# Patient Record
Sex: FEMALE | Race: WHITE | NOT HISPANIC OR LATINO | Employment: OTHER | URBAN - METROPOLITAN AREA
[De-identification: names, ages, dates, MRNs, and addresses within clinical notes are randomized per-mention and may not be internally consistent; named-entity substitution may affect disease eponyms.]

---

## 2017-01-16 DIAGNOSIS — G89.29 OTHER CHRONIC PAIN: ICD-10-CM

## 2017-01-16 DIAGNOSIS — G89.4 CHRONIC PAIN SYNDROME: ICD-10-CM

## 2017-01-16 DIAGNOSIS — S76.311S HAMSTRING MUSCLE STRAIN, RIGHT, SEQUELA: Primary | ICD-10-CM

## 2017-01-16 NOTE — TELEPHONE ENCOUNTER
Controlled Substance Refill Request for Tramadol 50mg  Problem List Complete:  No     PROVIDER TO CONSIDER COMPLETION OF PROBLEM LIST AND OVERVIEW/CONTROLLED SUBSTANCE AGREEMENT    Last Written Prescription Date:  12/15/2016  Last Fill Quantity: 75,12/15/2016   # refills: 0    Last Office Visit with [facility] [place] care provider: 12/15/2016-Dr Oconnor    Future Office visit:     Controlled substance agreement on file: Yes:  Date 1/7/2015.     Processing:  Fax Rx to [facility] pharmacy   checked in past 6 months?  Yes 9/17/2016

## 2017-01-17 RX ORDER — TRAMADOL HYDROCHLORIDE 50 MG/1
50 TABLET ORAL EVERY 6 HOURS PRN
Qty: 75 TABLET | Refills: 0 | Status: SHIPPED | OUTPATIENT
Start: 2017-01-17 | End: 2017-03-14

## 2017-01-17 RX ORDER — HYDROCODONE BITARTRATE AND ACETAMINOPHEN 10; 325 MG/1; MG/1
TABLET ORAL
Qty: 20 TABLET | Refills: 0 | Status: SHIPPED | OUTPATIENT
Start: 2017-01-17 | End: 2017-03-16

## 2017-01-17 NOTE — TELEPHONE ENCOUNTER
Controlled Substance Refill Request for Norco Oral TAblet 10-325mg  Problem List Complete:  No     PROVIDER TO CONSIDER COMPLETION OF PROBLEM LIST AND OVERVIEW/CONTROLLED SUBSTANCE AGREEMENT    Last Written Prescription Date:  12/15/2016  Last Fill Quantity: 20,12/15/2016   # refills: 0    Last Office Visit with [facility] [place] care provider: 12/15/2016-Dr Oconnor    Future Office visit:     Controlled substance agreement on file: Yes:  Date 1/7/2015.     Processing:  Fax Rx to [facility] pharmacy   checked in past 6 months?  Yes 9/7/2016

## 2017-03-14 DIAGNOSIS — S76.311S HAMSTRING MUSCLE STRAIN, RIGHT, SEQUELA: ICD-10-CM

## 2017-03-14 DIAGNOSIS — G89.4 CHRONIC PAIN SYNDROME: ICD-10-CM

## 2017-03-15 DIAGNOSIS — G89.29 OTHER CHRONIC PAIN: ICD-10-CM

## 2017-03-15 NOTE — TELEPHONE ENCOUNTER
Controlled Substance Refill Request for traMADol (ULTRAM) 50 MG tablet  Problem List Complete:  No     PROVIDER TO CONSIDER COMPLETION OF PROBLEM LIST AND OVERVIEW/CONTROLLED SUBSTANCE AGREEMENT    Last Written Prescription Date:  1/17/17  Last Fill Quantity: 75,   # refills: 0    Last Office Visit with [facility] [place]: 12/15/2016 With Dr. Oconnor.      Future Office visit:     Controlled substance agreement on file: Yes:  Date 1/7/15.     Processing:  Fax Rx to [facility] pharmacy   checked in past 6 months?  No, route to RN  9/17/16    ROLAND Seymour

## 2017-03-16 RX ORDER — CYCLOBENZAPRINE HCL 10 MG
TABLET ORAL
Qty: 30 TABLET | Refills: 1 | Status: SHIPPED | OUTPATIENT
Start: 2017-03-16 | End: 2017-07-17

## 2017-03-16 RX ORDER — TRAMADOL HYDROCHLORIDE 50 MG/1
TABLET ORAL
Qty: 75 TABLET | Refills: 0 | Status: SHIPPED | OUTPATIENT
Start: 2017-03-16 | End: 2017-04-17

## 2017-03-16 RX ORDER — HYDROCODONE BITARTRATE AND ACETAMINOPHEN 10; 325 MG/1; MG/1
TABLET ORAL
Qty: 20 TABLET | Refills: 0 | Status: SHIPPED | OUTPATIENT
Start: 2017-03-16 | End: 2017-04-17

## 2017-03-16 NOTE — TELEPHONE ENCOUNTER
Pending Prescriptions:                       Disp   Refills    cyclobenzaprine (FLEXERIL) 10 MG tablet [*30 tab*1            Sig: TAKE 1 TABLET (10 MG) BY MOUTH NIGHTLY AS NEEDED           FOR MUSCLE SPASMS    HYDROcodone-acetaminophen (NORCO)  *20 tab*0            Sig: One tab by mouth twice daily as needed LAST           REFILL NEEDS VISIT    Controlled Substance Refill Request for Hydrocodone  Problem List Complete:  Yes  Patient is followed by GABINO JONES for ongoing prescription of pain medication. All refills should be approved by this provider, or covering partner.     Medication(s):   Tramadol, hydrocodone.   Maximum quantity per month:75/20 Clinic visit frequency required: Q 4 months      Controlled substance agreement:      Encounter-Level CSA - 1/7/15:                   Controlled Substance Agreement - Scan on 1/8/2015 9:46 AM :  Clinics Controlled Medication Agreement 1-7-15 (below)          Pain Clinic evaluation in the past: No     DIRE Total Score(s):  No flowsheet data found.     Last [facility] website verification: 9/7/16     checked in past 6 months?  Yes 09/07/2016     Please call patient on her cell phone when Rx's are ready to be picked up.  Orquidea Herzog

## 2017-03-16 NOTE — TELEPHONE ENCOUNTER
[place] reviewed:  not reviewed/not due - last done on 9/7/16    [place]  [email or web address]

## 2017-03-16 NOTE — TELEPHONE ENCOUNTER
[place] reviewed:  not reviewed/not due - last done on 9/7/16    [place] profile:  [email or web address]

## 2017-04-17 DIAGNOSIS — S76.311S HAMSTRING MUSCLE STRAIN, RIGHT, SEQUELA: ICD-10-CM

## 2017-04-17 DIAGNOSIS — G89.29 OTHER CHRONIC PAIN: ICD-10-CM

## 2017-04-17 DIAGNOSIS — G89.4 CHRONIC PAIN SYNDROME: ICD-10-CM

## 2017-04-17 RX ORDER — HYDROCODONE BITARTRATE AND ACETAMINOPHEN 10; 325 MG/1; MG/1
TABLET ORAL
Qty: 20 TABLET | Refills: 0 | Status: SHIPPED | OUTPATIENT
Start: 2017-04-17 | End: 2017-06-06

## 2017-04-17 RX ORDER — TRAMADOL HYDROCHLORIDE 50 MG/1
50 TABLET ORAL EVERY 6 HOURS PRN
Qty: 75 TABLET | Refills: 0 | Status: SHIPPED | OUTPATIENT
Start: 2017-04-17 | End: 2017-05-09

## 2017-04-17 NOTE — TELEPHONE ENCOUNTER
Controlled Substance Refill Request for Norco  Mg & Tramadol 50 Mg  Problem List Complete:  Yes   checked in past 6 months?  Yes 12/15/2016     Patient would like to  the RX for Norco  Mg & she would like the RX for Tramadol 50 Mg to be faxed to [facility] pharmacy in [place].  Please call patient when she can come over and  the paper prescription.  Patient can be reached at [phone].    Paola Manzo/

## 2017-05-09 ENCOUNTER — TELEPHONE (OUTPATIENT)
Dept: FAMILY MEDICINE | Facility: CLINIC | Age: 62
End: 2017-05-09

## 2017-05-09 DIAGNOSIS — G89.4 CHRONIC PAIN SYNDROME: ICD-10-CM

## 2017-05-09 DIAGNOSIS — S76.311S HAMSTRING MUSCLE STRAIN, RIGHT, SEQUELA: ICD-10-CM

## 2017-05-09 NOTE — TELEPHONE ENCOUNTER
5/9/2017    Call Regarding Preventive Health Screening Mammogram    Attempt 1    Message on voicemail     Comments:         Outreach   PAULY

## 2017-05-09 NOTE — TELEPHONE ENCOUNTER
Controlled Substance Refill Request for traMADol (ULTRAM) 50 MG tablet  Problem List Complete:  Yes    Overview Addendum 9/7/2016 11:11 AM by Gayle Broderick RN      Patient is followed by GABINO JONES for ongoing prescription of pain medication. All refills should be approved by this provider, or covering partner.     Medication(s):   Tramadol, hydrocodone.   Maximum quantity per month:75/20 Clinic visit frequency required: Q 4 months      Controlled substance agreement:      Encounter-Level CSA - 1/7/15:                   Controlled Substance Agreement - Scan on 1/8/2015 9:46 AM :  Clinics Controlled Medication Agreement 1-7-15 (below)          Pain Clinic evaluation in the past: No     DIRE Total Score(s):  No flowsheet data found.         Last Written Prescription Date:  4/17/17  Last Fill Quantity: 75,   # refills: 0    Last Office Visit with [facility] primary care provider: 12/15/2016      Clinic visit frequency required: Q 4 months     Future Office visit:     Controlled substance agreement on file: Yes:  Date 1/7/15.     Processing:  Fax Rx to [facility] pharmacy   checked in past 6 months?  Yes 12/15/16      ROLAND Seymour  May 9, 2017  4:58 PM

## 2017-05-12 RX ORDER — TRAMADOL HYDROCHLORIDE 50 MG/1
TABLET ORAL
Qty: 50 TABLET | Refills: 0 | Status: SHIPPED | OUTPATIENT
Start: 2017-05-12 | End: 2017-06-02

## 2017-05-12 NOTE — TELEPHONE ENCOUNTER
Prescription faxed to [facility], 05/12/17    Teagan Llamas/KATRINA

## 2017-06-02 DIAGNOSIS — S76.311S HAMSTRING MUSCLE STRAIN, RIGHT, SEQUELA: ICD-10-CM

## 2017-06-02 DIAGNOSIS — G89.4 CHRONIC PAIN SYNDROME: ICD-10-CM

## 2017-06-05 RX ORDER — TRAMADOL HYDROCHLORIDE 50 MG/1
TABLET ORAL
Qty: 50 TABLET | Refills: 0 | Status: SHIPPED | OUTPATIENT
Start: 2017-06-05 | End: 2017-06-27

## 2017-06-05 NOTE — TELEPHONE ENCOUNTER
Pending Prescriptions:                       Disp   Refills    traMADol (ULTRAM) 50 MG tablet [Pharmacy *50 tab*0            Sig: TAKE 1 TABLET BY MOUTH EVERY 6 HOURS AS NEEDED           FOR MODERATE PAIN        TRAMADOL       Last Written Prescription Date:  5/12/2017  Last Fill Quantity: 50,   # refills: 0  Last Office Visit with [facility] or [facility] prescribing provider: 12/15/2016Anastasia        Future Office visit:       Routing refill request to provider for review/approval because:  Drug not on the [facility], [facility] or [facility] refill protocol or controlled substance

## 2017-06-05 NOTE — TELEPHONE ENCOUNTER
Routing refill request to provider for review/approval because:  Drug not on the [facility] refill protocol   : 6.5.17, monthly fills    Lyndsey Amador RN, BS  Clinical Nurse Triage.

## 2017-06-06 DIAGNOSIS — G89.29 OTHER CHRONIC PAIN: ICD-10-CM

## 2017-06-06 NOTE — TELEPHONE ENCOUNTER
Controlled Substance Refill Request for Norco  Problem List Complete:  No     PROVIDER TO CONSIDER COMPLETION OF PROBLEM LIST AND OVERVIEW/CONTROLLED SUBSTANCE AGREEMENT    Last Written Prescription Date:  04/17/2017  Last Fill Quantity: 20,   # refills: 0    Last Office Visit with [facility] primary care provider: 12/15/2016-Dr Oconnor    Future Office visit:     Controlled substance agreement on file: Yes:  Date 1/7/2015.     Processing:  Fax Rx to [facility] pharmacy   checked in past 6 months?  No, route to RN

## 2017-06-12 NOTE — TELEPHONE ENCOUNTER
Not [facility] med.  Sent to provider.  Elisabeth Freeman RN      Last [facility] website verification:  6.5.17

## 2017-06-13 RX ORDER — HYDROCODONE BITARTRATE AND ACETAMINOPHEN 10; 325 MG/1; MG/1
TABLET ORAL
Qty: 20 TABLET | Refills: 0 | Status: SHIPPED | OUTPATIENT
Start: 2017-06-13 | End: 2017-07-17

## 2017-06-15 ENCOUNTER — TELEPHONE (OUTPATIENT)
Dept: FAMILY MEDICINE | Facility: CLINIC | Age: 62
End: 2017-06-15

## 2017-06-15 NOTE — TELEPHONE ENCOUNTER
Panel Management Review      Patient has the following on her problem list: None      Composite cancer screening  Chart review shows that this patient is due/due soon for the following Pap Smear and Mammogram  Summary:    Patient is due/failing the following:   MAMMOGRAM and PAP    Action needed:   Patient needs office visit for Pap, Mammo.    Type of outreach:    Phone, spoke to patient.  she going set something in the future     Questions for provider review:    None                                                                                                                                    Liudmila Jerry      Chart routed to none .

## 2017-06-17 ENCOUNTER — HEALTH MAINTENANCE LETTER (OUTPATIENT)
Age: 62
End: 2017-06-17

## 2017-06-26 DIAGNOSIS — S76.311S HAMSTRING MUSCLE STRAIN, RIGHT, SEQUELA: ICD-10-CM

## 2017-06-26 DIAGNOSIS — G89.4 CHRONIC PAIN SYNDROME: ICD-10-CM

## 2017-06-26 RX ORDER — TRAMADOL HYDROCHLORIDE 50 MG/1
TABLET ORAL
Qty: 50 TABLET | Refills: 0 | Status: CANCELLED | OUTPATIENT
Start: 2017-06-26

## 2017-06-26 NOTE — TELEPHONE ENCOUNTER
Controlled Substance Refill Request for traMADol (ULTRAM) 50 MG tablet  Sig: TAKE 1 TABLET BY MOUTH EVERY 6 HOURS AS NEEDED FOR MODERATE PAIN    Problem List Complete:  Yes  Overview Addendum 6/5/2017  1:35 PM by Lyndsey Amador RN      Patient is followed by GABINO JONES for ongoing prescription of pain medication.  All refills should be approved by this provider, or covering partner.     Medication(s):   Tramadol, hydrocodone.   Maximum quantity per month:75/20 Clinic visit frequency required: Q 4 months      Controlled substance agreement:      Encounter-Level CSA - 1/7/15:                   Controlled Substance Agreement - Scan on 1/8/2015  9:46 AM : [facility] Controlled Medication Agreement 1-7-15 (below)          Pain Clinic evaluation in the past: No     DIRE Total Score(s):  No flowsheet data found.     Last [facility] website verification:  6.5.17       Last Written Prescription Date:  6/5/17  Last Fill Quantity: 50,   # refills: 0    Last Office Visit with [facility] primary care provider: 12/15/2016      Clinic visit frequency required: Q 4 months     Future Office visit:     Controlled substance agreement on file: Yes:  Date 1/7/15.     Processing:  Fax Rx to [facility] pharmacy   checked in past 6 months?  Yes 6/5/17      ROLADN Seymour  June 26, 2017  3:47 PM

## 2017-06-27 ENCOUNTER — TELEPHONE (OUTPATIENT)
Dept: FAMILY MEDICINE | Facility: CLINIC | Age: 62
End: 2017-06-27

## 2017-06-27 DIAGNOSIS — S76.311S HAMSTRING MUSCLE STRAIN, RIGHT, SEQUELA: ICD-10-CM

## 2017-06-27 DIAGNOSIS — G89.4 CHRONIC PAIN SYNDROME: ICD-10-CM

## 2017-06-27 RX ORDER — TRAMADOL HYDROCHLORIDE 50 MG/1
TABLET ORAL
Qty: 50 TABLET | Refills: 0 | Status: SHIPPED | OUTPATIENT
Start: 2017-06-27 | End: 2017-07-17

## 2017-06-27 NOTE — TELEPHONE ENCOUNTER
Patient wondering if medication for Tramadol has been faxed to [facility] pharmacy. Was filled yesterday or put in, has Dr. Oconnor looked at this?

## 2017-06-27 NOTE — LETTER
[facility]  [address] [place], [address]  [phone]        June 27, 2017    Emeli Granados                                                                                                                                                       [address] [facility] [address]            Dear Emeli,    I did refill your medication but will need to see you in the next month to re evaluate your Pain Management Plan..          Lars Oconnor MD

## 2017-06-27 NOTE — TELEPHONE ENCOUNTER
Routing refill request to provider for review/approval because:  Drug not on the [facility] refill protocol    done 6.5.17    Lyndsey Amador RN, BS  Clinical Nurse Triage.

## 2017-07-17 ENCOUNTER — OFFICE VISIT (OUTPATIENT)
Dept: FAMILY MEDICINE | Facility: CLINIC | Age: 62
End: 2017-07-17
Payer: COMMERCIAL

## 2017-07-17 ENCOUNTER — RADIANT APPOINTMENT (OUTPATIENT)
Dept: GENERAL RADIOLOGY | Facility: CLINIC | Age: 62
End: 2017-07-17
Attending: FAMILY MEDICINE
Payer: COMMERCIAL

## 2017-07-17 VITALS
HEIGHT: 59 IN | BODY MASS INDEX: 26.59 KG/M2 | WEIGHT: 131.9 LBS | OXYGEN SATURATION: 97 % | DIASTOLIC BLOOD PRESSURE: 73 MMHG | RESPIRATION RATE: 16 BRPM | HEART RATE: 72 BPM | SYSTOLIC BLOOD PRESSURE: 135 MMHG | TEMPERATURE: 98.3 F

## 2017-07-17 DIAGNOSIS — G89.29 OTHER CHRONIC PAIN: ICD-10-CM

## 2017-07-17 DIAGNOSIS — M25.511 ACUTE PAIN OF RIGHT SHOULDER: Primary | ICD-10-CM

## 2017-07-17 DIAGNOSIS — G47.9 SLEEP DISORDER: ICD-10-CM

## 2017-07-17 PROBLEM — F10.21 ALCOHOL DEPENDENCE IN REMISSION (H): Status: ACTIVE | Noted: 2017-07-17

## 2017-07-17 PROCEDURE — 99214 OFFICE O/P EST MOD 30 MIN: CPT | Performed by: FAMILY MEDICINE

## 2017-07-17 PROCEDURE — 73030 X-RAY EXAM OF SHOULDER: CPT | Mod: RT

## 2017-07-17 RX ORDER — NAPROXEN 500 MG/1
500 TABLET ORAL 2 TIMES DAILY PRN
Qty: 90 TABLET | Refills: 1 | Status: SHIPPED | OUTPATIENT
Start: 2017-07-17 | End: 2017-12-04

## 2017-07-17 RX ORDER — ZOLPIDEM TARTRATE 5 MG/1
5 TABLET ORAL
Qty: 30 TABLET | Refills: 3 | Status: SHIPPED | OUTPATIENT
Start: 2017-07-17 | End: 2018-04-21

## 2017-07-17 RX ORDER — HYDROCODONE BITARTRATE AND ACETAMINOPHEN 10; 325 MG/1; MG/1
TABLET ORAL
Qty: 20 TABLET | Refills: 0 | Status: SHIPPED | OUTPATIENT
Start: 2017-07-17 | End: 2017-09-05

## 2017-07-17 RX ORDER — TRAMADOL HYDROCHLORIDE 50 MG/1
TABLET ORAL
Qty: 60 TABLET | Refills: 0 | Status: SHIPPED | OUTPATIENT
Start: 2017-07-17 | End: 2017-08-09

## 2017-07-17 RX ORDER — CYCLOBENZAPRINE HCL 10 MG
TABLET ORAL
Qty: 30 TABLET | Refills: 1 | Status: SHIPPED | OUTPATIENT
Start: 2017-07-17 | End: 2017-12-04

## 2017-07-17 ASSESSMENT — ANXIETY QUESTIONNAIRES
6. BECOMING EASILY ANNOYED OR IRRITABLE: NOT AT ALL
1. FEELING NERVOUS, ANXIOUS, OR ON EDGE: NOT AT ALL
3. WORRYING TOO MUCH ABOUT DIFFERENT THINGS: NOT AT ALL
GAD7 TOTAL SCORE: 0
5. BEING SO RESTLESS THAT IT IS HARD TO SIT STILL: NOT AT ALL
7. FEELING AFRAID AS IF SOMETHING AWFUL MIGHT HAPPEN: NOT AT ALL
2. NOT BEING ABLE TO STOP OR CONTROL WORRYING: NOT AT ALL
IF YOU CHECKED OFF ANY PROBLEMS ON THIS QUESTIONNAIRE, HOW DIFFICULT HAVE THESE PROBLEMS MADE IT FOR YOU TO DO YOUR WORK, TAKE CARE OF THINGS AT HOME, OR GET ALONG WITH OTHER PEOPLE: NOT DIFFICULT AT ALL

## 2017-07-17 ASSESSMENT — PATIENT HEALTH QUESTIONNAIRE - PHQ9: 5. POOR APPETITE OR OVEREATING: NOT AT ALL

## 2017-07-17 NOTE — MR AVS SNAPSHOT
"              After Visit Summary   7/17/2017    Emeli Granados    MRN: [id number]           Patient Information     Date Of Birth          1955        Visit Information        Provider Department      7/17/2017 10:00 AM Lars Oconnor MD [facility]        Today's Diagnoses     Acute pain of right shoulder    -  1    Hamstring muscle strain, right, sequela        Chronic pain syndrome        Other chronic pain          Care Instructions    lamisil cream twice daily     Soak in hot tub or pool or mix water with bleach          Follow-ups after your visit        Who to contact     If you have questions or need follow up information about today's clinic visit or your schedule please contact [facility] directly at [phone].  Normal or non-critical lab and imaging results will be communicated to you by MyChart, letter or phone within 4 business days after the clinic has received the results. If you do not hear from us within 7 days, please contact the clinic through MyChart or phone. If you have a critical or abnormal lab result, we will notify you by phone as soon as possible.  Submit refill requests through [facility] or call your pharmacy and they will forward the refill request to us. Please allow 3 business days for your refill to be completed.          Additional Information About Your Visit        MyChart Information     [facility] lets you send messages to your doctor, view your test results, renew your prescriptions, schedule appointments and more. To sign up, go to [email or web address] . Click on \"Log in\" on the left side of the screen, which will take you to the Welcome page. Then click on \"Sign up Now\" on the right side of the page.     You will be asked to enter the access code listed below, as well as some personal information. Please follow the directions to create your username and password.     Your access code is: [email or web address]  Expires: 10/15/2017 " "11:12 AM     Your access code will  in 90 days. If you need help or a new code, please call your [facility] or [phone].        Care EveryWhere ID     This is your Care EveryWhere ID. This could be used by other organizations to access your [facility] medical records  [id number]        Your Vitals Were     Pulse Temperature Respirations Height Pulse Oximetry BMI (Body Mass Index)    72 98.3  F (36.8  C) (Oral) 16 4' 11\" (1.499 m) 97% 26.64 kg/m2       Blood Pressure from Last 3 Encounters:   17 135/73   12/15/16 110/70   16 112/74    Weight from Last 3 Encounters:   17 131 lb 14.4 oz (59.8 kg)   12/15/16 129 lb (58.5 kg)   16 131 lb (59.4 kg)              We Performed the Following     DEPRESSION ACTION PLAN (DAP)     XR Shoulder Right G/E 3 Views          Today's Medication Changes          These changes are accurate as of: 17 11:12 AM.  If you have any questions, ask your nurse or doctor.               These medicines have changed or have updated prescriptions.        Dose/Directions    HYDROcodone-acetaminophen  MG per tablet   Commonly known as:  NORCO   This may have changed:  additional instructions   Used for:  Other chronic pain   Changed by:  Lars Oconnor MD        One tab by mouth twice daily as needed   Quantity:  20 tablet   Refills:  0            Where to get your medicines      Some of these will need a paper prescription and others can be bought over the counter.  Ask your nurse if you have questions.     Bring a paper prescription for each of these medications     HYDROcodone-acetaminophen  MG per tablet    traMADol 50 MG tablet                Primary Care Provider Office Phone # Fax #    Lars Oconnor [phone]       [address] [address]        Equal Access to Services     SWETA ZHENG AH: Natalio leoo Soomaali, waaxda luqadaha, qaybta kaalmada " jes westtamara arenasaan ah. So [facility] [phone].    ATENCIÓN: Si deon huitron, tiene a murphy disposición servicios gratuitos de asistencia lingüística. Judy al [phone].    We comply with applicable federal civil rights laws and [place] laws. We do not discriminate on the basis of race, color, national origin, age, disability sex, sexual orientation or gender identity.            Thank you!     Thank you for choosing [facility]  for your care. Our goal is always to provide you with excellent care. Hearing back from our patients is one way we can continue to improve our services. Please take a few minutes to complete the written survey that you may receive in the mail after your visit with us. Thank you!             Your Updated Medication List - Protect others around you: Learn how to safely use, store and throw away your medicines at www.disposemymeds.org.          This list is accurate as of: 7/17/17 11:12 AM.  Always use your most recent med list.                   Brand Name Dispense Instructions for use Diagnosis    * albuterol (2.5 MG/3ML) 0.083% neb solution     30 vial    Take 1 vial (2.5 mg) by nebulization every 6 hours as needed for shortness of breath / dyspnea or wheezing    Acute recurrent maxillary sinusitis, Acute bronchitis, unspecified organism       * albuterol 108 (90 BASE) MCG/ACT Inhaler    PROAIR HFA/PROVENTIL HFA/VENTOLIN HFA    1 Inhaler    Inhale 2 puffs into the lungs every 6 hours as needed    COPD exacerbation (H)       ascorbic acid 1000 MG Tabs    vitamin C     Take 1,000 mg by mouth daily        cholecalciferol 1000 UNIT tablet    vitamin D     Take 1,000 Units by mouth daily        cyclobenzaprine 10 MG tablet    FLEXERIL    30 tablet    TAKE 1 TABLET (10 MG) BY MOUTH NIGHTLY AS NEEDED FOR MUSCLE SPASMS    Other chronic pain       fexofenadine 180 MG tablet    ALLEGRA    30 tablet    Take 1 tablet (180 mg) by mouth daily    Hives        HYDROcodone-acetaminophen  MG per tablet    NORCO    20 tablet    One tab by mouth twice daily as needed    Other chronic pain       * ipratropium - albuterol 0.5 mg/2.5 mg/3 mL 0.5-2.5 (3) MG/3ML neb solution    DUONEB    30 vial    Take 1 vial (3 mLs) by nebulization every 4 hours as needed for shortness of breath / dyspnea or wheezing    Obstructive chronic bronchitis with exacerbation (H)       * ipratropium - albuterol 0.5 mg/2.5 mg/3 mL 0.5-2.5 (3) MG/3ML neb solution    DUONEB    30 vial    Take 1 vial (3 mLs) by nebulization every 6 hours as needed for shortness of breath / dyspnea or wheezing    COPD exacerbation (H)       lidocaine (viscous) 2 % solution    XYLOCAINE      Obstructive chronic bronchitis with exacerbation (H)       metFORMIN 500 MG 24 hr tablet    GLUCOPHAGE-XR    30 tablet    Take 1 tablet (500 mg) by mouth daily (with dinner)    Abnormal laboratory test       MULTIVITAMIN PO      Take 1 tablet by mouth daily Per pt, uses ones without Iron        naproxen 500 MG tablet    NAPROSYN    90 tablet    Take 1 tablet (500 mg) by mouth 2 times daily as needed for moderate pain    Hamstring muscle strain, right, sequela, Other chronic pain       * nicotine 14 MG/24HR 24 hr patch    NICODERM CQ    30 patch    Place 1 patch onto the skin every 24 hours        * nicotine 7 MG/24HR 24 hr patch    NICODERM CQ    30 patch    Place 1 patch onto the skin every 24 hours        omeprazole 20 MG CR capsule    priLOSEC    60 capsule    Take 2 capsules (40 mg) by mouth daily    Oropharyngeal dysphagia       order for DME     1 Device    Equipment being ordered: Nebulizer    COPD exacerbation (H)       traMADol 50 MG tablet    ULTRAM    60 tablet    TAKE 1 TABLET BY MOUTH EVERY 6 HOURS AS NEEDED FOR MODERATE PAIN    Hamstring muscle strain, right, sequela, Chronic pain syndrome       vitamin E 400 UNITS Tabs      Take 400 Units by mouth daily        zolpidem 5 MG tablet    AMBIEN    30 tablet    Take 1  tablet (5 mg) by mouth nightly as needed for sleep    Sleep disorder       * Notice:  This list has 6 medication(s) that are the same as other medications prescribed for you. Read the directions carefully, and ask your doctor or other care provider to review them with you.

## 2017-07-17 NOTE — NURSING NOTE
"Chief Complaint   Patient presents with     Musculoskeletal Problem     right shoulder and both feet        Initial /73 (BP Location: Right arm, Patient Position: Chair, Cuff Size: Adult Regular)  Pulse 72  Temp 98.3  F (36.8  C) (Oral)  Resp 16  Ht 4' 11\" (1.499 m)  Wt 131 lb 14.4 oz (59.8 kg)  SpO2 97%  BMI 26.64 kg/m2 Estimated body mass index is 26.64 kg/(m^2) as calculated from the following:    Height as of this encounter: 4' 11\" (1.499 m).    Weight as of this encounter: 131 lb 14.4 oz (59.8 kg).  Medication Reconciliation: complete   "

## 2017-07-17 NOTE — PROGRESS NOTES
SUBJECTIVE:                                                    Emeli Granados is a 62 year old female who presents to clinic today for the following health issues:      Concern - shoulder pain   Overdid it moving her dad to snf  Onset: 4 months     Description:   Hurts with movement and when lay on it right shoulder     Intensity: moderate, severe    Progression of Symptoms:  worsening    Accompanying Signs & Symptoms:  Hurts when movement     Previous history of similar problem:   None     Precipitating factors:   Worsened by: with movement     Alleviating factors:  Improved by:     Therapies Tried and outcome:     Past Medical History:   Diagnosis Date     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 uterus and left the ovaries     Other and unspecified alcohol dependence, unspecified drinking behavior     sober since 1999     Polycythemia 8/8/2012     Smoker      Unspecified disorder of esophagus     stricture - has had it dilated     Unspecified hemorrhoids without mention of complication        Past Surgical History:   Procedure Laterality Date     C NONSPECIFIC PROCEDURE  1988    hysterectomy-uterine & cervical ca - tubes and ovaries are still in     C NONSPECIFIC PROCEDURE      ganglion cyst removal     C NONSPECIFIC PROCEDURE      right thumb surgery     C NONSPECIFIC PROCEDURE  2003    dilatation of the esophagus once due to dysphagia and stricture     HC COLONOSCOPY THRU STOMA, DIAGNOSTIC  2004, 2012    repeat in 2022     HC HEMORRHOIDECTOMY W BANDING/LIGATION      Hemorrhoidectomy     HC REMOVAL OF TONSILS,<10 Y/O      Tonsils <11y.o.     HYSTERECTOMY, PAP NO LONGER INDICATED       LAPAROSCOPIC SALPINGO-OOPHORECTOMY Bilateral 10/23/2015    Procedure: LAPAROSCOPIC SALPINGO-OOPHORECTOMY;   Surgeon: Johnathan Steve MD;  Location: [facility] OR       Family History   Problem Relation Age of Onset     Hypertension Mother      CEREBROVASCULAR DISEASE Mother      TIA's     Depression Mother      Cancer - colorectal Maternal Grandmother      dx at 65     Lipids Father      C.A.D. Father      angioplasty at 65     Musculoskeletal Disorder Father      Alcohol/Drug Daughter      in remission     Breast Cancer No family hx of        Social History   Substance Use Topics     Smoking status: Current Every Day Smoker     Packs/day: 0.50     Years: 30.00     Types: Cigarettes     Start date: 10/13/1967     Smokeless tobacco: Never Used     Alcohol use No      Comment: sober since 9/11/99     Inventory of mental status shows issues with past alcoholism  Discussed regarding anxiety, sleep, anhedonia, irritability, energy,  perspective, self image, affect, current stressors, holistic parameters, work situation,  physical and social mitigating factors.    Past history has included years of sobriety no new episode and  episodes and recent stress from mom and dad in snf.    This is the dominant arm      Painful arc syndrome is present, xray shows calcific bursitis     No associated neck pain. Moderate trapezius discomfort is noted with  shoulder shrug and axillary nerve is intact. Full neck ROM    Xrays show potential calcification    no associated epicondylitis at the elbow    No apprehension or rotational instability.    Specific rotator cuff defect or tenderness is not  palpable.        No problems with vision, hearing, thyroid, chest pain, dyspnea,rash,  stomach upset, polyuria, polydipsia, dysuria,muscle aches, numbness,  cough, tics or balance issues. Memory is reliable, employment is regular .    HARVINDER,thyroid normal, lungs clear, s1s2,soft abdoman, symmetrical dtrs,  no abdominal mass,good peripheral pulses, vs stable.  Discussed ssri vs dop/ser grouping vs wellbutrin in depression mgmt  Discussed analgesics as a  bridge to definitive treatment     (M25.511) Acute pain of right shoulder  (primary encounter diagnosis)  Comment: bursitis, suggest steroid injection  Plan: XR Shoulder Right G/E 3 Views            (S76.311S) Hamstring muscle strain, right, sequela  Comment:   Plan: traMADol (ULTRAM) 50 MG tablet, naproxen         (NAPROSYN) 500 MG tablet        Good recovery at this point     (G89.4) Chronic pain syndrome  Comment: neck and back   Plan: traMADol (ULTRAM) 50 MG tablet            (G89.29) Other chronic pain  Comment: completed CSA  Plan: HYDROcodone-acetaminophen (NORCO)  MG per        tablet, cyclobenzaprine (FLEXERIL) 10 MG         tablet, naproxen (NAPROSYN) 500 MG tablet        Infrequent: nsaid is primary    (G47.9) Sleep disorder  Comment: Plan: zolpidem (AMBIEN) 5 MG tablet  .

## 2017-07-17 NOTE — LETTER
[facility]  [address] [place], [address]  [phone]      July 18, 2017    Emeli Granados  [address]  [facility] [address]          Dear Emeli,    The results of your recent xray look normal.   Enclosed is a copy of the results.  It was a pleasure to see you at your last appointment.    If you have any questions or concerns, please call myself or my nurse at [phone].          Sincerely,    Lars Oconnor MD    Results for orders placed or performed in visit on 07/17/17   XR Shoulder Right G/E 3 Views    Narrative    XR SHOULDER RT G/E 3 VW  7/17/2017 10:37 AM    HISTORY:  Pain in right shoulder    COMPARISON:  None.      Impression    IMPRESSION:  Negative.     BRENDA LAMAS MD     MF

## 2017-07-17 NOTE — LETTER
[facility]    07/17/17    Patient: Emeli Granados  YOB: 1955  Medical Record Number: [id number]                                                                  Controlled Substance Agreement  I understand that my care provider has prescribed controlled substances (narcotics, tranquilizers, and/or stimulants) to help manage my condition(s).  I am taking this medicine to help me function or work.  I know that this is strong medicine.  It could have serious side effects and even cause a dependency on the drug.  If I stop these medicines suddenly, I could have severe withdrawal symptoms.    The risks, benefits, and side effects of these medication(s) were explained to me.  I agree that:  1. I will take part in other treatments as advised by my provider.  This may be psychiatry or counseling, physical therapy, behavioral therapy, group treatment, or a referral to a pain clinic.  I will reduce or stop my medicine when my provider tells me to do so.   2. I will take my medicines as prescribed.  I will not change the dose or schedule unless my provider tells me to.  There will be no refills if I  run out early.   I may be contacted at any time without warning and asked to complete a drug test or pill count.   3. I will keep all my appointments at the clinic.  If I miss appointments or fail to follow instructions, my provider may stop my medicine.  4. I will not ask other providers to prescribe controlled substances. And I will not accept controlled substances from other people. If I need another prescribed controlled substance for a new reason, I will notify my provider within one business day.  5. If I enroll in the [place] Medical Marijuana program, I will tell my provider.  I will also sign an agreement to share my medical records with my provider.  6. I will use one pharmacy to fill all of my controlled substance prescriptions.  If my prescription is mailed to my pharmacy, it may  take 5 to 7 days for my medicine to be ready.  7. I understand that my provider, clinic care team, and pharmacy can track controlled substance prescriptions from other providers through a central database (prescription monitoring program).  8. I will bring in my list of medications (or my medicine bottles) each time I come to the clinic.  REV- 04/2016                                                                                                                                            Page 1 of 2      [place]    07/17/17    Patient: Emeli Granados  YOB: 1955  Medical Record Number: [id number]    9. Refills of controlled substances will be made only during office hours.  It is up to me to make sure that I do not run out of my medicines on weekends or holidays.    10. I am responsible for my prescriptions.  If the medicine is lost or stolen, it will not be replaced.   I also agree not to share these medicines with anyone.  11. I agree to not use ANY illegal or recreational drugs.  This includes marijuana, cocaine, bath salts or other drugs.  I agree not to use alcohol unless my provider says I may.  I agree to give urine samples whenever asked.  If I fail to give a urine sample, the provider may stop my medicine.     12. I will tell my nurse or provider right away if I become pregnant or have a new medical problem treated outside of [facility].  13. I understand that this medicine can affect my thinking and judgment.  It may be unsafe for me to drive, use machinery and do dangerous tasks.  I will not do any of these things until I know how the medicine affects me.  If my dose changes, I will wait to see how it affects me.  I will contact my provider if I have concerns about medicine side effects.  I understand that if I do not follow any of the conditions above, my prescriptions or treatment may be stopped.    I agree that my provider, clinic care team, and pharmacy may  work with any city, state or federal law enforcement agency that investigates the misuse, sale, or other diversion of my controlled medicine. I will allow my provider to discuss my care with or share a copy of this agreement with any other treating provider, pharmacy or emergency room where I receive care.  I agree to give up (waive) any right of privacy or confidentiality with respect to these authorizations.   I have read this agreement and have asked questions about anything I did not understand.   ___________________________________    ___________________________  Patient Signature                                                           Date and Time  ___________________________________     ____________________________  Witness                                                                            Date and Time  ___________________________________  Lars Oconnor MD  REV-  04/2016                                                                                                                                                                 Page 2 of 2

## 2017-07-18 ASSESSMENT — PATIENT HEALTH QUESTIONNAIRE - PHQ9: SUM OF ALL RESPONSES TO PHQ QUESTIONS 1-9: 0

## 2017-07-18 ASSESSMENT — ANXIETY QUESTIONNAIRES: GAD7 TOTAL SCORE: 0

## 2017-08-09 DIAGNOSIS — G89.29 OTHER CHRONIC PAIN: ICD-10-CM

## 2017-08-09 RX ORDER — TRAMADOL HYDROCHLORIDE 50 MG/1
TABLET ORAL
Qty: 60 TABLET | Refills: 0 | Status: SHIPPED | OUTPATIENT
Start: 2017-08-09 | End: 2017-09-05

## 2017-08-09 NOTE — TELEPHONE ENCOUNTER
traMADol (ULTRAM) 50 MG tablet    Last Written Prescription Date: 07/17/17  Last Fill Quantity: 60,  # refills: 0   Last Office Visit with [facility], [facility] or [facility] prescribing provider: 07/17/17

## 2017-09-05 DIAGNOSIS — G89.29 OTHER CHRONIC PAIN: ICD-10-CM

## 2017-09-05 RX ORDER — HYDROCODONE BITARTRATE AND ACETAMINOPHEN 10; 325 MG/1; MG/1
TABLET ORAL
Qty: 20 TABLET | Refills: 0 | Status: SHIPPED | OUTPATIENT
Start: 2017-09-05 | End: 2017-10-03

## 2017-09-05 RX ORDER — TRAMADOL HYDROCHLORIDE 50 MG/1
TABLET ORAL
Qty: 60 TABLET | Refills: 0 | Status: SHIPPED | OUTPATIENT
Start: 2017-09-05 | End: 2017-10-03

## 2017-09-05 NOTE — TELEPHONE ENCOUNTER
HYDROcodone-acetaminophen (NORCO)  MG per tablet    Last Written Prescription Date: 07/17/17  Last Fill Quantity: 20,  # refills: 0   Last Office Visit with [facility], [facility] or [facility] prescribing provider: 07/17/17

## 2017-09-05 NOTE — TELEPHONE ENCOUNTER
Adding second script:    traMADol (ULTRAM) 50 MG tablet    Last Written Prescription Date: 08/09/17  Last Fill Quantity: 60,  # refills: 0   Last Office Visit with [facility], [facility] or [facility] prescribing provider: 07/17/17

## 2017-09-15 ENCOUNTER — TELEPHONE (OUTPATIENT)
Dept: FAMILY MEDICINE | Facility: CLINIC | Age: 62
End: 2017-09-15

## 2017-09-15 DIAGNOSIS — Z87.891 PERSONAL HISTORY OF TOBACCO USE, PRESENTING HAZARDS TO HEALTH: Primary | ICD-10-CM

## 2017-09-15 NOTE — TELEPHONE ENCOUNTER
Panel Management Review      Patient has the following on her problem list: None      Composite cancer screening  Chart review shows that this patient is due/due soon for the following Pap Smear and Mammogram  Summary:    Patient is due/failing the following:   MAMMOGRAM and PAP    Action needed:   Patient needs office visit for Pap and Mammo.    Type of outreach:    Phone, spoke to patient.  decline both Mammo and Pap    Questions for provider review:    None                                                                                                                                    Liudmila Jerry      Chart routed to none .

## 2017-09-15 NOTE — TELEPHONE ENCOUNTER
nicotine (NICODERM CQ) 14 MG/24HR 24 hr patch    Last Written Prescription Date: 04/08/2016  Last Fill Quantity: 30 patches, # refills: 0  Last Office Visit with [facility], [facility] or [facility] prescribing provider: 07/17/2017-Dr Oconnor        BP Readings from Last 3 Encounters:   07/17/17 135/73   12/15/16 110/70   07/12/16 112/74

## 2017-09-19 RX ORDER — NICOTINE 21 MG/24HR
PATCH, TRANSDERMAL 24 HOURS TRANSDERMAL
Qty: 28 PATCH | Refills: 3 | Status: SHIPPED | OUTPATIENT
Start: 2017-09-19 | End: 2019-02-25

## 2017-09-19 NOTE — TELEPHONE ENCOUNTER
Routing refill request to provider to review approval because:  Drug not active on patient's medication list    ? Pt resuming  Gayle Broderick RN, BSN  Message handled by Nurse Triage.

## 2017-10-03 DIAGNOSIS — G89.29 OTHER CHRONIC PAIN: ICD-10-CM

## 2017-10-03 RX ORDER — TRAMADOL HYDROCHLORIDE 50 MG/1
TABLET ORAL
Qty: 60 TABLET | Refills: 0 | Status: SHIPPED | OUTPATIENT
Start: 2017-10-03 | End: 2017-10-30

## 2017-10-03 RX ORDER — HYDROCODONE BITARTRATE AND ACETAMINOPHEN 10; 325 MG/1; MG/1
TABLET ORAL
Qty: 20 TABLET | Refills: 0 | Status: SHIPPED | OUTPATIENT
Start: 2017-10-03 | End: 2017-10-30

## 2017-10-03 NOTE — TELEPHONE ENCOUNTER
refill of Norco and Tramadol fax tramadol to [facility] will pick norco up at  please call when ready  Antonella Melgar/

## 2017-10-30 ENCOUNTER — OFFICE VISIT (OUTPATIENT)
Dept: FAMILY MEDICINE | Facility: CLINIC | Age: 62
End: 2017-10-30
Payer: COMMERCIAL

## 2017-10-30 ENCOUNTER — RADIANT APPOINTMENT (OUTPATIENT)
Dept: GENERAL RADIOLOGY | Facility: CLINIC | Age: 62
End: 2017-10-30
Attending: FAMILY MEDICINE
Payer: COMMERCIAL

## 2017-10-30 VITALS
TEMPERATURE: 97.7 F | OXYGEN SATURATION: 98 % | RESPIRATION RATE: 14 BRPM | SYSTOLIC BLOOD PRESSURE: 119 MMHG | HEART RATE: 99 BPM | DIASTOLIC BLOOD PRESSURE: 67 MMHG | BODY MASS INDEX: 26.66 KG/M2 | WEIGHT: 132 LBS

## 2017-10-30 DIAGNOSIS — G89.29 OTHER CHRONIC PAIN: ICD-10-CM

## 2017-10-30 DIAGNOSIS — M54.50 ACUTE LOW BACK PAIN WITHOUT SCIATICA, UNSPECIFIED BACK PAIN LATERALITY: ICD-10-CM

## 2017-10-30 DIAGNOSIS — Z12.31 ENCOUNTER FOR SCREENING MAMMOGRAM FOR BREAST CANCER: Primary | ICD-10-CM

## 2017-10-30 PROCEDURE — 72100 X-RAY EXAM L-S SPINE 2/3 VWS: CPT

## 2017-10-30 PROCEDURE — 99214 OFFICE O/P EST MOD 30 MIN: CPT | Performed by: FAMILY MEDICINE

## 2017-10-30 RX ORDER — HYDROCODONE BITARTRATE AND ACETAMINOPHEN 10; 325 MG/1; MG/1
TABLET ORAL
Qty: 20 TABLET | Refills: 0 | Status: SHIPPED | OUTPATIENT
Start: 2017-11-02 | End: 2017-12-04

## 2017-10-30 RX ORDER — TRAMADOL HYDROCHLORIDE 50 MG/1
TABLET ORAL
Qty: 60 TABLET | Refills: 0 | Status: SHIPPED | OUTPATIENT
Start: 2017-11-02 | End: 2017-12-04

## 2017-10-30 RX ORDER — CYCLOBENZAPRINE HCL 10 MG
5-10 TABLET ORAL 3 TIMES DAILY PRN
Qty: 30 TABLET | Refills: 1 | Status: SHIPPED | OUTPATIENT
Start: 2017-10-30 | End: 2018-01-26

## 2017-10-30 NOTE — PATIENT INSTRUCTIONS
You may schedule your mammogram at [facility] by calling [phone] and asking to schedule your mammogram or you may schedule with [facility] in [place] by calling [phone].

## 2017-10-30 NOTE — NURSING NOTE
"Chief Complaint   Patient presents with     Back Pain     X 4 weeks, starts in Right buttocks and radiates to lower back, also having some bilateral calf pain, lateral aspect.         Initial /67 (BP Location: Right arm, Patient Position: Chair, Cuff Size: Adult Regular)  Pulse 99  Temp 97.7  F (36.5  C) (Oral)  Resp 14  Wt 132 lb (59.9 kg)  SpO2 98%  BMI 26.66 kg/m2 Estimated body mass index is 26.66 kg/(m^2) as calculated from the following:    Height as of 7/17/17: 4' 11\" (1.499 m).    Weight as of this encounter: 132 lb (59.9 kg).  Medication Reconciliation: complete   Mani Maxwell MA      "

## 2017-10-30 NOTE — PROGRESS NOTES
SUBJECTIVE:   Emeli Granados is a 62 year old female who presents to clinic today for the following health issues:    Patient presents with low back and groin pain for about 3 weeks.   Was raking and pulled something in right groin/crotch area and it shoots up and into low back.   She tried muscles relaxer and tramadol and Vicodin and they did not help much.         Back Pain       Duration: X 3 weeks        Specific cause: raking her son's yard on a hill, moving large salad bowls at work     Description:   Location of pain: gluteus right  Character of pain: Jolting, constant pain   Pain radiation:to lower back   New numbness or weakness in legs, not attributed to pain:  no     Intensity: Currently 6/10     History:   Pain interferes with job: YES, the last 4 days, on her feet a lot, working at [employer]   History of back problems: previous degenerative joint disease of the lumbar spine  Any previous MRI or X-rays: Yes--at "[facility]  Date 1-2 years ago  Sees a specialist for back pain:  Dr. Oli Verma-surgeon, orthopedic MD-"[employer]"  Therapies tried without relief: acetaminophen (Tylenol), cold, heat, muscle relaxants, NSAIDs and stretch    Alleviating factors:   Improved by: none      Precipitating factors:  Worsened by: walking, standing, bending, lifting    Functional and Psychosocial Screen (Chris STarT Back):      Not performed today          Accompanying Signs & Symptoms:  Risk of Fracture:  None  Risk of Cauda Equina:  None  Risk of Infection:  None  Risk of Cancer:  None  Risk of Ankylosing Spondylitis:  Onset at age <35, male, AND morning back stiffness. no         Past Medical History:   Diagnosis Date     Acute hepatitis C without mention of hepatic coma(070.51) 1996    Dr. 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     Diverticula of colon 2014     Malignant neoplasm of endocervix (H) 1988    took  uterus and left the ovaries     Other and unspecified alcohol dependence, unspecified drinking behavior     sober since 1999     Polycythemia 8/8/2012     Smoker      Unspecified disorder of esophagus     stricture - has had it dilated     Unspecified hemorrhoids without mention of complication        Past Surgical History:   Procedure Laterality Date     C NONSPECIFIC PROCEDURE  1988    hysterectomy-uterine & cervical ca - tubes and ovaries are still in     C NONSPECIFIC PROCEDURE      ganglion cyst removal     C NONSPECIFIC PROCEDURE      right thumb surgery     C NONSPECIFIC PROCEDURE  2003    dilatation of the esophagus once due to dysphagia and stricture     HC COLONOSCOPY THRU STOMA, DIAGNOSTIC  2004, 2012    repeat in 2022     HC HEMORRHOIDECTOMY W BANDING/LIGATION      Hemorrhoidectomy     HC REMOVAL OF TONSILS,<12 Y/O      Tonsils <11y.o.     HYSTERECTOMY, PAP NO LONGER INDICATED       LAPAROSCOPIC SALPINGO-OOPHORECTOMY Bilateral 10/23/2015    Procedure: LAPAROSCOPIC SALPINGO-OOPHORECTOMY;  Surgeon: Johnathan Steve MD;  Location:  OR       MEDICATIONS:  Current Outpatient Prescriptions   Medication     HYDROcodone-acetaminophen (NORCO)  MG per tablet     traMADol (ULTRAM) 50 MG tablet     nicotine (NICODERM CQ) 14 MG/24HR 24 hr patch     cyclobenzaprine (FLEXERIL) 10 MG tablet     naproxen (NAPROSYN) 500 MG tablet     zolpidem (AMBIEN) 5 MG tablet     albuterol (PROAIR HFA/PROVENTIL HFA/VENTOLIN HFA) 108 (90 BASE) MCG/ACT Inhaler     order for DME     ipratropium - albuterol 0.5 mg/2.5 mg/3 mL (DUONEB) 0.5-2.5 (3) MG/3ML neb solution     lidocaine (XYLOCAINE) 2 % solution (viscous)     metFORMIN (GLUCOPHAGE-XR) 500 MG 24 hr tablet     omeprazole (PRILOSEC) 20 MG capsule     nicotine (NICODERM CQ) 7 MG/24HR patch 2h hr     fexofenadine (ALLEGRA) 180 MG tablet     ipratropium - albuterol 0.5 mg/2.5 mg/3 mL (DUONEB) 0.5-2.5 (3) MG/3ML nebulization     albuterol (2.5 MG/3ML) 0.083% nebulizer solution      cholecalciferol (VITAMIN D3) 1000 UNIT tablet     ascorbic acid (VITAMIN C) 1000 MG TABS     vitamin E 400 UNITS TABS     Multiple Vitamins-Minerals (MULTIVITAMIN OR)     No current facility-administered medications for this visit.        SOCIAL HISTORY:  Social History   Substance Use Topics     Smoking status: Current Every Day Smoker     Packs/day: 0.50     Years: 30.00     Types: Cigarettes     Start date: 10/13/1967     Smokeless tobacco: Never Used      Comment: has patches, trying to quit 10/30/2017     Alcohol use No      Comment: sober since 9/11/99       Family History   Problem Relation Age of Onset     Hypertension Mother      CEREBROVASCULAR DISEASE Mother      TIA's     Depression Mother      Cancer - colorectal Maternal Grandmother      dx at 65     Lipids Father      C.A.D. Father      angioplasty at 65     Musculoskeletal Disorder Father      Alcohol/Drug Daughter      in remission     Breast Cancer No family hx of        Objective:  Blood pressure 119/67, pulse 99, temperature 97.7  F (36.5  C), temperature source Oral, resp. rate 14, weight 132 lb (59.9 kg), SpO2 98 %, not currently breastfeeding.  Chest: Clear to auscultation bilaterally.  No wheezes, rales or retractions.  CV: Regular rate and rhythm without murmurs, rubs or gallops.  Back:   NEG straight leg raise - pain tenderness in inner right groin to touch and low right gluteal muscle    Xray: some DJD but no disc space issues    Assessment:  1. Muscles strain - could still be disc issue but less likely  2. Chronic pain  3. HCM = needs mammogram and due for pap    Plan:  1. Mammogram ordered  2. Flexeril   3. Refills per epicare  4. BENITO  5. Recheck in one month  6. If not better will consider MRI

## 2017-10-30 NOTE — MR AVS SNAPSHOT
After Visit Summary   10/30/2017    Emeli Granados    MRN: [id number]           Patient Information     Date Of Birth          1955        Visit Information        Provider Department      10/30/2017 3:45 PM Emma Garcia MD [facility]        Today's Diagnoses     Encounter for screening mammogram for breast cancer    -  1    Acute low back pain without sciatica, unspecified back pain laterality        Other chronic pain          Care Instructions    You may schedule your mammogram at [facility] by calling [phone] and asking to schedule your mammogram or you may schedule with [facility] in [place] by calling [phone].            Follow-ups after your visit        Additional Services     [facility] PT, HAND, AND CHIROPRACTIC REFERRAL       **This order will print in the [facility] Scheduling Office**    Physical Therapy, Hand Therapy and Chiropractic Care are available through:    *[facility]  *[facility]  *[facility]    Call one number to schedule at any of the above locations: [phone].    Your provider has referred you to: [facility] - PT and/or Chiropractic Care determined by clinical presentation at [facility] or [facility] Initial Visit    Indication/Reason for Referral: Low Back Pain  Onset of Illness: 3 weeks ago  Therapy Orders: Up to 12 visits - eval and treat   Special Programs: None  Special Request: None    Chris Perez      Additional Comments for the Therapist or Chiropractor: per therapist    Please be aware that coverage of these services is subject to the terms and limitations of your health insurance plan.  Call member services at your health plan with any benefit or coverage questions.      Please bring the following to your appointment:    *Your personal calendar for scheduling future appointments  *Comfortable clothing                  Future tests that  "were ordered for you today     Open Future Orders        Priority Expected Expires Ordered    *MA Screening Digital Bilateral Routine  10/30/2018 10/30/2017            Who to contact     If you have questions or need follow up information about today's clinic visit or your schedule please contact [facility] directly at [phone].  Normal or non-critical lab and imaging results will be communicated to you by MyChart, letter or phone within 4 business days after the clinic has received the results. If you do not hear from us within 7 days, please contact the clinic through [employer] or phone. If you have a critical or abnormal lab result, we will notify you by phone as soon as possible.  Submit refill requests through [employer] or call your pharmacy and they will forward the refill request to us. Please allow 3 business days for your refill to be completed.          Additional Information About Your Visit        [email or web address] Information     [employer] lets you send messages to your doctor, view your test results, renew your prescriptions, schedule appointments and more. To sign up, go to [email or web address] . Click on \"Log in\" on the left side of the screen, which will take you to the Welcome page. Then click on \"Sign up Now\" on the right side of the page.     You will be asked to enter the access code listed below, as well as some personal information. Please follow the directions to create your username and password.     Your access code is: [email or web address]  Expires: 2018  4:48 PM     Your access code will  in 90 days. If you need help or a new code, please call your [facility] or [phone].        Care EveryWhere ID     This is your Care EveryWhere ID. This could be used by other organizations to access your [place] medical records  [id number]        Your Vitals Were     Pulse Temperature Respirations Pulse Oximetry BMI (Body Mass Index)       99 97.7  F (36.5  C) (Oral) 14 98% " 26.66 kg/m2        Blood Pressure from Last 3 Encounters:   10/30/17 119/67   07/17/17 135/73   12/15/16 110/70    Weight from Last 3 Encounters:   10/30/17 132 lb (59.9 kg)   07/17/17 131 lb 14.4 oz (59.8 kg)   12/15/16 129 lb (58.5 kg)              We Performed the Following     [facility] PT, HAND, AND CHIROPRACTIC REFERRAL          Today's Medication Changes          These changes are accurate as of: 10/30/17  4:48 PM.  If you have any questions, ask your nurse or doctor.               These medicines have changed or have updated prescriptions.        Dose/Directions    * cyclobenzaprine 10 MG tablet   Commonly known as:  FLEXERIL   This may have changed:  Another medication with the same name was added. Make sure you understand how and when to take each.   Used for:  Other chronic pain   Changed by:  Lars Oconnor MD        TAKE 1 TABLET (10 MG) BY MOUTH NIGHTLY AS NEEDED FOR MUSCLE SPASMS   Quantity:  30 tablet   Refills:  1       * cyclobenzaprine 10 MG tablet   Commonly known as:  FLEXERIL   This may have changed:  You were already taking a medication with the same name, and this prescription was added. Make sure you understand how and when to take each.   Used for:  Acute low back pain without sciatica, unspecified back pain laterality   Changed by:  Emma Garcia MD        Dose:  5-10 mg   Take 0.5-1 tablets (5-10 mg) by mouth 3 times daily as needed for muscle spasms   Quantity:  30 tablet   Refills:  1       * Notice:  This list has 2 medication(s) that are the same as other medications prescribed for you. Read the directions carefully, and ask your doctor or other care provider to review them with you.         Where to get your medicines      These medications were sent to [id number] - [place], [place] - [address]  [address] [facility] [address]    Hours:  9Am-9Pm (M-F) 9Am-6Pm (S&S) Phone:  [phone]     cyclobenzaprine 10 MG tablet         Some of these will need a paper  prescription and others can be bought over the counter.  Ask your nurse if you have questions.     Bring a paper prescription for each of these medications     HYDROcodone-acetaminophen  MG per tablet    traMADol 50 MG tablet                Primary Care Provider Office Phone # Fax #    Lars Oconnor [phone] [address]  [facility] [address]        Equal Access to Services     [facility] NORM : Hadii aad ku hadasho Soomaali, waaxda luqadaha, qaybta kaalmada adeegyada, waxay idiin hayaan adeeg khmayish la'aan . So [facility] [phone].    ATENCIÓN: Si habla español, tiene a murphy disposición servicios gratuitos de asistencia lingüística. Llame al [phone].    We comply with applicable federal civil rights laws and [place] laws. We do not discriminate on the basis of race, color, national origin, age, disability, sex, sexual orientation, or gender identity.            Thank you!     Thank you for choosing [facility]  for your care. Our goal is always to provide you with excellent care. Hearing back from our patients is one way we can continue to improve our services. Please take a few minutes to complete the written survey that you may receive in the mail after your visit with us. Thank you!             Your Updated Medication List - Protect others around you: Learn how to safely use, store and throw away your medicines at www.disposemymeds.org.          This list is accurate as of: 10/30/17  4:48 PM.  Always use your most recent med list.                   Brand Name Dispense Instructions for use Diagnosis    * albuterol (2.5 MG/3ML) 0.083% neb solution     30 vial    Take 1 vial (2.5 mg) by nebulization every 6 hours as needed for shortness of breath / dyspnea or wheezing    Acute recurrent maxillary sinusitis, Acute bronchitis, unspecified organism       * albuterol 108 (90 BASE) MCG/ACT Inhaler    PROAIR HFA/PROVENTIL HFA/VENTOLIN HFA    1 Inhaler     Inhale 2 puffs into the lungs every 6 hours as needed    COPD exacerbation (H)       ascorbic acid 1000 MG Tabs    vitamin C     Take 1,000 mg by mouth daily        cholecalciferol 1000 UNIT tablet    vitamin D3     Take 1,000 Units by mouth daily        * cyclobenzaprine 10 MG tablet    FLEXERIL    30 tablet    TAKE 1 TABLET (10 MG) BY MOUTH NIGHTLY AS NEEDED FOR MUSCLE SPASMS    Other chronic pain       * cyclobenzaprine 10 MG tablet    FLEXERIL    30 tablet    Take 0.5-1 tablets (5-10 mg) by mouth 3 times daily as needed for muscle spasms    Acute low back pain without sciatica, unspecified back pain laterality       fexofenadine 180 MG tablet    ALLEGRA    30 tablet    Take 1 tablet (180 mg) by mouth daily    Hives       HYDROcodone-acetaminophen  MG per tablet   Start taking on:  11/2/2017    NORCO    20 tablet    One tab by mouth twice daily as needed    Other chronic pain       * ipratropium - albuterol 0.5 mg/2.5 mg/3 mL 0.5-2.5 (3) MG/3ML neb solution    DUONEB    30 vial    Take 1 vial (3 mLs) by nebulization every 4 hours as needed for shortness of breath / dyspnea or wheezing    Obstructive chronic bronchitis with exacerbation (H)       * ipratropium - albuterol 0.5 mg/2.5 mg/3 mL 0.5-2.5 (3) MG/3ML neb solution    DUONEB    30 vial    Take 1 vial (3 mLs) by nebulization every 6 hours as needed for shortness of breath / dyspnea or wheezing    COPD exacerbation (H)       lidocaine (viscous) 2 % solution    XYLOCAINE      Obstructive chronic bronchitis with exacerbation (H)       metFORMIN 500 MG 24 hr tablet    GLUCOPHAGE-XR    30 tablet    Take 1 tablet (500 mg) by mouth daily (with dinner)    Abnormal laboratory test       MULTIVITAMIN PO      Take 1 tablet by mouth daily Per pt, uses ones without Iron        naproxen 500 MG tablet    NAPROSYN    90 tablet    Take 1 tablet (500 mg) by mouth 2 times daily as needed for moderate pain    Other chronic pain       * nicotine 7 MG/24HR 24 hr patch     NICODERM CQ    30 patch    Place 1 patch onto the skin every 24 hours        * nicotine 14 MG/24HR 24 hr patch    NICODERM CQ    28 patch    PLACE 1 PATCH ONTO THE SKIN EVERY 24 HOURS    Personal history of tobacco use, presenting hazards to health       omeprazole 20 MG CR capsule    priLOSEC    60 capsule    Take 2 capsules (40 mg) by mouth daily    Oropharyngeal dysphagia       order for DME     1 Device    Equipment being ordered: Nebulizer    COPD exacerbation (H)       traMADol 50 MG tablet   Start taking on:  11/2/2017    ULTRAM    60 tablet    TAKE 1 TABLET BY MOUTH EVERY 6 HOURS AS NEEDED FOR MODERATE PAIN    Other chronic pain       vitamin E 400 UNITS Tabs      Take 400 Units by mouth daily        zolpidem 5 MG tablet    AMBIEN    30 tablet    Take 1 tablet (5 mg) by mouth nightly as needed for sleep    Sleep disorder       * Notice:  This list has 8 medication(s) that are the same as other medications prescribed for you. Read the directions carefully, and ask your doctor or other care provider to review them with you.

## 2017-11-06 ENCOUNTER — THERAPY VISIT (OUTPATIENT)
Dept: PHYSICAL THERAPY | Facility: CLINIC | Age: 62
End: 2017-11-06
Payer: COMMERCIAL

## 2017-11-06 ENCOUNTER — OFFICE VISIT (OUTPATIENT)
Dept: FAMILY MEDICINE | Facility: CLINIC | Age: 62
End: 2017-11-06
Payer: COMMERCIAL

## 2017-11-06 VITALS
TEMPERATURE: 98.1 F | HEIGHT: 59 IN | WEIGHT: 135 LBS | RESPIRATION RATE: 12 BRPM | OXYGEN SATURATION: 97 % | HEART RATE: 72 BPM | DIASTOLIC BLOOD PRESSURE: 45 MMHG | SYSTOLIC BLOOD PRESSURE: 98 MMHG | BODY MASS INDEX: 27.21 KG/M2

## 2017-11-06 DIAGNOSIS — Z23 NEED FOR PROPHYLACTIC VACCINATION AND INOCULATION AGAINST INFLUENZA: ICD-10-CM

## 2017-11-06 DIAGNOSIS — M54.50 LUMBAGO: Primary | ICD-10-CM

## 2017-11-06 DIAGNOSIS — J01.01 ACUTE RECURRENT MAXILLARY SINUSITIS: ICD-10-CM

## 2017-11-06 DIAGNOSIS — J20.9 ACUTE BRONCHITIS, UNSPECIFIED ORGANISM: ICD-10-CM

## 2017-11-06 DIAGNOSIS — J44.1 OBSTRUCTIVE CHRONIC BRONCHITIS WITH EXACERBATION (H): Primary | ICD-10-CM

## 2017-11-06 PROCEDURE — 90686 IIV4 VACC NO PRSV 0.5 ML IM: CPT | Performed by: FAMILY MEDICINE

## 2017-11-06 PROCEDURE — 97161 PT EVAL LOW COMPLEX 20 MIN: CPT | Mod: GP | Performed by: PHYSICAL THERAPIST

## 2017-11-06 PROCEDURE — 97110 THERAPEUTIC EXERCISES: CPT | Mod: GP | Performed by: PHYSICAL THERAPIST

## 2017-11-06 PROCEDURE — 90471 IMMUNIZATION ADMIN: CPT | Performed by: FAMILY MEDICINE

## 2017-11-06 PROCEDURE — 99214 OFFICE O/P EST MOD 30 MIN: CPT | Mod: 25 | Performed by: FAMILY MEDICINE

## 2017-11-06 NOTE — PROGRESS NOTES

## 2017-11-06 NOTE — NURSING NOTE
"Chief Complaint   Patient presents with     URI       Initial BP 98/45 (BP Location: Left arm, Patient Position: Chair, Cuff Size: Adult Regular)  Pulse 72  Temp 98.1  F (36.7  C) (Oral)  Resp 12  Ht 4' 11\" (1.499 m)  Wt 135 lb (61.2 kg)  SpO2 97%  BMI 27.27 kg/m2 Estimated body mass index is 27.27 kg/(m^2) as calculated from the following:    Height as of this encounter: 4' 11\" (1.499 m).    Weight as of this encounter: 135 lb (61.2 kg).  Medication Reconciliation: complete   Jono Kimball CMA       "

## 2017-11-06 NOTE — PROGRESS NOTES
SUBJECTIVE:   Emeli Granados is a 62 year old female who presents to clinic today for the following health issues:      RESPIRATORY SYMPTOMS      Duration: three days    Description  nasal congestion, rhinorrhea, fatigue/malaise and chest congestion    Severity: severe    Accompanying signs and symptoms: None    History (predisposing factors):  Bronchitis     Precipitating or alleviating factors: sick grandchildren    Therapies tried and outcome:  Mucinex      Past Medical History:   Diagnosis Date     Acute hepatitis C without mention of hepatic coma(070.51) 1996    Dr. 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     Diverticula of colon 2014     Malignant neoplasm of endocervix (H) 1988    took uterus and left the ovaries     Other and unspecified alcohol dependence, unspecified drinking behavior     sober since 1999     Polycythemia 8/8/2012     Smoker      Unspecified disorder of esophagus     stricture - has had it dilated     Unspecified hemorrhoids without mention of complication        Past Surgical History:   Procedure Laterality Date     C NONSPECIFIC PROCEDURE  1988    hysterectomy-uterine & cervical ca - tubes and ovaries are still in     C NONSPECIFIC PROCEDURE      ganglion cyst removal     C NONSPECIFIC PROCEDURE      right thumb surgery     C NONSPECIFIC PROCEDURE  2003    dilatation of the esophagus once due to dysphagia and stricture     HC COLONOSCOPY THRU STOMA, DIAGNOSTIC  2004, 2012    repeat in 2022     HC HEMORRHOIDECTOMY W BANDING/LIGATION      Hemorrhoidectomy     HC REMOVAL OF TONSILS,<10 Y/O      Tonsils <11y.o.     HYSTERECTOMY, PAP NO LONGER INDICATED       LAPAROSCOPIC SALPINGO-OOPHORECTOMY Bilateral 10/23/2015    Procedure: LAPAROSCOPIC SALPINGO-OOPHORECTOMY;  Surgeon: Johnathan Steve MD;  Location: [facility] OR       Family History   Problem Relation Age of Onset     Hypertension Mother       CEREBROVASCULAR DISEASE Mother      TIA's     Depression Mother      Cancer - colorectal Maternal Grandmother      dx at 65     Lipids Father      C.A.D. Father      angioplasty at 65     Musculoskeletal Disorder Father      Alcohol/Drug Daughter      in remission     Breast Cancer No family hx of        Social History   Substance Use Topics     Smoking status: Current Every Day Smoker     Packs/day: 0.50     Years: 30.00     Types: Cigarettes     Start date: 10/13/1967     Smokeless tobacco: Never Used      Comment: has patches, trying to quit 10/30/2017     Alcohol use No      Comment: sober since 9/11/99     Patient complains of congestion with sore throat, nasal congestion and sinus pain. Some mucopurulant discharge but no upper dental pain and no sustained fever. Duration less than one week, .  Moderately productive with wheeze and congestion. Left maxillary sinus history of airborn allergy or asthma.   No chest pain, diaphoresis, or sob.  Moderately productive cough.  TMs dull not*red, sinus tenderness left maxillary   lungs clear, s1s2,soft abd,no distress, cyanosis or stridor.  A:URI with copd and exacerbation  P:fluids, antipyretics,rest and  as directed.  Recheck PRN worsening or non-improvement over 5 days.  Discussed signs of systemic or constutional illness.  (J44.1) Obstructive chronic bronchitis with exacerbation (H)  (primary encounter diagnosis)  Comment:   Plan: COPD ACTION PLAN, amoxicillin-clavulanate         (AUGMENTIN) 875-125 MG per tablet        Take with food

## 2017-11-06 NOTE — MR AVS SNAPSHOT
"              After Visit Summary   11/6/2017    Emeli Granados    MRN: [id number]           Patient Information     Date Of Birth          1955        Visit Information        Provider Department      11/6/2017 10:50 AM Lars Solorio, PT [facility] Physical Therapy        Today's Diagnoses     Lumbago    -  1       Follow-ups after your visit        Your next 10 appointments already scheduled     Nov 10, 2017 11:00 AM CST   [facility] Spine with Andrew Gaona PT   [facility] ([facility])    [address]  [facility] [id number] [phone]              Who to contact     If you have questions or need follow up information about today's clinic visit or your schedule please contact [facility] PHYSICAL THERAPY directly at [phone].  Normal or non-critical lab and imaging results will be communicated to you by [employer], letter or phone within 4 business days after the clinic has received the results. If you do not hear from us within 7 days, please contact the clinic through [employer] or phone. If you have a critical or abnormal lab result, we will notify you by phone as soon as possible.  Submit refill requests through [employer] or call your pharmacy and they will forward the refill request to us. Please allow 3 business days for your refill to be completed.          Additional Information About Your Visit        MyChart Information     [employer] lets you send messages to your doctor, view your test results, renew your prescriptions, schedule appointments and more. To sign up, go to [email or web address] . Click on \"Log in\" on the left side of the screen, which will take you to the Welcome page. Then click on \"Sign up Now\" on the right side of the page.     You will be asked to enter the access code listed below, as well as some personal information. Please follow " the directions to create your username and password.     Your access code is: [email or web address]  Expires: 2018  3:48 PM     Your access code will  in 90 days. If you need help or a new code, please call your [employer] clinic or [phone].        Care EveryWhere ID     This is your Care EveryWhere ID. This could be used by other organizations to access your [employer] medical records  [id number]         Blood Pressure from Last 3 Encounters:   17 98/45   10/30/17 119/67   17 135/73    Weight from Last 3 Encounters:   17 61.2 kg (135 lb)   10/30/17 59.9 kg (132 lb)   17 59.8 kg (131 lb 14.4 oz)              We Performed the Following     HC PT EVAL, LOW COMPLEXITY     [facility] INITIAL EVAL REPORT     THERAPEUTIC EXERCISES          Today's Medication Changes          These changes are accurate as of: 17  3:04 PM.  If you have any questions, ask your nurse or doctor.               Start taking these medicines.        Dose/Directions    amoxicillin-clavulanate 875-125 MG per tablet   Commonly known as:  AUGMENTIN   Used for:  Obstructive chronic bronchitis with exacerbation (H)   Started by:  Lars Oconnor MD        Dose:  1 tablet   Take 1 tablet by mouth 2 times daily   Quantity:  20 tablet   Refills:  0            Where to get your medicines      These medications were sent to [id number] - [place], [place] - [address]  [address] [facility] [address]    Hours:  9Am-9Pm (M-F) 9Am-6Pm (S&S) Phone:  [phone]     amoxicillin-clavulanate 875-125 MG per tablet                Primary Care Provider Office Phone # Fax #    Lars Oconnor [phone] [address] [facility] [address]        Equal Access to Services     MISHA ZHENG : Natalio ivy Sotremaine, waotilioda luqadaha, qaybta kaalmajes daigle. [facility] [phone].    ATENCIÓN: Si tori lindsey a murphy disposición  servicios gratuitos de asistencia lingüística. Judy abarca [phone].    We comply with applicable federal civil rights laws and [place] laws. We do not discriminate on the basis of race, color, national origin, age, disability, sex, sexual orientation, or gender identity.            Thank you!     Thank you for choosing [place] [facility] PHYSICAL [facility]  for your care. Our goal is always to provide you with excellent care. Hearing back from our patients is one way we can continue to improve our services. Please take a few minutes to complete the written survey that you may receive in the mail after your visit with us. Thank you!             Your Updated Medication List - Protect others around you: Learn how to safely use, store and throw away your medicines at www.disposemymeds.org.          This list is accurate as of: 11/6/17  3:04 PM.  Always use your most recent med list.                   Brand Name Dispense Instructions for use Diagnosis    * albuterol (2.5 MG/3ML) 0.083% neb solution     30 vial    Take 1 vial (2.5 mg) by nebulization every 6 hours as needed for shortness of breath / dyspnea or wheezing    Acute recurrent maxillary sinusitis, Acute bronchitis, unspecified organism       * albuterol 108 (90 BASE) MCG/ACT Inhaler    PROAIR HFA/PROVENTIL HFA/VENTOLIN HFA    1 Inhaler    Inhale 2 puffs into the lungs every 6 hours as needed    COPD exacerbation (H)       amoxicillin-clavulanate 875-125 MG per tablet    AUGMENTIN    20 tablet    Take 1 tablet by mouth 2 times daily    Obstructive chronic bronchitis with exacerbation (H)       ascorbic acid 1000 MG Tabs    vitamin C     Take 1,000 mg by mouth daily        cholecalciferol 1000 UNIT tablet    vitamin D3     Take 1,000 Units by mouth daily        * cyclobenzaprine 10 MG tablet    FLEXERIL    30 tablet    TAKE 1 TABLET (10 MG) BY MOUTH NIGHTLY AS NEEDED FOR MUSCLE SPASMS    Other chronic pain       * cyclobenzaprine 10  MG tablet    FLEXERIL    30 tablet    Take 0.5-1 tablets (5-10 mg) by mouth 3 times daily as needed for muscle spasms    Acute low back pain without sciatica, unspecified back pain laterality       fexofenadine 180 MG tablet    ALLEGRA    30 tablet    Take 1 tablet (180 mg) by mouth daily    Hives       HYDROcodone-acetaminophen  MG per tablet    NORCO    20 tablet    One tab by mouth twice daily as needed    Other chronic pain       * ipratropium - albuterol 0.5 mg/2.5 mg/3 mL 0.5-2.5 (3) MG/3ML neb solution    DUONEB    30 vial    Take 1 vial (3 mLs) by nebulization every 4 hours as needed for shortness of breath / dyspnea or wheezing    Obstructive chronic bronchitis with exacerbation (H)       * ipratropium - albuterol 0.5 mg/2.5 mg/3 mL 0.5-2.5 (3) MG/3ML neb solution    DUONEB    30 vial    Take 1 vial (3 mLs) by nebulization every 6 hours as needed for shortness of breath / dyspnea or wheezing    COPD exacerbation (H)       lidocaine (viscous) 2 % solution    XYLOCAINE      Obstructive chronic bronchitis with exacerbation (H)       metFORMIN 500 MG 24 hr tablet    GLUCOPHAGE-XR    30 tablet    Take 1 tablet (500 mg) by mouth daily (with dinner)    Abnormal laboratory test       MULTIVITAMIN PO      Take 1 tablet by mouth daily Per pt, uses ones without Iron        naproxen 500 MG tablet    NAPROSYN    90 tablet    Take 1 tablet (500 mg) by mouth 2 times daily as needed for moderate pain    Other chronic pain       * nicotine 7 MG/24HR 24 hr patch    NICODERM CQ    30 patch    Place 1 patch onto the skin every 24 hours        * nicotine 14 MG/24HR 24 hr patch    NICODERM CQ    28 patch    PLACE 1 PATCH ONTO THE SKIN EVERY 24 HOURS    Personal history of tobacco use, presenting hazards to health       omeprazole 20 MG CR capsule    priLOSEC    60 capsule    Take 2 capsules (40 mg) by mouth daily    Oropharyngeal dysphagia       order for DME     1 Device    Equipment being ordered: Nebulizer    COPD  exacerbation (H)       traMADol 50 MG tablet    ULTRAM    60 tablet    TAKE 1 TABLET BY MOUTH EVERY 6 HOURS AS NEEDED FOR MODERATE PAIN    Other chronic pain       vitamin E 400 UNITS Tabs      Take 400 Units by mouth daily        zolpidem 5 MG tablet    AMBIEN    30 tablet    Take 1 tablet (5 mg) by mouth nightly as needed for sleep    Sleep disorder       * Notice:  This list has 8 medication(s) that are the same as other medications prescribed for you. Read the directions carefully, and ask your doctor or other care provider to review them with you.

## 2017-11-06 NOTE — PROGRESS NOTES
Subjective:    Patient is a 62 year old female presenting with rehab back hpi. The history is provided by the patient. No  was used.   Emeli Granados is a 62 year old female with a lumbar condition.  Condition occurred with:  Degenerative joint disease.  Condition occurred: for unknown reasons.  This is a chronic condition  Pt reports having bilateral LBP with R hip and thigh radiculopathy that has been present for one month.  She attributes her sx's to raking and performing yard work 4 weeks ago.  MD appt on 10/30/17..    Patient reports pain:  Lumbar spine right and central lumbar spine.  Radiates to:  Gluteals right and thigh right.   and is constant and reported as 8/10.  Associated symptoms:  Loss of motion/stiffness. Pain is worse during the day.  Symptoms are exacerbated by sitting, walking and standing and relieved by rest.  Since onset symptoms are unchanged.  Special tests:  X-ray.      General health as reported by patient is good.  Pertinent medical history includes:  Cancer and smoking.  Medical allergies: no.  Other surgeries include:  Cancer surgery (1999).  Current medications:  Muscle relaxants.  Current occupation is None  .        Barriers include:  None as reported by the patient.    Red flags:  None as reported by the patient.                        Objective:    Standing Alignment:        Lumbar:  Lordosis incr                Flexibility/Screens:   Positive screens:  LumbarNegative screens: Hip                    Lumbar/SI Evaluation  ROM:    AROM Lumbar:   Flexion:          75% with ERP  Ext:                    75% with ERP   Side Bend:        Left:  100%    Right:  100%  Rotation:           Left:     Right:   Side Glide:        Left:     Right:                   Neural Tension/Mobility:  Lumbar:  Normal        Lumbar Palpation:  normal                                                         General     ROS    Assessment/Plan:      Patient is a 62 year old female with  lumbar complaints.    Patient has the following significant findings with corresponding treatment plan.                Diagnosis 1:  LBP  Pain -  self management, education, directional preference exercise and home program  Decreased ROM/flexibility - manual therapy and therapeutic exercise  Decreased strength - therapeutic exercise and therapeutic activities  Impaired muscle performance - neuro re-education  Decreased function - therapeutic activities    Therapy Evaluation Codes:   1) History comprised of:   Personal factors that impact the plan of care:      None.    Comorbidity factors that impact the plan of care are:      Smoking.     Medications impacting care: Muscle relaxant.  2) Examination of Body Systems comprised of:   Body structures and functions that impact the plan of care:      Lumbar spine.   Activity limitations that impact the plan of care are:      Bending, Lifting, Sitting, Standing and Walking.  3) Clinical presentation characteristics are:   Stable/Uncomplicated.  4) Decision-Making    Low complexity using standardized patient assessment instrument and/or measureable assessment of functional outcome.  Cumulative Therapy Evaluation is: Low complexity.    Previous and current functional limitations:  (See Goal Flow Sheet for this information)    Short term and Long term goals: (See Goal Flow Sheet for this information)     Communication ability:  Patient appears to be able to clearly communicate and understand verbal and written communication and follow directions correctly.  Treatment Explanation - The following has been discussed with the patient:   RX ordered/plan of care  Anticipated outcomes  Possible risks and side effects  This patient would benefit from PT intervention to resume normal activities.   Rehab potential is good.    Frequency:  2 X week, once daily  Duration:  for 5 weeks  Discharge Plan:  Achieve all LTG.  Independent in home treatment program.  Reach maximal therapeutic  benefit.    Please refer to the daily flowsheet for treatment today, total treatment time and time spent performing 1:1 timed codes.

## 2017-11-06 NOTE — MR AVS SNAPSHOT
"              After Visit Summary   11/6/2017    Emeli Granados    MRN: [id number]           Patient Information     Date Of Birth          1955        Visit Information        Provider Department      11/6/2017 2:30 PM Lars Oconnor MD [facility]        Today's Diagnoses     Obstructive chronic bronchitis with exacerbation (H)    -  1       Follow-ups after your visit        Your next 10 appointments already scheduled     Nov 06, 2017  2:30 PM CST   Team Short with Lars Oconnor MD   [facility] ([facility])    [address] [place] [id number] [phone]            Nov 10, 2017 11:00 AM CST   BENITO Spine with Andrew Gaona PT   [facility] ([address] [address] [phone]              Who to contact     If you have questions or need follow up information about today's clinic visit or your schedule please contact [facility] directly at [phone].  Normal or non-critical lab and imaging results will be communicated to you by incredibluehart, letter or phone within 4 business days after the clinic has received the results. If you do not hear from us within 7 days, please contact the clinic through incredibluehart or phone. If you have a critical or abnormal lab result, we will notify you by phone as soon as possible.  Submit refill requests through Sun Number or call your pharmacy and they will forward the refill request to us. Please allow 3 business days for your refill to be completed.          Additional Information About Your Visit        MyChart Information     Sun Number lets you send messages to your doctor, view your test results, renew your prescriptions, schedule appointments and more. To sign up, go to [email or web address] . Click on \"Log in\" on the left side of the screen, which will take " "you to the Welcome page. Then click on \"Sign up Now\" on the right side of the page.     You will be asked to enter the access code listed below, as well as some personal information. Please follow the directions to create your username and password.     Your access code is: [email or web address]  Expires: 2018  3:48 PM     Your access code will  in 90 days. If you need help or a new code, please call your [place] clinic or [phone].        Care EveryWhere ID     This is your Care EveryWhere ID. This could be used by other organizations to access your [place] medical records  [id number]        Your Vitals Were     Pulse Temperature Respirations Height Pulse Oximetry BMI (Body Mass Index)    72 98.1  F (36.7  C) (Oral) 12 4' 11\" (1.499 m) 97% 27.27 kg/m2       Blood Pressure from Last 3 Encounters:   17 98/45   10/30/17 119/67   17 135/73    Weight from Last 3 Encounters:   17 135 lb (61.2 kg)   10/30/17 132 lb (59.9 kg)   17 131 lb 14.4 oz (59.8 kg)              We Performed the Following     COPD ACTION PLAN          Today's Medication Changes          These changes are accurate as of: 17 12:03 PM.  If you have any questions, ask your nurse or doctor.               Start taking these medicines.        Dose/Directions    amoxicillin-clavulanate 875-125 MG per tablet   Commonly known as:  AUGMENTIN   Used for:  Obstructive chronic bronchitis with exacerbation (H)   Started by:  Lars Oconnor MD        Dose:  1 tablet   Take 1 tablet by mouth 2 times daily   Quantity:  20 tablet   Refills:  0            Where to get your medicines      These medications were sent to [id number] - [place], [place] - [address]  [address] [facility] [address]    Hours:  9Am-9Pm (M-F) 9Am-6Pm (S&S) Phone:  [phone]     amoxicillin-clavulanate 875-125 MG per tablet                Primary Care Provider Office Phone # Fax #    Lars Oconnor [phone] " [phone]       [address] [address] [facility] [address]        Equal Access to Services     ARACELIMISHA NORM : Hadii aad ku hadpradeeprafael Soilaali, waotilioda luqadaha, qaybta kaortizda brett, waxgoyo rosein hayaamaegan sevillatamara tuttle ludivina alvarez. So [facility] [phone].    ATENCIÓN: Si habla español, tiene a murphy disposición servicios gratuitos de asistencia lingüística. Llame al [phone].    We comply with applicable federal civil rights laws and [place] laws. We do not discriminate on the basis of race, color, national origin, age, disability, sex, sexual orientation, or gender identity.            Thank you!     Thank you for choosing [facility]  for your care. Our goal is always to provide you with excellent care. Hearing back from our patients is one way we can continue to improve our services. Please take a few minutes to complete the written survey that you may receive in the mail after your visit with us. Thank you!             Your Updated Medication List - Protect others around you: Learn how to safely use, store and throw away your medicines at www.disposemymeds.org.          This list is accurate as of: 11/6/17 12:03 PM.  Always use your most recent med list.                   Brand Name Dispense Instructions for use Diagnosis    * albuterol (2.5 MG/3ML) 0.083% neb solution     30 vial    Take 1 vial (2.5 mg) by nebulization every 6 hours as needed for shortness of breath / dyspnea or wheezing    Acute recurrent maxillary sinusitis, Acute bronchitis, unspecified organism       * albuterol 108 (90 BASE) MCG/ACT Inhaler    PROAIR HFA/PROVENTIL HFA/VENTOLIN HFA    1 Inhaler    Inhale 2 puffs into the lungs every 6 hours as needed    COPD exacerbation (H)       amoxicillin-clavulanate 875-125 MG per tablet    AUGMENTIN    20 tablet    Take 1 tablet by mouth 2 times daily    Obstructive chronic bronchitis with exacerbation (H)       ascorbic acid 1000 MG Tabs    vitamin C     Take 1,000 mg by mouth daily         cholecalciferol 1000 UNIT tablet    vitamin D3     Take 1,000 Units by mouth daily        * cyclobenzaprine 10 MG tablet    FLEXERIL    30 tablet    TAKE 1 TABLET (10 MG) BY MOUTH NIGHTLY AS NEEDED FOR MUSCLE SPASMS    Other chronic pain       * cyclobenzaprine 10 MG tablet    FLEXERIL    30 tablet    Take 0.5-1 tablets (5-10 mg) by mouth 3 times daily as needed for muscle spasms    Acute low back pain without sciatica, unspecified back pain laterality       fexofenadine 180 MG tablet    ALLEGRA    30 tablet    Take 1 tablet (180 mg) by mouth daily    Hives       HYDROcodone-acetaminophen  MG per tablet    NORCO    20 tablet    One tab by mouth twice daily as needed    Other chronic pain       * ipratropium - albuterol 0.5 mg/2.5 mg/3 mL 0.5-2.5 (3) MG/3ML neb solution    DUONEB    30 vial    Take 1 vial (3 mLs) by nebulization every 4 hours as needed for shortness of breath / dyspnea or wheezing    Obstructive chronic bronchitis with exacerbation (H)       * ipratropium - albuterol 0.5 mg/2.5 mg/3 mL 0.5-2.5 (3) MG/3ML neb solution    DUONEB    30 vial    Take 1 vial (3 mLs) by nebulization every 6 hours as needed for shortness of breath / dyspnea or wheezing    COPD exacerbation (H)       lidocaine (viscous) 2 % solution    XYLOCAINE      Obstructive chronic bronchitis with exacerbation (H)       metFORMIN 500 MG 24 hr tablet    GLUCOPHAGE-XR    30 tablet    Take 1 tablet (500 mg) by mouth daily (with dinner)    Abnormal laboratory test       MULTIVITAMIN PO      Take 1 tablet by mouth daily Per pt, uses ones without Iron        naproxen 500 MG tablet    NAPROSYN    90 tablet    Take 1 tablet (500 mg) by mouth 2 times daily as needed for moderate pain    Other chronic pain       * nicotine 7 MG/24HR 24 hr patch    NICODERM CQ    30 patch    Place 1 patch onto the skin every 24 hours        * nicotine 14 MG/24HR 24 hr patch    NICODERM CQ    28 patch    PLACE 1 PATCH ONTO THE SKIN EVERY 24 HOURS     Personal history of tobacco use, presenting hazards to health       omeprazole 20 MG CR capsule    priLOSEC    60 capsule    Take 2 capsules (40 mg) by mouth daily    Oropharyngeal dysphagia       order for DME     1 Device    Equipment being ordered: Nebulizer    COPD exacerbation (H)       traMADol 50 MG tablet    ULTRAM    60 tablet    TAKE 1 TABLET BY MOUTH EVERY 6 HOURS AS NEEDED FOR MODERATE PAIN    Other chronic pain       vitamin E 400 UNITS Tabs      Take 400 Units by mouth daily        zolpidem 5 MG tablet    AMBIEN    30 tablet    Take 1 tablet (5 mg) by mouth nightly as needed for sleep    Sleep disorder       * Notice:  This list has 8 medication(s) that are the same as other medications prescribed for you. Read the directions carefully, and ask your doctor or other care provider to review them with you.

## 2017-11-06 NOTE — LETTER
My COPD Action Plan     Name: Emeli Granados    YOB: 1955   Date: 11/6/2017    My doctor: Lars Oconnor MD   My clinic: [address] [address] [phone] Quit smoking   Duo neb  Albuterol neb  FEV-1 (no units)   Date Value   02/09/2015 49     FEV1/FVC (no units)   Date Value   02/09/2015 80      My COPD Severity: Mild = FeV1 > 80%      Use of Oxygen: Oxygen Not Prescribed      Make sure you've had your pneumonia   vaccines.          GREEN ZONE       Doing well today      Usual level of activity and exercise    Usual amount of cough and mucus    No shortness of breath    Usual level of health (thinking clearly, sleeping well, feel like eating) Actions:      Take daily medicines    Use oxygen as prescribed    Follow regular exercise and diet plan    Avoid cigarette smoke and other irritants that harm the lungs           YELLOW ZONE          Having a bad day or flare up      Short of breath more than usual    A lot more sputum (mucus) than usual    Sputum looks yellow, green, tan, brown or bloody    More coughing or wheezing    Fever or chills    Less energy; trouble completing activities    Trouble thinking or focusing    Using quick relief inhaler or nebulizer more often    Poor sleep; symptoms wake me up    Do not feel like eating Actions:      Get plenty of rest    Take daily medicines    Use quick relief inhaler every 4 hours    If you use oxygen, call you doctor to see if you should adjust your oxygen    Do breathing exercises or other things to help you relax    Let a loved one, friend or neighbor know you are feeling worse    Call your care team if you have 2 or more symptoms.  Start taking steroids or antibiotics if directed by your care team           RED ZONE       Need medical care now      Severe shortness of breath (feel you can't breathe)    Fever, chills    Not enough breath to do any activity    Trouble  coughing up mucus, walking or talking    Blood in mucus    Frequent coughing   Rescue medicines are not working    Not able to sleep because of breathing    Feel confused or drowsy    Chest pain    Actions:      Call your health care team.  If you cannot reach your care team, call 911 or go to the emergency room.        Electronically signed by: Lars Oconnor, November 6, 2017  Annual Reminders:  Meet with Care Team, Flu Shot every Fall  Pharmacy: [id number] [address]

## 2017-11-09 RX ORDER — ALBUTEROL SULFATE 0.83 MG/ML
SOLUTION RESPIRATORY (INHALATION)
Qty: 75 ML | Refills: 0 | Status: SHIPPED | OUTPATIENT
Start: 2017-11-09 | End: 2019-11-01

## 2017-11-09 NOTE — TELEPHONE ENCOUNTER
Prescription approved per [facility] Refill Protocol.  Used for COPD  Damaso Colorado RN, BSN

## 2017-11-10 ENCOUNTER — THERAPY VISIT (OUTPATIENT)
Dept: PHYSICAL THERAPY | Facility: CLINIC | Age: 62
End: 2017-11-10
Payer: COMMERCIAL

## 2017-11-10 DIAGNOSIS — G89.29 CHRONIC RIGHT-SIDED LOW BACK PAIN WITH RIGHT-SIDED SCIATICA: ICD-10-CM

## 2017-11-10 DIAGNOSIS — M54.41 CHRONIC RIGHT-SIDED LOW BACK PAIN WITH RIGHT-SIDED SCIATICA: ICD-10-CM

## 2017-11-10 PROCEDURE — 97110 THERAPEUTIC EXERCISES: CPT | Mod: GP | Performed by: PHYSICAL THERAPIST

## 2017-11-10 NOTE — MR AVS SNAPSHOT
"              After Visit Summary   11/10/2017    Emeli Granaods    MRN: [id number]           Patient Information     Date Of Birth          1955        Visit Information        Provider Department      11/10/2017 11:00 AM Lars Solorio PT [facility]        Today's Diagnoses     Chronic right-sided low back pain with right-sided sciatica           Follow-ups after your visit        Who to contact     If you have questions or need follow up information about today's clinic visit or your schedule please contact [facility] directly at [phone].  Normal or non-critical lab and imaging results will be communicated to you by NetSparkhart, letter or phone within 4 business days after the clinic has received the results. If you do not hear from us within 7 days, please contact the clinic through NetSparkhart or phone. If you have a critical or abnormal lab result, we will notify you by phone as soon as possible.  Submit refill requests through Yotta280 or call your pharmacy and they will forward the refill request to us. Please allow 3 business days for your refill to be completed.          Additional Information About Your Visit        MyChart Information     [email or web address] lets you send messages to your doctor, view your test results, renew your prescriptions, schedule appointments and more. To sign up, go to [email or web address] . Click on \"Log in\" on the left side of the screen, which will take you to the Welcome page. Then click on \"Sign up Now\" on the right side of the page.     You will be asked to enter the access code listed below, as well as some personal information. Please follow the directions to create your username and password.     Your access code is: [email or web address]  Expires: 2018  3:48 PM     Your access code will  in 90 days. If you need help or a new code, please call your [place] clinic or " [phone].        Care EveryWhere ID     This is your Care EveryWhere ID. This could be used by other organizations to access your [place] medical records  [id number]         Blood Pressure from Last 3 Encounters:   11/06/17 98/45   10/30/17 119/67   07/17/17 135/73    Weight from Last 3 Encounters:   11/06/17 61.2 kg (135 lb)   10/30/17 59.9 kg (132 lb)   07/17/17 59.8 kg (131 lb 14.4 oz)              We Performed the Following     THERAPEUTIC EXERCISES        Primary Care Provider Office Phone # Fax #    Lars Oconnor [phone] [address] [facility] [address]        Equal Access to Services     SWETA ZHENG : Hadii aad ku hadasho Soomaali, waaxda luqadaha, qaybta kaalmada adeegyada, jes florence . So [facility] [phone].    ATENCIÓN: Si habla español, tiene a murphy disposición servicios gratuitos de asistencia lingüística. Ll[facility] [phone].    We comply with applicable federal civil rights laws and [place] laws. We do not discriminate on the basis of race, color, national origin, age, disability, sex, sexual orientation, or gender identity.            Thank you!     Thank you for [facility]  for your care. Our goal is always to provide you with excellent care. Hearing back from our patients is one way we can continue to improve our services. Please take a few minutes to complete the written survey that you may receive in the mail after your visit with us. Thank you!             Your Updated Medication List - Protect others around you: Learn how to safely use, store and throw away your medicines at www.disposemymeds.org.          This list is accurate as of: 11/10/17 12:27 PM.  Always use your most recent med list.                   Brand Name Dispense Instructions for use Diagnosis    * albuterol 108 (90 BASE) MCG/ACT Inhaler    PROAIR HFA/PROVENTIL HFA/VENTOLIN HFA    1  Inhaler    Inhale 2 puffs into the lungs every 6 hours as needed    COPD exacerbation (H)       * albuterol (2.5 MG/3ML) 0.083% neb solution     75 mL    INHALE 3 MILLILITERS (2.5 MG) BY NEBULIZATION ROUTE EVERY 6 HOURS AS NEEDED FOR SHORTNESS OF BREATH/DYSPNEA OR WHEEZING    Acute recurrent maxillary sinusitis, Acute bronchitis, unspecified organism       amoxicillin-clavulanate 875-125 MG per tablet    AUGMENTIN    20 tablet    Take 1 tablet by mouth 2 times daily    Obstructive chronic bronchitis with exacerbation (H)       ascorbic acid 1000 MG Tabs    vitamin C     Take 1,000 mg by mouth daily        cholecalciferol 1000 UNIT tablet    vitamin D3     Take 1,000 Units by mouth daily        * cyclobenzaprine 10 MG tablet    FLEXERIL    30 tablet    TAKE 1 TABLET (10 MG) BY MOUTH NIGHTLY AS NEEDED FOR MUSCLE SPASMS    Other chronic pain       * cyclobenzaprine 10 MG tablet    FLEXERIL    30 tablet    Take 0.5-1 tablets (5-10 mg) by mouth 3 times daily as needed for muscle spasms    Acute low back pain without sciatica, unspecified back pain laterality       fexofenadine 180 MG tablet    ALLEGRA    30 tablet    Take 1 tablet (180 mg) by mouth daily    Hives       HYDROcodone-acetaminophen  MG per tablet    NORCO    20 tablet    One tab by mouth twice daily as needed    Other chronic pain       * ipratropium - albuterol 0.5 mg/2.5 mg/3 mL 0.5-2.5 (3) MG/3ML neb solution    DUONEB    30 vial    Take 1 vial (3 mLs) by nebulization every 4 hours as needed for shortness of breath / dyspnea or wheezing    Obstructive chronic bronchitis with exacerbation (H)       * ipratropium - albuterol 0.5 mg/2.5 mg/3 mL 0.5-2.5 (3) MG/3ML neb solution    DUONEB    30 vial    Take 1 vial (3 mLs) by nebulization every 6 hours as needed for shortness of breath / dyspnea or wheezing    COPD exacerbation (H)       lidocaine (viscous) 2 % solution    XYLOCAINE      Obstructive chronic bronchitis with exacerbation (H)       metFORMIN  500 MG 24 hr tablet    GLUCOPHAGE-XR    30 tablet    Take 1 tablet (500 mg) by mouth daily (with dinner)    Abnormal laboratory test       MULTIVITAMIN PO      Take 1 tablet by mouth daily Per pt, uses ones without Iron        naproxen 500 MG tablet    NAPROSYN    90 tablet    Take 1 tablet (500 mg) by mouth 2 times daily as needed for moderate pain    Other chronic pain       * nicotine 7 MG/24HR 24 hr patch    NICODERM CQ    30 patch    Place 1 patch onto the skin every 24 hours        * nicotine 14 MG/24HR 24 hr patch    NICODERM CQ    28 patch    PLACE 1 PATCH ONTO THE SKIN EVERY 24 HOURS    Personal history of tobacco use, presenting hazards to health       omeprazole 20 MG CR capsule    priLOSEC    60 capsule    Take 2 capsules (40 mg) by mouth daily    Oropharyngeal dysphagia       order for DME     1 Device    Equipment being ordered: Nebulizer    COPD exacerbation (H)       traMADol 50 MG tablet    ULTRAM    60 tablet    TAKE 1 TABLET BY MOUTH EVERY 6 HOURS AS NEEDED FOR MODERATE PAIN    Other chronic pain       vitamin E 400 UNITS Tabs      Take 400 Units by mouth daily        zolpidem 5 MG tablet    AMBIEN    30 tablet    Take 1 tablet (5 mg) by mouth nightly as needed for sleep    Sleep disorder       * Notice:  This list has 8 medication(s) that are the same as other medications prescribed for you. Read the directions carefully, and ask your doctor or other care provider to review them with you.

## 2017-12-04 DIAGNOSIS — G89.29 OTHER CHRONIC PAIN: ICD-10-CM

## 2017-12-04 RX ORDER — HYDROCODONE BITARTRATE AND ACETAMINOPHEN 10; 325 MG/1; MG/1
TABLET ORAL
Qty: 20 TABLET | Refills: 0 | Status: SHIPPED | OUTPATIENT
Start: 2017-12-04 | End: 2017-12-27

## 2017-12-04 RX ORDER — TRAMADOL HYDROCHLORIDE 50 MG/1
TABLET ORAL
Qty: 60 TABLET | Refills: 0 | Status: SHIPPED | OUTPATIENT
Start: 2017-12-04 | End: 2017-12-27

## 2017-12-04 RX ORDER — CYCLOBENZAPRINE HCL 10 MG
TABLET ORAL
Qty: 30 TABLET | Refills: 1 | Status: SHIPPED | OUTPATIENT
Start: 2017-12-04 | End: 2018-01-26

## 2017-12-04 RX ORDER — NAPROXEN 500 MG/1
500 TABLET ORAL 2 TIMES DAILY PRN
Qty: 90 TABLET | Refills: 1 | Status: SHIPPED | OUTPATIENT
Start: 2017-12-04 | End: 2019-02-25

## 2017-12-04 NOTE — TELEPHONE ENCOUNTER
Controlled Substance Refill Request for Norco, Tramadol, Flexeril, Naproxen  Problem List Complete:  Yes     checked in past 6 months?  Yes 11/06/2017     Disorder of bone and cartilage   CARDIOVASCULAR SCREENING; LDL GOAL LESS THAN 130   Acute hepatitis C virus infection   Obstructive chronic bronchitis with exacerbation (H)   Osteopenia   Obesity   Back pain   Chronic pain   Alcohol dependence in remission (H)   Lumbago     Patient states she will  all 4 Rx's at the [facility].  Patient states she requested this refill on Friday 12/01/2017. Patient is hoping to get a rush put on these refills.  Please call patient at [phone] when available for  at the  at the [facility].    Paola Manzo/

## 2017-12-05 NOTE — TELEPHONE ENCOUNTER
How Severe Is Your Rash?: mild Picked up at  today. ID verified, picked up by Anastasia.   Is This A New Presentation, Or A Follow-Up?: Rash Additional History: Patient states he uses Fluocinonide 0.05% gel once or twice a week as directed by his primary care physician. Pain 0/10.

## 2017-12-12 PROBLEM — M54.50 LUMBAGO: Status: RESOLVED | Noted: 2017-11-06 | Resolved: 2017-12-12

## 2017-12-12 NOTE — PROGRESS NOTES
Discharge Note    Progress reporting period is from initial eval to Nov 10, 2017.     Emeli failed to return for next follow up visit and current status is unknown.  Please see information below for last relevant information on current status.  Patient seen for Rxs Used: 2 visits.  SUBJECTIVE  Subjective changes noted by patient:     .  Current pain level is  .     Previous pain level was  Initial Pain level: 8/10.   Changes in function:  Yes (See Goal flowsheet attached for changes in current functional level)  Adverse reaction to treatment or activity: None    OBJECTIVE  Changes noted in objective findings:       ASSESSMENT/PLAN  Diagnosis: LBP   DIAGP:  The encounter diagnosis was Chronic right-sided low back pain with right-sided sciatica.  Updated problem list and treatment plan:   Decreased strength - HEP  STG/LTGs have been met or progress has been made towards goals:  Yes, please see goal flowsheet for most current information  Assessment of Progress: current status is unknown.  Last current status:     Self Management Plans:  HEP  I have re-evaluated this patient and find that the nature, scope, duration and intensity of the therapy is appropriate for the medical condition of the patient.  Emeli continues to require the following intervention to meet STG and LTG's:  HEP.    Recommendations:  Discharge with current home program.  Patient to follow up with MD as needed.    Please refer to the daily flowsheet for treatment today, total treatment time and time spent performing 1:1 timed codes.

## 2017-12-20 ENCOUNTER — OFFICE VISIT (OUTPATIENT)
Dept: FAMILY MEDICINE | Facility: CLINIC | Age: 62
End: 2017-12-20
Payer: COMMERCIAL

## 2017-12-20 VITALS
WEIGHT: 134.6 LBS | BODY MASS INDEX: 27.19 KG/M2 | DIASTOLIC BLOOD PRESSURE: 70 MMHG | HEART RATE: 88 BPM | TEMPERATURE: 98 F | OXYGEN SATURATION: 98 % | RESPIRATION RATE: 14 BRPM | SYSTOLIC BLOOD PRESSURE: 130 MMHG

## 2017-12-20 DIAGNOSIS — M54.42 ACUTE BILATERAL LOW BACK PAIN WITH LEFT-SIDED SCIATICA: ICD-10-CM

## 2017-12-20 DIAGNOSIS — J45.41 MODERATE PERSISTENT ASTHMA WITH EXACERBATION: Primary | ICD-10-CM

## 2017-12-20 PROCEDURE — 99213 OFFICE O/P EST LOW 20 MIN: CPT | Performed by: FAMILY MEDICINE

## 2017-12-20 RX ORDER — AZITHROMYCIN 250 MG/1
TABLET, FILM COATED ORAL
Qty: 6 TABLET | Refills: 0 | Status: SHIPPED | OUTPATIENT
Start: 2017-12-20 | End: 2018-05-25

## 2017-12-20 RX ORDER — IPRATROPIUM BROMIDE AND ALBUTEROL SULFATE 2.5; .5 MG/3ML; MG/3ML
1 SOLUTION RESPIRATORY (INHALATION) EVERY 6 HOURS PRN
Qty: 30 VIAL | Refills: 1 | Status: SHIPPED | OUTPATIENT
Start: 2017-12-20 | End: 2020-11-20

## 2017-12-20 NOTE — LETTER
[facility]  [address] [place], [address]  [phone]        December 20, 2017    Emeli Granados                                                                                                                                                       [address] [facility] [address]    Off work today for bronchitis and respiratory infection.  OK to return to work 12/23/2017          Lars Oconnor MD

## 2017-12-20 NOTE — LETTER
[facility]  [address] [place], [address]  [phone]        December 20, 2017    Emeli Granados                                                                                                                                                       [address] [facility] [address]      New work restriction maximum lifting of 10 pounds until  1/15/2017.   For low back sprain.       Lars Oconnor MD

## 2017-12-20 NOTE — MR AVS SNAPSHOT
"              After Visit Summary   2017    Emeli Granados    MRN: [id number]           Patient Information     Date Of Birth          1955        Visit Information        Provider Department      2017 1:30 PM Lars Oconnor MD [facility]        Today's Diagnoses     Moderate persistent asthma with exacerbation    -  1       Follow-ups after your visit        Who to contact     If you have questions or need follow up information about today's clinic visit or your schedule please contact [facility] directly at [phone].  Normal or non-critical lab and imaging results will be communicated to you by [employer], letter or phone within 4 business days after the clinic has received the results. If you do not hear from us within 7 days, please contact the clinic through [employer] or phone. If you have a critical or abnormal lab result, we will notify you by phone as soon as possible.  Submit refill requests through [employer] or call your pharmacy and they will forward the refill request to us. Please allow 3 business days for your refill to be completed.          Additional Information About Your Visit        MyChart Information     [employer] lets you send messages to your doctor, view your test results, renew your prescriptions, schedule appointments and more. To sign up, go to [email or web address] . Click on \"Log in\" on the left side of the screen, which will take you to the Welcome page. Then click on \"Sign up Now\" on the right side of the page.     You will be asked to enter the access code listed below, as well as some personal information. Please follow the directions to create your username and password.     Your access code is: [email or web address]  Expires: 2018  3:48 PM     Your access code will  in 90 days. If you need help or a new code, please call your [facility] or [phone].        Care EveryWhere ID     This is your Care EveryWhere " ID. This could be used by other organizations to access your [place] medical records  [id number]        Your Vitals Were     Pulse Temperature Respirations Pulse Oximetry BMI (Body Mass Index)       88 98  F (36.7  C) (Oral) 14 98% 27.19 kg/m2        Blood Pressure from Last 3 Encounters:   12/20/17 130/70   11/06/17 98/45   10/30/17 119/67    Weight from Last 3 Encounters:   12/20/17 134 lb 9.6 oz (61.1 kg)   11/06/17 135 lb (61.2 kg)   10/30/17 132 lb (59.9 kg)              Today, you had the following     No orders found for display         Today's Medication Changes          These changes are accurate as of: 12/20/17  1:58 PM.  If you have any questions, ask your nurse or doctor.               Start taking these medicines.        Dose/Directions    azithromycin 250 MG tablet   Commonly known as:  ZITHROMAX   Used for:  Moderate persistent asthma with exacerbation   Started by:  Lars Oconnor MD        Two tablets first day, then one tablet daily for four days.   Quantity:  6 tablet   Refills:  0         These medicines have changed or have updated prescriptions.        Dose/Directions    * ipratropium - albuterol 0.5 mg/2.5 mg/3 mL 0.5-2.5 (3) MG/3ML neb solution   Commonly known as:  DUONEB   This may have changed:  Another medication with the same name was added. Make sure you understand how and when to take each.   Used for:  Obstructive chronic bronchitis with exacerbation (H)   Changed by:  Ani Bella,         Dose:  1 vial   Take 1 vial (3 mLs) by nebulization every 4 hours as needed for shortness of breath / dyspnea or wheezing   Quantity:  30 vial   Refills:  1       * ipratropium - albuterol 0.5 mg/2.5 mg/3 mL 0.5-2.5 (3) MG/3ML neb solution   Commonly known as:  DUONEB   This may have changed:  Another medication with the same name was added. Make sure you understand how and when to take each.   Used for:  COPD exacerbation (H)   Changed by:  Lars Oconnor MD         Dose:  1 vial   Take 1 vial (3 mLs) by nebulization every 6 hours as needed for shortness of breath / dyspnea or wheezing   Quantity:  30 vial   Refills:  1       * ipratropium - albuterol 0.5 mg/2.5 mg/3 mL 0.5-2.5 (3) MG/3ML neb solution   Commonly known as:  DUONEB   This may have changed:  You were already taking a medication with the same name, and this prescription was added. Make sure you understand how and when to take each.   Used for:  Moderate persistent asthma with exacerbation   Changed by:  Lars Oconnor MD        Dose:  1 vial   Take 1 vial (3 mLs) by nebulization every 6 hours as needed for shortness of breath / dyspnea or wheezing   Quantity:  30 vial   Refills:  1       * Notice:  This list has 3 medication(s) that are the same as other medications prescribed for you. Read the directions carefully, and ask your doctor or other care provider to review them with you.         Where to get your medicines      Some of these will need a paper prescription and others can be bought over the counter.  Ask your nurse if you have questions.     Bring a paper prescription for each of these medications     azithromycin 250 MG tablet    ipratropium - albuterol 0.5 mg/2.5 mg/3 mL 0.5-2.5 (3) MG/3ML neb solution                Primary Care Provider Office Phone # Fax #    Lars Oconnor [phone] [address] [facility] [address]        Equal Access to Services     [facility] AH: Hadii foster harman hadasho Sotremaine, waaxda luqadaha, qaybta kaalmada adeegyada, jes alvarez. So [facility] [phone].    ATENCIÓN: Si habla español, tiene a murphy disposición servicios gratuitos de asistencia lingüística. Llame al [phone].    We comply with applicable federal civil rights laws and [place] laws. We do not discriminate on the basis of race, color, national origin, age, disability, sex, sexual orientation, or gender identity.            Thank you!      Thank you for choosing [facility]  for your care. Our goal is always to provide you with excellent care. Hearing back from our patients is one way we can continue to improve our services. Please take a few minutes to complete the written survey that you may receive in the mail after your visit with us. Thank you!             Your Updated Medication List - Protect others around you: Learn how to safely use, store and throw away your medicines at www.disposemymeds.org.          This list is accurate as of: 12/20/17  1:58 PM.  Always use your most recent med list.                   Brand Name Dispense Instructions for use Diagnosis    * albuterol 108 (90 BASE) MCG/ACT Inhaler    PROAIR HFA/PROVENTIL HFA/VENTOLIN HFA    1 Inhaler    Inhale 2 puffs into the lungs every 6 hours as needed    COPD exacerbation (H)       * albuterol (2.5 MG/3ML) 0.083% neb solution     75 mL    INHALE 3 MILLILITERS (2.5 MG) BY NEBULIZATION ROUTE EVERY 6 HOURS AS NEEDED FOR SHORTNESS OF BREATH/DYSPNEA OR WHEEZING    Acute recurrent maxillary sinusitis, Acute bronchitis, unspecified organism       amoxicillin-clavulanate 875-125 MG per tablet    AUGMENTIN    20 tablet    Take 1 tablet by mouth 2 times daily    Obstructive chronic bronchitis with exacerbation (H)       ascorbic acid 1000 MG Tabs    vitamin C     Take 1,000 mg by mouth daily        azithromycin 250 MG tablet    ZITHROMAX    6 tablet    Two tablets first day, then one tablet daily for four days.    Moderate persistent asthma with exacerbation       cholecalciferol 1000 UNIT tablet    vitamin D3     Take 1,000 Units by mouth daily        * cyclobenzaprine 10 MG tablet    FLEXERIL    30 tablet    Take 0.5-1 tablets (5-10 mg) by mouth 3 times daily as needed for muscle spasms    Acute low back pain without sciatica, unspecified back pain laterality       * cyclobenzaprine 10 MG tablet    FLEXERIL    30 tablet    TAKE 1 TABLET (10 MG) BY MOUTH NIGHTLY AS NEEDED FOR  MUSCLE SPASMS    Other chronic pain       fexofenadine 180 MG tablet    ALLEGRA    30 tablet    Take 1 tablet (180 mg) by mouth daily    Hives       HYDROcodone-acetaminophen  MG per tablet    NORCO    20 tablet    One tab by mouth twice daily as needed    Other chronic pain       * ipratropium - albuterol 0.5 mg/2.5 mg/3 mL 0.5-2.5 (3) MG/3ML neb solution    DUONEB    30 vial    Take 1 vial (3 mLs) by nebulization every 4 hours as needed for shortness of breath / dyspnea or wheezing    Obstructive chronic bronchitis with exacerbation (H)       * ipratropium - albuterol 0.5 mg/2.5 mg/3 mL 0.5-2.5 (3) MG/3ML neb solution    DUONEB    30 vial    Take 1 vial (3 mLs) by nebulization every 6 hours as needed for shortness of breath / dyspnea or wheezing    COPD exacerbation (H)       * ipratropium - albuterol 0.5 mg/2.5 mg/3 mL 0.5-2.5 (3) MG/3ML neb solution    DUONEB    30 vial    Take 1 vial (3 mLs) by nebulization every 6 hours as needed for shortness of breath / dyspnea or wheezing    Moderate persistent asthma with exacerbation       lidocaine (viscous) 2 % solution    XYLOCAINE      Obstructive chronic bronchitis with exacerbation (H)       metFORMIN 500 MG 24 hr tablet    GLUCOPHAGE-XR    30 tablet    Take 1 tablet (500 mg) by mouth daily (with dinner)    Abnormal laboratory test       MULTIVITAMIN PO      Take 1 tablet by mouth daily Per pt, uses ones without Iron        naproxen 500 MG tablet    NAPROSYN    90 tablet    Take 1 tablet (500 mg) by mouth 2 times daily as needed for moderate pain    Other chronic pain       * nicotine 7 MG/24HR 24 hr patch    NICODERM CQ    30 patch    Place 1 patch onto the skin every 24 hours        * nicotine 14 MG/24HR 24 hr patch    NICODERM CQ    28 patch    PLACE 1 PATCH ONTO THE SKIN EVERY 24 HOURS    Personal history of tobacco use, presenting hazards to health       omeprazole 20 MG CR capsule    priLOSEC    60 capsule    Take 2 capsules (40 mg) by mouth daily     Oropharyngeal dysphagia       order for DME     1 Device    Equipment being ordered: Nebulizer    COPD exacerbation (H)       traMADol 50 MG tablet    ULTRAM    60 tablet    TAKE 1 TABLET BY MOUTH EVERY 6 HOURS AS NEEDED FOR MODERATE PAIN    Other chronic pain       vitamin E 400 UNITS Tabs      Take 400 Units by mouth daily        zolpidem 5 MG tablet    AMBIEN    30 tablet    Take 1 tablet (5 mg) by mouth nightly as needed for sleep    Sleep disorder       * Notice:  This list has 9 medication(s) that are the same as other medications prescribed for you. Read the directions carefully, and ask your doctor or other care provider to review them with you.

## 2017-12-20 NOTE — PROGRESS NOTES
SUBJECTIVE:   Emeli Granados is a 62 year old female who presents to clinic today for the following health issues:      RESPIRATORY SYMPTOMS      Duration: 4 days    Description  nasal congestion, chills and fatigue/malaise    Severity: moderate    Accompanying signs and symptoms: chest congestion with brown phlegm and some back pain     History (predisposing factors):  Bronchitis     Precipitating or alleviating factors: None    Therapies tried and outcome:  Mucinex helps dry out sinuses   Patient complains of congestion with sore throat, nasal congestion and sinus pain. Some mucopurulant discharge but no upper dental pain and no sustained fever..  Smoker and history of airborn allergy or asthma.   No chest pain, diaphoresis, or sob.  moderatelyproductive cough.  TMs dull not *red, sinus tenderness left   lungs clear, s1s2,soft abd,no distress, cyanosis or stridor.  A:URI with sinusitis and exacerbation of chronic bronchitis   P:fluids, antipyretics,rest and meds as directed.  Recheck PRN worsening or non-improvement over 5 days.  Discussed signs of systemic or constutional illness.    Also having pain in her low and mid back    Past Medical History:   Diagnosis Date     Acute hepatitis C without mention of hepatic coma(070.51) 1996    Dr. 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     Diverticula of colon 2014     Malignant neoplasm of endocervix (H) 1988    took uterus and left the ovaries     Other and unspecified alcohol dependence, unspecified drinking behavior     sober since 1999     Polycythemia 8/8/2012     Smoker      Unspecified disorder of esophagus     stricture - has had it dilated     Unspecified hemorrhoids without mention of complication        Past Surgical History:   Procedure Laterality Date     C NONSPECIFIC PROCEDURE  1988    hysterectomy-uterine & cervical ca - tubes and ovaries are still in     C  NONSPECIFIC PROCEDURE      ganglion cyst removal     C NONSPECIFIC PROCEDURE      right thumb surgery     C NONSPECIFIC PROCEDURE  2003    dilatation of the esophagus once due to dysphagia and stricture     HC COLONOSCOPY THRU STOMA, DIAGNOSTIC  2004, 2012    repeat in 2022     HC HEMORRHOIDECTOMY W BANDING/LIGATION      Hemorrhoidectomy     HC REMOVAL OF TONSILS,<10 Y/O      Tonsils <11y.o.     HYSTERECTOMY, PAP NO LONGER INDICATED       LAPAROSCOPIC SALPINGO-OOPHORECTOMY Bilateral 10/23/2015    Procedure: LAPAROSCOPIC SALPINGO-OOPHORECTOMY;  Surgeon: Johnathan Steve MD;  Location: [facility] OR       Family History   Problem Relation Age of Onset     Hypertension Mother      CEREBROVASCULAR DISEASE Mother      TIA's     Depression Mother      Cancer - colorectal Maternal Grandmother      dx at 65     Lipids Father      C.A.D. Father      angioplasty at 65     Musculoskeletal Disorder Father      Alcohol/Drug Daughter      in remission     Breast Cancer No family hx of        Social History   Substance Use Topics     Smoking status: Current Every Day Smoker     Packs/day: 0.50     Years: 30.00     Types: Cigarettes     Start date: 10/13/1967     Smokeless tobacco: Never Used      Comment: has patches, trying to quit 10/30/2017     Alcohol use No      Comment: sober since 9/11/99     No pain on straight leg raise   Back Subjective:         Symptoms began:   1 week(s) ago       Symptoms changing:  onset acute and are worse.                  Location:  low back left region       Radiation to radiates into the left leg       At worst a 7 on a scale of 1-10.         Personal hx of back pain is:  recurrent self limited episodes of low back pain in the past.       Pain is exacerbated by: lifting, standing and bending.       Pain is relieved by: heat and rest.       Associated sx include: none.       Previous plain films obtained: No.    .       Red flag symptoms: negative    Discussed exercises, rest, lifting restrictions,  recent bronchitis issues   (J45.41) Moderate persistent asthma with exacerbation  (primary encounter diagnosis)  Comment:   Plan: ipratropium - albuterol 0.5 mg/2.5 mg/3 mL         (DUONEB) 0.5-2.5 (3) MG/3ML neb solution,         azithromycin (ZITHROMAX) 250 MG tablet            (M54.42) Acute bilateral low back pain with left-sided sciatica  Comment:   Plan: light duty     .

## 2017-12-26 PROBLEM — J45.41 MODERATE PERSISTENT ASTHMA WITH EXACERBATION: Status: ACTIVE | Noted: 2017-12-26

## 2017-12-27 DIAGNOSIS — G89.29 OTHER CHRONIC PAIN: ICD-10-CM

## 2018-01-02 PROBLEM — G89.29 OTHER CHRONIC PAIN: Status: ACTIVE | Noted: 2018-01-02

## 2018-01-02 RX ORDER — HYDROCODONE BITARTRATE AND ACETAMINOPHEN 10; 325 MG/1; MG/1
TABLET ORAL
Qty: 20 TABLET | Refills: 0 | Status: SHIPPED | OUTPATIENT
Start: 2018-01-02 | End: 2018-01-25

## 2018-01-02 RX ORDER — TRAMADOL HYDROCHLORIDE 50 MG/1
TABLET ORAL
Qty: 60 TABLET | Refills: 0 | Status: SHIPPED | OUTPATIENT
Start: 2018-01-02 | End: 2018-01-25

## 2018-01-02 NOTE — TELEPHONE ENCOUNTER
[place] reviewed:  reviewed- no concerns    [place]  [email or web address]

## 2018-01-02 NOTE — TELEPHONE ENCOUNTER
Controlled Substance Refill Request for Tramadol 50 mg   Problem List Complete:  No     PROVIDER TO CONSIDER COMPLETION OF PROBLEM LIST AND OVERVIEW/CONTROLLED SUBSTANCE AGREEMENT    Last Written Prescription Date:  12/04/17  Last Fill Quantity: 60,   # refills: 0    Last Office Visit with G primary care provider: 12/20/17 Dr Oconnor    Future Office visit:     Controlled substance agreement on file: No.     Processing:  Fax Rx to [facility] pharmacy     checked in past 6 months?  No, route to RN

## 2018-01-25 ENCOUNTER — TELEPHONE (OUTPATIENT)
Dept: FAMILY MEDICINE | Facility: CLINIC | Age: 63
End: 2018-01-25

## 2018-01-25 DIAGNOSIS — G89.29 OTHER CHRONIC PAIN: ICD-10-CM

## 2018-01-25 RX ORDER — TRAMADOL HYDROCHLORIDE 50 MG/1
TABLET ORAL
Qty: 60 TABLET | Refills: 0 | Status: SHIPPED | OUTPATIENT
Start: 2018-01-25 | End: 2018-02-24

## 2018-01-25 RX ORDER — HYDROCODONE BITARTRATE AND ACETAMINOPHEN 10; 325 MG/1; MG/1
TABLET ORAL
Qty: 20 TABLET | Refills: 0 | Status: SHIPPED | OUTPATIENT
Start: 2018-01-25 | End: 2018-02-24

## 2018-01-26 ENCOUNTER — OFFICE VISIT (OUTPATIENT)
Dept: FAMILY MEDICINE | Facility: CLINIC | Age: 63
End: 2018-01-26
Payer: COMMERCIAL

## 2018-01-26 VITALS
BODY MASS INDEX: 27.21 KG/M2 | RESPIRATION RATE: 14 BRPM | TEMPERATURE: 98.3 F | DIASTOLIC BLOOD PRESSURE: 72 MMHG | OXYGEN SATURATION: 96 % | HEART RATE: 85 BPM | HEIGHT: 59 IN | WEIGHT: 135 LBS | SYSTOLIC BLOOD PRESSURE: 130 MMHG

## 2018-01-26 DIAGNOSIS — G89.29 OTHER CHRONIC PAIN: ICD-10-CM

## 2018-01-26 DIAGNOSIS — F43.21 GRIEF REACTION: ICD-10-CM

## 2018-01-26 DIAGNOSIS — Z13.6 CARDIOVASCULAR SCREENING; LDL GOAL LESS THAN 130: ICD-10-CM

## 2018-01-26 DIAGNOSIS — E55.9 VITAMIN D DEFICIENCY: Primary | ICD-10-CM

## 2018-01-26 DIAGNOSIS — M54.50 ACUTE LOW BACK PAIN WITHOUT SCIATICA, UNSPECIFIED BACK PAIN LATERALITY: ICD-10-CM

## 2018-01-26 DIAGNOSIS — J41.1 MUCOPURULENT CHRONIC BRONCHITIS (H): ICD-10-CM

## 2018-01-26 PROCEDURE — 99214 OFFICE O/P EST MOD 30 MIN: CPT | Performed by: FAMILY MEDICINE

## 2018-01-26 RX ORDER — CYCLOBENZAPRINE HCL 10 MG
5-10 TABLET ORAL 3 TIMES DAILY PRN
Qty: 30 TABLET | Refills: 1 | Status: SHIPPED | OUTPATIENT
Start: 2018-01-26 | End: 2018-05-08

## 2018-01-26 NOTE — PROGRESS NOTES
SUBJECTIVE:   Emeli Granados is a 62 year old female who presents to clinic today for the following health issues:    Back Subjective:back and neck pain, worse after recent sprain         Symptoms began:   10 day(s) ago       Symptoms changing:  onset acute and onset torsional stain  and are worse.                  Location:  low back left region       Radiation to radiates into the left leg       At worst a  on a scale of 1-10.         Personal hx of back pain is:  recurrent self limited episodes of low back pain in the past.       Pain is exacerbated by: walking and lying.       Pain is patially relieved by: heat and rest.       Associated sx include: no numbenss or weakness        Previous plain films obtained: No.        Results: mild djd.       Red flag symptoms: negative.  Stress with her parents in the SNF  Inventory of mental status shows issues with anxiety and situational depression.    Discussed regarding anxiety, sleep, anhedonia, irritability, energy,  perspective, self image, affect, current stressors, holistic parameters, work situation,  physical and social mitigating factors.    Past history has included both parents in 90's reluctantly placed in SNF, grappling with care issues, afraid to declare hospice, squabbling siblings   Recent  episodes and positive depression family history.    Prior treatment has included ssri and talk therapy is recommended  .    Suicidal ideation is  absent.    No problems with vision, hearing, thyroid, chest pain, dyspnea,rash,  stomach upset, polyuria, polydipsia, dysuria,muscle aches, numbness,  cough, tics or balance issues. Memory is relaible  .    HARVINDER,thyroid normal, lungs clear, s1s2,soft abdoman, symmetrical dtrs,  no abdominal mass,good peripheral pulses, vs stable.  Discussed ssri vs dop/ser grouping vs wellbutrin in depression mgmt  Discussed anxiolytics as situational tools not specific therapy  HPI: they are dwindling at 89 and she's progressively  anxious  Grief reaction, situational anxiety,  Consider talk therapy        PROBLEM LIST:                   Patient Active Problem List   Diagnosis     Disorder of bone and cartilage     CARDIOVASCULAR SCREENING; LDL GOAL LESS THAN 130     Acute hepatitis C virus infection     Obstructive chronic bronchitis with exacerbation (H)     Osteopenia     Obesity     Back pain     Alcohol dependence in remission (H)     Moderate persistent asthma with exacerbation     Other chronic pain       PAST MEDICAL HISTORY:                  Past Medical History:   Diagnosis Date     Acute hepatitis C without mention of hepatic coma(070.51) 1996    Dr. 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     Diverticula of colon 2014     Malignant neoplasm of endocervix (H) 1988    took uterus and left the ovaries     Other and unspecified alcohol dependence, unspecified drinking behavior     sober since 1999     Polycythemia 8/8/2012     Smoker      Unspecified disorder of esophagus     stricture - has had it dilated     Unspecified hemorrhoids without mention of complication        PAST SURGICAL HISTORY:                  Past Surgical History:   Procedure Laterality Date     C NONSPECIFIC PROCEDURE  1988    hysterectomy-uterine & cervical ca - tubes and ovaries are still in     C NONSPECIFIC PROCEDURE      ganglion cyst removal     C NONSPECIFIC PROCEDURE      right thumb surgery     C NONSPECIFIC PROCEDURE  2003    dilatation of the esophagus once due to dysphagia and stricture     HC COLONOSCOPY THRU STOMA, DIAGNOSTIC  2004, 2012    repeat in 2022     HC HEMORRHOIDECTOMY W BANDING/LIGATION      Hemorrhoidectomy     HC REMOVAL OF TONSILS,<10 Y/O      Tonsils <11y.o.     HYSTERECTOMY, PAP NO LONGER INDICATED       LAPAROSCOPIC SALPINGO-OOPHORECTOMY Bilateral 10/23/2015    Procedure: LAPAROSCOPIC SALPINGO-OOPHORECTOMY;  Surgeon: Johnathan Steve MD;   Location: [facility] OR       CURRENT MEDICATIONS:                  Current Outpatient Prescriptions   Medication Sig Dispense Refill     cyclobenzaprine (FLEXERIL) 10 MG tablet Take 0.5-1 tablets (5-10 mg) by mouth 3 times daily as needed for muscle spasms 30 tablet 1     HYDROcodone-acetaminophen (NORCO)  MG per tablet TAKE 1 TABLET BY MOUTH TWICE DAILY AS NEEDED 20 tablet 0     traMADol (ULTRAM) 50 MG tablet TAKE 1 TABLET BY MOUTH EVERY 6 HOURS AS NEEDED FOR MODERATE PAIN 60 tablet 0     ipratropium - albuterol 0.5 mg/2.5 mg/3 mL (DUONEB) 0.5-2.5 (3) MG/3ML neb solution Take 1 vial (3 mLs) by nebulization every 6 hours as needed for shortness of breath / dyspnea or wheezing 30 vial 1     naproxen (NAPROSYN) 500 MG tablet Take 1 tablet (500 mg) by mouth 2 times daily as needed for moderate pain 90 tablet 1     albuterol (2.5 MG/3ML) 0.083% neb solution INHALE 3 MILLILITERS (2.5 MG) BY NEBULIZATION ROUTE EVERY 6 HOURS AS NEEDED FOR SHORTNESS OF BREATH/DYSPNEA OR WHEEZING 75 mL 0     zolpidem (AMBIEN) 5 MG tablet Take 1 tablet (5 mg) by mouth nightly as needed for sleep 30 tablet 3     albuterol (PROAIR HFA/PROVENTIL HFA/VENTOLIN HFA) 108 (90 BASE) MCG/ACT Inhaler Inhale 2 puffs into the lungs every 6 hours as needed 1 Inhaler 5     order for DME Equipment being ordered: Nebulizer 1 Device 0     ipratropium - albuterol 0.5 mg/2.5 mg/3 mL (DUONEB) 0.5-2.5 (3) MG/3ML neb solution Take 1 vial (3 mLs) by nebulization every 6 hours as needed for shortness of breath / dyspnea or wheezing 30 vial 1     lidocaine (XYLOCAINE) 2 % solution (viscous)   0     ipratropium - albuterol 0.5 mg/2.5 mg/3 mL (DUONEB) 0.5-2.5 (3) MG/3ML nebulization Take 1 vial (3 mLs) by nebulization every 4 hours as needed for shortness of breath / dyspnea or wheezing 30 vial 1     cholecalciferol (VITAMIN D3) 1000 UNIT tablet Take 1,000 Units by mouth daily       ascorbic acid (VITAMIN C) 1000 MG TABS Take 1,000 mg by mouth daily       vitamin E 400  UNITS TABS Take 400 Units by mouth daily       Multiple Vitamins-Minerals (MULTIVITAMIN OR) Take 1 tablet by mouth daily Per pt, uses ones without Iron       azithromycin (ZITHROMAX) 250 MG tablet Two tablets first day, then one tablet daily for four days. (Patient not taking: Reported on 1/26/2018) 6 tablet 0     amoxicillin-clavulanate (AUGMENTIN) 875-125 MG per tablet Take 1 tablet by mouth 2 times daily (Patient not taking: Reported on 1/26/2018) 20 tablet 0     nicotine (NICODERM CQ) 14 MG/24HR 24 hr patch PLACE 1 PATCH ONTO THE SKIN EVERY 24 HOURS (Patient not taking: Reported on 1/26/2018) 28 patch 3     metFORMIN (GLUCOPHAGE-XR) 500 MG 24 hr tablet Take 1 tablet (500 mg) by mouth daily (with dinner) (Patient not taking: Reported on 1/26/2018) 30 tablet 4     omeprazole (PRILOSEC) 20 MG capsule Take 2 capsules (40 mg) by mouth daily (Patient not taking: Reported on 1/26/2018) 60 capsule 0     nicotine (NICODERM CQ) 7 MG/24HR patch 2h hr Place 1 patch onto the skin every 24 hours (Patient not taking: Reported on 1/26/2018) 30 patch 0     fexofenadine (ALLEGRA) 180 MG tablet Take 1 tablet (180 mg) by mouth daily (Patient not taking: Reported on 1/26/2018) 30 tablet 1             FAMILY HISTORY:                   Family History   Problem Relation Age of Onset     Hypertension Mother      CEREBROVASCULAR DISEASE Mother      TIA's     Depression Mother      Cancer - colorectal Maternal Grandmother      dx at 65     Lipids Father      C.A.D. Father      angioplasty at 65     Musculoskeletal Disorder Father      Alcohol/Drug Daughter      in remission     Breast Cancer No family hx of        HEALTH MAINTENANCE:                    REVIEW OF OUTSIDE RECORDS: NO    REVIEW OF SYSTEMS:  C: NEGATIVE for fever, chills  I: NEGATIVE for worrisome rashes, moles or lesions  E: NEGATIVE for vision changes   E/M: NEGATIVE for ear, mouth and throat problems  R: NEGATIVE for significant cough or SOB  CV: NEGATIVE for chest pain,  "palpitations   GI: NEGATIVE for nausea, abdominal pain, heartburn, or change in bowel habits  : NEGATIVE for frequency, dysuria, or hematuria  M: NEGATIVE for significant arthralgias or myalgia  N: NEGATIVE for weakness, dizziness or paresthesias or headache  NVS:no headache or balance issus  INTEG:no moles or new rashes  LYMPH:no nodes or night sweats    EXAM:    /72 (BP Location: Right arm, Patient Position: Sitting, Cuff Size: Adult Regular)  Pulse 85  Temp 98.3  F (36.8  C) (Oral)  Resp 14  Ht 4' 11\" (1.499 m)  Wt 135 lb (61.2 kg)  SpO2 96%  BMI 27.27 kg/m2  GENERAL APPEARANCE: healthy, alert and no distress   EXAM:  GENERAL APPEARANCE: healthy, alert and no distress  EYES: EOMI,  PERRL  HENT: ear canals and TM's normal and nose and mouth without ulcers or lesions  NECK: no adenopathy  RESP: lungs clear to auscultation - no rales, rhonchi or wheezes  CV: regular rates and rhythm, normal S1 S2, no S3 or S4 and no murmur, click or rub -  ABDOMEN:  soft, nontender, no HSM or masses and bowel sounds normal  MS: gait normal. Very limited lateral neck and low back rom, left leg painful after recent injury   BACK:no tenderness or pain on straight leg raise      Work with extension exercises, consider PT, water therapy    ASSESSMENT/PLAN  1. Vitamin D deficiency    2. Acute low back pain without sciatica, unspecified back pain laterality    3. CARDIOVASCULAR SCREENING; LDL GOAL LESS THAN 130    4. Other chronic pain      Current Outpatient Prescriptions   Medication     cyclobenzaprine (FLEXERIL) 10 MG tablet     HYDROcodone-acetaminophen (NORCO)  MG per tablet     traMADol (ULTRAM) 50 MG tablet     ipratropium - albuterol 0.5 mg/2.5 mg/3 mL (DUONEB) 0.5-2.5 (3) MG/3ML neb solution     naproxen (NAPROSYN) 500 MG tablet     albuterol (2.5 MG/3ML) 0.083% neb solution     zolpidem (AMBIEN) 5 MG tablet     albuterol (PROAIR HFA/PROVENTIL HFA/VENTOLIN HFA) 108 (90 BASE) MCG/ACT Inhaler     order for DME "     ipratropium - albuterol 0.5 mg/2.5 mg/3 mL (DUONEB) 0.5-2.5 (3) MG/3ML neb solution     lidocaine (XYLOCAINE) 2 % solution (viscous)     ipratropium - albuterol 0.5 mg/2.5 mg/3 mL (DUONEB) 0.5-2.5 (3) MG/3ML nebulization     cholecalciferol (VITAMIN D3) 1000 UNIT tablet     ascorbic acid (VITAMIN C) 1000 MG TABS     vitamin E 400 UNITS TABS     Multiple Vitamins-Minerals (MULTIVITAMIN OR)     azithromycin (ZITHROMAX) 250 MG tablet     amoxicillin-clavulanate (AUGMENTIN) 875-125 MG per tablet     nicotine (NICODERM CQ) 14 MG/24HR 24 hr patch     metFORMIN (GLUCOPHAGE-XR) 500 MG 24 hr tablet     omeprazole (PRILOSEC) 20 MG capsule     nicotine (NICODERM CQ) 7 MG/24HR patch 2h hr     fexofenadine (ALLEGRA) 180 MG tablet     No current facility-administered medications for this visit.                                     I have discussed with patient the risks, benefits, medications, treatment options and modalities.   I have instructed the patient to call or schedule a follow-up appointment if any problems or failure to improve.

## 2018-01-26 NOTE — NURSING NOTE
"Chief Complaint   Patient presents with     Musculoskeletal Problem     left hip and Knee        Initial /72 (BP Location: Right arm, Patient Position: Sitting, Cuff Size: Adult Regular)  Pulse 85  Temp 98.3  F (36.8  C) (Oral)  Resp 14  Ht 4' 11\" (1.499 m)  Wt 135 lb (61.2 kg)  SpO2 96%  BMI 27.27 kg/m2 Estimated body mass index is 27.27 kg/(m^2) as calculated from the following:    Height as of this encounter: 4' 11\" (1.499 m).    Weight as of this encounter: 135 lb (61.2 kg).  Medication Reconciliation: complete   "

## 2018-01-26 NOTE — MR AVS SNAPSHOT
After Visit Summary   1/26/2018    Emeli Granados    MRN: [id number]           Patient Information     Date Of Birth          1955        Visit Information        Provider Department      1/26/2018 11:15 AM Lars Oconnor MD [facility]        Today's Diagnoses     Vitamin D deficiency    -  1    Acute low back pain without sciatica, unspecified back pain laterality        CARDIOVASCULAR SCREENING; LDL GOAL LESS THAN 130        Other chronic pain           Follow-ups after your visit        Future tests that were ordered for you today     Open Future Orders        Priority Expected Expires Ordered    CBC with platelets differential ASAP  8/26/2018 1/26/2018    Lipid panel reflex to direct LDL Fasting Routine  8/26/2018 1/26/2018    **Comprehensive metabolic panel FUTURE 2mo Routine 3/27/2018 8/26/2018 1/26/2018    Vitamin D Deficiency Routine  8/26/2018 1/26/2018    Drug  Screen Comprehensive, Urine w/o Reported Meds (Pain Care Package) Routine  8/26/2018 1/26/2018    Albumin Random Urine Quantitative with Creat Ratio Routine  8/26/2018 1/26/2018            Who to contact     If you have questions or need follow up information about today's clinic visit or your schedule please contact [facility] directly at [phone].  Normal or non-critical lab and imaging results will be communicated to you by MyChart, letter or phone within 4 business days after the clinic has received the results. If you do not hear from us within 7 days, please contact the clinic through MyChart or phone. If you have a critical or abnormal lab result, we will notify you by phone as soon as possible.  Submit refill requests through [employer] or call your pharmacy and they will forward the refill request to us. Please allow 3 business days for your refill to be completed.          Additional Information About Your Visit        [employer] Information     [employer] lets you send  "messages to your doctor, view your test results, renew your prescriptions, schedule appointments and more. To sign up, go to [email or web address] . Click on \"Log in\" on the left side of the screen, which will take you to the Welcome page. Then click on \"Sign up Now\" on the right side of the page.     You will be asked to enter the access code listed below, as well as some personal information. Please follow the directions to create your username and password.     Your access code is: [email or web address]  Expires: 2018  3:48 PM     Your access code will  in 90 days. If you need help or a new code, please call your [facility] clinic or [phone].        Care EveryWhere ID     This is your Care EveryWhere ID. This could be used by other organizations to access your [facility] medical records  [id number]        Your Vitals Were     Pulse Temperature Respirations Height Pulse Oximetry BMI (Body Mass Index)    85 98.3  F (36.8  C) (Oral) 14 4' 11\" (1.499 m) 96% 27.27 kg/m2       Blood Pressure from Last 3 Encounters:   18 130/72   17 130/70   17 98/45    Weight from Last 3 Encounters:   18 135 lb (61.2 kg)   17 134 lb 9.6 oz (61.1 kg)   17 135 lb (61.2 kg)                 Where to get your medicines      Some of these will need a paper prescription and others can be bought over the counter.  Ask your nurse if you have questions.     Bring a paper prescription for each of these medications     cyclobenzaprine 10 MG tablet          Primary Care Provider Office Phone # Fax #    Lars Oconnor [phone] [address] [facility] [address]        Equal Access to Services     SWETA ZHENG : Natalio Warner, wacristela sidhu, qaybta kaaltheodora west, jes alvarez. [facility] [phone].    ATENCIÓN: Si habla español, tiene a murphy disposición servicios gratuitos de asistencia lingüística. Llame al " [phone].    We comply with applicable federal civil rights laws and [place] laws. We do not discriminate on the basis of race, color, national origin, age, disability, sex, sexual orientation, or gender identity.            Thank you!     Thank you for choosing [facility]  for your care. Our goal is always to provide you with excellent care. Hearing back from our patients is one way we can continue to improve our services. Please take a few minutes to complete the written survey that you may receive in the mail after your visit with us. Thank you!             Your Updated Medication List - Protect others around you: Learn how to safely use, store and throw away your medicines at www.disposemymeds.org.          This list is accurate as of 1/26/18 11:59 AM.  Always use your most recent med list.                   Brand Name Dispense Instructions for use Diagnosis    * albuterol 108 (90 BASE) MCG/ACT Inhaler    PROAIR HFA/PROVENTIL HFA/VENTOLIN HFA    1 Inhaler    Inhale 2 puffs into the lungs every 6 hours as needed    COPD exacerbation (H)       * albuterol (2.5 MG/3ML) 0.083% neb solution     75 mL    INHALE 3 MILLILITERS (2.5 MG) BY NEBULIZATION ROUTE EVERY 6 HOURS AS NEEDED FOR SHORTNESS OF BREATH/DYSPNEA OR WHEEZING    Acute recurrent maxillary sinusitis, Acute bronchitis, unspecified organism       amoxicillin-clavulanate 875-125 MG per tablet    AUGMENTIN    20 tablet    Take 1 tablet by mouth 2 times daily    Obstructive chronic bronchitis with exacerbation (H)       ascorbic acid 1000 MG Tabs    vitamin C     Take 1,000 mg by mouth daily        azithromycin 250 MG tablet    ZITHROMAX    6 tablet    Two tablets first day, then one tablet daily for four days.    Moderate persistent asthma with exacerbation       cholecalciferol 1000 UNIT tablet    vitamin D3     Take 1,000 Units by mouth daily        cyclobenzaprine 10 MG tablet    FLEXERIL    30 tablet    Take 0.5-1 tablets (5-10 mg)  by mouth 3 times daily as needed for muscle spasms    Acute low back pain without sciatica, unspecified back pain laterality       fexofenadine 180 MG tablet    ALLEGRA    30 tablet    Take 1 tablet (180 mg) by mouth daily    Hives       HYDROcodone-acetaminophen  MG per tablet    NORCO    20 tablet    TAKE 1 TABLET BY MOUTH TWICE DAILY AS NEEDED    Other chronic pain       * ipratropium - albuterol 0.5 mg/2.5 mg/3 mL 0.5-2.5 (3) MG/3ML neb solution    DUONEB    30 vial    Take 1 vial (3 mLs) by nebulization every 4 hours as needed for shortness of breath / dyspnea or wheezing    Obstructive chronic bronchitis with exacerbation (H)       * ipratropium - albuterol 0.5 mg/2.5 mg/3 mL 0.5-2.5 (3) MG/3ML neb solution    DUONEB    30 vial    Take 1 vial (3 mLs) by nebulization every 6 hours as needed for shortness of breath / dyspnea or wheezing    COPD exacerbation (H)       * ipratropium - albuterol 0.5 mg/2.5 mg/3 mL 0.5-2.5 (3) MG/3ML neb solution    DUONEB    30 vial    Take 1 vial (3 mLs) by nebulization every 6 hours as needed for shortness of breath / dyspnea or wheezing    Moderate persistent asthma with exacerbation       lidocaine (viscous) 2 % solution    XYLOCAINE      Obstructive chronic bronchitis with exacerbation (H)       metFORMIN 500 MG 24 hr tablet    GLUCOPHAGE-XR    30 tablet    Take 1 tablet (500 mg) by mouth daily (with dinner)    Abnormal laboratory test       MULTIVITAMIN PO      Take 1 tablet by mouth daily Per pt, uses ones without Iron        naproxen 500 MG tablet    NAPROSYN    90 tablet    Take 1 tablet (500 mg) by mouth 2 times daily as needed for moderate pain    Other chronic pain       * nicotine 7 MG/24HR 24 hr patch    NICODERM CQ    30 patch    Place 1 patch onto the skin every 24 hours        * nicotine 14 MG/24HR 24 hr patch    NICODERM CQ    28 patch    PLACE 1 PATCH ONTO THE SKIN EVERY 24 HOURS    Personal history of tobacco use, presenting hazards to health        omeprazole 20 MG CR capsule    priLOSEC    60 capsule    Take 2 capsules (40 mg) by mouth daily    Oropharyngeal dysphagia       order for DME     1 Device    Equipment being ordered: Nebulizer    COPD exacerbation (H)       traMADol 50 MG tablet    ULTRAM    60 tablet    TAKE 1 TABLET BY MOUTH EVERY 6 HOURS AS NEEDED FOR MODERATE PAIN    Other chronic pain       vitamin E 400 UNITS Tabs      Take 400 Units by mouth daily        zolpidem 5 MG tablet    AMBIEN    30 tablet    Take 1 tablet (5 mg) by mouth nightly as needed for sleep    Sleep disorder       * Notice:  This list has 7 medication(s) that are the same as other medications prescribed for you. Read the directions carefully, and ask your doctor or other care provider to review them with you.

## 2018-01-26 NOTE — LETTER
[facility]  [address] [place], [address]  [phone]        January 26, 2018    Emeli Granados                                                                                                                                                       [address] [facility] [address]      25 pound weight limitation at work until 3.1.2018 for leg injury           Lars Oconnor MD

## 2018-01-29 DIAGNOSIS — M54.50 ACUTE LOW BACK PAIN WITHOUT SCIATICA, UNSPECIFIED BACK PAIN LATERALITY: ICD-10-CM

## 2018-01-29 DIAGNOSIS — G89.29 OTHER CHRONIC PAIN: ICD-10-CM

## 2018-01-29 DIAGNOSIS — E55.9 VITAMIN D DEFICIENCY: ICD-10-CM

## 2018-01-29 DIAGNOSIS — Z13.6 CARDIOVASCULAR SCREENING; LDL GOAL LESS THAN 130: ICD-10-CM

## 2018-01-29 LAB
ALBUMIN SERPL-MCNC: 3.9 G/DL (ref 3.4–5)
ALP SERPL-CCNC: 89 U/L (ref 40–150)
ALT SERPL W P-5'-P-CCNC: 23 U/L (ref 0–50)
ANION GAP SERPL CALCULATED.3IONS-SCNC: 8 MMOL/L (ref 3–14)
AST SERPL W P-5'-P-CCNC: 25 U/L (ref 0–45)
BASOPHILS # BLD AUTO: 0 10E9/L (ref 0–0.2)
BASOPHILS NFR BLD AUTO: 0.2 %
BILIRUB SERPL-MCNC: 0.3 MG/DL (ref 0.2–1.3)
BUN SERPL-MCNC: 19 MG/DL (ref 7–30)
CALCIUM SERPL-MCNC: 9.2 MG/DL (ref 8.5–10.1)
CHLORIDE SERPL-SCNC: 109 MMOL/L (ref 94–109)
CHOLEST SERPL-MCNC: 177 MG/DL
CO2 SERPL-SCNC: 25 MMOL/L (ref 20–32)
CREAT SERPL-MCNC: 0.71 MG/DL (ref 0.52–1.04)
CREAT UR-MCNC: 89 MG/DL
DEPRECATED CALCIDIOL+CALCIFEROL SERPL-MC: 41 UG/L (ref 20–75)
DIFFERENTIAL METHOD BLD: ABNORMAL
EOSINOPHIL # BLD AUTO: 0.2 10E9/L (ref 0–0.7)
EOSINOPHIL NFR BLD AUTO: 2.1 %
ERYTHROCYTE [DISTWIDTH] IN BLOOD BY AUTOMATED COUNT: 14 % (ref 10–15)
GFR SERPL CREATININE-BSD FRML MDRD: 83 ML/MIN/1.7M2
GLUCOSE SERPL-MCNC: 96 MG/DL (ref 70–99)
HCT VFR BLD AUTO: 48.3 % (ref 35–47)
HDLC SERPL-MCNC: 51 MG/DL
HGB BLD-MCNC: 16.3 G/DL (ref 11.7–15.7)
LDLC SERPL CALC-MCNC: 94 MG/DL
LYMPHOCYTES # BLD AUTO: 3.4 10E9/L (ref 0.8–5.3)
LYMPHOCYTES NFR BLD AUTO: 37 %
MCH RBC QN AUTO: 32.3 PG (ref 26.5–33)
MCHC RBC AUTO-ENTMCNC: 33.7 G/DL (ref 31.5–36.5)
MCV RBC AUTO: 96 FL (ref 78–100)
MICROALBUMIN UR-MCNC: <5 MG/L
MICROALBUMIN/CREAT UR: NORMAL MG/G CR (ref 0–25)
MONOCYTES # BLD AUTO: 0.6 10E9/L (ref 0–1.3)
MONOCYTES NFR BLD AUTO: 6.7 %
NEUTROPHILS # BLD AUTO: 4.9 10E9/L (ref 1.6–8.3)
NEUTROPHILS NFR BLD AUTO: 54 %
NONHDLC SERPL-MCNC: 126 MG/DL
PLATELET # BLD AUTO: 225 10E9/L (ref 150–450)
POTASSIUM SERPL-SCNC: 3.9 MMOL/L (ref 3.4–5.3)
PROT SERPL-MCNC: 6.7 G/DL (ref 6.8–8.8)
RBC # BLD AUTO: 5.04 10E12/L (ref 3.8–5.2)
SODIUM SERPL-SCNC: 142 MMOL/L (ref 133–144)
TRIGL SERPL-MCNC: 162 MG/DL
WBC # BLD AUTO: 9.2 10E9/L (ref 4–11)

## 2018-01-29 PROCEDURE — 80061 LIPID PANEL: CPT | Performed by: FAMILY MEDICINE

## 2018-01-29 PROCEDURE — 80307 DRUG TEST PRSMV CHEM ANLYZR: CPT | Mod: 90 | Performed by: FAMILY MEDICINE

## 2018-01-29 PROCEDURE — 82306 VITAMIN D 25 HYDROXY: CPT | Performed by: FAMILY MEDICINE

## 2018-01-29 PROCEDURE — 82043 UR ALBUMIN QUANTITATIVE: CPT | Performed by: FAMILY MEDICINE

## 2018-01-29 PROCEDURE — 85025 COMPLETE CBC W/AUTO DIFF WBC: CPT | Performed by: FAMILY MEDICINE

## 2018-01-29 PROCEDURE — 36415 COLL VENOUS BLD VENIPUNCTURE: CPT | Performed by: FAMILY MEDICINE

## 2018-01-29 PROCEDURE — 99000 SPECIMEN HANDLING OFFICE-LAB: CPT | Performed by: FAMILY MEDICINE

## 2018-01-29 PROCEDURE — 80053 COMPREHEN METABOLIC PANEL: CPT | Performed by: FAMILY MEDICINE

## 2018-02-01 LAB — COMPREHEN DRUG ANALYSIS UR: NORMAL

## 2018-02-20 ENCOUNTER — TELEPHONE (OUTPATIENT)
Dept: FAMILY MEDICINE | Facility: CLINIC | Age: 63
End: 2018-02-20

## 2018-02-20 DIAGNOSIS — G89.29 OTHER CHRONIC PAIN: ICD-10-CM

## 2018-02-20 NOTE — TELEPHONE ENCOUNTER
METRIC FAILED: chronic pain  RECOMMENDATIONS: update problem list (wrong info placed)  updated, only our clinic listed  Gayle Broderick RN, BSN  Message handled by Nurse Triage.

## 2018-02-24 DIAGNOSIS — G89.29 OTHER CHRONIC PAIN: ICD-10-CM

## 2018-02-24 RX ORDER — TRAMADOL HYDROCHLORIDE 50 MG/1
TABLET ORAL
Qty: 60 TABLET | Refills: 0 | Status: SHIPPED | OUTPATIENT
Start: 2018-02-24 | End: 2018-03-21

## 2018-02-24 RX ORDER — HYDROCODONE BITARTRATE AND ACETAMINOPHEN 10; 325 MG/1; MG/1
TABLET ORAL
Qty: 20 TABLET | Refills: 0 | Status: SHIPPED | OUTPATIENT
Start: 2018-02-24 | End: 2018-03-21

## 2018-02-24 NOTE — TELEPHONE ENCOUNTER
Routing refill request to provider to review approval because:  Drug not on the [facility], P or [facility] refill protocol or controlled substance    CONFIRMED LAST REFILL FOR BOTH 1/25/18, pt asking for in clinic provider to approve, inform pt of plan    Gayle Broderick RN, BSN  Message handled by Nurse Triage.

## 2018-02-24 NOTE — TELEPHONE ENCOUNTER
Refilled in Dr. Mohan's absence - remind patient not to expect refills on weekends in future but due for fill and CSA up to date with recent appropriate urine drug screen so ok to fill.

## 2018-02-24 NOTE — TELEPHONE ENCOUNTER
Called pt, informed, signed rx's placed up front  Gayle Broderick RN, BSN  Message handled by Nurse Triage.

## 2018-02-24 NOTE — TELEPHONE ENCOUNTER
. [phone]    DR LERMA WILL ONLY FILL TRAMADOL     Controlled Substance Refill Request for  Problem List Complete:  Yes   chePatient is followed by Lars Oconnor MD for ongoing prescription of pain medication.  All refills should only be approved by this provider, or covering partner.     Medication(s): hydrocodone.   Maximum quantity per month: 20  Clinic visit frequency required: Q 3 months      Controlled substance agreement:  Encounter-Level CSA - 07/17/2017:      cked in past 6 months?  Yes 02/20/2018     SEND HYDROCODONE TO PCP    Danisha Verma

## 2018-03-07 ENCOUNTER — HOSPITAL PATHOLOGY (OUTPATIENT)
Dept: OTHER | Facility: CLINIC | Age: 63
End: 2018-03-07

## 2018-03-09 LAB — COPATH REPORT: NORMAL

## 2018-03-21 ENCOUNTER — TELEPHONE (OUTPATIENT)
Dept: FAMILY MEDICINE | Facility: CLINIC | Age: 63
End: 2018-03-21

## 2018-03-21 DIAGNOSIS — G89.29 OTHER CHRONIC PAIN: ICD-10-CM

## 2018-03-21 NOTE — TELEPHONE ENCOUNTER
Controlled Substance Refill Request for traMADol (ULTRAM) 50 MG tablet  Problem List Complete:  Yes    Patient is followed by Lars Oconnor MD for ongoing prescription of pain medication.  All refills should only be approved by this provider, or covering partner.     Medication(s): hydrocodone.   Maximum quantity per month: 20  Clinic visit frequency required: Q 3 months      Controlled substance agreement:  Encounter-Level CSA - 07/17/2017:            Controlled Substance Agreement - Scan on 7/31/2017  9:32 PM : CONTROLLED SUBSTANCE AGREEMENT (below)            Encounter-Level CSA - 07/17/2017:            Controlled Substance Agreement - Scan on 1/8/2015  9:46 AM : [facility] Controlled Medication Agreement 1-7-15 (below)            Pain Clinic evaluation in the past: No     DIRE Total Score(s):  No flowsheet data found.     Last [facility] website verification:  done on 2/20/18      Last Written Prescription Date:  02/24/18  Last Fill Quantity: 60,   # refills: 0    Last Office Visit with [facility] primary care provider: 01/26/18    Clinic visit frequency required: Q 3 months     Future Office visit:     Controlled substance agreement on file: Yes:  Date 01/08/15.     Processing:  Patient will  in clinic   checked in past 6 months?  Yes 02/20/18      Controlled Substance Refill Request for HYDROcodone-acetaminophen (NORCO)  MG per tablet  Problem List Complete:  Yes    Patient is followed by Lars Oconnor MD for ongoing prescription of pain medication.  All refills should only be approved by this provider, or covering partner.     Medication(s): hydrocodone.   Maximum quantity per month: 20  Clinic visit frequency required: Q 3 months      Controlled substance agreement:  Encounter-Level CSA - 07/17/2017:            Controlled Substance Agreement - Scan on 7/31/2017  9:32 PM : CONTROLLED SUBSTANCE AGREEMENT (below)            Encounter-Level CSA - 07/17/2017:            Controlled Substance  Agreement - Scan on 1/8/2015  9:46 AM :  Clinics Controlled Medication Agreement 1-7-15 (below)            Pain Clinic evaluation in the past: No     DIRE Total Score(s):  No flowsheet data found.     [facility]  done on 2/20/18      Last Written Prescription Date:  02/24/18  Last Fill Quantity: 20,   # refills: 0    Last Office Visit with [facility] primary care provider: 01/26/18    Clinic visit frequency required: Q 3 months     Future Office visit:     Controlled substance agreement on file: Yes:  Date 01/08/15.     Processing:  Patient will  in clinic   checked in past 6 months?  Yes 02/20/18

## 2018-03-21 NOTE — TELEPHONE ENCOUNTER
Patient wants another letter extending her work excuse for another month as she states she had re injured her back.  Call when available for .

## 2018-03-21 NOTE — LETTER
[facility]  [address] [place], [address]  [phone]        March 21, 2018    Emeli Granados                                                                                                                                                       [address] [facility] [address]    25 pound weight limitation at work until 5/1/2018 for back pain.         Lars Oconnor MD

## 2018-03-23 RX ORDER — TRAMADOL HYDROCHLORIDE 50 MG/1
TABLET ORAL
Qty: 60 TABLET | Refills: 0 | Status: SHIPPED | OUTPATIENT
Start: 2018-03-23 | End: 2018-04-20

## 2018-03-23 RX ORDER — HYDROCODONE BITARTRATE AND ACETAMINOPHEN 10; 325 MG/1; MG/1
TABLET ORAL
Qty: 20 TABLET | Refills: 0 | Status: SHIPPED | OUTPATIENT
Start: 2018-03-23 | End: 2018-04-20

## 2018-04-03 DIAGNOSIS — J44.1 COPD EXACERBATION (H): ICD-10-CM

## 2018-04-03 NOTE — TELEPHONE ENCOUNTER
"Last Written Prescription Date:  12/15/16  Last Fill Quantity: 1 inhaler,  # refills: 5   Last office visit: 1/26/2018 with prescribing provider:  Anastasia   Future Office Visit:      Requested Prescriptions   Pending Prescriptions Disp Refills     VENTOLIN  (90 BASE) MCG/ACT Inhaler [Pharmacy Med Name: Ventolin HFA Inhalation Aerosol Solution 108 (90 Base) MCG/ACT] 18 g 4     Sig: INHALE 2 PUFFS INTO THE LUNGS EVERY 6 HOURS AS NEEDED.    Asthma Maintenance Inhalers - Anticholinergics Failed    4/3/2018  9:36 AM       Failed - Asthma control assessment score within normal limits in last 6 months    Please review ACT score.          Passed - Patient is age 12 years or older       Passed - Recent (6 mo) or future (30 days) visit within the authorizing provider's specialty    Patient had office visit in the last 6 months or has a visit in the next 30 days with authorizing provider or within the authorizing provider's specialty.  See \"Patient Info\" tab in inbasket, or \"Choose Columns\" in Meds & Orders section of the refill encounter.            No flowsheet data found.    "

## 2018-04-05 RX ORDER — ALBUTEROL SULFATE 90 UG/1
AEROSOL, METERED RESPIRATORY (INHALATION)
Qty: 18 G | Refills: 2 | Status: SHIPPED | OUTPATIENT
Start: 2018-04-05 | End: 2019-02-09

## 2018-04-05 NOTE — TELEPHONE ENCOUNTER
Diagnosis not asthma, COPD  Prescription approved per [facility] Refill Protocol.  Gayle Broderick RN, BSN

## 2018-04-12 ENCOUNTER — TELEPHONE (OUTPATIENT)
Dept: FAMILY MEDICINE | Facility: CLINIC | Age: 63
End: 2018-04-12

## 2018-04-12 NOTE — TELEPHONE ENCOUNTER
Panel Management Review      Patient has the following on her problem list: None      Composite cancer screening  Chart review shows that this patient is due/due soon for the following Mammogram  Summary:    Patient is due/failing the following:   MAMMOGRAM    Action needed:   Patient needs referral/order: mammo    Type of outreach:    Phone, spoke to patient.  patient declined    Questions for provider review:    None                                                                                                                                    Jono Kimball [facility]        Chart routed to none .

## 2018-04-20 DIAGNOSIS — G89.29 OTHER CHRONIC PAIN: ICD-10-CM

## 2018-04-20 NOTE — TELEPHONE ENCOUNTER
Controlled Substance Refill Request for Norco and Tramadol  Problem List Complete:  Yes    Last Written Prescription Date:  3/23/2018  Last Fill Quantity:20 ,   # refills: 0    Last Office Visit with [facility] primary care provider: 1/26/201     Processing:  Patient will  in clinic call pt at     Antonella Melgar/

## 2018-04-21 DIAGNOSIS — G47.9 SLEEP DISORDER: ICD-10-CM

## 2018-04-21 DIAGNOSIS — G89.29 OTHER CHRONIC PAIN: ICD-10-CM

## 2018-04-21 RX ORDER — HYDROCODONE BITARTRATE AND ACETAMINOPHEN 10; 325 MG/1; MG/1
TABLET ORAL
Qty: 20 TABLET | Refills: 0 | Status: SHIPPED | OUTPATIENT
Start: 2018-04-21 | End: 2018-05-21

## 2018-04-21 RX ORDER — TRAMADOL HYDROCHLORIDE 50 MG/1
TABLET ORAL
Qty: 60 TABLET | Refills: 0 | Status: SHIPPED | OUTPATIENT
Start: 2018-04-21 | End: 2018-05-21

## 2018-04-21 NOTE — TELEPHONE ENCOUNTER
Pt calling to check status of refill, would like to  today 4/21/18    Jaden Cole   04/21/18 9:31 AM

## 2018-04-23 DIAGNOSIS — G47.9 SLEEP DISORDER: ICD-10-CM

## 2018-04-23 RX ORDER — ZOLPIDEM TARTRATE 5 MG/1
TABLET ORAL
Qty: 30 TABLET | Refills: 2 | Status: SHIPPED | OUTPATIENT
Start: 2018-04-23 | End: 2018-04-23

## 2018-04-23 NOTE — TELEPHONE ENCOUNTER
Routing refill request to provider for review/approval because:  Drug not on the [facility] refill protocol   Last fill 12.27.17    Lyndsey Amador RN, BS  Clinical Nurse Triage.

## 2018-04-23 NOTE — TELEPHONE ENCOUNTER
Controlled Substance Refill Request for zolpidem (AMBIEN) 5 MG tablet  Problem List Complete:  Yes    Patient is followed by Lars Oconnor MD for ongoing prescription of pain medication.  All refills should only be approved by this provider, or covering partner.    Medication(s): hydrocodone.   Maximum quantity per month: 20  Clinic visit frequency required: Q 3 months     Controlled substance agreement:  Encounter-Level CSA - 07/17/2017:          Controlled Substance Agreement - Scan on 7/31/2017  9:32 PM : CONTROLLED SUBSTANCE AGREEMENT (below)          Encounter-Level CSA - 07/17/2017:          Controlled Substance Agreement - Scan on 1/8/2015  9:46 AM : [facility] Controlled Medication Agreement 1-7-15 (below)          Pain Clinic evaluation in the past: No    DIRE Total Score(s):  No flowsheet data found.      Last Written Prescription Date:  7/17/2017  Last Fill Quantity: 30 tablet,   # refills: 3    Last Office Visit with [facility] primary care provider: 1/26/2018Anastasia    Clinic visit frequency required: Q 3 months     Controlled substance agreement on file: Yes:  Date 7/31/2017.     Processing:  Staff will hand deliver Rx to on-site pharmacy     Last [facility] website verification:  done on 2/20/18   [email or web address]   checked in past 6 months?  Yes 2/20/2018

## 2018-04-25 RX ORDER — ZOLPIDEM TARTRATE 5 MG/1
TABLET ORAL
Qty: 30 TABLET | Refills: 2 | COMMUNITY
Start: 2018-04-25 | End: 2018-10-19

## 2018-05-08 DIAGNOSIS — M54.50 ACUTE LOW BACK PAIN WITHOUT SCIATICA, UNSPECIFIED BACK PAIN LATERALITY: ICD-10-CM

## 2018-05-08 NOTE — TELEPHONE ENCOUNTER
Requested Prescriptions   Pending Prescriptions Disp Refills     cyclobenzaprine (FLEXERIL) 10 MG tablet [Pharmacy Med Name: Cyclobenzaprine HCl Oral Tablet 10 MG] 30 tablet 0     Sig: TAKE HALF TO ONE TABLET BY MOUTH THREE TIMES DAILY AS NEEDED FOR MUSCLE SPASMS    There is no refill protocol information for this order            Last Written Prescription Date:  1/26/18  Last Fill Quantity: 30 tablet,   # refills: 1  Last Office Visit: 1/26/18 (Anastasia)  Future Office visit:       Routing refill request to provider for review/approval because:  Drug not on the [facility], [facility] or [facility] refill protocol or controlled substance

## 2018-05-09 RX ORDER — CYCLOBENZAPRINE HCL 10 MG
TABLET ORAL
Qty: 30 TABLET | Refills: 0 | Status: SHIPPED | OUTPATIENT
Start: 2018-05-09 | End: 2018-07-02

## 2018-05-09 NOTE — TELEPHONE ENCOUNTER
Routing refill request to provider to review approval because:  Drug not on the [facility], [facility] or [facility] refill protocol or controlled substance    Gayle Broderick RN, BSN  Message handled by Nurse Triage.

## 2018-05-17 DIAGNOSIS — G89.29 OTHER CHRONIC PAIN: ICD-10-CM

## 2018-05-17 NOTE — TELEPHONE ENCOUNTER
Controlled Substance Refill Request for   traMADol (ULTRAM) 50 MG tablet  HYDROcodone-acetaminophen (NORCO)  MG per tablet    Problem List Complete:  Yes   checked in past 6 months?  Yes      Back pain       Alcohol dependence in remission (H)       Other chronic pain       Last Written Prescription Date: 4/21/18   HYDROcodone-acetaminophen (NORCO)  MG per tablet  Last Fill Quantity: 20 tablets,  # refills: 0   Last office visit: 1/26/2018 with prescribing provider:  Anastasia Boykin Office Visit:        Last Written Prescription Date: 4/21/18  traMADol (ULTRAM) 50 MG tablet  Last Fill Quantity: 60 tablets,  # refills: 0   Last office visit: 1/26/2018 with prescribing provider:  Anastasia Boykin Office Visit:

## 2018-05-19 NOTE — TELEPHONE ENCOUNTER
Not [facility] medications.  Routed to provider.  Below states visit every 3 months.  Refill due 5/21/18.  Below states leaving town.  Elisabeth Freeman RN    Last OV 1/26/18  Norco- last RF 4/21/18 #20  Tramadol- last RF 4/21/18 #60      Patient is followed by Lars Oconnor MD for ongoing prescription of pain medication.  All refills should only be approved by this provider, or covering partner.    Medication(s): hydrocodone.   Maximum quantity per month: 20  Clinic visit frequency required: Q 3 months     Controlled substance agreement:  Encounter-Level CSA - 07/17/2017:          Controlled Substance Agreement - Scan on 7/31/2017  9:32 PM : CONTROLLED SUBSTANCE AGREEMENT (below)            Encounter-Level CSA - 07/17/2017:          Controlled Substance Agreement - Scan on 1/8/2015  9:46 AM : [facility] Controlled Medication Agreement 1-7-15 (below)              Pain Clinic evaluation in the past: No    DIRE Total Score(s):  No flowsheet data found.    Last [facility] website verification:  done on 4.23.18   [email or web address]

## 2018-05-19 NOTE — TELEPHONE ENCOUNTER
Pt called to check status of refill-pt wondering if another provider would be willing to refill as she is leaving [facility].

## 2018-05-19 NOTE — TELEPHONE ENCOUNTER
Will need to wait until Monday for Dr. Oconnor, appears she needs to be seen every 3 months, last visit in 1/2018.  Susan Haase, CNP

## 2018-05-21 RX ORDER — HYDROCODONE BITARTRATE AND ACETAMINOPHEN 10; 325 MG/1; MG/1
TABLET ORAL
Qty: 5 TABLET | Refills: 0 | Status: SHIPPED | OUTPATIENT
Start: 2018-05-21 | End: 2018-05-25

## 2018-05-21 RX ORDER — TRAMADOL HYDROCHLORIDE 50 MG/1
TABLET ORAL
Qty: 60 TABLET | Refills: 0 | OUTPATIENT
Start: 2018-05-21

## 2018-05-21 RX ORDER — HYDROCODONE BITARTRATE AND ACETAMINOPHEN 10; 325 MG/1; MG/1
TABLET ORAL
Qty: 20 TABLET | Refills: 0 | OUTPATIENT
Start: 2018-05-21

## 2018-05-21 RX ORDER — TRAMADOL HYDROCHLORIDE 50 MG/1
TABLET ORAL
Qty: 60 TABLET | Refills: 0 | Status: SHIPPED | OUTPATIENT
Start: 2018-05-21 | End: 2018-06-20

## 2018-05-21 NOTE — TELEPHONE ENCOUNTER
Patient called back, scheduled appt for Dr Oconnor's first available which is Friday 5/25.  Will you please refill her meds and can she  today by 1pm as she works at 2:00 (at least the Tramadol).  Thanks!

## 2018-05-25 ENCOUNTER — TELEPHONE (OUTPATIENT)
Dept: FAMILY MEDICINE | Facility: CLINIC | Age: 63
End: 2018-05-25

## 2018-05-25 ENCOUNTER — OFFICE VISIT (OUTPATIENT)
Dept: FAMILY MEDICINE | Facility: CLINIC | Age: 63
End: 2018-05-25
Payer: COMMERCIAL

## 2018-05-25 VITALS
BODY MASS INDEX: 26.88 KG/M2 | RESPIRATION RATE: 12 BRPM | HEART RATE: 78 BPM | SYSTOLIC BLOOD PRESSURE: 120 MMHG | OXYGEN SATURATION: 95 % | DIASTOLIC BLOOD PRESSURE: 76 MMHG | TEMPERATURE: 98.9 F | WEIGHT: 133.1 LBS

## 2018-05-25 DIAGNOSIS — Z12.31 ENCOUNTER FOR SCREENING MAMMOGRAM FOR BREAST CANCER: Primary | ICD-10-CM

## 2018-05-25 DIAGNOSIS — Z87.891 PERSONAL HISTORY OF TOBACCO USE: ICD-10-CM

## 2018-05-25 DIAGNOSIS — G89.29 OTHER CHRONIC PAIN: ICD-10-CM

## 2018-05-25 DIAGNOSIS — F10.21 ALCOHOL DEPENDENCE IN REMISSION (H): ICD-10-CM

## 2018-05-25 DIAGNOSIS — Z12.2 ENCOUNTER FOR SCREENING FOR LUNG CANCER: ICD-10-CM

## 2018-05-25 DIAGNOSIS — M85.851 OSTEOPENIA OF BOTH THIGHS: ICD-10-CM

## 2018-05-25 DIAGNOSIS — M85.852 OSTEOPENIA OF BOTH THIGHS: ICD-10-CM

## 2018-05-25 DIAGNOSIS — J45.41 MODERATE PERSISTENT ASTHMA WITH EXACERBATION: ICD-10-CM

## 2018-05-25 PROCEDURE — 99214 OFFICE O/P EST MOD 30 MIN: CPT | Performed by: FAMILY MEDICINE

## 2018-05-25 PROCEDURE — G0296 VISIT TO DETERM LDCT ELIG: HCPCS | Performed by: FAMILY MEDICINE

## 2018-05-25 RX ORDER — HYDROCODONE BITARTRATE AND ACETAMINOPHEN 10; 325 MG/1; MG/1
TABLET ORAL
Qty: 20 TABLET | Refills: 0 | Status: SHIPPED | OUTPATIENT
Start: 2018-05-25 | End: 2018-06-20

## 2018-05-25 NOTE — PROGRESS NOTES
SUBJECTIVE:    CC: Emeli Granados is a 63 year old female who presents for follow up of chronic neck and back pain     HPI: has CSA and followed to the letter, does physical work, flares up her back and neck, years of alcohol abstinence,  Tobacco use and respiratory issues working on cessation . Has allergy and asthma   Current Outpatient Prescriptions   Medication     albuterol (2.5 MG/3ML) 0.083% neb solution     ascorbic acid (VITAMIN C) 1000 MG TABS     cholecalciferol (VITAMIN D3) 1000 UNIT tablet     cyclobenzaprine (FLEXERIL) 10 MG tablet     fexofenadine (ALLEGRA) 180 MG tablet     HYDROcodone-acetaminophen (NORCO)  MG per tablet     ipratropium - albuterol 0.5 mg/2.5 mg/3 mL (DUONEB) 0.5-2.5 (3) MG/3ML neb solution     ipratropium - albuterol 0.5 mg/2.5 mg/3 mL (DUONEB) 0.5-2.5 (3) MG/3ML neb solution     ipratropium - albuterol 0.5 mg/2.5 mg/3 mL (DUONEB) 0.5-2.5 (3) MG/3ML nebulization     lidocaine (XYLOCAINE) 2 % solution (viscous)     metFORMIN (GLUCOPHAGE-XR) 500 MG 24 hr tablet     Multiple Vitamins-Minerals (MULTIVITAMIN OR)     naproxen (NAPROSYN) 500 MG tablet     nicotine (NICODERM CQ) 14 MG/24HR 24 hr patch     nicotine (NICODERM CQ) 7 MG/24HR patch 2h hr     omeprazole (PRILOSEC) 20 MG capsule     order for DME     traMADol (ULTRAM) 50 MG tablet     VENTOLIN  (90 BASE) MCG/ACT Inhaler     vitamin E 400 UNITS TABS     zolpidem (AMBIEN) 5 MG tablet     No current facility-administered medications for this visit.              PROBLEM LIST:                   Patient Active Problem List   Diagnosis     Disorder of bone and cartilage     CARDIOVASCULAR SCREENING; LDL GOAL LESS THAN 130     Acute hepatitis C virus infection     Obstructive chronic bronchitis with exacerbation (H)     Osteopenia     Obesity     Back pain     Alcohol dependence in remission (H)     Moderate persistent asthma with exacerbation     Other chronic pain       PAST MEDICAL HISTORY:                  Past  Medical History:   Diagnosis Date     Acute hepatitis C without mention of hepatic coma(070.51) 1996    Dr. 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     Diverticula of colon 2014     Malignant neoplasm of endocervix (H) 1988    took uterus and left the ovaries     Other and unspecified alcohol dependence, unspecified drinking behavior     sober since 1999     Polycythemia 8/8/2012     Smoker      Unspecified disorder of esophagus     stricture - has had it dilated     Unspecified hemorrhoids without mention of complication        PAST SURGICAL HISTORY:                  Past Surgical History:   Procedure Laterality Date     C NONSPECIFIC PROCEDURE  1988    hysterectomy-uterine & cervical ca - tubes and ovaries are still in     C NONSPECIFIC PROCEDURE      ganglion cyst removal     C NONSPECIFIC PROCEDURE      right thumb surgery     C NONSPECIFIC PROCEDURE  2003    dilatation of the esophagus once due to dysphagia and stricture     HC COLONOSCOPY THRU STOMA, DIAGNOSTIC  2004, 2012    repeat in 2022     HC HEMORRHOIDECTOMY W BANDING/LIGATION      Hemorrhoidectomy     HC REMOVAL OF TONSILS,<10 Y/O      Tonsils <11y.o.     HYSTERECTOMY, PAP NO LONGER INDICATED       LAPAROSCOPIC SALPINGO-OOPHORECTOMY Bilateral 10/23/2015    Procedure: LAPAROSCOPIC SALPINGO-OOPHORECTOMY;  Surgeon: Johnathan Steve MD;  Location:  OR       CURRENT MEDICATIONS:                  Current Outpatient Prescriptions   Medication Sig Dispense Refill     albuterol (2.5 MG/3ML) 0.083% neb solution INHALE 3 MILLILITERS (2.5 MG) BY NEBULIZATION ROUTE EVERY 6 HOURS AS NEEDED FOR SHORTNESS OF BREATH/DYSPNEA OR WHEEZING 75 mL 0     ascorbic acid (VITAMIN C) 1000 MG TABS Take 1,000 mg by mouth daily       cholecalciferol (VITAMIN D3) 1000 UNIT tablet Take 1,000 Units by mouth daily       cyclobenzaprine (FLEXERIL) 10 MG tablet TAKE HALF TO ONE TABLET BY MOUTH  THREE TIMES DAILY AS NEEDED FOR MUSCLE SPASMS 30 tablet 0     fexofenadine (ALLEGRA) 180 MG tablet Take 1 tablet (180 mg) by mouth daily (Patient not taking: Reported on 1/26/2018) 30 tablet 1     HYDROcodone-acetaminophen (NORCO)  MG per tablet TAKE 1 TABLET BY MOUTH TWICE DAILY AS NEEDED. 20 tablet 0     ipratropium - albuterol 0.5 mg/2.5 mg/3 mL (DUONEB) 0.5-2.5 (3) MG/3ML neb solution Take 1 vial (3 mLs) by nebulization every 6 hours as needed for shortness of breath / dyspnea or wheezing 30 vial 1     ipratropium - albuterol 0.5 mg/2.5 mg/3 mL (DUONEB) 0.5-2.5 (3) MG/3ML neb solution Take 1 vial (3 mLs) by nebulization every 6 hours as needed for shortness of breath / dyspnea or wheezing 30 vial 1     ipratropium - albuterol 0.5 mg/2.5 mg/3 mL (DUONEB) 0.5-2.5 (3) MG/3ML nebulization Take 1 vial (3 mLs) by nebulization every 4 hours as needed for shortness of breath / dyspnea or wheezing 30 vial 1     lidocaine (XYLOCAINE) 2 % solution (viscous)   0     metFORMIN (GLUCOPHAGE-XR) 500 MG 24 hr tablet Take 1 tablet (500 mg) by mouth daily (with dinner) (Patient not taking: Reported on 1/26/2018) 30 tablet 4     Multiple Vitamins-Minerals (MULTIVITAMIN OR) Take 1 tablet by mouth daily Per pt, uses ones without Iron       naproxen (NAPROSYN) 500 MG tablet Take 1 tablet (500 mg) by mouth 2 times daily as needed for moderate pain 90 tablet 1     nicotine (NICODERM CQ) 14 MG/24HR 24 hr patch PLACE 1 PATCH ONTO THE SKIN EVERY 24 HOURS (Patient not taking: Reported on 1/26/2018) 28 patch 3     nicotine (NICODERM CQ) 7 MG/24HR patch 2h hr Place 1 patch onto the skin every 24 hours (Patient not taking: Reported on 1/26/2018) 30 patch 0     omeprazole (PRILOSEC) 20 MG capsule Take 2 capsules (40 mg) by mouth daily (Patient not taking: Reported on 1/26/2018) 60 capsule 0     order for DME Equipment being ordered: Nebulizer 1 Device 0     traMADol (ULTRAM) 50 MG tablet TAKE 1 TABLET BY MOUTH EVERY 6 HOURS AS NEEDED FOR  MODERATE PAIN. 60 tablet 0     VENTOLIN  (90 BASE) MCG/ACT Inhaler INHALE 2 PUFFS INTO THE LUNGS EVERY 6 HOURS AS NEEDED.  18 g 2     vitamin E 400 UNITS TABS Take 400 Units by mouth daily       zolpidem (AMBIEN) 5 MG tablet TAKE ONE TABLET BY MOUTH NIGHTLY AS NEEDED FOR SLEEP 30 tablet 2             FAMILY HISTORY:                   Family History   Problem Relation Age of Onset     Hypertension Mother      CEREBROVASCULAR DISEASE Mother      TIA's     Depression Mother      Cancer - colorectal Maternal Grandmother      dx at 65     Lipids Father      C.A.D. Father      angioplasty at 65     Musculoskeletal Disorder Father      Alcohol/Drug Daughter      in remission     Breast Cancer No family hx of        HEALTH MAINTENANCE:                    REVIEW OF OUTSIDE RECORDS: NO    REVIEW OF SYSTEMS:  CONSTITUTIONAL: NEGATIVE for fever, chills  EYES: NEGATIVE for vision changes   RESP: NEGATIVE for significant cough or SOB  BREAST: NEGATIVE for masses, tenderness or discharge  CV: NEGATIVE for chest pain, palpitations   GI: NEGATIVE for nausea, abdominal pain, heartburn, or change in bowel habits  : NEGATIVE for frequency, dysuria, or hematuria  MUSCULOSKELETAL: NEGATIVE for significant arthralgias or myalgia  NEURO: NEGATIVE for weakness, dizziness or paresthesias or headache  NVS:no headache or balance issus  INTEG:no moles or new rashes  LYMPH:no nodes or night sweats    EXAM:    /76 (BP Location: Left arm, Patient Position: Chair, Cuff Size: Adult Regular)  Pulse 78  Temp 98.9  F (37.2  C) (Oral)  Resp 12  Wt 133 lb 1.6 oz (60.4 kg)  SpO2 95%  BMI 26.88 kg/m2  GENERAL APPEARANCE: healthy, alert and no distress   EXAM:  GENERAL APPEARANCE: healthy, alert and no distress  EYES: EOMI,  PERRL  HENT: ear canals and TM's normal and nose and mouth without ulcers or lesions  NECK: no adenopathy, no asymmetry, masses, or scars and thyroid normal to palpation  RESP: lungs clear to auscultation - no  rales, rhonchi or wheezes  BREAST: normal without masses, tenderness or nipple discharge and no palpable axillary masses or adenopathy  CV: regular rates and rhythm, normal S1 S2, no S3 or S4 and no murmur, click or rub -  ABDOMEN:  soft, nontender, no HSM or masses and bowel sounds normal  NEURO: Normal strength and tone, sensory exam grossly normal, mentation intact and speech normal  PSYCH: mentation appears normal and affect normal/bright  BACK:no tenderness or pain on straight let raise           ASSESSMENT/PLAN  1. Encounter for screening mammogram for breast cancer    2. Osteopenia of both thighs    3. Personal history of tobacco use    4. Encounter for screening for lung cancer    5. Alcohol dependence in remission (H)            Get lung cancer screen: work towards cessation of tobacco, get dexa and mammogram,  Three month follow up csa                       I have discussed with patient the risks, benefits, medications, treatment options and modalities.   I have instructed the patient to call or schedule a follow-up appointment if any problems or failure to improve.                         Lung Cancer Screening Shared Decision Making Visit     Emeli Granados is eligible for lung cancer screening on the basis of the information provided in my signed lung cancer screening order.     I have discussed with patient the risks and benefits of screening for lung cancer with low-dose CT.     The risks include:   radiation exposure: one low dose chest CT has as much ionizing radiation as  about 15 chest x-rays or 6 months of background radiation living in [place]     false positives: 96% of positive findings/nodules are NOT cancer, but some  might still require additional diagnostic evaluation, including biopsy   over-diagnosis: some slow growing cancers that might never have been clinically  significant will be detected and treated unnecessarily     The benefit of early detection of lung cancer is contingent  upon adherence to annual screening or more frequent follow up if indicated.     Furthermore, reaping the benefits of screening requires Emeli Granados to be willing and physically able to undergo diagnostic procedures, if indicated. Although no specific guide is available for determining severity of comorbidities, it is reasonable to withhold screening in patients who have greater mortality risk from other diseases.     We did discuss that the only way to prevent lung cancer is to not smoke. Smoking cessation assistance was offered.    I did not offer risk estimation using a calculator such as this one:    ShouldIScreen

## 2018-05-25 NOTE — TELEPHONE ENCOUNTER
Pt calls, saw GABINO JONES MD less than one hour ago, needs NORCO revised, picked up rx from 5/21 for 5 tablets, needs monthly allotment, routed, also ADVISE if pt needs to return 5/21 rx (recommnended to return this when picking up new rx), routed, inform pt when final    Telephone Information:   Mobile [phone]     Gayle Broderick RN, BSN  Message handled by Nurse Triage.

## 2018-05-25 NOTE — PATIENT INSTRUCTIONS

## 2018-05-25 NOTE — MR AVS SNAPSHOT
After Visit Summary   5/25/2018    Emeli Granados    MRN: [id number]           Patient Information     Date Of Birth          1955        Visit Information        Provider Department      5/25/2018 10:45 AM Lars Oconnor MD [facility]        Today's Diagnoses     Encounter for screening mammogram for breast cancer    -  1    Osteopenia of both thighs        Personal history of tobacco use        Encounter for screening for lung cancer          Care Instructions      Lung Cancer Screening   Frequently Asked Questions  If you are at high-risk for lung cancer, getting screened with low-dose computed tomography (LDCT) every year can help save your life. This handout offers answers to some of the most common questions about lung cancer screening. If you have other questions, please call [phone] ([phone]).     What is it?  Lung cancer screening uses special X-ray technology to create an image of your lung tissue. The exam is quick and easy and takes less than 10 seconds. We don t give you any medicine or use any needles. You can eat before and after the exam. You don t need to change your clothes as long as the clothing on your chest doesn t contain metal. But, you do need to be able to hold your breath for at least 6 seconds during the exam.    What is the goal of lung cancer screening?  The goal of lung cancer screening is to save lives. Many times, lung cancer is not found until a person starts having physical symptoms. Lung cancer screening can help detect lung cancer in the earliest stages when it may be easier to treat.    Who should be screened for lung cancer?  We suggest lung cancer screening for anyone who is at high-risk for lung cancer. You are in the high-risk group if you:      are between the ages of 55 and 79, and    have smoked at least 1 pack of cigarettes a day for 30 or more years, and    still smoke or have quit within the past 15  years.    However, if you have a new cough or shortness of breath, you should talk to your doctor before being screened.    Some national lung health advocacy groups also recommend screening for people ages 50 to 79 who have smoked an average of 1 pack of cigarettes a day for 20 years. They must also have at least 1 other risk factor for lung cancer, not including exposure to secondhand smoke. Other risk factors are having had cancer in the past, emphysema, pulmonary fibrosis, COPD, a family history of lung cancer, or exposure to certain materials such as arsenic, asbestos, beryllium, cadmium, chromium, diesel fumes, nickel, radon or silica. Your care team can help you know if you have one of these risk factors.     Why does it matter if I have symptoms?  Certain symptoms can be a sign that you have a condition in your lungs that should be checked and treated by your doctor. These symptoms include fever, chest pain, a new or changing cough, shortness of breath that you have never felt before, coughing up blood or unexplained weight loss. Having any of these symptoms can greatly affect the results of lung cancer screening.       Should all smokers get an LDCT lung cancer screening exam?  It depends. Lung cancer screening is for a very specific group of men and women who have a history of heavy smoking over a long period of time (see  Who should be screened for lung cancer  above).  I am in the high-risk group, but have been diagnosed with cancer in the past. Is LDCT lung cancer screening right for me?  In some cases, you should not have LDCT lung screening, such as when your doctor is already following your cancer with CT scan studies. Your doctor will help you decide if LDCT lung screening is right for you.  Do I need to have a screening exam every year?  Yes. If you are in the high-risk group described earlier, you should get an LDCT lung cancer screening exam every year until you are 79, or are no longer willing  or able to undergo screening and possible procedures to diagnose and treat lung cancer.  How effective is LDCT at preventing death from lung cancer?  Studies have shown that LDCT lung cancer screening can lower the risk of death from lung cancer by 20 percent in people who are at high-risk.  What are the risks?  There are some risks and limitations of LDCT lung cancer screening. We want to make sure you understand the risks and benefits, so please let us know if you have any questions. Your doctor may want to talk with you more about these risks.    Radiation exposure: As with any exam that uses radiation, there is a very small increased risk of cancer. The amount of radiation in LDCT is small--about the same amount a person would get from a mammogram. Your doctor orders the exam when he or she feels the potential benefits outweigh the risks.    False negatives: No test is perfect, including LDCT. It is possible that you may have a medical condition, including lung cancer, that is not found during your exam. This is called a false negative result.    False positives and more testing: LDCT very often finds something in the lung that could be cancer, but in fact is not. This is called a false positive result. False positive tests often cause anxiety. To make sure these findings are not cancer, you may need to have more tests. These tests will be done only if you give us permission. Sometimes patients need a treatment that can have side effects, such as a biopsy. For more information on false positives, see  What can I expect from the results?     Findings not related to lung cancer: Your LDCT exam also takes pictures of areas of your body next to your lungs. In a very small number of cases, the CT scan will show an abnormal finding in one of these areas, such as your kidneys, adrenal glands, liver or thyroid. This finding may not be serious, but you may need more tests. Your doctor can help you decide what other tests  you may need, if any.  What can I expect from the results?  About 1 out of 4 LDCT exams will find something that may need more tests. Most of the time, these findings are lung nodules. Lung nodules are very small collections of tissue in the lung. These nodules are very common, and the vast majority--more than 97 percent--are not cancer (benign). Most are normal lymph nodes or small areas of scarring from past infections.  But, if a small lung nodule is found to be cancer, the cancer can be cured more than 90 percent of the time. To know if the nodule is cancer, we may need to get more images before your next yearly screening exam. If the nodule has suspicious features (for example, it is large, has an odd shape or grows over time), we will refer you to a specialist for further testing.  Will my doctor also get the results?  Yes. Your doctor will get a copy of your results.  Is it okay to keep smoking now that there s a cancer screening exam?  No. Tobacco is one of the strongest cancer-causing agents. It causes not only lung cancer, but other cancers and cardiovascular (heart) diseases as well. The damage caused by smoking builds over time. This means that the longer you smoke, the higher your risk of disease. While it is never too late to quit, the sooner you quit, the better.  Where can I find help to quit smoking?  The best way to prevent lung cancer is to stop smoking. If you have already quit smoking, congratulations and keep it up! For help on quitting smoking, please call [employer] at [phone] ([phone]) or the American Cancer Society at 1-378.456.3583 to find local resources near you.  One-on-one health coaching:  If you d prefer to work individually with a health care provider on tobacco cessation, we offer:      Medication Therapy Management:  Our specially trained pharmacists work closely with you and your doctor to help you quit smoking.  Call [phone] or [phone] (toll free).     Can Do: Health  coaching offered by [facility].  [email or web address]            Follow-ups after your visit        Future tests that were ordered for you today     Open Future Orders        Priority Expected Expires Ordered    CT Chest Lung Cancer Scrn Low Dose wo Routine  5/25/2019 5/25/2018    CT Chest Lung Cancer Scrn Low Dose wo Routine  5/25/2019 5/25/2018    *MA Screening Digital Bilateral Routine  5/25/2019 5/25/2018    DX Hip/Pelvis/Spine Routine  5/25/2019 5/25/2018            Who to contact     If you have questions or need follow up information about today's clinic visit or your schedule please contact [facility] directly at [phone].  Normal or non-critical lab and imaging results will be communicated to you by MyChart, letter or phone within 4 business days after the clinic has received the results. If you do not hear from us within 7 days, please contact the clinic through MyChart or phone. If you have a critical or abnormal lab result, we will notify you by phone as soon as possible.  Submit refill requests through [employer] or call your pharmacy and they will forward the refill request to us. Please allow 3 business days for your refill to be completed.          Additional Information About Your Visit        Care EveryWhere ID     This is your Care EveryWhere ID. This could be used by other organizations to access your [place] medical records  [id number]        Your Vitals Were     Pulse Temperature Respirations Pulse Oximetry BMI (Body Mass Index)       78 98.9  F (37.2  C) (Oral) 12 95% 26.88 kg/m2        Blood Pressure from Last 3 Encounters:   05/25/18 120/76   01/26/18 130/72   12/20/17 130/70    Weight from Last 3 Encounters:   05/25/18 133 lb 1.6 oz (60.4 kg)   01/26/18 135 lb (61.2 kg)   12/20/17 134 lb 9.6 oz (61.1 kg)              We Performed the Following     Okay for Smoking Cessation Study (PLUTO) to Contact Patient     Prof fee: Shared Decisionmaking for  Lung Cancer Screening          Today's Medication Changes          These changes are accurate as of 5/25/18 11:21 AM.  If you have any questions, ask your nurse or doctor.               Stop taking these medicines if you haven't already. Please contact your care team if you have questions.     amoxicillin-clavulanate 875-125 MG per tablet   Commonly known as:  AUGMENTIN   Stopped by:  Lars Oconnor MD                    Primary Care Provider Office Phone # Fax #    Lars Oconnor [phone] [address] [facility] [address]        Equal Access to Services     [facility]: Hadii aad ku hadasho Soomaali, waaxda luqadaha, qaybta kaalmada adeegyada, waxgoyo rosein hayaan adetamara florence . So [facility] [phone].    ATENCIÓN: Si habla español, tiene a murphy disposición servicios gratuitos de asistencia lingüística. [facility] [phone].    We comply with applicable federal civil rights laws and [place] laws. We do not discriminate on the basis of race, color, national origin, age, disability, sex, sexual orientation, or gender identity.            Thank you!     Thank you for choosing [facility]  for your care. Our goal is always to provide you with excellent care. Hearing back from our patients is one way we can continue to improve our services. Please take a few minutes to complete the written survey that you may receive in the mail after your visit with us. Thank you!             Your Updated Medication List - Protect others around you: Learn how to safely use, store and throw away your medicines at www.disposemymeds.org.          This list is accurate as of 5/25/18 11:21 AM.  Always use your most recent med list.                   Brand Name Dispense Instructions for use Diagnosis    * albuterol (2.5 MG/3ML) 0.083% neb solution     75 mL    INHALE 3 MILLILITERS (2.5 MG) BY NEBULIZATION ROUTE EVERY 6 HOURS AS NEEDED FOR SHORTNESS OF BREATH/DYSPNEA OR  WHEEZING    Acute recurrent maxillary sinusitis, Acute bronchitis, unspecified organism       * VENTOLIN  (90 Base) MCG/ACT Inhaler   Generic drug:  albuterol     18 g    INHALE 2 PUFFS INTO THE LUNGS EVERY 6 HOURS AS NEEDED.    COPD exacerbation (H)       ascorbic acid 1000 MG Tabs    vitamin C     Take 1,000 mg by mouth daily        cholecalciferol 1000 UNIT tablet    vitamin D3     Take 1,000 Units by mouth daily        cyclobenzaprine 10 MG tablet    FLEXERIL    30 tablet    TAKE HALF TO ONE TABLET BY MOUTH THREE TIMES DAILY AS NEEDED FOR MUSCLE SPASMS    Acute low back pain without sciatica, unspecified back pain laterality       fexofenadine 180 MG tablet    ALLEGRA    30 tablet    Take 1 tablet (180 mg) by mouth daily    Hives       HYDROcodone-acetaminophen  MG per tablet    NORCO    5 tablet    TAKE 1 TABLET BY MOUTH TWICE DAILY AS NEEDED.    Other chronic pain       * ipratropium - albuterol 0.5 mg/2.5 mg/3 mL 0.5-2.5 (3) MG/3ML neb solution    DUONEB    30 vial    Take 1 vial (3 mLs) by nebulization every 4 hours as needed for shortness of breath / dyspnea or wheezing    Obstructive chronic bronchitis with exacerbation (H)       * ipratropium - albuterol 0.5 mg/2.5 mg/3 mL 0.5-2.5 (3) MG/3ML neb solution    DUONEB    30 vial    Take 1 vial (3 mLs) by nebulization every 6 hours as needed for shortness of breath / dyspnea or wheezing    COPD exacerbation (H)       * ipratropium - albuterol 0.5 mg/2.5 mg/3 mL 0.5-2.5 (3) MG/3ML neb solution    DUONEB    30 vial    Take 1 vial (3 mLs) by nebulization every 6 hours as needed for shortness of breath / dyspnea or wheezing    Moderate persistent asthma with exacerbation       lidocaine (viscous) 2 % solution    XYLOCAINE      Obstructive chronic bronchitis with exacerbation (H)       metFORMIN 500 MG 24 hr tablet    GLUCOPHAGE-XR    30 tablet    Take 1 tablet (500 mg) by mouth daily (with dinner)    Abnormal laboratory test       MULTIVITAMIN PO       Take 1 tablet by mouth daily Per pt, uses ones without Iron        naproxen 500 MG tablet    NAPROSYN    90 tablet    Take 1 tablet (500 mg) by mouth 2 times daily as needed for moderate pain    Other chronic pain       * nicotine 7 MG/24HR 24 hr patch    NICODERM CQ    30 patch    Place 1 patch onto the skin every 24 hours        * nicotine 14 MG/24HR 24 hr patch    NICODERM CQ    28 patch    PLACE 1 PATCH ONTO THE SKIN EVERY 24 HOURS    Personal history of tobacco use, presenting hazards to health       omeprazole 20 MG CR capsule    priLOSEC    60 capsule    Take 2 capsules (40 mg) by mouth daily    Oropharyngeal dysphagia       order for DME     1 Device    Equipment being ordered: Nebulizer    COPD exacerbation (H)       traMADol 50 MG tablet    ULTRAM    60 tablet    TAKE 1 TABLET BY MOUTH EVERY 6 HOURS AS NEEDED FOR MODERATE PAIN.    Other chronic pain       vitamin E 400 units Tabs      Take 400 Units by mouth daily        zolpidem 5 MG tablet    AMBIEN    30 tablet    TAKE ONE TABLET BY MOUTH NIGHTLY AS NEEDED FOR SLEEP    Sleep disorder       * Notice:  This list has 7 medication(s) that are the same as other medications prescribed for you. Read the directions carefully, and ask your doctor or other care provider to review them with you.

## 2018-05-26 ASSESSMENT — PATIENT HEALTH QUESTIONNAIRE - PHQ9: SUM OF ALL RESPONSES TO PHQ QUESTIONS 1-9: 0

## 2018-06-20 DIAGNOSIS — G89.29 OTHER CHRONIC PAIN: ICD-10-CM

## 2018-06-21 NOTE — TELEPHONE ENCOUNTER
Controlled Substance Refill Request for   traMADol (ULTRAM) 50 MG tablet  Problem List Complete:  Yes   checked in past 6 months?  Yes yes  Problem List   At end of visit        Nervous and Auditory       Back pain       Alcohol dependence in remission (H)       Other chronic pain     Last Written Prescription Date:  5/21/18  Last Fill Quantity: 60 tablet,  # refills: 0   Last office visit: 5/25/2018 with prescribing provider:  Anastasia Boykin Office Visit:      Controlled Substance Refill Request for   HYDROcodone-acetaminophen (NORCO)  MG per tablet  Problem List Complete:  Yes   checked in past 6 months?  Yes yes      Back pain       Alcohol dependence in remission (H)       Other chronic pain     Last Written Prescription Date:  5/25/18  Last Fill Quantity: 20 tablet,  # refills: 0   Last office visit: 5/25/2018 with prescribing provider:  Anastasia Boykin Office Visit:

## 2018-06-22 RX ORDER — HYDROCODONE BITARTRATE AND ACETAMINOPHEN 10; 325 MG/1; MG/1
TABLET ORAL
Qty: 20 TABLET | Refills: 0 | Status: SHIPPED | OUTPATIENT
Start: 2018-06-22 | End: 2018-07-22

## 2018-06-22 RX ORDER — TRAMADOL HYDROCHLORIDE 50 MG/1
TABLET ORAL
Qty: 60 TABLET | Refills: 0 | Status: SHIPPED | OUTPATIENT
Start: 2018-06-22 | End: 2018-07-23

## 2018-06-22 NOTE — TELEPHONE ENCOUNTER
This is due for refill on 6/25/2018, I will let Dr. Oconnor refill, I only see the Norco on the controlled substance area.  Susan Haase, CNP

## 2018-06-22 NOTE — TELEPHONE ENCOUNTER
Patient calling regarding Tramadol.  States is working 8-9 hour shifts and needs the Tramadol.  7/17/17 is day of CSA and notes Tramadol in note.  Has been getting Tramadol for years.  Wants to know if can get today yet.  Advised could not promise as was not filled earlier.  Wants call back to know what is going on.  Elisabeth Freeman RN    05/21/18 1431 Sign Lars Oconnor MD Reorder from Order: [id number]      05/21/18 1431 Taking Flag Checked Lars Oconnor MD      05/25/18 1103 Taking Flag Checked Jono Kimball      06/22/18 1202 Reorder Haase, Susan Rachele, APRN CNP To Order: [id number]       Medication Detail         Disp Refills Start End GOVIND     traMADol (ULTRAM) 50 MG tablet 60 tablet 0 5/21/2018  No     Sig: TAKE 1 TABLET BY MOUTH EVERY 6 HOURS AS NEEDED FOR MODERATE PAIN.     Class: Local Print     Order: [id number]         7/17/17  M25.511) Acute pain of right shoulder  (primary encounter diagnosis)  Comment: bursitis, suggest steroid injection  Plan: XR Shoulder Right G/E 3 Views         (S76.311S) Hamstring muscle strain, right, sequela  Comment:   Plan: traMADol (ULTRAM) 50 MG tablet, naproxen         (NAPROSYN) 500 MG tablet        Good recovery at this point      (G89.4) Chronic pain syndrome  Comment: neck and back   Plan: traMADol (ULTRAM) 50 MG tablet         (G89.29) Other chronic pain  Comment: completed CSA  Plan: HYDROcodone-acetaminophen (NORCO)  MG per        tablet, cyclobenzaprine (FLEXERIL) 10 MG         tablet, naproxen (NAPROSYN) 500 MG tablet        Infrequent: nsaid is primary     (G47.9) Sleep disorder  Comment: Plan: zolpidem (AMBIEN) 5 MG tablet  .

## 2018-06-28 ENCOUNTER — HOSPITAL ENCOUNTER (OUTPATIENT)
Dept: CT IMAGING | Facility: CLINIC | Age: 63
End: 2018-06-28
Attending: FAMILY MEDICINE
Payer: COMMERCIAL

## 2018-06-28 ENCOUNTER — RADIANT APPOINTMENT (OUTPATIENT)
Dept: BONE DENSITY | Facility: CLINIC | Age: 63
End: 2018-06-28
Attending: FAMILY MEDICINE
Payer: COMMERCIAL

## 2018-06-28 ENCOUNTER — HOSPITAL ENCOUNTER (OUTPATIENT)
Dept: MAMMOGRAPHY | Facility: CLINIC | Age: 63
Discharge: HOME | End: 2018-06-28
Attending: FAMILY MEDICINE | Admitting: FAMILY MEDICINE
Payer: COMMERCIAL

## 2018-06-28 DIAGNOSIS — Z12.31 ENCOUNTER FOR SCREENING MAMMOGRAM FOR BREAST CANCER: ICD-10-CM

## 2018-06-28 DIAGNOSIS — Z12.2 ENCOUNTER FOR SCREENING FOR LUNG CANCER: ICD-10-CM

## 2018-06-28 DIAGNOSIS — Z87.891 PERSONAL HISTORY OF TOBACCO USE: ICD-10-CM

## 2018-06-28 DIAGNOSIS — M85.851 OSTEOPENIA OF BOTH THIGHS: ICD-10-CM

## 2018-06-28 DIAGNOSIS — M85.852 OSTEOPENIA OF BOTH THIGHS: ICD-10-CM

## 2018-06-28 PROCEDURE — 77063 BREAST TOMOSYNTHESIS BI: CPT

## 2018-06-28 PROCEDURE — G0297 LDCT FOR LUNG CA SCREEN: HCPCS

## 2018-07-02 ENCOUNTER — TELEPHONE (OUTPATIENT)
Dept: FAMILY MEDICINE | Facility: CLINIC | Age: 63
End: 2018-07-02

## 2018-07-02 DIAGNOSIS — M54.50 ACUTE LOW BACK PAIN WITHOUT SCIATICA, UNSPECIFIED BACK PAIN LATERALITY: ICD-10-CM

## 2018-07-02 RX ORDER — CYCLOBENZAPRINE HCL 10 MG
TABLET ORAL
Qty: 30 TABLET | Refills: 0 | Status: SHIPPED | OUTPATIENT
Start: 2018-07-02 | End: 2018-08-24

## 2018-07-02 NOTE — LETTER
[facility]  [address] [place], [address]  [phone]        July 2, 2018    Emeli Granados                                                                                                                                                       [address] [facility] [address]    Restricted to 10-15 pound lifting weight until 9/15/2018 for back pain.           Lars Oconnor MD

## 2018-07-02 NOTE — TELEPHONE ENCOUNTER
Cyclobenzaprine 10mg      Last Written Prescription Date:  5/9/2018  Last Fill Quantity: 30,   # refills: 0  Last Office Visit: 5/25/2018  Future Office visit:       Routing refill request to provider for review/approval because:  Drug not on the [facility], P or [facility] refill protocol or controlled substance    Irina SHEFFIELD RN, BSN, PHN  [facility] [place]

## 2018-07-22 DIAGNOSIS — G89.29 OTHER CHRONIC PAIN: ICD-10-CM

## 2018-07-23 RX ORDER — HYDROCODONE BITARTRATE AND ACETAMINOPHEN 10; 325 MG/1; MG/1
TABLET ORAL
Qty: 20 TABLET | Refills: 0 | Status: SHIPPED | OUTPATIENT
Start: 2018-07-23 | End: 2018-08-17

## 2018-07-23 RX ORDER — TRAMADOL HYDROCHLORIDE 50 MG/1
TABLET ORAL
Qty: 60 TABLET | Refills: 0 | Status: SHIPPED | OUTPATIENT
Start: 2018-07-23 | End: 2018-08-17

## 2018-07-23 NOTE — TELEPHONE ENCOUNTER
Requested Prescriptions   Pending Prescriptions Disp Refills     HYDROcodone-acetaminophen (NORCO)  MG per tablet [Pharmacy Med Name: Hydrocodone-Acetaminophen Oral Tablet  MG]  Last Written Prescription Date:  6/22/2018  Last Fill Quantity: 20 tablet,  # refills: 0   Last Office Visit: 5/25/2018   Future Office Visit:    20 tablet 0     Sig: TAKE ONE TABLET BY MOUTH TWICE DAILY AS NEEDED    There is no refill protocol information for this order

## 2018-08-17 DIAGNOSIS — G89.29 OTHER CHRONIC PAIN: ICD-10-CM

## 2018-08-17 RX ORDER — HYDROCODONE BITARTRATE AND ACETAMINOPHEN 10; 325 MG/1; MG/1
TABLET ORAL
Qty: 15 TABLET | Refills: 0 | Status: SHIPPED | OUTPATIENT
Start: 2018-08-17 | End: 2018-09-14

## 2018-08-17 RX ORDER — TRAMADOL HYDROCHLORIDE 50 MG/1
TABLET ORAL
Qty: 60 TABLET | Refills: 0 | Status: SHIPPED | OUTPATIENT
Start: 2018-08-17 | End: 2018-09-14

## 2018-08-17 NOTE — TELEPHONE ENCOUNTER
[place] reviewed:  reviewed- no concerns    [place]:  [email or web address]

## 2018-08-17 NOTE — TELEPHONE ENCOUNTER
Patient is requesting this early states she is going out of town an needs it today.  Was reminded about the refill turn around time of 48 - 72 hrs.      Call when available for pickup.       Controlled Substance Refill Request for HYDROcodone-acetaminophen (NORCO)  MG per tablet  Problem List Complete:  Yes    Patient is followed by Lars Oconnor MD for ongoing prescription of pain medication.  All refills should only be approved by this provider, or covering partner.     Medication(s): hydrocodone.   Maximum quantity per month: 20  Clinic visit frequency required: Q 3 months      Controlled substance agreement:  Encounter-Level CSA - 07/17/2017:            Controlled Substance Agreement - Scan on 7/31/2017  9:32 PM : CONTROLLED SUBSTANCE AGREEMENT (below)            Encounter-Level CSA - 07/17/2017:            Controlled Substance Agreement - Scan on 1/8/2015  9:46 AM : [facility] Controlled Medication Agreement 1-7-15 (below)            Pain Clinic evaluation in the past: No     DIRE Total Score(s):  No flowsheet data found.     Last [facility] website verification:  done on 4.23.18      Last Written Prescription Date:  07/23/18  Last Fill Quantity: 20,   # refills: 0    Last Office Visit with [facility] primary care provider: 05/25/18    Clinic visit frequency required: Q 3 months     Future Office visit:     Controlled substance agreement on file: Yes:  Date 07/17/17.     Processing:  Patient will  in clinic   checked in past 3 months?  No, route to RN      Controlled Substance Refill Request for traMADol (ULTRAM) 50 MG tablet  Problem List Complete:  Yes   checked in past 3 months?  No, route to RN

## 2018-08-24 DIAGNOSIS — M54.50 ACUTE LOW BACK PAIN WITHOUT SCIATICA, UNSPECIFIED BACK PAIN LATERALITY: ICD-10-CM

## 2018-08-24 NOTE — TELEPHONE ENCOUNTER
Requested Prescriptions   Pending Prescriptions Disp Refills     cyclobenzaprine (FLEXERIL) 10 MG tablet [Pharmacy Med Name: Cyclobenzaprine HCl Oral Tablet 10 MG] 30 tablet 0     Sig: TAKE HALF TO ONE TABLET BY MOUTH THREE TIMES DAILY AS NEEDED FOR MUSCLE SPASMS    There is no refill protocol information for this order        Last Written Prescription Date:  7/2/18  Last Fill Quantity: 30,  # refills: 0   Last Office Visit: 5/25/2018   Future Office Visit:         Routing refill request to provider for review/approval because:  Drug not on the [facility], P or [facility] refill protocol or controlled substance      Associated Diagnoses   Acute low back pain without sciatica, unspecified back pain laterality [M54.5]

## 2018-08-27 RX ORDER — CYCLOBENZAPRINE HCL 10 MG
TABLET ORAL
Qty: 30 TABLET | Refills: 0 | Status: SHIPPED | OUTPATIENT
Start: 2018-08-27 | End: 2018-12-04

## 2018-09-14 DIAGNOSIS — G89.29 OTHER CHRONIC PAIN: ICD-10-CM

## 2018-09-15 NOTE — TELEPHONE ENCOUNTER
Controlled Substance Refill Request for HYDROcodone-acetaminophen (NORCO)  MG per tablet  Problem List Complete:  No     PROVIDER TO CONSIDER COMPLETION OF PROBLEM LIST AND OVERVIEW/CONTROLLED SUBSTANCE AGREEMENT    Last Written Prescription Date:  8/17/2018  Last Fill Quantity: 15 tablet,   # refills: 0    Last Office Visit with [facility] primary care provider: 5/25/2018, Anastasia    Controlled substance agreement on file: Yes:  Date 7/17/2017.     Processing:  Staff will hand deliver Rx to on-site pharmacy     checked in past 3 months?  No, route to RN     Last [facility] website verification:  done on 4.23.18   [email or web address]      Controlled Substance Refill Request for traMADol (ULTRAM) 50 MG tablet  Problem List Complete:  No     PROVIDER TO CONSIDER COMPLETION OF PROBLEM LIST AND OVERVIEW/CONTROLLED SUBSTANCE AGREEMENT    Last Written Prescription Date:  8/17/2018  Last Fill Quantity: 60 tablet,   # refills: 0    Last Office Visit with [facility] primary care provider: 5/25/2018, Anastasia    Controlled substance agreement on file: Yes:  Date 7/17/2017.     Processing:  Staff will hand deliver Rx to on-site pharmacy     checked in past 3 months?  No, route to RN     Last [facility] website verification:  done on 4.23.18   [email or web address]

## 2018-09-18 RX ORDER — HYDROCODONE BITARTRATE AND ACETAMINOPHEN 10; 325 MG/1; MG/1
TABLET ORAL
Qty: 10 TABLET | Refills: 0 | Status: SHIPPED | OUTPATIENT
Start: 2018-09-18 | End: 2018-10-15

## 2018-09-18 RX ORDER — TRAMADOL HYDROCHLORIDE 50 MG/1
TABLET ORAL
Qty: 60 TABLET | Refills: 0 | Status: SHIPPED | OUTPATIENT
Start: 2018-09-18 | End: 2018-10-15

## 2018-09-18 NOTE — TELEPHONE ENCOUNTER
RX [place]) reviewed:  reviewed- no concerns    [place]:  [email or web address]

## 2018-09-18 NOTE — TELEPHONE ENCOUNTER
Pt calls to check status.  Please fax tramadol to [facility]. She will  Norco   Klever Redmond RN

## 2018-10-15 DIAGNOSIS — G89.29 OTHER CHRONIC PAIN: ICD-10-CM

## 2018-10-16 RX ORDER — HYDROCODONE BITARTRATE AND ACETAMINOPHEN 10; 325 MG/1; MG/1
TABLET ORAL
Qty: 15 TABLET | Refills: 0 | COMMUNITY
Start: 2018-10-16 | End: 2018-10-19

## 2018-10-16 NOTE — TELEPHONE ENCOUNTER
Pt called & scheduled appointment    Next 5 appointments (look out 90 days)     Oct 19, 2018  3:45 PM CDT   [facility]connor [facility]   [facility]    [address] [facility] [address]   [phone]                    Jaden Cole   10/16/18 4:25 PM

## 2018-10-16 NOTE — TELEPHONE ENCOUNTER
Patient wants the Tramadol sent to:    [id number] - [place], [place] - [address]    She will  the Norco in clinic.

## 2018-10-16 NOTE — TELEPHONE ENCOUNTER
Controlled Substance Refill Request for HYDROcodone-acetaminophen (NORCO)  MG per tablet  Problem List Complete:  Yes    Overview Addendum 9/18/2018  8:19 AM by Sherie Colorado RN      Patient is followed by Lars Oconnor MD for ongoing prescription of pain medication.  All refills should only be approved by this provider, or covering partner.     Medication(s): hydrocodone.   Maximum quantity per month: 20  Clinic visit frequency required: Q 3 months      Controlled substance agreement:  Encounter-Level CSA - 07/17/2017:            Controlled Substance Agreement - Scan on 7/31/2017  9:32 PM : CONTROLLED SUBSTANCE AGREEMENT (below)            Encounter-Level CSA - 07/17/2017:            Controlled Substance Agreement - Scan on 1/8/2015  9:46 AM : [facility] Controlled Medication Agreement 1-7-15 (below)            Pain Clinic evaluation in the past: No     DIRE Total Score(s):  No flowsheet data found.     Last [facility] website verification:  done on 9/18/2018   [email or web address]       Last Written Prescription Date:  9/18/18  Last Fill Quantity: 10,   # refills: 0    Last Office Visit with [facility] primary care provider: 5/25/2018      Clinic visit frequency required: Q 3 months     Future Office visit:     Controlled substance agreement on file: Yes:  Date 7/31/17.     Processing:  Patient will  in clinic   checked in past 3 months?  Yes 9/18/18      _________________________________________________________________________________________________________________________    Controlled Substance Refill Request for traMADol (ULTRAM) 50 MG tablet  Problem List Complete:  No     PROVIDER TO CONSIDER COMPLETION OF PROBLEM LIST AND OVERVIEW/CONTROLLED SUBSTANCE AGREEMENT    Last Written Prescription Date:  9/18/18  Last Fill Quantity: 60,   # refills: 0    Last Office Visit with [facility] primary care provider: 5/25/2018      Future Office visit:     Controlled substance agreement on file: Yes:   Date 7/31/17.     Processing:  Fax Rx to [facility] pharmacy   checked in past 3 months?  Yes 9/18/18

## 2018-10-16 NOTE — TELEPHONE ENCOUNTER
Pt told last refill, appointment due, LM for pt to call and schedule visit asap, due for refill 10/18    Gayle Broderick RN, BSN  Message handled by Nurse Triage.

## 2018-10-16 NOTE — TELEPHONE ENCOUNTER
Pt calls, informs staff did not tell her last month, did not notice the sig on her bottle, discussed, made appointment Friday but out now, wants tramadol refill now, can get hydrocodone refill at visit, pt informs was using more tramadol since hydrocodone rx amount decreased    ROUTED TO GABINO JONES MD, PLEASE CONFIRM TRAMADOL REFILL NOW, INFORM PT WHEN FINAL    Next 5 appointments (look out 90 days)     Oct 19, 2018  3:45 PM CDT   SHORT with Gabino Jones MD   [facility]    [address] [place] [address] [phone]                Gayle Broderick RN, BSN  Message handled by Nurse Triage.

## 2018-10-17 RX ORDER — TRAMADOL HYDROCHLORIDE 50 MG/1
TABLET ORAL
Qty: 60 TABLET | Refills: 0 | Status: SHIPPED | OUTPATIENT
Start: 2018-10-17 | End: 2018-11-15

## 2018-10-19 ENCOUNTER — OFFICE VISIT (OUTPATIENT)
Dept: FAMILY MEDICINE | Facility: CLINIC | Age: 63
End: 2018-10-19
Payer: COMMERCIAL

## 2018-10-19 VITALS
OXYGEN SATURATION: 92 % | SYSTOLIC BLOOD PRESSURE: 115 MMHG | DIASTOLIC BLOOD PRESSURE: 77 MMHG | WEIGHT: 136.1 LBS | TEMPERATURE: 98.4 F | HEART RATE: 99 BPM | BODY MASS INDEX: 27.49 KG/M2 | RESPIRATION RATE: 12 BRPM

## 2018-10-19 DIAGNOSIS — G47.9 SLEEP DISORDER: ICD-10-CM

## 2018-10-19 DIAGNOSIS — G89.29 CHRONIC RIGHT-SIDED LOW BACK PAIN WITHOUT SCIATICA: ICD-10-CM

## 2018-10-19 DIAGNOSIS — G89.29 OTHER CHRONIC PAIN: ICD-10-CM

## 2018-10-19 DIAGNOSIS — M54.50 CHRONIC RIGHT-SIDED LOW BACK PAIN WITHOUT SCIATICA: ICD-10-CM

## 2018-10-19 DIAGNOSIS — J45.41 MODERATE PERSISTENT ASTHMA WITH EXACERBATION: Primary | ICD-10-CM

## 2018-10-19 PROCEDURE — 99214 OFFICE O/P EST MOD 30 MIN: CPT | Performed by: FAMILY MEDICINE

## 2018-10-19 RX ORDER — HYDROCODONE BITARTRATE AND ACETAMINOPHEN 10; 325 MG/1; MG/1
TABLET ORAL
Qty: 15 TABLET | Refills: 0 | Status: SHIPPED | OUTPATIENT
Start: 2018-10-19 | End: 2018-11-26

## 2018-10-19 RX ORDER — ZOLPIDEM TARTRATE 5 MG/1
TABLET ORAL
Qty: 30 TABLET | Refills: 2 | Status: SHIPPED | OUTPATIENT
Start: 2018-10-19 | End: 2019-04-29

## 2018-10-19 ASSESSMENT — ANXIETY QUESTIONNAIRES
IF YOU CHECKED OFF ANY PROBLEMS ON THIS QUESTIONNAIRE, HOW DIFFICULT HAVE THESE PROBLEMS MADE IT FOR YOU TO DO YOUR WORK, TAKE CARE OF THINGS AT HOME, OR GET ALONG WITH OTHER PEOPLE: NOT DIFFICULT AT ALL
3. WORRYING TOO MUCH ABOUT DIFFERENT THINGS: NOT AT ALL
1. FEELING NERVOUS, ANXIOUS, OR ON EDGE: NOT AT ALL
6. BECOMING EASILY ANNOYED OR IRRITABLE: NOT AT ALL
5. BEING SO RESTLESS THAT IT IS HARD TO SIT STILL: NOT AT ALL
7. FEELING AFRAID AS IF SOMETHING AWFUL MIGHT HAPPEN: NOT AT ALL
GAD7 TOTAL SCORE: 0
2. NOT BEING ABLE TO STOP OR CONTROL WORRYING: NOT AT ALL

## 2018-10-19 ASSESSMENT — PATIENT HEALTH QUESTIONNAIRE - PHQ9: 5. POOR APPETITE OR OVEREATING: NOT AT ALL

## 2018-10-19 NOTE — MR AVS SNAPSHOT
"              After Visit Summary   10/19/2018    Emeli Granados    MRN: [id number]           Patient Information     Date Of Birth          1955        Visit Information        Provider Department      10/19/2018 3:45 PM Lars Oconnor MD [facility]        Today's Diagnoses     Other chronic pain           Follow-ups after your visit        Who to contact     If you have questions or need follow up information about today's clinic visit or your schedule please contact [facility] directly at [phone].  Normal or non-critical lab and imaging results will be communicated to you by MyChart, letter or phone within 4 business days after the clinic has received the results. If you do not hear from us within 7 days, please contact the clinic through [employer] or phone. If you have a critical or abnormal lab result, we will notify you by phone as soon as possible.  Submit refill requests through [employer] or call your pharmacy and they will forward the refill request to us. Please allow 3 business days for your refill to be completed.          Additional Information About Your Visit        MyChart Information     [employer] lets you send messages to your doctor, view your test results, renew your prescriptions, schedule appointments and more. To sign up, go to [email or web address] . Click on \"Log in\" on the left side of the screen, which will take you to the Welcome page. Then click on \"Sign up Now\" on the right side of the page.     You will be asked to enter the access code listed below, as well as some personal information. Please follow the directions to create your username and password.     Your access code is: [email or web address]  Expires: 2019  4:15 PM     Your access code will  in 90 days. If you need help or a new code, please call your [facility] or [phone].        Care EveryWhere ID     This is your Care EveryWhere ID. This could be used by " other organizations to access your [place] medical records  [id number]        Your Vitals Were     Pulse Temperature Respirations Pulse Oximetry BMI (Body Mass Index)       99 98.4  F (36.9  C) (Oral) 12 92% 27.49 kg/m2        Blood Pressure from Last 3 Encounters:   10/19/18 115/77   05/25/18 120/76   01/26/18 130/72    Weight from Last 3 Encounters:   10/19/18 136 lb 1.6 oz (61.7 kg)   05/25/18 133 lb 1.6 oz (60.4 kg)   01/26/18 135 lb (61.2 kg)              Today, you had the following     No orders found for display         Today's Medication Changes          These changes are accurate as of 10/19/18  4:15 PM.  If you have any questions, ask your nurse or doctor.               Stop taking these medicines if you haven't already. Please contact your care team if you have questions.     lidocaine (viscous) 2 % solution   Commonly known as:  XYLOCAINE   Stopped by:  Lars Oconnor MD                    Primary Care Provider Office Phone # Fax #    Lars Oconnor [phone] [address] [facility] [address]        Equal Access to Services     MISHA ZHEGN AH: Hadii foster harman hadpradeepo Soluis alfredoali, waaxda luqadaha, qaybta kaalmada adeegyada, jes alvarez. So [facility] [phone].    ATENCIÓN: Si habla español, tiene a murphy disposición servicios gratuitos de asistencia lingüística. Judy al [phone].    We comply with applicable federal civil rights laws and [place] laws. We do not discriminate on the basis of race, color, national origin, age, disability, sex, sexual orientation, or gender identity.            Thank you!     Thank you for choosing [facility]  for your care. Our goal is always to provide you with excellent care. Hearing back from our patients is one way we can continue to improve our services. Please take a few minutes to complete the written survey that you may receive in the mail after your visit with us. Thank  you!             Your Updated Medication List - Protect others around you: Learn how to safely use, store and throw away your medicines at www.disposemymeds.org.          This list is accurate as of 10/19/18  4:15 PM.  Always use your most recent med list.                   Brand Name Dispense Instructions for use Diagnosis    * albuterol (2.5 MG/3ML) 0.083% neb solution     75 mL    INHALE 3 MILLILITERS (2.5 MG) BY NEBULIZATION ROUTE EVERY 6 HOURS AS NEEDED FOR SHORTNESS OF BREATH/DYSPNEA OR WHEEZING    Acute recurrent maxillary sinusitis, Acute bronchitis, unspecified organism       * VENTOLIN  (90 Base) MCG/ACT inhaler   Generic drug:  albuterol     18 g    INHALE 2 PUFFS INTO THE LUNGS EVERY 6 HOURS AS NEEDED.    COPD exacerbation (H)       ascorbic acid 1000 MG Tabs    vitamin C     Take 1,000 mg by mouth daily        cholecalciferol 1000 UNIT tablet    vitamin D3     Take 1,000 Units by mouth daily        cyclobenzaprine 10 MG tablet    FLEXERIL    30 tablet    TAKE HALF TO ONE TABLET BY MOUTH THREE TIMES DAILY AS NEEDED FOR MUSCLE SPASMS    Acute low back pain without sciatica, unspecified back pain laterality       HYDROcodone-acetaminophen  MG per tablet    NORCO    15 tablet    TAKE 1 TABLET BY MOUTH TWICE DAILY AS NEEDED.    Other chronic pain       * ipratropium - albuterol 0.5 mg/2.5 mg/3 mL 0.5-2.5 (3) MG/3ML neb solution    DUONEB    30 vial    Take 1 vial (3 mLs) by nebulization every 4 hours as needed for shortness of breath / dyspnea or wheezing    Obstructive chronic bronchitis with exacerbation (H)       * ipratropium - albuterol 0.5 mg/2.5 mg/3 mL 0.5-2.5 (3) MG/3ML neb solution    DUONEB    30 vial    Take 1 vial (3 mLs) by nebulization every 6 hours as needed for shortness of breath / dyspnea or wheezing    COPD exacerbation (H)       * ipratropium - albuterol 0.5 mg/2.5 mg/3 mL 0.5-2.5 (3) MG/3ML neb solution    DUONEB    30 vial    Take 1 vial (3 mLs) by nebulization every 6  hours as needed for shortness of breath / dyspnea or wheezing    Moderate persistent asthma with exacerbation       metFORMIN 500 MG 24 hr tablet    GLUCOPHAGE-XR    30 tablet    Take 1 tablet (500 mg) by mouth daily (with dinner)    Abnormal laboratory test       MULTIVITAMIN PO      Take 1 tablet by mouth daily Per pt, uses ones without Iron        naproxen 500 MG tablet    NAPROSYN    90 tablet    Take 1 tablet (500 mg) by mouth 2 times daily as needed for moderate pain    Other chronic pain       * nicotine 7 MG/24HR 24 hr patch    NICODERM CQ    30 patch    Place 1 patch onto the skin every 24 hours        * nicotine 14 MG/24HR 24 hr patch    NICODERM CQ    28 patch    PLACE 1 PATCH ONTO THE SKIN EVERY 24 HOURS    Personal history of tobacco use, presenting hazards to health       omeprazole 20 MG CR capsule    priLOSEC    60 capsule    Take 2 capsules (40 mg) by mouth daily    Oropharyngeal dysphagia       order for DME     1 Device    Equipment being ordered: Nebulizer    COPD exacerbation (H)       traMADol 50 MG tablet    ULTRAM    60 tablet    TAKE 1 TABLET BY MOUTH EVERY 6 HOURS AS NEEDED FOR MODERATE PAIN.    Other chronic pain       vitamin E 400 units Tabs      Take 400 Units by mouth daily        zolpidem 5 MG tablet    AMBIEN    30 tablet    TAKE ONE TABLET BY MOUTH NIGHTLY AS NEEDED FOR SLEEP    Sleep disorder       * Notice:  This list has 7 medication(s) that are the same as other medications prescribed for you. Read the directions carefully, and ask your doctor or other care provider to review them with you.

## 2018-10-19 NOTE — PROGRESS NOTES
SUBJECTIVE:   Emeli Granados is a 63 year old female who presents to clinic today for the following health issues:    Chronic back paina nd right shoulder pain         Symptoms began:   12 year(s) ago       Symptoms changing:  onset gradual and are worse.                  Location:  low back bilateral region       Radiation to        At worst a 8 on a scale of 1-10.         Personal hx of back pain is:  recurrent self limited episodes of low back pain in the past, previous osteoarthritis of lumbar spine and previous herniated disc.       Pain is exacerbated by: lifting, lying, sitting and bending.       Pain is relieved by: ice, rest and meds.       Associated sx include: none.       Previous plain films obtained: No.        Results: ddd cervical and lumbar .       Red flag symptoms: negative.  Past Medical History:   Diagnosis Date     Acute hepatitis C without mention of hepatic coma(070.51) 1996    Dr. 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     Diverticula of colon 2014     Malignant neoplasm of endocervix (H) 1988    took uterus and left the ovaries     Other and unspecified alcohol dependence, unspecified drinking behavior     sober since 1999     Polycythemia 8/8/2012     Smoker      Unspecified disorder of esophagus     stricture - has had it dilated     Unspecified hemorrhoids without mention of complication        Past Surgical History:   Procedure Laterality Date     C NONSPECIFIC PROCEDURE  1988    hysterectomy-uterine & cervical ca - tubes and ovaries are still in     C NONSPECIFIC PROCEDURE      ganglion cyst removal     C NONSPECIFIC PROCEDURE      right thumb surgery     C NONSPECIFIC PROCEDURE  2003    dilatation of the esophagus once due to dysphagia and stricture     HC COLONOSCOPY THRU STOMA, DIAGNOSTIC  2004, 2012    repeat in 2022     HC HEMORRHOIDECTOMY W BANDING/LIGATION      Hemorrhoidectomy      HC REMOVAL OF TONSILS,<10 Y/O      Tonsils <11y.o.     HYSTERECTOMY, PAP NO LONGER INDICATED       LAPAROSCOPIC SALPINGO-OOPHORECTOMY Bilateral 10/23/2015    Procedure: LAPAROSCOPIC SALPINGO-OOPHORECTOMY;  Surgeon: Johnathan Steve MD;  Location: [facility] OR       Family History   Problem Relation Age of Onset     Hypertension Mother      Cerebrovascular Disease Mother      TIA's     Depression Mother      Cancer - colorectal Maternal Grandmother      dx at 65     Lipids Father      C.A.D. Father      angioplasty at 65     Musculoskeletal Disorder Father      Alcohol/Drug Daughter      in remission     Breast Cancer No family hx of        Social History   Substance Use Topics     Smoking status: Current Every Day Smoker     Packs/day: 0.50     Years: 30.00     Types: Cigarettes     Start date: 10/13/1967     Smokeless tobacco: Never Used      Comment: has patches, trying to quit 10/30/2017     Alcohol use No      Comment: sober since 9/11/99     On exam the vital signs are stable  Weight is Body mass index is 27.49 kg/(m^2).   Eyes show .sina    No neck masses or thyromegaly.Ear nose and throat shows no mass  No bruits, murmers, rubs or extrasounds. No cardiomegaly or chest wall tenderness. Lungs clear, no abdominal masses or organomegaly. No CVA tenderness.  Skin eval no rash  No hernias, good range of motion neck, back and extremities. No abnormal skin lesions. Normal genitalia. Good peripheral pulses. No adenopathy.  Normal gait and stance. Neck is supple.  Back exam shows limited lateral flexion and extension    (J45.41) Moderate persistent asthma with exacerbation  (primary encounter diagnosis)  Comment:   Plan: inhalers, tobacco avoidence     (G89.29) Other chronic pain  omment:   Plan: HYDROcodone-acetaminophen (NORCO)  MG per        tablet        Daily flare, prn meds     (G47.9) Sleep disorder  Comment:   Plan: zolpidem (AMBIEN) 5 MG tablet        Sleep onset,    (M54.5,  G89.29) Chronic right-sided  low back pain without sciatica  Comment:   Plan: ddd

## 2018-10-19 NOTE — LETTER
My Asthma Action Plan  Name: Emeli Granados   YOB: 1955  Date: 10/20/2018   My doctor: Lars Oconnor MD   My clinic: [facility]        Current Outpatient Prescriptions   Medication     albuterol (2.5 MG/3ML) 0.083% neb solution     ascorbic acid (VITAMIN C) 1000 MG TABS     cholecalciferol (VITAMIN D3) 1000 UNIT tablet     cyclobenzaprine (FLEXERIL) 10 MG tablet     HYDROcodone-acetaminophen (NORCO)  MG per tablet     ipratropium - albuterol 0.5 mg/2.5 mg/3 mL (DUONEB) 0.5-2.5 (3) MG/3ML neb solution     ipratropium - albuterol 0.5 mg/2.5 mg/3 mL (DUONEB) 0.5-2.5 (3) MG/3ML neb solution     ipratropium - albuterol 0.5 mg/2.5 mg/3 mL (DUONEB) 0.5-2.5 (3) MG/3ML nebulization     Multiple Vitamins-Minerals (MULTIVITAMIN OR)     naproxen (NAPROSYN) 500 MG tablet     nicotine (NICODERM CQ) 14 MG/24HR 24 hr patch     nicotine (NICODERM CQ) 7 MG/24HR patch 2h hr     omeprazole (PRILOSEC) 20 MG capsule     order for DME     traMADol (ULTRAM) 50 MG tablet     VENTOLIN  (90 BASE) MCG/ACT Inhaler     vitamin E 400 UNITS TABS     zolpidem (AMBIEN) 5 MG tablet     metFORMIN (GLUCOPHAGE-XR) 500 MG 24 hr tablet     No current facility-administered medications for this visit.         My Asthma Severity: { :094147}  Avoid your asthma triggers: { :491983}        {Is patient a child or adult?:282887}       GREEN ZONE   Good Control    I feel good    No cough or wheeze    Can work, sleep and play without asthma symptoms       Take your asthma control medicine every day.     1. If exercise triggers your asthma, take your rescue medication    15 minutes before exercise or sports, and    During exercise if you have asthma symptoms  2. Spacer to use with inhaler: If you have a spacer, make sure to use it with your inhaler             YELLOW ZONE Getting Worse  I have ANY of these:    I do not feel good    Cough or wheeze    Chest feels tight    Wake up at night   1. Keep taking  your Green Zone medications  2. Start taking your rescue medicine:    every 20 minutes for up to 1 hour. Then every 4 hours for 24-48 hours.  3. If you stay in the Yellow Zone for more than 12-24 hours, contact your doctor.  4. If you do not return to the Green Zone in 12-24 hours or you get worse, start taking your oral steroid medicine if prescribed by your provider.           RED ZONE Medical Alert - Get Help  I have ANY of these:    I feel awful    Medicine is not helping    Breathing getting harder    Trouble walking or talking    Nose opens wide to breathe       1. Take your rescue medicine NOW  2. If your provider has prescribed an oral steroid medicine, start taking it NOW  3. Call your doctor NOW  4. If you are still in the Red Zone after 20 minutes and you have not reached your doctor:    Take your rescue medicine again and    Call 911 or go to the emergency room right away    See your regular doctor within 2 weeks of an Emergency Room or Urgent Care visit for follow-up treatment.          Annual Reminders:  Meet with Asthma Educator,  Flu Shot in the Fall, consider Pneumonia Vaccination for patients with asthma (aged 19 and older).    Pharmacy:    [id number] [address] [address]  WRITTEN PRESCRIPTION REQUESTED                      Asthma Triggers  How To Control Things That Make Your Asthma Worse    Triggers are things that make your asthma worse.  Look at the list below to help you find your triggers and what you can do about them.  You can help prevent asthma flare-ups by staying away from your triggers.      Trigger                                                          What you can do   Cigarette Smoke  Tobacco smoke can make asthma worse. Do not allow smoking in your home, car or around you.  Be sure no one smokes at a child s day care or school.  If you smoke, ask your health care provider for ways to help you quit.  Ask family members to quit too.  Ask your health care  provider for a referral to Quit Plan to help you quit smoking, or call 9-883-489-PLAN.     Colds, Flu, Bronchitis  These are common triggers of asthma. Wash your hands often.  Don t touch your eyes, nose or mouth.  Get a flu shot every year.     Dust Mites  These are tiny bugs that live in cloth or carpet. They are too small to see. Wash sheets and blankets in hot water every week.   Encase pillows and mattress in dust mite proof covers.  Avoid having carpet if you can. If you have carpet, vacuum weekly.   Use a dust mask and HEPA vacuum.   Pollen and Outdoor Mold  Some people are allergic to trees, grass, or weed pollen, or molds. Try to keep your windows closed.  Limit time out doors when pollen count is high.   Ask you health care provider about taking medicine during allergy season.     Animal Dander  Some people are allergic to skin flakes, urine or saliva from pets with fur or feathers. Keep pets with fur or feathers out of your home.    If you can t keep the pet outdoors, then keep the pet out of your bedroom.  Keep the bedroom door closed.  Keep pets off cloth furniture and away from stuffed toys.     Mice, Rats, and Cockroaches  Some people are allergic to the waste from these pests.   Cover food and garbage.  Clean up spills and food crumbs.  Store grease in the refrigerator.   Keep food out of the bedroom.   Indoor Mold  This can be a trigger if your home has high moisture. Fix leaking faucets, pipes, or other sources of water.   Clean moldy surfaces.  Dehumidify basement if it is damp and smelly.   Smoke, Strong Odors, and Sprays  These can reduce air quality. Stay away from strong odors and sprays, such as perfume, powder, hair spray, paints, smoke incense, paint, cleaning products, candles and new carpet.   Exercise or Sports  Some people with asthma have this trigger. Be active!  Ask your doctor about taking medicine before sports or exercise to prevent symptoms.    Warm up for 5-10 minutes before and  after sports or exercise.     Other Triggers of Asthma  Cold air:  Cover your nose and mouth with a scarf.  Sometimes laughing or crying can be a trigger.  Some medicines and food can trigger asthma.

## 2018-10-19 NOTE — LETTER
[facility]  [address] [place], [place], [address]  [phone]        October 19, 2018    Emeli Granados                                                                                                                                                       [address] [facility] [address]            Dear Emeli,    Lifting limit of 15 pounds until 11/10/2018 for back pain.           Lars Oconnor MD

## 2018-10-20 ASSESSMENT — ANXIETY QUESTIONNAIRES: GAD7 TOTAL SCORE: 0

## 2018-11-15 DIAGNOSIS — G89.29 OTHER CHRONIC PAIN: ICD-10-CM

## 2018-11-15 NOTE — TELEPHONE ENCOUNTER
Controlled Substance Refill Request for  traMADol (ULTRAM) 50 MG tablet  Problem List Complete:  Yes  Last Written Prescription Date: 10/17/18  Last Fill Quantity: 60 yablet,  # refills: 0   Last office visit: 10/19/2018 with prescribing provider:  Anastasia   Future Office Visit:      Other chronic pain   Problem Detail   Noted:  1/2/2018    Priority:  Medium    Overview Addendum 9/18/2018  8:19 AM by Sherie Colorado RN   Patient is followed by Lars Oconnor MD for ongoing prescription of pain medication.  All refills should only be approved by this provider, or covering partner.     Medication(s): hydrocodone.   Maximum quantity per month: 20  Clinic visit frequency required: Q 3 months      Controlled substance agreement:  Encounter-Level CSA - 07/17/2017:            Controlled Substance Agreement - Scan on 7/31/2017  9:32 PM : CONTROLLED SUBSTANCE AGREEMENT (below)                  Pain Clinic evaluation in the past: No     DIRE Total Score(s):  No flowsheet data found.     Last [facility] website verification:  done on 9/18/2018   [email or web address]       checked in past 3 months?  Yes 9/1/18

## 2018-11-15 NOTE — TELEPHONE ENCOUNTER
Routing refill request to provider for review/approval because:  Drug not on the [facility] refill protocol   : 9..18  Annual Controlled substance agreement: 17  Annual drug screenin18    Leigha Amador RN

## 2018-11-16 RX ORDER — TRAMADOL HYDROCHLORIDE 50 MG/1
TABLET ORAL
Qty: 60 TABLET | Refills: 0 | Status: SHIPPED | OUTPATIENT
Start: 2018-11-16 | End: 2018-12-18

## 2018-11-26 DIAGNOSIS — G89.29 OTHER CHRONIC PAIN: ICD-10-CM

## 2018-11-26 NOTE — TELEPHONE ENCOUNTER
Controlled Substance Refill Request for HYDROcodone-acetaminophen (NORCO)  MG per tablet  Problem List Complete:  No     PROVIDER TO CONSIDER COMPLETION OF PROBLEM LIST AND OVERVIEW/CONTROLLED SUBSTANCE AGREEMENT    Last Written Prescription Date:  10/19/2018  Last Fill Quantity: 15 tablet,   # refills: 0    Last Office Visit with [facility] primary care provider: 10/19/2018Anastasia        Controlled substance agreement on file: Yes:  Date 7/17/2017.     Processing:  Staff will hand deliver Rx to on-site pharmacy   checked in past 3 months?  Yes 9/18/2018     Last [facility] website verification:  done on 9/18/2018   [email or web address]

## 2018-11-26 NOTE — TELEPHONE ENCOUNTER
Requested Prescriptions   Pending Prescriptions Disp Refills     HYDROcodone-acetaminophen (NORCO)  MG per tablet [Pharmacy Med Name: Hydrocodone-Acetaminophen Oral Tablet  MG] 15 tablet 0     Sig: TAKE ONE TABLET BY MOUTH TWICE DAILY AS NEEDED    There is no refill protocol information for this order

## 2018-11-28 RX ORDER — HYDROCODONE BITARTRATE AND ACETAMINOPHEN 10; 325 MG/1; MG/1
TABLET ORAL
Qty: 15 TABLET | Refills: 0 | Status: SHIPPED | OUTPATIENT
Start: 2018-11-28 | End: 2018-12-18

## 2018-11-28 NOTE — TELEPHONE ENCOUNTER
Requested Prescriptions   Pending Prescriptions Disp Refills     HYDROcodone-acetaminophen (NORCO)  MG per tablet [Pharmacy Med Name: Hydrocodone-Acetaminophen Oral Tablet  MG] 15 tablet 0     Sig: TAKE ONE TABLET BY MOUTH TWICE DAILY AS NEEDED    There is no refill protocol information for this order        Last rx: 10.19.18  Routing refill request to provider for review/approval because:  Drug not on the [facility] refill protocol   : 18  Annual Controlled substance agreement: 17  Annual drug screenin18    Leigha Amador RN

## 2018-12-04 DIAGNOSIS — M54.50 ACUTE LOW BACK PAIN WITHOUT SCIATICA, UNSPECIFIED BACK PAIN LATERALITY: ICD-10-CM

## 2018-12-06 NOTE — TELEPHONE ENCOUNTER
Routing refill request to provider to review approval because:  Drug not on the [facility] or [facility] refill protocol or controlled substance ? Ongoing  Gayle Broderick RN, BSN  Message handled by Nurse Triage.

## 2018-12-06 NOTE — TELEPHONE ENCOUNTER
Requested Prescriptions   Pending Prescriptions Disp Refills     cyclobenzaprine (FLEXERIL) 10 MG tablet [Pharmacy Med Name: Cyclobenzaprine HCl Oral Tablet 10 MG] 30 tablet 0     Sig: TAKE HALF TO ONE TABLET BY MOUTH THREE TIMES DAILY AS NEEDED FOR MUSCLE SPASMS    There is no refill protocol information for this order        Last Written Prescription Date:  8/27/18  Last Fill Quantity: 30,  # refills: 0   Last Office Visit: 10/19/2018 Anastasia       Return in about 3 months (around 1/19/2019).     Future Office Visit:

## 2018-12-07 RX ORDER — CYCLOBENZAPRINE HCL 10 MG
TABLET ORAL
Qty: 30 TABLET | Refills: 0 | Status: SHIPPED | OUTPATIENT
Start: 2018-12-07 | End: 2019-03-04

## 2018-12-18 DIAGNOSIS — G89.29 OTHER CHRONIC PAIN: ICD-10-CM

## 2018-12-20 ENCOUNTER — TELEPHONE (OUTPATIENT)
Dept: FAMILY MEDICINE | Facility: CLINIC | Age: 63
End: 2018-12-20

## 2018-12-20 RX ORDER — TRAMADOL HYDROCHLORIDE 50 MG/1
TABLET ORAL
Qty: 60 TABLET | Refills: 0 | Status: SHIPPED | OUTPATIENT
Start: 2018-12-20 | End: 2019-01-18

## 2018-12-20 RX ORDER — HYDROCODONE BITARTRATE AND ACETAMINOPHEN 10; 325 MG/1; MG/1
TABLET ORAL
Qty: 15 TABLET | Refills: 0 | Status: SHIPPED | OUTPATIENT
Start: 2018-12-20 | End: 2019-01-29

## 2018-12-20 NOTE — TELEPHONE ENCOUNTER
RX [place] ([place]) reviewed:  reviewed- no concerns    Kylah Blackwell RN      [place] profile:  [email or web address]

## 2018-12-20 NOTE — TELEPHONE ENCOUNTER
traMADol (ULTRAM) 50 MG tablet    Last Written Prescription Date: 11/16/18   Last Fill Quantity: 60,  # refills: 0   Last office visit: 10/19/2018 with prescribing provider:  Dr Anastasia Boykin Office Visit:      HYDROcodone-acetaminophen (NORCO)  MG per tablet    Last Written Prescription Date:  11/28/18  Last Fill Quantity: 15,  # refills: 0   Last office visit: 10/19/2018 with prescribing provider:  Dr Anastasia Boykin Office Visit:        Controlled Substance Refill Request for HYDROcodone-acetaminophen (NORCO)  MG per tablet  Problem List Complete:  No     PROVIDER TO CONSIDER COMPLETION OF PROBLEM LIST AND OVERVIEW/CONTROLLED SUBSTANCE AGREEMENT    Last Written Prescription Date:  11/18/18  Last Fill Quantity: 15,   # refills: 0    [place] [facility] [place] Dr Anastasia Boykin Office visit:     Controlled substance agreement on file: No.     Processing:  Fax Rx to [facility] pharmacy   checked in past 3 months?  No, route to RN       Requested Prescriptions   Pending Prescriptions Disp Refills     HYDROcodone-acetaminophen (NORCO)  MG per tablet [Pharmacy Med Name: Hydrocodone-Acetaminophen Oral Tablet  MG] 15 tablet 0     Sig: TAKE ONE TABLET BY MOUTH TWICE DAILY AS NEEDED    There is no refill protocol information for this order        traMADol (ULTRAM) 50 MG tablet [Pharmacy Med Name: TraMADol HCl Oral Tablet 50 MG] 60 tablet 0     Sig: TAKE ONE TABLET BY MOUTH EVERY SIX HOURS AS NEEDED FOR MODERATE PAIN    There is no refill protocol information for this order        Casandra Thompson/KATRINA

## 2019-01-18 DIAGNOSIS — G89.29 OTHER CHRONIC PAIN: ICD-10-CM

## 2019-01-19 NOTE — TELEPHONE ENCOUNTER
Controlled Substance Refill Request for traMADol (ULTRAM) 50 MG tablet  Problem List Complete:    No     PROVIDER TO CONSIDER COMPLETION OF PROBLEM LIST AND OVERVIEW/CONTROLLED SUBSTANCE AGREEMENT    Last Written Prescription Date:  12/20/2018  Last Fill Quantity: 60 tablet,   # refills: 0    THE MOST RECENT OFFICE VISIT MUST BE WITHIN THE PAST 3 MONTHS. (AT LEAST ONE FACE TO FACE VISIT MUST OCCUR EVERY 6 MONTHS. ADDITIONAL VISITS CAN BE VIRTUAL.)    Last Office Visit with [facility] primary care provider: 10/19/2018, Anastasia        Controlled substance agreement: [unfilled]    Last Urine Drug Screen: No results found for: Brooke ARREAGA Drug Analysis UR   Date Value Ref Range Status   01/29/2018 FINAL  Final     Comment:     (Note)  ====================================================================  COMPREHENSIVE DRUG ANALYSIS,UR  ====================================================================  Test                             Result       Flag       Units        Drug Present   Hydromorphone                  125                     ng/mg creat   Norhydrocodone                 81                      ng/mg creat    Hydromorphone and norhydrocodone are expected metabolites of    hydrocodone. Sources of hydrocodone are scheduled prescription    medications.  Hydromorphone is also available as a scheduled    prescription medication.   Tramadol                       PRESENT                               O-Desmethyltramadol            PRESENT                               N-Desmethyltramadol            PRESENT                                Source of tramadol is a prescription medication.    O-desmethyltramadol and N-desmethyltramadol are expected    metabolites of tramadol.   Naproxen                       PRESENT                              ====================================================================  Test                      Result    Flag   Units      Ref Range        Creatinine              85                mg/dL      >=20            ====================================================================  For clinical consultation, please call [phone].  ====================================================================  Analysis performed by [employer] [place] [address]     , No results found for: THC13, PCP13, COC13, MAMP13, OPI13, AMP13, BZO13, TCA13, MTD13, BAR13, OXY13, PPX13, BUP13     Processing:  Staff will hand deliver Rx to on-site pharmacy     [email or web address]       checked in past 3 months?  No, route to RN     Last [place]MP website verification:  done on 9/18/2018   [email or web address]

## 2019-01-21 RX ORDER — TRAMADOL HYDROCHLORIDE 50 MG/1
50 TABLET ORAL EVERY 6 HOURS PRN
Qty: 60 TABLET | Refills: 0 | Status: SHIPPED | OUTPATIENT
Start: 2019-01-21 | End: 2019-02-23

## 2019-01-21 NOTE — TELEPHONE ENCOUNTER
Routing refill request to provider to review approval because:  Drug not on the [facility], [facility] or  Health refill protocol or controlled substance   updated, at bronze  Last refill: 12/20/18 #60    Gayle Broderick RN, BSN  Message handled by Nurse Triage.

## 2019-01-27 DIAGNOSIS — G89.29 OTHER CHRONIC PAIN: ICD-10-CM

## 2019-01-27 RX ORDER — HYDROCODONE BITARTRATE AND ACETAMINOPHEN 10; 325 MG/1; MG/1
TABLET ORAL
Qty: 15 TABLET | Refills: 0 | Status: CANCELLED | OUTPATIENT
Start: 2019-01-27

## 2019-01-28 NOTE — TELEPHONE ENCOUNTER
Requested Prescriptions   Pending Prescriptions Disp Refills     HYDROcodone-acetaminophen (NORCO)  MG per tablet [Pharmacy Med Name: Hydrocodone-Acetaminophen Oral Tablet  MG] 15 tablet 0     Sig: TAKE ONE TABLET BY MOUTH TWICE DAILY AS NEEDED    There is no refill protocol information for this order        Last Written Prescription Date:  12/20/2018  Last Fill Quantity: 15,  # refills: 0   Last office visit: 10/19/2018 with prescribing provider:  Dr Oconnor  Future Office Visit:

## 2019-01-29 DIAGNOSIS — G89.29 OTHER CHRONIC PAIN: ICD-10-CM

## 2019-01-29 RX ORDER — HYDROCODONE BITARTRATE AND ACETAMINOPHEN 10; 325 MG/1; MG/1
TABLET ORAL
Qty: 15 TABLET | Refills: 0 | Status: SHIPPED | OUTPATIENT
Start: 2019-01-29 | End: 2019-02-04

## 2019-01-29 NOTE — TELEPHONE ENCOUNTER
GABINO JONES MD approved rx, need to confirm pharmacy, Cub entered? Pt picking up? LM for pt to call to confirm, signed rx at [facility]    Gayle Broderick RN, BSN  Message handled by Nurse Triage.

## 2019-02-04 DIAGNOSIS — G89.29 OTHER CHRONIC PAIN: ICD-10-CM

## 2019-02-04 NOTE — TELEPHONE ENCOUNTER
Controlled Substance Refill Request for HYDROcodone-acetaminophen (NORCO)  MG per tablet  Problem List Complete:    No     PROVIDER TO CONSIDER COMPLETION OF PROBLEM LIST AND OVERVIEW/CONTROLLED SUBSTANCE AGREEMENT    Last Written Prescription Date:  1/29/2019  Last Fill Quantity: 15 tablet,   # refills: 0    THE MOST RECENT OFFICE VISIT MUST BE WITHIN THE PAST 3 MONTHS. AT LEAST ONE FACE TO FACE VISIT MUST OCCUR EVERY 6 MONTHS. ADDITIONAL VISITS CAN BE VIRTUAL.  (THIS STATEMENT SHOULD BE DELETED.)    [place] [facility] [place] [facility] [place]: 10/19/2018Anastasia    Future Office visit:     Controlled substance agreement:   Encounter-Level CSA - 07/17/2017:    Controlled Substance Agreement - Scan on 7/31/2017  9:32 PM: CONTROLLED SUBSTANCE AGREEMENT (below)       Encounter-Level CSA - 01/07/2015:    Controlled Substance Agreement - Scan on 1/8/2015  9:46 AM: [facility] Controlled Medication Agreement 1-7-15 (below)       Patient-Level CSA:    There are no patient-level csa.         Last Urine Drug Screen: No results found for: Brooke ARREAGA Drug Analysis UR   Date Value Ref Range Status   01/29/2018 FINAL  Final     Comment:     (Note)  ====================================================================  COMPREHENSIVE DRUG ANALYSIS,UR  ====================================================================  Test                             Result       Flag       Units        Drug Present   Hydromorphone                  125                     ng/mg creat   Norhydrocodone                 81                      ng/mg creat    Hydromorphone and norhydrocodone are expected metabolites of    hydrocodone. Sources of hydrocodone are scheduled prescription    medications.  Hydromorphone is also available as a scheduled    prescription medication.   Tramadol                       PRESENT                               O-Desmethyltramadol            PRESENT                               N-Desmethyltramadol             PRESENT                                Source of tramadol is a prescription medication.    O-desmethyltramadol and N-desmethyltramadol are expected    metabolites of tramadol.   Naproxen                       PRESENT                              ====================================================================  Test                      Result    Flag   Units      Ref Range        Creatinine              85               mg/dL      >=20            ====================================================================  For clinical consultation, please call [phone].  ====================================================================  Analysis performed by [employer] [place] [address]     , No results found for: THC13, PCP13, COC13, MAMP13, OPI13, AMP13, BZO13, TCA13, MTD13, BAR13, OXY13, PPX13, BUP13     Processing:  Staff will hand deliver Rx to on-site pharmacy     [email or web address]       checked in past 3 months?  Yes 1/21/2019     Last [place]MP website verification:  1/21/19   [email or web address]

## 2019-02-06 NOTE — TELEPHONE ENCOUNTER
Last Written Prescription Date:  1.29.19  Last Fill Quantity: 15,  # refills: 0   Last office visit: 10/19/2018 with prescribing provider:  Anastasia   Future Office Visit:      Requested Prescriptions   Pending Prescriptions Disp Refills     HYDROcodone-acetaminophen (NORCO)  MG per tablet [Pharmacy Med Name: Hydrocodone-Acetaminophen Oral Tablet  MG] 15 tablet 0     Sig: TAKE ONE TABLET BY MOUTH TWICE DAILY AS NEEDED    There is no refill protocol information for this order        Rx was picked up by pt

## 2019-02-07 RX ORDER — HYDROCODONE BITARTRATE AND ACETAMINOPHEN 10; 325 MG/1; MG/1
TABLET ORAL
Qty: 15 TABLET | Refills: 0 | COMMUNITY
Start: 2019-02-07 | End: 2019-03-04

## 2019-02-07 NOTE — TELEPHONE ENCOUNTER
rx picked up 2/4/19, will close encounter  Gayle Broderick RN, BSN  Message handled by Nurse Triage.

## 2019-02-09 DIAGNOSIS — J44.1 COPD EXACERBATION (H): ICD-10-CM

## 2019-02-12 RX ORDER — ALBUTEROL SULFATE 90 UG/1
AEROSOL, METERED RESPIRATORY (INHALATION)
Qty: 18 G | Refills: 1 | Status: SHIPPED | OUTPATIENT
Start: 2019-02-12 | End: 2019-06-21

## 2019-02-12 NOTE — TELEPHONE ENCOUNTER
Prescription approved per [facility] [place].  Gayle Broderick RN, BSN

## 2019-02-20 DIAGNOSIS — G89.29 OTHER CHRONIC PAIN: ICD-10-CM

## 2019-02-20 NOTE — TELEPHONE ENCOUNTER
Last Written Prescription Date:  01/21/19  Last Fill Quantity: 60,  # refills: 0   Last office visit: 10/19/2018 with prescribing provider:  Dr Oconnor   Future Office Visit:   Next 5 appointments (look out 90 days)    Feb 25, 2019  4:45 PM CST  SHORT with Lars Oconnor MD  [facility] [address] [place] [id number] [phone]         Requested Prescriptions   Pending Prescriptions Disp Refills     traMADol (ULTRAM) 50 MG tablet 60 tablet 0     Sig: Take 1 tablet (50 mg) by mouth every 6 hours as needed for severe pain LAST REFILL SEE MD FEB    There is no refill protocol information for this order

## 2019-02-21 NOTE — TELEPHONE ENCOUNTER
Routing refill request to provider for review/approval because:  Drug not on the [facility] refill protocol   Pt has appt on , can we fill then?  : 1.21.19  Annual Controlled substance agreement: 7..  Annual drug screenin.29.    Leigha Amador RN

## 2019-02-22 DIAGNOSIS — G89.29 OTHER CHRONIC PAIN: ICD-10-CM

## 2019-02-23 RX ORDER — TRAMADOL HYDROCHLORIDE 50 MG/1
50 TABLET ORAL EVERY 6 HOURS PRN
Qty: 60 TABLET | Refills: 0 | Status: SHIPPED | OUTPATIENT
Start: 2019-02-23 | End: 2019-02-25

## 2019-02-23 NOTE — TELEPHONE ENCOUNTER
Requested Prescriptions   Pending Prescriptions Disp Refills     traMADol (ULTRAM) 50 MG tablet [Pharmacy Med Name: traMADol HCl Oral Tablet 50 MG] 60 tablet 0     Sig: TAKE ONE TABLET BY MOUTH EVERY SIX HOURS AS NEEDED FOR SEVERE PAIN. LAST REFILL, SEE MD FEB.    There is no refill protocol information for this order        traMADol (ULTRAM) 50 MG tablet  Last Written Prescription Date:  1/21/19  Last Fill Quantity: 60 tablets,   # refills: 0  Last Office Visit:   Anastasia 10/19/18  Future Office visit:    Next 5 appointments (look out 90 days)    Feb 25, 2019  4:45 PM CST  SHORT with Lars [facility]connor MD  [facility] [address] [place] [id number] [phone]           Routing refill request to provider for review/approval because:  Drug not on the [facility], [facility] or [facility] refill protocol or controlled substance

## 2019-02-23 NOTE — TELEPHONE ENCOUNTER
Patient called requesting rx refill of Tramadol to be filled today.  She states that she has been out since Wednesday.  She states that she takes 2 Tramadol per day for chronic back pain.  She states that she has to work all weekend at [employer] in the [employer] 9 1/2 hour days.  Patient does have a follow up appointment with Dr. Oconnor on Monday 2/25/2019.     checked 2/22/2019:  (copied only last 6 months)        Catherine Espinoza RN

## 2019-02-23 NOTE — TELEPHONE ENCOUNTER
Rx faxed to:    [id number] - [facility] [address] [address]

## 2019-02-25 ENCOUNTER — OFFICE VISIT (OUTPATIENT)
Dept: FAMILY MEDICINE | Facility: CLINIC | Age: 64
End: 2019-02-25
Payer: COMMERCIAL

## 2019-02-25 VITALS
DIASTOLIC BLOOD PRESSURE: 82 MMHG | WEIGHT: 137.2 LBS | TEMPERATURE: 98.1 F | RESPIRATION RATE: 14 BRPM | BODY MASS INDEX: 27.71 KG/M2 | SYSTOLIC BLOOD PRESSURE: 122 MMHG | OXYGEN SATURATION: 97 % | HEART RATE: 82 BPM

## 2019-02-25 DIAGNOSIS — E80.0 ERYTHROPOIETIC PORPHYRIA (H): ICD-10-CM

## 2019-02-25 DIAGNOSIS — J41.1 MUCOPURULENT CHRONIC BRONCHITIS (H): ICD-10-CM

## 2019-02-25 DIAGNOSIS — G89.29 OTHER CHRONIC PAIN: ICD-10-CM

## 2019-02-25 DIAGNOSIS — H65.01 RIGHT ACUTE SEROUS OTITIS MEDIA, RECURRENCE NOT SPECIFIED: ICD-10-CM

## 2019-02-25 DIAGNOSIS — R13.12 OROPHARYNGEAL DYSPHAGIA: ICD-10-CM

## 2019-02-25 DIAGNOSIS — R73.9 HYPERGLYCEMIA: Primary | ICD-10-CM

## 2019-02-25 LAB
ERYTHROCYTE [DISTWIDTH] IN BLOOD BY AUTOMATED COUNT: 13.8 % (ref 10–15)
HBA1C MFR BLD: 6.2 % (ref 0–5.6)
HCT VFR BLD AUTO: 47.8 % (ref 35–47)
HGB BLD-MCNC: 16.2 G/DL (ref 11.7–15.7)
MCH RBC QN AUTO: 32.1 PG (ref 26.5–33)
MCHC RBC AUTO-ENTMCNC: 33.9 G/DL (ref 31.5–36.5)
MCV RBC AUTO: 95 FL (ref 78–100)
PLATELET # BLD AUTO: 237 10E9/L (ref 150–450)
RBC # BLD AUTO: 5.05 10E12/L (ref 3.8–5.2)
WBC # BLD AUTO: 7.6 10E9/L (ref 4–11)

## 2019-02-25 PROCEDURE — 85027 COMPLETE CBC AUTOMATED: CPT | Performed by: FAMILY MEDICINE

## 2019-02-25 PROCEDURE — 36415 COLL VENOUS BLD VENIPUNCTURE: CPT | Performed by: FAMILY MEDICINE

## 2019-02-25 PROCEDURE — 99214 OFFICE O/P EST MOD 30 MIN: CPT | Performed by: FAMILY MEDICINE

## 2019-02-25 PROCEDURE — 80307 DRUG TEST PRSMV CHEM ANLYZR: CPT | Mod: 90 | Performed by: FAMILY MEDICINE

## 2019-02-25 PROCEDURE — 80053 COMPREHEN METABOLIC PANEL: CPT | Performed by: FAMILY MEDICINE

## 2019-02-25 PROCEDURE — 83036 HEMOGLOBIN GLYCOSYLATED A1C: CPT | Performed by: FAMILY MEDICINE

## 2019-02-25 PROCEDURE — 99000 SPECIMEN HANDLING OFFICE-LAB: CPT | Performed by: FAMILY MEDICINE

## 2019-02-25 RX ORDER — TRAMADOL HYDROCHLORIDE 50 MG/1
50 TABLET ORAL EVERY 6 HOURS PRN
Qty: 60 TABLET | Refills: 0 | Status: SHIPPED | OUTPATIENT
Start: 2019-02-25 | End: 2019-03-22

## 2019-02-25 RX ORDER — AMOXICILLIN 500 MG/1
500 CAPSULE ORAL 2 TIMES DAILY
Qty: 20 CAPSULE | Refills: 1 | Status: SHIPPED | OUTPATIENT
Start: 2019-02-25 | End: 2019-03-07

## 2019-02-25 RX ORDER — TRAMADOL HYDROCHLORIDE 50 MG/1
TABLET ORAL
Qty: 60 TABLET | Refills: 0 | OUTPATIENT
Start: 2019-02-25

## 2019-02-25 ASSESSMENT — PATIENT HEALTH QUESTIONNAIRE - PHQ9
SUM OF ALL RESPONSES TO PHQ QUESTIONS 1-9: 0
10. IF YOU CHECKED OFF ANY PROBLEMS, HOW DIFFICULT HAVE THESE PROBLEMS MADE IT FOR YOU TO DO YOUR WORK, TAKE CARE OF THINGS AT HOME, OR GET ALONG WITH OTHER PEOPLE: NOT DIFFICULT AT ALL
SUM OF ALL RESPONSES TO PHQ QUESTIONS 1-9: 0

## 2019-02-25 NOTE — LETTER
My Asthma Action Plan  Name: Emeli Granados   YOB: 1955  Date: 2/25/2019   My doctor: Lars Oconnor MD   My clinic: [facility]        My Control Medicine: Albuterol   My Rescue Medicine: Albuterol    My Asthma Severity: controlled   Avoid your asthma triggers: Patient is unaware of triggers               GREEN ZONE   Good Control    I feel good    No cough or wheeze    Can work, sleep and play without asthma symptoms       Take your asthma control medicine every day.     1. If exercise triggers your asthma, take your rescue medication    15 minutes before exercise or sports, and    During exercise if you have asthma symptoms  2. Spacer to use with inhaler: If you have a spacer, make sure to use it with your inhaler             YELLOW ZONE Getting Worse  I have ANY of these:    I do not feel good    Cough or wheeze    Chest feels tight    Wake up at night   1. Keep taking your Green Zone medications  2. Start taking your rescue medicine:    every 20 minutes for up to 1 hour. Then every 4 hours for 24-48 hours.  3. If you stay in the Yellow Zone for more than 12-24 hours, contact your doctor.  4. If you do not return to the Green Zone in 12-24 hours or you get worse, start taking your oral steroid medicine if prescribed by your provider.           RED ZONE Medical Alert - Get Help  I have ANY of these:    I feel awful    Medicine is not helping    Breathing getting harder    Trouble walking or talking    Nose opens wide to breathe       1. Take your rescue medicine NOW  2. If your provider has prescribed an oral steroid medicine, start taking it NOW  3. Call your doctor NOW  4. If you are still in the Red Zone after 20 minutes and you have not reached your doctor:    Take your rescue medicine again and    Call 911 or go to the emergency room right away    See your regular doctor within 2 weeks of an Emergency Room or Urgent Care visit for follow-up treatment.           Annual Reminders:  Meet with Asthma Educator,  Flu Shot in the Fall, consider Pneumonia Vaccination for patients with asthma (aged 19 and older).    Pharmacy:    [id number] [address]  WRITTEN PRESCRIPTION REQUESTED                      Asthma Triggers  How To Control Things That Make Your Asthma Worse    Triggers are things that make your asthma worse.  Look at the list below to help you find your triggers and what you can do about them.  You can help prevent asthma flare-ups by staying away from your triggers.      Trigger                                                          What you can do   Cigarette Smoke  Tobacco smoke can make asthma worse. Do not allow smoking in your home, car or around you.  Be sure no one smokes at a child s day care or school.  If you smoke, ask your health care provider for ways to help you quit.  Ask family members to quit too.  Ask your health care provider for a referral to Quit Plan to help you quit smoking, or call 2-172-243-PLAN.     Colds, Flu, Bronchitis  These are common triggers of asthma. Wash your hands often.  Don t touch your eyes, nose or mouth.  Get a flu shot every year.     Dust Mites  These are tiny bugs that live in cloth or carpet. They are too small to see. Wash sheets and blankets in hot water every week.   Encase pillows and mattress in dust mite proof covers.  Avoid having carpet if you can. If you have carpet, vacuum weekly.   Use a dust mask and HEPA vacuum.   Pollen and Outdoor Mold  Some people are allergic to trees, grass, or weed pollen, or molds. Try to keep your windows closed.  Limit time out doors when pollen count is high.   Ask you health care provider about taking medicine during allergy season.     Animal Dander  Some people are allergic to skin flakes, urine or saliva from pets with fur or feathers. Keep pets with fur or feathers out of your home.    If you can t keep the pet outdoors, then keep the pet  out of your bedroom.  Keep the bedroom door closed.  Keep pets off cloth furniture and away from stuffed toys.     Mice, Rats, and Cockroaches  Some people are allergic to the waste from these pests.   Cover food and garbage.  Clean up spills and food crumbs.  Store grease in the refrigerator.   Keep food out of the bedroom.   Indoor Mold  This can be a trigger if your home has high moisture. Fix leaking faucets, pipes, or other sources of water.   Clean moldy surfaces.  Dehumidify basement if it is damp and smelly.   Smoke, Strong Odors, and Sprays  These can reduce air quality. Stay away from strong odors and sprays, such as perfume, powder, hair spray, paints, smoke incense, paint, cleaning products, candles and new carpet.   Exercise or Sports  Some people with asthma have this trigger. Be active!  Ask your doctor about taking medicine before sports or exercise to prevent symptoms.    Warm up for 5-10 minutes before and after sports or exercise.     Other Triggers of Asthma  Cold air:  Cover your nose and mouth with a scarf.  Sometimes laughing or crying can be a trigger.  Some medicines and food can trigger asthma.

## 2019-02-25 NOTE — PROGRESS NOTES
Answers for HPI/ROS submitted by the patient on 2/25/2019   If you checked off any problems, how difficult have these problems made it for you to do your work, take care of things at home, or get along with other people?: Not difficult at all  PHQ9 TOTAL SCORE: 0    SUBJECTIVE:   Emeli Granados is a 64 year old female who presents to clinic today for the following health issues:    Current Outpatient Medications   Medication     albuterol (2.5 MG/3ML) 0.083% neb solution     amoxicillin (AMOXIL) 500 MG capsule     ascorbic acid (VITAMIN C) 1000 MG TABS     cholecalciferol (VITAMIN D3) 1000 UNIT tablet     cyclobenzaprine (FLEXERIL) 10 MG tablet     HYDROcodone-acetaminophen (NORCO)  MG per tablet     ipratropium - albuterol 0.5 mg/2.5 mg/3 mL (DUONEB) 0.5-2.5 (3) MG/3ML neb solution     ipratropium - albuterol 0.5 mg/2.5 mg/3 mL (DUONEB) 0.5-2.5 (3) MG/3ML neb solution     ipratropium - albuterol 0.5 mg/2.5 mg/3 mL (DUONEB) 0.5-2.5 (3) MG/3ML nebulization     Multiple Vitamins-Minerals (MULTIVITAMIN OR)     nicotine (NICODERM CQ) 7 MG/24HR patch 2h hr     omeprazole (PRILOSEC) 20 MG DR capsule     order for DME     traMADol (ULTRAM) 50 MG tablet     VENTOLIN  (90 Base) MCG/ACT inhaler     vitamin E 400 UNITS TABS     zolpidem (AMBIEN) 5 MG tablet     No current facility-administered medications for this visit.        Patient is here for ringing in both ears for about the past two months.    SUBJECTIVE:    CC: Emeli Granados is a 64 year old female who presents for follow up asthma and gerd, chronic neck pain and low back pain     HPI: cold winter makes all the issues more difficult         PROBLEM LIST:                   Patient Active Problem List   Diagnosis     Disorder of bone and cartilage     CARDIOVASCULAR SCREENING; LDL GOAL LESS THAN 130     Acute hepatitis C virus infection     Obstructive chronic bronchitis with exacerbation (H)     Osteopenia     Back pain     Alcohol dependence in  remission (H)     Moderate persistent asthma with exacerbation     Other chronic pain       PAST MEDICAL HISTORY:                  Past Medical History:   Diagnosis Date     Acute hepatitis C without mention of hepatic coma(070.51) 1996    Dr. 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     Diverticula of colon 2014     Malignant neoplasm of endocervix (H) 1988    took uterus and left the ovaries     Other and unspecified alcohol dependence, unspecified drinking behavior     sober since 1999     Polycythemia 8/8/2012     Smoker      Unspecified disorder of esophagus     stricture - has had it dilated     Unspecified hemorrhoids without mention of complication        PAST SURGICAL HISTORY:                  Past Surgical History:   Procedure Laterality Date     C NONSPECIFIC PROCEDURE  1988    hysterectomy-uterine & cervical ca - tubes and ovaries are still in     C NONSPECIFIC PROCEDURE      ganglion cyst removal     C NONSPECIFIC PROCEDURE      right thumb surgery     C NONSPECIFIC PROCEDURE  2003    dilatation of the esophagus once due to dysphagia and stricture     HC COLONOSCOPY THRU STOMA, DIAGNOSTIC  2004, 2012    repeat in 2022     HC HEMORRHOIDECTOMY W BANDING/LIGATION      Hemorrhoidectomy     HC REMOVAL OF TONSILS,<12 Y/O      Tonsils <11y.o.     HYSTERECTOMY, PAP NO LONGER INDICATED       LAPAROSCOPIC SALPINGO-OOPHORECTOMY Bilateral 10/23/2015    Procedure: LAPAROSCOPIC SALPINGO-OOPHORECTOMY;  Surgeon: Johnathan Steve MD;  Location: [facility] OR       CURRENT MEDICATIONS:                  Current Outpatient Medications   Medication Sig Dispense Refill     albuterol (2.5 MG/3ML) 0.083% neb solution INHALE 3 MILLILITERS (2.5 MG) BY NEBULIZATION ROUTE EVERY 6 HOURS AS NEEDED FOR SHORTNESS OF BREATH/DYSPNEA OR WHEEZING 75 mL 0     ascorbic acid (VITAMIN C) 1000 MG TABS Take 1,000 mg by mouth daily       cholecalciferol  (VITAMIN D3) 1000 UNIT tablet Take 1,000 Units by mouth daily       cyclobenzaprine (FLEXERIL) 10 MG tablet TAKE HALF TO ONE TABLET BY MOUTH THREE TIMES DAILY AS NEEDED FOR MUSCLE SPASMS 30 tablet 0     HYDROcodone-acetaminophen (NORCO)  MG per tablet TAKE ONE TABLET BY MOUTH TWICE DAILY AS NEEDED  15 tablet 0     ipratropium - albuterol 0.5 mg/2.5 mg/3 mL (DUONEB) 0.5-2.5 (3) MG/3ML neb solution Take 1 vial (3 mLs) by nebulization every 6 hours as needed for shortness of breath / dyspnea or wheezing 30 vial 1     ipratropium - albuterol 0.5 mg/2.5 mg/3 mL (DUONEB) 0.5-2.5 (3) MG/3ML neb solution Take 1 vial (3 mLs) by nebulization every 6 hours as needed for shortness of breath / dyspnea or wheezing 30 vial 1     ipratropium - albuterol 0.5 mg/2.5 mg/3 mL (DUONEB) 0.5-2.5 (3) MG/3ML nebulization Take 1 vial (3 mLs) by nebulization every 4 hours as needed for shortness of breath / dyspnea or wheezing 30 vial 1     metFORMIN (GLUCOPHAGE-XR) 500 MG 24 hr tablet Take 1 tablet (500 mg) by mouth daily (with dinner) 30 tablet 4     Multiple Vitamins-Minerals (MULTIVITAMIN OR) Take 1 tablet by mouth daily Per pt, uses ones without Iron       nicotine (NICODERM CQ) 7 MG/24HR patch 2h hr Place 1 patch onto the skin every 24 hours 30 patch 0     omeprazole (PRILOSEC) 20 MG capsule Take 2 capsules (40 mg) by mouth daily 60 capsule 0     order for DME Equipment being ordered: Nebulizer 1 Device 0     traMADol (ULTRAM) 50 MG tablet Take 1 tablet (50 mg) by mouth every 6 hours as needed for severe pain LAST REFILL SEE MD FEB 60 tablet 0     VENTOLIN  (90 Base) MCG/ACT inhaler INHALE 2 PUFFS INTO THE LUNGS EVERY 6 HOURS AS NEEDED. 18 g 1     vitamin E 400 UNITS TABS Take 400 Units by mouth daily       zolpidem (AMBIEN) 5 MG tablet TAKE ONE TABLET BY MOUTH NIGHTLY AS NEEDED FOR SLEEP 30 tablet 2             FAMILY HISTORY:                   Family History   Problem Relation Age of Onset     Hypertension Mother       Cerebrovascular Disease Mother         TIA's     Depression Mother      Cancer - colorectal Maternal Grandmother         dx at 65     Lipids Father      C.A.D. Father         angioplasty at 65     Musculoskeletal Disorder Father      Alcohol/Drug Daughter         in remission     Breast Cancer No family hx of        HEALTH MAINTENANCE:                    REVIEW OF OUTSIDE RECORDS: NO    REVIEW OF SYSTEMS:  CONSTITUTIONAL: NEGATIVE for fever, chills  INTEGUMENTARY/SKIN: NEGATIVE for worrisome rashes, moles or lesions  EYES: NEGATIVE for vision changes   ENT/MOUTH: NEGATIVE for ear, mouth and throat problems  RESP: NEGATIVE for significant cough or SOB  CV: NEGATIVE for chest pain, palpitations   GI: NEGATIVE for nausea, abdominal pain, heartburn, or change in bowel habits  : NEGATIVE for frequency, dysuria, or hematuria  MUSCULOSKELETAL: NEGATIVE for significant arthralgias or myalgia  NEURO: NEGATIVE for weakness, dizziness or paresthesias or headache  NVS:no headache or balance issus  INTEG:no moles or new rashes  LYMPH:no nodes or night sweats    EXAM:      GENERAL APPEARANCE: healthy, alert and no distress   EXAM:  GENERAL APPEARANCE: healthy, alert and no distress  EYES: EOMI,  PERRL  HENT: ear canals and TM's normal and nose and mouth without ulcers or lesions  RESP: lungs clear to auscultation - no rales, rhonchi or wheezes  CV: regular rates and rhythm, normal S1 S2, no S3 or S4 and no murmur, click or rub -  ABDOMEN:  soft, nontender, no HSM or masses and bowel sounds normal  SKIN: no suspicious lesions or rashes  NEURO: Normal strength and tone, sensory exam grossly normal, mentation intact and speech normal  PSYCH: mentation appears normal and affect normal/bright  BACK:no tenderness or pain on straight let raise           ASSESSMENT/PLAN  (R73.9) Hyperglycemia  (primary encounter diagnosis)  Comment:   Plan: holding steady    (R13.12) dysphagia  Comment:   Plan: omeprazole (PRILOSEC) 20 MG DR capsule,          Comprehensive metabolic panel, Hemoglobin A1c        meds effective     (G89.29) Other chronic pain  Comment:   Plan: traMADol (ULTRAM) 50 MG tablet, Drug  Screen         Comprehensive, Urine w/o Reported Meds (Pain         Care Package)            (H65.01) Right acute serous otitis media, recurrence not specified  Comment:   Plan: amoxicillin (AMOXIL) 500 MG capsule        New med    (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   I have instructed the patient to call or schedule a follow-up appointment if any problems or failure to improve.

## 2019-02-25 NOTE — TELEPHONE ENCOUNTER
The source prescription was reordered on 2/23/2019 by Tej Werner MD.    Michelle Dobbins, RN  Patient Care Supervisor  [facility]  [phone]

## 2019-02-26 LAB
ALBUMIN SERPL-MCNC: 3.9 G/DL (ref 3.4–5)
ALP SERPL-CCNC: 87 U/L (ref 40–150)
ALT SERPL W P-5'-P-CCNC: 25 U/L (ref 0–50)
ANION GAP SERPL CALCULATED.3IONS-SCNC: 11 MMOL/L (ref 3–14)
AST SERPL W P-5'-P-CCNC: 25 U/L (ref 0–45)
BILIRUB SERPL-MCNC: 0.3 MG/DL (ref 0.2–1.3)
BUN SERPL-MCNC: 19 MG/DL (ref 7–30)
CALCIUM SERPL-MCNC: 9.4 MG/DL (ref 8.5–10.1)
CHLORIDE SERPL-SCNC: 110 MMOL/L (ref 94–109)
CO2 SERPL-SCNC: 21 MMOL/L (ref 20–32)
CREAT SERPL-MCNC: 0.81 MG/DL (ref 0.52–1.04)
GFR SERPL CREATININE-BSD FRML MDRD: 76 ML/MIN/{1.73_M2}
GLUCOSE SERPL-MCNC: 101 MG/DL (ref 70–99)
POTASSIUM SERPL-SCNC: 4.1 MMOL/L (ref 3.4–5.3)
PROT SERPL-MCNC: 7 G/DL (ref 6.8–8.8)
SODIUM SERPL-SCNC: 142 MMOL/L (ref 133–144)

## 2019-02-26 ASSESSMENT — PATIENT HEALTH QUESTIONNAIRE - PHQ9: SUM OF ALL RESPONSES TO PHQ QUESTIONS 1-9: 0

## 2019-02-26 ASSESSMENT — ASTHMA QUESTIONNAIRES: ACT_TOTALSCORE: 21

## 2019-03-01 LAB — COMPREHEN DRUG ANALYSIS UR: NORMAL

## 2019-03-04 DIAGNOSIS — G89.29 OTHER CHRONIC PAIN: ICD-10-CM

## 2019-03-04 DIAGNOSIS — M54.50 ACUTE LOW BACK PAIN WITHOUT SCIATICA, UNSPECIFIED BACK PAIN LATERALITY: ICD-10-CM

## 2019-03-04 NOTE — TELEPHONE ENCOUNTER
Controlled Substance Refill Request for cyclobenzaprine (FLEXERIL) 10 MG tablet  Problem List Complete:    No     PROVIDER TO CONSIDER COMPLETION OF PROBLEM LIST AND OVERVIEW/CONTROLLED SUBSTANCE AGREEMENT    Last Written Prescription Date:  12/7/2018  Last Fill Quantity: 30 tablet,   # refills: 0    Last Office Visit with [facility] [place] provider: 2/25/2019    Future Office visit:     Controlled substance agreement:   Encounter-Level CSA - 07/17/2017:    Controlled Substance Agreement - Scan on 7/31/2017  9:32 PM: CONTROLLED SUBSTANCE AGREEMENT (below)       Encounter-Level CSA - 01/07/2015:    Controlled Substance Agreement - Scan on 1/8/2015  9:46 AM: [facility] Controlled Medication Agreement 1-7-15 (below)       Patient-Level CSA:    There are no patient-level csa.         Last Urine Drug Screen: No results found for: Brooke ARREAGA Drug Analysis UR   Date Value Ref Range Status   02/25/2019 FINAL  Final     Comment:     (Note)  ====================================================================  COMPREHENSIVE DRUG ANALYSIS,UR  ====================================================================  Test                             Result       Flag       Units        Drug Present   Tramadol                       1000                    ng/mg creat   O-Desmethyltramadol            1437                    ng/mg creat   N-Desmethyltramadol            213                     ng/mg creat    Source of tramadol is a prescription medication.    O-desmethyltramadol and N-desmethyltramadol are expected    metabolites of tramadol.  ====================================================================  Test                      Result    Flag   Units      Ref Range        Creatinine              109              mg/dL      >=20            ====================================================================  For clinical consultation, please call [phone]  [phone].  ====================================================================  Analysis performed by [employer] [place] [address]     , No results found for: THC13, PCP13, COC13, MAMP13, OPI13, AMP13, BZO13, TCA13, MTD13, BAR13, OXY13, PPX13, BUP13     Processing:  Staff will hand deliver Rx to on-site pharmacy     [email or web address]       checked in past 3 months?  Yes 1/21/2019     Last [place] website verification:  1/21/19   [email or web address]

## 2019-03-04 NOTE — TELEPHONE ENCOUNTER
Controlled Substance Refill Request for HYDROcodone-acetaminophen (NORCO)  MG per tablet  Problem List Complete:    No     PROVIDER TO CONSIDER COMPLETION OF PROBLEM LIST AND OVERVIEW/CONTROLLED SUBSTANCE AGREEMENT    Last Written Prescription Date:  2/7/2019  Last Fill Quantity: 15 tablet,   # refills: 0      [place] [facility] [place]: 2/25/2019Anastasia    Future Office visit:     Controlled substance agreement:   Encounter-Level CSA - 07/17/2017:    Controlled Substance Agreement - Scan on 7/31/2017  9:32 PM: CONTROLLED SUBSTANCE AGREEMENT (below)       Encounter-Level CSA - 01/07/2015:    Controlled Substance Agreement - Scan on 1/8/2015  9:46 AM: [facility] Controlled Medication Agreement 1-7-15 (below)       Patient-Level CSA:    There are no patient-level csa.         Last Urine Drug Screen: No results found for: Brooke ARREAGA Drug Analysis UR   Date Value Ref Range Status   02/25/2019 FINAL  Final     Comment:     (Note)  ====================================================================  COMPREHENSIVE DRUG ANALYSIS,UR  ====================================================================  Test                             Result       Flag       Units        Drug Present   Tramadol                       1000                    ng/mg creat   O-Desmethyltramadol            1437                    ng/mg creat   N-Desmethyltramadol            213                     ng/mg creat    Source of tramadol is a prescription medication.    O-desmethyltramadol and N-desmethyltramadol are expected    metabolites of tramadol.  ====================================================================  Test                      Result    Flag   Units      Ref Range        Creatinine              109              mg/dL      >=20            ====================================================================  For clinical consultation, please call [phone]  [phone].  ====================================================================  Analysis performed by [email or web address] [place] [address]     , No results found for: THC13, PCP13, COC13, MAMP13, OPI13, AMP13, BZO13, TCA13, MTD13, BAR13, OXY13, PPX13, BUP13     Processing:  Staff will hand deliver Rx to on-site pharmacy     [email or web address]       checked in past 3 months?  Yes 1/21/2019     Last [place] website verification:  1/21/19   [email or web address]

## 2019-03-06 RX ORDER — CYCLOBENZAPRINE HCL 10 MG
TABLET ORAL
Qty: 30 TABLET | Refills: 0 | Status: SHIPPED | OUTPATIENT
Start: 2019-03-06 | End: 2019-08-05

## 2019-03-06 RX ORDER — HYDROCODONE BITARTRATE AND ACETAMINOPHEN 10; 325 MG/1; MG/1
TABLET ORAL
Qty: 15 TABLET | Refills: 0 | Status: SHIPPED | OUTPATIENT
Start: 2019-03-06 | End: 2019-03-29

## 2019-03-06 NOTE — TELEPHONE ENCOUNTER
Routing refill request to provider to review approval because:  Drug not on the [facility], [facility] or [facility] refill protocol or controlled substance  Gayle Broderick RN, BSN  Message handled by Nurse Triage.

## 2019-03-06 NOTE — TELEPHONE ENCOUNTER
Routing refill request to provider to review approval because:  Drug not on the [facility], [facility] or [facility] refill protocol or controlled substance  Gayle Broderick RN, BSN  Message handled by Nurse Triage.

## 2019-03-06 NOTE — TELEPHONE ENCOUNTER
Called patient, left msg with a family member to call clinic, NORCO Rx is being walked to [place], [place]. Ruth Behrens.

## 2019-03-07 ENCOUNTER — TELEPHONE (OUTPATIENT)
Dept: FAMILY MEDICINE | Facility: CLINIC | Age: 64
End: 2019-03-07

## 2019-03-07 DIAGNOSIS — R13.12 OROPHARYNGEAL DYSPHAGIA: ICD-10-CM

## 2019-03-07 RX ORDER — OMEPRAZOLE 40 MG/1
40 CAPSULE, DELAYED RELEASE ORAL DAILY
Qty: 90 CAPSULE | Refills: 1 | Status: SHIPPED | OUTPATIENT
Start: 2019-03-07 | End: 2020-05-27

## 2019-03-07 NOTE — TELEPHONE ENCOUNTER
FAX from [facility], confirms below, resent for 40 mg every day, current sig 20 mg-2 tabs daily  Prescription approved per [facility] Refill Protocol.  Gayle Broderick, RN, BSN

## 2019-03-07 NOTE — TELEPHONE ENCOUNTER
Patient called, Pharmacy and insurance will only cover 40mg cap for 1 day , not the 20 mg for 2 times a day.     Can we switch this and resend to pharmacy,     Casandra Thompson/KATRINA

## 2019-03-22 DIAGNOSIS — G89.29 OTHER CHRONIC PAIN: ICD-10-CM

## 2019-03-23 NOTE — TELEPHONE ENCOUNTER
Last Written Prescription Date:  2.25.19  Last Fill Quantity: 60,  # refills: 0   Last office visit: 2/25/2019 with prescribing provider:  Alphonso   Future Office Visit:      Requested Prescriptions   Pending Prescriptions Disp Refills     traMADol (ULTRAM) 50 MG tablet [Pharmacy Med Name: traMADol HCl Oral Tablet 50 MG] 60 tablet 0     Sig: TAKE ONE TABLET BY MOUTH EVERY SIX HOURS AS NEEDED FOR SEVERE PAIN. LAST REFILL, SEE MD FEB    There is no refill protocol information for this order        Routing refill request to provider for review/approval because:  Drug not on the [facility] refill protocol     Lyndsey Amador RN, BS  Clinical Nurse Triage.

## 2019-03-25 RX ORDER — TRAMADOL HYDROCHLORIDE 50 MG/1
50 TABLET ORAL EVERY 6 HOURS PRN
Qty: 60 TABLET | Refills: 0 | Status: SHIPPED | OUTPATIENT
Start: 2019-03-25 | End: 2019-04-17

## 2019-03-25 NOTE — TELEPHONE ENCOUNTER
Rx was faxed to [facility], pharmacy will notify patient when ready to be picked up.     Jaden Cole   03/25/19 9:06 AM

## 2019-03-29 DIAGNOSIS — G89.29 OTHER CHRONIC PAIN: ICD-10-CM

## 2019-03-29 NOTE — TELEPHONE ENCOUNTER
Controlled Substance Refill Request for HYDROcodone-acetaminophen (NORCO)  MG per tablet  Problem List Complete:    No     PROVIDER TO CONSIDER COMPLETION OF PROBLEM LIST AND OVERVIEW/CONTROLLED SUBSTANCE AGREEMENT    Last Written Prescription Date:  3/6/2019  Last Fill Quantity: 15 tablet,   # refills: 0    THE MOST RECENT OFFICE VISIT MUST BE WITHIN THE PAST 3 MONTHS. AT LEAST ONE FACE TO FACE VISIT MUST OCCUR EVERY 6 MONTHS. ADDITIONAL VISITS CAN BE VIRTUAL.  (THIS STATEMENT SHOULD BE DELETED.)    Last Office Visit with [facility] [place] care provider: 2/25/2019Anastasia    Future Office visit:     Controlled substance agreement:   Encounter-Level CSA - 07/17/2017:    Controlled Substance Agreement - Scan on 7/31/2017  9:32 PM: CONTROLLED SUBSTANCE AGREEMENT (below)       Encounter-Level CSA - 01/07/2015:    Controlled Substance Agreement - Scan on 1/8/2015  9:46 AM: [facility] Controlled Medication Agreement 1-7-15 (below)       Patient-Level CSA:    There are no patient-level csa.         Last Urine Drug Screen: No results found for: Brooke ARREAGA Drug Analysis UR   Date Value Ref Range Status   02/25/2019 FINAL  Final     Comment:     (Note)  ====================================================================  COMPREHENSIVE DRUG ANALYSIS,UR  ====================================================================  Test                             Result       Flag       Units        Drug Present   Tramadol                       1000                    ng/mg creat   O-Desmethyltramadol            1437                    ng/mg creat   N-Desmethyltramadol            213                     ng/mg creat    Source of tramadol is a prescription medication.    O-desmethyltramadol and N-desmethyltramadol are expected    metabolites of tramadol.  ====================================================================  Test                      Result    Flag   Units      Ref Range        Creatinine              109               mg/dL      >=20            ====================================================================  For clinical consultation, please call [phone].  ====================================================================  Analysis performed by [employer] [place] [address]     , No results found for: THC13, PCP13, COC13, MAMP13, OPI13, AMP13, BZO13, TCA13, MTD13, BAR13, OXY13, PPX13, BUP13     Processing:  Staff will hand deliver Rx to on-site pharmacy     [email or web address]       checked in past 3 months?  Yes 1/21/2019     Last [place] website verification:  1/21/19   [email or web address]

## 2019-04-01 RX ORDER — HYDROCODONE BITARTRATE AND ACETAMINOPHEN 10; 325 MG/1; MG/1
TABLET ORAL
Qty: 12 TABLET | Refills: 0 | Status: SHIPPED | OUTPATIENT
Start: 2019-04-01 | End: 2019-04-29

## 2019-04-01 NOTE — TELEPHONE ENCOUNTER
Routing refill request to provider to review approval because:  Drug not on the [facility], [facility] or [facility] refill protocol or controlled substance     UTD-CONFIRM START DATE, LAST REFILL 3/6/19 #15    Gayle Broderick, RN, BSN  Message handled by Nurse Triage.

## 2019-04-29 DIAGNOSIS — G89.29 OTHER CHRONIC PAIN: ICD-10-CM

## 2019-04-29 DIAGNOSIS — G47.9 SLEEP DISORDER: ICD-10-CM

## 2019-04-30 NOTE — TELEPHONE ENCOUNTER
Controlled Substance Refill Request for zolpidem (AMBIEN) 5 MG tablet  Problem List Complete:    No     PROVIDER TO CONSIDER COMPLETION OF PROBLEM LIST AND OVERVIEW/CONTROLLED SUBSTANCE AGREEMENT    Last Written Prescription Date:  10/19/2018  Last Fill Quantity: 30 tablet,   # refills: 2    THE MOST RECENT OFFICE VISIT MUST BE WITHIN THE PAST 3 MONTHS. AT LEAST ONE FACE TO FACE VISIT MUST OCCUR EVERY 6 MONTHS. ADDITIONAL VISITS CAN BE VIRTUAL.  (THIS STATEMENT SHOULD BE DELETED.)    Last Office Visit with [facility] primary care provider: 2/25/2019Anastasia    Future Office visit:     Controlled substance agreement:   Encounter-Level CSA - 07/17/2017:    Controlled Substance Agreement - Scan on 7/31/2017  9:32 PM: CONTROLLED SUBSTANCE AGREEMENT (below)       Encounter-Level CSA - 01/07/2015:    Controlled Substance Agreement - Scan on 1/8/2015  9:46 AM: [facility] Controlled Medication Agreement 1-7-15 (below)       Patient-Level CSA:    There are no patient-level csa.         Last Urine Drug Screen: No results found for: Brooke ARREAGA Drug Analysis UR   Date Value Ref Range Status   02/25/2019 FINAL  Final     Comment:     (Note)  ====================================================================  COMPREHENSIVE DRUG ANALYSIS,UR  ====================================================================  Test                             Result       Flag       Units        Drug Present   Tramadol                       1000                    ng/mg creat   O-Desmethyltramadol            1437                    ng/mg creat   N-Desmethyltramadol            213                     ng/mg creat    Source of tramadol is a prescription medication.    O-desmethyltramadol and N-desmethyltramadol are expected    metabolites of tramadol.  ====================================================================  Test                      Result    Flag   Units      Ref Range        Creatinine              109              mg/dL      >=20             ====================================================================  For clinical consultation, please call [phone].  ====================================================================  Analysis performed by [facility] [address] [place] [address]     , No results found for: THC13, PCP13, COC13, MAMP13, OPI13, AMP13, BZO13, TCA13, MTD13, BAR13, OXY13, PPX13, BUP13     Processing:  Staff will hand deliver Rx to on-site pharmacy     [email or web address]       checked in past 3 months?  No, route to RN     Last [facility] website verification:  1/21/19   [email or web address]          Controlled Substance Refill Request for HYDROcodone-acetaminophen (NORCO)  MG per tablet  Problem List Complete:    No     PROVIDER TO CONSIDER COMPLETION OF PROBLEM LIST AND OVERVIEW/CONTROLLED SUBSTANCE AGREEMENT    Last Written Prescription Date:  4/1/2019  Last Fill Quantity: 12 tablet,   # refills: 0    THE MOST RECENT OFFICE VISIT MUST BE WITHIN THE PAST 3 MONTHS. AT LEAST ONE FACE TO FACE VISIT MUST OCCUR EVERY 6 MONTHS. ADDITIONAL VISITS CAN BE VIRTUAL.  (THIS STATEMENT SHOULD BE DELETED.)    Last Office Visit with [facility] primary care provider: 2/25/2019Anastasia    Future Office visit:     Controlled substance agreement:   Encounter-Level CSA - 07/17/2017:    Controlled Substance Agreement - Scan on 7/31/2017  9:32 PM: CONTROLLED SUBSTANCE AGREEMENT (below)       Encounter-Level CSA - 01/07/2015:    Controlled Substance Agreement - Scan on 1/8/2015  9:46 AM: [facility] Controlled Medication Agreement 1-7-15 (below)       Patient-Level CSA:    There are no patient-level csa.         Last Urine Drug Screen: No results found for: Brooke ARREAGA Drug Analysis UR   Date Value Ref Range Status   02/25/2019 FINAL  Final     Comment:     (Note)  ====================================================================  COMPREHENSIVE DRUG  ANALYSIS,UR  ====================================================================  Test                             Result       Flag       Units        Drug Present   Tramadol                       1000                    ng/mg creat   O-Desmethyltramadol            1437                    ng/mg creat   N-Desmethyltramadol            213                     ng/mg creat    Source of tramadol is a prescription medication.    O-desmethyltramadol and N-desmethyltramadol are expected    metabolites of tramadol.  ====================================================================  Test                      Result    Flag   Units      Ref Range        Creatinine              109              mg/dL      >=20            ====================================================================  For clinical consultation, please call [phone].  ====================================================================  Analysis performed by [facility] [place] [address]     , No results found for: THC13, PCP13, COC13, MAMP13, OPI13, AMP13, BZO13, TCA13, MTD13, BAR13, OXY13, PPX13, BUP13     Processing:  Staff will hand deliver Rx to on-site pharmacy     [email or web address]       checked in past 3 months?  No, route to RN     Last [facility] website verification:  1/21/19   [email or web address]

## 2019-05-01 RX ORDER — ZOLPIDEM TARTRATE 5 MG/1
TABLET ORAL
Qty: 30 TABLET | Refills: 1 | Status: SHIPPED | OUTPATIENT
Start: 2019-05-01 | End: 2019-10-22

## 2019-05-01 RX ORDER — HYDROCODONE BITARTRATE AND ACETAMINOPHEN 10; 325 MG/1; MG/1
TABLET ORAL
Qty: 15 TABLET | Refills: 0 | Status: SHIPPED | OUTPATIENT
Start: 2019-05-01 | End: 2019-05-28

## 2019-05-01 NOTE — TELEPHONE ENCOUNTER
RX [place] ([place] reviewed:  reviewed- no concerns    [place] profile:  [email or web address]

## 2019-05-15 DIAGNOSIS — G89.29 OTHER CHRONIC PAIN: ICD-10-CM

## 2019-05-15 NOTE — TELEPHONE ENCOUNTER
Controlled Substance Refill Request for   traMADol (ULTRAM) 50 MG tablet    Problem List Complete:  Yes  Other chronic pain   Problem Detail     Noted:  1/2/2018   Priority:  Medium   Overview Addendum 1/21/2019 11:41 AM by Gayle Broderick RN   Patient is followed by Lars Oconnor MD for ongoing prescription of pain medication.  All refills should only be approved by this provider, or covering partner.     Medication(s): hydrocodone.   Maximum quantity per month: 20  Clinic visit frequency required: Q 3 months      Controlled substance agreement:  Encounter-Level CSA - 07/17/2017:            Controlled Substance Agreement - Scan on 7/31/2017  9:32 PM : CONTROLLED SUBSTANCE AGREEMENT (below)                Encounter-Level CSA - 07/17/2017:            Controlled Substance Agreement - Scan on 1/8/2015  9:46 AM : [facility] Controlled Medication Agreement 1-7-15 (below)                   Pain Clinic evaluation in the past: No     DIRE Total Score(s):  No flowsheet data found.     Last [facility] website verification:  1/21/19   [email or web address]      Previous Version   Relevant Medications     traMADol (ULTRAM) 50 MG tablet   HYDROcodone-acetaminophen (NORCO)  MG per tablet          checked in past 3 months?  no    Last Written Prescription Date:  4/19/19  Last Fill Quantity: 60 tablets,  # refills: 0   Last office visit: 2/25/2019 with prescribing provider: Anastasia 2/25/19    Future Office Visit:

## 2019-05-16 RX ORDER — TRAMADOL HYDROCHLORIDE 50 MG/1
TABLET ORAL
Qty: 60 TABLET | Refills: 0 | Status: SHIPPED | OUTPATIENT
Start: 2019-05-16 | End: 2019-06-06

## 2019-05-16 NOTE — TELEPHONE ENCOUNTER
Routing refill request to provider to review approval because:  Drug not on the G, P or [facility] refill protocol or controlled substance   updated, at bronze, last refill 4/19/19    Gayle Broderick RN, BSN  Message handled by Nurse Triage.

## 2019-05-21 ENCOUNTER — TELEPHONE (OUTPATIENT)
Dept: FAMILY MEDICINE | Facility: CLINIC | Age: 64
End: 2019-05-21

## 2019-05-21 NOTE — LETTER
[facility]  [id number] [place] [id number]  Phone: [phone]    May 21, 2019        Emeli Granados  [address] [facility] [address]          To whom it may concern:    RE: Emeli Granados    Due to a previous injury, Ms. Emeli Granados's locker should remain on the first floor to avoid the use of stairs.     Please contact me for questions or concerns.      Sincerely,        Lars Oconnor MD

## 2019-05-21 NOTE — TELEPHONE ENCOUNTER
Patient states she was seen in the past for WC case, she states it has been years. She is requesting for Dr. Oconnor to write a letter stating she can't due stairs due to a right knee injury. I explained to patient that she will most likely need to be seen if we haven't address the issue in a while, patient asked for me to send the request to see if he will write the letter. Patient will  letter if completed. She can be reach at [phone]. Orquidea Herzog

## 2019-05-21 NOTE — TELEPHONE ENCOUNTER
Pt requests a letter that she cannot use stairs d/t a previous injury to the right knee (2014)     Pt's employer states her locker can remain on the first floor with an updated letter from PCP.     Dr. Ana LAYNE have pended/saved a letter for review/revising/printing/signing.     Please advise if pt should have OV for f/u on this concern.     Thank you,   Diamond TAMEZ RN

## 2019-05-21 NOTE — LETTER
May 23, 2019      Emeli Granados  [address] [facility] [address]        To Whom It May Concern,      Due to a previous knee injury, Ms. Emeli Granados's locker should remain on the first floor to avoid the use of stairs which cause pain and possible further injury.  Locker could be on a separate floor if there is elevator access.   Pt can push and lift up to 50 lbs, but should be excused from unnecessary stair climing.     Please contact me for questions or concerns.            Sincerely,        Lars Oconnor MD

## 2019-05-23 NOTE — TELEPHONE ENCOUNTER
Dr. Sainz,     Routed a new draft letter to you.  Pt called and reported work said letter was not good enough and needed push and pull limits.  Pt states push and pull limits are 50 lbs, and no elevator access at work.  Pended letter for your review with push pull limits and notes on knee injury.  Please review.    Nadira Baptiste RN    Pt's call back when letter is ready for pickup  [phone]

## 2019-05-28 DIAGNOSIS — G89.29 OTHER CHRONIC PAIN: ICD-10-CM

## 2019-05-29 NOTE — TELEPHONE ENCOUNTER
Controlled Substance Refill Request for HYDROcodone-acetaminophen (NORCO)  MG per tablet  Problem List Complete:    No     PROVIDER TO CONSIDER COMPLETION OF PROBLEM LIST AND OVERVIEW/CONTROLLED SUBSTANCE AGREEMENT    Last Written Prescription Date:  5/1/2019  Last Fill Quantity: 15 tablet,   # refills: 0    THE MOST RECENT OFFICE VISIT MUST BE WITHIN THE PAST 3 MONTHS. AT LEAST ONE FACE TO FACE VISIT MUST OCCUR EVERY 6 MONTHS. ADDITIONAL VISITS CAN BE VIRTUAL.  (THIS STATEMENT SHOULD BE DELETED.)    Last Office Visit with [facility] primary care provider: 2/25/2019Anastasia    Future Office visit:     Controlled substance agreement:   Encounter-Level CSA - 07/17/2017:    Controlled Substance Agreement - Scan on 7/31/2017  9:32 PM: CONTROLLED SUBSTANCE AGREEMENT (below)       Encounter-Level CSA - 01/07/2015:    Controlled Substance Agreement - Scan on 1/8/2015  9:46 AM: [facility] Controlled Medication Agreement 1-7-15 (below)       Patient-Level CSA:    There are no patient-level csa.         Last Urine Drug Screen: No results found for: Brooke ARREAGA Drug Analysis UR   Date Value Ref Range Status   02/25/2019 FINAL  Final     Comment:     (Note)  ====================================================================  COMPREHENSIVE DRUG ANALYSIS,UR  ====================================================================  Test                             Result       Flag       Units        Drug Present   Tramadol                       1000                    ng/mg creat   O-Desmethyltramadol            1437                    ng/mg creat   N-Desmethyltramadol            213                     ng/mg creat    Source of tramadol is a prescription medication.    O-desmethyltramadol and N-desmethyltramadol are expected    metabolites of tramadol.  ====================================================================  Test                      Result    Flag   Units      Ref Range        Creatinine              109               mg/dL      >=20            ====================================================================  For clinical consultation, please call [phone].  ====================================================================  Analysis performed by [facility] [place] [address]     , No results found for: THC13, PCP13, COC13, MAMP13, OPI13, AMP13, BZO13, TCA13, MTD13, BAR13, OXY13, PPX13, BUP13     Processing:  Staff will hand deliver Rx to on-site pharmacy     [email or web address]       checked in past 3 months?  Yes 5/16/2019     Last [place] website verification:  5/16/19   [email or web address]

## 2019-05-30 NOTE — TELEPHONE ENCOUNTER
Routing refill request to provider for review/approval because:  Drug not on the G refill protocol     : 5..  Last refill 5. #15 R 0  Annual Controlled substance agreement: .  Annual drug screenin19    Leigha Amador RN

## 2019-05-31 RX ORDER — HYDROCODONE BITARTRATE AND ACETAMINOPHEN 10; 325 MG/1; MG/1
TABLET ORAL
Qty: 15 TABLET | Refills: 0 | Status: SHIPPED | OUTPATIENT
Start: 2019-05-31 | End: 2019-06-25

## 2019-06-06 DIAGNOSIS — G89.29 OTHER CHRONIC PAIN: ICD-10-CM

## 2019-06-06 NOTE — TELEPHONE ENCOUNTER
Pending Prescriptions:                       Disp   Refills    traMADol (ULTRAM) 50 MG tablet [Pharmacy M*60 tab*0        Sig: TAKE ONE TABLET BY MOUTH EVERY SIX HOURS AS NEEDED           FOR SEVERE PAIN    Routing refill request to provider for review/approval because:  Drug not on the [facility] refill protocol

## 2019-06-06 NOTE — TELEPHONE ENCOUNTER
Controlled Substance Refill Request for traMADol HCl Oral Tablet 50 MG  Problem List Complete:    No     PROVIDER TO CONSIDER COMPLETION OF PROBLEM LIST AND OVERVIEW/CONTROLLED SUBSTANCE AGREEMENT    Last Written Prescription Date:  05/16/19  Last Fill Quantity: 60,   # refills: 0    THE MOST RECENT OFFICE VISIT MUST BE WITHIN THE PAST 3 MONTHS. AT LEAST ONE FACE TO FACE VISIT MUST OCCUR EVERY 6 MONTHS. ADDITIONAL VISITS CAN BE VIRTUAL.  (THIS STATEMENT SHOULD BE DELETED.)    Last Office Visit with [facility] primary care provider: DR Oconnor    Future Office visit:     Controlled substance agreement:   Encounter-Level CSA - 07/17/2017:    Controlled Substance Agreement - Scan on 7/31/2017  9:32 PM: CONTROLLED SUBSTANCE AGREEMENT (below)       Encounter-Level CSA - 01/07/2015:    Controlled Substance Agreement - Scan on 1/8/2015  9:46 AM: [facility] Controlled Medication Agreement 1-7-15 (below)       Patient-Level CSA:    There are no patient-level csa.         Last Urine Drug Screen: No results found for: Brooke ARREAGA Drug Analysis UR   Date Value Ref Range Status   02/25/2019 FINAL  Final     Comment:     (Note)  ====================================================================  COMPREHENSIVE DRUG ANALYSIS,UR  ====================================================================  Test                             Result       Flag       Units        Drug Present   Tramadol                       1000                    ng/mg creat   O-Desmethyltramadol            1437                    ng/mg creat   N-Desmethyltramadol            213                     ng/mg creat    Source of tramadol is a prescription medication.    O-desmethyltramadol and N-desmethyltramadol are expected    metabolites of tramadol.  ====================================================================  Test                      Result    Flag   Units      Ref Range        Creatinine              109              mg/dL      >=20             ====================================================================  For clinical consultation, please call [phone].  ====================================================================  Analysis performed by [facility] [place] [address]     , No results found for: THC13, PCP13, COC13, MAMP13, OPI13, AMP13, BZO13, TCA13, MTD13, BAR13, OXY13, PPX13, BUP13     Processing:  Fax Rx to [facility] pharmacy     [email or web address]       checked in past 3 months?  No, route to RN

## 2019-06-07 RX ORDER — TRAMADOL HYDROCHLORIDE 50 MG/1
TABLET ORAL
Qty: 60 TABLET | Refills: 0 | Status: SHIPPED | OUTPATIENT
Start: 2019-06-07 | End: 2019-07-11

## 2019-06-21 DIAGNOSIS — J44.1 COPD EXACERBATION (H): ICD-10-CM

## 2019-06-21 RX ORDER — ALBUTEROL SULFATE 90 UG/1
AEROSOL, METERED RESPIRATORY (INHALATION)
Qty: 18 G | Refills: 1 | Status: SHIPPED | OUTPATIENT
Start: 2019-06-21 | End: 2019-09-20

## 2019-06-21 NOTE — TELEPHONE ENCOUNTER
6/21/19  Pt stated that she called and ordered the inhaler (VENTOLIN) on 6/6/19, have not heard anything wondering if it was approved,   I did not see that it was reordered     refill on RX  VENTOLIN  Pharmacy [facility]         VENTOLIN  (90 Base) MCG/ACT inhaler  1 ordered         Start: 2/12/2019  Ord/Sold: 2/12/2019 (O)  Report  Adh:   Taking:   Long-term:   Pharmacy: [id number] - [place], [place] - [address]  Med Dose History  Change       Summary: INHALE 2 PUFFS INTO THE LUNGS EVERY 6 HOURS AS NEEDED., Disp-18 g, R-1, E-Prescribe       Patient Sig: INHALE 2 PUFFS INTO THE LUNGS EVERY 6 HOURS AS NEEDED.       Ordered on: 2/12/2019       Authorized by: GABINO JONES       Dispense: 18 g

## 2019-06-21 NOTE — TELEPHONE ENCOUNTER
"6/6/19 refill was for tramadol only.   Requested Prescriptions   Pending Prescriptions Disp Refills     albuterol (VENTOLIN HFA) 108 (90 Base) MCG/ACT inhaler 18 g 1       Asthma Maintenance Inhalers - Anticholinergics Passed - 6/21/2019 12:10 PM        Passed - Patient is age 12 years or older        Passed - Asthma control assessment score within normal limits in last 6 months     Please review ACT score.           Passed - Medication is active on med list        Passed - Recent (6 mo) or future (30 days) visit within the authorizing provider's specialty     Patient had office visit in the last 6 months or has a visit in the next 30 days with authorizing provider or within the authorizing provider's specialty.  See \"Patient Info\" tab in inbasket, or \"Choose Columns\" in Meds & Orders section of the refill encounter.            Last Written Prescription Date:  2/12/19  Last Fill Quantity: 18 g,  # refills: 1   Last office visit: 2/25/2019 with prescribing provider: Dr. Oconnor   Future Office Visit:    Prescription approved per [facility] Refill Protocol.  Klever Redmond RN      "

## 2019-06-25 DIAGNOSIS — G89.29 OTHER CHRONIC PAIN: ICD-10-CM

## 2019-06-25 NOTE — TELEPHONE ENCOUNTER
"Requested Prescriptions   Pending Prescriptions Disp Refills     VENTOLIN  (90 Base) MCG/ACT inhaler [Pharmacy Med Name: Ventolin HFA Inhalation Aerosol Solution 108 (90 Base) MCG/ACT]  Last Written Prescription Date:  4/5/2018  Last Fill Quantity: 18 g,  # refills: 2   Last office visit: 10/19/2018 with prescribing provider:  Anastasia   Future Office Visit:     18 g 1     Sig: INHALE 2 PUFFS INTO THE LUNGS EVERY 6 HOURS AS NEEDED.    Asthma Maintenance Inhalers - Anticholinergics Failed - 2/9/2019 10:42 AM       Failed - Asthma control assessment score within normal limits in last 6 months    No flowsheet data found.           Passed - Patient is age 12 years or older       Passed - Medication is active on med list       Passed - Recent (6 mo) or future (30 days) visit within the authorizing provider's specialty    Patient had office visit in the last 6 months or has a visit in the next 30 days with authorizing provider or within the authorizing provider's specialty.  See \"Patient Info\" tab in inbasket, or \"Choose Columns\" in Meds & Orders section of the refill encounter.              " Patient was unable to make it to the appt yesterday and her mom was unsure of what to tell the daughter so she is calling to see what is going on with her dad

## 2019-06-26 RX ORDER — HYDROCODONE BITARTRATE AND ACETAMINOPHEN 10; 325 MG/1; MG/1
TABLET ORAL
Qty: 15 TABLET | Refills: 0 | Status: SHIPPED | OUTPATIENT
Start: 2019-06-26 | End: 2019-07-29

## 2019-06-26 NOTE — TELEPHONE ENCOUNTER
Routing refill request to provider to review approval because:  Drug not on the [facility], [facility] or [facility] refill protocol or controlled substance  CONFIRM START DATE    Gayle Broderick RN, BSN  Message handled by Nurse Triage.

## 2019-06-26 NOTE — TELEPHONE ENCOUNTER
Controlled Substance Refill Request for HYDROcodone-acetaminophen (NORCO)  MG per tablet  Problem List Complete:    No     PROVIDER TO CONSIDER COMPLETION OF PROBLEM LIST AND OVERVIEW/CONTROLLED SUBSTANCE AGREEMENT    Last Written Prescription Date:  5/31/2019  Last Fill Quantity: 15 tablet,   # refills: 0    THE MOST RECENT OFFICE VISIT MUST BE WITHIN THE PAST 3 MONTHS. AT LEAST ONE FACE TO FACE VISIT MUST OCCUR EVERY 6 MONTHS. ADDITIONAL VISITS CAN BE VIRTUAL.  (THIS STATEMENT SHOULD BE DELETED.)    Last Office Visit with [facility] primary care provider: 2/25/2019Anastasia    Future Office visit:     Controlled substance agreement:   Encounter-Level CSA - 07/17/2017:    Controlled Substance Agreement - Scan on 7/31/2017  9:32 PM: CONTROLLED SUBSTANCE AGREEMENT (below)       Encounter-Level CSA - 01/07/2015:    Controlled Substance Agreement - Scan on 1/8/2015  9:46 AM: [facility] Controlled Medication Agreement 1-7-15 (below)       Patient-Level CSA:    There are no patient-level csa.         Last Urine Drug Screen: No results found for: Brooke ARREAGA Drug Analysis UR   Date Value Ref Range Status   02/25/2019 FINAL  Final     Comment:     (Note)  ====================================================================  COMPREHENSIVE DRUG ANALYSIS,UR  ====================================================================  Test                             Result       Flag       Units        Drug Present   Tramadol                       1000                    ng/mg creat   O-Desmethyltramadol            1437                    ng/mg creat   N-Desmethyltramadol            213                     ng/mg creat    Source of tramadol is a prescription medication.    O-desmethyltramadol and N-desmethyltramadol are expected    metabolites of tramadol.  ====================================================================  Test                      Result    Flag   Units      Ref Range        Creatinine              109               mg/dL      >=20            ====================================================================  For clinical consultation, please call [phone].  ====================================================================  Analysis performed by [facility] [place] [address]     , No results found for: THC13, PCP13, COC13, MAMP13, OPI13, AMP13, BZO13, TCA13, MTD13, BAR13, OXY13, PPX13, BUP13     Processing:  Staff will hand deliver Rx to on-site pharmacy     [email or web address]       checked in past 3 months?  Yes 5/16/2019     Last [place] website verification:  5/16/19   [email or web address]

## 2019-06-26 NOTE — TELEPHONE ENCOUNTER
Called pt-lm prescription walked to [facility], 06/26/19    Teagan Llamas/KATRINA

## 2019-07-11 DIAGNOSIS — G89.29 OTHER CHRONIC PAIN: ICD-10-CM

## 2019-07-11 NOTE — TELEPHONE ENCOUNTER
Routing refill request to provider for review/approval because:  Drug not on the [facility] refill protocol     Last Written Prescription Date: 6/7/19  Last Fill Quantity: 60,  # refills:0  Last office visit: 2/25/2019 with prescribing provider:   Future Office Visit:   Next 5 appointments (look out 90 days)    Jul 29, 2019  1:05 PM CDT  Office visit with Emma Byrne MD, [facility] EXAM ROOM 07  [facility] [address] [place] [address] [phone]         Controlled Substance Refill Request for Ultram  Problem List Complete:  Yes   checked in past 3 months?  Yes 7/11/19      Patient is followed by Lars Oconnor MD for ongoing prescription of pain medication.  All refills should only be approved by this provider, or covering partner.    Medication(s): hydrocodone.   Maximum quantity per month: 20  Clinic visit frequency required: Q 3 months     Controlled substance agreement:  Encounter-Level CSA - 07/17/2017:          Controlled Substance Agreement - Scan on 7/31/2017  9:32 PM : CONTROLLED SUBSTANCE AGREEMENT (below)            Encounter-Level CSA - 07/17/2017:          Controlled Substance Agreement - Scan on 1/8/2015  9:46 AM : [facility] Controlled Medication Agreement 1-7-15 (below)              Pain Clinic evaluation in the past: No    DIRE Total Score(s):  No flowsheet data found.    Last [facility] website verification:  7/11/19: no concerns    [email or web address]    Reina Cormier RN Flex

## 2019-07-12 RX ORDER — TRAMADOL HYDROCHLORIDE 50 MG/1
TABLET ORAL
Qty: 60 TABLET | Refills: 0 | Status: SHIPPED | OUTPATIENT
Start: 2019-07-12 | End: 2019-08-04

## 2019-07-13 ENCOUNTER — TELEPHONE (OUTPATIENT)
Dept: FAMILY MEDICINE | Facility: CLINIC | Age: 64
End: 2019-07-13

## 2019-07-13 NOTE — TELEPHONE ENCOUNTER
Right foot pain after going up and down a ladder.  She states she feels like she has a stress fracture in her foot which she states she has had in the past.  Advised she be seen in urgent care to assess this but she states she has to work.  She does have a boot at home that she used from her previous stress fracture.  Pt can use this but should also be seen to assess foot sooner than later.    Pt expressed understanding and acceptance of the plan.  Pt had no further questions at this time.  Advised can call back to clinic at any time with concerns.       Damaso Colorado RN, BSN

## 2019-07-15 ENCOUNTER — OFFICE VISIT (OUTPATIENT)
Dept: URGENT CARE | Facility: URGENT CARE | Age: 64
End: 2019-07-15
Payer: COMMERCIAL

## 2019-07-15 ENCOUNTER — TELEPHONE (OUTPATIENT)
Dept: URGENT CARE | Facility: URGENT CARE | Age: 64
End: 2019-07-15

## 2019-07-15 ENCOUNTER — ANCILLARY PROCEDURE (OUTPATIENT)
Dept: GENERAL RADIOLOGY | Facility: CLINIC | Age: 64
End: 2019-07-15
Attending: FAMILY MEDICINE
Payer: COMMERCIAL

## 2019-07-15 ENCOUNTER — TELEPHONE (OUTPATIENT)
Dept: FAMILY MEDICINE | Facility: CLINIC | Age: 64
End: 2019-07-15

## 2019-07-15 VITALS
OXYGEN SATURATION: 93 % | HEART RATE: 78 BPM | HEIGHT: 59 IN | WEIGHT: 134.7 LBS | BODY MASS INDEX: 27.16 KG/M2 | DIASTOLIC BLOOD PRESSURE: 60 MMHG | SYSTOLIC BLOOD PRESSURE: 104 MMHG | TEMPERATURE: 97.1 F | RESPIRATION RATE: 16 BRPM

## 2019-07-15 DIAGNOSIS — M79.671 RIGHT FOOT PAIN: ICD-10-CM

## 2019-07-15 DIAGNOSIS — M79.671 RIGHT FOOT PAIN: Primary | ICD-10-CM

## 2019-07-15 DIAGNOSIS — G89.29 OTHER CHRONIC PAIN: ICD-10-CM

## 2019-07-15 DIAGNOSIS — S93.601A SPRAIN OF RIGHT FOOT, INITIAL ENCOUNTER: ICD-10-CM

## 2019-07-15 PROCEDURE — 99213 OFFICE O/P EST LOW 20 MIN: CPT | Performed by: FAMILY MEDICINE

## 2019-07-15 PROCEDURE — 73630 X-RAY EXAM OF FOOT: CPT | Mod: RT

## 2019-07-15 ASSESSMENT — PAIN SCALES - GENERAL: PAINLEVEL: SEVERE PAIN (6)

## 2019-07-15 ASSESSMENT — MIFFLIN-ST. JEOR: SCORE: 1061.63

## 2019-07-15 NOTE — TELEPHONE ENCOUNTER
"S: Foot Pain     B:     A:   Onset: Foot pain  Location: Very top and around by toes     A lot painting going up and down ladders    Possibly went on/off ladder incorrectly     Pt can walk, but it is  \"hell to walk\"     Good feeling in toes, denies numbness/tingling, discoloration     R: Urgent Care for evaluation (no clinic appt's available)                 "

## 2019-07-15 NOTE — TELEPHONE ENCOUNTER
Reason for call:  Other   Patient called regarding (reason for call): letter   Additional comments:Patient stated that she has a letter from the urgent care provider sitting at the . She would like it to be faxed to her work place, which is a pharmacy. Fax number is [phone]. Please call her to confirm once this has been faxed today.    Phone number to reach patient:  Home number on file [phone] (home)    Best Time:  any    Can we leave a detailed message on this number?  YES     Monica GARCÍA  Central Scheduler

## 2019-07-15 NOTE — LETTER
[facility]  [address]  [facility] [address]  [phone]      July 24, 2019    RE:  Emeli Granados                                                                                                                                                       [address] [facility] [address]            To whom it may concern:    Emeli Granados is under my professional care for    Right foot pain  Sprain of right foot, initial encounter.   She  may return to work with the following:    Light duty- unable to stand more than 2 hours continuously, After 2 hours she needs at least 15 minutes without weight bearing.  May wear foot splint at work.  Restrictions for 3 weeks,  July 16-August 5.          Sincerely,        Randi Swenson MD    [facility]

## 2019-07-15 NOTE — TELEPHONE ENCOUNTER
Controlled Substance Refill Request for HYDROcodone-acetaminophen (NORCO)  MG per tablet  Problem List Complete:    No     PROVIDER TO CONSIDER COMPLETION OF PROBLEM LIST AND OVERVIEW/CONTROLLED SUBSTANCE AGREEMENT    Last Written Prescription Date:  06/26/19  Last Fill Quantity: 15,   # refills: 0    THE MOST RECENT OFFICE VISIT MUST BE WITHIN THE PAST 3 MONTHS. AT LEAST ONE FACE TO FACE VISIT MUST OCCUR EVERY 6 MONTHS. ADDITIONAL VISITS CAN BE VIRTUAL.  (THIS STATEMENT SHOULD BE DELETED.)    Last Office Visit with [facility] primary care provider: Dr Oconnor    Future Office visit:   Next 5 appointments (look out 90 days)    Jul 29, 2019  1:05 PM CDT  Office visit with Emma Byrne MD, CR EXAM ROOM 07  [facility] [address] [place] [id number] [phone]          Controlled substance agreement:   Encounter-Level CSA - 07/17/2017:    Controlled Substance Agreement - Scan on 7/31/2017  9:32 PM: CONTROLLED SUBSTANCE AGREEMENT (below)       Encounter-Level CSA - 01/07/2015:    Controlled Substance Agreement - Scan on 1/8/2015  9:46 AM: [facility] Controlled Medication Agreement 1-7-15 (below)       Patient-Level CSA:    There are no patient-level csa.         Last Urine Drug Screen: No results found for: Brooke ARREAGA Drug Analysis UR   Date Value Ref Range Status   02/25/2019 FINAL  Final     Comment:     (Note)  ====================================================================  COMPREHENSIVE DRUG ANALYSIS,UR  ====================================================================  Test                             Result       Flag       Units        Drug Present   Tramadol                       1000                    ng/mg creat   O-Desmethyltramadol            1437                    ng/mg creat   N-Desmethyltramadol            213                     ng/mg creat    Source of tramadol is a prescription medication.     O-desmethyltramadol and N-desmethyltramadol are expected    metabolites of tramadol.  ====================================================================  Test                      Result    Flag   Units      Ref Range        Creatinine              109              mg/dL      >=20            ====================================================================  For clinical consultation, please call [phone].  ====================================================================  Analysis performed by [facility] [place] [address]     , No results found for: THC13, PCP13, COC13, MAMP13, OPI13, AMP13, BZO13, TCA13, MTD13, BAR13, OXY13, PPX13, BUP13     Processing:  E-Scribe to [facility]     [email or web address]       checked in past 3 months?  No, route to RN

## 2019-07-16 DIAGNOSIS — G89.29 OTHER CHRONIC PAIN: ICD-10-CM

## 2019-07-16 RX ORDER — HYDROCODONE BITARTRATE AND ACETAMINOPHEN 10; 325 MG/1; MG/1
TABLET ORAL
Qty: 15 TABLET | Refills: 0 | Status: SHIPPED | OUTPATIENT
Start: 2019-07-16 | End: 2019-08-07

## 2019-07-16 NOTE — TELEPHONE ENCOUNTER
Dr. Oconnor,    Please review/sign or advise for refill of HYDROcodone-acetaminophen (NORCO)  MG per tablet.    Prescription last written on 06/26/19 for #15. Writer not added as  delegate, so unable to look up info.    Ashlyn Talbert RN  [facility]

## 2019-07-16 NOTE — PROGRESS NOTES
SUBJECTIVE  Chief Complaint   Patient presents with     Urgent Care     Foot Pain     Was going up and down ladders last week fixing/painting house.  Now has pain in right foot on top right side x 4d  Feels like stress fx like she's had in the past.       Emeli Granados is a 64 year old female  who has had a right foot pain  that started  4 days ago.  No known injury.  She has had to stand and climb ladders more than usual, but no fall or twisting of her foot  . Symptoms have been gradual  . Prior history of related problems: She had past stress fractures of bilateral feet without specific trauma with prolonged treatment to accomplish healing ( about 10 years ago).  Has not had foot problems  In past years, but current symptoms similar to past stress fractures. .      Past Medical History:   Diagnosis Date     Acute hepatitis C without mention of hepatic coma(070.51) 1996    Dr. 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     Diverticula of colon 2014     Malignant neoplasm of endocervix (H) 1988    took uterus and left the ovaries     Other and unspecified alcohol dependence, unspecified drinking behavior     sober since 1999     Polycythemia 8/8/2012     Smoker      Unspecified disorder of esophagus     stricture - has had it dilated     Unspecified hemorrhoids without mention of complication      Patient Active Problem List   Diagnosis     Disorder of bone and cartilage     CARDIOVASCULAR SCREENING; LDL GOAL LESS THAN 130     Acute hepatitis C virus infection     Obstructive chronic bronchitis with exacerbation (H)     Osteopenia     Back pain     Alcohol dependence in remission (H)     Moderate persistent asthma with exacerbation     Other chronic pain       ALLERGIES:  Metaproterenol sulfate and Percocet [oxycodone-acetaminophen]      Current Outpatient Medications on File Prior to Visit:  albuterol (2.5 MG/3ML) 0.083%  neb solution INHALE 3 MILLILITERS (2.5 MG) BY NEBULIZATION ROUTE EVERY 6 HOURS AS NEEDED FOR SHORTNESS OF BREATH/DYSPNEA OR WHEEZING   albuterol (VENTOLIN HFA) 108 (90 Base) MCG/ACT inhaler INHALE 2 PUFFS INTO THE LUNGS EVERY 6 HOURS AS NEEDED.   ascorbic acid (VITAMIN C) 1000 MG TABS Take 1,000 mg by mouth daily   cholecalciferol (VITAMIN D3) 1000 UNIT tablet Take 1,000 Units by mouth daily   cyclobenzaprine (FLEXERIL) 10 MG tablet TAKE HALF TO ONE TABLET BY MOUTH THREE TIMES DAILY AS NEEDED FOR MUSCLE SPASMS.   HYDROcodone-acetaminophen (NORCO)  MG per tablet TAKE ONE TABLET BY MOUTH Daily as needed for severe pain NEEDS MD VISIT BEFORE NEXT REFILL   ipratropium - albuterol 0.5 mg/2.5 mg/3 mL (DUONEB) 0.5-2.5 (3) MG/3ML neb solution Take 1 vial (3 mLs) by nebulization every 6 hours as needed for shortness of breath / dyspnea or wheezing   ipratropium - albuterol 0.5 mg/2.5 mg/3 mL (DUONEB) 0.5-2.5 (3) MG/3ML neb solution Take 1 vial (3 mLs) by nebulization every 6 hours as needed for shortness of breath / dyspnea or wheezing   ipratropium - albuterol 0.5 mg/2.5 mg/3 mL (DUONEB) 0.5-2.5 (3) MG/3ML nebulization Take 1 vial (3 mLs) by nebulization every 4 hours as needed for shortness of breath / dyspnea or wheezing   Multiple Vitamins-Minerals (MULTIVITAMIN OR) Take 1 tablet by mouth daily Per pt, uses ones without Iron   omeprazole (PRILOSEC) 40 MG DR capsule Take 1 capsule (40 mg) by mouth daily   order for DME Equipment being ordered: Nebulizer   traMADol (ULTRAM) 50 MG tablet TAKE ONE TABLET BY MOUTH EVERY SIX HOURS AS NEEDED FOR PAIN    vitamin E 400 UNITS TABS Take 400 Units by mouth daily   zolpidem (AMBIEN) 5 MG tablet TAKE ONE TABLET BY MOUTH EVERY NIGHT AS NEEDED FOR SLEEP   [] amoxicillin (AMOXIL) 500 MG capsule Take 1 capsule (500 mg) by mouth 2 times daily for 10 days   nicotine (NICODERM CQ) 7 MG/24HR patch 2h hr Place 1 patch onto the skin every 24 hours (Patient not taking: Reported on  "7/15/2019)     No current facility-administered medications on file prior to visit.     Social History     Tobacco Use     Smoking status: Current Every Day Smoker     Packs/day: 0.50     Years: 30.00     Pack years: 15.00     Types: Cigarettes     Start date: 10/13/1967     Smokeless tobacco: Never Used     Tobacco comment: has patches, trying to quit 10/30/2017   Substance Use Topics     Alcohol use: No     Comment: sober since 9/11/99       Family History   Problem Relation Age of Onset     Hypertension Mother      Cerebrovascular Disease Mother         TIA's     Depression Mother      Cancer - colorectal Maternal Grandmother         dx at 65     Lipids Father      C.A.D. Father         angioplasty at 65     Musculoskeletal Disorder Father      Alcohol/Drug Daughter         in remission     Breast Cancer No family hx of          ROS:  CONSTITUTIONAL:NEGATIVE for fever, chills,    INTEGUMENTARY/SKIN: NEGATIVE for worrisome rashes,   or lesions  NEURO: NEGATIVE for weakness, dizziness or paresthesias    OBJECTIVE:  /60 (BP Location: Right arm, Patient Position: Sitting, Cuff Size: Adult Regular)   Pulse 78   Temp 97.1  F (36.2  C) (Tympanic)   Resp 16   Ht 1.499 m (4' 11\")   Wt 61.1 kg (134 lb 11.2 oz)   LMP  (LMP Unknown)   SpO2 93%   Breastfeeding? No   BMI 27.21 kg/m    Appearance: alert and cooperative. Moderate distress, limping due to right foot pain    right FOOT  No swelling, contusion or pain with ROM of the ankle  No pain with ROM of toes  Sensation and movement  intact all toes, capillary refill 2 second  Tenderness to palpation  4th/ 5th metatarsal/ MTP  region of the foot, dorsally, not on plantar side  no  swelling dorsum of foot  No contusion/ bruising of foot      X-ray: right foot no fracture or dislocation noted, pending review by Radiologist.   x-ray read by me Randi Swenson MD    ASSESSMENT:  Right foot pain     - XR Foot Right G/E 3 Views; Future  - order for DME; Cam walker boot " right-  Low boot    Sprain of right foot, initial encounter     - order for DME; Cam walker boot right-  Low boot     We discussed the possible causes of the patient's musculoskeletal pain  ,  Including pain due to contusion of of muscle and other soft tissues,  Muscle strain,  Sprain of ligaments and/ or joint capsule,    Stress/ strain to tendons,      X-ray was performed with findings of no fracture , no noted chronic degenerative changes or  other bone abnormalities.       warm packs to encourage blood flow to the painful region  Use cane   to limit weight bearing if pain is causing problems with ambulating  Ibuprofen/ acetaminophen for alternative for pain  Cam walker splint to help immobilize the foot  Follow-up with primary care or Ortho or podiatry if persistent symptoms    Note for work

## 2019-07-16 NOTE — TELEPHONE ENCOUNTER
Controlled Substance Refill Request for HYDROcodone-acetaminophen (NORCO)  MG per tablet  Problem List Complete:    No     PROVIDER TO CONSIDER COMPLETION OF PROBLEM LIST AND OVERVIEW/CONTROLLED SUBSTANCE AGREEMENT    Last Written Prescription Date:  7/16/2019  Last Fill Quantity: 15 tablet,   # refills: 0    THE MOST RECENT OFFICE VISIT MUST BE WITHIN THE PAST 3 MONTHS. AT LEAST ONE FACE TO FACE VISIT MUST OCCUR EVERY 6 MONTHS. ADDITIONAL VISITS CAN BE VIRTUAL.  (THIS STATEMENT SHOULD BE DELETED.)    Last Office Visit with [facility] primary care provider: 2/25/2019Anastasia    Future Office visit:   Next 5 appointments (look out 90 days)    Jul 29, 2019  1:05 PM CDT  Office visit with Emma Byrne MD, CR EXAM ROOM 07  [facility] [address] [place] [id number] [phone]          Controlled substance agreement:   Encounter-Level CSA - 07/17/2017:    Controlled Substance Agreement - Scan on 7/31/2017  9:32 PM: CONTROLLED SUBSTANCE AGREEMENT (below)       Encounter-Level CSA - 01/07/2015:    Controlled Substance Agreement - Scan on 1/8/2015  9:46 AM: [facility] Controlled Medication Agreement 1-7-15 (below)       Patient-Level CSA:    There are no patient-level csa.         Last Urine Drug Screen: No results found for: Brooke ARREAGA Drug Analysis UR   Date Value Ref Range Status   02/25/2019 FINAL  Final     Comment:     (Note)  ====================================================================  COMPREHENSIVE DRUG ANALYSIS,UR  ====================================================================  Test                             Result       Flag       Units        Drug Present   Tramadol                       1000                    ng/mg creat   O-Desmethyltramadol            1437                    ng/mg creat   N-Desmethyltramadol            213                     ng/mg creat    Source of tramadol is a prescription medication.     O-desmethyltramadol and N-desmethyltramadol are expected    metabolites of tramadol.  ====================================================================  Test                      Result    Flag   Units      Ref Range        Creatinine              109              mg/dL      >=20            ====================================================================  For clinical consultation, please call [phone].  ====================================================================  Analysis performed by [facility] [place] [address]     , No results found for: THC13, PCP13, COC13, MAMP13, OPI13, AMP13, BZO13, TCA13, MTD13, BAR13, OXY13, PPX13, BUP13     Processing:  Staff will hand deliver Rx to on-site pharmacy     [email or web address]       checked in past 3 months?  Yes 7/11/2019

## 2019-07-17 RX ORDER — HYDROCODONE BITARTRATE AND ACETAMINOPHEN 10; 325 MG/1; MG/1
TABLET ORAL
Refills: 0 | COMMUNITY
Start: 2019-07-17 | End: 2019-07-29

## 2019-07-23 NOTE — TELEPHONE ENCOUNTER
Patient calling stating she needs additional note/letter to say,  she needs to rest her foot more than her two fifteen minute breaks that she gets @ work for 2 weeks 7/15/19 -7/26/19. Please call patient when complete or if questions. Cell # [phone]. Ruth Behrens.

## 2019-07-24 NOTE — TELEPHONE ENCOUNTER
Called mobile number-  Left message on voice mail that I will try to reach her tomorrow to get a note for work    Randi Swenson

## 2019-07-29 ENCOUNTER — OFFICE VISIT (OUTPATIENT)
Dept: FAMILY MEDICINE | Facility: CLINIC | Age: 64
End: 2019-07-29
Payer: COMMERCIAL

## 2019-07-29 VITALS
WEIGHT: 133 LBS | HEIGHT: 59 IN | SYSTOLIC BLOOD PRESSURE: 104 MMHG | OXYGEN SATURATION: 92 % | DIASTOLIC BLOOD PRESSURE: 71 MMHG | BODY MASS INDEX: 26.81 KG/M2 | HEART RATE: 81 BPM | TEMPERATURE: 98.6 F | RESPIRATION RATE: 16 BRPM

## 2019-07-29 DIAGNOSIS — Z87.898 HISTORY OF LUMP OF RIGHT BREAST: ICD-10-CM

## 2019-07-29 DIAGNOSIS — Z87.891 PERSONAL HISTORY OF TOBACCO USE: ICD-10-CM

## 2019-07-29 DIAGNOSIS — G89.29 OTHER CHRONIC PAIN: ICD-10-CM

## 2019-07-29 DIAGNOSIS — J44.1 COPD EXACERBATION (H): Primary | ICD-10-CM

## 2019-07-29 LAB
FEF 25/75: 25
FEV-1: 58
FEV1/FVC: 78
FVC: 74

## 2019-07-29 PROCEDURE — 94010 BREATHING CAPACITY TEST: CPT | Performed by: FAMILY MEDICINE

## 2019-07-29 PROCEDURE — 90670 PCV13 VACCINE IM: CPT | Performed by: FAMILY MEDICINE

## 2019-07-29 PROCEDURE — 99214 OFFICE O/P EST MOD 30 MIN: CPT | Mod: 25 | Performed by: FAMILY MEDICINE

## 2019-07-29 PROCEDURE — 90471 IMMUNIZATION ADMIN: CPT | Performed by: FAMILY MEDICINE

## 2019-07-29 ASSESSMENT — ANXIETY QUESTIONNAIRES
7. FEELING AFRAID AS IF SOMETHING AWFUL MIGHT HAPPEN: NOT AT ALL
7. FEELING AFRAID AS IF SOMETHING AWFUL MIGHT HAPPEN: NOT AT ALL
GAD7 TOTAL SCORE: 0
1. FEELING NERVOUS, ANXIOUS, OR ON EDGE: NOT AT ALL
2. NOT BEING ABLE TO STOP OR CONTROL WORRYING: NOT AT ALL
GAD7 TOTAL SCORE: 0
6. BECOMING EASILY ANNOYED OR IRRITABLE: NOT AT ALL
3. WORRYING TOO MUCH ABOUT DIFFERENT THINGS: NOT AT ALL
5. BEING SO RESTLESS THAT IT IS HARD TO SIT STILL: NOT AT ALL
GAD7 TOTAL SCORE: 0
4. TROUBLE RELAXING: NOT AT ALL

## 2019-07-29 ASSESSMENT — MIFFLIN-ST. JEOR: SCORE: 1053.91

## 2019-07-29 ASSESSMENT — PATIENT HEALTH QUESTIONNAIRE - PHQ9
10. IF YOU CHECKED OFF ANY PROBLEMS, HOW DIFFICULT HAVE THESE PROBLEMS MADE IT FOR YOU TO DO YOUR WORK, TAKE CARE OF THINGS AT HOME, OR GET ALONG WITH OTHER PEOPLE: NOT DIFFICULT AT ALL
SUM OF ALL RESPONSES TO PHQ QUESTIONS 1-9: 0
SUM OF ALL RESPONSES TO PHQ QUESTIONS 1-9: 0

## 2019-07-29 NOTE — PATIENT INSTRUCTIONS

## 2019-07-29 NOTE — LETTER
[facility]  07/29/19    Patient: Emeli Granados  YOB: 1955  Medical Record Number: [id number]                                                                  Opioid / Opioid Plus Controlled Substance Agreement    I understand that my care provider has prescribed an opioid (narcotic) controlled substance to help manage my condition(s). I am taking this medicine to help me function or work. I know this is strong medicine, and that it can cause serious side effects. Opioid medicine can be sedating, addicting and may cause a dependency on the drug. They can affect my ability to drive or think, and cause depression. They need to be taken exactly as prescribed. Combining opioids with certain medicines or chemicals (such as cocaine, sedatives and tranquilizers, sleeping pills, meth) can be dangerous or even fatal. Also, if I stop opioids suddenly, I may have severe withdrawal symptoms. Last, I understand that opioids do not work for all types of pain nor for all patients. If not helpful, I may be asked to stop them.        The risks, benefits, and side effects of these medicine(s) were explained to me. I agree that:    1. I will take part in other treatments as advised by my care team. This may be psychiatry or counseling, physical therapy, behavioral therapy, group treatment or a referral to a pain clinic. I will reduce or stop my medicine when my care team tells me to do so.  2. I will take my medicines as prescribed. I will not change the dose or schedule unless my care team tells me to. There will be no refills if I  run out early.   I may be contactedwithout warning and asked to complete a urine drug test or pill count at any time.   3. I will keep all my appointments, and understand this is part of the monitoring of opioids. My care team may require an office visit for EVERY opioid/controlled substance refill. If I miss appointments or don t follow instructions, my care team may  stop my medicine.  4. I will not ask other providers to prescribe controlled substances, and I will not accept controlled substances from other people. If I need another prescribed controlled substance for a new reason, I will tell my care team within 1 business day.  5. I will use one pharmacy to fill all of my controlled substance prescriptions, and it is up to me to make sure that I do not run out of my medicines on weekends or holidays. If my care team is willing to refill my opioid prescription without a visit, I must request refills only during office hours, refills may take up to 3 days to process, and it may take up to 5 to 7 days for my medicine to be mailed and ready at my pharmacy. Prescriptions will not be mailed anywhere except my pharmacy.        [id number]  Rev 12/18         Registration to scan to EHR                             Page 1 of 2               Controlled Substance Agreement Opioid        [facility]  07/29/19  Patient: Emeli Granados  YOB: 1955  Medical Record Number: [id number]                                                                  6. I am responsible for my prescriptions. If the medicine/prescription is lost or stolen, it will not be replaced. I also agree not to share controlled substance medicines with anyone.  7. I agree to not use ANY illegal or recreational drugs. This includes marijuana, cocaine, bath salts or other drugs. I agree not to use alcohol unless my care team says I may.          I agree to give urine samples whenever asked. If I don t give a urine sample, the care team may stop my medicine.    8. If I enroll in the [place] Medical Marijuana program, I will tell my care team. I will also sign an agreement to share my medical records with my care team.   9. I will bring in my list of medicines (or my medicine bottles) each time I come to the clinic.   10. I will tell my care team right away if I become pregnant or have a new medical  problem treated outside of my regular clinic.  11. I understand that this medicine can affect my thinking and judgment. It may be unsafe for me to drive, use machinery and do dangerous tasks. I will not do any of these things until I know how the medicine affects me. If my dose changes, I will wait to see how it affects me. I will contact my care team if I have concerns about medicine side effects.    I understand that if I do not follow any of the conditions above, my prescriptions or treatment may be stopped.      I agree that my provider, clinic care team, and pharmacy may work with any city, state or federal law enforcement agency that investigates the misuse, sale, or other diversion of my controlled medicine. I will allow my provider to discuss my care with or share a copy of this agreement with any other treating provider, pharmacy or emergency room where I receive care. I agree to give up (waive) any right of privacy or confidentiality with respect to these consents.     I have read this agreement and have asked questions about anything I did not understand.      ________________________________________________________________________  Patient signature - Date/Time -  Emeli Granados                                      ________________________________________________________________________  Witness signature                                                            ________________________________________________________________________  Provider signature - Emma Byrne MD      [id number]  Rev 12/18         Registration to scan to EHR                         Page 2 of 2                   Controlled Substance Agreement Opioid           Page 1 of 2  Opioid Pain Medicines (also known as Narcotics)  What You Need to Know    What are opioids?   Opioids are pain medicines that must be prescribed by a doctor.  They are also known as narcotics.    Examples are:     morphine (MS Contin, Marlyn)    oxycodone  (Oxycontin)    oxycodone and acetaminophen (Percocet)    hydrocodone and acetaminophen (Vicodin, Norco)     fentanyl patch (Duragesic)     hydromorphone (Dilaudid)     methadone     What do opioids do well?   Opioids are best for short-term pain after a surgery or injury. They also work well for cancer pain. Unlike other pain medicines, they do not cause liver or kidney failure or ulcers. They may help some people with long-lasting (chronic) pain.     What do opioids NOT do well?   Opioids never get rid of pain entirely, and they do not work well for most patients with chronic pain. Opioids do not reduce swelling, one of the causes of pain. They also don t work well for nerve pain.                           For informational purposes only.  Not to replace the advice of your care provider.  Copyright [facility]. All right reserved. [employer] 535464-[id number] 02/18.      Page 2 of 2    Risks and side effects   Talk to your doctor before you start or decide to keep taking one of these medicines. Side effects include:    Lowering your breathing rate enough to cause death    Overdose, including death, especially if taking higher than prescribed doses    Long-term opioid use    Worse depression symptoms; less pleasure in things you usually enjoy    Feeling tired or sluggish    Slower thoughts or cloudy thinking    Being more sensitive to pain over time; pain is harder to control    Trouble sleeping or restless sleep    Changes in hormone levels (for example, less testosterone)    Changes in sex drive or ability to have sex    Constipation    Unsafe driving    Itching and sweating    Feeling dizzy    Nausea, vomiting and dry mouth    What else should I know about opioids?  When someone takes opioids for too long or too often, they become dependent. This means that if you stop or reduce the medicine too quickly, you will have withdrawal symptoms.    Dependence is not the same as addiction. Addiction is when  people keep using a substance that harms their body, their mind or their relations with others. If you have a history of drug or alcohol abuse, taking opioids can cause a relapse.    Over time, opioids don t work as well. Most people will need higher and higher doses. The higher the dose, the more serious the side effects. We don t know the long-term effects of opioids.      Prescribed opioids aren't the best way to manage chronic pain    Other ways to manage pain include:      Ibuprofen or acetaminophen.  You should always try this first.      Treat health problems that may be causing pain.      acupuncture or massage, deep breathing, meditation, visual imagery, aromatherapy.      Use heat or ice at the pain site      Physical therapy and exercise      Stop smoking      See a counselor or therapist                                                  People who have used opioids for a long time may have a lower quality of life, worse depression, higher levels of pain and more visits to doctors.    Never share your opioids with others. Be sure to store opioids in a secure place, locked if possible.Young children can easily swallow them and overdose.     You can overdose on opioids.  Signs of overdose include decrease or loss of consciousness, slowed breathing, trouble waking and blue lips.  If someone is worried about overdose, they should call 911.    If you are at risk for overdose, you may get naloxone (Narcan, a medicine that reverses the effects of opioids.  If you overdose, a friend or family member can give you Narcan while waiting for the ambulance.  They need to know the signs of overdose and how to give Narcan.    While you're taking opioids:    Don't use alcohol or street drugs. Taking them together can cause death.    Don't take any of these medicines unless your doctor says its okay.  Taking these with opioids can cause death.    Benzodiazepines (such as lorazepam         or diazepam)    Muscle relaxers  (such as cyclobenzaprine)    sleeping pills    other opioids    Safe disposal of opioids  Find your area drug take-back program, your pharmacy mail-back program, buy a special disposal bag (such as Deterra) from your pharmacy or flush them down the toilet.  Use the guidelines at:  www.fda.gov/drugs/resourcesforyou

## 2019-07-29 NOTE — PROGRESS NOTES
Subjective     Emeli Granados is a 64 year old female who presents to clinic today for the following health issues:    History of Present Illness        She eats 2-3 servings of fruits and vegetables daily.She consumes 4 sweetened beverage(s) daily.  She is taking medications regularly.     Emeli Granados is here with lumps for the last month.   They do not hurt.   One is on her left shoulder where her bra strap crosses and one is in the right upper breast.       Her breathing feels fine right now.   She feels that since she got her pneuonia vaccine 4 years ago she has been much healthier.       She has chronic pain and sees razia for this.   She gets about 15-20 norco per month for this.   Her last CSA was 2017.       Past Medical History:   Diagnosis Date     Acute hepatitis C without mention of hepatic coma(070.51) 1996    Dr. 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     Diverticula of colon 2014     Malignant neoplasm of endocervix (H) 1988    took uterus and left the ovaries     Other and unspecified alcohol dependence, unspecified drinking behavior     sober since 1999     Polycythemia 8/8/2012     Smoker      Unspecified disorder of esophagus     stricture - has had it dilated     Unspecified hemorrhoids without mention of complication        Past Surgical History:   Procedure Laterality Date     C NONSPECIFIC PROCEDURE  1988    hysterectomy-uterine & cervical ca - tubes and ovaries are still in - do NOT need paps     C NONSPECIFIC PROCEDURE      ganglion cyst removal     C NONSPECIFIC PROCEDURE      right thumb surgery     C NONSPECIFIC PROCEDURE  2003    dilatation of the esophagus once due to dysphagia and stricture     HC COLONOSCOPY THRU STOMA, DIAGNOSTIC  2004, 2012    repeat in 2022     HC HEMORRHOIDECTOMY W BANDING/LIGATION      Hemorrhoidectomy     HC REMOVAL OF TONSILS,<10 Y/O      Tonsils <11y.o.      HYSTERECTOMY, PAP NO LONGER INDICATED       LAPAROSCOPIC SALPINGO-OOPHORECTOMY Bilateral 10/23/2015    Procedure: LAPAROSCOPIC SALPINGO-OOPHORECTOMY;  Surgeon: Johnathan Steve MD;  Location:  OR       MEDICATIONS:  Current Outpatient Medications   Medication     albuterol (2.5 MG/3ML) 0.083% neb solution     albuterol (VENTOLIN HFA) 108 (90 Base) MCG/ACT inhaler     ascorbic acid (VITAMIN C) 1000 MG TABS     cholecalciferol (VITAMIN D3) 1000 UNIT tablet     cyclobenzaprine (FLEXERIL) 10 MG tablet     HYDROcodone-acetaminophen (NORCO)  MG per tablet     ipratropium - albuterol 0.5 mg/2.5 mg/3 mL (DUONEB) 0.5-2.5 (3) MG/3ML neb solution     ipratropium - albuterol 0.5 mg/2.5 mg/3 mL (DUONEB) 0.5-2.5 (3) MG/3ML neb solution     ipratropium - albuterol 0.5 mg/2.5 mg/3 mL (DUONEB) 0.5-2.5 (3) MG/3ML nebulization     Multiple Vitamins-Minerals (MULTIVITAMIN OR)     nicotine (NICODERM CQ) 7 MG/24HR patch 2h hr     omeprazole (PRILOSEC) 40 MG DR capsule     order for DME     order for DME     traMADol (ULTRAM) 50 MG tablet     vitamin E 400 UNITS TABS     zolpidem (AMBIEN) 5 MG tablet     No current facility-administered medications for this visit.        SOCIAL HISTORY:  Social History     Tobacco Use     Smoking status: Current Every Day Smoker     Packs/day: 0.50     Years: 30.00     Pack years: 15.00     Types: Cigarettes     Start date: 10/13/1967     Smokeless tobacco: Never Used     Tobacco comment: has patches, trying to quit 10/30/2017   Substance Use Topics     Alcohol use: No     Comment: sober since 9/11/99       Family History   Problem Relation Age of Onset     Hypertension Mother      Cerebrovascular Disease Mother         TIA's     Depression Mother      Cancer - colorectal Maternal Grandmother         dx at 65     Lipids Father      C.A.D. Father         angioplasty at 65     Musculoskeletal Disorder Father      Alcohol/Drug Daughter         in remission     Breast Cancer No family hx of   "             Review of Systems   ROS COMP: Constitutional, HEENT, cardiovascular, pulmonary, gi and gu systems are negative, except as otherwise noted.      Objective    /71 (BP Location: Right arm, Patient Position: Chair, Cuff Size: Adult Regular)   Pulse 81   Temp 98.6  F (37  C) (Oral)   Resp 16   Ht 1.499 m (4' 11\")   Wt 60.3 kg (133 lb)   LMP  (LMP Unknown)   SpO2 92%   BMI 26.86 kg/m    Body mass index is 26.86 kg/m .  Physical Exam   GENERAL: healthy, alert and no distress  EYES: Eyes grossly normal to inspection, PERRL and conjunctivae and sclerae normal  HENT: ear canals and TM's normal, nose and mouth without ulcers or lesions  NECK: no adenopathy, no asymmetry, masses, or scars and thyroid normal to palpation  RESP: some inspiratory rales at bases both sides - clear with cough  BREAST: normal without masses, tenderness or nipple discharge and right breast - very superior and center is 2-3 cm mobile soft and nontender subQ mass - skin is normal color  CV: regular rate and rhythm, normal S1 S2, no S3 or S4, no murmur, click or rub, no peripheral edema and peripheral pulses strong  ABDOMEN: soft, nontender, no hepatosplenomegaly, no masses and bowel sounds normal  MS: no gross musculoskeletal defects noted, no edema  SKIN: no suspicious lesions or rashes  NEURO: Normal strength and tone, mentation intact and speech normal  PSYCH: mentation appears normal, affect normal/bright  LYMPH: no cervical, supraclavicular, axillary, or inguinal adenopathy    Diagnostic Test Results:  Labs reviewed in Epic  Spirometry from 2015 shows severe obstruction - patient states it was done when she was sick        Assessment:  1. COPD - stable and at baseline  2. Smoker  3. Right breast lump - could be lipoma  4. Chronic pain - gets 20 per month      Plan:  1. PCV 13 shot  2. Will get baseline spirometry - last one 4 years ago  3. Had her sign controlled substance agreement - update  4. Ordered yearly low dose " chest CT  5. Will get diagnostic mammo      Lung Cancer Screening Shared Decision Making Visit     Emeli Granados is eligible for lung cancer screening on the basis of the information provided in my signed lung cancer screening order.     I have discussed with patient the risks and benefits of screening for lung cancer with low-dose CT.     The risks include:  radiation exposure: one low dose chest CT has as much ionizing radiation as about 15 chest x-rays or 6 months of background radiation living in [place]    false positives: 96% of positive findings/nodules are NOT cancer, but some might still require additional diagnostic evaluation, including biopsy  over-diagnosis: some slow growing cancers that might never have been clinically significant will be detected and treated unnecessarily     The benefit of early detection of lung cancer is contingent upon adherence to annual screening or more frequent follow up if indicated.     Furthermore, reaping the benefits of screening requires Emeli Granados to be willing and physically able to undergo diagnostic procedures, if indicated. Although no specific guide is available for determining severity of comorbidities, it is reasonable to withhold screening in patients who have greater mortality risk from other diseases.     We did discuss that the only way to prevent lung cancer is to not smoke. Smoking cessation assistance was offered.    I did not offer risk estimation using a calculator such as this one:    ShouldIScreen  Answers for HPI/ROS submitted by the patient on 7/29/2019   Chronic problems general questions HPI Form  If you checked off any problems, how difficult have these problems made it for you to do your work, take care of things at home, or get along with other people?: Not difficult at all  PHQ9 TOTAL SCORE: 0  HAMLET 7 TOTAL SCORE: 0

## 2019-07-30 ASSESSMENT — PATIENT HEALTH QUESTIONNAIRE - PHQ9: SUM OF ALL RESPONSES TO PHQ QUESTIONS 1-9: 0

## 2019-07-30 ASSESSMENT — ASTHMA QUESTIONNAIRES: ACT_TOTALSCORE: 23

## 2019-07-30 ASSESSMENT — ANXIETY QUESTIONNAIRES: GAD7 TOTAL SCORE: 0

## 2019-08-04 DIAGNOSIS — G89.29 OTHER CHRONIC PAIN: ICD-10-CM

## 2019-08-05 DIAGNOSIS — M54.50 ACUTE LOW BACK PAIN WITHOUT SCIATICA, UNSPECIFIED BACK PAIN LATERALITY: ICD-10-CM

## 2019-08-05 NOTE — TELEPHONE ENCOUNTER
Requested Prescriptions   Pending Prescriptions Disp Refills     traMADol (ULTRAM) 50 MG tablet [Pharmacy Med Name: traMADol HCl Oral Tablet 50 MG] 60 tablet 0     Sig: TAKE ONE TABLET BY MOUTH EVERY SIX HOURS AS NEEDED FOR PAIN  Last Written Prescription Date:  7/12/19  Last Fill Quantity: 60 tab,  # refills: 0   Last office visit: No previous visit found with prescribing provider:  Caty   Future Office Visit:         There is no refill protocol information for this order

## 2019-08-06 ENCOUNTER — TELEPHONE (OUTPATIENT)
Dept: FAMILY MEDICINE | Facility: CLINIC | Age: 64
End: 2019-08-06

## 2019-08-06 RX ORDER — TRAMADOL HYDROCHLORIDE 50 MG/1
TABLET ORAL
Qty: 60 TABLET | Refills: 0 | Status: SHIPPED | OUTPATIENT
Start: 2019-08-06 | End: 2019-09-04

## 2019-08-06 NOTE — TELEPHONE ENCOUNTER
Pt called asking for approval for refill. She works today at 2:30     Jaden Cole   08/06/19 10:50 AM

## 2019-08-06 NOTE — TELEPHONE ENCOUNTER
Requested Prescriptions   Pending Prescriptions Disp Refills     cyclobenzaprine (FLEXERIL) 10 MG tablet [Pharmacy Med Name: Cyclobenzaprine HCl Oral Tablet 10 MG] 30 tablet 0     Sig: TAKE HALF TO ONE TABLET BY MOUTH THREE TIMES DAILY AS NEEDED FOR MUSCLE SPASMS.       There is no refill protocol information for this order            Last Written Prescription Date:  3/06/19  Last Fill Quantity: 30 tablet,   # refills: 0  Last Office Visit: 2/25/2019 (IGNACIA Oconnor)  Future Office visit:       Routing refill request to provider for review/approval because:  Drug not on the G, P or [facility] refill protocol or controlled substance

## 2019-08-07 DIAGNOSIS — G89.29 OTHER CHRONIC PAIN: ICD-10-CM

## 2019-08-07 RX ORDER — CYCLOBENZAPRINE HCL 10 MG
TABLET ORAL
Qty: 30 TABLET | Refills: 0 | Status: SHIPPED | OUTPATIENT
Start: 2019-08-07 | End: 2019-09-30

## 2019-08-07 RX ORDER — HYDROCODONE BITARTRATE AND ACETAMINOPHEN 10; 325 MG/1; MG/1
TABLET ORAL
Qty: 15 TABLET | Refills: 0 | Status: SHIPPED | OUTPATIENT
Start: 2019-08-07 | End: 2019-09-04

## 2019-08-07 NOTE — TELEPHONE ENCOUNTER
Controlled Substance Refill Request for Norco  Problem List Complete:    Yes    Last Written Prescription Date:  7/16/2019  Last Fill Quantity:15,   # refills: 0    THE MOST RECENT OFFICE VISIT MUST BE WITHIN THE PAST 3 MONTHS. AT LEAST ONE FACE TO FACE VISIT MUST OCCUR EVERY 6 MONTHS. ADDITIONAL VISITS CAN BE VIRTUAL.  (THIS STATEMENT SHOULD BE DELETED.)    Last Office Visit with [facility] primary care provider:     Future Office visit: 7/29/2019    Controlled substance agreement:   Encounter-Level CSA - 07/17/2017:    Controlled Substance Agreement - Scan on 7/31/2017  9:32 PM: CONTROLLED SUBSTANCE AGREEMENT (below)       Encounter-Level CSA - 01/07/2015:    Controlled Substance Agreement - Scan on 1/8/2015  9:46 AM: [facility] Controlled Medication Agreement 1-7-15 (below)       Patient-Level CSA:    Controlled Substance Agreement - Opioid - Scan on 7/29/2019  3:09 PM (below)           Last Urine Drug Screen: No results found for: WHITLEY   Comprehen Drug Analysis UR   Date Value Ref Range Status   02/25/2019 FINAL  Final     Comment:     (Note)  ====================================================================  COMPREHENSIVE DRUG ANALYSIS,UR  ====================================================================  Test                             Result       Flag       Units        Drug Present   Tramadol                       1000                    ng/mg creat   O-Desmethyltramadol            1437                    ng/mg creat   N-Desmethyltramadol            213                     ng/mg creat    Source of tramadol is a prescription medication.    O-desmethyltramadol and N-desmethyltramadol are expected    metabolites of tramadol.  ====================================================================  Test                      Result    Flag   Units      Ref Range        Creatinine              109              mg/dL      >=20            ====================================================================  For  clinical consultation, please call [phone].  ====================================================================  Analysis performed by [employer] [place] [address]     , No results found for: THC13, PCP13, COC13, MAMP13, OPI13, AMP13, BZO13, TCA13, MTD13, BAR13, OXY13, PPX13, BUP13     Antonella Melgar/

## 2019-08-08 ENCOUNTER — HOSPITAL ENCOUNTER (OUTPATIENT)
Dept: ULTRASOUND IMAGING | Facility: CLINIC | Age: 64
End: 2019-08-08
Attending: FAMILY MEDICINE
Payer: COMMERCIAL

## 2019-08-08 ENCOUNTER — HOSPITAL ENCOUNTER (OUTPATIENT)
Dept: CT IMAGING | Facility: CLINIC | Age: 64
Discharge: HOME | End: 2019-08-08
Attending: FAMILY MEDICINE | Admitting: FAMILY MEDICINE
Payer: COMMERCIAL

## 2019-08-08 ENCOUNTER — HOSPITAL ENCOUNTER (OUTPATIENT)
Dept: MAMMOGRAPHY | Facility: CLINIC | Age: 64
End: 2019-08-08
Attending: FAMILY MEDICINE
Payer: COMMERCIAL

## 2019-08-08 DIAGNOSIS — Z87.898 HISTORY OF LUMP OF RIGHT BREAST: ICD-10-CM

## 2019-08-08 DIAGNOSIS — Z87.891 PERSONAL HISTORY OF TOBACCO USE: ICD-10-CM

## 2019-08-08 PROCEDURE — 76642 ULTRASOUND BREAST LIMITED: CPT | Mod: RT

## 2019-08-08 PROCEDURE — G0297 LDCT FOR LUNG CA SCREEN: HCPCS

## 2019-08-08 PROCEDURE — G0279 TOMOSYNTHESIS, MAMMO: HCPCS

## 2019-08-08 PROCEDURE — 77066 DX MAMMO INCL CAD BI: CPT

## 2019-09-04 DIAGNOSIS — G89.29 OTHER CHRONIC PAIN: ICD-10-CM

## 2019-09-05 NOTE — TELEPHONE ENCOUNTER
"Patient called wondering on status of medication refills. Explained provider was out of the office for the evening but would be back tomorrow. Patient is anxious to get meds refilled as she said, \"I only have enough to get me through tomorrow.\"  Routing refill request to provider to review approval because:  Drug not on the [facility], P or [facility] refill protocol or controlled substance  Last OV 7/29/19, no future visits scheduled.    up to date, last refill for tramadol 8/6/19, last refill for [place] 8/7/19  DEEP Shi, RN  Message handled by Nurse Triage    "

## 2019-09-05 NOTE — TELEPHONE ENCOUNTER
Requested Prescriptions   Pending Prescriptions Disp Refills     traMADol (ULTRAM) 50 MG tablet [Pharmacy Med Name: traMADol HCl Oral Tablet 50 MG] 60 tablet 0     Sig: Take one twice daily as needed for pain SEE DOCTOR FOR NEXT REFILL   Last Written Prescription Date: 8/6/19   Last Fill Quantity: 60 tablet,  # refills: 0   Last office visit: 7/29/2019 with prescribing provider:  jose   Future Office Visit:      There is no refill protocol information for this order        HYDROcodone-acetaminophen (NORCO)  MG per tablet [Pharmacy Med Name: HYDROcodone-Acetaminophen Oral Tablet  MG] 15 tablet 0     Sig: TAKE ONE TABLET BY MOUTH TWICE DAILY AS NEEDED.       There is no refill protocol information for this order      Controlled Substance Refill Request for HYDROcodone-acetaminophen (NORCO)  MG per tablet  Problem List Complete:    yes    Last Written Prescription Date:8/7/19    Last Fill Quantity: 15 tablet,   # refills: 0        Last Office Visit with [facility] primary care provider:7/29/19 Jose    Future Office visit:     Controlled substance agreement:   Encounter-Level CSA - 07/17/2017:    Controlled Substance Agreement - Scan on 7/31/2017  9:32 PM: CONTROLLED SUBSTANCE AGREEMENT (below)       Encounter-Level CSA - 01/07/2015:    Controlled Substance Agreement - Scan on 1/8/2015  9:46 AM: [facility] Controlled Medication Agreement 1-7-15 (below)       Patient-Level CSA:    Controlled Substance Agreement - Opioid - Scan on 7/29/2019  3:09 PM (below)           Last Urine Drug Screen: No results found for: Brooke ARREAGA Drug Analysis UR   Date Value Ref Range Status   02/25/2019 FINAL  Final     Comment:     (Note)  ====================================================================  COMPREHENSIVE DRUG ANALYSIS,UR  ====================================================================  Test                             Result       Flag       Units        Drug Present   Tramadol                        1000                    ng/mg creat   O-Desmethyltramadol            1437                    ng/mg creat   N-Desmethyltramadol            213                     ng/mg creat    Source of tramadol is a prescription medication.    O-desmethyltramadol and N-desmethyltramadol are expected    metabolites of tramadol.  ====================================================================  Test                      Result    Flag   Units      Ref Range        Creatinine              109              mg/dL      >=20            ====================================================================  For clinical consultation, please call [phone].  ====================================================================  Analysis performed by [employer] [place] [address]     , No results found for: THC13, PCP13, COC13, MAMP13, OPI13, AMP13, BZO13, TCA13, MTD13, BAR13, OXY13, PPX13, BUP13     Processing:  Staff will hand deliver Rx to on-site pharmacy     [email or web address]       checked in past 3 months?  Yes 7/11/19        Other chronic painNoted 1/2/2018  [G89.29]  Overview  Patient is followed by Lars Oconnor MD for ongoing prescription of pain medication. All refills should only be approved by this provider, or covering partner.    Medication(s): hydrocodone.   Maximum quantity per month: 20  Clinic visit frequency required: Q 3 months     Controlled substance agreement:  Encounter-Level CSA - 07/17/2017:     Controlled Substance Agreement - Scan on 7/31/2017 9:32 PM : CONTROLLED SUBSTANCE AGREEMENT (below)          Encounter-Level CSA - 07/17/2017:     Controlled Substance Agreement - Scan on 1/8/2015 9:46 AM :  Clinics Controlled Medication Agreement 1-7-15 (below)            Pain Clinic evaluation in the past: No    DIRE Total Score(s):  No flowsheet data found.    Last [facility] website verification: 7/11/19  [email or web address]

## 2019-09-06 RX ORDER — HYDROCODONE BITARTRATE AND ACETAMINOPHEN 10; 325 MG/1; MG/1
TABLET ORAL
Qty: 15 TABLET | Refills: 0 | Status: SHIPPED | OUTPATIENT
Start: 2019-09-06 | End: 2019-09-30

## 2019-09-06 RX ORDER — TRAMADOL HYDROCHLORIDE 50 MG/1
TABLET ORAL
Qty: 60 TABLET | Refills: 0 | Status: SHIPPED | OUTPATIENT
Start: 2019-09-06 | End: 2019-09-30

## 2019-09-20 DIAGNOSIS — J44.1 COPD EXACERBATION (H): ICD-10-CM

## 2019-09-20 NOTE — TELEPHONE ENCOUNTER
"Requested Prescriptions   Pending Prescriptions Disp Refills     albuterol (VENTOLIN HFA) 108 (90 Base) MCG/ACT inhaler [Pharmacy Med Name: Ventolin HFA Inhalation Aerosol Solution 108 (90 Base) MCG/ACT] 18 g 0     Sig: INHALE TWO PUFFS BY MOUTH EVERY SIX HOURS AS NEEDED       Last Written Prescription Date:  6/21/19  Last Fill Quantity: 12g,  # refills: 1   Last office visit: 7/29/2019 with prescribing provider: Jose    Future Office Visit:        Asthma Maintenance Inhalers - Anticholinergics Passed - 9/20/2019  2:27 PM        Passed - Patient is age 12 years or older        Passed - Asthma control assessment score within normal limits in last 6 months     Please review ACT score.           Passed - Medication is active on med list        Passed - Recent (6 mo) or future (30 days) visit within the authorizing provider's specialty     Patient had office visit in the last 6 months or has a visit in the next 30 days with authorizing provider or within the authorizing provider's specialty.  See \"Patient Info\" tab in inbasket, or \"Choose Columns\" in Meds & Orders section of the refill encounter.              "

## 2019-09-23 RX ORDER — ALBUTEROL SULFATE 90 UG/1
AEROSOL, METERED RESPIRATORY (INHALATION)
Qty: 18 G | Refills: 1 | Status: SHIPPED | OUTPATIENT
Start: 2019-09-23 | End: 2020-01-30

## 2019-09-23 NOTE — TELEPHONE ENCOUNTER
Prescription approved per [facility] Refill Protocol.  Alexandru Bruce, RN, BSN

## 2019-09-29 DIAGNOSIS — G89.29 OTHER CHRONIC PAIN: ICD-10-CM

## 2019-09-30 DIAGNOSIS — M54.50 ACUTE LOW BACK PAIN WITHOUT SCIATICA, UNSPECIFIED BACK PAIN LATERALITY: ICD-10-CM

## 2019-09-30 DIAGNOSIS — G89.29 OTHER CHRONIC PAIN: ICD-10-CM

## 2019-09-30 RX ORDER — HYDROCODONE BITARTRATE AND ACETAMINOPHEN 10; 325 MG/1; MG/1
TABLET ORAL
Qty: 15 TABLET | Refills: 0 | COMMUNITY
Start: 2019-09-30 | End: 2019-10-30

## 2019-09-30 RX ORDER — HYDROCODONE BITARTRATE AND ACETAMINOPHEN 10; 325 MG/1; MG/1
TABLET ORAL
Qty: 15 TABLET | Refills: 0
Start: 2019-09-30

## 2019-09-30 RX ORDER — TRAMADOL HYDROCHLORIDE 50 MG/1
TABLET ORAL
Qty: 60 TABLET | Refills: 0 | OUTPATIENT
Start: 2019-09-30

## 2019-09-30 RX ORDER — TRAMADOL HYDROCHLORIDE 50 MG/1
TABLET ORAL
Qty: 60 TABLET | Refills: 0 | Status: SHIPPED | OUTPATIENT
Start: 2019-09-30 | End: 2019-09-30

## 2019-09-30 RX ORDER — TRAMADOL HYDROCHLORIDE 50 MG/1
TABLET ORAL
Qty: 60 TABLET | Refills: 0 | COMMUNITY
Start: 2019-09-30 | End: 2019-10-30

## 2019-09-30 RX ORDER — HYDROCODONE BITARTRATE AND ACETAMINOPHEN 10; 325 MG/1; MG/1
TABLET ORAL
Qty: 15 TABLET | Refills: 0 | Status: SHIPPED | OUTPATIENT
Start: 2019-09-30 | End: 2019-09-30

## 2019-09-30 NOTE — TELEPHONE ENCOUNTER
traMADol (ULTRAM) 50 MG tablet      Last Written Prescription Date:  09/06/2019  Last Fill Quantity: 60 tablet,   # refills: 0  Last Office Visit: 07/29/2019  Future Office visit:       Routing refill request to provider for review/approval because:  Drug not on the [facility], [facility] or [facility] refill protocol or controlled substance        Controlled Substance Refill Request for HYDROcodone-acetaminophen (NORCO)  MG per tablet  Problem List Complete:    Yes    Last Written Prescription Date:  09/06/2019  Last Fill Quantity: 15 tablet,   # refills: 0    THE MOST RECENT OFFICE VISIT MUST BE WITHIN THE PAST 3 MONTHS. AT LEAST ONE FACE TO FACE VISIT MUST OCCUR EVERY 6 MONTHS. ADDITIONAL VISITS CAN BE VIRTUAL.  (THIS STATEMENT SHOULD BE DELETED.)    Last Office Visit with [facility] primary care provider: 07/29/2019    Patient is followed by Lars Oconnor MD for ongoing prescription of pain medication.  All refills should only be approved by this provider, or covering partner.     Medication(s): hydrocodone.   Maximum quantity per month: 20  Clinic visit frequency required: Q 3 months      Controlled substance agreement:      Future Office visit:     Controlled substance agreement:   Encounter-Level CSA - 07/17/2017:    Controlled Substance Agreement - Scan on 7/31/2017  9:32 PM: CONTROLLED SUBSTANCE AGREEMENT     Encounter-Level CSA - 01/07/2015:    Controlled Substance Agreement - Scan on 1/8/2015  9:46 AM: [facility] Controlled Medication Agreement 1-7-15     Patient-Level CSA:    Controlled Substance Agreement - Opioid - Scan on 7/29/2019  3:09 PM         Last Urine Drug Screen: No results found for: Brooke ARREAGA Drug Analysis UR   Date Value Ref Range Status   02/25/2019 FINAL  Final     Comment:     (Note)  ====================================================================  COMPREHENSIVE DRUG ANALYSIS,UR  ====================================================================  Test                              Result       Flag       Units        Drug Present   Tramadol                       1000                    ng/mg creat   O-Desmethyltramadol            1437                    ng/mg creat   N-Desmethyltramadol            213                     ng/mg creat    Source of tramadol is a prescription medication.    O-desmethyltramadol and N-desmethyltramadol are expected    metabolites of tramadol.  ====================================================================  Test                      Result    Flag   Units      Ref Range        Creatinine              109              mg/dL      >=20            ====================================================================  For clinical consultation, please call [phone].  ====================================================================  Analysis performed by [employer] [employer] [place] [address]     , No results found for: THC13, PCP13, COC13, MAMP13, OPI13, AMP13, BZO13, TCA13, MTD13, BAR13, OXY13, PPX13, BUP13     Processing:  Staff will hand deliver Rx to on-site pharmacy    [email or web address]   checked in past 3 months?  Yes 07/11/2019

## 2019-09-30 NOTE — TELEPHONE ENCOUNTER
Cub calling, needs chronic pain in sig, only sees ICD 9 code, wants added to sig, added for future refills  Gayle Broderick RN, BSN  Message handled by Nurse Triage.

## 2019-10-01 DIAGNOSIS — G89.29 OTHER CHRONIC PAIN: ICD-10-CM

## 2019-10-01 NOTE — TELEPHONE ENCOUNTER
"Requested Prescriptions   Pending Prescriptions Disp Refills     naproxen (NAPROSYN) 500 MG tablet [Pharmacy Med Name: Naproxen Oral Tablet 500 MG] 90 tablet 0     Sig: Take 1 tablet (500 mg) by mouth 2 times daily as needed for moderate pain.       NSAID Medications Failed - 10/1/2019  2:45 PM        Failed - Patient is age 6-64 years        Failed - Normal CBC on file in past 12 months     Recent Labs   Lab Test 02/25/19  1727   WBC 7.6   RBC 5.05   HGB 16.2*   HCT 47.8*                    Failed - Medication is active on med list        Passed - Blood pressure under 140/90 in past 12 months     BP Readings from Last 3 Encounters:   07/29/19 104/71   07/15/19 104/60   02/25/19 122/82                 Passed - Normal ALT on file in past 12 months     Recent Labs   Lab Test 02/25/19  1727   ALT 25             Passed - Normal AST on file in past 12 months     Recent Labs   Lab Test 02/25/19  1727   AST 25             Passed - Recent (12 mo) or future (30 days) visit within the authorizing provider's specialty     Patient has had an office visit with the authorizing provider or a provider within the authorizing providers department within the previous 12 mos or has a future within next 30 days. See \"Patient Info\" tab in inbasket, or \"Choose Columns\" in Meds & Orders section of the refill encounter.              Passed - No active pregnancy on record        Passed - Normal serum creatinine on file in past 12 months     Recent Labs   Lab Test 02/25/19  1727   CR 0.81             Passed - No positive pregnancy test in past 12 months        Last Written Prescription Date:  08/17/2009  Last Fill Quantity: 60,  # refills: 0   Last office visit: 7/29/2019 with prescribing provider:  Dr Oconnor   Future Office Visit:   Next 5 appointments (look out 90 days)    Oct 30, 2019  3:25 PM CDT  Office visit with Lars Oconnor MD, CR EXAM ROOM 15  [facility] ([facility]) [address] [address] " [facility] [address] [phone]

## 2019-10-02 RX ORDER — CYCLOBENZAPRINE HCL 10 MG
TABLET ORAL
Qty: 30 TABLET | Refills: 0 | Status: SHIPPED | OUTPATIENT
Start: 2019-10-02 | End: 2019-12-31

## 2019-10-03 RX ORDER — NAPROXEN 500 MG/1
500 TABLET ORAL 2 TIMES DAILY PRN
Qty: 90 TABLET | Refills: 0 | OUTPATIENT
Start: 2019-10-03

## 2019-10-22 DIAGNOSIS — G47.9 SLEEP DISORDER: ICD-10-CM

## 2019-10-22 RX ORDER — ZOLPIDEM TARTRATE 5 MG/1
TABLET ORAL
Qty: 30 TABLET | Refills: 1 | Status: SHIPPED | OUTPATIENT
Start: 2019-10-22 | End: 2020-01-06

## 2019-10-22 NOTE — TELEPHONE ENCOUNTER
ambien      Last Written Prescription Date:  5/1/19  Last Fill Quantity: 30,   # refills: 1  Last Office Visit: 7/29/19  Future Office visit:    Next 5 appointments (look out 90 days)    Oct 30, 2019  3:25 PM CDT  Office visit with Lars Oconnor MD, CR EXAM ROOM 15  [facility] [address] [place] [id number] [phone]           Routing refill request to provider for review/approval because:  Drug not on the [facility], [facility] or [facility] refill protocol or controlled substance

## 2019-10-30 ENCOUNTER — OFFICE VISIT (OUTPATIENT)
Dept: FAMILY MEDICINE | Facility: CLINIC | Age: 64
End: 2019-10-30
Payer: COMMERCIAL

## 2019-10-30 VITALS
BODY MASS INDEX: 26.81 KG/M2 | OXYGEN SATURATION: 93 % | HEIGHT: 59 IN | WEIGHT: 133 LBS | SYSTOLIC BLOOD PRESSURE: 119 MMHG | RESPIRATION RATE: 16 BRPM | DIASTOLIC BLOOD PRESSURE: 61 MMHG | HEART RATE: 83 BPM | TEMPERATURE: 98.3 F

## 2019-10-30 DIAGNOSIS — Z71.6 ENCOUNTER FOR TOBACCO USE CESSATION COUNSELING: ICD-10-CM

## 2019-10-30 DIAGNOSIS — Z23 NEED FOR PROPHYLACTIC VACCINATION AND INOCULATION AGAINST INFLUENZA: Primary | ICD-10-CM

## 2019-10-30 DIAGNOSIS — G89.29 OTHER CHRONIC PAIN: ICD-10-CM

## 2019-10-30 PROCEDURE — 90471 IMMUNIZATION ADMIN: CPT | Performed by: FAMILY MEDICINE

## 2019-10-30 PROCEDURE — 99213 OFFICE O/P EST LOW 20 MIN: CPT | Mod: 25 | Performed by: FAMILY MEDICINE

## 2019-10-30 PROCEDURE — 90662 IIV NO PRSV INCREASED AG IM: CPT | Performed by: FAMILY MEDICINE

## 2019-10-30 RX ORDER — VARENICLINE TARTRATE 1 MG/1
1 TABLET, FILM COATED ORAL 2 TIMES DAILY
Qty: 60 TABLET | Refills: 1 | Status: SHIPPED | OUTPATIENT
Start: 2019-10-30 | End: 2020-01-23

## 2019-10-30 RX ORDER — TRAMADOL HYDROCHLORIDE 50 MG/1
TABLET ORAL
Qty: 60 TABLET | Refills: 0 | Status: SHIPPED | OUTPATIENT
Start: 2019-10-30 | End: 2019-11-25

## 2019-10-30 RX ORDER — HYDROCODONE BITARTRATE AND ACETAMINOPHEN 10; 325 MG/1; MG/1
TABLET ORAL
Qty: 15 TABLET | Refills: 0 | Status: SHIPPED | OUTPATIENT
Start: 2019-10-30 | End: 2019-11-29

## 2019-10-30 ASSESSMENT — ANXIETY QUESTIONNAIRES
GAD7 TOTAL SCORE: 0
GAD7 TOTAL SCORE: 0
6. BECOMING EASILY ANNOYED OR IRRITABLE: NOT AT ALL
7. FEELING AFRAID AS IF SOMETHING AWFUL MIGHT HAPPEN: NOT AT ALL
5. BEING SO RESTLESS THAT IT IS HARD TO SIT STILL: NOT AT ALL
1. FEELING NERVOUS, ANXIOUS, OR ON EDGE: NOT AT ALL
4. TROUBLE RELAXING: NOT AT ALL
2. NOT BEING ABLE TO STOP OR CONTROL WORRYING: NOT AT ALL
GAD7 TOTAL SCORE: 0
3. WORRYING TOO MUCH ABOUT DIFFERENT THINGS: NOT AT ALL
7. FEELING AFRAID AS IF SOMETHING AWFUL MIGHT HAPPEN: NOT AT ALL

## 2019-10-30 ASSESSMENT — ENCOUNTER SYMPTOMS: BACK PAIN: 1

## 2019-10-30 ASSESSMENT — MIFFLIN-ST. JEOR: SCORE: 1057.29

## 2019-10-30 ASSESSMENT — PAIN SCALES - GENERAL: PAINLEVEL: MODERATE PAIN (5)

## 2019-10-30 NOTE — PROGRESS NOTES
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   Current Outpatient Medications   Medication     HYDROcodone-acetaminophen (NORCO)  MG per tablet     traMADol (ULTRAM) 50 MG tablet     varenicline (CHANTIX KTAE) 0.5 MG X 11 & 1 MG X 42 tablet     varenicline (CHANTIX) 1 MG tablet     albuterol (PROVENTIL) (2.5 MG/3ML) 0.083% neb solution     albuterol (VENTOLIN HFA) 108 (90 Base) MCG/ACT inhaler     ascorbic acid (VITAMIN C) 1000 MG TABS     cholecalciferol (VITAMIN D3) 1000 UNIT tablet     cyclobenzaprine (FLEXERIL) 10 MG tablet     ipratropium - albuterol 0.5 mg/2.5 mg/3 mL (DUONEB) 0.5-2.5 (3) MG/3ML neb solution     ipratropium - albuterol 0.5 mg/2.5 mg/3 mL (DUONEB) 0.5-2.5 (3) MG/3ML neb solution     ipratropium - albuterol 0.5 mg/2.5 mg/3 mL (DUONEB) 0.5-2.5 (3) MG/3ML nebulization     Multiple Vitamins-Minerals (MULTIVITAMIN OR)     nicotine (NICODERM CQ) 7 MG/24HR patch 2h hr     omeprazole (PRILOSEC) 40 MG DR capsule     order for DME     order for DME     vitamin E 400 UNITS TABS     zolpidem (AMBIEN) 5 MG tablet     No current facility-administered medications for this visit.          Imm/Inj     Back Pain      History of Present Illness        She eats 2-3 servings of fruits and vegetables daily.She consumes 2 sweetened beverage(s) daily.  She is taking medications regularly.     Back Pain       Duration: 2 weeks ago work injury        Specific cause: lifting    Description:   Location of pain: low back right  Character of pain: gnawing  Pain radiation:radiates into the right leg  New numbness or weakness in legs, not attributed to pain:  no     Intensity: Currently 6/10, At its worst 10/10    History:   Pain interferes with job: some days it does, some days it doesn't  History of back problems: no prior back problems  Any previous MRI or X-rays:  None  Sees a specialist for back pain:  No  Therapies tried without relief: cold, heat, standing and stretch    Alleviating factors:   Improved by: heat      Precipitating factors:  Worsened by: Lifting          Accompanying Signs & Symptoms:  Risk of Fracture:  None  Risk of Cauda Equina:  None  Risk of Infection:  None  Risk of Cancer:  None  Risk of Ankylosing Spondylitis:  Onset at age <35, male, AND morning back stiffness. no                Past Medical History:   Diagnosis Date     Acute hepatitis C without mention of hepatic coma(070.51) 1996    Dr. 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     Diverticula of colon 2014     Malignant neoplasm of endocervix (H) 1988    took uterus and left the ovaries     Other and unspecified alcohol dependence, unspecified drinking behavior     sober since 1999     Polycythemia 8/8/2012     Smoker      Unspecified disorder of esophagus     stricture - has had it dilated     Unspecified hemorrhoids without mention of complication        Past Surgical History:   Procedure Laterality Date     C NONSPECIFIC PROCEDURE  1988    hysterectomy-uterine & cervical ca - tubes and ovaries are still in - do NOT need paps     C NONSPECIFIC PROCEDURE      ganglion cyst removal     C NONSPECIFIC PROCEDURE      right thumb surgery     C NONSPECIFIC PROCEDURE  2003    dilatation of the esophagus once due to dysphagia and stricture     HC COLONOSCOPY THRU STOMA, DIAGNOSTIC  2004, 2012    repeat in 2022     HC HEMORRHOIDECTOMY W BANDING/LIGATION      Hemorrhoidectomy     HC REMOVAL OF TONSILS,<12 Y/O      Tonsils <11y.o.     HYSTERECTOMY, PAP NO LONGER INDICATED       LAPAROSCOPIC SALPINGO-OOPHORECTOMY Bilateral 10/23/2015    Procedure: LAPAROSCOPIC SALPINGO-OOPHORECTOMY;  Surgeon: Johnathan Steve MD;  Location: [facility] OR       Family History   Problem Relation Age of Onset     Hypertension Mother       Cerebrovascular Disease Mother         TIA's     Depression Mother      Cancer - colorectal Maternal Grandmother         dx at 65     Lipids Father      C.A.D. Father         angioplasty at 65     Musculoskeletal Disorder Father      Alcohol/Drug Daughter         in remission     Breast Cancer No family hx of        Social History     Tobacco Use     Smoking status: Current Every Day Smoker     Packs/day: 0.50     Years: 30.00     Pack years: 15.00     Types: Cigarettes     Start date: 10/13/1967     Smokeless tobacco: Never Used     Tobacco comment: has patches, trying to quit 10/30/2017   Substance Use Topics     Alcohol use: No     Comment: sober since 9/11/99           Current Outpatient Medications   Medication Sig Dispense Refill     HYDROcodone-acetaminophen (NORCO)  MG per tablet Take one daily as needed for chronic pain. Do not take with tramadol. 15 tablet 0     traMADol (ULTRAM) 50 MG tablet Take one three times  daily as needed for chronic pain 60 tablet 0     varenicline (CHANTIX KATE) 0.5 MG X 11 & 1 MG X 42 tablet Take 0.5 mg tab daily for 3 days, THEN 0.5 mg tab twice daily for 4 days, THEN 1 mg twice daily. 53 tablet 0     varenicline (CHANTIX) 1 MG tablet Take 1 tablet (1 mg) by mouth 2 times daily 60 tablet 1     albuterol (PROVENTIL) (2.5 MG/3ML) 0.083% neb solution INHALE 3 MILLILITERS (2.5 MG) BY NEBULIZATION ROUTE EVERY 6 HOURS AS NEEDED FOR SHORTNESS OF BREATH/DYSPNEA OR WHEEZING 30 mL 0     albuterol (VENTOLIN HFA) 108 (90 Base) MCG/ACT inhaler INHALE TWO PUFFS BY MOUTH EVERY SIX HOURS AS NEEDED 18 g 1     ascorbic acid (VITAMIN C) 1000 MG TABS Take 1,000 mg by mouth daily       cholecalciferol (VITAMIN D3) 1000 UNIT tablet Take 1,000 Units by mouth daily       cyclobenzaprine (FLEXERIL) 10 MG tablet TAKE HALF TO ONE TABLET BY MOUTH THREE TIMES DAILY AS NEEDED FOR MUSCLE SPASMS. 30 tablet 0     ipratropium - albuterol 0.5 mg/2.5 mg/3 mL (DUONEB) 0.5-2.5 (3) MG/3ML neb solution Take 1  vial (3 mLs) by nebulization every 6 hours as needed for shortness of breath / dyspnea or wheezing 30 vial 1     ipratropium - albuterol 0.5 mg/2.5 mg/3 mL (DUONEB) 0.5-2.5 (3) MG/3ML neb solution Take 1 vial (3 mLs) by nebulization every 6 hours as needed for shortness of breath / dyspnea or wheezing 30 vial 1     ipratropium - albuterol 0.5 mg/2.5 mg/3 mL (DUONEB) 0.5-2.5 (3) MG/3ML nebulization Take 1 vial (3 mLs) by nebulization every 4 hours as needed for shortness of breath / dyspnea or wheezing 30 vial 1     Multiple Vitamins-Minerals (MULTIVITAMIN OR) Take 1 tablet by mouth daily Per pt, uses ones without Iron       nicotine (NICODERM CQ) 7 MG/24HR patch 2h hr Place 1 patch onto the skin every 24 hours (Patient not taking: Reported on 7/15/2019) 30 patch 0     omeprazole (PRILOSEC) 40 MG DR capsule Take 1 capsule (40 mg) by mouth daily 90 capsule 1     order for DME Cam walker boot right-  Low boot 1 Units 0     order for DME Equipment being ordered: Nebulizer 1 Device 0     vitamin E 400 UNITS TABS Take 400 Units by mouth daily       zolpidem (AMBIEN) 5 MG tablet TAKE ONE TABLET BY MOUTH EVERY NIGHT AS NEEDED FOR SLEEP 30 tablet 1       Reviewed and updated as needed this visit by Provider           Review of Systems   Musculoskeletal: Positive for back pain.      ROS COMP: Constitutional, HEENT, cardiovascular, pulmonary, GI, , musculoskeletal, neuro, skin, endocrine and psych systems are negative, except as otherwise noted.      Objective    LMP  (LMP Unknown)   There is no height or weight on file to calculate BMI.  Physical Exam   GENERAL: healthy, alert and no distress  EYES: Eyes grossly normal to inspection, PERRL and conjunctivae and sclerae normal  HENT: ear canals and TM's normal, nose and mouth without ulcers or lesions  NECK: no adenopathy, no asymmetry, masses, or scars and thyroid normal to palpation  RESP: lungs clear to auscultation - no rales, rhonchi or wheezes  CV: regular rate and  rhythm, normal S1 S2, no S3 or S4, no murmur, click or rub, no peripheral edema and peripheral pulses strong  ABDOMEN: soft, nontender, no hepatosplenomegaly, no masses and bowel sounds normal  MS: no gross musculoskeletal defects noted, no edema  SKIN: no suspicious lesions or rashes  NEURO: Normal strength and tone, mentation intact and speech normal  PSYCH: mentation appears normal, affect normal/bright    Diagnostic Test Results:  Labs reviewed in Epic        Assessment & Plan     1. Need for prophylactic vaccination and inoculation against influenza  admin  - INFLUENZA (HIGH DOSE) 3 VALENT VACCINE [13644]  - Vaccine Administration, Initial [97573]    2. Other chronic pain  ddd and djd neck and ddd lumbar, physical work in retail grocery   - traMADol (ULTRAM) 50 MG tablet; Take one three times  daily as needed for chronic pain  Dispense: 60 tablet; Refill: 0  - HYDROcodone-acetaminophen (NORCO)  MG per tablet; Take one daily as needed for chronic pain. Do not take with tramadol.  Dispense: 15 tablet; Refill: 0    3. Encounter for tobacco use cessation counseling  Discussed chantix and behavioral approaches  - varenicline (CHANTIX) 1 MG tablet; Take 1 tablet (1 mg) by mouth 2 times daily  Dispense: 60 tablet; Refill: 1  - varenicline (CHANTIX KATE) 0.5 MG X 11 & 1 MG X 42 tablet; Take 0.5 mg tab daily for 3 days, THEN 0.5 mg tab twice daily for 4 days, THEN 1 mg twice daily.  Dispense: 53 tablet; Refill: 0     Tobacco Cessation:   reports that she has been smoking cigarettes. She started smoking about 52 years ago. She has a 15.00 pack-year smoking history. She has never used smokeless tobacco.      Lars Oconnor MD  [facility]    Answers for HPI/ROS submitted by the patient on 10/30/2019   Chronic problems general questions HPI Form  If you checked off any problems, how difficult have these problems made it for you to do your work, take care of things at home, or get along with  other people?: Not difficult at all  PHQ9 TOTAL SCORE: 0  HAMLET 7 TOTAL SCORE: 0

## 2019-10-31 ASSESSMENT — ANXIETY QUESTIONNAIRES: GAD7 TOTAL SCORE: 0

## 2019-11-01 DIAGNOSIS — J20.9 ACUTE BRONCHITIS, UNSPECIFIED ORGANISM: ICD-10-CM

## 2019-11-01 DIAGNOSIS — J01.01 ACUTE RECURRENT MAXILLARY SINUSITIS: ICD-10-CM

## 2019-11-01 RX ORDER — ALBUTEROL SULFATE 0.83 MG/ML
SOLUTION RESPIRATORY (INHALATION)
Qty: 30 ML | Refills: 0 | Status: SHIPPED | OUTPATIENT
Start: 2019-11-01 | End: 2020-11-20

## 2019-11-01 NOTE — TELEPHONE ENCOUNTER
"Dr. Oconnor,    Routing refill request to provider for review/approval because:  Allergy/Contraindication: albuterol   Reactions: Other (See Comments). No reaction type specified. User documented allergy severity: None specified.   Drug Class Match with METAPROTERENOL SULFATE (Class: BETA-2 AGONISTS).   \"alupent inhaler-shaking\"   Details    Last Rx 12/2017  Thanks,  Rika Hamm RN            "

## 2019-11-25 DIAGNOSIS — G89.29 OTHER CHRONIC PAIN: ICD-10-CM

## 2019-11-26 NOTE — TELEPHONE ENCOUNTER
Controlled Substance Refill Request for Tramadol  Problem List Complete:  Yes   checked in past 3 months?  Yes 11/26/19     No concerns over fills. Two different narcotics prescribed along with Ambien.     Summer Harding RN -- [facility]

## 2019-11-27 RX ORDER — TRAMADOL HYDROCHLORIDE 50 MG/1
TABLET ORAL
Qty: 60 TABLET | Refills: 0 | Status: SHIPPED | OUTPATIENT
Start: 2019-11-27 | End: 2019-12-27

## 2019-11-29 DIAGNOSIS — G89.29 OTHER CHRONIC PAIN: ICD-10-CM

## 2019-11-29 RX ORDER — HYDROCODONE BITARTRATE AND ACETAMINOPHEN 10; 325 MG/1; MG/1
TABLET ORAL
Qty: 15 TABLET | Refills: 0 | Status: SHIPPED | OUTPATIENT
Start: 2019-11-29 | End: 2019-12-27

## 2019-11-29 NOTE — TELEPHONE ENCOUNTER
Routing refill request to provider to review approval because:  Drug not on the [facility], [facility] or  Health refill protocol or controlled substance    [place] reviewed:  reviewed- no concerns    [place] profile:  [email or web address]    Controlled Substance Refill Request for Norco  Problem List Complete:  Yes      Medication(s): hydrocodone.   Maximum quantity per month: 20  Clinic visit frequency required: Q 3 months     Controlled substance agreement:  Encounter-Level CSA - 07/17/2017:          Controlled Substance Agreement - Scan on 7/31/2017  9:32 PM : CONTROLLED SUBSTANCE AGREEMENT (below)            Encounter-Level CSA - 07/17/2017:          Controlled Substance Agreement - Scan on 1/8/2015  9:46 AM : [facility] Controlled Medication Agreement 1-7-15 (below)              Pain Clinic evaluation in the past: No    DIRE Total Score(s):  No flowsheet data found.    Last [facility] website verification: 11/29/19   [email or web address]    Last Written Prescription Date:  10/30/2019  Last Fill Quantity: 15,  # refills: 0   Last office visit: 10/30/2019 with prescribing provider:     Future Office Visit:      Gayle Broderick RN, BSN  Message handled by Nurse Triage.

## 2019-12-24 DIAGNOSIS — G89.29 OTHER CHRONIC PAIN: ICD-10-CM

## 2019-12-26 ENCOUNTER — TELEPHONE (OUTPATIENT)
Dept: FAMILY MEDICINE | Facility: CLINIC | Age: 64
End: 2019-12-26

## 2019-12-26 NOTE — TELEPHONE ENCOUNTER
Routing refill request to provider to review approval because:  Drug not on the [facility], UMP or  Health refill protocol or controlled substance    Controlled Substance Refill Request for tramadol, Norco  Problem List Complete:  Yes    Medication(s): hydrocodone.   Maximum quantity per month: 20  Clinic visit frequency required: Q 3 months     Controlled substance agreement:  Encounter-Level CSA - 07/17/2017:          Controlled Substance Agreement - Scan on 7/31/2017  9:32 PM : CONTROLLED SUBSTANCE AGREEMENT (below)            Encounter-Level CSA - 07/17/2017:          Controlled Substance Agreement - Scan on 1/8/2015  9:46 AM : [facility] Controlled Medication Agreement 1-7-15 (below)              Pain Clinic evaluation in the past: No    DIRE Total Score(s):  No flowsheet data found.    Last [facility] website verification: 11/29/19   [email or web address]  Last Written Prescription Date:  11/29/19-Brodnax, 11/27/19-tramadol  Last Fill Quantity: 15/60,  # refills: 0   Last office visit: 10/30/2019 with prescribing provider:     Future Office Visit:      Gayle Broderick, RN, BSN  Message handled by Nurse Triage.

## 2019-12-27 RX ORDER — HYDROCODONE BITARTRATE AND ACETAMINOPHEN 10; 325 MG/1; MG/1
TABLET ORAL
Qty: 15 TABLET | Refills: 0 | Status: SHIPPED | OUTPATIENT
Start: 2019-12-27 | End: 2020-01-30

## 2019-12-27 RX ORDER — TRAMADOL HYDROCHLORIDE 50 MG/1
TABLET ORAL
Qty: 60 TABLET | Refills: 0 | Status: SHIPPED | OUTPATIENT
Start: 2019-12-27 | End: 2020-01-30

## 2019-12-28 DIAGNOSIS — M54.50 ACUTE LOW BACK PAIN WITHOUT SCIATICA, UNSPECIFIED BACK PAIN LATERALITY: ICD-10-CM

## 2019-12-31 RX ORDER — CYCLOBENZAPRINE HCL 10 MG
TABLET ORAL
Qty: 30 TABLET | Refills: 0 | Status: SHIPPED | OUTPATIENT
Start: 2019-12-31 | End: 2020-02-28

## 2019-12-31 NOTE — TELEPHONE ENCOUNTER
Routing refill request to provider to review approval because:  Drug not on the [facility], [facility] or [facility] refill protocol or controlled substance    cyclobenzaprine (FLEXERIL) 10 MG tablet  Last Written Prescription Date:  10/2/19  Last Fill Quantity: 30,  # refills: 0   Last office visit: 10/30/2019 with prescribing provider:  Lars Oconnor M.D.   Future Office Visit:  None currently scheduled   RX monitoring program ([place]) reviewed:  not reviewed/not due - last done on 11/29/19    [place] profile:  [email or web address]    DEEP Shi, RN, PHN

## 2020-01-05 DIAGNOSIS — G47.9 SLEEP DISORDER: ICD-10-CM

## 2020-01-06 RX ORDER — ZOLPIDEM TARTRATE 5 MG/1
TABLET ORAL
Qty: 30 TABLET | Refills: 0 | Status: SHIPPED | OUTPATIENT
Start: 2020-01-06 | End: 2020-02-28

## 2020-01-06 NOTE — TELEPHONE ENCOUNTER
Routing refill request to provider to review approval because:  Drug not on the [facility], [facility] or [facility] refill protocol or controlled substance    Last Written Prescription Date:  10/22/19  Last Fill Quantity: 30,  # refills: 1     Last office visit: 10/30/2019 with prescribing provider:     Future Office Visit:    Gayle Broderick RN, BSN  Message handled by Nurse Triage.

## 2020-01-23 DIAGNOSIS — Z71.6 ENCOUNTER FOR TOBACCO USE CESSATION COUNSELING: ICD-10-CM

## 2020-01-23 NOTE — TELEPHONE ENCOUNTER
"Last Written Prescription Date:  10/30/19  Last Fill Quantity: 53,  # refills: 0   Last office visit: 10/30/2019 with prescribing provider:  10/30/19    Future Office Visit:      Routing refill request to provider for review/approval because:  Med started at last OV - unsure of intended duration of medication     Monica GIBBS RN    Requested Prescriptions   Pending Prescriptions Disp Refills     CHANTIX 1 MG tablet [Pharmacy Med Name: Chantix Oral Tablet 1 MG] 60 tablet 0     Sig: TAKE 1 TABLET BY MOUTH 2 TIMES A DAY (START AFTER FINISHING STARTER PACK).       Partial Cholinergic Nicotinic Agonist Agents Passed - 1/23/2020  7:01 AM        Passed - Blood pressure under 140/90 in past 12 months     BP Readings from Last 3 Encounters:   10/30/19 119/61   07/29/19 104/71   07/15/19 104/60                 Passed - Recent (12 mo) or future (30 days) visit within the authorizing provider's specialty     Patient has had an office visit with the authorizing provider or a provider within the authorizing providers department within the previous 12 mos or has a future within next 30 days. See \"Patient Info\" tab in inbasket, or \"Choose Columns\" in Meds & Orders section of the refill encounter.              Passed - Medication is active on med list        Passed - Patient is 18 years of age or older        Passed - Patient is not pregnant        Passed - No positive pregnancy test on file in past 12 months          "

## 2020-01-24 RX ORDER — VARENICLINE TARTRATE 1 MG/1
TABLET, FILM COATED ORAL
Qty: 60 TABLET | Refills: 1 | Status: SHIPPED | OUTPATIENT
Start: 2020-01-24 | End: 2020-11-20

## 2020-01-26 DIAGNOSIS — J44.1 COPD EXACERBATION (H): ICD-10-CM

## 2020-01-26 DIAGNOSIS — G89.29 OTHER CHRONIC PAIN: ICD-10-CM

## 2020-01-29 ENCOUNTER — OFFICE VISIT (OUTPATIENT)
Dept: FAMILY MEDICINE | Facility: CLINIC | Age: 65
End: 2020-01-29
Payer: COMMERCIAL

## 2020-01-29 ENCOUNTER — TELEPHONE (OUTPATIENT)
Dept: FAMILY MEDICINE | Facility: CLINIC | Age: 65
End: 2020-01-29

## 2020-01-29 VITALS
WEIGHT: 136 LBS | HEART RATE: 91 BPM | SYSTOLIC BLOOD PRESSURE: 110 MMHG | TEMPERATURE: 98.5 F | RESPIRATION RATE: 16 BRPM | OXYGEN SATURATION: 98 % | DIASTOLIC BLOOD PRESSURE: 72 MMHG | BODY MASS INDEX: 27.27 KG/M2

## 2020-01-29 DIAGNOSIS — L02.214 ABSCESS OF GROIN: Primary | ICD-10-CM

## 2020-01-29 PROCEDURE — 99213 OFFICE O/P EST LOW 20 MIN: CPT | Performed by: PHYSICIAN ASSISTANT

## 2020-01-29 RX ORDER — SULFAMETHOXAZOLE/TRIMETHOPRIM 800-160 MG
1 TABLET ORAL 2 TIMES DAILY
Qty: 14 TABLET | Refills: 0 | Status: SHIPPED | OUTPATIENT
Start: 2020-01-29 | End: 2020-02-06

## 2020-01-29 NOTE — TELEPHONE ENCOUNTER
Patient calling and states she has a cyst in vaginal area.  Size of 50 cent piece.  States it is getting larger.  States is in so much pain she can not even walk.  Wants to see Dr. Byrne.  Discussed Dr. Byrne does not have opening but that Roscoe can see her at 2:10 pm.  Patient agrees with plan.  Elisabeth Freeman RN

## 2020-01-29 NOTE — TELEPHONE ENCOUNTER
Last Written Prescription Date:    Last Fill Quantity: ,  # refills:   Tramadol   12.27.19 #60  Ventolin 9.23.19  18 G R 1  [place] 12.27.19  #15   Last office visit: 10/30/2019 with prescribing provider:  Anastasia   Future Office Visit:      Routing refill request to provider for review/approval because:  Drug not on the [facility] refill protocol

## 2020-01-29 NOTE — PROGRESS NOTES
Subjective     Emeli Granados is a 64 year old female who presents to clinic today for the following health issues:    HPI   Vaginal Lump-possible cyst on rt inner labia, sx getting worse, no drainage or bleeding, extremely sore to sit and walk-x 3 days. Had gotten some drainage when squeezing only. Painful. She has been applying hot packs. Worsening over the next few days.      Patient Active Problem List   Diagnosis     Disorder of bone and cartilage     CARDIOVASCULAR SCREENING; LDL GOAL LESS THAN 130     Acute hepatitis C virus infection     Obstructive chronic bronchitis with exacerbation (H)     Osteopenia     Back pain     Alcohol dependence in remission (H)     Moderate persistent asthma with exacerbation     Other chronic pain     Past Surgical History:   Procedure Laterality Date     C NONSPECIFIC PROCEDURE  1988    hysterectomy-uterine & cervical ca - tubes and ovaries are still in - do NOT need paps     C NONSPECIFIC PROCEDURE      ganglion cyst removal     C NONSPECIFIC PROCEDURE      right thumb surgery     C NONSPECIFIC PROCEDURE  2003    dilatation of the esophagus once due to dysphagia and stricture     HC COLONOSCOPY THRU STOMA, DIAGNOSTIC  2004, 2012    repeat in 2022     HC HEMORRHOIDECTOMY W BANDING/LIGATION      Hemorrhoidectomy     HC REMOVAL OF TONSILS,<11 Y/O      Tonsils <11y.o.     HYSTERECTOMY, PAP NO LONGER INDICATED       LAPAROSCOPIC SALPINGO-OOPHORECTOMY Bilateral 10/23/2015    Procedure: LAPAROSCOPIC SALPINGO-OOPHORECTOMY;  Surgeon: Johnathan Steve MD;  Location:  OR       Social History     Tobacco Use     Smoking status: Former Smoker     Packs/day: 0.50     Years: 30.00     Pack years: 15.00     Types: Cigarettes     Start date: 10/13/1967     Smokeless tobacco: Never Used     Tobacco comment: has patches, trying to quit 10/30/2017   Substance Use Topics     Alcohol use: No     Comment: sober since 9/11/99     Family History   Problem Relation Age of Onset     Hypertension  Mother      Cerebrovascular Disease Mother         TIA's     Depression Mother      Cancer - colorectal Maternal Grandmother         dx at 65     Lipids Father      C.A.D. Father         angioplasty at 65     Musculoskeletal Disorder Father      Alcohol/Drug Daughter         in remission     Breast Cancer No family hx of          Current Outpatient Medications   Medication Sig Dispense Refill     albuterol (PROVENTIL) (2.5 MG/3ML) 0.083% neb solution INHALE 3 MILLILITERS (2.5 MG) BY NEBULIZATION ROUTE EVERY 6 HOURS AS NEEDED FOR SHORTNESS OF BREATH/DYSPNEA OR WHEEZING 30 mL 0     albuterol (VENTOLIN HFA) 108 (90 Base) MCG/ACT inhaler INHALE TWO PUFFS BY MOUTH EVERY SIX HOURS AS NEEDED 18 g 1     ascorbic acid (VITAMIN C) 1000 MG TABS Take 1,000 mg by mouth daily       CHANTIX 1 MG tablet TAKE 1 TABLET BY MOUTH 2 TIMES A DAY (START AFTER FINISHING STARTER PACK). 60 tablet 1     cholecalciferol (VITAMIN D3) 1000 UNIT tablet Take 1,000 Units by mouth daily       cyclobenzaprine (FLEXERIL) 10 MG tablet TAKE HALF TO ONE TABLET BY MOUTH THREE TIMES DAILY AS NEEDED FOR MUSCLE SPASMS. 30 tablet 0     HYDROcodone-acetaminophen (NORCO)  MG per tablet TAKE 1 TABLET BY MOUTH DAILY AS NEEDED FOR CHRONIC PAIN. DO NOT TAKE WITH TRAMADOL. 15 tablet 0     ipratropium - albuterol 0.5 mg/2.5 mg/3 mL (DUONEB) 0.5-2.5 (3) MG/3ML neb solution Take 1 vial (3 mLs) by nebulization every 6 hours as needed for shortness of breath / dyspnea or wheezing 30 vial 1     ipratropium - albuterol 0.5 mg/2.5 mg/3 mL (DUONEB) 0.5-2.5 (3) MG/3ML neb solution Take 1 vial (3 mLs) by nebulization every 6 hours as needed for shortness of breath / dyspnea or wheezing 30 vial 1     ipratropium - albuterol 0.5 mg/2.5 mg/3 mL (DUONEB) 0.5-2.5 (3) MG/3ML nebulization Take 1 vial (3 mLs) by nebulization every 4 hours as needed for shortness of breath / dyspnea or wheezing 30 vial 1     Multiple Vitamins-Minerals (MULTIVITAMIN OR) Take 1 tablet by mouth  daily Per pt, uses ones without Iron       nicotine (NICODERM CQ) 7 MG/24HR patch 2h hr Place 1 patch onto the skin every 24 hours 30 patch 0     omeprazole (PRILOSEC) 40 MG DR capsule Take 1 capsule (40 mg) by mouth daily 90 capsule 1     order for DME Cam walker boot right-  Low boot 1 Units 0     order for DME Equipment being ordered: Nebulizer 1 Device 0     sulfamethoxazole-trimethoprim (BACTRIM DS) 800-160 MG tablet Take 1 tablet by mouth 2 times daily for 7 days 14 tablet 0     traMADol (ULTRAM) 50 MG tablet TAKE ONE TABLET BY MOUTH THREE TIMES DAILY AS NEEDED FOR CHRONIC PAIN 60 tablet 0     varenicline (CHANTIX KATE) 0.5 MG X 11 & 1 MG X 42 tablet Take 0.5 mg tab daily for 3 days, THEN 0.5 mg tab twice daily for 4 days, THEN 1 mg twice daily. 53 tablet 0     vitamin E 400 UNITS TABS Take 400 Units by mouth daily       zolpidem (AMBIEN) 5 MG tablet TAKE ONE TABLET BY MOUTH EVERY NIGHT AS NEEDED FOR SLEEP. 30 tablet 0     Allergies   Allergen Reactions     Metaproterenol Sulfate Other (See Comments)     alupent inhaler-shaking     Percocet [Oxycodone-Acetaminophen] Itching         Reviewed and updated as needed this visit by Provider         Review of Systems   ROS COMP: Constitutional, HEENT, cardiovascular, pulmonary, gi and gu systems are negative, except as otherwise noted.        Objective    /72 (BP Location: Right arm, Patient Position: Chair, Cuff Size: Adult Regular)   Pulse 91   Temp 98.5  F (36.9  C) (Oral)   Resp 16   Wt 61.7 kg (136 lb)   LMP  (LMP Unknown)   SpO2 98%   BMI 27.27 kg/m    Body mass index is 27.27 kg/m .         Physical Exam   GENERAL: healthy, alert and no distress  EYES: Eyes grossly normal to inspection, PERRL and conjunctivae and sclerae normal   (female): normal female external genitalia, normal urethral meatus  and an abscess on the external part of the right labia majora. There is surrounding erythema. No drainage but there is a head noted. Tender to  palpation. The area is firm and non-fluctuant.   MS: no gross musculoskeletal defects noted, no edema  SKIN: no suspicious lesions or rashes  NEURO: Normal strength and tone, mentation intact and speech normal  PSYCH: mentation appears normal, affect normal/bright    Diagnostic Test Results:  none         Assessment & Plan     (L02.214) Abscess of groin  (primary encounter diagnosis)    Comment: Treat with bactrim. Area is firm and non-fluctuant so not able to be lanced today. Continue hot packs and start antibiotic. Follow-up if not improving as expected if the area becomes softer.    Plan: sulfamethoxazole-trimethoprim (BACTRIM DS)         800-160 MG tablet              Patient Instructions   Take the complete course of the antibiotic.    Apply hot packs as often as possible.     Follow-up if not improving in 1 week or sooner if worsening.      No follow-ups on file.    Roscoe Hu PA-C  [facility]

## 2020-01-29 NOTE — PATIENT INSTRUCTIONS
Take the complete course of the antibiotic.    Apply hot packs as often as possible.     Follow-up if not improving in 1 week or sooner if worsening.

## 2020-01-29 NOTE — LETTER
January 29, 2020      Emeli Granados  [address] [place] [id number]        To Whom It May Concern:    Emeli Granados was seen on 1/29/2020.  Please excuse her  until 2/1/2020 due to illness.        Sincerely,        Roscoe Hu PA-C

## 2020-01-30 RX ORDER — HYDROCODONE BITARTRATE AND ACETAMINOPHEN 10; 325 MG/1; MG/1
TABLET ORAL
Qty: 15 TABLET | Refills: 0 | Status: SHIPPED | OUTPATIENT
Start: 2020-01-30 | End: 2020-02-28

## 2020-01-30 RX ORDER — ALBUTEROL SULFATE 90 UG/1
AEROSOL, METERED RESPIRATORY (INHALATION)
Qty: 18 G | Refills: 0 | Status: SHIPPED | OUTPATIENT
Start: 2020-01-30 | End: 2020-05-19

## 2020-01-30 RX ORDER — TRAMADOL HYDROCHLORIDE 50 MG/1
TABLET ORAL
Qty: 60 TABLET | Refills: 0 | Status: SHIPPED | OUTPATIENT
Start: 2020-01-30 | End: 2020-02-28

## 2020-02-06 ENCOUNTER — OFFICE VISIT (OUTPATIENT)
Dept: FAMILY MEDICINE | Facility: CLINIC | Age: 65
End: 2020-02-06
Payer: COMMERCIAL

## 2020-02-06 VITALS
DIASTOLIC BLOOD PRESSURE: 76 MMHG | RESPIRATION RATE: 18 BRPM | BODY MASS INDEX: 27.86 KG/M2 | WEIGHT: 138.2 LBS | OXYGEN SATURATION: 95 % | TEMPERATURE: 97.8 F | HEART RATE: 85 BPM | SYSTOLIC BLOOD PRESSURE: 113 MMHG | HEIGHT: 59 IN

## 2020-02-06 DIAGNOSIS — J45.41 MODERATE PERSISTENT ASTHMA WITH EXACERBATION: ICD-10-CM

## 2020-02-06 DIAGNOSIS — L02.214 ABSCESS OF GROIN: Primary | ICD-10-CM

## 2020-02-06 PROCEDURE — 99214 OFFICE O/P EST MOD 30 MIN: CPT | Performed by: NURSE PRACTITIONER

## 2020-02-06 RX ORDER — SULFAMETHOXAZOLE/TRIMETHOPRIM 800-160 MG
1 TABLET ORAL 2 TIMES DAILY
Qty: 10 TABLET | Refills: 0 | Status: SHIPPED | OUTPATIENT
Start: 2020-02-06 | End: 2020-05-27

## 2020-02-06 ASSESSMENT — ASTHMA QUESTIONNAIRES
QUESTION_2 LAST FOUR WEEKS HOW OFTEN HAVE YOU HAD SHORTNESS OF BREATH: ONCE OR TWICE A WEEK
ACT_TOTALSCORE: 22
QUESTION_3 LAST FOUR WEEKS HOW OFTEN DID YOUR ASTHMA SYMPTOMS (WHEEZING, COUGHING, SHORTNESS OF BREATH, CHEST TIGHTNESS OR PAIN) WAKE YOU UP AT NIGHT OR EARLIER THAN USUAL IN THE MORNING: NOT AT ALL
QUESTION_4 LAST FOUR WEEKS HOW OFTEN HAVE YOU USED YOUR RESCUE INHALER OR NEBULIZER MEDICATION (SUCH AS ALBUTEROL): ONCE A WEEK OR LESS
QUESTION_1 LAST FOUR WEEKS HOW MUCH OF THE TIME DID YOUR ASTHMA KEEP YOU FROM GETTING AS MUCH DONE AT WORK, SCHOOL OR AT HOME: NONE OF THE TIME
QUESTION_5 LAST FOUR WEEKS HOW WOULD YOU RATE YOUR ASTHMA CONTROL: WELL CONTROLLED
ACUTE_EXACERBATION_TODAY: NO

## 2020-02-06 ASSESSMENT — MIFFLIN-ST. JEOR: SCORE: 1077.5

## 2020-02-06 NOTE — PROGRESS NOTES
Subjective     Emeli Granados is a 64 year old female who presents to clinic today for the following health issues:    Vaginal Problem      History of Present Illness        She eats 0-1 servings of fruits and vegetables daily.She consumes 2 sweetened beverage(s) daily.She exercises with enough effort to increase her heart rate 10 to 19 minutes per day.  She exercises with enough effort to increase her heart rate 3 or less days per week.   She is taking medications regularly.       Vaginal Symptoms   Onset: Groin Cyst  For about a week or so.  Pain has decreased and size of cyst decreased.Finished bactrim today.  Denies fever.     Therapies Tried and outcome: hot packs, baths  COPD:  ACT of 22      Patient Active Problem List   Diagnosis     Disorder of bone and cartilage     CARDIOVASCULAR SCREENING; LDL GOAL LESS THAN 130     Acute hepatitis C virus infection     Obstructive chronic bronchitis with exacerbation (H)     Osteopenia     Back pain     Alcohol dependence in remission (H)     Moderate persistent asthma with exacerbation     Other chronic pain     Past Surgical History:   Procedure Laterality Date     C NONSPECIFIC PROCEDURE  1988    hysterectomy-uterine & cervical ca - tubes and ovaries are still in - do NOT need paps     C NONSPECIFIC PROCEDURE      ganglion cyst removal     C NONSPECIFIC PROCEDURE      right thumb surgery     C NONSPECIFIC PROCEDURE  2003    dilatation of the esophagus once due to dysphagia and stricture     HC COLONOSCOPY THRU STOMA, DIAGNOSTIC  2004, 2012    repeat in 2022     HC HEMORRHOIDECTOMY W BANDING/LIGATION      Hemorrhoidectomy     HC REMOVAL OF TONSILS,<12 Y/O      Tonsils <11y.o.     HYSTERECTOMY, PAP NO LONGER INDICATED       LAPAROSCOPIC SALPINGO-OOPHORECTOMY Bilateral 10/23/2015    Procedure: LAPAROSCOPIC SALPINGO-OOPHORECTOMY;  Surgeon: Johnathan Steve MD;  Location:  OR       Social History     Tobacco Use     Smoking status: Former Smoker     Packs/day: 0.50      Years: 30.00     Pack years: 15.00     Types: Cigarettes     Start date: 10/13/1967     Last attempt to quit: 2019     Years since quittin.2     Smokeless tobacco: Never Used     Tobacco comment: quit 2019   Substance Use Topics     Alcohol use: No     Comment: sober since 99     Family History   Problem Relation Age of Onset     Hypertension Mother      Cerebrovascular Disease Mother         TIA's     Depression Mother      Cancer - colorectal Maternal Grandmother         dx at 65     Lipids Father      C.A.D. Father         angioplasty at 65     Musculoskeletal Disorder Father      Alcohol/Drug Daughter         in remission     Breast Cancer No family hx of          Current Outpatient Medications   Medication Sig Dispense Refill     sulfamethoxazole-trimethoprim (BACTRIM DS) 800-160 MG tablet Take 1 tablet by mouth 2 times daily 10 tablet 0     albuterol (PROVENTIL) (2.5 MG/3ML) 0.083% neb solution INHALE 3 MILLILITERS (2.5 MG) BY NEBULIZATION ROUTE EVERY 6 HOURS AS NEEDED FOR SHORTNESS OF BREATH/DYSPNEA OR WHEEZING 30 mL 0     albuterol (VENTOLIN HFA) 108 (90 Base) MCG/ACT inhaler INHALE TWO PUFFS BY MOUTH EVERY SIX HOURS AS NEEDED 18 g 0     ascorbic acid (VITAMIN C) 1000 MG TABS Take 1,000 mg by mouth daily       CHANTIX 1 MG tablet TAKE 1 TABLET BY MOUTH 2 TIMES A DAY (START AFTER FINISHING STARTER PACK). 60 tablet 1     cholecalciferol (VITAMIN D3) 1000 UNIT tablet Take 1,000 Units by mouth daily       cyclobenzaprine (FLEXERIL) 10 MG tablet TAKE HALF TO ONE TABLET BY MOUTH THREE TIMES DAILY AS NEEDED FOR MUSCLE SPASMS. 30 tablet 0     HYDROcodone-acetaminophen (NORCO)  MG per tablet TAKE ONE TABLET BY MOUTH DAILY AS NEEDED FOR CHRONIC PAIN. DO NOT TAKE WITH TRAMADOL. 15 tablet 0     ipratropium - albuterol 0.5 mg/2.5 mg/3 mL (DUONEB) 0.5-2.5 (3) MG/3ML neb solution Take 1 vial (3 mLs) by nebulization every 6 hours as needed for shortness of breath / dyspnea or wheezing 30 vial 1      ipratropium - albuterol 0.5 mg/2.5 mg/3 mL (DUONEB) 0.5-2.5 (3) MG/3ML neb solution Take 1 vial (3 mLs) by nebulization every 6 hours as needed for shortness of breath / dyspnea or wheezing 30 vial 1     ipratropium - albuterol 0.5 mg/2.5 mg/3 mL (DUONEB) 0.5-2.5 (3) MG/3ML nebulization Take 1 vial (3 mLs) by nebulization every 4 hours as needed for shortness of breath / dyspnea or wheezing 30 vial 1     Multiple Vitamins-Minerals (MULTIVITAMIN OR) Take 1 tablet by mouth daily Per pt, uses ones without Iron       nicotine (NICODERM CQ) 7 MG/24HR patch 2h hr Place 1 patch onto the skin every 24 hours 30 patch 0     omeprazole (PRILOSEC) 40 MG DR capsule Take 1 capsule (40 mg) by mouth daily 90 capsule 1     order for DME Cam walker boot right-  Low boot 1 Units 0     order for DME Equipment being ordered: Nebulizer 1 Device 0     traMADol (ULTRAM) 50 MG tablet TAKE ONE TABLET BY MOUTH THREE TIMES DAILY AS NEEDED FOR CHRONIC PAIN 60 tablet 0     varenicline (CHANTIX KATE) 0.5 MG X 11 & 1 MG X 42 tablet Take 0.5 mg tab daily for 3 days, THEN 0.5 mg tab twice daily for 4 days, THEN 1 mg twice daily. 53 tablet 0     vitamin E 400 UNITS TABS Take 400 Units by mouth daily       zolpidem (AMBIEN) 5 MG tablet TAKE ONE TABLET BY MOUTH EVERY NIGHT AS NEEDED FOR SLEEP. 30 tablet 0     BP Readings from Last 3 Encounters:   02/06/20 113/76   01/29/20 110/72   10/30/19 119/61    Wt Readings from Last 3 Encounters:   02/06/20 62.7 kg (138 lb 3.2 oz)   01/29/20 61.7 kg (136 lb)   10/30/19 60.3 kg (133 lb)                    Reviewed and updated as needed this visit by Provider         Review of Systems   Genitourinary: Positive for vaginal discharge.      ROS COMP: CONSTITUTIONAL: NEGATIVE for fever, chills, change in weight  RESP: NEGATIVE for significant cough or SOB  CV: NEGATIVE for chest pain, palpitations or peripheral edema  : see HPI  PSYCHIATRIC: NEGATIVE for changes in mood or affect      Objective    LMP  (LMP  "Unknown)   There is no height or weight on file to calculate BMI.  Physical Exam   GENERAL: healthy, alert and no distress  RESP: lungs clear to auscultation - no rales, rhonchi or wheezes  CV: regular rate and rhythm, normal S1 S2, no S3 or S4, no murmur, click or rub, no peripheral edema and peripheral pulses strong   (female): right groin with a 3 cm nodule with a center point, no drainage, tender to touch.  No surrounding erythema or tenderness.         Assessment & Plan   Assessment  Emeli was seen today for vaginal problem.    Diagnoses and all orders for this visit:    Abscess of groin:  Attempted I/D of groin, patient did not tolerate the injection of lidocaine, jumped and asked me to stop.  Discussed an alternative plan is to take bactrim for 5 more days, hot pack, see if area drains.  If does not resolve by 2/10 asked to call the clinic and leave me a message and I will work her into my schedule on 2/11 or 2/12 to attempt an I/D again, at that time will have another person in the room.  -     sulfamethoxazole-trimethoprim (BACTRIM DS) 800-160 MG tablet; Take 1 tablet by mouth 2 times daily    Moderate persistent asthma with exacerbation:  ACT 22, well controlled.     BMI:   Estimated body mass index is 27.91 kg/m  as calculated from the following:    Height as of this encounter: 1.499 m (4' 11\").    Weight as of this encounter: 62.7 kg (138 lb 3.2 oz).   FUTURE APPOINTMENTS:       - Follow-up visit in 4 days, sooner as needed.     Susan Haase, APRN [facility]"

## 2020-02-07 ASSESSMENT — ASTHMA QUESTIONNAIRES: ACT_TOTALSCORE: 22

## 2020-02-28 DIAGNOSIS — G47.9 SLEEP DISORDER: ICD-10-CM

## 2020-02-28 DIAGNOSIS — G89.29 OTHER CHRONIC PAIN: ICD-10-CM

## 2020-02-28 DIAGNOSIS — M54.50 ACUTE LOW BACK PAIN WITHOUT SCIATICA, UNSPECIFIED BACK PAIN LATERALITY: ICD-10-CM

## 2020-02-28 RX ORDER — HYDROCODONE BITARTRATE AND ACETAMINOPHEN 10; 325 MG/1; MG/1
TABLET ORAL
Qty: 15 TABLET | Refills: 0 | Status: SHIPPED | OUTPATIENT
Start: 2020-02-28 | End: 2020-03-26

## 2020-02-28 RX ORDER — CYCLOBENZAPRINE HCL 10 MG
TABLET ORAL
Qty: 30 TABLET | Refills: 0 | Status: SHIPPED | OUTPATIENT
Start: 2020-02-28 | End: 2020-05-03

## 2020-02-28 RX ORDER — TRAMADOL HYDROCHLORIDE 50 MG/1
TABLET ORAL
Qty: 60 TABLET | Refills: 0 | Status: SHIPPED | OUTPATIENT
Start: 2020-02-28 | End: 2020-03-26

## 2020-02-28 RX ORDER — ZOLPIDEM TARTRATE 5 MG/1
TABLET ORAL
Qty: 30 TABLET | Refills: 0 | Status: SHIPPED | OUTPATIENT
Start: 2020-02-28 | End: 2020-04-15

## 2020-02-28 NOTE — TELEPHONE ENCOUNTER
RX [place] ([place]) reviewed:  reviewed- no concerns    Tramadol 1/30/20   Norco 1/30/20   Zolpidem 1/6/20    Controlled Substance Refill Request for multiple see above   Problem List Complete:    Aileen Blackwell RN      PROVIDER TO CONSIDER COMPLETION OF PROBLEM LIST AND OVERVIEW/CONTROLLED SUBSTANCE AGREEMENT    Last Written Prescription Date:   Tramadol 1/30/20  # 60  Norco 1/30/20 #15  Zolpidem 1/6/20 # 30  Cyclobenzaprine  12/31/19 #30      Last Office Visit with [facility] primary care provider: 10/30/19    Future Office visit:     Controlled substance agreement:   Encounter-Level CSA - 07/17/2017:    Controlled Substance Agreement - Scan on 7/31/2017  9:32 PM: CONTROLLED SUBSTANCE AGREEMENT     Encounter-Level CSA - 01/07/2015:    Controlled Substance Agreement - Scan on 1/8/2015  9:46 AM: [facility] Controlled Medication Agreement 1-7-15     Patient-Level CSA:    Controlled Substance Agreement - Opioid - Scan on 7/29/2019  3:09 PM         Last Urine Drug Screen: No results found for: Brooke ARREAGA Drug Analysis UR   Date Value Ref Range Status   02/25/2019 FINAL  Final     Comment:     (Note)  ====================================================================  COMPREHENSIVE DRUG ANALYSIS,UR  ====================================================================  Test                             Result       Flag       Units        Drug Present   Tramadol                       1000                    ng/mg creat   O-Desmethyltramadol            1437                    ng/mg creat   N-Desmethyltramadol            213                     ng/mg creat    Source of tramadol is a prescription medication.    O-desmethyltramadol and N-desmethyltramadol are expected    metabolites of tramadol.  ====================================================================  Test                      Result    Flag   Units      Ref Range        Creatinine              109              mg/dL      >=20             ====================================================================  For clinical consultation, please call [phone].  ====================================================================  Analysis performed by [facility] [place] [address]     , No results found for: THC13, PCP13, COC13, MAMP13, OPI13, AMP13, BZO13, TCA13, MTD13, BAR13, OXY13, PPX13, BUP13     Processing:  Rx to be electronically transmitted to pharmacy by provider      [email or web address]       checked in past 3 months?  Yes yes today

## 2020-03-23 ENCOUNTER — NURSE TRIAGE (OUTPATIENT)
Dept: FAMILY MEDICINE | Facility: CLINIC | Age: 65
End: 2020-03-23

## 2020-03-23 NOTE — TELEPHONE ENCOUNTER
Patient calls, she reports last Tuesday morning she was walking quickly while barefoot and hit her left foot on a wooden pet ramp. Patient put on shoes and socks and worked a 9 hour shift. When she came home, she took off shoe and discovered foot was very swollen and had bruising on fourth and fifth toes. Patient reports there is still some bruising present. Patient recently started wearing a boot to help with the pain, but was told that in order to wear the boot to work she needs a doctor's note.  Patient reports current pain 7/10 when not in boot, 5-6/10 when in boot. Patient advised to use Tylenol/NSAID for pain relief, elevate, ice, and rest foot. Please advise - does patient need phone visit or xray? Can a letter of accomodation be written without formal assessment? Please route back for patient update.       Reason for Disposition    [1] Swollen foot AND [2] no fever  (Exceptions: localized bump from bunions, calluses, insect bite, sting)    [1] MODERATE pain (e.g., interferes with normal activities, limping) AND [2] present > 3 days    Additional Information    Negative: Followed a foot injury    Negative: Diabetes    Negative: Ankle pain is main symptom    Negative: Thigh or calf pain is main symptom    Negative: Entire foot is cool or blue in comparison to other foot    Negative: Purple or black skin on foot or toe    Negative: [1] Red area or streak AND [2] fever    Negative: [1] Swollen foot AND [2] fever    Negative: Patient sounds very sick or weak to the triager    Negative: [1] SEVERE pain (e.g., excruciating, unable to do any normal activities) AND [2] not improved after 2 hours of pain medicine    Negative: [1] Looks infected (spreading redness, pus) AND [2] large red area (> 2 in. or 5 cm)    Negative: Looks like a boil, infected sore, or deep ulcer    Negative: [1] Redness of the skin AND [2] no fever    Negative: Numbness in one foot (i.e., loss of sensation)    Negative: [1] Numbness or tingling  "in feet AND [2] new or increased    Negative: Pain in the big toe joint    Negative: [1] MILD pain (e.g., does not interfere with normal activities) AND [2] present > 7 days    Negative: Foot pain is a chronic symptom (recurrent or ongoing AND present > 4 weeks)    Negative: Caused by known bunions, plantar wart, or flat feet    Negative: Foot pain    Negative: Caused by overuse from recent vigorous activity (e.g.,  aerobics, jogging/running, physical work, prolonged walking, sports)    Negative: Caused by brief muscle cramps in the foot    Negative: Prevention of foot pain, questions about    Negative: Properly fitting shoes, questions about    Answer Assessment - Initial Assessment Questions  1. ONSET: \"When did the pain start?\"       3/17/2020  2. LOCATION: \"Where is the pain located?\"       Left foot  3. PAIN: \"How bad is the pain?\"    (Scale 1-10; or mild, moderate, severe)    -  MILD (1-3): doesn't interfere with normal activities     -  MODERATE (4-7): interferes with normal activities (e.g., work or school) or awakens from sleep, limping     -  SEVERE (8-10): excruciating pain, unable to do any normal activities, unable to walk      7/10 when not wearing boot, 5-6/10 when wearing the boot  4. WORK OR EXERCISE: \"Has there been any recent work or exercise that involved this part of the body?\"       Patient hit foot against wooden pet ramp  5. CAUSE: \"What do you think is causing the foot pain?\"      Patient hit foot against wooden pet ramp  6. OTHER SYMPTOMS: \"Do you have any other symptoms?\" (e.g., leg pain, rash, fever, numbness)      Bruising   7. PREGNANCY: \"Is there any chance you are pregnant?\" \"When was your last menstrual period?\"      N/A    Protocols used: FOOT PAIN-A-    Keysha Trujillo, DOTN, RN, PHN    "

## 2020-03-23 NOTE — LETTER
March 24, 2020      ATTN: [facility] [phone]    To Whom It May Concern,      Emeli Granados is my patient. Due to injury, she needs to wear an orthopedic boot on her left foot while at work for two weeks.           Sincerely,          Lars Oconnor MD/berkley

## 2020-03-23 NOTE — TELEPHONE ENCOUNTER
Symptoms  Describe your symptoms: foot pain-possible broke-injury  Any pain: Yes: painful, swelling   How long have you been having symptoms: 7  days  Have you been seen for this:  No  Appointment offered?: No  Triage offered?: Yes: please advise  Home remedies tried: wearing boot   Requested Pharmacy:   Okay to leave a detailed message? Yes at Cell number on file:    Telephone Information:   Mobile [phone]             Danisha Verma

## 2020-03-24 NOTE — TELEPHONE ENCOUNTER
Call to patient, she states she works at [employer], calls back with fax number to send letter - [phone]. Patient asks if she needs to have a different boot, as last time she needed a boot it was for her right foot. Patient states this boot is not form fitted, advised it sounds like a boot that can be worn on either foot. Letter faxed to employer.   Keysha Trujillo, DOTN, RN, PHN

## 2020-03-25 DIAGNOSIS — G89.29 OTHER CHRONIC PAIN: ICD-10-CM

## 2020-03-26 ENCOUNTER — TELEPHONE (OUTPATIENT)
Dept: FAMILY MEDICINE | Facility: CLINIC | Age: 65
End: 2020-03-26

## 2020-03-26 DIAGNOSIS — S99.922A FOOT INJURY, LEFT, INITIAL ENCOUNTER: Primary | ICD-10-CM

## 2020-03-26 RX ORDER — HYDROCODONE BITARTRATE AND ACETAMINOPHEN 10; 325 MG/1; MG/1
TABLET ORAL
Qty: 15 TABLET | Refills: 0 | Status: SHIPPED | OUTPATIENT
Start: 2020-03-26 | End: 2020-04-23

## 2020-03-26 RX ORDER — TRAMADOL HYDROCHLORIDE 50 MG/1
TABLET ORAL
Qty: 60 TABLET | Refills: 0 | Status: SHIPPED | OUTPATIENT
Start: 2020-03-26 | End: 2020-04-23

## 2020-03-26 NOTE — TELEPHONE ENCOUNTER
General Call:   Who is calling:  Patient  Reason for Call:  Boot  What are your questions or concerns:  Patient calling stating that she needs a new boot. She is wanting a nurse to call her asap as she needs to go to work. She did not give me any information and was upset that there was no nurse available at the time of call. Patient stated she spoke to Keysha yesterday about this.  Date of last appointment with provider: 2/6/20  Okay to leave a detailed message:Yes at Home number on file [phone] (home)     Cortney Cortes,

## 2020-03-26 NOTE — TELEPHONE ENCOUNTER
Routing refill request to provider for review/approval because:  Drug not on the [facility] refill protocol       Controlled Substance Refill Request for HYDROcodone-acetaminophen (NORCO)  MG per tablet  Problem List Complete:    Yes    Last Written Prescription Date:  2/28/2020  Last Fill Quantity: 15,   # refills: 0    Last Office Visit with [facility] primary care provider: 10/30/2020, seen by other providers as recently as 2/6/2020    Future Office visit: none currently scheduled    Controlled substance agreement:   Encounter-Level CSA - 07/17/2017:    Controlled Substance Agreement - Scan on 7/31/2017  9:32 PM: CONTROLLED SUBSTANCE AGREEMENT     Encounter-Level CSA - 01/07/2015:    Controlled Substance Agreement - Scan on 1/8/2015  9:46 AM: [facility] Controlled Medication Agreement 1-7-15     Patient-Level CSA:    Controlled Substance Agreement - Opioid - Scan on 7/29/2019  3:09 PM         Last Urine Drug Screen: No results found for: Brooke ARREAGA Drug Analysis UR   Date Value Ref Range Status   02/25/2019 FINAL  Final     Comment:     (Note)  ====================================================================  COMPREHENSIVE DRUG ANALYSIS,UR  ====================================================================  Test                             Result       Flag       Units        Drug Present   Tramadol                       1000                    ng/mg creat   O-Desmethyltramadol            1437                    ng/mg creat   N-Desmethyltramadol            213                     ng/mg creat    Source of tramadol is a prescription medication.    O-desmethyltramadol and N-desmethyltramadol are expected    metabolites of tramadol.  ====================================================================  Test                      Result    Flag   Units      Ref Range        Creatinine              109              mg/dL      >=20            ====================================================================  For  clinical consultation, please call [phone].  ====================================================================  Analysis performed by [facility], [address] [place] [address]     , No results found for: THC13, PCP13, COC13, MAMP13, OPI13, AMP13, BZO13, TCA13, MTD13, BAR13, OXY13, PPX13, BUP13     Processing:  Rx to be electronically transmitted to pharmacy by provider     [email or web address]   checked in past 3 months?  Yes 3/26/2020      Controlled Substance Refill Request for traMADol (ULTRAM) 50 MG tablet   Problem List Complete:    Yes    Last Written Prescription Date:  2/28/2020  Last Fill Quantity: 60,   # refills: 0     Processing:  Rx to be electronically transmitted to pharmacy by provider     [email or web address]   checked in past 3 months?  Yes 3/26/2020      DOT ShiN, RN, PHN

## 2020-03-26 NOTE — TELEPHONE ENCOUNTER
Called pt, see earlier calls, wants new air cast, discussed at length, see 7/2019 short air cast walking boot from urgent care, pt has not tried this boot yes, agrees in good condition, discussed toe injury, pt informs is improving daily, less swelling, less bruising, will use current boot if needed, no further action needed  Gayle Broderick RN, BSN  Message handled by Nurse Triage.

## 2020-04-15 DIAGNOSIS — G47.9 SLEEP DISORDER: ICD-10-CM

## 2020-04-15 DIAGNOSIS — G89.29 OTHER CHRONIC PAIN: ICD-10-CM

## 2020-04-15 RX ORDER — ZOLPIDEM TARTRATE 5 MG/1
TABLET ORAL
Qty: 30 TABLET | Refills: 0 | Status: SHIPPED | OUTPATIENT
Start: 2020-04-15 | End: 2020-05-27

## 2020-04-15 NOTE — TELEPHONE ENCOUNTER
Routing refill request to provider to review approval because:  Drug not on the [facility], [facility] or  Health refill protocol or controlled substance   RX [place] reviewed:  reviewed- no concerns    [place]:  [email or web address]  Controlled Substance Refill Request for Zolpidem   04/15/2020    Last Written Prescription Date:  02/28/2020  Last Fill Quantity: 30,  # refills: 0   Last office visit: 2/6/2020 with prescribing provider:  STEPHANIE   Future Office Visit:  NONE  Kary Quinteros RN

## 2020-04-22 DIAGNOSIS — G89.29 OTHER CHRONIC PAIN: ICD-10-CM

## 2020-04-23 RX ORDER — TRAMADOL HYDROCHLORIDE 50 MG/1
TABLET ORAL
Qty: 60 TABLET | Refills: 0 | Status: SHIPPED | OUTPATIENT
Start: 2020-04-23 | End: 2020-05-22

## 2020-04-23 RX ORDER — HYDROCODONE BITARTRATE AND ACETAMINOPHEN 10; 325 MG/1; MG/1
TABLET ORAL
Qty: 15 TABLET | Refills: 0 | Status: SHIPPED | OUTPATIENT
Start: 2020-04-23 | End: 2020-05-22

## 2020-04-23 NOTE — TELEPHONE ENCOUNTER
Patient calling to check on status of medication, states that she is out of this medication and that she recently injured her foot and has 2 fractures in her toes.         Rohini Crowe-Patient Rep

## 2020-05-03 DIAGNOSIS — M54.50 ACUTE LOW BACK PAIN WITHOUT SCIATICA, UNSPECIFIED BACK PAIN LATERALITY: ICD-10-CM

## 2020-05-03 RX ORDER — CYCLOBENZAPRINE HCL 10 MG
TABLET ORAL
Qty: 30 TABLET | Refills: 0 | Status: SHIPPED | OUTPATIENT
Start: 2020-05-03 | End: 2020-10-29

## 2020-05-19 DIAGNOSIS — J44.1 COPD EXACERBATION (H): ICD-10-CM

## 2020-05-19 RX ORDER — ALBUTEROL SULFATE 90 UG/1
AEROSOL, METERED RESPIRATORY (INHALATION)
Qty: 18 G | Refills: 0 | Status: SHIPPED | OUTPATIENT
Start: 2020-05-19 | End: 2020-06-15

## 2020-05-19 NOTE — TELEPHONE ENCOUNTER
Routing refill request to provider for review/approval because:  Please review quality needs.     Rimma Livingston RN on 5/19/2020 at 2:57 PM

## 2020-05-21 DIAGNOSIS — G89.29 OTHER CHRONIC PAIN: ICD-10-CM

## 2020-05-22 RX ORDER — TRAMADOL HYDROCHLORIDE 50 MG/1
TABLET ORAL
Qty: 60 TABLET | Refills: 0 | Status: SHIPPED | OUTPATIENT
Start: 2020-05-22 | End: 2020-06-26

## 2020-05-22 RX ORDER — HYDROCODONE BITARTRATE AND ACETAMINOPHEN 10; 325 MG/1; MG/1
TABLET ORAL
Qty: 15 TABLET | Refills: 0 | Status: SHIPPED | OUTPATIENT
Start: 2020-05-22 | End: 2020-06-26

## 2020-05-22 NOTE — TELEPHONE ENCOUNTER
Routing refill request to provider for review/approval because:  Drug not on the [facility] refill protocol     Rimma Livingston RN on 5/22/2020 at 10:25 AM

## 2020-05-23 ENCOUNTER — NURSE TRIAGE (OUTPATIENT)
Dept: NURSING | Facility: CLINIC | Age: 65
End: 2020-05-23

## 2020-05-23 NOTE — TELEPHONE ENCOUNTER
Last 2 days, low grad fevers usually after she gets off work.  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  Patient reports temp has been between 99.4-99.8 F (oral).  Patient reports she takes a Tylenol at night then no elevated temp in the morning.  The only other symptom she has is bodyaches.  FNA advised fever is 100.5 or higher.  Patient is concerned about COVID-19 and wants to schedule appointment with Dr. Oconnor.  Transferred call to schedule office visit per patient request.      Additional Information    Negative: Shock suspected (e.g., cold/pale/clammy skin, too weak to stand, low BP, rapid pulse)    Negative: Difficult to awaken or acting confused (e.g., disoriented, slurred speech)    Negative: [1] Difficulty breathing AND [2] bluish lips, tongue or face    Negative: New onset rash with multiple purple (or blood-colored) spots or dots    Negative: Sounds like a life-threatening emergency to the triager    Negative: Difficulty breathing    Negative: [1] Headache AND [2] stiff neck (can't touch chin to chest)    Negative: IV drug abuse    Negative: [1] Drinking very little AND [2] dehydration suspected (e.g., no urine > 12 hours, very dry mouth, very lightheaded)    Negative: Patient sounds very sick or weak to the triager  (Exception: mild weakness and hasn't taken fever medicine)    Negative: Fever > 104 F (40 C)    Negative: [1] Fever > 101 F (38.3 C) AND [2] age > 60    Negative: [1] Fever > 100.0 F (37.8 C) AND [2] bedridden (e.g., nursing home patient, CVA, chronic illness, recovering from surgery)    Negative: [1] Fever > 100.0 F (37.8 C) AND [2] indwelling urinary catheter (e.g., Michelle, Coude)    Negative: [1] Fever > 100.0 F (37.8 C) AND [2] has port (portacath), central line, or PICC line    Negative: [1] Fever > 100.0 F (37.8 C) AND [2] diabetes mellitus or weak immune system (e.g., HIV positive, cancer chemo, splenectomy, organ transplant, chronic steroids)    Negative: [1] Fever > 100.0 F (37.8  C) AND [2] surgery in the last month    Negative: Transplant patient (e.g., kidney, liver, lung, heart)    Negative: Fever present > 3 days (72 hours)    Negative: [1] Fever > 100.0 F (37.8 C) AND [2] foreign travel to a developing country in the last month    Negative: [1] Intermittent fever > 100.0 F (37.8 C) AND [2] lasts > 3 weeks    [1] Fever AND [2] no signs of serious infection or localizing symptoms (all other triage questions negative)    Protocols used: FEVER-A-AH

## 2020-05-26 DIAGNOSIS — G47.9 SLEEP DISORDER: ICD-10-CM

## 2020-05-27 ENCOUNTER — VIRTUAL VISIT (OUTPATIENT)
Dept: FAMILY MEDICINE | Facility: CLINIC | Age: 65
End: 2020-05-27
Payer: COMMERCIAL

## 2020-05-27 DIAGNOSIS — K22.2 BENIGN ESOPHAGEAL STRICTURE: Primary | ICD-10-CM

## 2020-05-27 PROCEDURE — 99213 OFFICE O/P EST LOW 20 MIN: CPT | Mod: 95 | Performed by: FAMILY MEDICINE

## 2020-05-27 RX ORDER — ZOLPIDEM TARTRATE 5 MG/1
TABLET ORAL
Qty: 30 TABLET | Refills: 1 | Status: SHIPPED | OUTPATIENT
Start: 2020-05-27 | End: 2020-07-22

## 2020-05-27 NOTE — PROGRESS NOTES
"Emeli Granados is a 65 year old female who is being evaluated via a billable telephone visit.      The patient has been notified of following:     \"This telephone visit will be conducted via a call between you and your physician/provider. We have found that certain health care needs can be provided without the need for a physical exam.  This service lets us provide the care you need with a short phone conversation.  If a prescription is necessary we can send it directly to your pharmacy.  If lab work is needed we can place an order for that and you can then stop by our lab to have the test done at a later time.    Telephone visits are billed at different rates depending on your insurance coverage. During this emergency period, for some insurers they may be billed the same as an in-person visit.  Please reach out to your insurance provider with any questions.    If during the course of the call the physician/provider feels a telephone visit is not appropriate, you will not be charged for this service.\"    Patient has given verbal consent for Telephone visit?  Yes    What phone number would you like to be contacted at? [phone]    How would you like to obtain your AVS? Jessicat    Subjective     Emeli Granados is a 65 year old female who presents via phone visit today for the following health issues:    HPI  Acute Illness   Acute illness concerns: low grade fever for 2  weeks  Onset: 2 week    Fever: YES- low grade    Chills/Sweats: YES    Headache (location?): no     Sinus Pressure:no    Conjunctivitis:  no    Ear Pain: no    Rhinorrhea: no     Congestion: no     Sore Throat: no      Cough: no    Wheeze: no     Decreased Appetite: no     Nausea: yes    Vomiting: no     Diarrhea:  no     Dysuria/Freq.: no     Fatigue/Achiness: YES    Sick/Strep Exposure: no      Therapies Tried and outcome: Tylenol         GERD/Heartburn past hx bouginage, swallowing getting more difficult not taking meds   Onset: 3 " months    Description:     Burning in chest: YES    Intensity: mild    Progression of Symptoms: same    Accompanying Signs & Symptoms:  Does it feel like food gets stuck: YES  Nausea: YES  Vomiting (bloody?): no   Abdominal Pain: no   Black-Tarry stools: no :  Bloody stools: no     History:   Previous ulcers: no     Precipitating factors:   Caffeine use: YES  Alcohol use: no   NSAID/Aspirin use: no   Tobacco use: no   Worse with fatty foods and spicy foods.    Alleviating factors:  Tums    Therapies Tried and outcome:chewable antacid    BP Readings from Last 3 Encounters:   02/06/20 113/76   01/29/20 110/72   10/30/19 119/61    Wt Readings from Last 3 Encounters:   02/06/20 62.7 kg (138 lb 3.2 oz)   01/29/20 61.7 kg (136 lb)   10/30/19 60.3 kg (133 lb)                    Reviewed and updated as needed this visit by Provider         Review of Systems   Constitutional, HEENT, cardiovascular, pulmonary, GI, , musculoskeletal, neuro, skin, endocrine and psych systems are negative, except as otherwise noted.       Objective   Reported vitals:  LMP  (LMP Unknown)    healthy, alert and no distress  PSYCH: Alert and oriented times 3; coherent speech, normal   rate and volume, able to articulate logical thoughts, able   to abstract reason, no tangential thoughts, no hallucinations   or delusions  Her affect is normal  RESP: No cough, no audible wheezing, able to talk in full sentences  Remainder of exam unable to be completed due to telephone visits    Diagnostic Test Results:  Labs reviewed in Epic  Past hx stricture 2016 [facility]        Assessment/Plan:  (K22.2) Benign esophageal stricture  (primary encounter diagnosis)  Comment:   Plan: come in tomorrow for follow up exam , lfts' cbc           Phone call duration:  29   minutes    Lars Oconnor MD

## 2020-05-28 ENCOUNTER — OFFICE VISIT (OUTPATIENT)
Dept: FAMILY MEDICINE | Facility: CLINIC | Age: 65
End: 2020-05-28
Payer: COMMERCIAL

## 2020-05-28 ENCOUNTER — ANCILLARY PROCEDURE (OUTPATIENT)
Dept: GENERAL RADIOLOGY | Facility: CLINIC | Age: 65
End: 2020-05-28
Attending: FAMILY MEDICINE
Payer: COMMERCIAL

## 2020-05-28 VITALS
BODY MASS INDEX: 29.31 KG/M2 | HEART RATE: 76 BPM | OXYGEN SATURATION: 97 % | DIASTOLIC BLOOD PRESSURE: 69 MMHG | WEIGHT: 145.1 LBS | TEMPERATURE: 98.2 F | SYSTOLIC BLOOD PRESSURE: 128 MMHG

## 2020-05-28 DIAGNOSIS — S92.502D CLOSED FRACTURE OF PHALANX OF LEFT FIFTH TOE WITH ROUTINE HEALING, SUBSEQUENT ENCOUNTER: ICD-10-CM

## 2020-05-28 DIAGNOSIS — M79.675 PAIN OF TOE OF LEFT FOOT: ICD-10-CM

## 2020-05-28 DIAGNOSIS — G89.21 CHRONIC PAIN DUE TO TRAUMA: ICD-10-CM

## 2020-05-28 DIAGNOSIS — K22.2 ESOPHAGEAL STRICTURE: Primary | ICD-10-CM

## 2020-05-28 LAB
ALBUMIN UR-MCNC: NEGATIVE MG/DL
APPEARANCE UR: CLEAR
BASOPHILS # BLD AUTO: 0 10E9/L (ref 0–0.2)
BASOPHILS NFR BLD AUTO: 0.3 %
BILIRUB UR QL STRIP: NEGATIVE
COLOR UR AUTO: YELLOW
DIFFERENTIAL METHOD BLD: NORMAL
EOSINOPHIL # BLD AUTO: 0.1 10E9/L (ref 0–0.7)
EOSINOPHIL NFR BLD AUTO: 1.3 %
ERYTHROCYTE [DISTWIDTH] IN BLOOD BY AUTOMATED COUNT: 13.1 % (ref 10–15)
GLUCOSE UR STRIP-MCNC: NEGATIVE MG/DL
HCT VFR BLD AUTO: 43 % (ref 35–47)
HGB BLD-MCNC: 14.5 G/DL (ref 11.7–15.7)
HGB UR QL STRIP: NEGATIVE
KETONES UR STRIP-MCNC: NEGATIVE MG/DL
LEUKOCYTE ESTERASE UR QL STRIP: NEGATIVE
LYMPHOCYTES # BLD AUTO: 2.3 10E9/L (ref 0.8–5.3)
LYMPHOCYTES NFR BLD AUTO: 25.4 %
MCH RBC QN AUTO: 31.7 PG (ref 26.5–33)
MCHC RBC AUTO-ENTMCNC: 33.7 G/DL (ref 31.5–36.5)
MCV RBC AUTO: 94 FL (ref 78–100)
MONOCYTES # BLD AUTO: 0.6 10E9/L (ref 0–1.3)
MONOCYTES NFR BLD AUTO: 7.1 %
NEUTROPHILS # BLD AUTO: 5.9 10E9/L (ref 1.6–8.3)
NEUTROPHILS NFR BLD AUTO: 65.9 %
NITRATE UR QL: NEGATIVE
PH UR STRIP: 5 PH (ref 5–7)
PLATELET # BLD AUTO: 305 10E9/L (ref 150–450)
RBC # BLD AUTO: 4.58 10E12/L (ref 3.8–5.2)
SOURCE: NORMAL
SP GR UR STRIP: 1.01 (ref 1–1.03)
T3FREE SERPL-MCNC: 2.6 PG/ML (ref 2.3–4.2)
UROBILINOGEN UR STRIP-ACNC: 0.2 EU/DL (ref 0.2–1)
WBC # BLD AUTO: 9 10E9/L (ref 4–11)

## 2020-05-28 PROCEDURE — 73630 X-RAY EXAM OF FOOT: CPT | Mod: LT

## 2020-05-28 PROCEDURE — 84481 FREE ASSAY (FT-3): CPT | Performed by: FAMILY MEDICINE

## 2020-05-28 PROCEDURE — 99214 OFFICE O/P EST MOD 30 MIN: CPT | Performed by: FAMILY MEDICINE

## 2020-05-28 PROCEDURE — 81003 URINALYSIS AUTO W/O SCOPE: CPT | Mod: 59 | Performed by: FAMILY MEDICINE

## 2020-05-28 PROCEDURE — 80050 GENERAL HEALTH PANEL: CPT | Performed by: FAMILY MEDICINE

## 2020-05-28 PROCEDURE — 36415 COLL VENOUS BLD VENIPUNCTURE: CPT | Performed by: FAMILY MEDICINE

## 2020-05-28 PROCEDURE — 99000 SPECIMEN HANDLING OFFICE-LAB: CPT | Performed by: FAMILY MEDICINE

## 2020-05-28 PROCEDURE — 80307 DRUG TEST PRSMV CHEM ANLYZR: CPT | Mod: 90 | Performed by: FAMILY MEDICINE

## 2020-05-28 PROCEDURE — 84439 ASSAY OF FREE THYROXINE: CPT | Performed by: FAMILY MEDICINE

## 2020-05-28 NOTE — LETTER
June 1, 2020      Emeli Granados  [address] [address]        Dear ,    We are writing to inform you of your test results.      Blood test results are all normal. Blood tests look pretty good. Hope you feel better soon. Your toe looks 3/4 healed, will still take more time.  Resulted Orders   CBC with platelets and differential   Result Value Ref Range    WBC 9.0 4.0 - 11.0 10e9/L    RBC Count 4.58 3.8 - 5.2 10e12/L    Hemoglobin 14.5 11.7 - 15.7 g/dL    Hematocrit 43.0 35.0 - 47.0 %    MCV 94 78 - 100 fl    MCH 31.7 26.5 - 33.0 pg    MCHC 33.7 31.5 - 36.5 g/dL    RDW 13.1 10.0 - 15.0 %    Platelet Count 305 150 - 450 10e9/L    % Neutrophils 65.9 %    % Lymphocytes 25.4 %    % Monocytes 7.1 %    % Eosinophils 1.3 %    % Basophils 0.3 %    Absolute Neutrophil 5.9 1.6 - 8.3 10e9/L    Absolute Lymphocytes 2.3 0.8 - 5.3 10e9/L    Absolute Monocytes 0.6 0.0 - 1.3 10e9/L    Absolute Eosinophils 0.1 0.0 - 0.7 10e9/L    Absolute Basophils 0.0 0.0 - 0.2 10e9/L    Diff Method Automated Method    T3, Free   Result Value Ref Range    Free T3 2.6 2.3 - 4.2 pg/mL   *UA reflex to Microscopic and Culture ([place] [facility] (except [place] and [place])   Result Value Ref Range    Color Urine Yellow     Appearance Urine Clear     Glucose Urine Negative NEG^Negative mg/dL    Bilirubin Urine Negative NEG^Negative    Ketones Urine Negative NEG^Negative mg/dL    Specific Gravity Urine 1.015 1.003 - 1.035    Blood Urine Negative NEG^Negative    pH Urine 5.0 5.0 - 7.0 pH    Protein Albumin Urine Negative NEG^Negative mg/dL    Urobilinogen Urine 0.2 0.2 - 1.0 EU/dL    Nitrite Urine Negative NEG^Negative    Leukocyte Esterase Urine Negative NEG^Negative    Source Midstream Urine        If you have any questions or concerns, please call the clinic at the number listed above.       Sincerely,        Lars Oconnor MD/AL

## 2020-05-28 NOTE — PROGRESS NOTES
Subjective     Emeli Granados is a 65 year old female who presents to clinic today for the following health issues:    HPI   Abdominal Pain  LAB REVIEW, hemoglobin normal , ua clear     Duration: 2 months    Description (location/character/radiation): fullness in the abdominal area       Associated flank pain: None    Intensity:  mild    Accompanying signs and symptoms:        Fever/Chills: YES- fever at night       Gas/Bloating: YES       Nausea/vomitting: YES- nausea       Diarrhea: no        Dysuria or Hematuria: no     History (previous similar pain/trauma/previous testing): yes    Precipitating or alleviating factors:       Pain worse with eating/BM/urination: eating        Pain relieved by BM: YES    Therapies tried and outcome: stool softeners    LMP:  not applicable    Concerned about difficulty swallowing, hph gerd with stricture and past dilatation    BP Readings from Last 3 Encounters:   05/28/20 128/69   02/06/20 113/76   01/29/20 110/72    Wt Readings from Last 3 Encounters:   05/28/20 65.8 kg (145 lb 1.6 oz)   02/06/20 62.7 kg (138 lb 3.2 oz)   01/29/20 61.7 kg (136 lb)                    Reviewed and updated as needed this visit by Provider         Review of Systems   Constitutional, HEENT, cardiovascular, pulmonary, GI, , musculoskeletal, neuro, skin, endocrine and psych systems are negative, except as otherwise noted.      Objective    LMP  (LMP Unknown)   There is no height or weight on file to calculate BMI.  Physical Exam   GENERAL: healthy, alert and no distress  EYES: Eyes grossly normal to inspection, PERRL and conjunctivae and sclerae normal  HENT: ear canals and TM's normal, nose and mouth without ulcers or lesions  NECK: no adenopathy, no asymmetry, masses, or scars and thyroid normal to palpation  RESP: lungs clear to auscultation - no rales, rhonchi or wheezes  RESP: lungs clear to auscultation - no rales, rhonchi or wheezes and QUIT TOBACCO 18 MONTHS AGO   CV: regular rate and  rhythm, normal S1 S2, no S3 or S4, no murmur, click or rub, no peripheral edema and peripheral pulses strong  ABDOMEN: soft, nontender, no hepatosplenomegaly, no masses and bowel sounds normal  MS: no gross musculoskeletal defects noted, no edema  SKIN: no suspicious lesions or rashes  NEURO: Normal strength and tone, mentation intact and speech normal  PSYCH: mentation appears normal, affect normal/bright    Diagnostic Test Results:  Labs reviewed in Epic        Assessment & Plan     1. Esophageal stricture    - CBC with platelets and differential  - Comprehensive metabolic panel  - TSH  - T4, free  - T3, Free  - *UA reflex to Microscopic and Culture ([place] [facility] (except [place] and [place])    2. Pain of toe of left foot  FX HEALING  - XR Foot Left G/E 3 Views    3. Chronic pain due to trauma  BACK AND NECK   - Drug  Screen Comprehensive , Urine with Reported Meds ([facility]) (Pain Care Package)    4. Closed fracture of phalanx of left fifth toe with routine healing, subsequent encounter  TRANSITION BOOT          Regular exercise      Return in about 3 months (around 8/28/2020).    Lars Oconnor MD  [facility]

## 2020-05-29 LAB
ALBUMIN SERPL-MCNC: 3.4 G/DL (ref 3.4–5)
ALP SERPL-CCNC: 95 U/L (ref 40–150)
ALT SERPL W P-5'-P-CCNC: 25 U/L (ref 0–50)
ANION GAP SERPL CALCULATED.3IONS-SCNC: 8 MMOL/L (ref 3–14)
AST SERPL W P-5'-P-CCNC: 18 U/L (ref 0–45)
BILIRUB SERPL-MCNC: 0.4 MG/DL (ref 0.2–1.3)
BUN SERPL-MCNC: 15 MG/DL (ref 7–30)
CALCIUM SERPL-MCNC: 8.9 MG/DL (ref 8.5–10.1)
CHLORIDE SERPL-SCNC: 107 MMOL/L (ref 94–109)
CO2 SERPL-SCNC: 24 MMOL/L (ref 20–32)
CREAT SERPL-MCNC: 0.65 MG/DL (ref 0.52–1.04)
GFR SERPL CREATININE-BSD FRML MDRD: >90 ML/MIN/{1.73_M2}
GLUCOSE SERPL-MCNC: 91 MG/DL (ref 70–99)
POTASSIUM SERPL-SCNC: 4.3 MMOL/L (ref 3.4–5.3)
PROT SERPL-MCNC: 7.5 G/DL (ref 6.8–8.8)
SODIUM SERPL-SCNC: 139 MMOL/L (ref 133–144)
T4 FREE SERPL-MCNC: 1.03 NG/DL (ref 0.76–1.46)
TSH SERPL DL<=0.005 MIU/L-ACNC: 0.88 MU/L (ref 0.4–4)

## 2020-06-01 LAB — PAIN DRUG SCR UR W RPTD MEDS: NORMAL

## 2020-06-26 DIAGNOSIS — G89.29 OTHER CHRONIC PAIN: ICD-10-CM

## 2020-06-26 RX ORDER — HYDROCODONE BITARTRATE AND ACETAMINOPHEN 10; 325 MG/1; MG/1
TABLET ORAL
Qty: 15 TABLET | Refills: 0 | Status: SHIPPED | OUTPATIENT
Start: 2020-06-26 | End: 2020-07-22

## 2020-06-26 RX ORDER — TRAMADOL HYDROCHLORIDE 50 MG/1
TABLET ORAL
Qty: 60 TABLET | Refills: 0 | Status: SHIPPED | OUTPATIENT
Start: 2020-06-26 | End: 2020-07-22

## 2020-06-26 RX ORDER — HYDROCODONE BITARTRATE AND ACETAMINOPHEN 10; 325 MG/1; MG/1
TABLET ORAL
Qty: 15 TABLET | Refills: 0 | OUTPATIENT
Start: 2020-06-26

## 2020-06-26 RX ORDER — TRAMADOL HYDROCHLORIDE 50 MG/1
TABLET ORAL
Qty: 60 TABLET | Refills: 0 | OUTPATIENT
Start: 2020-06-26

## 2020-06-26 NOTE — TELEPHONE ENCOUNTER
Routing refill request to provider for review/approval because:  Drug not on the [facility] refill protocol     Kylah Blackwell RN

## 2020-06-29 ENCOUNTER — OFFICE VISIT (OUTPATIENT)
Dept: FAMILY MEDICINE | Facility: CLINIC | Age: 65
End: 2020-06-29
Payer: COMMERCIAL

## 2020-06-29 ENCOUNTER — ANCILLARY PROCEDURE (OUTPATIENT)
Dept: GENERAL RADIOLOGY | Facility: CLINIC | Age: 65
End: 2020-06-29
Attending: PHYSICIAN ASSISTANT
Payer: COMMERCIAL

## 2020-06-29 VITALS
BODY MASS INDEX: 29.49 KG/M2 | TEMPERATURE: 97.4 F | RESPIRATION RATE: 16 BRPM | SYSTOLIC BLOOD PRESSURE: 124 MMHG | HEART RATE: 67 BPM | WEIGHT: 146 LBS | OXYGEN SATURATION: 96 % | DIASTOLIC BLOOD PRESSURE: 82 MMHG

## 2020-06-29 DIAGNOSIS — S99.921A FOOT INJURY, RIGHT, INITIAL ENCOUNTER: Primary | ICD-10-CM

## 2020-06-29 DIAGNOSIS — S99.921A FOOT INJURY, RIGHT, INITIAL ENCOUNTER: ICD-10-CM

## 2020-06-29 PROCEDURE — 99214 OFFICE O/P EST MOD 30 MIN: CPT | Performed by: PHYSICIAN ASSISTANT

## 2020-06-29 PROCEDURE — 73630 X-RAY EXAM OF FOOT: CPT | Mod: RT

## 2020-06-29 NOTE — LETTER
June 29, 2020      Emeli Granados  [address] [facility] [address]        To Whom It May Concern:    Emeli Granados was seen in our clinic. She may return to work with the following: allowed to wear a walking boot for up to 2 weeks.      Sincerely,        Roscoe Hu PA-C
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
## 2020-06-29 NOTE — PATIENT INSTRUCTIONS
No broken bones.     OK to wear the boot as needed for comfort for 1-2 weeks.     Apply ice 3-4 times a day for 15-20 minutes at a time.    Take Tylenol or ibuprofen as needed for pain.    Follow-up if not improving in 2-3 weeks or sooner if worsening.

## 2020-07-21 DIAGNOSIS — G89.29 OTHER CHRONIC PAIN: ICD-10-CM

## 2020-07-21 DIAGNOSIS — G47.9 SLEEP DISORDER: ICD-10-CM

## 2020-07-22 RX ORDER — HYDROCODONE BITARTRATE AND ACETAMINOPHEN 10; 325 MG/1; MG/1
TABLET ORAL
Qty: 15 TABLET | Refills: 0 | Status: SHIPPED | OUTPATIENT
Start: 2020-07-22 | End: 2020-08-17

## 2020-07-22 RX ORDER — ZOLPIDEM TARTRATE 5 MG/1
TABLET ORAL
Qty: 30 TABLET | Refills: 0 | Status: SHIPPED | OUTPATIENT
Start: 2020-07-22 | End: 2020-08-18

## 2020-07-22 RX ORDER — TRAMADOL HYDROCHLORIDE 50 MG/1
TABLET ORAL
Qty: 60 TABLET | Refills: 0 | Status: SHIPPED | OUTPATIENT
Start: 2020-07-22 | End: 2020-08-17

## 2020-08-17 DIAGNOSIS — G89.29 OTHER CHRONIC PAIN: ICD-10-CM

## 2020-08-17 DIAGNOSIS — G47.9 SLEEP DISORDER: ICD-10-CM

## 2020-08-18 RX ORDER — TRAMADOL HYDROCHLORIDE 50 MG/1
TABLET ORAL
Qty: 60 TABLET | Refills: 0 | Status: SHIPPED | OUTPATIENT
Start: 2020-08-18 | End: 2020-09-15

## 2020-08-18 RX ORDER — ZOLPIDEM TARTRATE 5 MG/1
TABLET ORAL
Qty: 30 TABLET | Refills: 0 | Status: SHIPPED | OUTPATIENT
Start: 2020-08-18 | End: 2020-09-21

## 2020-08-18 RX ORDER — HYDROCODONE BITARTRATE AND ACETAMINOPHEN 10; 325 MG/1; MG/1
TABLET ORAL
Qty: 15 TABLET | Refills: 0 | Status: SHIPPED | OUTPATIENT
Start: 2020-08-18 | End: 2020-09-15

## 2020-08-18 NOTE — TELEPHONE ENCOUNTER
Overdue for chronic pain follow up at the end of this month. Refilled to allow to schedule with pcp.     -sherry cain, pac

## 2020-08-21 NOTE — PROGRESS NOTES
Pre-Visit Planning     Future Appointments   Date Time Provider Department Center   8/25/2020  3:00 PM Lars Oconnro MD [facility]     Arrival Time for this Appointment:  3:00 PM   Appointment Notes for this encounter:   chronic pain follow up text--Wellness visit    Questionnaires Reviewed/Assigned  Additional questionnaires assigned        Patient preferred phone number: [phone]    Unable to reach. Left voicemail. Advised patient to call clinic back at [phone].

## 2020-08-25 ENCOUNTER — VIRTUAL VISIT (OUTPATIENT)
Dept: FAMILY MEDICINE | Facility: CLINIC | Age: 65
End: 2020-08-25
Payer: COMMERCIAL

## 2020-08-25 DIAGNOSIS — M54.50 CHRONIC RIGHT-SIDED LOW BACK PAIN WITHOUT SCIATICA: Primary | ICD-10-CM

## 2020-08-25 DIAGNOSIS — G89.29 CHRONIC RIGHT-SIDED LOW BACK PAIN WITHOUT SCIATICA: Primary | ICD-10-CM

## 2020-08-25 DIAGNOSIS — G89.29 OTHER CHRONIC PAIN: ICD-10-CM

## 2020-08-25 PROCEDURE — 99213 OFFICE O/P EST LOW 20 MIN: CPT | Mod: 95 | Performed by: FAMILY MEDICINE

## 2020-08-25 NOTE — PROGRESS NOTES
"Emeli Granados is a 65 year old female who is being evaluated via a billable video visit.      The patient has been notified of following:     \"This video visit will be conducted via a call between you and your physician/provider. We have found that certain health care needs can be provided without the need for an in-person physical exam.  This service lets us provide the care you need with a video conversation.  If a prescription is necessary we can send it directly to your pharmacy.  If lab work is needed we can place an order for that and you can then stop by our lab to have the test done at a later time.    Video visits are billed at different rates depending on your insurance coverage.  Please reach out to your insurance provider with any questions.    If during the course of the call the physician/provider feels a video visit is not appropriate, you will not be charged for this service.\"    Patient has given verbal consent for Video visit? Yes  How would you like to obtain your AVS? MyChart   Pt will be using cell phone, please send text to: [phone]        Subjective     Emeli Granados is a 65 year old female who presents today via video visit for the following health issues:    HPI    Chronic Pain Follow-Up    Where in your body do you have pain? Legs  How has your pain affected your ability to work? Working as tolerated  Which of these pain treatments have you tried since your last clinic visit? Rest and Stretching  How well are you sleeping? Poor  How has your mood been since your last visit? About the same  Have you had a significant life event? No  Other aggravating factors: none  Taking medication as directed? Yes    PHQ-9 SCORE 2/25/2019 7/29/2019 10/30/2019   PHQ-9 Total Score MyChart 0 0 0   PHQ-9 Total Score 0 0 0     HAMLET-7 SCORE 10/19/2018 7/29/2019 10/30/2019   Total Score - 0 (minimal anxiety) 0 (minimal anxiety)   Total Score 0 0 0     No flowsheet data found.  Encounter-Level CSA - " 07/17/2017:    Controlled Substance Agreement - Scan on 7/31/2017  9:32 PM: CONTROLLED SUBSTANCE AGREEMENT     Encounter-Level CSA - 01/07/2015:    Controlled Substance Agreement - Scan on 1/8/2015  9:46 AM: [facility] Controlled Medication Agreement 1-7-15     Patient-Level CSA:    Controlled Substance Agreement - Opioid - Scan on 7/29/2019  3:09 PM         How many servings of fruits and vegetables do you eat daily?  0-1    On average, how many sweetened beverages do you drink each day (Examples: soda, juice, sweet tea, etc.  Do NOT count diet or artificially sweetened beverages)?   0    How many days per week do you exercise enough to make your heart beat faster? 3 or less    How many minutes a day do you exercise enough to make your heart beat faster? 9 or less    How many days per week do you miss taking your medication? 0         Video Start Time: 3:00    Follow up chronic pain, had a new injury to her right shoulder     Review of Systems   Constitutional, HEENT, cardiovascular, pulmonary, GI, , musculoskeletal, neuro, skin, endocrine and psych systems are negative, except as otherwise noted.      Objective           Vitals:  No vitals were obtained today due to virtual visit.    Physical Exam     GENERAL: Healthy, alert and no distress  EYES: Eyes grossly normal to inspection.  No discharge or erythema, or obvious scleral/conjunctival abnormalities.  RESP: No audible wheeze, cough, or visible cyanosis.  No visible retractions or increased work of breathing.    SKIN: Visible skin clear. No significant rash, abnormal pigmentation or lesions.  NEURO: Cranial nerves grossly intact.  Mentation and speech appropriate for age.  PSYCH: Mentation appears normal, affect normal/bright, judgement and insight intact, normal speech and appearance well-groomed.              Assessment & Plan     There are no diagnoses linked to this encounter.     BMI:   Estimated body mass index is 29.49 kg/m  as calculated from the  "following:    Height as of 2/6/20: 1.499 m (4' 11\").    Weight as of 6/29/20: 66.2 kg (146 lb).       Patient Active Problem List   Diagnosis     Disorder of bone and cartilage     CARDIOVASCULAR SCREENING; LDL GOAL LESS THAN 130     Acute hepatitis C virus infection     Obstructive chronic bronchitis with exacerbation (H)     Osteopenia     Back pain     Alcohol dependence in remission (H)     Moderate persistent asthma with exacerbation     Other chronic pain     Cervical Spondylosis with right neck pain     Work on weight loss  Regular exercise    (M54.5,  G89.29) Chronic right-sided low back pain without sciatica  (primary encounter diagnosis)  Comment:   Plan:     (G89.29) Other chronic pain  Comment:   Plan: neck and now right shoulder         Lars Oconnor MD  [facility]      Video-Visit Details    Type of service:  Video Visit    Video End Time:3:22 PM    Originating Location (pt. Location): Home    Distant Location (provider location):  [facility]     Platform used for Video Visit: Skylar        "

## 2020-09-15 DIAGNOSIS — G89.29 OTHER CHRONIC PAIN: ICD-10-CM

## 2020-09-15 RX ORDER — TRAMADOL HYDROCHLORIDE 50 MG/1
TABLET ORAL
Qty: 60 TABLET | Refills: 0 | Status: SHIPPED | OUTPATIENT
Start: 2020-09-15 | End: 2020-10-16

## 2020-09-15 RX ORDER — HYDROCODONE BITARTRATE AND ACETAMINOPHEN 10; 325 MG/1; MG/1
TABLET ORAL
Qty: 15 TABLET | Refills: 0 | Status: SHIPPED | OUTPATIENT
Start: 2020-09-15 | End: 2020-10-02

## 2020-09-15 NOTE — TELEPHONE ENCOUNTER
Routing refill request to provider for review/approval because:  Drug not on the [facility] refill protocol     Summer Harding RN   [facility] -- Triage Nurse

## 2020-09-20 DIAGNOSIS — G47.9 SLEEP DISORDER: ICD-10-CM

## 2020-09-21 ENCOUNTER — NURSE TRIAGE (OUTPATIENT)
Dept: CARDIOLOGY | Facility: CLINIC | Age: 65
End: 2020-09-21

## 2020-09-21 RX ORDER — ZOLPIDEM TARTRATE 5 MG/1
TABLET ORAL
Qty: 30 TABLET | Refills: 0 | Status: SHIPPED | OUTPATIENT
Start: 2020-09-21 | End: 2020-10-29

## 2020-09-21 NOTE — TELEPHONE ENCOUNTER
Patient called asking if she should have her heart checked out or be concerned due to her age. Patient states she does not have any current issues or conditions, neither does her siblings. Patient states her mother had HTN and father had a pacemaker. Advised she could make an appointment to establish care (preventative) or can follow up with her PCP to discuss. Patient decided she will follow up with her PCP.

## 2020-10-02 ENCOUNTER — TELEPHONE (OUTPATIENT)
Dept: FAMILY MEDICINE | Facility: CLINIC | Age: 65
End: 2020-10-02

## 2020-10-02 DIAGNOSIS — G89.29 OTHER CHRONIC PAIN: ICD-10-CM

## 2020-10-02 RX ORDER — HYDROCODONE BITARTRATE AND ACETAMINOPHEN 10; 325 MG/1; MG/1
TABLET ORAL
Qty: 10 TABLET | Refills: 0 | Status: SHIPPED | OUTPATIENT
Start: 2020-10-02 | End: 2020-10-16

## 2020-10-02 NOTE — TELEPHONE ENCOUNTER
Patient calling and states twice she has had severe chest pain.  States first happened a week ago or so.  States last happened yesterday and states called daughter so someone would know if something happened to her.  States feels like a heat wave goes over her.  States feels like someone pushing on her chest.  Last 2 minutes or so.  Radiated to under her (R) arm.  Advised ER for evaluation.  Patient declines ER.  Discussed seriousness of these symptoms.  States it is her first day back to work and just wants her pain med refill for her surgical pain.  Advised surgeon should handle post op pain.  States she does not do it that way and she goes through Dr. Oconnor.  Again advised ER to evaluate chest pain.  Advised already message to Dr. Oconnor regarding pain med request and that will be addressed as well.  Elisabeth Freeman RN

## 2020-10-02 NOTE — TELEPHONE ENCOUNTER
General Call:     Who is calling:  Emeli    Reason for Call:  Pt states that she had surgery on 09/29 where they put rods and screws in her mouth. Pt states that she is in a lot of pain and is hoping to get some pain medication from Dr. Oconnor.     What are your questions or concerns:  n/a    Date of last appointment with provider: 08/25    Okay to leave a detailed message:No at Cell number on file:    Telephone Information:   Mobile [phone]      Kaitlin Gauthier Patient Representative

## 2020-10-02 NOTE — TELEPHONE ENCOUNTER
I was advised that the procedure was coming. She's been very reliable with her neck and back pain med.

## 2020-10-16 DIAGNOSIS — G89.29 OTHER CHRONIC PAIN: ICD-10-CM

## 2020-10-16 RX ORDER — TRAMADOL HYDROCHLORIDE 50 MG/1
TABLET ORAL
Qty: 60 TABLET | Refills: 0 | Status: SHIPPED | OUTPATIENT
Start: 2020-10-16 | End: 2020-11-12

## 2020-10-16 RX ORDER — HYDROCODONE BITARTRATE AND ACETAMINOPHEN 10; 325 MG/1; MG/1
TABLET ORAL
Qty: 15 TABLET | Refills: 0 | Status: SHIPPED | OUTPATIENT
Start: 2020-10-16 | End: 2020-11-12

## 2020-10-16 NOTE — TELEPHONE ENCOUNTER
Routing refill request to provider for review/approval because:  Drug not on the [facility] refill protocol     Summer Harding RN   [facility] -- Triage Nurse

## 2020-10-28 DIAGNOSIS — M54.50 ACUTE LOW BACK PAIN WITHOUT SCIATICA, UNSPECIFIED BACK PAIN LATERALITY: ICD-10-CM

## 2020-10-28 DIAGNOSIS — G47.9 SLEEP DISORDER: ICD-10-CM

## 2020-10-29 RX ORDER — CYCLOBENZAPRINE HCL 10 MG
TABLET ORAL
Qty: 30 TABLET | Refills: 0 | Status: SHIPPED | OUTPATIENT
Start: 2020-10-29 | End: 2020-11-20

## 2020-10-29 RX ORDER — ZOLPIDEM TARTRATE 5 MG/1
TABLET ORAL
Qty: 30 TABLET | Refills: 0 | Status: SHIPPED | OUTPATIENT
Start: 2020-10-29 | End: 2020-11-30

## 2020-10-29 NOTE — TELEPHONE ENCOUNTER
Routing refill request to provider for review/approval because:  Drug not on the [facility] refill protocol     Elisabeth Freeman RN

## 2020-11-10 DIAGNOSIS — G89.29 OTHER CHRONIC PAIN: ICD-10-CM

## 2020-11-10 NOTE — TELEPHONE ENCOUNTER
Routing refill request to provider for review/approval because:  Drug not on the [facility] refill protocol     Summer Harding RN   [facility] -- Triage Nurse

## 2020-11-11 DIAGNOSIS — G89.29 OTHER CHRONIC PAIN: ICD-10-CM

## 2020-11-11 NOTE — TELEPHONE ENCOUNTER
Routing refill request to provider for review/approval because:  Drug not on the [facility] refill protocol   Damaso Colorado RN, BSN

## 2020-11-12 RX ORDER — HYDROCODONE BITARTRATE AND ACETAMINOPHEN 10; 325 MG/1; MG/1
TABLET ORAL
Qty: 15 TABLET | Refills: 0 | OUTPATIENT
Start: 2020-11-12

## 2020-11-12 RX ORDER — TRAMADOL HYDROCHLORIDE 50 MG/1
TABLET ORAL
Qty: 60 TABLET | Refills: 0 | Status: SHIPPED | OUTPATIENT
Start: 2020-11-12 | End: 2020-12-07

## 2020-11-12 RX ORDER — TRAMADOL HYDROCHLORIDE 50 MG/1
TABLET ORAL
Qty: 60 TABLET | Refills: 0 | OUTPATIENT
Start: 2020-11-12

## 2020-11-12 RX ORDER — HYDROCODONE BITARTRATE AND ACETAMINOPHEN 10; 325 MG/1; MG/1
TABLET ORAL
Qty: 15 TABLET | Refills: 0 | Status: SHIPPED | OUTPATIENT
Start: 2020-11-12 | End: 2020-12-07

## 2020-11-17 RX ORDER — HYDROCODONE BITARTRATE AND ACETAMINOPHEN 10; 325 MG/1; MG/1
TABLET ORAL
Qty: 15 TABLET | Refills: 0 | Status: CANCELLED | OUTPATIENT
Start: 2020-11-17

## 2020-11-20 ENCOUNTER — OFFICE VISIT (OUTPATIENT)
Dept: FAMILY MEDICINE | Facility: CLINIC | Age: 65
End: 2020-11-20
Payer: COMMERCIAL

## 2020-11-20 VITALS
HEART RATE: 72 BPM | OXYGEN SATURATION: 96 % | TEMPERATURE: 98.3 F | HEIGHT: 59 IN | WEIGHT: 140 LBS | BODY MASS INDEX: 28.22 KG/M2 | SYSTOLIC BLOOD PRESSURE: 129 MMHG | DIASTOLIC BLOOD PRESSURE: 78 MMHG

## 2020-11-20 DIAGNOSIS — K64.4 EXTERNAL HEMORRHOIDS: ICD-10-CM

## 2020-11-20 DIAGNOSIS — E80.0 ERYTHROPOIETIC PORPHYRIA (H): ICD-10-CM

## 2020-11-20 DIAGNOSIS — M54.50 ACUTE LOW BACK PAIN WITHOUT SCIATICA, UNSPECIFIED BACK PAIN LATERALITY: ICD-10-CM

## 2020-11-20 DIAGNOSIS — Z00.00 ENCOUNTER FOR MEDICARE ANNUAL WELLNESS EXAM: Primary | ICD-10-CM

## 2020-11-20 DIAGNOSIS — J45.20 CHRONIC ASTHMA, MILD INTERMITTENT, UNCOMPLICATED: ICD-10-CM

## 2020-11-20 DIAGNOSIS — J20.9 ACUTE BRONCHITIS, UNSPECIFIED ORGANISM: ICD-10-CM

## 2020-11-20 DIAGNOSIS — J44.1 COPD EXACERBATION (H): ICD-10-CM

## 2020-11-20 DIAGNOSIS — J01.01 ACUTE RECURRENT MAXILLARY SINUSITIS: ICD-10-CM

## 2020-11-20 DIAGNOSIS — M54.50 CHRONIC RIGHT-SIDED LOW BACK PAIN WITHOUT SCIATICA: ICD-10-CM

## 2020-11-20 DIAGNOSIS — G89.29 OTHER CHRONIC PAIN: ICD-10-CM

## 2020-11-20 DIAGNOSIS — F10.21 ALCOHOL DEPENDENCE IN REMISSION (H): ICD-10-CM

## 2020-11-20 DIAGNOSIS — Z87.891 PERSONAL HISTORY OF TOBACCO USE, PRESENTING HAZARDS TO HEALTH: ICD-10-CM

## 2020-11-20 DIAGNOSIS — G89.29 CHRONIC RIGHT-SIDED LOW BACK PAIN WITHOUT SCIATICA: ICD-10-CM

## 2020-11-20 LAB
BASOPHILS # BLD AUTO: 0 10E9/L (ref 0–0.2)
BASOPHILS NFR BLD AUTO: 0.3 %
DIFFERENTIAL METHOD BLD: NORMAL
EOSINOPHIL # BLD AUTO: 0.1 10E9/L (ref 0–0.7)
EOSINOPHIL NFR BLD AUTO: 1.8 %
ERYTHROCYTE [DISTWIDTH] IN BLOOD BY AUTOMATED COUNT: 13.4 % (ref 10–15)
HCT VFR BLD AUTO: 42.3 % (ref 35–47)
HGB BLD-MCNC: 14.7 G/DL (ref 11.7–15.7)
LYMPHOCYTES # BLD AUTO: 3.3 10E9/L (ref 0.8–5.3)
LYMPHOCYTES NFR BLD AUTO: 43.8 %
MCH RBC QN AUTO: 31.9 PG (ref 26.5–33)
MCHC RBC AUTO-ENTMCNC: 34.8 G/DL (ref 31.5–36.5)
MCV RBC AUTO: 92 FL (ref 78–100)
MONOCYTES # BLD AUTO: 0.6 10E9/L (ref 0–1.3)
MONOCYTES NFR BLD AUTO: 7.5 %
NEUTROPHILS # BLD AUTO: 3.5 10E9/L (ref 1.6–8.3)
NEUTROPHILS NFR BLD AUTO: 46.6 %
PLATELET # BLD AUTO: 241 10E9/L (ref 150–450)
RBC # BLD AUTO: 4.61 10E12/L (ref 3.8–5.2)
WBC # BLD AUTO: 7.6 10E9/L (ref 4–11)

## 2020-11-20 PROCEDURE — 85025 COMPLETE CBC W/AUTO DIFF WBC: CPT | Performed by: FAMILY MEDICINE

## 2020-11-20 PROCEDURE — 83721 ASSAY OF BLOOD LIPOPROTEIN: CPT | Performed by: FAMILY MEDICINE

## 2020-11-20 PROCEDURE — 80053 COMPREHEN METABOLIC PANEL: CPT | Performed by: FAMILY MEDICINE

## 2020-11-20 PROCEDURE — 99397 PER PM REEVAL EST PAT 65+ YR: CPT | Performed by: FAMILY MEDICINE

## 2020-11-20 PROCEDURE — 36415 COLL VENOUS BLD VENIPUNCTURE: CPT | Performed by: FAMILY MEDICINE

## 2020-11-20 RX ORDER — ALBUTEROL SULFATE 0.83 MG/ML
SOLUTION RESPIRATORY (INHALATION)
Qty: 30 ML | Refills: 0 | Status: SHIPPED | OUTPATIENT
Start: 2020-11-20 | End: 2022-01-13

## 2020-11-20 RX ORDER — ALBUTEROL SULFATE 90 UG/1
AEROSOL, METERED RESPIRATORY (INHALATION)
Qty: 18 G | Refills: 3 | Status: SHIPPED | OUTPATIENT
Start: 2020-11-20 | End: 2021-10-27

## 2020-11-20 RX ORDER — CYCLOBENZAPRINE HCL 10 MG
TABLET ORAL
Qty: 30 TABLET | Refills: 0 | Status: SHIPPED | OUTPATIENT
Start: 2020-11-20 | End: 2021-06-09

## 2020-11-20 ASSESSMENT — ENCOUNTER SYMPTOMS
DIARRHEA: 0
CHILLS: 0
HEMATOCHEZIA: 0
EYE PAIN: 0
COUGH: 0
ABDOMINAL PAIN: 0
CONSTIPATION: 0
DIZZINESS: 0
HEMATURIA: 0
NERVOUS/ANXIOUS: 0

## 2020-11-20 ASSESSMENT — MIFFLIN-ST. JEOR: SCORE: 1080.67

## 2020-11-20 ASSESSMENT — ACTIVITIES OF DAILY LIVING (ADL): CURRENT_FUNCTION: NO ASSISTANCE NEEDED

## 2020-11-20 NOTE — LETTER
November 24, 2020      Emeli Granados  [address] [place] [id number]        Dear ,    We are writing to inform you of your test results.    Your blood test results are good. Nice to see you doing well and not smoking    Resulted Orders   CBC with platelets and differential   Result Value Ref Range    WBC 7.6 4.0 - 11.0 10e9/L    RBC Count 4.61 3.8 - 5.2 10e12/L    Hemoglobin 14.7 11.7 - 15.7 g/dL    Hematocrit 42.3 35.0 - 47.0 %    MCV 92 78 - 100 fl    MCH 31.9 26.5 - 33.0 pg    MCHC 34.8 31.5 - 36.5 g/dL    RDW 13.4 10.0 - 15.0 %    Platelet Count 241 150 - 450 10e9/L    % Neutrophils 46.6 %    % Lymphocytes 43.8 %    % Monocytes 7.5 %    % Eosinophils 1.8 %    % Basophils 0.3 %    Absolute Neutrophil 3.5 1.6 - 8.3 10e9/L    Absolute Lymphocytes 3.3 0.8 - 5.3 10e9/L    Absolute Monocytes 0.6 0.0 - 1.3 10e9/L    Absolute Eosinophils 0.1 0.0 - 0.7 10e9/L    Absolute Basophils 0.0 0.0 - 0.2 10e9/L    Diff Method Automated Method    LDL cholesterol direct   Result Value Ref Range    LDL Cholesterol Direct 93 <100 mg/dL      Comment:      Desirable:       <100 mg/dl   Comprehensive metabolic panel   Result Value Ref Range    Sodium 139 133 - 144 mmol/L    Potassium 4.1 3.4 - 5.3 mmol/L    Chloride 107 94 - 109 mmol/L    Carbon Dioxide 29 20 - 32 mmol/L    Anion Gap 3 3 - 14 mmol/L    Glucose 79 70 - 99 mg/dL      Comment:      Non Fasting    Urea Nitrogen 20 7 - 30 mg/dL    Creatinine 0.84 0.52 - 1.04 mg/dL    GFR Estimate 72 >60 mL/min/[1.73_m2]      Comment:      Non  GFR Calc  Starting 12/18/2018, serum creatinine based estimated GFR (eGFR) will be   calculated using the Chronic Kidney Disease Epidemiology Collaboration   (CKD-EPI) equation.      GFR Estimate If Black 84 >60 mL/min/[1.73_m2]      Comment:       GFR Calc  Starting 12/18/2018, serum creatinine based estimated GFR (eGFR) will be   calculated using the Chronic Kidney Disease Epidemiology  Collaboration   (CKD-EPI) equation.      Calcium 9.4 8.5 - 10.1 mg/dL    Bilirubin Total 0.3 0.2 - 1.3 mg/dL    Albumin 3.7 3.4 - 5.0 g/dL    Protein Total 7.1 6.8 - 8.8 g/dL    Alkaline Phosphatase 98 40 - 150 U/L    ALT 28 0 - 50 U/L    AST 27 0 - 45 U/L       If you have any questions or concerns, please call the clinic at the number listed above.       Sincerely,        Lars Oconnor MD

## 2020-11-20 NOTE — PROGRESS NOTES
"  SUBJECTIVE:   Emeli Granados is a 65 year old female who presents for Preventive Visit.  She quit smoking more than a year ago     Patient has been advised of split billing requirements and indicates understanding: Yes  Are you in the first 12 months of your Medicare Part B coverage?  No    Physical Health:  Answers for HPI/ROS submitted by the patient on 2020   Annual Exam:  In general, how would you rate your overall physical health?: excellent  Frequency of exercise:: None  Do you usually eat at least 4 servings of fruit and vegetables a day, include whole grains & fiber, and avoid regularly eating high fat or \"junk\" foods? : No  Taking medications regularly:: Yes  Medication side effects:: None  Activities of Daily Living: no assistance needed  Home safety: no safety concerns identified  Hearing Impairment:: no hearing concerns  In the past 6 months, have you been bothered by leaking of urine?: No  abdominal pain: No  Blood in stool: No  Blood in urine: No  chest pain: No  chills: No  congestion: No  constipation: No  cough: No  diarrhea: No  dizziness: No  ear pain: No  eye pain: No  nervous/anxious: No  In general, how would you rate your overall mental or emotional health?: excellent  Additional concerns today:: No          PHQ-2 Score:      Do you feel safe in your environment? Yes    Have you ever done Advance Care Planning? (For example, a Health Directive, POLST, or a discussion with a medical provider or your loved ones about your wishes): No, advance care planning information given to patient to review.  Patient plans to discuss their wishes with loved ones or provider.      Additional concerns to address?  YES    Fall risk:  Fallen 2 or more times in the past year?: (P) No  Any fall with injury in the past year?: (P) No  click delete button to remove this line now  Cognitive Screenin) Repeat 3 items (Leader, Season, Table)    2) Clock draw:   NORMAL  3) 3 item recall:   Recalls 2 " objects   Results: NORMAL clock, 1-2 items recalled: COGNITIVE IMPAIRMENT LESS LIKELY    Mini-CogTM Copyright S [employer]. Licensed by the author for use in [facility]; reprinted with permission (lisa@[facility]). All rights reserved.      Do you have sleep apnea, excessive snoring or daytime drowsiness?: yes        Hemorrhoids  Onset: 1 month    Description:   Pain: YES  Itching: YES    Accompanying Signs & Symptoms:  Blood streaked toilet paper: no   Blood in stool: no   Changes in stool pattern: no     History:   Any previous GI studies done:Colonoscopy and Abdominal x-rays  Family History of colon cancer: no     Precipitating factors:   None    Alleviating factors:  None    Therapies Tried and outcome: preparation H and Tucks++    Chest Pain  4 weeks ago (lasting 1 minute) , then 1 week later (lasting approx. 2 minutes)  Just watching TV while lying in bed  Pressure, felt like sitting on her chest. Pain was from the back to the front.     Reviewed and updated as needed this visit by clinical staff                 Reviewed and updated as needed this visit by Provider                Social History     Tobacco Use     Smoking status: Former Smoker     Packs/day: 0.50     Years: 30.00     Pack years: 15.00     Types: Cigarettes     Start date: 10/13/1967     Quit date: 2019     Years since quittin.0     Smokeless tobacco: Never Used     Tobacco comment: quit 2019   Substance Use Topics     Alcohol use: No     Comment: sober since 99                           Current providers sharing in care for this patient include:   Patient Care Team:  Lars Oconnor MD as PCP - General (Family Practice)  Lars Oconnor MD as Assigned PCP  Chela Guidry as Personal Advocate & Liaison (PAL) ([facility])    The following health maintenance items are reviewed in Epic and correct as of today:  Health Maintenance   Topic Date Due     HEPATITIS B IMMUNIZATION (1 of 3 - Risk 3-dose  "series) 02/09/1973     ZOSTER IMMUNIZATION (2 of 3) 06/16/2016     MEDICARE ANNUAL WELLNESS VISIT  02/09/2019     ADVANCE CARE PLANNING  04/15/2019     ANNUAL REVIEW OF HM ORDERS  07/29/2020     LUNG CANCER SCREENING ANNUAL  08/08/2020     FALL RISK ASSESSMENT  10/30/2020     Pneumococcal Vaccine: 65+ Years (2 of 2 - PPSV23) 09/29/2020     URINE DRUG SCREEN  05/28/2021     MAMMO SCREENING  08/08/2021     COLORECTAL CANCER SCREENING  10/22/2022     LIPID  01/29/2023     DTAP/TDAP/TD IMMUNIZATION (3 - Td) 03/04/2025     DEXA  Completed     SPIROMETRY  Completed     HEPATITIS C SCREENING  Completed     COPD ACTION PLAN  Completed     PHQ-2  Completed     INFLUENZA VACCINE  Completed     Pneumococcal Vaccine: Pediatrics (0 to 5 Years) and At-Risk Patients (6 to 64 Years)  Aged Out     IPV IMMUNIZATION  Aged Out     MENINGITIS IMMUNIZATION  Aged Out     BP Readings from Last 3 Encounters:   11/20/20 129/78   06/29/20 124/82   05/28/20 128/69    Wt Readings from Last 3 Encounters:   11/20/20 63.5 kg (140 lb)   06/29/20 66.2 kg (146 lb)   05/28/20 65.8 kg (145 lb 1.6 oz)                  Pneumonia Vaccine:Adults age 65+ who received Pneumovax (PPSV23) at 65 years or older: Should be given PCV13 > 1 year after their most recent PPSV23    ROS:  Constitutional, HEENT, cardiovascular, pulmonary, GI, , musculoskeletal, neuro, skin, endocrine and psych systems are negative, except as otherwise noted.    OBJECTIVE:   LMP  (LMP Unknown)  Estimated body mass index is 29.49 kg/m  as calculated from the following:    Height as of 2/6/20: 1.499 m (4' 11\").    Weight as of 6/29/20: 66.2 kg (146 lb).  EXAM:   GENERAL: healthy, alert and no distress  EYES: Eyes grossly normal to inspection, PERRL and conjunctivae and sclerae normal  HENT: ear canals and TM's normal, nose and mouth without ulcers or lesions  NECK: no adenopathy, no asymmetry, masses, or scars and thyroid normal to palpation  RESP: lungs clear to auscultation - no rales, " "rhonchi or wheezes  CV: regular rate and rhythm, normal S1 S2, no S3 or S4, no murmur, click or rub, no peripheral edema and peripheral pulses strong  ABDOMEN: soft, nontender, no hepatosplenomegaly, no masses and bowel sounds normal  MS: no gross musculoskeletal defects noted, no edema  SKIN: no suspicious lesions or rashes  NEURO: Normal strength and tone, mentation intact and speech normal  PSYCH: mentation appears normal, affect normal/bright    Diagnostic Test Results:  Labs reviewed in Epic    ASSESSMENT / PLAN:   1. Encounter for Medicare annual wellness exam    She quit tobacco a year ago and her lungs are clear now   2. Erythropoietic porphyria (H)  No hepatic issues , check labs      Patient has been advised of split billing requirements and indicates understanding: No    COUNSELING:  Reviewed preventive health counseling, as reflected in patient instructions       walks [id number] steps per day        Regular exercise       Healthy diet/nutrition       Vision screening    Estimated body mass index is 29.49 kg/m  as calculated from the following:    Height as of 2/6/20: 1.499 m (4' 11\").    Weight as of 6/29/20: 66.2 kg (146 lb).    Weight management plan: Discussed healthy diet and exercise guidelines    She reports that she quit smoking about 12 months ago. Her smoking use included cigarettes. She started smoking about 53 years ago. She has a 15.00 pack-year smoking history. She has never used smokeless tobacco.    Appropriate preventive services were discussed with this patient, including applicable screening as appropriate for cardiovascular disease, diabetes, osteopenia/osteoporosis, and glaucoma.  As appropriate for age/gender, discussed screening for colorectal cancer, prostate cancer, breast cancer, and cervical cancer. Checklist reviewing preventive services available has been given to the patient.    Reviewed patients plan of care and provided an AVS. The Basic Care Plan (routine screening as " documented in Health Maintenance) for Emeli meets the Care Plan requirement. This Care Plan has been established and reviewed with the Patient.    Counseling Resources:  ATP IV Guidelines  Pooled Cohorts Equation Calculator  Breast Cancer Risk Calculator  BRCA-Related Cancer Risk Assessment: FHS-7 Tool  FRAX Risk Assessment  ICSI Preventive Guidelines  Dietary Guidelines for Americans, 2010  USDA's MyPlate  ASA Prophylaxis  Lung CA Screening    Lars Oconnor MD  [facility]

## 2020-11-20 NOTE — PATIENT INSTRUCTIONS
Patient Education   Personalized Prevention Plan  You are due for the preventive services outlined below.  Your care team is available to assist you in scheduling these services.  If you have already completed any of these items, please share that information with your care team to update in your medical record.  Health Maintenance Due   Topic Date Due     Hepatitis B Vaccine (1 of 3 - Risk 3-dose series) 02/09/1973     Zoster (Shingles) Vaccine (2 of 3) 06/16/2016     Annual Wellness Visit  02/09/2019     Discuss Advance Care Planning  04/15/2019     ANNUAL REVIEW OF HM ORDERS  07/29/2020     Lung Cancer Screening (CT Scan)  08/08/2020     FALL RISK ASSESSMENT  10/30/2020     Pneumococcal Vaccine (2 of 2 - PPSV23) 09/29/2020

## 2020-11-21 LAB
ALBUMIN SERPL-MCNC: 3.7 G/DL (ref 3.4–5)
ALP SERPL-CCNC: 98 U/L (ref 40–150)
ALT SERPL W P-5'-P-CCNC: 28 U/L (ref 0–50)
ANION GAP SERPL CALCULATED.3IONS-SCNC: 3 MMOL/L (ref 3–14)
AST SERPL W P-5'-P-CCNC: 27 U/L (ref 0–45)
BILIRUB SERPL-MCNC: 0.3 MG/DL (ref 0.2–1.3)
BUN SERPL-MCNC: 20 MG/DL (ref 7–30)
CALCIUM SERPL-MCNC: 9.4 MG/DL (ref 8.5–10.1)
CHLORIDE SERPL-SCNC: 107 MMOL/L (ref 94–109)
CO2 SERPL-SCNC: 29 MMOL/L (ref 20–32)
CREAT SERPL-MCNC: 0.84 MG/DL (ref 0.52–1.04)
GFR SERPL CREATININE-BSD FRML MDRD: 72 ML/MIN/{1.73_M2}
GLUCOSE SERPL-MCNC: 79 MG/DL (ref 70–99)
LDLC SERPL DIRECT ASSAY-MCNC: 93 MG/DL
POTASSIUM SERPL-SCNC: 4.1 MMOL/L (ref 3.4–5.3)
PROT SERPL-MCNC: 7.1 G/DL (ref 6.8–8.8)
SODIUM SERPL-SCNC: 139 MMOL/L (ref 133–144)

## 2020-11-29 DIAGNOSIS — G47.9 SLEEP DISORDER: ICD-10-CM

## 2020-11-30 RX ORDER — ZOLPIDEM TARTRATE 5 MG/1
TABLET ORAL
Qty: 30 TABLET | Refills: 0 | Status: SHIPPED | OUTPATIENT
Start: 2020-11-30 | End: 2021-01-04

## 2020-12-02 ENCOUNTER — TELEPHONE (OUTPATIENT)
Dept: FAMILY MEDICINE | Facility: CLINIC | Age: 65
End: 2020-12-02

## 2020-12-02 NOTE — TELEPHONE ENCOUNTER
Patient calling for lab results.  Advised of below and that letter with results is in the mail.  TRENTON Burton Jeffrey Ralph, MD   11/24/2020  9:14 AM CST      Your blood test results are good. Nice to see you doing well and not smoking        0

## 2020-12-05 DIAGNOSIS — G89.29 OTHER CHRONIC PAIN: ICD-10-CM

## 2020-12-07 RX ORDER — TRAMADOL HYDROCHLORIDE 50 MG/1
TABLET ORAL
Qty: 60 TABLET | Refills: 0 | Status: SHIPPED | OUTPATIENT
Start: 2020-12-07 | End: 2021-01-08

## 2020-12-07 RX ORDER — HYDROCODONE BITARTRATE AND ACETAMINOPHEN 10; 325 MG/1; MG/1
TABLET ORAL
Qty: 15 TABLET | Refills: 0 | Status: SHIPPED | OUTPATIENT
Start: 2020-12-07 | End: 2021-01-08

## 2020-12-07 NOTE — TELEPHONE ENCOUNTER
Routing refill request to provider for review/approval because:  Drug not on the [facility] refill protocol     Rimma Livingston RN on 12/7/2020 at 9:43 AM

## 2021-01-08 ENCOUNTER — TELEPHONE (OUTPATIENT)
Dept: NURSING | Facility: CLINIC | Age: 66
End: 2021-01-08

## 2021-01-08 DIAGNOSIS — G89.29 OTHER CHRONIC PAIN: ICD-10-CM

## 2021-01-08 RX ORDER — TRAMADOL HYDROCHLORIDE 50 MG/1
TABLET ORAL
Qty: 60 TABLET | Refills: 0 | Status: SHIPPED | OUTPATIENT
Start: 2021-01-08 | End: 2021-02-05

## 2021-01-08 RX ORDER — HYDROCODONE BITARTRATE AND ACETAMINOPHEN 10; 325 MG/1; MG/1
TABLET ORAL
Qty: 15 TABLET | Refills: 0 | Status: SHIPPED | OUTPATIENT
Start: 2021-01-08 | End: 2021-02-05

## 2021-01-08 NOTE — TELEPHONE ENCOUNTER
Routing refill request to provider for review/approval because:  Drug not on the [facility] refill protocol     Rimma Livingston RN on 1/8/2021 at 4:00 PM

## 2021-01-08 NOTE — TELEPHONE ENCOUNTER
Controlled Substance Refill Request for Oxycodone and tramadol  Problem List Complete:    No     PROVIDER TO CONSIDER COMPLETION OF PROBLEM LIST AND OVERVIEW/CONTROLLED SUBSTANCE AGREEMENT    Last Written Prescription Date:  12/5/2020  Last Fill Quantity: 60 pills tramadol,   0 refills:  Norco 15 pills 0 refills     THE MOST RECENT OFFICE VISIT MUST BE WITHIN THE PAST 3 MONTHS. AT LEAST ONE FACE TO FACE VISIT MUST OCCUR EVERY 6 MONTHS. ADDITIONAL VISITS CAN BE VIRTUAL.  (THIS STATEMENT SHOULD BE DELETED.)    Last Office Visit with [facility] primary care provider: 11/20/2020  Will be out of  meds this weekend.     Future Office visit:     Controlled substance agreement:   Encounter-Level CSA - 07/17/2017:    Controlled Substance Agreement - Scan on 7/31/2017  9:32 PM: CONTROLLED SUBSTANCE AGREEMENT     Encounter-Level CSA - 01/07/2015:    Controlled Substance Agreement - Scan on 1/8/2015  9:46 AM: [facility] Controlled Medication Agreement 1-7-15     Patient-Level CSA:    Controlled Substance Agreement - Opioid - Scan on 7/29/2019  3:09 PM         Last Urine Drug Screen:   Pain Drug SCR UR W RPTD Meds   Date Value Ref Range Status   05/28/2020 FINAL  Final     Comment:     (Note)  ====================================================================  [facility] COMP DRUG ANALYSIS,UR  ====================================================================  Test                             Result       Flag       Units        Drug Present and Declared for Prescription Verification   Tramadol                       1289         EXPECTED   ng/mg creat   O-Desmethyltramadol            3206         EXPECTED   ng/mg creat   N-Desmethyltramadol            191          EXPECTED   ng/mg creat    Source of tramadol is a prescription medication.    O-desmethyltramadol and N-desmethyltramadol are expected    metabolites of tramadol.   Acetaminophen                  PRESENT      EXPECTED                Drug Absent but Declared for  Prescription Verification   Hydrocodone                    Not Detected UNEXPECTED ng/mg creat   Zolpidem                       Not Detected UNEXPECTED                Zolpidem, as indicated in the declared medication list, is not    always detected even when used as directed.  ====================================================================  Test                      Result    Flag   Units      Ref Range        Creatinine              53               mg/dL      >=20            ====================================================================  Declared Medications:  The flagging and interpretation on this report are based on the  following declared medications.  Unexpected results may arise from  inaccuracies in the declared medications.  **Note: The testing scope of this panel includes these medications:  Hydrocodone (Norco)  Tramadol  **Note: The testing scope of this panel does not include small to  moderate amounts of these reported medications:  Acetaminophen (Norco)  Zolpidem  ====================================================================  For clinical consultation, please call [phone].  ====================================================================  Analysis performed by [employer] [address] [place] [address]     [facility] Drug Analysis UR   Date Value Ref Range Status   02/25/2019 FINAL  Final     Comment:     (Note)  ====================================================================  COMPREHENSIVE DRUG ANALYSIS,UR  ====================================================================  Test                             Result       Flag       Units        Drug Present   Tramadol                       1000                    ng/mg creat   O-Desmethyltramadol            1437                    ng/mg creat   N-Desmethyltramadol            213                     ng/mg creat    Source of tramadol is a prescription medication.    O-desmethyltramadol and N-desmethyltramadol are  expected    metabolites of tramadol.  ====================================================================  Test                      Result    Flag   Units      Ref Range        Creatinine              109              mg/dL      >=20            ====================================================================  For clinical consultation, please call [phone].  ====================================================================  Analysis performed by [facility] [place] [address]     , No results found for: THC13, PCP13, COC13, MAMP13, OPI13, AMP13, BZO13, TCA13, MTD13, BAR13, OXY13, PPX13, BUP13     Processing:  Rx to be electronically transmitted to pharmacy by provider      [email or web address]       checked in past 3 months?  No, route to RN

## 2021-01-12 ENCOUNTER — TELEPHONE (OUTPATIENT)
Dept: FAMILY MEDICINE | Facility: CLINIC | Age: 66
End: 2021-01-12

## 2021-01-12 NOTE — TELEPHONE ENCOUNTER
Patient called and LVM on other RN PAL VM. TRENTON CARRERO returned phone call. Patient inquiring about COVID vaccine. 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 RN advised we are watching for updates from MD daily and hope to receive information about more vaccines. Encouraged patient to continue social distancing, wear a mask, and look to MD for further updates.     Ayan MARX RN

## 2021-03-04 DIAGNOSIS — G89.29 OTHER CHRONIC PAIN: ICD-10-CM

## 2021-03-04 DIAGNOSIS — G47.9 SLEEP DISORDER: ICD-10-CM

## 2021-03-04 NOTE — TELEPHONE ENCOUNTER
Routing refill request to provider for review/approval because:  Drug not on the [facility] refill protocol     Summer Harding RN   [facility] -- Triage Nurse

## 2021-03-05 RX ORDER — TRAMADOL HYDROCHLORIDE 50 MG/1
TABLET ORAL
Qty: 60 TABLET | Refills: 0 | Status: SHIPPED | OUTPATIENT
Start: 2021-03-05 | End: 2021-03-05

## 2021-03-05 RX ORDER — HYDROCODONE BITARTRATE AND ACETAMINOPHEN 10; 325 MG/1; MG/1
TABLET ORAL
Qty: 15 TABLET | Refills: 0 | Status: SHIPPED | OUTPATIENT
Start: 2021-03-05 | End: 2021-04-02

## 2021-03-05 RX ORDER — ZOLPIDEM TARTRATE 5 MG/1
TABLET ORAL
Qty: 30 TABLET | Refills: 0 | Status: SHIPPED | OUTPATIENT
Start: 2021-03-05 | End: 2021-03-05

## 2021-04-01 DIAGNOSIS — G89.29 OTHER CHRONIC PAIN: ICD-10-CM

## 2021-04-01 DIAGNOSIS — G47.9 SLEEP DISORDER: ICD-10-CM

## 2021-04-02 RX ORDER — HYDROCODONE BITARTRATE AND ACETAMINOPHEN 10; 325 MG/1; MG/1
TABLET ORAL
Qty: 15 TABLET | Refills: 0 | Status: SHIPPED | OUTPATIENT
Start: 2021-04-02 | End: 2021-04-16

## 2021-04-02 RX ORDER — TRAMADOL HYDROCHLORIDE 50 MG/1
TABLET ORAL
Qty: 60 TABLET | Refills: 0 | Status: SHIPPED | OUTPATIENT
Start: 2021-04-02 | End: 2021-04-16

## 2021-04-02 RX ORDER — ZOLPIDEM TARTRATE 5 MG/1
TABLET ORAL
Qty: 30 TABLET | Refills: 0 | Status: SHIPPED | OUTPATIENT
Start: 2021-04-02 | End: 2021-04-16

## 2021-04-02 NOTE — TELEPHONE ENCOUNTER
Patient scheduled for soonest available office visit,   Patient wondering the status of three prescriptions, please advise, re-route to triage pool      Requesting call-back when addressed [phone]

## 2021-04-05 NOTE — TELEPHONE ENCOUNTER
rx's were sent one time when Lars Oconnor MD sent appointment message, has refills  Gayle Broderick RN, BSN  Message handled by CLINIC NURSE.

## 2021-04-16 ENCOUNTER — OFFICE VISIT (OUTPATIENT)
Dept: FAMILY MEDICINE | Facility: CLINIC | Age: 66
End: 2021-04-16
Payer: COMMERCIAL

## 2021-04-16 VITALS
DIASTOLIC BLOOD PRESSURE: 66 MMHG | WEIGHT: 140.5 LBS | BODY MASS INDEX: 28.38 KG/M2 | OXYGEN SATURATION: 95 % | SYSTOLIC BLOOD PRESSURE: 118 MMHG | TEMPERATURE: 98.4 F | HEART RATE: 69 BPM

## 2021-04-16 DIAGNOSIS — G89.29 OTHER CHRONIC PAIN: Primary | ICD-10-CM

## 2021-04-16 DIAGNOSIS — G47.9 SLEEP DISORDER: ICD-10-CM

## 2021-04-16 DIAGNOSIS — Z13.220 LIPID SCREENING: ICD-10-CM

## 2021-04-16 PROCEDURE — 80306 DRUG TEST PRSMV INSTRMNT: CPT | Performed by: PHYSICIAN ASSISTANT

## 2021-04-16 PROCEDURE — 80061 LIPID PANEL: CPT | Performed by: PHYSICIAN ASSISTANT

## 2021-04-16 PROCEDURE — 80053 COMPREHEN METABOLIC PANEL: CPT | Performed by: PHYSICIAN ASSISTANT

## 2021-04-16 PROCEDURE — 36415 COLL VENOUS BLD VENIPUNCTURE: CPT | Performed by: PHYSICIAN ASSISTANT

## 2021-04-16 PROCEDURE — 99214 OFFICE O/P EST MOD 30 MIN: CPT | Performed by: PHYSICIAN ASSISTANT

## 2021-04-16 RX ORDER — TRAMADOL HYDROCHLORIDE 50 MG/1
TABLET ORAL
Qty: 60 TABLET | Refills: 0 | Status: SHIPPED | OUTPATIENT
Start: 2021-05-01 | End: 2021-06-01

## 2021-04-16 RX ORDER — HYDROCODONE BITARTRATE AND ACETAMINOPHEN 10; 325 MG/1; MG/1
TABLET ORAL
Qty: 15 TABLET | Refills: 0 | Status: SHIPPED | OUTPATIENT
Start: 2021-05-01 | End: 2021-06-01

## 2021-04-16 RX ORDER — ZOLPIDEM TARTRATE 5 MG/1
5 TABLET ORAL
Qty: 30 TABLET | Refills: 0 | Status: SHIPPED | OUTPATIENT
Start: 2021-05-01 | End: 2021-06-01

## 2021-04-16 NOTE — LETTER
Opioid / Opioid Plus Controlled Substance Agreement    This is an agreement between you and your provider about the safe and appropriate use of controlled substance/opioids prescribed by your care team. Controlled substances are medicines that can cause physical and mental dependence (abuse).    There are strict laws about having and using these medicines. We here at [facility] are committing to working with you in your efforts to get better. To support you in this work, we ll help you schedule regular office appointments for medicine refills. If we must cancel or change your appointment for any reason, we ll make sure you have enough medicine to last until your next appointment.     As a Provider, I will:    Listen carefully to your concerns and treat you with respect.     Recommend a treatment plan that I believe is in your best interest. This plan may involve therapies other than opioid pain medication.     Talk with you often about the possible benefits, and the risk of harm of any medicine that we prescribe for you.     Provide a plan on how to taper (discontinue or go off) using this medicine if the decision is made to stop its use.    As a Patient, I understand that opioid(s):     Are a controlled substance prescribed by my care team to help me function or work and manage my condition(s).     Are strong medicines and can cause serious side effects such as:    Drowsiness, which can seriously affect my driving ability    A lower breathing rate, enough to cause death    Harm to my thinking ability     Depression     Abuse of and addiction to this medicine    Need to be taken exactly as prescribed. Combining opioids with certain medicines or chemicals (such as illegal drugs, sedatives, sleeping pills, and benzodiazepines) can be dangerous or even fatal. If I stop opioids suddenly, I may have severe withdrawal symptoms.    Do not work for all types of pain nor for all patients. If they re not helpful, I may  be asked to stop them.      The risks, benefits and side effects of these medicine(s) were explained to me. I agree that:  1. I will take part in other treatments as advised by my care team. This may be psychiatry or counseling, physical therapy, behavioral therapy, group treatment or a referral to a specialist.     2. I will keep all my appointments. I understand that this is part of the monitoring of opioids. My care team may require an office visit for EVERY opioid/controlled substance refill. If I miss appointments or don t follow instructions, my care team may stop my medicine.    3. I will take my medicines as prescribed. I will not change the dose or schedule unless my care team tells me to. There will be no refills if I run out early.     4. I may be asked to come to the clinic and complete a urine drug test or complete a pill count at any time. If I don t give a urine sample or participate in a pill count, the care team may stop my medicine.    5. I will only receive prescriptions from this clinic for chronic pain. If I am treated by another provider for acute pain issues, I will tell them that I am taking opioid pain medication for chronic pain and that I have a treatment agreement with this provider. I will inform my [facility] care team within one business day if I am given a prescription for any pain medication by another healthcare provider. My [facility] care team can contact other providers and pharmacists about my use of any medicines.    6. It is up to me to make sure that I don t run out of my medicines on weekends or holidays. If my care team is willing to refill my opioid prescription without a visit, I must request refills only during office hours. Refills may take up to 3 business days to process. I will use one pharmacy to fill all my opioid and other controlled substance prescriptions. I will notify the clinic about any changes to my insurance or medication  availability.    7. I am responsible for my prescriptions. If the medicine/prescription is lost, stolen or destroyed, it will not be replaced. I also agree not to share controlled substance medicines with anyone.    8. I am aware I should not use any illegal or recreational drugs. I agree not to drink alcohol unless my care team says I can.       9. If I enroll in the [place] [facility] Cannabis program, I will tell my care team prior to my next refill.     10. I will tell my care team right away if I become pregnant, have a new medical problem treated outside of my regular clinic, or have a change in my medications.    11. I understand that this medicine can affect my thinking, judgment and reaction time. Alcohol and drugs affect the brain and body, which can affect the safety of my driving. Being under the influence of alcohol or drugs can affect my decision-making, behaviors, personal safety, and the safety of others. Driving while impaired (DWI) can occur if a person is driving, operating, or in physical control of a car, motorcycle, boat, snowmobile, ATV, motorbike, off-road vehicle, or any other motor vehicle ([place] Statute 169A.20). I understand the risk if I choose to drive or operate any vehicle or machinery.    I understand that if I do not follow any of the conditions above, my prescriptions or treatment may be stopped or changed.          Opioids  What You Need to Know    What are opioids?   Opioids are pain medicines that must be prescribed by a doctor. They are also known as narcotics.     Examples are:   1. morphine (MS Contin, Marlyn)  2. oxycodone (Oxycontin)  3. oxycodone and acetaminophen (Percocet)  4. hydrocodone and acetaminophen (Vicodin, Norco)   5. fentanyl patch (Duragesic)   6. hydromorphone (Dilaudid)   7. methadone  8. codeine (Tylenol #3)     What do opioids do well?   Opioids are best for severe short-term pain such as after a surgery or injury. They may work well for cancer pain. They may  help some people with long-lasting (chronic) pain.     What do opioids NOT do well?   Opioids never get rid of pain entirely, and they don t work well for most patients with chronic pain. Opioids don t reduce swelling, one of the causes of pain.                                    Other ways to manage chronic pain and improve function include:       Treat the health problem that may be causing pain    Anti-inflammation medicines, which reduce swelling and tenderness, such as ibuprofen (Advil, Motrin) or naproxen (Aleve)    Acetaminophen (Tylenol)    Antidepressants and anti-seizure medicines, especially for nerve pain    Topical treatments such as patches or creams    Injections or nerve blocks    Chiropractic or osteopathic treatment    Acupuncture, massage, deep breathing, meditation, visual imagery, aromatherapy    Use heat or ice at the pain site    Physical therapy     Exercise    Stop smoking    Take part in therapy       Risks and side effects     Talk to your doctor before you start or decide to keep taking opioids. Possible side effects include:      Lowering your breathing rate enough to cause death    Overdose, including death, especially if taking higher than prescribed doses    Worse depression symptoms; less pleasure in things you usually enjoy    Feeling tired or sluggish    Slower thoughts or cloudy thinking    Being more sensitive to pain over time; pain is harder to control    Trouble sleeping or restless sleep    Changes in hormone levels (for example, less testosterone)    Changes in sex drive or ability to have sex    Constipation    Unsafe driving    Itching and sweating    Dizziness    Nausea, throwing up and dry mouth    What else should I know about opioids?    Opioids may lead to dependence, tolerance, or addiction.      Dependence means that if you stop or reduce the medicine too quickly, you will have withdrawal symptoms. These include loose poop (diarrhea), jitters, flu-like symptoms,  nervousness and tremors. Dependence is not the same as addiction.                       Tolerance means needing higher doses over time to get the same effect. This may increase the chance of serious side effects.      Addiction is when people improperly use a substance that harms their body, their mind or their relations with others. Use of opiates can cause a relapse of addiction if you have a history of drug or alcohol abuse.      People who have used opioids for a long time may have a lower quality of life, worse depression, higher levels of pain and more visits to doctors.    You can overdose on opioids. Take these steps to lower your risk of overdose:    1. Recognize the signs:  Signs of overdose include decrease or loss of consciousness (blackout), slowed breathing, trouble waking up and blue lips. If someone is worried about overdose, they should call 911.    2. Talk to your doctor about Narcan (naloxone).   If you are at risk for overdose, you may be given a prescription for Narcan. This medicine very quickly reverses the effects of opioids.   If you overdose, a friend or family member can give you Narcan while waiting for the ambulance. They need to know the signs of overdose and how to give Narcan.     3. Don't use alcohol or street drugs.   Taking them with opioids can cause death.    4. Do not take any of these medicines unless your doctor says it s OK. Taking these with opioids can cause death:    Benzodiazepines, such as lorazepam (Ativan), alprazolam (Xanax) or diazepam (Valium)    Muscle relaxers, such as cyclobenzaprine (Flexeril)    Sleeping pills like zolpidem (Ambien)     Other opioids      How to keep you and other people safe while taking opioids:    1. Never share your opioids with others.  Opioid medicines are regulated by the Drug Enforcement Agency (ELSIE). Selling or sharing medications is a criminal act.    2. Be sure to store opioids in a secure place, locked up if possible. Young children  can easily swallow them and overdose.    3. When you are traveling with your medicines, keep them in the original bottles. If you use a pill box, be sure you also carry a copy of your medicine list from your clinic or pharmacy.    4. Safe disposal of opioids    Most pharmacies have places to get rid of medicine, called disposal kiosks. Medicine disposal options are also available in every [facility]. Search your county and  medication disposal  to find more options. You can find more details at:  [email or web address]-[email or web address]-medications     I agree that my provider, clinic care team, and pharmacy may work with any city, state or federal law enforcement agency that investigates the misuse, sale, or other diversion of my controlled medicine. I will allow my provider to discuss my care with, or share a copy of, this agreement with any other treating provider, pharmacy or emergency room where I receive care.    I have read this agreement and have asked questions about anything I did not understand.    _______________________________________________________  Patient Signature - Emeli Granados _____________________                   Date     _______________________________________________________  Provider Signature - Gina Arias PA-C   _____________________                   Date     _______________________________________________________  Witness Signature (required if provider not present while patient signing)   _____________________                   Date

## 2021-04-16 NOTE — LETTER
April 20, 2021      Emeli M Roberta  [address] [address]        Dear ,    We are writing to inform you of your test results.    Your test results fall within the expected range(s) or remain unchanged from previous results.  Please continue with current treatment plan.        If you have any questions or concerns, please call the clinic at the number listed above.       Sincerely,      Gina Arias PA-C

## 2021-04-16 NOTE — PROGRESS NOTES
Assessment & Plan     Other chronic pain  Refills provided for patient today. Once PCP is back she can follow up with him.  CSA signed today.  Urine drug screen obtained.  CMP obtained per patient request.  - HYDROcodone-acetaminophen (NORCO)  MG per tablet; TAKE ONE TABLET BY MOUTH DAILY AS NEEDED FOR CHRONIC PAIN. DO NOT TAKE WITH TRAMADOL  TAKE WITH FOOD.  - traMADol (ULTRAM) 50 MG tablet; TAKE ONE TABLET BY MOUTH TWICE A DAY AS NEEDED FOR PAIN,  TAKE WITH FOOD.  - Drug Abuse Screen Panel 13, Urine ([facility])  - Comprehensive metabolic panel (BMP + Alb, Alk Phos, ALT, AST, Total. Bili, TP)    Sleep disorder  Refills provided today.  - zolpidem (AMBIEN) 5 MG tablet; Take 1 tablet (5 mg) by mouth nightly as needed for sleep    Lipid screening  Patient asking for screening lipid obtained today.  - Lipid panel reflex to direct LDL Non-fasting    Ordering of each unique test  Prescription drug management      Return in about 3 months (around 7/16/2021) for Follow up chronic pain , In Person, With PCP.    Gina Arias PA-C  [facility]    Letter with results    Subjective   Emeli is a 66 year old who presents for the following health issues    HPI     Chronic Pain Follow-Up    Where in your body do you have pain? Low back, Legs   How has your pain affected your ability to work? Working as tolerated. She works at [employer] and this active work is hard on her.  Which of these pain treatments have you tried since your last clinic visit? Other: rest and stretching   How well are you sleeping? Poor  How has your mood been since your last visit? About the same  Have you had a significant life event? No  Other aggravating factors: none  Taking medication as directed? Yes    She notes she has done PT in the past. She recently joined [employer].    PHQ-9 SCORE 2/25/2019 7/29/2019 10/30/2019   PHQ-9 Total Score MyChart 0 0 0   PHQ-9 Total Score 0 0 0     HAMLET-7 SCORE 10/19/2018  7/29/2019 10/30/2019   Total Score - 0 (minimal anxiety) 0 (minimal anxiety)   Total Score 0 0 0     No flowsheet data found.  Encounter-Level CSA - 07/17/2017:    Controlled Substance Agreement - Scan on 7/31/2017  9:32 PM: CONTROLLED SUBSTANCE AGREEMENT     Encounter-Level CSA - 01/07/2015:    Controlled Substance Agreement - Scan on 1/8/2015  9:46 AM: [facility] Controlled Medication Agreement 1-7-15     Patient-Level CSA:    Controlled Substance Agreement - Opioid - Scan on 7/29/2019  3:09 PM         Review of Systems   GENERAL:  No fevers  MUSCULOSKELETAL: As noted in HPI          Objective    /66 (BP Location: Right arm, Patient Position: Chair, Cuff Size: Adult Regular)   Pulse 69   Temp 98.4  F (36.9  C) (Oral)   Wt 63.7 kg (140 lb 8 oz)   LMP  (LMP Unknown)   SpO2 95%   BMI 28.38 kg/m    Body mass index is 28.38 kg/m .  Physical Exam   GENERAL: No acute distress  HEENT: Normocephalic  NEURO: Alert and non-focal

## 2021-04-19 LAB
ALBUMIN SERPL-MCNC: 3.6 G/DL (ref 3.4–5)
ALP SERPL-CCNC: 98 U/L (ref 40–150)
ALT SERPL W P-5'-P-CCNC: 27 U/L (ref 0–50)
AMPHETAMINES UR QL: ABNORMAL NG/ML
ANION GAP SERPL CALCULATED.3IONS-SCNC: 5 MMOL/L (ref 3–14)
AST SERPL W P-5'-P-CCNC: 22 U/L (ref 0–45)
BARBITURATES UR QL SCN: ABNORMAL NG/ML
BENZODIAZ UR QL SCN: ABNORMAL NG/ML
BILIRUB SERPL-MCNC: 0.3 MG/DL (ref 0.2–1.3)
BUN SERPL-MCNC: 19 MG/DL (ref 7–30)
BUPRENORPHINE UR QL: ABNORMAL NG/ML
CALCIUM SERPL-MCNC: 8.9 MG/DL (ref 8.5–10.1)
CANNABINOIDS UR QL: ABNORMAL NG/ML
CHLORIDE SERPL-SCNC: 108 MMOL/L (ref 94–109)
CHOLEST SERPL-MCNC: 170 MG/DL
CO2 SERPL-SCNC: 27 MMOL/L (ref 20–32)
COCAINE UR QL SCN: ABNORMAL NG/ML
CREAT SERPL-MCNC: 0.82 MG/DL (ref 0.52–1.04)
D-METHAMPHET UR QL: ABNORMAL NG/ML
GFR SERPL CREATININE-BSD FRML MDRD: 74 ML/MIN/{1.73_M2}
GLUCOSE SERPL-MCNC: 84 MG/DL (ref 70–99)
HDLC SERPL-MCNC: 53 MG/DL
LDLC SERPL CALC-MCNC: 86 MG/DL
METHADONE UR QL SCN: ABNORMAL NG/ML
NONHDLC SERPL-MCNC: 117 MG/DL
OPIATES UR QL SCN: ABNORMAL NG/ML
OXYCODONE UR QL SCN: ABNORMAL NG/ML
PCP UR QL SCN: ABNORMAL NG/ML
POTASSIUM SERPL-SCNC: 3.5 MMOL/L (ref 3.4–5.3)
PROPOXYPH UR QL: ABNORMAL NG/ML
PROT SERPL-MCNC: 7 G/DL (ref 6.8–8.8)
SODIUM SERPL-SCNC: 140 MMOL/L (ref 133–144)
TRICYCLICS UR QL SCN: ABNORMAL NG/ML
TRIGL SERPL-MCNC: 155 MG/DL

## 2021-05-12 ENCOUNTER — TELEPHONE (OUTPATIENT)
Dept: FAMILY MEDICINE | Facility: CLINIC | Age: 66
End: 2021-05-12

## 2021-05-12 ASSESSMENT — ANXIETY QUESTIONNAIRES
6. BECOMING EASILY ANNOYED OR IRRITABLE: NOT AT ALL
IF YOU CHECKED OFF ANY PROBLEMS ON THIS QUESTIONNAIRE, HOW DIFFICULT HAVE THESE PROBLEMS MADE IT FOR YOU TO DO YOUR WORK, TAKE CARE OF THINGS AT HOME, OR GET ALONG WITH OTHER PEOPLE: NOT DIFFICULT AT ALL
7. FEELING AFRAID AS IF SOMETHING AWFUL MIGHT HAPPEN: NOT AT ALL
2. NOT BEING ABLE TO STOP OR CONTROL WORRYING: NOT AT ALL
1. FEELING NERVOUS, ANXIOUS, OR ON EDGE: NOT AT ALL
GAD7 TOTAL SCORE: 0
5. BEING SO RESTLESS THAT IT IS HARD TO SIT STILL: NOT AT ALL
3. WORRYING TOO MUCH ABOUT DIFFERENT THINGS: NOT AT ALL

## 2021-05-12 ASSESSMENT — PATIENT HEALTH QUESTIONNAIRE - PHQ9
SUM OF ALL RESPONSES TO PHQ QUESTIONS 1-9: 0
5. POOR APPETITE OR OVEREATING: NOT AT ALL

## 2021-05-12 NOTE — TELEPHONE ENCOUNTER
Pt called back, was unable to complete questionnaires at this time as she was at work. Questionnaires were sent to Pt via [employer] to complete / Chela Guidry/EMT-B

## 2021-05-12 NOTE — TELEPHONE ENCOUNTER
Patient Quality Outreach Summary      Summary:    Patient is due/failing the following:   chronic pain phq and bridger     Type of outreach:    Phone, left message for patient/parent to call back.    Questions for provider review:    None                                                                                                                    Vianey Thayer MA       Chart routed to none.

## 2021-05-13 ASSESSMENT — ANXIETY QUESTIONNAIRES: GAD7 TOTAL SCORE: 0

## 2021-05-28 DIAGNOSIS — G47.9 SLEEP DISORDER: ICD-10-CM

## 2021-05-28 DIAGNOSIS — G89.29 OTHER CHRONIC PAIN: ICD-10-CM

## 2021-05-28 NOTE — TELEPHONE ENCOUNTER
RX was never picked up at . Contacted Pt, she confirmed she did get her medication from this visit. Shredded printed Rx / Chela Guidry/EMT-B    [phone]

## 2021-05-28 NOTE — TELEPHONE ENCOUNTER
Routing refill request to provider for review/approval because:  Drug not on the [facility] refill protocol     Rimma Livingston RN on 5/28/2021 at 11:47 AM

## 2021-06-01 RX ORDER — TRAMADOL HYDROCHLORIDE 50 MG/1
TABLET ORAL
Qty: 60 TABLET | Refills: 0 | Status: SHIPPED | OUTPATIENT
Start: 2021-06-01 | End: 2021-06-28

## 2021-06-01 RX ORDER — ZOLPIDEM TARTRATE 5 MG/1
5 TABLET ORAL
Qty: 30 TABLET | Refills: 0 | Status: SHIPPED | OUTPATIENT
Start: 2021-06-01 | End: 2021-08-23

## 2021-06-01 RX ORDER — HYDROCODONE BITARTRATE AND ACETAMINOPHEN 10; 325 MG/1; MG/1
TABLET ORAL
Qty: 15 TABLET | Refills: 0 | Status: SHIPPED | OUTPATIENT
Start: 2021-06-01 | End: 2021-06-28

## 2021-06-02 ENCOUNTER — TELEPHONE (OUTPATIENT)
Dept: FAMILY MEDICINE | Facility: CLINIC | Age: 66
End: 2021-06-02

## 2021-06-02 NOTE — TELEPHONE ENCOUNTER
Pharmacy Benefit Information:    Provider: Gabino Oconnor MD   PA started via [facility]: KEY: [id number]  Medication/SIG: ZOLPIDEM 5 MG  Pharmacy: [facility]    Routed to GABINO OCONNOR MD , please advise RX CHANGE OR PA, route to [facility] if PA needed  Gayle Broderick RN, BSN  Message handled by CLINIC NURSE.

## 2021-06-07 NOTE — TELEPHONE ENCOUNTER
Called [facility] pharmacy, informed, they will relay to pt (works at [facility]), will have pt call if ?'s  Gayle Broderick RN, BSN  Message handled by CLINIC NURSE.

## 2021-06-08 DIAGNOSIS — M54.50 ACUTE LOW BACK PAIN WITHOUT SCIATICA, UNSPECIFIED BACK PAIN LATERALITY: ICD-10-CM

## 2021-06-08 NOTE — TELEPHONE ENCOUNTER
Routing refill request to provider for review/approval because:  Drug not on the [facility] refill protocol     Summer Harding RN   [facility] -- Triage Nurse

## 2021-06-09 ENCOUNTER — NURSE TRIAGE (OUTPATIENT)
Dept: NURSING | Facility: CLINIC | Age: 66
End: 2021-06-09

## 2021-06-09 ENCOUNTER — OFFICE VISIT (OUTPATIENT)
Dept: URGENT CARE | Facility: URGENT CARE | Age: 66
End: 2021-06-09
Payer: COMMERCIAL

## 2021-06-09 ENCOUNTER — NURSE TRIAGE (OUTPATIENT)
Dept: FAMILY MEDICINE | Facility: CLINIC | Age: 66
End: 2021-06-09

## 2021-06-09 VITALS
SYSTOLIC BLOOD PRESSURE: 108 MMHG | WEIGHT: 146 LBS | OXYGEN SATURATION: 99 % | DIASTOLIC BLOOD PRESSURE: 49 MMHG | TEMPERATURE: 98.8 F | HEART RATE: 93 BPM | BODY MASS INDEX: 29.49 KG/M2

## 2021-06-09 DIAGNOSIS — N10 PYELONEPHRITIS, ACUTE: Primary | ICD-10-CM

## 2021-06-09 DIAGNOSIS — M54.50 ACUTE BILATERAL LOW BACK PAIN WITHOUT SCIATICA: ICD-10-CM

## 2021-06-09 LAB
ALBUMIN UR-MCNC: NEGATIVE MG/DL
ANION GAP SERPL CALCULATED.3IONS-SCNC: 6 MMOL/L (ref 3–14)
ANION GAP SERPL CALCULATED.3IONS-SCNC: 6 MMOL/L (ref 3–14)
APPEARANCE UR: CLEAR
BACTERIA #/AREA URNS HPF: ABNORMAL /HPF
BACTERIA #/AREA URNS HPF: ABNORMAL /HPF
BASOPHILS # BLD AUTO: 0 10E9/L (ref 0–0.2)
BASOPHILS # BLD AUTO: 0 10E9/L (ref 0–0.2)
BASOPHILS NFR BLD AUTO: 0.1 %
BASOPHILS NFR BLD AUTO: 0.3 %
BILIRUB UR QL STRIP: NEGATIVE
BUN SERPL-MCNC: 12 MG/DL (ref 7–30)
BUN SERPL-MCNC: 14 MG/DL (ref 7–30)
CALCIUM SERPL-MCNC: 9 MG/DL (ref 8.5–10.1)
CALCIUM SERPL-MCNC: 9.2 MG/DL (ref 8.5–10.1)
CHLORIDE SERPL-SCNC: 103 MMOL/L (ref 94–109)
CHLORIDE SERPL-SCNC: 104 MMOL/L (ref 94–109)
CO2 SERPL-SCNC: 26 MMOL/L (ref 20–32)
CO2 SERPL-SCNC: 27 MMOL/L (ref 20–32)
COLOR UR AUTO: YELLOW
CREAT SERPL-MCNC: 0.81 MG/DL (ref 0.52–1.04)
CREAT SERPL-MCNC: 0.88 MG/DL (ref 0.52–1.04)
DIFFERENTIAL METHOD BLD: ABNORMAL
DIFFERENTIAL METHOD BLD: NORMAL
EOSINOPHIL # BLD AUTO: 0.1 10E9/L (ref 0–0.7)
EOSINOPHIL # BLD AUTO: 0.1 10E9/L (ref 0–0.7)
EOSINOPHIL NFR BLD AUTO: 0.9 %
EOSINOPHIL NFR BLD AUTO: 1.3 %
ERYTHROCYTE [DISTWIDTH] IN BLOOD BY AUTOMATED COUNT: 13.4 % (ref 10–15)
ERYTHROCYTE [DISTWIDTH] IN BLOOD BY AUTOMATED COUNT: 13.4 % (ref 10–15)
GFR SERPL CREATININE-BSD FRML MDRD: 68 ML/MIN/{1.73_M2}
GFR SERPL CREATININE-BSD FRML MDRD: 75 ML/MIN/{1.73_M2}
GLUCOSE SERPL-MCNC: 122 MG/DL (ref 70–99)
GLUCOSE SERPL-MCNC: 124 MG/DL (ref 70–99)
GLUCOSE UR STRIP-MCNC: NEGATIVE MG/DL
HCT VFR BLD AUTO: 43.2 % (ref 35–47)
HCT VFR BLD AUTO: 43.5 % (ref 35–47)
HGB BLD-MCNC: 14.1 G/DL (ref 11.7–15.7)
HGB BLD-MCNC: 14.2 G/DL (ref 11.7–15.7)
HGB UR QL STRIP: ABNORMAL
IMM GRANULOCYTES # BLD: 0 10E9/L (ref 0–0.4)
IMM GRANULOCYTES NFR BLD: 0.3 %
KETONES UR STRIP-MCNC: NEGATIVE MG/DL
LACTATE BLD-SCNC: 0.9 MMOL/L (ref 0.7–2)
LEUKOCYTE ESTERASE UR QL STRIP: NEGATIVE
LYMPHOCYTES # BLD AUTO: 0.7 10E9/L (ref 0.8–5.3)
LYMPHOCYTES # BLD AUTO: 0.8 10E9/L (ref 0.8–5.3)
LYMPHOCYTES NFR BLD AUTO: 10.1 %
LYMPHOCYTES NFR BLD AUTO: 13.9 %
MCH RBC QN AUTO: 30.7 PG (ref 26.5–33)
MCH RBC QN AUTO: 31.1 PG (ref 26.5–33)
MCHC RBC AUTO-ENTMCNC: 32.4 G/DL (ref 31.5–36.5)
MCHC RBC AUTO-ENTMCNC: 32.9 G/DL (ref 31.5–36.5)
MCV RBC AUTO: 95 FL (ref 78–100)
MCV RBC AUTO: 95 FL (ref 78–100)
MONOCYTES # BLD AUTO: 0.3 10E9/L (ref 0–1.3)
MONOCYTES # BLD AUTO: 0.4 10E9/L (ref 0–1.3)
MONOCYTES NFR BLD AUTO: 4.3 %
MONOCYTES NFR BLD AUTO: 7 %
MUCOUS THREADS #/AREA URNS LPF: PRESENT /LPF
MUCOUS THREADS #/AREA URNS LPF: PRESENT /LPF
NEUTROPHILS # BLD AUTO: 4.5 10E9/L (ref 1.6–8.3)
NEUTROPHILS # BLD AUTO: 5.9 10E9/L (ref 1.6–8.3)
NEUTROPHILS NFR BLD AUTO: 77.6 %
NEUTROPHILS NFR BLD AUTO: 84.2 %
NITRATE UR QL: NEGATIVE
NON-SQ EPI CELLS #/AREA URNS LPF: ABNORMAL /LPF
NRBC # BLD AUTO: 0 10*3/UL
NRBC BLD AUTO-RTO: 0 /100
PH UR STRIP: 6 PH (ref 5–7)
PLATELET # BLD AUTO: 189 10E9/L (ref 150–450)
PLATELET # BLD AUTO: 195 10E9/L (ref 150–450)
POTASSIUM SERPL-SCNC: 3.4 MMOL/L (ref 3.4–5.3)
POTASSIUM SERPL-SCNC: 3.5 MMOL/L (ref 3.4–5.3)
RBC # BLD AUTO: 4.57 10E12/L (ref 3.8–5.2)
RBC # BLD AUTO: 4.6 10E12/L (ref 3.8–5.2)
RBC #/AREA URNS AUTO: 1 /HPF (ref 0–2)
RBC #/AREA URNS AUTO: ABNORMAL /HPF
SODIUM SERPL-SCNC: 135 MMOL/L (ref 133–144)
SODIUM SERPL-SCNC: 137 MMOL/L (ref 133–144)
SOURCE: ABNORMAL
SP GR UR STRIP: 1.01 (ref 1–1.03)
SQUAMOUS #/AREA URNS AUTO: 2 /HPF (ref 0–1)
URNS CMNT MICRO: ABNORMAL
UROBILINOGEN UR STRIP-MCNC: NORMAL MG/DL (ref 0–2)
WBC # BLD AUTO: 5.7 10E9/L (ref 4–11)
WBC # BLD AUTO: 7 10E9/L (ref 4–11)
WBC #/AREA URNS AUTO: 2 /HPF (ref 0–5)
WBC #/AREA URNS AUTO: ABNORMAL /HPF

## 2021-06-09 PROCEDURE — 87040 BLOOD CULTURE FOR BACTERIA: CPT | Performed by: EMERGENCY MEDICINE

## 2021-06-09 PROCEDURE — 36415 COLL VENOUS BLD VENIPUNCTURE: CPT | Performed by: PHYSICIAN ASSISTANT

## 2021-06-09 PROCEDURE — 85025 COMPLETE CBC W/AUTO DIFF WBC: CPT | Performed by: PHYSICIAN ASSISTANT

## 2021-06-09 PROCEDURE — 96361 HYDRATE IV INFUSION ADD-ON: CPT

## 2021-06-09 PROCEDURE — 99214 OFFICE O/P EST MOD 30 MIN: CPT | Performed by: PHYSICIAN ASSISTANT

## 2021-06-09 PROCEDURE — 81001 URINALYSIS AUTO W/SCOPE: CPT | Performed by: EMERGENCY MEDICINE

## 2021-06-09 PROCEDURE — 81015 MICROSCOPIC EXAM OF URINE: CPT | Performed by: PHYSICIAN ASSISTANT

## 2021-06-09 PROCEDURE — 87186 SC STD MICRODIL/AGAR DIL: CPT | Mod: 91 | Performed by: EMERGENCY MEDICINE

## 2021-06-09 PROCEDURE — 80048 BASIC METABOLIC PNL TOTAL CA: CPT | Performed by: EMERGENCY MEDICINE

## 2021-06-09 PROCEDURE — 87186 SC STD MICRODIL/AGAR DIL: CPT | Performed by: EMERGENCY MEDICINE

## 2021-06-09 PROCEDURE — 80048 BASIC METABOLIC PNL TOTAL CA: CPT | Performed by: PHYSICIAN ASSISTANT

## 2021-06-09 PROCEDURE — 87077 CULTURE AEROBIC IDENTIFY: CPT | Performed by: EMERGENCY MEDICINE

## 2021-06-09 PROCEDURE — 87088 URINE BACTERIA CULTURE: CPT | Performed by: EMERGENCY MEDICINE

## 2021-06-09 PROCEDURE — 96374 THER/PROPH/DIAG INJ IV PUSH: CPT | Mod: 59

## 2021-06-09 PROCEDURE — 99285 EMERGENCY DEPT VISIT HI MDM: CPT | Mod: 25

## 2021-06-09 PROCEDURE — 83605 ASSAY OF LACTIC ACID: CPT | Performed by: EMERGENCY MEDICINE

## 2021-06-09 PROCEDURE — 87800 DETECT AGNT MULT DNA DIREC: CPT | Performed by: EMERGENCY MEDICINE

## 2021-06-09 PROCEDURE — 87086 URINE CULTURE/COLONY COUNT: CPT | Performed by: EMERGENCY MEDICINE

## 2021-06-09 PROCEDURE — 85025 COMPLETE CBC W/AUTO DIFF WBC: CPT | Performed by: EMERGENCY MEDICINE

## 2021-06-09 RX ORDER — CYCLOBENZAPRINE HCL 10 MG
TABLET ORAL
Qty: 30 TABLET | Refills: 0 | Status: SHIPPED | OUTPATIENT
Start: 2021-06-09 | End: 2021-10-01 | Stop reason: ALTCHOICE

## 2021-06-09 RX ORDER — CIPROFLOXACIN 500 MG/1
500 TABLET, FILM COATED ORAL 2 TIMES DAILY
Qty: 14 TABLET | Refills: 0 | Status: SHIPPED | OUTPATIENT
Start: 2021-06-09 | End: 2021-06-10

## 2021-06-09 RX ORDER — CYCLOBENZAPRINE HCL 5 MG
TABLET ORAL
Qty: 20 TABLET | Refills: 0 | Status: ON HOLD | OUTPATIENT
Start: 2021-06-09 | End: 2021-06-11

## 2021-06-09 NOTE — LETTER
[facility]  [address]  [address]  [place] [address]  Phone: [phone]  Fax: [phone]    June 9, 2021        Emeli Granados  [address] [facility] [address]          To whom it may concern:    RE: Emeli Granados    Patient was seen and treated today at our clinic. Please excuse from work 6/9/2021 - 6/11/2021.     Please contact me for questions or concerns.      Sincerely,        Mallory Morel PA-C

## 2021-06-09 NOTE — PROGRESS NOTES
Assessment & Plan     1. Pyelonephritis, acute  66-year-old female presents the clinic for evaluation of flank pain, vomiting and fever.  On exam, she looks well.  Vital signs are stable.  She does have CVA tenderness on the right side.  Additionally, she has lower back pain bilaterally.  I suspect that her low back pain is related to moving the cement bird feeder.  Upper backslash flank pain, vomiting and fever I attribute to pyelonephritis. Will treat with oral cipro. Encouraged fluids.  Discussed with patient that she should have low threshold for follow-up, including development of fever greater than 100.4, return of vomiting, weakness, abdominal pain, or worsening symptoms to follow-up in the emergency department.  Patient agrees with treatment plan.  BMP is pending.  - Urine Microscopic  - CBC with platelets and differential  - Basic metabolic panel  (Ca, Cl, CO2, Creat, Gluc, K, Na, BUN)  - cyclobenzaprine (FLEXERIL) 5 MG tablet; Take 1-2 tablets up to three times per day as needed for muscle spasms.  Dispense: 20 tablet; Refill: 0  - ciprofloxacin (CIPRO) 500 MG tablet; Take 1 tablet (500 mg) by mouth 2 times daily for 7 days  Dispense: 14 tablet; Refill: 0    2. Acute bilateral low back pain without sciatica      Return in about 1 day (around 6/10/2021), or if symptoms worsen or fail to improve.    Diagnosis and treatment plan was reviewed with patient and/or family.   We went over any labs or imaging. Discussed worsening symptoms or little to no relief despite treatment plan to follow-up in ER.  Patient verbalizes understanding. All questions were addressed and answered.     Mallory Morel PA-C  [facility]    CHIEF COMPLAINT:   Chief Complaint   Patient presents with     Urgent Care     hurt back and throwing up x 2 days, dragged cement bird bath and after wards back started to hurt, felt like she pulled something and that night felt feverish     Sofiya gonzalez  66 year old female who presents to clinic today for evaluation of back pain, vomiting and dysuria. On 6/6/2021, patient was dragging a cement bird bath and shortly after developed right sided flank and lower back pain. The following two days she had vomiting, but this has since resolved. She has had temperature greater than 100 for two days. She has been taking tylenol and ibuprofen for her symptoms. She notes that her urine is a orangish - red color. She denies taking AZO.  No rigors noted.   She feels better today and rates the pain as a 6/10.   No hx of kidney stones or kidney infection. Does not have history of UTIs.     Past Medical History:   Diagnosis Date     Acute hepatitis C without mention of hepatic coma(070.51) 1996    Dr. 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     Diverticula of colon 2014     Malignant neoplasm of endocervix (H) 1988    took uterus and left the ovaries     Other and unspecified alcohol dependence, unspecified drinking behavior     sober since 1999     Polycythemia 8/8/2012     Smoker      Unspecified disorder of esophagus     stricture - has had it dilated     Unspecified hemorrhoids without mention of complication      Past Surgical History:   Procedure Laterality Date     HC COLONOSCOPY THRU STOMA, DIAGNOSTIC  2004, 2012    repeat in 2022     HC HEMORRHOIDECTOMY W BANDING/LIGATION      Hemorrhoidectomy     HC REMOVAL OF TONSILS,<12 Y/O      Tonsils <11y.o.     HYSTERECTOMY, PAP NO LONGER INDICATED       LAPAROSCOPIC SALPINGO-OOPHORECTOMY Bilateral 10/23/2015    Procedure: LAPAROSCOPIC SALPINGO-OOPHORECTOMY;  Surgeon: Johnathan Steve MD;  Location:  OR     [facility] NONSPECIFIC PROCEDURE  1988    hysterectomy-uterine & cervical ca - tubes and ovaries are still in - do NOT need paps     [facility] NONSPECIFIC PROCEDURE      ganglion cyst removal     [facility] NONSPECIFIC PROCEDURE      right thumb surgery      [facility] NONSPECIFIC PROCEDURE      dilatation of the esophagus once due to dysphagia and stricture     Social History     Tobacco Use     Smoking status: Former Smoker     Packs/day: 0.50     Years: 30.00     Pack years: 15.00     Types: Cigarettes     Start date: 10/13/1967     Quit date: 2019     Years since quittin.5     Smokeless tobacco: Never Used     Tobacco comment: quit 2019   Substance Use Topics     Alcohol use: No     Comment: sober since 99     Current Outpatient Medications   Medication     albuterol (PROAIR HFA/PROVENTIL HFA/VENTOLIN HFA) 108 (90 Base) MCG/ACT inhaler     albuterol (PROVENTIL) (2.5 MG/3ML) 0.083% neb solution     ascorbic acid (VITAMIN C) 1000 MG TABS     cholecalciferol (VITAMIN D3) 1000 UNIT tablet     ciprofloxacin (CIPRO) 500 MG tablet     cyclobenzaprine (FLEXERIL) 10 MG tablet     cyclobenzaprine (FLEXERIL) 5 MG tablet     HYDROcodone-acetaminophen (NORCO)  MG per tablet     Multiple Vitamins-Minerals (MULTIVITAMIN OR)     order for DME     traMADol (ULTRAM) 50 MG tablet     vitamin E 400 UNITS TABS     zolpidem (AMBIEN) 5 MG tablet     No current facility-administered medications for this visit.      Allergies   Allergen Reactions     Metaproterenol Sulfate Other (See Comments)     alupent inhaler-shaking     Percocet [Oxycodone-Acetaminophen] Itching       10 point ROS of systems were all negative except for pertinent positives noted in my HPI.      Exam:   /49   Pulse 93   Temp 98.8  F (37.1  C)   Wt 66.2 kg (146 lb)   LMP  (LMP Unknown)   SpO2 99%   BMI 29.49 kg/m    Constitutional: healthy, alert and no distress  Head: Normocephalic, atraumatic.  Eyes: conjunctiva clear, no drainage  ENT: MMM. Throat without tonsillar hypertrophy or erythema  Neck: neck is supple, no cervical lymphadenopathy or nuchal rigidity  Cardiovascular: RRR  Respiratory: CTA bilaterally, no rhonchi or rales  Gastrointestinal: soft and nontender. NO rebound,  guarding or rigidity.   BACK: R CVA tenderness. Lower back pain B/L  Skin: no rashes  Neurologic: Speech clear, gait normal. Moves all extremities.    Results for orders placed or performed in visit on 06/09/21   Urine Microscopic     Status: Abnormal   Result Value Ref Range    WBC Urine 10-25 (A) OTO5^0 - 5 /HPF    RBC Urine 5-10 (A) OTO2^O - 2 /HPF    Squamous Epithelial /LPF Urine Moderate (A) FEW^Few /LPF    Bacteria Urine Moderate (A) NEG^Negative /HPF    Mucous Urine Present (A) NEG^Negative /LPF    Comment Urine Interfering substances, dipstick not done    CBC with platelets and differential     Status: Abnormal   Result Value Ref Range    WBC 7.0 4.0 - 11.0 10e9/L    RBC Count 4.57 3.8 - 5.2 10e12/L    Hemoglobin 14.2 11.7 - 15.7 g/dL    Hematocrit 43.2 35.0 - 47.0 %    MCV 95 78 - 100 fl    MCH 31.1 26.5 - 33.0 pg    MCHC 32.9 31.5 - 36.5 g/dL    RDW 13.4 10.0 - 15.0 %    Platelet Count 189 150 - 450 10e9/L    % Neutrophils 84.2 %    % Lymphocytes 10.1 %    % Monocytes 4.3 %    % Eosinophils 1.3 %    % Basophils 0.1 %    Absolute Neutrophil 5.9 1.6 - 8.3 10e9/L    Absolute Lymphocytes 0.7 (L) 0.8 - 5.3 10e9/L    Absolute Monocytes 0.3 0.0 - 1.3 10e9/L    Absolute Eosinophils 0.1 0.0 - 0.7 10e9/L    Absolute Basophils 0.0 0.0 - 0.2 10e9/L    Diff Method Automated Method

## 2021-06-09 NOTE — TELEPHONE ENCOUNTER
"S-(situation): Patient calling in experiencing multiple symptoms. Most concerning to the patient right now is her back pain.     B-(background): Patient was moving/lifting a cement bird bath and pulled muscles in her back. 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     A-(assessment): Patient stated yesterday she could barely move. Today patient is able to walk around and notes back pain as 6/10. Other symptoms patient discussed was temperature of 100 over past 3 days and vomited 2x yesterday. Patient has not vomited today and feels this \"bug\" may be out of her system. Patient stated urine is orangish- red color and has tenderness in flank area.     R-(recommendations): Per protocol, advised patient seen by ED now. Patient seemed agreeable to plan though patient mentioned going to  before ending phone call. No further questions or concerns at this time.     Reason for Disposition    Blood in urine (red, pink, or tea-colored)    Additional Information    Negative: SEVERE pain in kidney area (flank) that follows a direct blow to that site    Negative: Back pain not from an injury    Negative: Back pain from overuse (work, exercise, gardening) OR from twisting, lifting, or bending injury    Negative: Dangerous mechanism of injury (e.g., MVA, contact sports, trampoline, diving, fall > 10 feet or 3 meters) (Exception: back pain began > 1 hour after injury)    Negative: Weakness (i.e., paralysis, loss of muscle strength) of the leg(s) or foot and sudden onset after back injury    Negative: Numbness (i.e., loss of sensation) of the leg(s) or foot and sudden onset after back injury    Negative: Major bleeding (actively dripping or spurting) that can't be stopped    Negative: Bullet, knife or other serious penetrating wound    Negative: Shock suspected (e.g., cold/pale/clammy skin, too weak to stand, low BP, rapid pulse)    Negative: Sounds like a life-threatening " "emergency to the triager    Answer Assessment - Initial Assessment Questions  1. MECHANISM: \"How did the injury happen?\" (Consider the possibility of domestic violence or elder abuse)      Lifting/moving a cement bird bath - pulled muscles in back  2. ONSET: \"When did the injury happen?\" (Minutes or hours ago)      3 days ago  3. LOCATION: \"What part of the back is injured?\"      Upper back by shoulders  4. SEVERITY: \"Can you move the back normally?\"      Yes, able to move today. Yesterday could barely move anything   5. PAIN: \"Is there any pain?\" If so, ask: \"How bad is the pain?\"   (Scale 1-10; or mild, moderate, severe)      6/10  6. CORD SYMPTOMS: Any weakness or numbness of the arms or legs?\"      No weakness   7. SIZE: For cuts, bruises, or swelling, ask: \"How large is it?\" (e.g., inches or centimeters)      no  8. TETANUS: For any breaks in the skin, ask: \"When was the last tetanus booster?\"      no  9. OTHER SYMPTOMS: \"Do you have any other symptoms?\" (e.g., abdominal pain, blood in urine)      Vomited yesterday 2x, temperature of 100 degree. Patient states orangish-red colored urine.   10. PREGNANCY: \"Is there any chance you are pregnant?\" \"When was your last menstrual period?\"        NA    Protocols used: BACK INJURY-A-OH    Ayan MARX RN    "

## 2021-06-09 NOTE — PATIENT INSTRUCTIONS
Patient Education     Kidney Infection (Adult Female)     An infection in one or both kidneys is called pyelonephritis. It usually happens when bacteria ) get into the kidney. Rarely it is caused when other germs such as viruses, fungi, or other disease-causing organisms get into the kidney. The bacteria or other disease-causing organisms can enter the kidneys from the bladder or blood traveling from other parts of the body. A kidney infection can become serious. It can cause severe illness, scarring of the kidneys, or kidney failure if not treated correctly.  Common causes for this problem include:    Not keeping the genital area clean and dry, which promotes the growth of bacteria    Wiping back to front. This drags bacteria from the rectum toward the urinary opening (urethra).    Wearing tight pants or underwear. This lets moisture build up in the genital area, which helps bacteria grow.    Holding urine in for long periods of time    Dehydration    Urinary tract infections    Blockages of urine draining from the kidney, such as a kidney stone  Kidney infections can cause symptoms similar to a bladder infection. Symptoms include:    Pain (or burning) when urinating    Having to urinate more often than usual    Blood in the urine (pink or red)    Belly (abdominal) pain or discomfort, usually in the lower abdomen    Pain in the side or back    Pain above the pubic bone    Fever or chills    Vomiting    Loss of appetite  Treatment is oral antibiotics. More severe cases are treated with intramuscular or IV (intravenous) antibiotics. These are started right away and may be changed once urine culture results show the infecting organisms. Treatment helps prevent a more serious kidney infection. Symptoms of kidney infections can vary based on your age.  Medicines  Medicines can help in the treatment of a bladder infection:    Take antibiotics exactly as prescribed and until they are used up, even if you feel better.  It's important to finish them to make sure the infection is gone.    Unless another medicine was prescribed, you can use over-the-counter medicines for pain, fever, or discomfort. If you have chronic liver of kidney disease, talk with your healthcare provider before using these medicines. Also talk with your provider if you've ever had a stomach ulcer or digestive bleeding, or are taking blood thinners.  Home care  The following are general care guidelines:    Stay home from work or school. Rest in bed until your fever breaks and you are feeling better, or as advised by your healthcare provider.    Drink lots of fluid unless you must restrict fluids for other medical reasons. This will force the medicine into your urinary system and flush the bacteria out of your body. Ask your healthcare provider how much you should drink.    Don't have sex until you have finished all of your medicine and your symptoms are gone.    Don't have caffeine, alcohol, or spicy foods. These foods may irritate the kidneys and bladder.    Don't take bubble baths. Sensitivity to the chemicals in bubble baths can irritate the urethra.    Make sure you wipe from front to back after using the toilet.    Wear loose cloths and cotton underwear.  Prevention  These self-care steps can help prevent future infections:    Drink plenty of fluids to prevent dehydration and flush out the bladder. Do this unless you must restrict fluids for other health reasons, or your healthcare provider told you not to.    Correct cleaning after going to the bathroom in important. Make sure you wipe from front to back after using the toilet.    Urinate more often. Don't try to hold urine in for a long time.    Don't wear tight-fitting pants and underwear.    Improve your diet to prevent constipation. Eat more fruits, vegetables, and fiber. Eat less junk and fatty foods. Constipation can make a urinary tract infection more likely. Talk with your healthcare provider if  you have trouble with bowel movements.    Urinate right after sex to flush out the bladder.  Follow-up care  Follow up with your healthcare provider, or as advised. Additional testing may be needed to make sure the infection has cleared. Close follow-up and further testing is very important to find the cause and to prevent future infections.  If a urine culture was done, you will be contacted if your treatment needs to be changed. If directed, you may call to find out the results.  If you had an X-ray, CT scan, or other diagnostic test, you will be notified of any new findings that may affect your care.  Call 911  Call 911 if any of the following occur:    Trouble breathing    Fainting or loss of consciousness    Rapid or very slow heart rate    Weakness, dizziness, or fainting    Trouble arousing or confusion  When to seek medical advice  Call your healthcare provider right away if any of these occur:    Fever 100.4 F (38 C) or higher, or as directed by your healthcare provider    Not feeling better or symptoms get worse within 1 to 2 days after starting antibiotics    Any symptom that continues after 3 days of treatment    Increasing pain in the stomach, back, side, or groin area    Repeated vomiting    Not able to take prescribed medicine due to nausea or another reason    Bloody, dark-colored, or foul smelling urine    Trouble urinating or decreased urine output    No urine for 8 hours, no tears when crying, confusion, sunken eyes, or dry mouth  Jenifer last reviewed this educational content on 11/1/2019 2000-2021 [employer]. All rights reserved. This information is not intended as a substitute for professional medical care. Always follow your healthcare professional's instructions.

## 2021-06-10 ENCOUNTER — HOSPITAL ENCOUNTER (EMERGENCY)
Facility: CLINIC | Age: 66
Discharge: HOME | End: 2021-06-10
Attending: EMERGENCY MEDICINE | Admitting: EMERGENCY MEDICINE
Payer: COMMERCIAL

## 2021-06-10 ENCOUNTER — APPOINTMENT (OUTPATIENT)
Dept: CT IMAGING | Facility: CLINIC | Age: 66
End: 2021-06-10
Attending: EMERGENCY MEDICINE
Payer: COMMERCIAL

## 2021-06-10 ENCOUNTER — APPOINTMENT (OUTPATIENT)
Dept: ULTRASOUND IMAGING | Facility: CLINIC | Age: 66
End: 2021-06-10
Attending: EMERGENCY MEDICINE
Payer: COMMERCIAL

## 2021-06-10 ENCOUNTER — HOSPITAL ENCOUNTER (INPATIENT)
Facility: CLINIC | Age: 66
LOS: 2 days | Discharge: HOME | DRG: 690 | End: 2021-06-12
Attending: EMERGENCY MEDICINE | Admitting: INTERNAL MEDICINE
Payer: COMMERCIAL

## 2021-06-10 ENCOUNTER — TELEPHONE (OUTPATIENT)
Dept: FAMILY MEDICINE | Facility: CLINIC | Age: 66
End: 2021-06-10

## 2021-06-10 VITALS
OXYGEN SATURATION: 93 % | DIASTOLIC BLOOD PRESSURE: 57 MMHG | RESPIRATION RATE: 18 BRPM | TEMPERATURE: 99.4 F | SYSTOLIC BLOOD PRESSURE: 113 MMHG | HEART RATE: 67 BPM

## 2021-06-10 DIAGNOSIS — R50.9 FEVER, UNSPECIFIED FEVER CAUSE: ICD-10-CM

## 2021-06-10 DIAGNOSIS — R78.81 BACTEREMIA: ICD-10-CM

## 2021-06-10 DIAGNOSIS — M54.9 MUSCULOSKELETAL BACK PAIN: ICD-10-CM

## 2021-06-10 LAB
ALBUMIN SERPL-MCNC: 2.7 G/DL (ref 3.4–5)
ALBUMIN UR-MCNC: NEGATIVE MG/DL
ALP SERPL-CCNC: 174 U/L (ref 40–150)
ALT SERPL W P-5'-P-CCNC: 174 U/L (ref 0–50)
ANION GAP SERPL CALCULATED.3IONS-SCNC: 4 MMOL/L (ref 3–14)
APPEARANCE UR: CLEAR
AST SERPL W P-5'-P-CCNC: 63 U/L (ref 0–45)
BACTERIA #/AREA URNS HPF: ABNORMAL /HPF
BASOPHILS # BLD AUTO: 0 10E9/L (ref 0–0.2)
BASOPHILS NFR BLD AUTO: 0.5 %
BILIRUB SERPL-MCNC: 1.2 MG/DL (ref 0.2–1.3)
BILIRUB UR QL STRIP: NEGATIVE
BUN SERPL-MCNC: 15 MG/DL (ref 7–30)
CALCIUM SERPL-MCNC: 8.5 MG/DL (ref 8.5–10.1)
CHLORIDE SERPL-SCNC: 109 MMOL/L (ref 94–109)
CO2 SERPL-SCNC: 26 MMOL/L (ref 20–32)
COLOR UR AUTO: YELLOW
CREAT SERPL-MCNC: 0.77 MG/DL (ref 0.52–1.04)
DIFFERENTIAL METHOD BLD: ABNORMAL
EOSINOPHIL # BLD AUTO: 0 10E9/L (ref 0–0.7)
EOSINOPHIL NFR BLD AUTO: 0.7 %
ERYTHROCYTE [DISTWIDTH] IN BLOOD BY AUTOMATED COUNT: 13.4 % (ref 10–15)
GFR SERPL CREATININE-BSD FRML MDRD: 79 ML/MIN/{1.73_M2}
GLUCOSE SERPL-MCNC: 104 MG/DL (ref 70–99)
GLUCOSE UR STRIP-MCNC: NEGATIVE MG/DL
HCT VFR BLD AUTO: 37.8 % (ref 35–47)
HGB BLD-MCNC: 12.6 G/DL (ref 11.7–15.7)
HGB UR QL STRIP: ABNORMAL
IMM GRANULOCYTES # BLD: 0 10E9/L (ref 0–0.4)
IMM GRANULOCYTES NFR BLD: 0.2 %
KETONES UR STRIP-MCNC: NEGATIVE MG/DL
LABORATORY COMMENT REPORT: NORMAL
LACTATE BLD-SCNC: 0.6 MMOL/L (ref 0.7–2)
LEUKOCYTE ESTERASE UR QL STRIP: NEGATIVE
LYMPHOCYTES # BLD AUTO: 0.7 10E9/L (ref 0.8–5.3)
LYMPHOCYTES NFR BLD AUTO: 16.3 %
MAGNESIUM SERPL-MCNC: 2 MG/DL (ref 1.6–2.3)
MCH RBC QN AUTO: 31.3 PG (ref 26.5–33)
MCHC RBC AUTO-ENTMCNC: 33.3 G/DL (ref 31.5–36.5)
MCV RBC AUTO: 94 FL (ref 78–100)
MONOCYTES # BLD AUTO: 0.3 10E9/L (ref 0–1.3)
MONOCYTES NFR BLD AUTO: 7.5 %
NEUTROPHILS # BLD AUTO: 3.1 10E9/L (ref 1.6–8.3)
NEUTROPHILS NFR BLD AUTO: 74.8 %
NITRATE UR QL: NEGATIVE
NRBC # BLD AUTO: 0 10*3/UL
NRBC BLD AUTO-RTO: 0 /100
PH UR STRIP: 6 PH (ref 5–7)
PLATELET # BLD AUTO: 171 10E9/L (ref 150–450)
POTASSIUM SERPL-SCNC: 3.4 MMOL/L (ref 3.4–5.3)
POTASSIUM SERPL-SCNC: 3.4 MMOL/L (ref 3.4–5.3)
PROT SERPL-MCNC: 6.7 G/DL (ref 6.8–8.8)
RBC # BLD AUTO: 4.03 10E12/L (ref 3.8–5.2)
RBC #/AREA URNS AUTO: 2 /HPF (ref 0–2)
SARS-COV-2 RNA RESP QL NAA+PROBE: NEGATIVE
SODIUM SERPL-SCNC: 139 MMOL/L (ref 133–144)
SOURCE: ABNORMAL
SP GR UR STRIP: 1.01 (ref 1–1.03)
SPECIMEN SOURCE: NORMAL
SQUAMOUS #/AREA URNS AUTO: 1 /HPF (ref 0–1)
UROBILINOGEN UR STRIP-MCNC: NORMAL MG/DL (ref 0–2)
WBC # BLD AUTO: 4.1 10E9/L (ref 4–11)
WBC #/AREA URNS AUTO: 1 /HPF (ref 0–5)

## 2021-06-10 PROCEDURE — 99223 1ST HOSP IP/OBS HIGH 75: CPT | Mod: AI | Performed by: INTERNAL MEDICINE

## 2021-06-10 PROCEDURE — 76705 ECHO EXAM OF ABDOMEN: CPT

## 2021-06-10 PROCEDURE — 250N000009 HC RX 250: Performed by: EMERGENCY MEDICINE

## 2021-06-10 PROCEDURE — 96365 THER/PROPH/DIAG IV INF INIT: CPT

## 2021-06-10 PROCEDURE — 87040 BLOOD CULTURE FOR BACTERIA: CPT | Performed by: EMERGENCY MEDICINE

## 2021-06-10 PROCEDURE — 250N000011 HC RX IP 250 OP 636: Performed by: EMERGENCY MEDICINE

## 2021-06-10 PROCEDURE — 36415 COLL VENOUS BLD VENIPUNCTURE: CPT | Performed by: INTERNAL MEDICINE

## 2021-06-10 PROCEDURE — 81001 URINALYSIS AUTO W/SCOPE: CPT | Performed by: EMERGENCY MEDICINE

## 2021-06-10 PROCEDURE — 83605 ASSAY OF LACTIC ACID: CPT | Performed by: EMERGENCY MEDICINE

## 2021-06-10 PROCEDURE — 74177 CT ABD & PELVIS W/CONTRAST: CPT

## 2021-06-10 PROCEDURE — 250N000013 HC RX MED GY IP 250 OP 250 PS 637: Performed by: INTERNAL MEDICINE

## 2021-06-10 PROCEDURE — 36415 COLL VENOUS BLD VENIPUNCTURE: CPT | Performed by: EMERGENCY MEDICINE

## 2021-06-10 PROCEDURE — 36415 COLL VENOUS BLD VENIPUNCTURE: CPT

## 2021-06-10 PROCEDURE — 99285 EMERGENCY DEPT VISIT HI MDM: CPT | Mod: 25

## 2021-06-10 PROCEDURE — 80053 COMPREHEN METABOLIC PANEL: CPT | Performed by: EMERGENCY MEDICINE

## 2021-06-10 PROCEDURE — 87635 SARS-COV-2 COVID-19 AMP PRB: CPT | Performed by: EMERGENCY MEDICINE

## 2021-06-10 PROCEDURE — 258N000003 HC RX IP 258 OP 636: Performed by: EMERGENCY MEDICINE

## 2021-06-10 PROCEDURE — 84132 ASSAY OF SERUM POTASSIUM: CPT | Performed by: INTERNAL MEDICINE

## 2021-06-10 PROCEDURE — 85025 COMPLETE CBC W/AUTO DIFF WBC: CPT | Performed by: EMERGENCY MEDICINE

## 2021-06-10 PROCEDURE — 120N000001 HC R&B MED SURG/OB

## 2021-06-10 PROCEDURE — 83735 ASSAY OF MAGNESIUM: CPT | Performed by: INTERNAL MEDICINE

## 2021-06-10 PROCEDURE — 71250 CT THORAX DX C-: CPT

## 2021-06-10 PROCEDURE — 258N000003 HC RX IP 258 OP 636: Performed by: INTERNAL MEDICINE

## 2021-06-10 RX ORDER — LIDOCAINE 40 MG/G
CREAM TOPICAL
Status: DISCONTINUED | OUTPATIENT
Start: 2021-06-10 | End: 2021-06-12 | Stop reason: HOSPADM

## 2021-06-10 RX ORDER — CYCLOBENZAPRINE HCL 5 MG
5 TABLET ORAL EVERY 8 HOURS PRN
Status: DISCONTINUED | OUTPATIENT
Start: 2021-06-10 | End: 2021-06-12 | Stop reason: HOSPADM

## 2021-06-10 RX ORDER — KETOROLAC TROMETHAMINE 15 MG/ML
15 INJECTION, SOLUTION INTRAMUSCULAR; INTRAVENOUS ONCE
Status: COMPLETED | OUTPATIENT
Start: 2021-06-10 | End: 2021-06-10

## 2021-06-10 RX ORDER — NALOXONE HYDROCHLORIDE 0.4 MG/ML
0.2 INJECTION, SOLUTION INTRAMUSCULAR; INTRAVENOUS; SUBCUTANEOUS
Status: DISCONTINUED | OUTPATIENT
Start: 2021-06-10 | End: 2021-06-12 | Stop reason: HOSPADM

## 2021-06-10 RX ORDER — NALOXONE HYDROCHLORIDE 0.4 MG/ML
0.4 INJECTION, SOLUTION INTRAMUSCULAR; INTRAVENOUS; SUBCUTANEOUS
Status: DISCONTINUED | OUTPATIENT
Start: 2021-06-10 | End: 2021-06-12 | Stop reason: HOSPADM

## 2021-06-10 RX ORDER — CEFTRIAXONE 2 G/1
2 INJECTION, POWDER, FOR SOLUTION INTRAMUSCULAR; INTRAVENOUS ONCE
Status: COMPLETED | OUTPATIENT
Start: 2021-06-10 | End: 2021-06-10

## 2021-06-10 RX ORDER — SODIUM CHLORIDE 9 MG/ML
INJECTION, SOLUTION INTRAVENOUS CONTINUOUS
Status: ACTIVE | OUTPATIENT
Start: 2021-06-10 | End: 2021-06-11

## 2021-06-10 RX ORDER — ZOLPIDEM TARTRATE 5 MG/1
5 TABLET ORAL
Status: DISCONTINUED | OUTPATIENT
Start: 2021-06-10 | End: 2021-06-12 | Stop reason: HOSPADM

## 2021-06-10 RX ORDER — CEFTRIAXONE 1 G/1
1 INJECTION, POWDER, FOR SOLUTION INTRAMUSCULAR; INTRAVENOUS EVERY 24 HOURS
Status: DISCONTINUED | OUTPATIENT
Start: 2021-06-11 | End: 2021-06-11

## 2021-06-10 RX ORDER — HYDROCODONE BITARTRATE AND ACETAMINOPHEN 10; 325 MG/1; MG/1
1 TABLET ORAL EVERY 4 HOURS PRN
Status: DISCONTINUED | OUTPATIENT
Start: 2021-06-10 | End: 2021-06-12 | Stop reason: HOSPADM

## 2021-06-10 RX ORDER — ONDANSETRON 2 MG/ML
4 INJECTION INTRAMUSCULAR; INTRAVENOUS EVERY 6 HOURS PRN
Status: DISCONTINUED | OUTPATIENT
Start: 2021-06-10 | End: 2021-06-12 | Stop reason: HOSPADM

## 2021-06-10 RX ORDER — IOPAMIDOL 755 MG/ML
500 INJECTION, SOLUTION INTRAVASCULAR ONCE
Status: COMPLETED | OUTPATIENT
Start: 2021-06-10 | End: 2021-06-10

## 2021-06-10 RX ORDER — ONDANSETRON 4 MG/1
4 TABLET, ORALLY DISINTEGRATING ORAL EVERY 6 HOURS PRN
Status: DISCONTINUED | OUTPATIENT
Start: 2021-06-10 | End: 2021-06-12 | Stop reason: HOSPADM

## 2021-06-10 RX ORDER — ALBUTEROL SULFATE 90 UG/1
2 AEROSOL, METERED RESPIRATORY (INHALATION) EVERY 4 HOURS PRN
Status: DISCONTINUED | OUTPATIENT
Start: 2021-06-10 | End: 2021-06-12 | Stop reason: HOSPADM

## 2021-06-10 RX ADMIN — SODIUM CHLORIDE 1000 ML: 9 INJECTION, SOLUTION INTRAVENOUS at 03:27

## 2021-06-10 RX ADMIN — SODIUM CHLORIDE: 9 INJECTION, SOLUTION INTRAVENOUS at 22:48

## 2021-06-10 RX ADMIN — SODIUM CHLORIDE 55 ML: 9 INJECTION, SOLUTION INTRAVENOUS at 01:32

## 2021-06-10 RX ADMIN — KETOROLAC TROMETHAMINE 15 MG: 15 INJECTION, SOLUTION INTRAMUSCULAR; INTRAVENOUS at 03:26

## 2021-06-10 RX ADMIN — ZOLPIDEM TARTRATE 5 MG: 5 TABLET ORAL at 23:41

## 2021-06-10 RX ADMIN — HYDROCODONE BITARTRATE AND ACETAMINOPHEN 1 TABLET: 10; 325 TABLET ORAL at 22:53

## 2021-06-10 RX ADMIN — IOPAMIDOL 72 ML: 755 INJECTION, SOLUTION INTRAVENOUS at 01:32

## 2021-06-10 RX ADMIN — CEFTRIAXONE SODIUM 2 G: 2 INJECTION, POWDER, FOR SOLUTION INTRAMUSCULAR; INTRAVENOUS at 19:26

## 2021-06-10 ASSESSMENT — ENCOUNTER SYMPTOMS
FEVER: 1
COUGH: 0
FLANK PAIN: 1
VOMITING: 1
BACK PAIN: 1
DIFFICULTY URINATING: 0
FEVER: 1
COUGH: 0
DYSURIA: 1
SHORTNESS OF BREATH: 0
CHILLS: 1
NAUSEA: 1
NAUSEA: 1
DYSURIA: 0
VOMITING: 0
SHORTNESS OF BREATH: 0

## 2021-06-10 ASSESSMENT — MIFFLIN-ST. JEOR: SCORE: 1062.51

## 2021-06-10 NOTE — TELEPHONE ENCOUNTER
Spoke to patient. She is unsure where her medication is. Spoke with pharmacist who reports that she is certain that Emeli came to pick it up. She reports that patient seemed confused and pale and called the pharmacy several hours later wondering if I had sent her medication over, which I did at 1:45 PM.   Speaking with Emeli, she reports that her fever has returned, concerning that she may have worsening pyelonephritis vs urosepsis. Encouraged follow-up in ER given her condition. Patient reports that she will go there now.    Mallory Morel PA-C

## 2021-06-10 NOTE — ED NOTES
Contacted by infectious disease for a positive blood culture result, gram-negative rods from right arm blood culture.  Patient contacted by myself and advised to return to the emergency department for likely admission and IV antibiotics.  Patient agreed that she would.       Teofilo Brunson MD  06/10/21 5120

## 2021-06-10 NOTE — ED PROVIDER NOTES
History   Chief Complaint:  Abnormal labs      The history is provided by the patient.      Emeli Granados is a 66 year old female with history of diverticulitis, alcohol abuse, and emphysema who presents with abnormal labs. Per chart review, the patient was seen here yesterday night for flank pain an reported fever. The patient had 4 days go bilateral flank pain, emesis, fever, and nausea. She was seen in clinic and diagnosed with a UTI and was sent home on Cipro. She did not  her antibiotic and that she had been told yesterday she did not have a urine infection and did not pick this up. Here, she notes that she was called today regarding a positive blood culture and was recommended to come to the ED. She has been having continue back pain, fevers, chills, and nausea and feels constipated. She denies cough, diarrhea, dysuria, chest pain, or abdominal pain.     Imaging:  CT Chest w/o contrast:   1.  No acute abnormality. No evidence of pneumonia.  2.  Emphysema. Reading per radiology.      CT Abdomen Pelvis w contrast:   1.  Focal, masslike thickening of the gallbladder fundus. Slight infiltration of adjacent fat. Underlying mass not excluded. Surgical consultation recommended.  2.  Diverticulosis. Reading per radiology.      US Abdomen Limited:  1.  No gallstone or biliary dilatation.  2.  Probable adenomyomatosis of the gallbladder wall. Reading per radiology.      Labs:  CBC: WBC 5.7, HGB 14.1,    BMP: Glucose 122(H) o/w WNL (Creatinine 0.88)   UA with microscopic: Blood Small; Bacteria Few; Squamous Epithelial 2(H); Mucous Present o/w WNL   Lactic acid (result time 2152): 0.9   Blood culture x 2 - pending  Urine Culture - Pending   COVID19 Virus PCR by nasopharyngeal swab: Pending       Review of Systems   Constitutional: Positive for chills and fever.   Respiratory: Negative for cough and shortness of breath.    Cardiovascular: Negative for chest pain.   Gastrointestinal: Positive for nausea  "and vomiting.   Genitourinary: Negative for difficulty urinating and dysuria.   Musculoskeletal: Positive for back pain.   All other systems reviewed and are negative.      Allergies:  Metaproterenol Sulfate  Percocet [Oxycodone-Acetaminophen]    Medications:  Albuterol inhaler   Norco   Ambien   Tramadol   Flexeril    Past Medical History:    Disorder of porphyrin metabolism   Diverticula of colon   Emphysema of lung   Esophageal stricture   History of alcohol abuse  Hemorrhoids   Hepatitis C  Malignant neoplasm of endocervix   Polycythemia   Chronic pain   Obstructive chronic bronchitis  Asthma     Past Surgical History:    Colonoscopy   Dilatation of the esophagus due to dysphagia and stricture  Ganglion cyst removal   Hemorrhoidectomy banding   Hysterectomy   Salpingo - oophorectomy   Tonsillectomy      Family History:    Hypertension, mother   Cerebrovascular disease, mother   Depression, mother   Cancer  Lipids, father   CAD, father   Alcohol drug   Musculoskeletal disorder     Social History:  Smoking status: former smoker  Alcohol use: no  Drug use: no  PCP: Lars Oconnor  Presents to the ED alone    Physical Exam     Patient Vitals for the past 24 hrs:   BP Temp Temp src Pulse Resp SpO2 Height Weight   06/10/21 2237 120/48 99.7  F (37.6  C) Oral 77 16 98 % 1.499 m (4' 11\") 62.2 kg (137 lb 1.6 oz)   06/10/21 2145 -- -- -- -- -- 96 % -- --   06/10/21 2130 115/51 -- -- -- -- 97 % -- --   06/10/21 2015 123/69 -- -- 84 -- 93 % -- --   06/10/21 2000 127/71 -- -- -- -- 98 % -- --   06/10/21 1945 115/65 -- -- 72 -- 98 % -- --   06/10/21 1930 121/70 -- -- 72 -- 97 % -- --   06/10/21 1656 114/77 98.1  F (36.7  C) Temporal 86 16 96 % -- --       Physical Exam  Constitutional: Well appearing.  HEENT: Atraumatic.  Moist mucous membranes.  Neck: Soft.  Supple.    Cardiac: Regular rate and rhythm.  No murmur or rub.  Respiratory: Clear to auscultation bilaterally.  No respiratory distress.   Abdomen: Soft and " nontender.  No rebound or guarding.  Nondistended.  Musculoskeletal: No edema.  Normal range of motion.  Neurologic: Alert and oriented.  Normal tone and bulk. Normal gait.  Skin: No rashes.  No edema.  Psych: Normal affect.  Normal behavior.    Emergency Department Course     Laboratory:  CBC: WBC 4.1, HGB 12.6,    CMP: Glucose 104(H), Albumin 2.7(L), Protein total 6.7(L), (H), AST 63(H) o/w WNL (Creatinine: 0.77)    UA: Blood: small (A), Bacteria: few (A), o/w Negative  Lactic acid (Resulted 1753): 0.6(L)  Blood Cultures x2: Pending    Emergency Department Course:  Reviewed:  I reviewed the patient's nursing notes, vitals, past medical records, Care Everywhere.     Assessments:  1726 I performed an assessment and examination of the patient as noted above.      1957 I checked on and updated the patient. Findings and plan explained to the Patient who consents to admission. Discussed the patient with Dr. Washington, who will admit the patient to a inpatient bed for further monitoring, evaluation, and treatment.     Consult:  2015  I spoke to Dr. Washington of the hospitalist service who accepts the patient for admission.      Interventions:  1926 Rocephin, 2 g, IV injection     Disposition:  The patient was admitted to the hospital under the care of Dr. Washington.       Impression & Plan   Medical Decision Making:  Emeli Granados is a 66 year old female who is afebrile and hemodynamically stable.  I reviewed her visit from yesterday.  I reviewed her positive blood culture.  She is growing gram-negative rods and I discussed with pharmacy and they recommend a dose of IV Rocephin.  This was given.  Work-up was noted as above with again normal white blood cell count.  She is hemodynamically stable.  She does have some mildly elevated liver enzymes, however, this point she has no pain of the abdomen or flank.  She has been tolerating p.o. intake.  Imaging of the ultrasound was performed yesterday.  I discussed  plan for admission for IV antibiotics other evaluation and she is in agreement.  I spoke with hospitalist service who accepts her to the medical floor and she was in stable condition at time of admission.      Covid-19  Emeli Granados was evaluated during a global COVID-19 pandemic, which necessitated consideration that the patient might be at risk for infection with the SARS-CoV-2 virus that causes COVID-19.   Applicable protocols for evaluation were followed during the patient's care.   COVID-19 was considered as part of the patient's evaluation. The plan for testing is:  a test was obtained during this visit.    Diagnosis:    ICD-10-CM    1. Bacteremia  R78.81 Potassium     Magnesium       Scribe Disclosure:  I, Abeba Ledezma, am serving as a scribe at 5:26 PM on 6/10/2021 to document services personally performed by Vj Ocampo MD based on my observations and the provider's statements to me.      Vj Ocampo MD  06/11/21 1550

## 2021-06-10 NOTE — ED TRIAGE NOTES
Patient presents with diagnosis of UTI/kidney infection. Bilateral flank pain for 3 days. Patient's fever returned today and was sent here to rule out sepsis.

## 2021-06-10 NOTE — ED PROVIDER NOTES
History     Chief Complaint:  Flank Pain     The history is provided by the patient.   Emeli Granados is a 66 year old female with history of diverticula of the colon who presents with flank pain and reported fever. The patient tells us that for four days she has been having bilateral flank pain, vomiting, fever and nausea which prompted her to be seen earlier today in clinic. No dysuria or urgency.  She tells us that she was diagnosed with a UTI/kidney infection and an order for Cipro was placed. However, before the patient could  the prescription her doctor called and instructed her to be seen in the ED because her fever had not subsided. While in the ED the patient notes bilateral flank pain, nausea and tells us that she has not vomited yet today. The patient denies chest pain, shortness of breath, cough or rash.      Upon recheck, the patient tells us that her back/flank pain actually started after moving some heavy boxes.     Review of Systems   Constitutional: Positive for fever.   Respiratory: Negative for cough and shortness of breath.    Cardiovascular: Negative for chest pain.   Gastrointestinal: Positive for nausea. Negative for vomiting.   Genitourinary: Positive for dysuria and flank pain.   Skin: Negative for rash.   All other systems reviewed and are negative.    Allergies:  Metaproterenol Sulfate  Percocet    Medications:  Albuterol  Flexeril   Norco   Ultram  Ambien    Past Medical History:    Past Medical History:   Disorders of porphyrin metabolism   Diverticula of colon   Emphysema lung   Esophageal stricture   h/o Alcohol dependence   Hemorrhoids   Hepatitis C   Malignant neoplasm of endocervix   Polycythemia     Past Surgical History:    Procedure   COLONOSCOPY   Dilatation of the esophagus once due to dysphagia and stricture   Ganglion cyst removal   HEMORRHOIDECTOMY BANDING   HYSTERECTOMY   LAPAROSCOPIC SALPINGO-OOPHORECTOMY   Thumb surgery   TONSILLECTOMY     Family History:     Hypertension   TIA's   Depression   Musculoskeletal disorder  Alcohol/Drug    Social History:  The patient presents to the ED alone.     Physical Exam     Patient Vitals for the past 24 hrs:   BP Temp Temp src Pulse Resp SpO2   06/10/21 0500 -- -- -- -- -- 93 %   06/10/21 0415 -- -- -- 67 -- 93 %   06/10/21 0332 -- -- -- 69 18 92 %   06/10/21 0330 113/57 -- -- 68 -- 94 %   06/10/21 0320 115/58 -- -- 74 -- 91 %   06/10/21 0115 109/62 -- -- 75 -- 93 %   06/10/21 0100 100/62 -- -- 76 -- 94 %   06/10/21 0050 94/65 -- -- 86 -- --   06/09/21 2123 116/72 99.4  F (37.4  C) Oral 103 16 96 %     Physical Exam  Constitutional: Cooperative.   HENT:   Mouth/Throat: Mucous membranes are dry.   Cardiovascular: Normal rate, regular rhythm and normal heart sounds.  No murmur.  Pulmonary/Chest: Effort normal and breath sounds normal. No respiratory distress. No wheezes. No rales.   Abdominal: Soft. Normal appearance and bowel sounds are normal. No distension. There is no tenderness. There is no rigidity and no guarding.   Musculoskeletal: Normal range of motion. No LE edema  Neurological: Alert. Oriented x4.  GCS 15.  Strength normal.   Skin: Skin is warm and dry. No rash noted.   Psychiatric: Normal mood and affect.     Emergency Department Course     Imaging:  CT Chest w/o contrast:   1.  No acute abnormality. No evidence of pneumonia.  2.  Emphysema. Reading per radiology.     CT Abdomen Pelvis w contrast:   1.  Focal, masslike thickening of the gallbladder fundus. Slight infiltration of adjacent fat. Underlying mass not excluded. Surgical consultation recommended.  2.  Diverticulosis. Reading per radiology.     US Abdomen Limited:  1.  No gallstone or biliary dilatation.  2.  Probable adenomyomatosis of the gallbladder wall. Reading per radiology.     Laboratory:  CBC: WBC 5.7, HGB 14.1,    BMP: Glucose 122(H) o/w WNL (Creatinine 0.88)     UA with microscopic: Blood Small; Bacteria Few; Squamous Epithelial 2(H); Mucous  Present o/w WNL     Lactic acid (result time 2152): 0.9     Blood culture x 2 - No growth     Urine Culture - Pending     COVID19 Virus PCR by nasopharyngeal swab: Pending     Reviewed:  I reviewed nursing notes, vitals, past medical history and care everywhere    Assessments:  0100 I obtained history and examined the patient as noted above.   0350 I rechecked the patient and explained findings.   0459  I rechecked the patient and discussed the findings and plan.     Interventions:  0326 Toradol 15 mg IV     Disposition:  The patient was discharged to home.     Impression & Plan     Medical Decision Making:  Emeli Granados is a 66 year old female with the above history who presents with concern for infection. She was initially diagnosed with a UTI. It is important to note that this urine was contaminated with moderate skin cells. Urinalysis here shows only two white blood cells per high powered fields so I believe the initial urinalysis was a contaminate. Her labs have been reassuring with a normal white blood cell count. Lactic acid is normal. We did send blood and urine cultures as well as a COVID swab all of which are pending. She has no respiratory symptoms and only emphysematous changes on CT. Certainly no infiltrate or concern for bacteria or viral pneumonia. There was a question of an abnormal appearing gall bladder. Ultrasound appears to show adenomatosis without evidence of cholecystitis, neoplasm etc. She has no tenderness in her abdomen so I don't feel further workup in this regard is needed. In regards to the back pains it appears to be musculoskeletal as it started after moving heavy boxes and is reproducible via history. I would hold on antibiotics in favor of following up with cultures for the reported fever at home. Vital signs are reassuring. No concern for meningitis or other central nervous system infection and she will be discharged home.     Covid-19  Emeli Granados was evaluated during a  global COVID-19 pandemic, which necessitated consideration that the patient might be at risk for infection with the SARS-CoV-2 virus that causes COVID-19.   Applicable protocols for evaluation were followed during the patient's care.   COVID-19 was considered as part of the patient's evaluation. The plan for testing is:  a test was obtained during this visit.    Diagnosis:    ICD-10-CM    1. Fever - likely musculoskeletal R50.9    2. Musculoskeletal back pain  M54.9      Scribe Disclosure:  I, Mani Hanna, am serving as a scribe at 12:49 PM on 6/9/2021 to document services personally performed by Pedro Neves MD, based on my observations and the provider's statements to me.          Pedro Neves MD  06/10/21 7362     ICU Vital Signs Last 24 Hrs  HR: 122 (02 Nov 2020 14:14) (122 - 134)  BP: 121/70 (02 Nov 2020 13:22) (121/70 - 121/70)      General: AOx3, NAD  Heart: RRR  Lungs: CTAB  Abd: Soft, nontender, gravid  SVE: 2.0/50/-3  SSPE: +Pooling, clear fluid. Negative vaginal bleeding or clots appreciated.        FHT: 140 bpm, moderate variability + accels no decels present   [place]: Irregular   Sono: Vertex, fundal placenta

## 2021-06-10 NOTE — LETTER
[place] [address] [facility]  [address]  [facility] [address]  [phone] June 12, 2021    RE:  Emeli Granados                                                                                                                                                       [address] [facility] [address]            To whom it may concern:    Emeli Granados was admitted to [facility] from Claribel 10 through June 12. She can return to work without restrictions on Monday, June 14, and should be off of work duties until that time.      Sincerely,        Segundo Mora MD  Hospitalist  [facility]

## 2021-06-10 NOTE — ED TRIAGE NOTES
Pt presents to ED after receiving call that she had positive blood cultures. Pt reports having fever at home today. Pt was seen yesterday for fever and vomiting. ABC Intact. A/O x4.

## 2021-06-10 NOTE — TELEPHONE ENCOUNTER
"Pharmacy calling, states pt picked up her prescriptions earlier today, around 4-5PM, pt calling the pharmacy now to state she lost her Cipro and needs a refill.  Pharmacist states her concern pt may be confused.     Pharmacy closes at 9PM.      RN will route to Saritha Goodson RN/GERARDO    Reason for Disposition    [1] Pharmacy calling with prescription questions AND [2] triager unable to answer question    Additional Information    Negative: MORE THAN A DOUBLE DOSE of a prescription or over-the-counter (OTC) drug    Negative: [1] DOUBLE DOSE (an extra dose or lesser amount) of over-the-counter (OTC) drug AND [2] any symptoms (e.g., dizziness, nausea, pain, sleepiness)    Negative: [1] DOUBLE DOSE (an extra dose or lesser amount) of prescription drug AND [2] any symptoms (e.g., dizziness, nausea, pain, sleepiness)    Negative: Took another person's prescription drug    Negative: [1] DOUBLE DOSE (an extra dose or lesser amount) of prescription drug AND [2] NO symptoms (Exception: a double dose of antibiotics)    Negative: Diabetes drug error or overdose (e.g., took wrong type of insulin or took extra dose)    Negative: [1] Request for URGENT new prescription or refill of \"essential\" medication (i.e., likelihood of harm to patient if not taken) AND [2] triager unable to fill per unit policy    Negative: [1] Prescription not at pharmacy AND [2] was prescribed by PCP recently    Protocols used: MEDICATION QUESTION CALL-A-AH      "

## 2021-06-11 LAB
ALBUMIN SERPL-MCNC: 2.4 G/DL (ref 3.4–5)
ALP SERPL-CCNC: 153 U/L (ref 40–150)
ALT SERPL W P-5'-P-CCNC: 128 U/L (ref 0–50)
ANION GAP SERPL CALCULATED.3IONS-SCNC: 4 MMOL/L (ref 3–14)
AST SERPL W P-5'-P-CCNC: 38 U/L (ref 0–45)
BILIRUB DIRECT SERPL-MCNC: 0.4 MG/DL (ref 0–0.2)
BILIRUB SERPL-MCNC: 1 MG/DL (ref 0.2–1.3)
BUN SERPL-MCNC: 11 MG/DL (ref 7–30)
CALCIUM SERPL-MCNC: 8.2 MG/DL (ref 8.5–10.1)
CHLORIDE SERPL-SCNC: 114 MMOL/L (ref 94–109)
CO2 SERPL-SCNC: 25 MMOL/L (ref 20–32)
CREAT SERPL-MCNC: 0.7 MG/DL (ref 0.52–1.04)
ERYTHROCYTE [DISTWIDTH] IN BLOOD BY AUTOMATED COUNT: 13.3 % (ref 10–15)
GFR SERPL CREATININE-BSD FRML MDRD: 90 ML/MIN/{1.73_M2}
GLUCOSE SERPL-MCNC: 84 MG/DL (ref 70–99)
HCT VFR BLD AUTO: 36.9 % (ref 35–47)
HGB BLD-MCNC: 12.1 G/DL (ref 11.7–15.7)
MCH RBC QN AUTO: 31 PG (ref 26.5–33)
MCHC RBC AUTO-ENTMCNC: 32.8 G/DL (ref 31.5–36.5)
MCV RBC AUTO: 95 FL (ref 78–100)
PLATELET # BLD AUTO: 162 10E9/L (ref 150–450)
POTASSIUM SERPL-SCNC: 3.9 MMOL/L (ref 3.4–5.3)
PROT SERPL-MCNC: 5.8 G/DL (ref 6.8–8.8)
RBC # BLD AUTO: 3.9 10E12/L (ref 3.8–5.2)
SODIUM SERPL-SCNC: 143 MMOL/L (ref 133–144)
WBC # BLD AUTO: 3.3 10E9/L (ref 4–11)

## 2021-06-11 PROCEDURE — 80076 HEPATIC FUNCTION PANEL: CPT | Performed by: INTERNAL MEDICINE

## 2021-06-11 PROCEDURE — 80048 BASIC METABOLIC PNL TOTAL CA: CPT | Performed by: INTERNAL MEDICINE

## 2021-06-11 PROCEDURE — 250N000013 HC RX MED GY IP 250 OP 250 PS 637: Performed by: INTERNAL MEDICINE

## 2021-06-11 PROCEDURE — 99232 SBSQ HOSP IP/OBS MODERATE 35: CPT | Performed by: INTERNAL MEDICINE

## 2021-06-11 PROCEDURE — 85027 COMPLETE CBC AUTOMATED: CPT | Performed by: INTERNAL MEDICINE

## 2021-06-11 PROCEDURE — 36415 COLL VENOUS BLD VENIPUNCTURE: CPT | Performed by: INTERNAL MEDICINE

## 2021-06-11 PROCEDURE — 250N000011 HC RX IP 250 OP 636: Performed by: INTERNAL MEDICINE

## 2021-06-11 PROCEDURE — 120N000001 HC R&B MED SURG/OB

## 2021-06-11 RX ORDER — CEFTRIAXONE 2 G/1
2 INJECTION, POWDER, FOR SOLUTION INTRAMUSCULAR; INTRAVENOUS EVERY 24 HOURS
Status: DISCONTINUED | OUTPATIENT
Start: 2021-06-11 | End: 2021-06-12 | Stop reason: HOSPADM

## 2021-06-11 RX ORDER — POTASSIUM CHLORIDE 1500 MG/1
20 TABLET, EXTENDED RELEASE ORAL ONCE
Status: COMPLETED | OUTPATIENT
Start: 2021-06-11 | End: 2021-06-11

## 2021-06-11 RX ORDER — ALBUTEROL SULFATE 0.83 MG/ML
2.5 SOLUTION RESPIRATORY (INHALATION) EVERY 4 HOURS PRN
Status: DISCONTINUED | OUTPATIENT
Start: 2021-06-11 | End: 2021-06-12 | Stop reason: HOSPADM

## 2021-06-11 RX ORDER — TRAMADOL HYDROCHLORIDE 50 MG/1
50 TABLET ORAL 2 TIMES DAILY PRN
Status: DISCONTINUED | OUTPATIENT
Start: 2021-06-11 | End: 2021-06-12 | Stop reason: HOSPADM

## 2021-06-11 RX ADMIN — POTASSIUM CHLORIDE 20 MEQ: 1500 TABLET, EXTENDED RELEASE ORAL at 01:02

## 2021-06-11 RX ADMIN — HYDROCODONE BITARTRATE AND ACETAMINOPHEN 1 TABLET: 10; 325 TABLET ORAL at 10:30

## 2021-06-11 RX ADMIN — CEFTRIAXONE SODIUM 2 G: 2 INJECTION, POWDER, FOR SOLUTION INTRAMUSCULAR; INTRAVENOUS at 17:50

## 2021-06-11 RX ADMIN — ZOLPIDEM TARTRATE 5 MG: 5 TABLET ORAL at 22:11

## 2021-06-11 RX ADMIN — HYDROCODONE BITARTRATE AND ACETAMINOPHEN 1 TABLET: 10; 325 TABLET ORAL at 15:32

## 2021-06-11 RX ADMIN — HYDROCODONE BITARTRATE AND ACETAMINOPHEN 1 TABLET: 10; 325 TABLET ORAL at 22:11

## 2021-06-11 ASSESSMENT — ACTIVITIES OF DAILY LIVING (ADL)
ADLS_ACUITY_SCORE: 10
CONCENTRATING,_REMEMBERING_OR_MAKING_DECISIONS_DIFFICULTY: NO
DRESSING/BATHING_DIFFICULTY: NO
ADLS_ACUITY_SCORE: 12
HEARING_DIFFICULTY_OR_DEAF: NO
ADLS_ACUITY_SCORE: 10
ADLS_ACUITY_SCORE: 10
DOING_ERRANDS_INDEPENDENTLY_DIFFICULTY: NO
DIFFICULTY_COMMUNICATING: NO
DIFFICULTY_EATING/SWALLOWING: NO
PATIENT_/_FAMILY_COMMUNICATION_STYLE: SPOKEN LANGUAGE (ENGLISH OR BILINGUAL)
ADLS_ACUITY_SCORE: 15
WALKING_OR_CLIMBING_STAIRS_DIFFICULTY: NO
TOILETING_ISSUES: NO
FALL_HISTORY_WITHIN_LAST_SIX_MONTHS: NO
ADLS_ACUITY_SCORE: 12
WEAR_GLASSES_OR_BLIND: NO

## 2021-06-11 NOTE — PROGRESS NOTES
"[facility]    Medicine Progress Note - Hospitalist Service       Date of Admission:  6/10/2021  Length of stay: 1 days    Assessment & Plan   Emeli Granados is a 66 year old female with a history of diverticulitis, some form of porphyria cutanea tarda in remission, who is admitted to the hospitalist service with gram-negative azar bacteremia most likely from urinary source.    Gram-negative azar bacteremia  Urinary tract infection  - Patient was seen in urgent care in the emergency room on 6/9.  Symptoms that prompted those visits were abdominal pain, flank pain, nausea/vomiting and fever.  She said her temperature was up to 101.0  F at home. Initial UA only had 2 WBCs.  Blood and urine cultures were sent.  Apparently she had been prescribed ciprofloxacin at the urgent care but never took it.  She was called back to the emergency department on 6/10 after her blood cultures returned positive for gram-negative rods.  Blood cultures have speciated to show Klebsiella species.  She has been started on ceftriaxone.  - She had some back pain that was attributed to MSK etiology but perhaps a component of that could have been pyelonephritis.  - She has had no signs of sepsis, no fever, elevated WBC count, tachycardia.   - Continue ceftriaxone 2 g once daily.  - Follow-up blood and urine cultures-the urine culture from 6/9 now growing nonlactose fermenting GNR's which is likely the same Klebsiella.  - Anticipate discharge to home on oral Cipro or Bactrim.    Elevated transaminases  Abnormal gallbladder appearance on CT and US c/w adenomyosis  - Labs here notable for bilirubin of 1.2, , AST 63.  CT abdomen done in the early morning on 6/10 showed a \"masslike thickening\" of the gallbladder fundus.  On ultrasound this was found to be consistent with adenomyosis, a benign condition.  She has had no major right upper quadrant pain.  Likely the transaminases elevated to acute infection.  No sign of " "cholecystitis.  - Continue to monitor abdominal exam and monitor transaminases.    Chronic conditions  Obstructive lung disease-resume home albuterol as needed.  Chronic pain-resume home Norco and tramadol.  Insomnia-resume home zolpidem.    Diet: Regular  DVT Prophylaxis: Low Risk/Ambulatory with no VTE prophylaxis indicated  Malnutrition: No  Michelle Catheter: No  Code Status: Full Code     Disposition Plan   Expected discharge:   To home tomorrow on oral antibiotics if no change in status.    Segundo Mora MD  Hospitalist Service  [facility]  ______________________________________________________________________    Interval History   Doing OK this morning. Rough night due to sleeping issues. Still with some mild back pain. No fevers/chills.    Data reviewed today: I reviewed all medications, new labs and imaging results over the last 24 hours.     Physical Exam   /44 (BP Location: Right arm)   Pulse 72   Temp 98.9  F (37.2  C) (Oral)   Resp 20   Ht 1.499 m (4' 11\")   Wt 62.2 kg (137 lb 1.6 oz)   LMP  (LMP Unknown)   SpO2 97%   BMI 27.69 kg/m    137 lbs 1.6 oz       General: no distress, looks tired.     HEENT: No scleral icterus. Oropharynx moist.     Neck: Supple. Normal range of motion.     Pulmonary: Normal work of breathing. Clear to auscultation bilaterally.    Cardiovascular: Regular rate and rhythm without murmur or extra heart sounds.    Abdomen: Soft and non-tender.    Extremities: No peripheral edema. No clubbing or cyanosis.     Neurologic: Awake, alert, appropriate.    Skin: Warm and dry.    Psychiatric: Normal affect and mood.     Data    Recent Labs   Lab 06/11/21  0543 06/10/21  2248 06/10/21  1753 06/09/21  2152   WBC 3.3*  --  4.1 5.7   HGB 12.1  --  12.6 14.1   MCV 95  --  94 95     --  171 195     --  139 135   POTASSIUM 3.9 3.4 3.4 3.5   CHLORIDE 114*  --  109 103   CO2 25  --  26 26   BUN 11  --  15 12   CR 0.70  --  0.77 0.88   ANIONGAP 4  --  " 4 6   ALAN 8.2*  --  8.5 9.2   GLC 84  --  104* 122*   ALBUMIN 2.4*  --  2.7*  --    PROTTOTAL 5.8*  --  6.7*  --    BILITOTAL 1.0  --  1.2  --    ALKPHOS 153*  --  174*  --    *  --  174*  --    AST 38  --  63*  --      No results found for this or any previous visit (from the past 24 hour(s)).    Medications      Current Facility-Administered Medications   Medication Dose Route Frequency     cefTRIAXone  2 g Intravenous Q24H     sodium chloride (PF)  3 mL Intracatheter Q8H

## 2021-06-11 NOTE — ED NOTES
[facility]  ED Nurse Handoff Report    Emeli Granados is a 66 year old female   ED Chief complaint: No chief complaint on file.  . ED Diagnosis:   Final diagnoses:   Bacteremia     Allergies:   Allergies   Allergen Reactions     Metaproterenol Sulfate Other (See Comments)     alupent inhaler-shaking     Percocet [Oxycodone-Acetaminophen] Itching       Code Status: Full Code  Activity level - Baseline/Home:  Independent. Activity Level - Current:   Independent. Lift room needed: No. Bariatric: No   Needed: No   Isolation: No. Infection: Not Applicable.     Vital Signs:   Vitals:    06/10/21 1656 06/10/21 1930 06/10/21 1945 06/10/21 2000   BP: 114/77 121/70 115/65 127/71   Pulse: 86 72 72    Resp: 16      Temp: 98.1  F (36.7  C)      TempSrc: Temporal      SpO2: 96% 97% 98% 98%       Cardiac Rhythm:  ,      Pain level:    Patient confused: No. Patient Falls Risk: No.   Elimination Status: Has voided   Patient Report - Initial Complaint: R flank pain. Focused Assessment:    Emeli Granados is a 66 year old female with history of diverticulitis, alcohol abuse, and emphysema who presents with abnormal labs. Per chart review, the patient was seen here yesterday night for flank pain an reported fever. The patient had 4 days go bilateral flank pain, emesis, fever, and nausea. She was seen in clinic and diagnosed with a UTI and was sent home on Cipro. The patient here states that she did not  her antibiotic and that yesterday when she was seen she did not have a UTI. Here, she notes that she was called today regarding a positive blood culture and was recommended to come to the ED. She has been having continue back pain, fevers, chills, and nausea and feels contipated. She denies cough, diarrhea, dysuria, chest pain, or abdominal pain.   Tests Performed: labs. Abnormal Results:   Labs Ordered and Resulted from Time of ED Arrival Up to the Time of Departure from the ED   CBC WITH PLATELETS  DIFFERENTIAL - Abnormal; Notable for the following components:       Result Value    Absolute Lymphocytes 0.7 (*)     All other components within normal limits   COMPREHENSIVE METABOLIC PANEL - Abnormal; Notable for the following components:    Glucose 104 (*)     Albumin 2.7 (*)     Protein Total 6.7 (*)     Alkaline Phosphatase 174 (*)      (*)     AST 63 (*)     All other components within normal limits   LACTIC ACID WHOLE BLOOD - Abnormal; Notable for the following components:    Lactic Acid 0.6 (*)     All other components within normal limits   ROUTINE UA WITH MICROSCOPIC - Abnormal; Notable for the following components:    Blood Urine Small (*)     Bacteria Urine Few (*)     All other components within normal limits   PERIPHERAL IV CATHETER   BLOOD CULTURE   BLOOD CULTURE     No orders to display   .   Treatments provided: ABX  Family Comments: n/a  OBS brochure/video discussed/provided to patient:  No  ED Medications:   Medications   cefTRIAXone (ROCEPHIN) 2 g vial to attach to  ml bag for ADULTS or NS 50 ml bag for PEDS (0 g Intravenous Stopped 6/10/21 1959)     Drips infusing:  No  For the majority of the shift, the patient's behavior Green. Interventions performed were n/a.    Sepsis treatment initiated: No     Patient tested for COVID 19 prior to admission: YES (done yesterday during ER visit, result was negative.)    ED Nurse Name/Phone Number: Tali William RN,   8:45 PM    RECEIVING UNIT ED HANDOFF REVIEW    Above ED Nurse Handoff Report was reviewed: Yes  Reviewed by: Celia Sepulveda RN on Claribel 10, 2021 at 10:18 PM

## 2021-06-11 NOTE — PLAN OF CARE
Pertinent assessments: A&Ox4. Up SBA. VSS. Tmax 99.7. RA. Norco given for back/flank pain.     Major Shift Events ED admission @ 2200    Treatment Plan: IVF, Rocephin    Bedside Nurse: Celia Sepulveda RN

## 2021-06-11 NOTE — H&P
[facility]    Hospitalist History and Physical    Name: Emeli Granados    MRN: [id number]  YOB: 1955    Age: 66 year old  Date of Admission:  6/10/2021  Date of Service (when I saw the patient): 06/10/21    Assessment & Plan   Emeli Granados is a 66 year old female with past medical history significant for  diverticulitis of colon and porphyria was seen in the emergency room on 6/9/2021 for flank pain nausea vomiting and fever.  She was diagnosed with a UTI and started on ciprofloxacin.  Before patient could start on antibiotics which she was sent to the emergency room as her fever was improving.  Flank pain was attribute it to either UTI or musculoskeletal pain she was discharged home.  She was asked to return to the emergency room today with positive blood cultures drawn from right arm for gram-negative rods.      Gram-negative rods bacteremia  -Unclear source, UA is unremarkable urine culture still pending,   -Possibly GI source CT abdomen pelvis showed gallbladder wall thickening ultrasound and abnormal LFTs but no biliary dilatation  -Gram-negative rods in blood  -Cultures were drawn in the emergency room yesterday 6/9/2021  -She was discharged home on ciprofloxacin for possible UTI (which she didn't take)  -Repeat blood cultures sent  -Started on ceftriaxone will continue for now    Abnormal LFTs  -Known history of porphyria  -CT abdomen pelvis showed gallbladder wall thickening  -No biliary dilatation or gallstones on ultrasound  -Ultrasound showed  adenomyomatosis of the gallbladder wall  -Recheck LFTs in a.m.  -Abnormal LFTs are possible with porphyria  -Consider GI consult in a.m. worsening LFTs  -History of alcohol use none for last 5 years        DVT Prophylaxis: Pneumatic Compression Devices  Code Status: Full Code    Disposition: Admitted as inpatient    Primary Care Physician   Lars Oconnor    Chief Complaint   Abnormal blood cultures    Nausea  vomiting and right flank pain 4 days      History is obtained from the patient    History of Present Illness   Emeli Granados is a 66 year old female  with past medical history significant for  diverticulitis of colon, porphyria was seen in the emergency room on 6/9/2021 for flank pain nausea vomiting and fever.  She was diagnosed with a UTI and started on ciprofloxacin.  Before patient could start on antibiotics which she was sent to the emergency room as her fever was improving.  Flank pain was attribute it to either UTI or musculoskeletal pain she was discharged home.  She was asked to return to the emergency room today with positive blood cultures drawn from right arm for gram-negative rods.    Her symptoms started about 4 days ago with nausea and vomiting vomited about 4-5 times a day vomitus was mainly projectile initially brownish later was more greenish. No vomiting for the last 1 day. Associated with nausea and dry heaves now does not have significant abdominal pain but has right flank pain. Is constipated no loose stools no BM,  for the last 4 days. Usually uses MiraLAX but has had no BM for now 4 days, felt warm and had subjective fevers no chills generalized malaise and weakness. Flank pain she attributes to probably musculoskeletal as she noted it after she moved a birdbath. Pain worse with certain positions. 4-5 out of 10 in intensity. No radiation. It did improve with muscle relaxants that she took at home.    Review of all other symptoms are negative. 10 point review of system was completed.      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 uterus and left the ovaries     Polycythemia 08/08/2012         Past Surgical History   Past Surgical History:   Procedure Laterality Date     COLONOSCOPY  2004, 2012    repeat in 2022     Dilatation of the esophagus once due to dysphagia and stricture  2003     Ganglion cyst removal       HEMORRHOIDECTOMY BANDING       HYSTERECTOMY       LAPAROSCOPIC SALPINGO-OOPHORECTOMY Bilateral 10/23/2015    Procedure: LAPAROSCOPIC SALPINGO-OOPHORECTOMY;  Surgeon: Johnathan Steve MD;  Location: [facility] OR     Thumb surgery Right      TONSILLECTOMY         Prior to Admission Medications   Prior to Admission Medications   Prescriptions Last Dose Informant Patient Reported? Taking?   HYDROcodone-acetaminophen (NORCO)  MG per tablet   No No   Sig: TAKE ONE TABLET BY MOUTH DAILY AS NEEDED FOR CHRONIC PAIN. DO NOT TAKE WITH TRAMADOL  TAKE WITH FOOD.   Multiple Vitamins-Minerals (MULTIVITAMIN OR)   Yes No   Sig: Take 1 tablet by mouth daily Per pt, uses ones without Iron   albuterol (PROAIR HFA/PROVENTIL HFA/VENTOLIN HFA) 108 (90 Base) MCG/ACT inhaler   No No   Sig: One or two puffs q6h as needed for wheeze   albuterol (PROVENTIL) (2.5 MG/3ML) 0.083% neb solution   No No   Sig: INHALE 3 MILLILITERS (2.5 MG) BY NEBULIZATION ROUTE EVERY 6 HOURS AS NEEDED FOR SHORTNESS OF BREATH/DYSPNEA OR WHEEZING   ascorbic acid (VITAMIN C) 1000 MG TABS   Yes No   Sig: Take 1,000 mg by mouth daily   cholecalciferol (VITAMIN D3) 1000 UNIT tablet   Yes No   Sig: Take 1,000 Units by mouth daily   cyclobenzaprine (FLEXERIL) 10 MG tablet   No No   Sig: TAKE HALF TO ONE TABLET BY MOUTH THREE TIMES DAILY AS NEEDED FOR MUSCLE SPASMS.   cyclobenzaprine (FLEXERIL) 5 MG tablet   No No   Sig: Take 1-2 tablets up to three times per day as needed for muscle spasms.   traMADol (ULTRAM) 50 MG tablet   No No   Sig: TAKE ONE TABLET BY MOUTH TWICE A DAY AS NEEDED FOR PAIN,  TAKE WITH FOOD.   vitamin E  400 UNITS TABS   Yes No   Sig: Take 400 Units by mouth daily   zolpidem (AMBIEN) 5 MG tablet   No No   Sig: Take 1 tablet (5 mg) by mouth nightly as needed for sleep      Facility-Administered Medications: None     Allergies   Allergies   Allergen Reactions     Metaproterenol Sulfate Other (See Comments)     alupent inhaler-shaking     Percocet [Oxycodone-Acetaminophen] Itching       Social History   Social History     Tobacco Use     Smoking status: Former Smoker     Packs/day: 0.50     Years: 30.00     Pack years: 15.00     Types: Cigarettes     Start date: 10/13/1967     Quit date: 2019     Years since quittin.5     Smokeless tobacco: Never Used     Tobacco comment: quit 2019   Substance Use Topics     Alcohol use: No     Comment: sober since 99     Social History     Social History Narrative     Not on file     Lives with her . Quit smoking about a year and a half ago quit alcohol about 5 years ago    Family History   I have reviewed this patient's family history and updated it with pertinent information if needed.   Father had pacemaker mom had    Review of Systems   A Comprehensive greater than 10 system review of systems was carried out.  Pertinent positives and negatives are noted above.  Otherwise negative for contributory information.    Physical Exam   Temp: 98.1  F (36.7  C) Temp src: Temporal BP: 127/71 Pulse: 72   Resp: 16 SpO2: 98 % O2 Device: None (Room air)    Vital Signs with Ranges  Temp:  [98.1  F (36.7  C)-99.4  F (37.4  C)] 98.1  F (36.7  C)  Pulse:  [] 72  Resp:  [16-18] 16  BP: ()/(57-77) 127/71  SpO2:  [91 %-98 %] 98 %  0 lbs 0 oz    GEN:  Alert, oriented x 3, appears comfortable, no overt distress  HEENT:  Normocephalic/atraumatic, no scleral icterus, no nasal discharge, mouth moist.  CV:  Regular rate and rhythm, no murmur or JVD.  S1 + S2 noted, no S3 or S4.  LUNGS:  Clear to auscultation bilaterally without rales/rhonchi/wheezing/retractions.   Symmetric chest rise on inhalation noted.  ABD:  Active bowel sounds, soft, non-tender/non-distended.  No rebound/guarding/rigidity.  EXT:  No edema.  No cyanosis.  No joint synovitis noted.  SKIN:  Dry to touch, no exanthems noted in the visualized areas.  NEURO:  Symmetric muscle strength,  No new focal deficits appreciated.    Data   Data reviewed today:  I personally reviewed the abdominal CT image(s) showing  prominent gall bladder.    No results for input(s): PH, PHV, PO2, PO2V, SAT, PCO2, PCO2V, HCO3, HCO3V in the last 168 hours.  Recent Labs   Lab 06/10/21  1753 06/09/21  2152 06/09/21  1353   WBC 4.1 5.7 7.0   HGB 12.6 14.1 14.2   HCT 37.8 43.5 43.2   MCV 94 95 95    195 189     Recent Labs   Lab 06/10/21  1753 06/09/21  2152 06/09/21  1353    135 137   POTASSIUM 3.4 3.5 3.4   CHLORIDE 109 103 104   CO2 26 26 27   ANIONGAP 4 6 6   * 122* 124*   BUN 15 12 14   CR 0.77 0.88 0.81   GFRESTIMATED 79 68 75   GFRESTBLACK >90 79 87   ALAN 8.5 9.2 9.0     Recent Labs   Lab 06/10/21  0221 06/09/21  2152   CULT No growth after 9 hours Cultured on the 1st day of incubation:  Gram negative rods  *  Critical Value/Significant Value, preliminary result only, called to and read back by  Dr. Brunson at 1500 [id number].DK    (Note)  POSITIVE for KLEBSIELLA PNEUMONIAE by Verigene multiplex nucleic acid  test. Final identification and antimicrobial susceptibility testing  will be verified by standard methods.    Specimen tested with Verigene multiplex, gram-negative blood culture  nucleic acid test for the following targets: Acinetobacter sp.,  Citrobacter sp., Enterobacter sp., Proteus sp., E. coli, K.  pneumoniae/oxytoca, P. aeruginosa, and the following resistance  markers: CTXM, KPC, NDM, VIM, IMP and OXA.    Critical Value/Significant Value called to and read back by Tali William RN 06/10/21 @5923 RYLAN    PENDING     No results for input(s): NTBNPI, NTBNP in the last 168 hours.  Recent Labs   Lab  06/10/21  1753 06/09/21  2152 06/09/21  1353   CR 0.77 0.88 0.81     No results for input(s): DD in the last 168 hours.  No results for input(s): SED, CRP in the last 168 hours.  GFR Estimate   Date Value Ref Range Status   06/10/2021 79 >60 mL/min/[1.73_m2] Final     Comment:     Non  GFR Calc  Starting 12/18/2018, serum creatinine based estimated GFR (eGFR) will be   calculated using the Chronic Kidney Disease Epidemiology Collaboration   (CKD-EPI) equation.     06/09/2021 68 >60 mL/min/[1.73_m2] Final     Comment:     Non  GFR Calc  Starting 12/18/2018, serum creatinine based estimated GFR (eGFR) will be   calculated using the Chronic Kidney Disease Epidemiology Collaboration   (CKD-EPI) equation.     06/09/2021 75 >60 mL/min/[1.73_m2] Final     Comment:     Non  GFR Calc  Starting 12/18/2018, serum creatinine based estimated GFR (eGFR) will be   calculated using the Chronic Kidney Disease Epidemiology Collaboration   (CKD-EPI) equation.       GFR Estimate If Black   Date Value Ref Range Status   06/10/2021 >90 >60 mL/min/[1.73_m2] Final     Comment:      GFR Calc  Starting 12/18/2018, serum creatinine based estimated GFR (eGFR) will be   calculated using the Chronic Kidney Disease Epidemiology Collaboration   (CKD-EPI) equation.     06/09/2021 79 >60 mL/min/[1.73_m2] Final     Comment:      GFR Calc  Starting 12/18/2018, serum creatinine based estimated GFR (eGFR) will be   calculated using the Chronic Kidney Disease Epidemiology Collaboration   (CKD-EPI) equation.     06/09/2021 87 >60 mL/min/[1.73_m2] Final     Comment:      GFR Calc  Starting 12/18/2018, serum creatinine based estimated GFR (eGFR) will be   calculated using the Chronic Kidney Disease Epidemiology Collaboration   (CKD-EPI) equation.       Recent Labs   Lab 06/10/21  1753 06/09/21  2152 06/09/21  1353   HGB 12.6 14.1 14.2     Recent Labs   Lab  06/10/21  1753   AST 63*   *   ALKPHOS 174*   BILITOTAL 1.2     No results for input(s): INR in the last 168 hours.  Recent Labs   Lab 06/10/21  1753 06/09/21  2152   LACT 0.6* 0.9     No results for input(s): LIPASE in the last 168 hours.  Recent Labs   Lab 06/10/21  1753 06/09/21  2152 06/09/21  1353   BUN 15 12 14   CR 0.77 0.88 0.81     No results for input(s): TSH in the last 168 hours.  No results for input(s): TROPONIN, TROPI, TROPR in the last 168 hours.    Invalid input(s): TROP, TROPONINIES  Recent Labs   Lab 06/10/21  1753   COLOR Yellow   APPEARANCE Clear   URINEGLC Negative   URINEBILI Negative   URINEKETONE Negative   SG 1.010   UBLD Small*   URINEPH 6.0   PROTEIN Negative   NITRITE Negative   LEUKEST Negative   RBCU 2   WBCU 1       Recent Results (from the past 24 hour(s))   CT Abdomen Pelvis w Contrast    Narrative    EXAM: CT ABDOMEN PELVIS W CONTRAST  LOCATION: [facility]  DATE/TIME: 6/10/2021 1:29 AM    INDICATION: Flank pain and fever  COMPARISON: 04/12/2014  TECHNIQUE: CT scan of the abdomen and pelvis was performed following injection of IV contrast. Multiplanar reformats were obtained. Dose reduction techniques were used.  CONTRAST: 72mL Isovue-370    FINDINGS:   LOWER CHEST: Mild basilar atelectasis.    HEPATOBILIARY: Hepatic steatosis. Subcentimeter hepatic hypodensity small for characterization. Focal, masslike wall thickening of the gallbladder fundus 1.6 x 1.7 cm. Slight haziness of adjacent fat.    PANCREAS: Normal.    SPLEEN: Normal.    ADRENAL GLANDS: Normal.    KIDNEYS/BLADDER: Normal.    BOWEL: Normal caliber. Normal appendix. Diverticulosis    LYMPH NODES: Normal.    VASCULATURE: Atherosclerotic vascular calcification.    PELVIC ORGANS: Absent uterus.    MUSCULOSKELETAL: Degenerative change osseous structures.      Impression    IMPRESSION:   1.  Focal, masslike thickening of the gallbladder fundus. Slight infiltration of adjacent fat. Underlying mass not  excluded. Surgical consultation recommended.  2.  Diverticulosis.   Chest CT w/o contrast    Narrative    EXAM: CT CHEST W/O CONTRAST  LOCATION: [facility]  DATE/TIME: 6/10/2021 1:29 AM    INDICATION: Fever.  COMPARISON: 8/8/2019.  TECHNIQUE: CT chest without IV contrast. Multiplanar reformats were obtained. Dose reduction techniques were used.  CONTRAST: None.    FINDINGS:   LUNGS AND PLEURA: There are a few bands of scar and atelectasis at the lung bases. Emphysematous disease in the upper lungs. Tiny nodule in the right upper lobe posteriorly is stable. Tiny nodule in the left upper lobe laterally is also stable. No   pneumothorax or pleural effusion.    MEDIASTINUM/AXILLAE: No lymphadenopathy. No thoracic aortic aneurysm.    CORONARY ARTERY CALCIFICATION: None.    UPPER ABDOMEN: No acute upper abdominal abnormality.    MUSCULOSKELETAL: Degenerative disease in the spine.      Impression    IMPRESSION:   1.  No acute abnormality. No evidence of pneumonia.  2.  Emphysema.     US Abdomen Limited    Narrative    EXAM: US ABDOMEN LIMITED  LOCATION: [facility]  DATE/TIME: 6/10/2021 3:46 AM    INDICATION: Right upper quadrant pain.  COMPARISON: CT 6/10/2021.  TECHNIQUE: Limited abdominal ultrasound.    FINDINGS:    GALLBLADDER: No gallstones. Mild gallbladder wall thickening and probable adenomyomatosis. No sonographic Romeo sign.    BILE DUCTS: No biliary dilatation. The common duct measures 7 mm.    LIVER: Normal parenchyma with smooth contour. No focal mass.    RIGHT KIDNEY: No hydronephrosis.    PANCREAS: The visualized portions are normal.    No ascites.      Impression    IMPRESSION:  1.  No gallstone or biliary dilatation.  2.  Probable adenomyomatosis of the gallbladder wall.

## 2021-06-11 NOTE — PHARMACY-ADMISSION MEDICATION HISTORY
Admission medication history interview status for this patient is complete. See [facility] admission navigator for allergy information, prior to admission medications and immunization status.     Medication history interview done, indicate source(s): Patient  Medication history resources (including written lists, pill bottles, clinic record):None  Pharmacy: [id number] [address]    Changes made to PTA medication list:  Added: -  Deleted: duplicate cyclobenzaprine  Changed: -    Prior to Admission medications    Medication Sig Last Dose Taking? Auth Provider   albuterol (PROAIR HFA/PROVENTIL HFA/VENTOLIN HFA) 108 (90 Base) MCG/ACT inhaler One or two puffs q6h as needed for wheeze Past Month at Unknown time Yes Lars Oconnor MD   ascorbic acid (VITAMIN C) 1000 MG TABS Take 1,000 mg by mouth daily 6/10/2021 at am Yes Unknown, Entered By History   cholecalciferol (VITAMIN D3) 1000 UNIT tablet Take 1,000 Units by mouth daily 6/10/2021 at am Yes Unknown, Entered By History   cyclobenzaprine (FLEXERIL) 10 MG tablet TAKE HALF TO ONE TABLET BY MOUTH THREE TIMES DAILY AS NEEDED FOR MUSCLE SPASMS. Past Month at Unknown time Yes Gabino Urban PA-C   HYDROcodone-acetaminophen (NORCO)  MG per tablet TAKE ONE TABLET BY MOUTH DAILY AS NEEDED FOR CHRONIC PAIN. DO NOT TAKE WITH TRAMADOL  TAKE WITH FOOD. Past Month at Unknown time Yes Lars Oconnor MD   Multiple Vitamins-Minerals (MULTIVITAMIN OR) Take 1 tablet by mouth daily Per pt, uses ones without Iron 6/10/2021 at am Yes Reported, Patient   traMADol (ULTRAM) 50 MG tablet TAKE ONE TABLET BY MOUTH TWICE A DAY AS NEEDED FOR PAIN,  TAKE WITH FOOD. Past Month at Unknown time Yes Lars Oconnor MD   vitamin E 400 UNITS TABS Take 400 Units by mouth daily 6/10/2021 at am Yes Unknown, Entered By History   zolpidem (AMBIEN) 5 MG tablet Take 1 tablet (5 mg) by mouth nightly as needed for sleep Past Month at Unknown time  Yes Lars Oconnor MD   albuterol (PROVENTIL) (2.5 MG/3ML) 0.083% neb solution INHALE 3 MILLILITERS (2.5 MG) BY NEBULIZATION ROUTE EVERY 6 HOURS AS NEEDED FOR SHORTNESS OF BREATH/DYSPNEA OR WHEEZING More than a month at Unknown time  Lars Oconnor MD

## 2021-06-12 VITALS
OXYGEN SATURATION: 97 % | HEIGHT: 59 IN | SYSTOLIC BLOOD PRESSURE: 112 MMHG | WEIGHT: 137.1 LBS | RESPIRATION RATE: 20 BRPM | BODY MASS INDEX: 27.64 KG/M2 | HEART RATE: 60 BPM | DIASTOLIC BLOOD PRESSURE: 50 MMHG | TEMPERATURE: 97.8 F

## 2021-06-12 LAB
ALBUMIN SERPL-MCNC: 2.4 G/DL (ref 3.4–5)
ALP SERPL-CCNC: 181 U/L (ref 40–150)
ALT SERPL W P-5'-P-CCNC: 97 U/L (ref 0–50)
ANION GAP SERPL CALCULATED.3IONS-SCNC: 3 MMOL/L (ref 3–14)
AST SERPL W P-5'-P-CCNC: 37 U/L (ref 0–45)
BACTERIA SPEC CULT: ABNORMAL
BILIRUB SERPL-MCNC: 0.7 MG/DL (ref 0.2–1.3)
BUN SERPL-MCNC: 11 MG/DL (ref 7–30)
CALCIUM SERPL-MCNC: 8.5 MG/DL (ref 8.5–10.1)
CHLORIDE SERPL-SCNC: 112 MMOL/L (ref 94–109)
CO2 SERPL-SCNC: 28 MMOL/L (ref 20–32)
CREAT SERPL-MCNC: 0.71 MG/DL (ref 0.52–1.04)
ERYTHROCYTE [DISTWIDTH] IN BLOOD BY AUTOMATED COUNT: 13.3 % (ref 10–15)
GFR SERPL CREATININE-BSD FRML MDRD: 88 ML/MIN/{1.73_M2}
GLUCOSE SERPL-MCNC: 101 MG/DL (ref 70–99)
HCT VFR BLD AUTO: 37.8 % (ref 35–47)
HGB BLD-MCNC: 12.3 G/DL (ref 11.7–15.7)
MAGNESIUM SERPL-MCNC: 1.9 MG/DL (ref 1.6–2.3)
MCH RBC QN AUTO: 30.7 PG (ref 26.5–33)
MCHC RBC AUTO-ENTMCNC: 32.5 G/DL (ref 31.5–36.5)
MCV RBC AUTO: 94 FL (ref 78–100)
PLATELET # BLD AUTO: 189 10E9/L (ref 150–450)
POTASSIUM SERPL-SCNC: 4.1 MMOL/L (ref 3.4–5.3)
PROT SERPL-MCNC: 5.9 G/DL (ref 6.8–8.8)
RBC # BLD AUTO: 4.01 10E12/L (ref 3.8–5.2)
SODIUM SERPL-SCNC: 143 MMOL/L (ref 133–144)
SPECIMEN SOURCE: ABNORMAL
WBC # BLD AUTO: 3.5 10E9/L (ref 4–11)

## 2021-06-12 PROCEDURE — 36415 COLL VENOUS BLD VENIPUNCTURE: CPT | Performed by: INTERNAL MEDICINE

## 2021-06-12 PROCEDURE — 83735 ASSAY OF MAGNESIUM: CPT | Performed by: INTERNAL MEDICINE

## 2021-06-12 PROCEDURE — 80053 COMPREHEN METABOLIC PANEL: CPT | Performed by: INTERNAL MEDICINE

## 2021-06-12 PROCEDURE — 250N000013 HC RX MED GY IP 250 OP 250 PS 637: Performed by: INTERNAL MEDICINE

## 2021-06-12 PROCEDURE — 85027 COMPLETE CBC AUTOMATED: CPT | Performed by: INTERNAL MEDICINE

## 2021-06-12 PROCEDURE — 99238 HOSP IP/OBS DSCHRG MGMT 30/<: CPT | Performed by: INTERNAL MEDICINE

## 2021-06-12 RX ORDER — CIPROFLOXACIN 500 MG/1
500 TABLET, FILM COATED ORAL 2 TIMES DAILY
Qty: 10 TABLET | Refills: 0 | Status: SHIPPED | OUTPATIENT
Start: 2021-06-12 | End: 2021-06-17

## 2021-06-12 RX ORDER — HYDROCODONE BITARTRATE AND ACETAMINOPHEN 5; 325 MG/1; MG/1
1 TABLET ORAL EVERY 6 HOURS PRN
Qty: 6 TABLET | Refills: 0 | Status: SHIPPED | OUTPATIENT
Start: 2021-06-12 | End: 2021-06-15

## 2021-06-12 RX ADMIN — HYDROCODONE BITARTRATE AND ACETAMINOPHEN 1 TABLET: 10; 325 TABLET ORAL at 09:44

## 2021-06-12 ASSESSMENT — ACTIVITIES OF DAILY LIVING (ADL)
ADLS_ACUITY_SCORE: 10

## 2021-06-12 NOTE — PLAN OF CARE
Nurse from [phone]    End of Shift Note  See flowsheets for vital signs and complete assessments.    Pertinent Assessments: A&Ox4. Denies pain, nausea, SOB, numbness & tingling. Up independent.     Major Shift Events: Uneventfuul    Treatment Plan: IV Rocephin    Discharge: Possibly tomorrow 6/12    Bedside RN: Summer Herrera

## 2021-06-12 NOTE — DISCHARGE SUMMARY
[facility]  Hospitalist Discharge Summary       Date of Admission:  6/10/2021  Date of Discharge:  6/12/2021  Discharging Provider: Segundo Mora MD      Discharge Diagnoses   Klebsiella variicola bacteremia due to urinary tract infection    Follow-ups Needed After Discharge   Follow-up Appointments     Follow-up and recommended labs and tests       Follow up with primary care provider, Lars Oconnor, within 7-14   days for hospital follow- up.             Unresulted Labs Ordered in the Past 30 Days of this Admission     Date and Time Order Name Status Description    6/10/2021 1849 Blood culture Preliminary     6/10/2021 1718 Blood culture Preliminary     6/10/2021 0146 Blood culture ONE site Preliminary     6/10/2021 0057 Urine Culture Aerobic Bacterial Preliminary       These results will be followed up by [facility]    Discharge Disposition   Discharged to home  Condition at discharge: Stable    Hospital Course     Emeli Granados is a 66 year old female with a history of diverticulitis, some form of porphyria cutanea tarda in remission, who was admitted to the hospitalist service with:     Klebsiella bacteremia  Urinary tract infection  Patient was seen in urgent care and the emergency room on 6/9.  Symptoms that prompted those visits were abdominal pain, flank pain, nausea/vomiting and fever.  She said her temperature was up to 101.0  F at home. Initial UA only had 2 WBCs.  Blood and urine cultures were sent.  Apparently she had been prescribed ciprofloxacin at the urgent care but never took it.  She was called back to the emergency department on 6/10 after her blood cultures returned positive for gram-negative rods.  Blood cultures have speciated to show Klebsiella species.  She has been started on ceftriaxone.  Had no sign of sepsis here with no fever, etc.  Was placed on ceftriaxone during hospitalization.  Repeat blood cultures drawn when she returned to the ED were  "negative.  Ultimately, the she grew the same Klebsiella in her bloodstream that was in her urine sample from 6/9.  On the day of discharge, she was fever free, feeling well, and ready to go home.  She received 2 days of IV ceftriaxone here and will discharge on an additional 5 days of ciprofloxacin 500 mg twice daily.    Elevated transaminases  Abnormal gallbladder appearance on CT and US c/w adenomyosis  Labs here notable for bilirubin of 1.2, , AST 63.  CT abdomen done in the early morning on 6/10 showed a \"masslike thickening\" of the gallbladder fundus.  On ultrasound this was found to be consistent with adenomyosis, a benign condition.  She has had no major right upper quadrant pain.  Likely the transaminases elevated to acute infection.  No sign of cholecystitis. Transaminases trended down daily prior to discharge.    Consultations This Hospital Stay   None      Code Status   Full Code    Time Spent on this Encounter   I, Segundo Mora MD, personally saw the patient today and spent less than or equal to 30 minutes discharging this patient.       Segundo Mora MD  [facility]  ______________________________________________________________________    Physical Exam   Vital Signs: Temp: 97.8  F (36.6  C) Temp src: Oral BP: 112/50 Pulse: 60   Resp: 20 SpO2: 97 % O2 Device: None (Room air)    Weight: 137 lbs 1.6 oz      General: No distress.    HEENT: No scleral icterus. Oropharynx moist.     Neck: Supple. Normal range of motion.    Pulmonary: Normal work of breathing. Clear to auscultation bilaterally.    Cardiovascular: Regular rate and rhythm without murmur or extra heart sounds.    Abdomen: Soft and non-tender.    Extremities: No peripheral edema. No clubbing.    Neurologic: Awake, alert, appropriate.    Skin: Warm and dry.    Psychiatric: Normal affect and mood.       Primary Care Physician   Lars Oconnor    Discharge Orders      Reason for your hospital " stay    Bloodstream infection due to urinary tract infection. Your bloodstream infection cleared. We had you on IV antibiotics while in the hospital and at home you will take antibiotic pills for five more days.     Follow-up and recommended labs and tests     Follow up with primary care provider, Lars Oconnor, within 7-14 days for hospital follow- up.     Activity    Your activity upon discharge: As tolerated     Diet    Follow this diet upon discharge: Regular       Significant Results and Procedures   Most Recent 3 CBC's:  Recent Labs   Lab Test 06/12/21  0614 06/11/21  0543 06/10/21  1753   WBC 3.5* 3.3* 4.1   HGB 12.3 12.1 12.6   MCV 94 95 94    162 171     Most Recent 3 BMP's:  Recent Labs   Lab Test 06/12/21  0614 06/11/21  0543 06/10/21  2248 06/10/21  1753    143  --  139   POTASSIUM 4.1 3.9 3.4 3.4   CHLORIDE 112* 114*  --  109   CO2 28 25  --  26   BUN 11 11  --  15   CR 0.71 0.70  --  0.77   ANIONGAP 3 4  --  4   ALAN 8.5 8.2*  --  8.5   * 84  --  104*     Most Recent 2 LFT's:  Recent Labs   Lab Test 06/12/21  0614 06/11/21  0543   AST 37 38   ALT 97* 128*   ALKPHOS 181* 153*   BILITOTAL 0.7 1.0     Most Recent 3 INR's:No lab results found.  Most Recent 6 Bacteria Isolates From Any Culture (See EPIC Reports for Culture Details):  Recent Labs   Lab Test 06/10/21  1915 06/10/21  1753 06/10/21  0221 06/09/21  2152   CULT No growth after 2 days No growth after 2 days No growth after 2 days Cultured on the 1st day of incubation:  Klebsiella variicola  *  Critical Value/Significant Value, preliminary result only, called to and read back by  Dr. Brunson at 1500 6.10.21.DK    (Note)  POSITIVE for KLEBSIELLA PNEUMONIAE by Value and [employer] multiplex nucleic acid  test. Final identification and antimicrobial susceptibility testing  will be verified by standard methods.    Specimen tested with Verigene multiplex, gram-negative blood culture  nucleic acid test for the following targets:  Acinetobacter sp.,  Citrobacter sp., Enterobacter sp., Proteus sp., E. coli, K.  pneumoniae/oxytoca, P. aeruginosa, and the following resistance  markers: CTXM, KPC, NDM, VIM, IMP and OXA.    Critical Value/Significant Value called to and read back by Tali William RN 06/10/21 @1923 RYLAN    >100,000 colonies/mL  Klebsiella variicola/pneumoniae  Susceptibility testing in progress  *   ,   Results for orders placed or performed during the hospital encounter of 06/10/21   Chest CT w/o contrast    Narrative    EXAM: CT CHEST W/O CONTRAST  LOCATION: [facility]  DATE/TIME: 6/10/2021 1:29 AM    INDICATION: Fever.  COMPARISON: 8/8/2019.  TECHNIQUE: CT chest without IV contrast. Multiplanar reformats were obtained. Dose reduction techniques were used.  CONTRAST: None.    FINDINGS:   LUNGS AND PLEURA: There are a few bands of scar and atelectasis at the lung bases. Emphysematous disease in the upper lungs. Tiny nodule in the right upper lobe posteriorly is stable. Tiny nodule in the left upper lobe laterally is also stable. No   pneumothorax or pleural effusion.    MEDIASTINUM/AXILLAE: No lymphadenopathy. No thoracic aortic aneurysm.    CORONARY ARTERY CALCIFICATION: None.    UPPER ABDOMEN: No acute upper abdominal abnormality.    MUSCULOSKELETAL: Degenerative disease in the spine.      Impression    IMPRESSION:   1.  No acute abnormality. No evidence of pneumonia.  2.  Emphysema.     CT Abdomen Pelvis w Contrast    Narrative    EXAM: CT ABDOMEN PELVIS W CONTRAST  LOCATION: [facility]  DATE/TIME: 6/10/2021 1:29 AM    INDICATION: Flank pain and fever  COMPARISON: 04/12/2014  TECHNIQUE: CT scan of the abdomen and pelvis was performed following injection of IV contrast. Multiplanar reformats were obtained. Dose reduction techniques were used.  CONTRAST: 72mL Isovue-370    FINDINGS:   LOWER CHEST: Mild basilar atelectasis.    HEPATOBILIARY: Hepatic steatosis. Subcentimeter hepatic hypodensity small  for characterization. Focal, masslike wall thickening of the gallbladder fundus 1.6 x 1.7 cm. Slight haziness of adjacent fat.    PANCREAS: Normal.    SPLEEN: Normal.    ADRENAL GLANDS: Normal.    KIDNEYS/BLADDER: Normal.    BOWEL: Normal caliber. Normal appendix. Diverticulosis    LYMPH NODES: Normal.    VASCULATURE: Atherosclerotic vascular calcification.    PELVIC ORGANS: Absent uterus.    MUSCULOSKELETAL: Degenerative change osseous structures.      Impression    IMPRESSION:   1.  Focal, masslike thickening of the gallbladder fundus. Slight infiltration of adjacent fat. Underlying mass not excluded. Surgical consultation recommended.  2.  Diverticulosis.   US Abdomen Limited    Narrative    EXAM: US ABDOMEN LIMITED  LOCATION: [facility]  DATE/TIME: 6/10/2021 3:46 AM    INDICATION: Right upper quadrant pain.  COMPARISON: CT 6/10/2021.  TECHNIQUE: Limited abdominal ultrasound.    FINDINGS:    GALLBLADDER: No gallstones. Mild gallbladder wall thickening and probable adenomyomatosis. No sonographic Romeo sign.    BILE DUCTS: No biliary dilatation. The common duct measures 7 mm.    LIVER: Normal parenchyma with smooth contour. No focal mass.    RIGHT KIDNEY: No hydronephrosis.    PANCREAS: The visualized portions are normal.    No ascites.      Impression    IMPRESSION:  1.  No gallstone or biliary dilatation.  2.  Probable adenomyomatosis of the gallbladder wall.                   Discharge Medications   Current Discharge Medication List      START taking these medications    Details   ciprofloxacin (CIPRO) 500 MG tablet Take 1 tablet (500 mg) by mouth 2 times daily for 5 days  Qty: 10 tablet, Refills: 0    Associated Diagnoses: Bacteremia      HYDROcodone-acetaminophen (NORCO) 5-325 MG tablet Take 1 tablet by mouth every 6 hours as needed for severe pain  Qty: 6 tablet, Refills: 0    Associated Diagnoses: Bacteremia         CONTINUE these medications which have NOT CHANGED    Details   albuterol  (PROAIR HFA/PROVENTIL HFA/VENTOLIN HFA) 108 (90 Base) MCG/ACT inhaler One or two puffs q6h as needed for wheeze  Qty: 18 g, Refills: 3    Comments: Pharmacy may dispense brand covered by insurance (Proair, or proventil or ventolin or generic albuterol inhaler)  Associated Diagnoses: Acute bronchitis, unspecified organism      ascorbic acid (VITAMIN C) 1000 MG TABS Take 1,000 mg by mouth daily      cholecalciferol (VITAMIN D3) 1000 UNIT tablet Take 1,000 Units by mouth daily      cyclobenzaprine (FLEXERIL) 10 MG tablet TAKE HALF TO ONE TABLET BY MOUTH THREE TIMES DAILY AS NEEDED FOR MUSCLE SPASMS.  Qty: 30 tablet, Refills: 0    Associated Diagnoses: Acute low back pain without sciatica, unspecified back pain laterality      HYDROcodone-acetaminophen (NORCO)  MG per tablet TAKE ONE TABLET BY MOUTH DAILY AS NEEDED FOR CHRONIC PAIN. DO NOT TAKE WITH TRAMADOL  TAKE WITH FOOD.  Qty: 15 tablet, Refills: 0    Associated Diagnoses: Other chronic pain      Multiple Vitamins-Minerals (MULTIVITAMIN OR) Take 1 tablet by mouth daily Per pt, uses ones without Iron      traMADol (ULTRAM) 50 MG tablet TAKE ONE TABLET BY MOUTH TWICE A DAY AS NEEDED FOR PAIN,  TAKE WITH FOOD.  Qty: 60 tablet, Refills: 0    Associated Diagnoses: Other chronic pain      vitamin E 400 UNITS TABS Take 400 Units by mouth daily      zolpidem (AMBIEN) 5 MG tablet Take 1 tablet (5 mg) by mouth nightly as needed for sleep  Qty: 30 tablet, Refills: 0    Associated Diagnoses: Sleep disorder      albuterol (PROVENTIL) (2.5 MG/3ML) 0.083% neb solution INHALE 3 MILLILITERS (2.5 MG) BY NEBULIZATION ROUTE EVERY 6 HOURS AS NEEDED FOR SHORTNESS OF BREATH/DYSPNEA OR WHEEZING  Qty: 30 mL, Refills: 0    Associated Diagnoses: Acute bronchitis, unspecified organism           Allergies   Allergies   Allergen Reactions     Metaproterenol Sulfate Other (See Comments)     alupent inhaler-shaking     Percocet [Oxycodone-Acetaminophen] Itching

## 2021-06-12 NOTE — PLAN OF CARE
To Do:  End of Shift Summary  For vital signs and complete assessments, please see documentation flowsheets.     Pertinent assessments: A/Ox4. VSS. Denies SOB and nausea. No pain reported. Up independent.  Major Shift Events: Uneventful  Treatment Plan: Rocephin IV. Poss discharge today with Bactrim or Cipro po.  Bedside Nurse: Eboni Hightower RN

## 2021-06-12 NOTE — PLAN OF CARE
Patient hospitalized for bacteremia. Cleared for discharge to home today per MD. Discharge instructions, medications, and follow-ups reviewed with patient and  in detail. Discharge medications, including Norco and Cipro were filled and given to pt. Patient and  verbalized understanding of discharge instructions. Belongings were returned to patient at time of discharge including glasses, cell phone, clothing, purse, and duffle bag.  providing transport home.

## 2021-06-13 LAB
BACTERIA SPEC CULT: ABNORMAL
Lab: ABNORMAL
SPECIMEN SOURCE: ABNORMAL

## 2021-06-15 ENCOUNTER — TELEPHONE (OUTPATIENT)
Dept: FAMILY MEDICINE | Facility: CLINIC | Age: 66
End: 2021-06-15

## 2021-06-15 NOTE — TELEPHONE ENCOUNTER
"Hospital/TCU/ED for chronic condition Discharge Protocol    \"Hi, my name is Gayle LICEA RN, a registered nurse, and I am calling from [facility].  I am calling to follow up and see how things are going for you after your recent emergency visit/hospital/TCU stay.\"    Tell me how you are doing now that you are home?\" doing okay      Discharge Instructions    \"Let's review your discharge instructions.  What is/are the follow-up recommendations?  Pt. Response: take antibiotic,f/u if similar symptoms    \"Has an appointment with your primary care provider been scheduled?\"   No (schedule appointment)-appointment scheduled    \"When you see the provider, I would recommend that you bring your medications with you.\"    Medications    \"Tell me what changed about your medicines when you discharged?\"    Changes to chronic meds?    0-1    \"What questions do you have about your medications?\"    None     New diagnoses of heart failure, COPD, diabetes, or MI?    No         Post Discharge Medication Reconciliation Status: discharge medications reconciled, continue medications without change.    Was MTM referral placed (*Make sure to put transitions as reason for referral)?   No    Call Summary    \"What questions or concerns do you have about your recent visit and your follow-up care?\"     none    \"If you have questions or things don't continue to improve, we encourage you contact us through the main clinic number (give number).  Even if the clinic is not open, triage nurses are available 24/7 to help you.     We would like you to know that our clinic has extended hours (provide information).  We also have urgent care (provide details on closest location and hours/contact info)\"      \"Thank you for your time and take care!\"      Follow-up Appointments    Follow-up and recommended labs and tests      Follow up with primary care provider, Lars Oconnor, within 7-14   days for hospital follow- up.       Gayle " Davie RN, BSN  Message handled by CLINIC NURSE.

## 2021-06-16 LAB
BACTERIA SPEC CULT: NO GROWTH
Lab: NORMAL
SPECIMEN SOURCE: NORMAL

## 2021-06-21 NOTE — PROGRESS NOTES
"Pre-Visit Planning   Next 5 appointments (look out 90 days)    Jun 22, 2021  9:30 AM  (Arrive by 9:10 AM)  Office Visit with Ana Maria Bruner MD  [facility] ([facility] [address] [facility] [address] [phone]   Jul 26, 2021  1:30 PM  (Arrive by 1:10 PM)  Office Visit with Lars Oconnor MD  [facility] ([facility] ) [address] [facility] [address] [phone]        Appointment Notes for this encounter:   hospital follow up - Klebsiella variicola bacteremia due to Urinary Tract Infection  Pt reports feeling much better    Questionnaires Reviewed/Assigned  No additional questionnaires are needed    Patient preferred phone number: [phone]      Spoke to patient via phone. Patient does not have additional questions or concerns.        Visit is not preventive.    Patient is established.        Health Maintenance Due   Topic Date Due     HEPATITIS B IMMUNIZATION (1 of 3 - Risk 3-dose series) Never done     ZOSTER IMMUNIZATION (2 of 3) 06/16/2016     ANNUAL REVIEW OF HM ORDERS  07/29/2020         MyChart  Patient is active on [employer].      Call Summary  \"Thank you for your time today.  If anything comes up before your appointment, please feel free to contact us at [phone].\"    "

## 2021-06-22 ENCOUNTER — OFFICE VISIT (OUTPATIENT)
Dept: FAMILY MEDICINE | Facility: CLINIC | Age: 66
End: 2021-06-22
Payer: COMMERCIAL

## 2021-06-22 VITALS
WEIGHT: 135.1 LBS | HEART RATE: 73 BPM | SYSTOLIC BLOOD PRESSURE: 110 MMHG | HEIGHT: 59 IN | DIASTOLIC BLOOD PRESSURE: 72 MMHG | OXYGEN SATURATION: 97 % | TEMPERATURE: 98.5 F | BODY MASS INDEX: 27.24 KG/M2

## 2021-06-22 DIAGNOSIS — R78.81 BACTEREMIA DUE TO KLEBSIELLA PNEUMONIAE: Primary | ICD-10-CM

## 2021-06-22 DIAGNOSIS — B96.1 BACTEREMIA DUE TO KLEBSIELLA PNEUMONIAE: Primary | ICD-10-CM

## 2021-06-22 DIAGNOSIS — F10.21 ALCOHOL DEPENDENCE IN REMISSION (H): ICD-10-CM

## 2021-06-22 DIAGNOSIS — R74.8 ELEVATED LIVER ENZYMES: ICD-10-CM

## 2021-06-22 DIAGNOSIS — Z02.9 ADMINISTRATIVE ENCOUNTER: ICD-10-CM

## 2021-06-22 DIAGNOSIS — K57.30 DIVERTICULOSIS OF COLON: ICD-10-CM

## 2021-06-22 DIAGNOSIS — D13.5 ADENOMYOMATOSIS OF GALLBLADDER: ICD-10-CM

## 2021-06-22 LAB
ALBUMIN SERPL-MCNC: 3.4 G/DL (ref 3.4–5)
ALP SERPL-CCNC: 140 U/L (ref 40–150)
ALT SERPL W P-5'-P-CCNC: 45 U/L (ref 0–50)
ANION GAP SERPL CALCULATED.3IONS-SCNC: 5 MMOL/L (ref 3–14)
AST SERPL W P-5'-P-CCNC: 23 U/L (ref 0–45)
BILIRUB SERPL-MCNC: 0.4 MG/DL (ref 0.2–1.3)
BUN SERPL-MCNC: 15 MG/DL (ref 7–30)
CALCIUM SERPL-MCNC: 9.2 MG/DL (ref 8.5–10.1)
CHLORIDE SERPL-SCNC: 111 MMOL/L (ref 94–109)
CO2 SERPL-SCNC: 25 MMOL/L (ref 20–32)
CREAT SERPL-MCNC: 0.78 MG/DL (ref 0.52–1.04)
GFR SERPL CREATININE-BSD FRML MDRD: 79 ML/MIN/{1.73_M2}
GLUCOSE SERPL-MCNC: 106 MG/DL (ref 70–99)
POTASSIUM SERPL-SCNC: 3.8 MMOL/L (ref 3.4–5.3)
PROT SERPL-MCNC: 7.2 G/DL (ref 6.8–8.8)
SODIUM SERPL-SCNC: 141 MMOL/L (ref 133–144)

## 2021-06-22 PROCEDURE — 99495 TRANSJ CARE MGMT MOD F2F 14D: CPT | Performed by: FAMILY MEDICINE

## 2021-06-22 PROCEDURE — 36415 COLL VENOUS BLD VENIPUNCTURE: CPT | Performed by: FAMILY MEDICINE

## 2021-06-22 PROCEDURE — 80053 COMPREHEN METABOLIC PANEL: CPT | Performed by: FAMILY MEDICINE

## 2021-06-22 ASSESSMENT — MIFFLIN-ST. JEOR: SCORE: 1053.44

## 2021-06-22 NOTE — LETTER
June 22, 2021      Emeli Granados  [address] [facility] [address]        To Whom It May Concern:    Emeli Granados  was seen on 6/22/2/21 for hospital follow up.  Please allow patient to have water during her shift.   For further questions or concerns please let us know.         Sincerely,          Ana Maria Romo MD, [facility]

## 2021-06-22 NOTE — PROGRESS NOTES
"    Assessment & Plan     Bacteremia due to Klebsiella pneumoniae  - resolved     Adenomyomatosis of gallbladder  - incidental finding. Watchful waiting discussed    Diverticulosis of colon  - noted on CT. Advise increased intake of fiber.     Elevated liver enzymes  - recheck  - Comprehensive metabolic panel (BMP + Alb, Alk Phos, ALT, AST, Total. Bili, TP)    Alcohol dependence in remission (H)  - remains in remission    Administrative encounter  - work note written to be able to drink water at her work station.              BMI:   Estimated body mass index is 27.29 kg/m  as calculated from the following:    Height as of this encounter: 1.499 m (4' 11\").    Weight as of this encounter: 61.3 kg (135 lb 1.6 oz).       See Patient Instructions    Return in about 5 months (around 11/22/2021) for Physical Exam, in person, with your primary care physician.    Ana Maria Bruner MD  [facility] ALIZA Sainz is a 66 year old who presents for the following health issues     HPI       Hospital Follow-up Visit:    Hospital/Nursing Home/ Rehab Facility: [facility]  Date of Admission: 06/10/2021  Date of Discharge: 06/12/2021  Reason(s) for Admission:Klebsiella variicola bacteremia due to urinary tract infection       Was your hospitalization related to COVID-19? No   Problems taking medications regularly:  None  Medication changes since discharge:  Pt states she has finished course of antibiotics  Problems adhering to non-medication therapy:  None    Summary of hospitalization:  [facility] discharge summary reviewed  Diagnostic Tests/Treatments reviewed.  Follow up needed: none  Other Healthcare Providers Involved in Patient s Care:         None  Update since discharge: improved.       Post Discharge Medication Reconciliation: discharge medications reconciled, continue medications without change.  Plan of care communicated with patient          " "    Reports feeling better since discharge and has completed course of antibiotics. During her hospitalization she was an incidental finding of  adenomyomatosis of gall bladder noted. She denies any RUQ, nausea or vomiting at this time.   Also did have elevated liver enzyme- reports being sober for over 23 years.     Review of Systems   Constitutional, HEENT, cardiovascular, pulmonary, GI, , musculoskeletal, neuro, skin, endocrine and psych systems are negative, except as otherwise noted.      Objective    /72 (BP Location: Right arm, Patient Position: Sitting, Cuff Size: Adult Small)   Pulse 73   Temp 98.5  F (36.9  C) (Oral)   Ht 1.499 m (4' 11\")   Wt 61.3 kg (135 lb 1.6 oz)   LMP  (LMP Unknown)   SpO2 97%   BMI 27.29 kg/m    Body mass index is 27.29 kg/m .  Physical Exam   GENERAL: healthy, alert and no distress  NECK: no adenopathy, no asymmetry, masses, or scars and thyroid normal to palpation  RESP: lungs clear to auscultation - no rales, rhonchi or wheezes  CV: regular rate and rhythm, normal S1 S2, no S3 or S4, no murmur, click or rub, no peripheral edema and peripheral pulses strong  ABDOMEN: soft, nontender, no hepatosplenomegaly, no masses and bowel sounds normal  MS: no gross musculoskeletal defects noted, no edema  PSYCH: mentation appears normal, affect normal/bright                "

## 2021-07-13 NOTE — TELEPHONE ENCOUNTER
Pt calls, discuss zolpidem, see note below, will pay bateman  Gayle Broderick RN, BSN  Message handled by CLINIC NURSE.

## 2021-07-26 ENCOUNTER — OFFICE VISIT (OUTPATIENT)
Dept: FAMILY MEDICINE | Facility: CLINIC | Age: 66
End: 2021-07-26
Payer: COMMERCIAL

## 2021-07-26 VITALS
HEIGHT: 59 IN | HEART RATE: 69 BPM | WEIGHT: 136.1 LBS | OXYGEN SATURATION: 97 % | DIASTOLIC BLOOD PRESSURE: 78 MMHG | SYSTOLIC BLOOD PRESSURE: 139 MMHG | BODY MASS INDEX: 27.44 KG/M2 | TEMPERATURE: 98.6 F

## 2021-07-26 DIAGNOSIS — G89.29 OTHER CHRONIC PAIN: Primary | ICD-10-CM

## 2021-07-26 DIAGNOSIS — Z87.440 PERSONAL HISTORY OF URINARY TRACT INFECTION: ICD-10-CM

## 2021-07-26 DIAGNOSIS — E80.0 ERYTHROPOIETIC PORPHYRIA (H): ICD-10-CM

## 2021-07-26 LAB
ALBUMIN UR-MCNC: NEGATIVE MG/DL
APPEARANCE UR: CLEAR
BILIRUB UR QL STRIP: NEGATIVE
COLOR UR AUTO: YELLOW
GLUCOSE UR STRIP-MCNC: NEGATIVE MG/DL
HGB UR QL STRIP: NEGATIVE
KETONES UR STRIP-MCNC: NEGATIVE MG/DL
LEUKOCYTE ESTERASE UR QL STRIP: NEGATIVE
NITRATE UR QL: NEGATIVE
PH UR STRIP: 5 [PH] (ref 5–7)
SP GR UR STRIP: 1.02 (ref 1–1.03)
UROBILINOGEN UR STRIP-ACNC: 0.2 E.U./DL

## 2021-07-26 PROCEDURE — 80307 DRUG TEST PRSMV CHEM ANLYZR: CPT | Performed by: FAMILY MEDICINE

## 2021-07-26 PROCEDURE — 82570 ASSAY OF URINE CREATININE: CPT | Mod: 59 | Performed by: FAMILY MEDICINE

## 2021-07-26 PROCEDURE — 99214 OFFICE O/P EST MOD 30 MIN: CPT | Performed by: FAMILY MEDICINE

## 2021-07-26 PROCEDURE — 81003 URINALYSIS AUTO W/O SCOPE: CPT | Performed by: FAMILY MEDICINE

## 2021-07-26 ASSESSMENT — MIFFLIN-ST. JEOR: SCORE: 1057.98

## 2021-07-26 ASSESSMENT — ANXIETY QUESTIONNAIRES
2. NOT BEING ABLE TO STOP OR CONTROL WORRYING: NOT AT ALL
IF YOU CHECKED OFF ANY PROBLEMS ON THIS QUESTIONNAIRE, HOW DIFFICULT HAVE THESE PROBLEMS MADE IT FOR YOU TO DO YOUR WORK, TAKE CARE OF THINGS AT HOME, OR GET ALONG WITH OTHER PEOPLE: NOT DIFFICULT AT ALL
1. FEELING NERVOUS, ANXIOUS, OR ON EDGE: NOT AT ALL
5. BEING SO RESTLESS THAT IT IS HARD TO SIT STILL: NOT AT ALL
6. BECOMING EASILY ANNOYED OR IRRITABLE: NOT AT ALL
7. FEELING AFRAID AS IF SOMETHING AWFUL MIGHT HAPPEN: NOT AT ALL
7. FEELING AFRAID AS IF SOMETHING AWFUL MIGHT HAPPEN: NOT AT ALL
3. WORRYING TOO MUCH ABOUT DIFFERENT THINGS: NOT AT ALL

## 2021-07-26 ASSESSMENT — ASTHMA QUESTIONNAIRES
QUESTION_4 LAST FOUR WEEKS HOW OFTEN HAVE YOU USED YOUR RESCUE INHALER OR NEBULIZER MEDICATION (SUCH AS ALBUTEROL): NOT AT ALL
QUESTION_5 LAST FOUR WEEKS HOW WOULD YOU RATE YOUR ASTHMA CONTROL: COMPLETELY CONTROLLED
QUESTION_2 LAST FOUR WEEKS HOW OFTEN HAVE YOU HAD SHORTNESS OF BREATH: NOT AT ALL
QUESTION_3 LAST FOUR WEEKS HOW OFTEN DID YOUR ASTHMA SYMPTOMS (WHEEZING, COUGHING, SHORTNESS OF BREATH, CHEST TIGHTNESS OR PAIN) WAKE YOU UP AT NIGHT OR EARLIER THAN USUAL IN THE MORNING: NOT AT ALL
ACT_TOTALSCORE: 25
QUESTION_1 LAST FOUR WEEKS HOW MUCH OF THE TIME DID YOUR ASTHMA KEEP YOU FROM GETTING AS MUCH DONE AT WORK, SCHOOL OR AT HOME: NONE OF THE TIME

## 2021-07-26 NOTE — PROGRESS NOTES
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     Polycythemia 08/08/2012       PAST SURGICAL HISTORY:   Past Surgical History:   Procedure Laterality Date     COLONOSCOPY  2004, 2012    repeat in 2022     Dilatation of the esophagus once due to dysphagia and stricture  2003     Ganglion cyst removal       HEMORRHOIDECTOMY BANDING       HYSTERECTOMY       LAPAROSCOPIC SALPINGO-OOPHORECTOMY Bilateral 10/23/2015    Procedure: LAPAROSCOPIC SALPINGO-OOPHORECTOMY;  Surgeon: Johnathan Steve MD;  Location: [facility]     Thumb surgery Right      TONSILLECTOMY         FAMILY HISTORY:   Family History   Problem Relation Age of Onset     Hypertension Mother      Cerebrovascular Disease Mother         TIA's     Depression Mother      Cancer - colorectal Maternal Grandmother         dx at 65     Lipids Father      C.A.D. Father         angioplasty at 65     Musculoskeletal Disorder Father      Alcohol/Drug Daughter         in remission     Breast Cancer No family hx of        SOCIAL HISTORY:   Social History     Tobacco Use     Smoking status: Former Smoker     Packs/day: 0.50     Years:  "30.00     Pack years: 15.00     Types: Cigarettes     Start date: 10/13/1967     Quit date: 2019     Years since quittin.7     Smokeless tobacco: Never Used     Tobacco comment: quit 2019   Substance Use Topics     Alcohol use: No     Comment: sober since 99         HPI     Medication Followup of tramadol    Taking Medication as prescribed: yes    Side Effects:  None    Medication Helping Symptoms:  yes     Medication follow up of Hydrocodone> no side effects and the medication is working well for back pain.      How many servings of fruits and vegetables do you eat daily?  2-3    On average, how many sweetened beverages do you drink each day (Examples: soda, juice, sweet tea, etc.  Do NOT count diet or artificially sweetened beverages)?   0    How many days per week do you exercise enough to make your heart beat faster? 7    How many minutes a day do you exercise enough to make your heart beat faster? 30 - 60    How many days per week do you miss taking your medication? 2-3 days a week      Review of Systems   Constitutional, HEENT, cardiovascular, pulmonary, GI, , musculoskeletal, neuro, skin, endocrine and psych systems are negative, except as otherwise noted.      Objective    /78 (BP Location: Right arm, Patient Position: Sitting, Cuff Size: Adult Regular)   Pulse 69   Temp 98.6  F (37  C) (Oral)   Ht 1.499 m (4' 11\")   Wt 61.7 kg (136 lb 1.6 oz)   LMP  (LMP Unknown)   SpO2 97%   BMI 27.49 kg/m    Body mass index is 27.49 kg/m .  Physical Exam   GENERAL: healthy, alert and no distress  EYES: Eyes grossly normal to inspection, PERRL and conjunctivae and sclerae normal  HENT: ear canals and TM's normal, nose and mouth without ulcers or lesions  NECK: no adenopathy, no asymmetry, masses, or scars and thyroid normal to palpation  RESP: lungs clear to auscultation - no rales, rhonchi or wheezes  CV: regular rate and rhythm, normal S1 S2, no S3 or S4, no murmur, click or rub, no " peripheral edema and peripheral pulses strong  ABDOMEN: soft, nontender, no hepatosplenomegaly, no masses and bowel sounds normal  MS: no gross musculoskeletal defects noted, no edema  SKIN: no suspicious lesions or rashes  NEURO: Normal strength and tone, mentation intact and speech normal  PSYC  Current Outpatient Medications   Medication     albuterol (PROAIR HFA/PROVENTIL HFA/VENTOLIN HFA) 108 (90 Base) MCG/ACT inhaler     albuterol (PROVENTIL) (2.5 MG/3ML) 0.083% neb solution     ascorbic acid (VITAMIN C) 1000 MG TABS     cholecalciferol (VITAMIN D3) 1000 UNIT tablet     cyclobenzaprine (FLEXERIL) 10 MG tablet     HYDROcodone-acetaminophen (NORCO)  MG per tablet     Multiple Vitamins-Minerals (MULTIVITAMIN OR)     traMADol (ULTRAM) 50 MG tablet     vitamin E 400 UNITS TABS     zolpidem (AMBIEN) 5 MG tablet     No current facility-administered medications for this visit.       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

## 2021-07-26 NOTE — LETTER
Opioid / Opioid Plus Controlled Substance Agreement     This is an agreement between you and your provider about the safe and appropriate use of controlled substance/opioids prescribed by your care team. Controlled substances are medicines that can cause physical and mental dependence (abuse).    There are strict laws about having and using these medicines. We here at [facility] are committing to working with you in your efforts to get better. To support you in this work, we ll help you schedule regular office appointments for medicine refills. If we must cancel or change your appointment for any reason, we ll make sure you have enough medicine to last until your next appointment.     As a Provider, I will:    Listen carefully to your concerns and treat you with respect.     Recommend a treatment plan that I believe is in your best interest. This plan may involve therapies other than opioid pain medication.     Talk with you often about the possible benefits, and the risk of harm of any medicine that we prescribe for you.     Provide a plan on how to taper (discontinue or go off) using this medicine if the decision is made to stop its use.    As a Patient, I understand that opioid(s):     Are a controlled substance prescribed by my care team to help me function or work and manage my condition(s).     Are strong medicines and can cause serious side effects such as:    Drowsiness, which can seriously affect my driving ability    A lower breathing rate, enough to cause death    Harm to my thinking ability     Depression     Abuse of and addiction to this medicine    Need to be taken exactly as prescribed. Combining opioids with certain medicines or chemicals (such as illegal drugs, sedatives, sleeping pills, and benzodiazepines) can be dangerous or even fatal. If I stop opioids suddenly, I may have severe withdrawal symptoms.    Do not work for all types of pain nor for all patients. If they re not helpful, I  may be asked to stop them.        The risks, benefits and side effects of these medicine(s) were explained to me. I agree that:  1. I will take part in other treatments as advised by my care team. This may be psychiatry or counseling, physical therapy, behavioral therapy, group treatment or a referral to a specialist.     2. I will keep all my appointments. I understand that this is part of the monitoring of opioids. My care team may require an office visit for EVERY opioid/controlled substance refill. If I miss appointments or don t follow instructions, my care team may stop my medicine.    3. I will take my medicines as prescribed. I will not change the dose or schedule unless my care team tells me to. There will be no refills if I run out early.     4. I may be asked to come to the clinic and complete a urine drug test or complete a pill count at any time. If I don t give a urine sample or participate in a pill count, the care team may stop my medicine.    5. I will only receive prescriptions from this clinic for chronic pain. If I am treated by another provider for acute pain issues, I will tell them that I am taking opioid pain medication for chronic pain and that I have a treatment agreement with this provider. I will inform my [facility] care team within one business day if I am given a prescription for any pain medication by another healthcare provider. My [facility] care team can contact other providers and pharmacists about my use of any medicines.    6. It is up to me to make sure that I don t run out of my medicines on weekends or holidays. If my care team is willing to refill my opioid prescription without a visit, I must request refills only during office hours. Refills may take up to 3 business days to process. I will use one pharmacy to fill all my opioid and other controlled substance prescriptions. I will notify the clinic about any changes to my insurance or medication  availability.    7. I am responsible for my prescriptions. If the medicine/prescription is lost, stolen or destroyed, it will not be replaced. I also agree not to share controlled substance medicines with anyone.    8. I am aware I should not use any illegal or recreational drugs. I agree not to drink alcohol unless my care team says I can.       9. If I enroll in the [place] [facility] Cannabis program, I will tell my care team prior to my next refill.     10. I will tell my care team right away if I become pregnant, have a new medical problem treated outside of my regular clinic, or have a change in my medications.    11. I understand that this medicine can affect my thinking, judgment and reaction time. Alcohol and drugs affect the brain and body, which can affect the safety of my driving. Being under the influence of alcohol or drugs can affect my decision-making, behaviors, personal safety, and the safety of others. Driving while impaired (DWI) can occur if a person is driving, operating, or in physical control of a car, motorcycle, boat, snowmobile, ATV, motorbike, off-road vehicle, or any other motor vehicle ([place] Statute 169A.20). I understand the risk if I choose to drive or operate any vehicle or machinery.    I understand that if I do not follow any of the conditions above, my prescriptions or treatment may be stopped or changed.          Opioids  What You Need to Know    What are opioids?   Opioids are pain medicines that must be prescribed by a doctor. They are also known as narcotics.     Examples are:   1. morphine (MS Contin, Marlyn)  2. oxycodone (Oxycontin)  3. oxycodone and acetaminophen (Percocet)  4. hydrocodone and acetaminophen (Vicodin, Norco)   5. fentanyl patch (Duragesic)   6. hydromorphone (Dilaudid)   7. methadone  8. codeine (Tylenol #3)     What do opioids do well?   Opioids are best for severe short-term pain such as after a surgery or injury. They may work well for cancer pain. They may  help some people with long-lasting (chronic) pain.     What do opioids NOT do well?   Opioids never get rid of pain entirely, and they don t work well for most patients with chronic pain. Opioids don t reduce swelling, one of the causes of pain.                                    Other ways to manage chronic pain and improve function include:       Treat the health problem that may be causing pain    Anti-inflammation medicines, which reduce swelling and tenderness, such as ibuprofen (Advil, Motrin) or naproxen (Aleve)    Acetaminophen (Tylenol)    Antidepressants and anti-seizure medicines, especially for nerve pain    Topical treatments such as patches or creams    Injections or nerve blocks    Chiropractic or osteopathic treatment    Acupuncture, massage, deep breathing, meditation, visual imagery, aromatherapy    Use heat or ice at the pain site    Physical therapy     Exercise    Stop smoking    Take part in therapy       Risks and side effects     Talk to your doctor before you start or decide to keep taking opioids. Possible side effects include:      Lowering your breathing rate enough to cause death    Overdose, including death, especially if taking higher than prescribed doses    Worse depression symptoms; less pleasure in things you usually enjoy    Feeling tired or sluggish    Slower thoughts or cloudy thinking    Being more sensitive to pain over time; pain is harder to control    Trouble sleeping or restless sleep    Changes in hormone levels (for example, less testosterone)    Changes in sex drive or ability to have sex    Constipation    Unsafe driving    Itching and sweating    Dizziness    Nausea, throwing up and dry mouth    What else should I know about opioids?    Opioids may lead to dependence, tolerance, or addiction.      Dependence means that if you stop or reduce the medicine too quickly, you will have withdrawal symptoms. These include loose poop (diarrhea), jitters, flu-like symptoms,  nervousness and tremors. Dependence is not the same as addiction.                       Tolerance means needing higher doses over time to get the same effect. This may increase the chance of serious side effects.      Addiction is when people improperly use a substance that harms their body, their mind or their relations with others. Use of opiates can cause a relapse of addiction if you have a history of drug or alcohol abuse.      People who have used opioids for a long time may have a lower quality of life, worse depression, higher levels of pain and more visits to doctors.    You can overdose on opioids. Take these steps to lower your risk of overdose:    1. Recognize the signs:  Signs of overdose include decrease or loss of consciousness (blackout), slowed breathing, trouble waking up and blue lips. If someone is worried about overdose, they should call 911.    2. Talk to your doctor about Narcan (naloxone).   If you are at risk for overdose, you may be given a prescription for Narcan. This medicine very quickly reverses the effects of opioids.   If you overdose, a friend or family member can give you Narcan while waiting for the ambulance. They need to know the signs of overdose and how to give Narcan.     3. Don't use alcohol or street drugs.   Taking them with opioids can cause death.    4. Do not take any of these medicines unless your doctor says it s OK. Taking these with opioids can cause death:    Benzodiazepines, such as lorazepam (Ativan), alprazolam (Xanax) or diazepam (Valium)    Muscle relaxers, such as cyclobenzaprine (Flexeril)    Sleeping pills like zolpidem (Ambien)     Other opioids      How to keep you and other people safe while taking opioids:    1. Never share your opioids with others.  Opioid medicines are regulated by the Drug Enforcement Agency (ELSIE). Selling or sharing medications is a criminal act.    2. Be sure to store opioids in a secure place, locked up if possible. Young children  can easily swallow them and overdose.    3. When you are traveling with your medicines, keep them in the original bottles. If you use a pill box, be sure you also carry a copy of your medicine list from your clinic or pharmacy.    4. Safe disposal of opioids    Most pharmacies have places to get rid of medicine, called disposal kiosks. Medicine disposal options are also available in every [facility]. Search your county and  medication disposal  to find more options. You can find more details at:  [email or web address]-medications     I agree that my provider, clinic care team, and pharmacy may work with any city, state or federal law enforcement agency that investigates the misuse, sale, or other diversion of my controlled medicine. I will allow my provider to discuss my care with, or share a copy of, this agreement with any other treating provider, pharmacy or emergency room where I receive care.    I have read this agreement and have asked questions about anything I did not understand.    _______________________________________________________  Patient Signature - Emeli Granados _____________________                   Date     _______________________________________________________  Provider Signature - Lars Oconnor MD   _____________________                   Date     _______________________________________________________  Witness Signature (required if provider not present while patient signing)   _____________________                   Date

## 2021-07-27 LAB — CREAT UR-MCNC: 53 MG/DL

## 2021-07-27 ASSESSMENT — ASTHMA QUESTIONNAIRES: ACT_TOTALSCORE: 25

## 2021-07-31 LAB
CREATININE URINE MG/DL  (SYNCED VALUE): 53 MG/DL
N-NORTRAMADOL/CREAT UR CFM: 455 NG/MG {CREAT}
O-NORTRAMADOL UR CFM-MCNC: 241 NG/ML
TRAMADOL CTO UR CFM-MCNC: 119 NG/ML
TRAMADOL/CREAT UR: 225 NG/MG {CREAT}

## 2021-08-05 ENCOUNTER — TELEPHONE (OUTPATIENT)
Dept: FAMILY MEDICINE | Facility: CLINIC | Age: 66
End: 2021-08-05

## 2021-08-05 PROBLEM — F10.21 ALCOHOL DEPENDENCE IN REMISSION (H): Status: RESOLVED | Noted: 2017-07-17 | Resolved: 2021-08-05

## 2021-08-05 NOTE — TELEPHONE ENCOUNTER
Pharmacy Benefit Information:    Provider: Vini Oconnor MD  Phone #: [phone]  Key: [id number]  Medication/SIG: Zolpidem 5mg tablets  Take 1 tablet by mouth nightly as needed for sleep.  Pharmacy: [facility]   [address] [facility] [address]    Routed to SETH Oconnor MD, please advise RX CHANGE OR PA, route to [facility] PA pool or inform pharmacy of plan.     DOT LarsonN, RN  [facility]

## 2021-08-05 NOTE — TELEPHONE ENCOUNTER
PA request failed on OTC and herbal. Safe use of this product at this dose prn for over two years.

## 2021-08-06 NOTE — TELEPHONE ENCOUNTER
Central Prior Authorization Team   Phone: [phone]    PA Initiation    Medication:   Insurance Company: [employer] - Phone [phone] Fax [phone]  Pharmacy Filling the Rx: [id number] [address]  Filling Pharmacy Phone: [phone]  Filling Pharmacy Fax: [phone]  Start Date: 8/6/2021    INITIATED QUANTITY LIMIT VIA PHONE.

## 2021-08-10 NOTE — TELEPHONE ENCOUNTER
Prior Authorization Approval    Authorization Effective Date: 7/11/2021  Authorization Expiration Date: 8/10/2022  Medication: [id number]-APPROVED  Approved Dose/Quantity:    Reference #:     Insurance Company: [employer] - Phone [phone] Fax [phone]  Expected CoPay:       CoPay Card Available:      Foundation Assistance Needed:    Which Pharmacy is filling the prescription (Not needed for infusion/clinic administered): [id number] - [place], [place] - [address] [address]  Pharmacy Notified: Yes  Patient Notified: Yes  **Instructed pharmacy to notify patient when script is ready to /ship.**

## 2021-08-19 DIAGNOSIS — G89.29 OTHER CHRONIC PAIN: ICD-10-CM

## 2021-08-19 DIAGNOSIS — G47.9 SLEEP DISORDER: ICD-10-CM

## 2021-08-20 NOTE — TELEPHONE ENCOUNTER
Routing refill request to provider for review/approval because:  Drug not on the [facility] refill protocol     Ayan MARX RN

## 2021-08-23 RX ORDER — ZOLPIDEM TARTRATE 5 MG/1
5 TABLET ORAL
Qty: 30 TABLET | Refills: 0 | Status: SHIPPED | OUTPATIENT
Start: 2021-08-23 | End: 2021-10-01

## 2021-08-23 RX ORDER — TRAMADOL HYDROCHLORIDE 50 MG/1
TABLET ORAL
Qty: 60 TABLET | Refills: 0 | Status: SHIPPED | OUTPATIENT
Start: 2021-08-23 | End: 2021-09-17

## 2021-08-23 RX ORDER — HYDROCODONE BITARTRATE AND ACETAMINOPHEN 10; 325 MG/1; MG/1
TABLET ORAL
Qty: 15 TABLET | Refills: 0 | Status: SHIPPED | OUTPATIENT
Start: 2021-08-23 | End: 2021-09-09

## 2021-09-04 ENCOUNTER — HEALTH MAINTENANCE LETTER (OUTPATIENT)
Age: 66
End: 2021-09-04

## 2021-09-07 DIAGNOSIS — G89.29 OTHER CHRONIC PAIN: ICD-10-CM

## 2021-09-07 NOTE — TELEPHONE ENCOUNTER
Routing refill request to provider for review/approval because:  Drug not on the [facility] refill protocol     DOT DavisN, RN - Patient Advocate and Liaison (PAL)   [facility]   [phone]

## 2021-09-08 ENCOUNTER — ANCILLARY PROCEDURE (OUTPATIENT)
Dept: GENERAL RADIOLOGY | Facility: CLINIC | Age: 66
End: 2021-09-08
Attending: FAMILY MEDICINE
Payer: COMMERCIAL

## 2021-09-08 ENCOUNTER — OFFICE VISIT (OUTPATIENT)
Dept: URGENT CARE | Facility: URGENT CARE | Age: 66
End: 2021-09-08
Payer: OTHER MISCELLANEOUS

## 2021-09-08 VITALS
HEART RATE: 54 BPM | DIASTOLIC BLOOD PRESSURE: 73 MMHG | RESPIRATION RATE: 16 BRPM | SYSTOLIC BLOOD PRESSURE: 126 MMHG | TEMPERATURE: 97.9 F | OXYGEN SATURATION: 95 %

## 2021-09-08 DIAGNOSIS — S39.92XA BACK INJURY, INITIAL ENCOUNTER: ICD-10-CM

## 2021-09-08 DIAGNOSIS — S79.912A HIP INJURY, LEFT, INITIAL ENCOUNTER: ICD-10-CM
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
## 2021-09-08 PROCEDURE — 73502 X-RAY EXAM HIP UNI 2-3 VIEWS: CPT | Performed by: RADIOLOGY

## 2021-09-08 PROCEDURE — 72100 X-RAY EXAM L-S SPINE 2/3 VWS: CPT | Performed by: RADIOLOGY

## 2021-09-08 PROCEDURE — 99215 OFFICE O/P EST HI 40 MIN: CPT | Performed by: FAMILY MEDICINE

## 2021-09-08 RX ORDER — CYCLOBENZAPRINE HCL 5 MG
5 TABLET ORAL
Qty: 30 TABLET | Refills: 0 | Status: SHIPPED | OUTPATIENT
Start: 2021-09-08 | End: 2021-12-31

## 2021-09-08 NOTE — LETTER
[facility] [place]  [address] [facility] [address]  Phone: [phone]  Fax: [phone]    September 8, 2021        Emeli Granados  [address] [facility] [place] [address]          To whom it may concern:    RE: Emeli Granados    Patient was seen and treated today at our clinic.  Please excuse from work until 9/13/21.     Please contact me for questions or concerns.      Sincerely,        Mattie Roth MD

## 2021-09-08 NOTE — PATIENT INSTRUCTIONS
Start cyclobenzaprine at night-muscle relaxant should help with pain and will make you sleepy    You should be off work until next Monday to get your back and hip further assessed by our Ortho/sports medicine specialist will be giving you a call- if you do not hear from them in the next 1 to 2 days please call the number on the referral      Start ibuprofen scheduled 600 mg 3 times a day can alternate with Tylenol 1000 mg 3 times a day maximum dose of Tylenol is 3000 mg 4 hours    Continue heat to the area    If pain becomes more severe any numbness or weakness in your leg any loss of bowel or bladder function to the emergency room        Patient Education     Understanding Lumbar Radiculopathy    Lumbar radiculopathy is irritation or inflammation of a nerve root in the low back. It causes symptoms that spread out from the back down one or both legs. To understand this condition, it helps to understand the parts of the spine:    Vertebrae. These are bones that stack to form the spine. The lumbar spine contains 5 vertebrae near the bottom of your spine.    Disks. These are soft pads of tissue between the vertebrae. They act as shock absorbers for the spine.    Spinal canal. This is a tunnel formed within the stacked vertebrae. In the lumbar spine, nerves run through this canal.    Nerves. These branch off and leave the spinal canal, traveling out to parts of the body. As they leave the spinal canal, nerves pass through openings between the vertebrae. The nerve root is the part of the nerve that is closest to the spinal canal.    Sciatic nerve. This is a large nerve formed from several nerve roots in the low back. This nerve extends down the back of the leg to the foot.  With lumbar radiculopathy, nerve roots in the low back become irritated. This leads to pain and symptoms. The sciatic nerve is commonly involved, so the condition is often called sciatica.  What causes lumbar radiculopathy?  Aging, injury, poor  posture, extra body weight, and other issues can lead to problems in the low back. These problems may then irritate nerve roots. They include:    Damage to a disk in the lumbar spine. The damaged disk may then press on nearby nerve roots.    Degeneration from wear and tear, and aging. This can lead to narrowing (stenosis) of the openings between the vertebrae. The narrowed openings press on nerve roots as they leave the spinal canal.    Unstable spine. This is when a vertebra slips forward. It can then press on a nerve root.  Other, less common things can put pressure on nerves in the low back. These include diabetes, infection, or a tumor.  Symptoms of lumbar radiculopathy  These include:    Pain in the low back    Pain, numbness, tingling, or weakness that travels into the buttocks, hip, groin, or leg    Muscle spasms in the low back, or leg  Treatment for lumbar radiculopathy  In most cases, your healthcare provider will first try treatments that help relieve symptoms. These may include:    Prescription and over-the-counter pain medicines. These help relieve pain, swelling, and irritation.    Limits on positions and activities that increase pain. But lying in bed or avoiding all movement is only recommended for a short period of time.    Physical therapy, including exercises and stretches. This helps decrease pain and increase movement and function.    Steroid shots into the lower back. This may help relieve symptoms for a time.    Weight-loss program. If you are overweight, losing extra pounds (kilograms) may help relieve symptoms.  In some cases, you may need surgery to fix the underlying problem. This depends on the cause, the symptoms, and how long the pain has lasted.  Possible complications  Over time, an irritated and inflamed nerve may become damaged. This may lead to long-lasting (permanent) numbness or weakness in your legs and feet. If symptoms change suddenly or get worse, be sure to let your  healthcare provider know.  When to call your healthcare provider  Call your healthcare provider right away if you have any of these:    New pain or pain that gets worse    New or increasing weakness, tingling, or numbness in your leg or foot    Problems controlling your bladder or bowel  Jenifer last reviewed this educational content on 2/1/2020 2000-2021 [employer]. All rights reserved. This information is not intended as a substitute for professional medical care. Always follow your healthcare professional's instructions.           Patient Education     Understanding Trochanteric Bursitis    A bursa is a thin, slippery, sac-like film. It contains a small amount of fluid. This structure is found between bones and soft tissues in and around joints. A bursa cushions and protects a joint. It keeps parts of a joint from rubbing against each other. If a bursa becomes inflamed and irritated, it's known as bursitis.   The trochanteric bursa is found on the hip joint. It lies on top of the bump at the top of the thighbone called the greater trochanter. Inflammation of this bursa is called trochanteric bursitis.   How to say it   [id number]  Causes of trochanteric bursitis  Causes may include:    Overuse of the hip during running or other sports, dance, or work    Falling on or irritation to the side of the hip  This condition may occur along with other problems, such as osteoarthritis of the hip or knee, or low back problems. In rare cases, it may occur after hip surgery.   Symptoms of trochanteric bursitis    Pain or aching on the side of the hip. It's often felt as a sharp, intense pain. The pain may travel down the leg.    Swelling, tenderness, or warmth on the side of the hip at the bony bump at the top of the thigh    Treatment for trochanteric bursitis  These may include:    Resting the hip. This allows the bursa to heal.    Prescription or over-the-counter pain medicines. These help reduce  inflammation, swelling, and pain. NSAIDs (nonsteroidal anti-inflammatory drugs) are the most common medicines used. Medicines may be prescribed or bought over the counter. They may be given as pills. Or they may be put on the skin as a gel, cream, or patch.    Cold packs and heat packs. These help reduce pain and swelling.    Stretching and strengthening exercises. These improve flexibility and strength around the hip.    Physical therapy. This includes exercises or other treatments.    Injections of medicine into the bursa.  This may help reduce inflammation and relieve symptoms. The medicine is usually a corticosteroid. This is a strong anti-inflammatory medicine.  Possible complications  If you don t give your hip time to heal, the problem may not go away, may return, or may get worse. Rest and treat your hip as directed.   When to call your healthcare provider  Call your healthcare provider right away if you have any of these:    Fever of 100.4 F (38 C) or higher, or as directed by your provider    Redness, swelling, or warmth that gets worse    Symptoms that don t get better with prescribed medicines, or get worse    New symptoms  Jenifer last reviewed this educational content on 6/1/2019 2000-2021 [employer]. All rights reserved. This information is not intended as a substitute for professional medical care. Always follow your healthcare professional's instructions.

## 2021-09-08 NOTE — PROGRESS NOTES
Patient presents with:  Urgent Care  Fall: Patient fell off 3 step ladder at work Saturday-Patient has pain in left side of back radiating into leg        Subjective     Emeli Granados is a 66 year old female who presents to clinic today for the following health issues:    HPI     Back Pain       Duration: 5 days ago        Specific cause: fell off ladder at work-unable to contact boss to do work comp- works at [employer] in the [place]    3 step ladder on second step, a large box fell and grazed her  left shoulder-she  had a box in her hand and as she fell back landed against shelves behind her to keep from falling to the floor and  landing on her buttocks    Description:   Location of pain: low back left  At time of injury coming down on left leg electrical shock type of pain from ankle to back   Character of pain: when up walking and lifting pain on left side into her buttocks, difficulty sleeping hard to get in a comfortable position -takes ambien for insomnia chronically,   PMH remarkable for porphyria-requires phlebotomy    Hep C positive   Pain radiation:radiates into the left buttocks and radiates into the left leg-  New numbness or weakness in legs, not attributed to pain:  YES- radicular pain new  since injury  Any back pain in past,has history of sciatica/fall at work >10 years ago    Intensity: Currently 7/10, At its worst 10/10    History:   Pain interferes with job: YES  History of back problems: history of fall on right, now has intermittent pain on right down into the buttocks  Any previous MRI or X-rays: None  Sees a specialist for back pain:    Xray for pain on right in 2017   Therapies tried without relief: took tramadol today with some relief, heating pain some relief icy hot and icy hot patch -works better than lidocaine patches     Alleviating factors:   Improved by: heat and sitting helps but not long, walking helps the most, better when standing as well     Precipitating factors:  Worsened by:  Lifting, Bending, Sitting and Lying Flat    Quit smoking 2019- -2 year anniversary 21  ETOH use in remission since 1999      Past Medical History:   Diagnosis Date     Disorders of porphyrin metabolism 1996    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     Polycythemia 2012     Social History     Tobacco Use     Smoking status: Former Smoker     Packs/day: 0.50     Years: 30.00     Pack years: 15.00     Types: Cigarettes     Start date: 10/13/1967     Quit date: 2019     Years since quittin.9     Smokeless tobacco: Never Used     Tobacco comment: quit 2019   Substance Use Topics     Alcohol use: No     Comment: sober since 99       Current Outpatient Medications   Medication Sig Dispense Refill     albuterol (PROAIR HFA/PROVENTIL HFA/VENTOLIN HFA) 108 (90 Base) MCG/ACT inhaler One or two puffs q6h as needed for wheeze 18 g 3     albuterol (PROVENTIL) (2.5 MG/3ML) 0.083% neb solution INHALE 3 MILLILITERS (2.5 MG) BY NEBULIZATION ROUTE EVERY 6 HOURS AS NEEDED FOR SHORTNESS OF BREATH/DYSPNEA OR WHEEZING 30 mL 0     ascorbic acid (VITAMIN C) 1000 MG TABS Take 1,000 mg by mouth daily       cholecalciferol (VITAMIN D3) 1000 UNIT tablet Take 1,000 Units by mouth daily       cyclobenzaprine (FLEXERIL) 5 MG tablet Take 1 tablet (5 mg) by mouth at bedtime as needed, may repeat once for muscle spasms 30 tablet 0     Multiple Vitamins-Minerals (MULTIVITAMIN OR) Take 1 tablet by mouth daily Per pt, uses ones without Iron       vitamin E 400 UNITS TABS Take 400 Units by mouth daily       HYDROcodone-acetaminophen (NORCO) 5-325 MG tablet Take 1  tablet by mouth every 6 hours as needed for severe pain 15 tablet 0     traMADol (ULTRAM) 50 MG tablet Take 1 tablet (50 mg) by mouth 3 times daily For chronic neck pain 90 tablet 0     zolpidem (AMBIEN) 5 MG tablet Take 1 tablet (5 mg) by mouth nightly as needed for sleep 30 tablet 0     Allergies   Allergen Reactions     Metaproterenol Sulfate Other (See Comments)     alupent inhaler-shaking     Percocet [Oxycodone-Acetaminophen] Itching             ROS are negative, except as otherwise noted HPI      Objective    /73   Pulse 54   Temp 97.9  F (36.6  C) (Oral)   Resp 16   LMP  (LMP Unknown)   SpO2 95%   Breastfeeding No   There is no height or weight on file to calculate BMI.  Physical Exam   GENERAL: healthy, alert and no distress  NECK: no adenopathy, no asymmetry, masses, or scars and thyroid normal to palpation  MS: no gross musculoskeletal defects noted, no edema  Neck supple no paraspinous muscle tenderness bilaterally  Back-paraspinous muscle tenderness in lower lumbar area on left  greater than right, straight -right negative at 30 and 60 degrees and left-negative at 30 degrees positive 60 degrees down lateral leg to ankle, DTRs +2 bilaterally at the knees, normal heel toe gait  Hips-right hip full range of motion without pain nontender over the greater trochanter  Left hip-no ecchymosis no swelling, no erythema , tender over the greater trochanter, pain and guarding with internal and external rotation, full flexion extension with mild pain over lateral hip  NEURO: Normal strength and tone, mentation intact and speech normal, gait slow and deliberate due to left heel pain and left lower back pain      Diagnostic Test Results:  Labs reviewed in Epic  Results for orders placed or performed in visit on 09/08/21   XR Hip Left 2-3 Views     Status: None    Narrative    HIP LEFT TWO - THREE VIEWS   9/8/2021 6:25 PM     HISTORY:  Pain in left hip, over greater trochanter noted on exam,  fall off  ladder; Back injury, initial encounter.    COMPARISON: None.      Impression    IMPRESSION: Normal bones, joint spaces and alignment. There is no  evidence of fracture or osteonecrosis.    ESTELA WHITFIELD MD         SYSTEM ID:  [id number]   Results for orders placed or performed in visit on 09/08/21   XR Lumbar Spine 2/3 Views     Status: None    Narrative    LUMBAR SPINE TWO - THREE VIEWS  9/8/2021 6:25 PM     HISTORY: Fall of ladder at work, new pain on left with pain radiating  down the leg; Hip injury, left, initial encounter.    COMPARISON: Lumbar spine x-ray 10/30/2017.       Impression    IMPRESSION: Alignment of the lumbar spine is within normal limits. No  loss of vertebral body height. Mild degenerative endplate changes and  loss of disc height throughout the lumbar spine.     GELY HIGUERA MD         SYSTEM ID:  [id number]           ASSESSMENT/PLAN:      ICD-10-CM    1. Fall, initial encounter  W19.XXXA    2. Back injury, initial encounter  S39.92XA XR Hip Left 2-3 Views     cyclobenzaprine (FLEXERIL) 5 MG tablet     Orthopedic  Referral   3. Hip injury, left, initial encounter  S79.912A XR Lumbar Spine 2/3 Views     cyclobenzaprine (FLEXERIL) 5 MG tablet     Orthopedic  Referral     Reviewed medication instructions and side effects. Follow up if experiences side effects.. I reviewed supportive care, otc meds to use if needed, expected course, and signs of concern.  Follow up as needed or if does not improve within 1-2 day(s) or if worsens in any way.  Reviewed red flag symptoms and is to go to the ER if experiences any of these.    The use of Dragon/FanChatteration services may have been used to construct the content in this note; any grammatical or spelling errors are non-intentional. Please contact the author of this note directly if you are in need of any clarification.       On the day of the encounter, time spend on chart review, patient visit, review of testing, documentation and  discussion with other providers was 40 minutes      Patient Instructions     Start cyclobenzaprine at night-muscle relaxant should help with pain and will make you sleepy    You should be off work until next Monday to get your back and hip further assessed by our Ortho/sports medicine specialist will be giving you a call- if you do not hear from them in the next 1 to 2 days please call the number on the referral      Start ibuprofen scheduled 600 mg 3 times a day can alternate with Tylenol 1000 mg 3 times a day maximum dose of Tylenol is 3000 mg 4 hours    Continue heat to the area    If pain becomes more severe any numbness or weakness in your leg any loss of bowel or bladder function to the emergency room        Patient Education     Understanding Lumbar Radiculopathy    Lumbar radiculopathy is irritation or inflammation of a nerve root in the low back. It causes symptoms that spread out from the back down one or both legs. To understand this condition, it helps to understand the parts of the spine:    Vertebrae. These are bones that stack to form the spine. The lumbar spine contains 5 vertebrae near the bottom of your spine.    Disks. These are soft pads of tissue between the vertebrae. They act as shock absorbers for the spine.    Spinal canal. This is a tunnel formed within the stacked vertebrae. In the lumbar spine, nerves run through this canal.    Nerves. These branch off and leave the spinal canal, traveling out to parts of the body. As they leave the spinal canal, nerves pass through openings between the vertebrae. The nerve root is the part of the nerve that is closest to the spinal canal.    Sciatic nerve. This is a large nerve formed from several nerve roots in the low back. This nerve extends down the back of the leg to the foot.  With lumbar radiculopathy, nerve roots in the low back become irritated. This leads to pain and symptoms. The sciatic nerve is commonly involved, so the condition is often  called sciatica.  What causes lumbar radiculopathy?  Aging, injury, poor posture, extra body weight, and other issues can lead to problems in the low back. These problems may then irritate nerve roots. They include:    Damage to a disk in the lumbar spine. The damaged disk may then press on nearby nerve roots.    Degeneration from wear and tear, and aging. This can lead to narrowing (stenosis) of the openings between the vertebrae. The narrowed openings press on nerve roots as they leave the spinal canal.    Unstable spine. This is when a vertebra slips forward. It can then press on a nerve root.  Other, less common things can put pressure on nerves in the low back. These include diabetes, infection, or a tumor.  Symptoms of lumbar radiculopathy  These include:    Pain in the low back    Pain, numbness, tingling, or weakness that travels into the buttocks, hip, groin, or leg    Muscle spasms in the low back, or leg  Treatment for lumbar radiculopathy  In most cases, your healthcare provider will first try treatments that help relieve symptoms. These may include:    Prescription and over-the-counter pain medicines. These help relieve pain, swelling, and irritation.    Limits on positions and activities that increase pain. But lying in bed or avoiding all movement is only recommended for a short period of time.    Physical therapy, including exercises and stretches. This helps decrease pain and increase movement and function.    Steroid shots into the lower back. This may help relieve symptoms for a time.    Weight-loss program. If you are overweight, losing extra pounds (kilograms) may help relieve symptoms.  In some cases, you may need surgery to fix the underlying problem. This depends on the cause, the symptoms, and how long the pain has lasted.  Possible complications  Over time, an irritated and inflamed nerve may become damaged. This may lead to long-lasting (permanent) numbness or weakness in your legs and  feet. If symptoms change suddenly or get worse, be sure to let your healthcare provider know.  When to call your healthcare provider  Call your healthcare provider right away if you have any of these:    New pain or pain that gets worse    New or increasing weakness, tingling, or numbness in your leg or foot    Problems controlling your bladder or bowel  Jenifer last reviewed this educational content on 2/1/2020 2000-2021 [employer]. All rights reserved. This information is not intended as a substitute for professional medical care. Always follow your healthcare professional's instructions.           Patient Education     Understanding Trochanteric Bursitis    A bursa is a thin, slippery, sac-like film. It contains a small amount of fluid. This structure is found between bones and soft tissues in and around joints. A bursa cushions and protects a joint. It keeps parts of a joint from rubbing against each other. If a bursa becomes inflamed and irritated, it's known as bursitis.   The trochanteric bursa is found on the hip joint. It lies on top of the bump at the top of the thighbone called the greater trochanter. Inflammation of this bursa is called trochanteric bursitis.   How to say it   [id number]  Causes of trochanteric bursitis  Causes may include:    Overuse of the hip during running or other sports, dance, or work    Falling on or irritation to the side of the hip  This condition may occur along with other problems, such as osteoarthritis of the hip or knee, or low back problems. In rare cases, it may occur after hip surgery.   Symptoms of trochanteric bursitis    Pain or aching on the side of the hip. It's often felt as a sharp, intense pain. The pain may travel down the leg.    Swelling, tenderness, or warmth on the side of the hip at the bony bump at the top of the thigh    Treatment for trochanteric bursitis  These may include:    Resting the hip. This allows the bursa to  heal.    Prescription or over-the-counter pain medicines. These help reduce inflammation, swelling, and pain. NSAIDs (nonsteroidal anti-inflammatory drugs) are the most common medicines used. Medicines may be prescribed or bought over the counter. They may be given as pills. Or they may be put on the skin as a gel, cream, or patch.    Cold packs and heat packs. These help reduce pain and swelling.    Stretching and strengthening exercises. These improve flexibility and strength around the hip.    Physical therapy. This includes exercises or other treatments.    Injections of medicine into the bursa.  This may help reduce inflammation and relieve symptoms. The medicine is usually a corticosteroid. This is a strong anti-inflammatory medicine.  Possible complications  If you don t give your hip time to heal, the problem may not go away, may return, or may get worse. Rest and treat your hip as directed.   When to call your healthcare provider  Call your healthcare provider right away if you have any of these:    Fever of 100.4 F (38 C) or higher, or as directed by your provider    Redness, swelling, or warmth that gets worse    Symptoms that don t get better with prescribed medicines, or get worse    New symptoms  Jenifer last reviewed this educational content on 6/1/2019 2000-2021 [employer]. All rights reserved. This information is not intended as a substitute for professional medical care. Always follow your healthcare professional's instructions.

## 2021-09-09 ENCOUNTER — ALLIED HEALTH/NURSE VISIT (OUTPATIENT)
Dept: FAMILY MEDICINE | Facility: CLINIC | Age: 66
End: 2021-09-09

## 2021-09-09 DIAGNOSIS — Z76.0 ENCOUNTER FOR MEDICATION REFILL: Primary | ICD-10-CM

## 2021-09-09 RX ORDER — HYDROCODONE BITARTRATE AND ACETAMINOPHEN 10; 325 MG/1; MG/1
TABLET ORAL
Qty: 15 TABLET | Refills: 0 | Status: SHIPPED | OUTPATIENT
Start: 2021-09-09 | End: 2021-10-01 | Stop reason: ALTCHOICE

## 2021-09-09 NOTE — PROGRESS NOTES
Patient walks in.  States she was to  last evening for a fall.  States having back pain.  Wondering if her pain med has been refilled yet.  Advised Dr. Oconnor has refill request but has not approved yet.  Patient requested note be added to refill encounter.  Note added per patient request.  Has not picked up her Cyclobenzaprine RX yet.  Advised she get that RX as she is having muscle spasms.  Advised rest, ice/heat and using muscle relaxer as advised.  Patient agrees with plan.  Elisabeth Freeman RN

## 2021-09-09 NOTE — TELEPHONE ENCOUNTER
Patient here as walk-in.  See  visit 9/8/21.  She is looking for [place] refill.  Elisabeth Freeman RN

## 2021-09-15 DIAGNOSIS — G89.29 OTHER CHRONIC PAIN: ICD-10-CM

## 2021-09-15 RX ORDER — TRAMADOL HYDROCHLORIDE 50 MG/1
TABLET ORAL
Qty: 60 TABLET | Refills: 0 | OUTPATIENT
Start: 2021-09-15

## 2021-09-15 NOTE — TELEPHONE ENCOUNTER
Last refill Tramadol 8/23/21 #[id number]    Routing refill request to provider for review/approval because:  Drug not on the [facility] refill protocol     Elisabeth Freeman RN

## 2021-09-16 NOTE — PROGRESS NOTES
[facility]ine Consultation    Date of visit: 9/16/2021    Assessment:  Emeli Granados is a 66 year old female with a past medical history significant for asthma, acute hepatitis C, erythropoietic porphyria, osteopenia who presents with complaints of:    1. Left sided low back pain: pain started after a fall at work on 9/4/2021. No red flag symptoms. Pain is located in left low back/buttock and into the posterior thigh. No acute pathology on x-rays of lumbar spine and left hip. SI joint provocative testing is positive on the left. Etiology of pain likely secondary to sacroiliac joint pain.     Plan:  The following recommendations were given to the patient. Diagnosis, treatment options, risks, benefits, and alternatives were discussed, and all questions were answered. The patient expressed understanding of the plan for management.     1. Therapies:  Order placed to start PT.   2. Self Care Recommendations: Ice painful area PRN  3. Diagnostic Studies: No further imaging indicated at this time.   4. Medication Management:  No medication changes made.   5. Procedures recommended: If she is not getting improvement with PT can consider doing a left SI joint injection.   6. Follow up: PRN.     Reason for consultation:    Primary Care Provider is Lars Oconnor.    Emeli Granados is a 66 year old female with a history of asthma, acute hepatitis C, erythropoietic porphyria, osteopenia  who I was asked to see in consultation by Mattie Roth  for evaluation of back pain.     Consultation and Evaluation for: back pain    Review of Electronic Chart: Today I have also reviewed available medical information in the patient's medical record at [place] ([facility]), including relevant provider notes, laboratory work, and imaging.     Chief Complaint:    Chief Complaint   Patient presents with     Pain     History:  Emeli Granados is a 66 year old female who presents for initial evaluation of chief pain  complaint of left sided back pain. She fell off of a 3 step ladder while at work on 9/4/2021. She reports she did not actually fall on the ground but rather fell into a shelf behind her. At the time she felt an electrical shock type pain from the ankle up to the left side of her back. Currently, the pain is located in left > right low back with referral into the buttock and posterior thighs. Pain is constant. Aggravated by lifting, prolonged sitting, rolling over in bed, going from a sitting to standing position. Relieved with standing and walking. Pain is sharp in quality. Severity/Intensity: 10/10 at worst, 2/10 at best.     Red Flags: The patient denies bowel or bladder incontinence, parasthesias, weakness, saddle anesthesia, unintentional weight loss, or fever/chills/sweats.     Medications:       Current pain medications:  Cyclobenzaprine 5 mg q hs prn - has not taken any  Norco 10 mg daily prn - [facility] (prescribed by PCP for chronic pain)  Tramadol 50 mg BID prn - [facility] (prescribed by PCP for chronic pain)  Ibuprofen 400 mg daily prn - [facility]  Tylenol prn - doesn't use often  Icy/hot patches and cream - [facility]        Past Pain Treatments:  PT: None   TENs Unit: Yes - [facility]  Injections: None  Self-care:   None  Surgeries related to pain: None  Alternative Therapies:    Chiropractic: None    Acupuncture: None  Other: None    Diagnostic tests:  XR Lumbar Spine completed on 9/8/21 shows:  IMPRESSION: Alignment of the lumbar spine is within normal limits. No  loss of vertebral body height. Mild degenerative endplate changes and  loss of disc height throughout the lumbar spine.      XR Left Hip completed on 9/8/21 shows:  IMPRESSION: Normal bones, joint spaces and alignment. There is no  evidence of fracture or osteonecrosis.    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     Polycythemia 08/08/2012       Past Surgical History:  Past Surgical History:   Procedure Laterality Date     COLONOSCOPY  2004, 2012    repeat in 2022     Dilatation of the esophagus once due to dysphagia and stricture  2003     Ganglion cyst removal       HEMORRHOIDECTOMY BANDING       HYSTERECTOMY       LAPAROSCOPIC SALPINGO-OOPHORECTOMY Bilateral 10/23/2015    Procedure: LAPAROSCOPIC SALPINGO-OOPHORECTOMY;  Surgeon: Johnathan Steve MD;  Location: [facility] OR     Thumb surgery Right      TONSILLECTOMY         Medications:  Current Outpatient Medications   Medication Sig Dispense Refill     albuterol (PROAIR HFA/PROVENTIL HFA/VENTOLIN HFA) 108 (90 Base) MCG/ACT inhaler One or two puffs q6h as needed for wheeze 18 g 3     albuterol (PROVENTIL) (2.5 MG/3ML) 0.083% neb solution INHALE 3 MILLILITERS (2.5 MG) BY NEBULIZATION ROUTE EVERY 6 HOURS AS NEEDED FOR SHORTNESS OF BREATH/DYSPNEA OR WHEEZING 30 mL 0     ascorbic acid (VITAMIN C) 1000 MG TABS Take 1,000 mg by mouth daily       cholecalciferol (VITAMIN D3) 1000 UNIT tablet Take 1,000 Units by mouth daily       cyclobenzaprine (FLEXERIL) 10 MG tablet TAKE HALF TO ONE TABLET BY MOUTH THREE TIMES DAILY AS NEEDED FOR MUSCLE SPASMS. (Patient not taking: Reported on 9/8/2021) 30 tablet 0     cyclobenzaprine (FLEXERIL) 5 MG tablet Take 1 tablet (5 mg) by mouth at bedtime as needed, may repeat once for muscle spasms 30 tablet 0     HYDROcodone-acetaminophen (NORCO)  MG per tablet TAKE ONE TABLET BY MOUTH DAILY AS NEEDED FOR CHRONIC PAIN. DO NOT TAKE WITH TRAMADOL  TAKE WITH FOOD. 15 tablet 0     Multiple Vitamins-Minerals  (MULTIVITAMIN OR) Take 1 tablet by mouth daily Per pt, uses ones without Iron       traMADol (ULTRAM) 50 MG tablet TAKE ONE TABLET BY MOUTH TWICE A DAY AS NEEDED FOR PAIN. TAKE WITH FOOD 60 tablet 0     vitamin E 400 UNITS TABS Take 400 Units by mouth daily       zolpidem (AMBIEN) 5 MG tablet Take 1 tablet (5 mg) by mouth nightly as needed for sleep. 30 tablet 0       Allergies:     Allergies   Allergen Reactions     Metaproterenol Sulfate Other (See Comments)     alupent inhaler-shaking     Percocet [Oxycodone-Acetaminophen] Itching       Family history:  Family History   Problem Relation Age of Onset     Hypertension Mother      Cerebrovascular Disease Mother         TIA's     Depression Mother      Cancer - colorectal Maternal Grandmother         dx at 65     Lipids Father      C.A.D. Father         angioplasty at 65     Musculoskeletal Disorder Father      Alcohol/Drug Daughter         in remission     Breast Cancer No family hx of      Social History:  Home situation: Lives in border of [facility]  Occupation/Schooling: works at [employer]  Tobacco use: former use  Drug use: none  Alcohol use: in remission    Physical Exam:  Vitals:    09/17/21 1539   BP: 122/75   Pulse: 78   SpO2: 96%     Exam:  Constitutional: Well developed, well nourished, appears stated age.  HEENT: Head atraumatic, normocephalic. Eyes without conjunctival injection or jaundice. Neck supple. No obvious neck masses.  Respiratory: Breathing unlabored on room air.  Psychiatric/mental status: Alert, without lethargy or stupor. Speech fluent. Appropriate affect. Mood normal. Able to follow commands without difficulty.     Musculoskeletal exam:  Gait/Station/Posture:   Normal stance, arm swing, and stride  Normal bulk and tone. Unremarkable spinal curvature.    Lumbar spine:  Range of motion with mild restriction in flexion and extension   Rotation/ext to right: no change in pain   Rotation/ext to left: no change in  pain  Myofascial tenderness:  No significant lumbar paraspinal tenderness  Focal tenderness: There is tenderness with palpation of the left SI joint and gluteal musculature. No significant pain pain with palpation of the right SI joint. No GT, or IT tenderness  SLR: negative  Andrew's maneuver: positive on left  Positive yeoman's on left    Hip exam:   normal internal and external range of motion bilaterally.    Neurologic exam:  CN:  Cranial nerves 2-12 are grossly intact  Motor Strength:  5/5 symmetric LE strength    Reflexes:     Patella L4:  R:  2/4 L: 2/4     Sensory:  (lower extremities):   Light touch: normal    Allodynia: absent    Hyperalgesia: absent     Danielle Garber MD  [facility] Pain Management       BILLING TIME DOCUMENTATION:   The total TIME spent on this patient on the date of the encounter/appointment was 25 minutes.      TOTAL TIME includes:   Time spent preparing to see the patient (reviewing records and tests)   Time spent face to face (or over the phone) with the patient   Time spent ordering tests, medications, procedures and referrals   Time spent documenting clinical information in Epic

## 2021-09-17 ENCOUNTER — OFFICE VISIT (OUTPATIENT)
Dept: PALLIATIVE MEDICINE | Facility: CLINIC | Age: 66
End: 2021-09-17
Payer: OTHER MISCELLANEOUS

## 2021-09-17 VITALS — SYSTOLIC BLOOD PRESSURE: 122 MMHG | HEART RATE: 78 BPM | OXYGEN SATURATION: 96 % | DIASTOLIC BLOOD PRESSURE: 75 MMHG

## 2021-09-17 DIAGNOSIS — M53.3 SACROILIAC JOINT PAIN: Primary | ICD-10-CM

## 2021-09-17 DIAGNOSIS — G89.29 OTHER CHRONIC PAIN: ICD-10-CM

## 2021-09-17 PROCEDURE — 99203 OFFICE O/P NEW LOW 30 MIN: CPT | Performed by: PHYSICAL MEDICINE & REHABILITATION

## 2021-09-17 RX ORDER — TRAMADOL HYDROCHLORIDE 50 MG/1
50 TABLET ORAL 3 TIMES DAILY
Qty: 90 TABLET | Refills: 0 | Status: SHIPPED | OUTPATIENT
Start: 2021-09-17 | End: 2021-10-22

## 2021-09-17 ASSESSMENT — PAIN SCALES - GENERAL: PAINLEVEL: MODERATE PAIN (5)

## 2021-09-17 NOTE — PATIENT INSTRUCTIONS
1. Order placed to start physical therapy.   2. No medication changes made today.   3. Ice the painful area to see if that provides benefit.   4. If you are not getting improvement in pain after completing PT then follow up in clinic. We can consider doing a left SI joint injection.     Danielle Garber MD  [facility] Management     ----------------------------------------------------------------  Clinic Number:  [phone]     Call with any questions about your care and for scheduling assistance.     Calls are returned Monday through Friday between 8 AM and 4:30 PM. We usually get back to you within 2 business days depending on the issue/request.    If we are prescribing your medications:    For opioid medication refills, call the clinic or send a [facility] message 7 days in advance.  Please include:    Name of requested medication    Name of the pharmacy.    For non-opioid medications, call your pharmacy directly to request a refill. Please allow 3-4 days to be processed.     Per [place] State Law:    All controlled substance prescriptions must be filled within 30 days of being written.      For those controlled substances allowing refills, pickup must occur within 30 days of last fill.      We believe regular attendance is key to your success in our program!      Any time you are unable to keep your appointment we ask that you call us at least 24 hours in advance to cancel.This will allow us to offer the appointment time to another patient.     Multiple missed appointments may lead to dismissal from the clinic.

## 2021-09-28 ENCOUNTER — THERAPY VISIT (OUTPATIENT)
Dept: PHYSICAL THERAPY | Facility: CLINIC | Age: 66
End: 2021-09-28
Attending: PHYSICAL MEDICINE & REHABILITATION
Payer: OTHER MISCELLANEOUS

## 2021-09-28 DIAGNOSIS — M54.50 ACUTE BILATERAL LOW BACK PAIN WITHOUT SCIATICA: ICD-10-CM

## 2021-09-28 DIAGNOSIS — M53.3 SACROILIAC JOINT PAIN: ICD-10-CM

## 2021-09-28 PROCEDURE — 97110 THERAPEUTIC EXERCISES: CPT | Mod: GP | Performed by: PHYSICAL THERAPIST

## 2021-09-28 PROCEDURE — 97035 APP MDLTY 1+ULTRASOUND EA 15: CPT | Mod: GP | Performed by: PHYSICAL THERAPIST

## 2021-09-28 PROCEDURE — 97161 PT EVAL LOW COMPLEX 20 MIN: CPT | Mod: GP | Performed by: PHYSICAL THERAPIST

## 2021-09-28 NOTE — PROGRESS NOTES
Physical Therapy Initial Evaluation  Subjective:  The history is provided by the patient. No  was used.   Patient Health History  Emeli Granados being seen for Low back pain.     Problem began: 9/4/2021.   Problem occurred: fell off ladder at work   Pain is reported as 9/10 on pain scale.  General health as reported by patient is good.     Red flags:  None as reported by patient.   Other medical allergies details: see EPIC.       Current medications:  Pain medication and sleep medication.    Current occupation is [employer].   Primary job tasks include:  Lifting/carrying, repetitive tasks, pushing/pulling and prolonged standing.                  Therapist Generated HPI Evaluation  Problem details: Pt c/o LPB since falling off step ladder at work 9/4/2021.  States box fell off pallet onto her knocking her back into shelf.  MD order date 9/17/2021..         Type of problem:  Lumbar.    This is a new condition.  Condition occurred with:  A fall/slip.  Where condition occurred: at work.  Patient reports pain:  Lower lumbar spine, central lumbar spine, mid lumbar spine, lumbar spine right and lumbar spine left.  Pain is described as sharp, stabbing, shooting and aching and is constant.  Pain radiates to:  Gluteals left and gluteals right. Pain is the same all the time.  Since onset symptoms are unchanged.  Associated symptoms:  Numbness and tingling (into B buttocks). Symptoms are exacerbated by standing, lifting, bending and twisting  and relieved by rest, activity/movement, analgesics, heat and ice.  Special tests included:  X-ray (Lx DDD).    Restrictions due to condition include:  Working in normal job without restrictions.  Barriers include:  None as reported by patient.                        Objective:  Standing Alignment:    Cervical/Thoracic:  Forward head  Shoulder/UE:  Rounded shoulders  Lumbar:  Lordosis incr                           Lumbar/SI Evaluation  ROM:  Arom wnl lumbar: NEDA:  mild pain initially, increased >1'.  prone: NE during/after.  AROM Lumbar:   Flexion:          Min loss +/-  Ext:                    Mod loss +   Side Bend:        Left:  Min loss +/-    Right:  Min loss +  Rotation:           Left:     Right:   Side Glide:        Left:     Right:         Strength: Fair core stab recruitment, mild spasms with reps  Lumbar Myotomes:  normal                  Neural Tension/Mobility:  Lumbar:  Normal        Lumbar Palpation:    Tenderness present at Left:    Erector Spinae  Tenderness present at Right: Erector Spinae        SI joint/Sacrum:    Normal SIJ alignment and testing                                                         General     ROS    Assessment/Plan:    Patient is a 66 year old female with lumbar complaints.    Patient has the following significant findings with corresponding treatment plan.                Diagnosis 1:  LBP  Pain -  hot/cold therapy, US, manual therapy, directional preference exercise and home program  Decreased ROM/flexibility - manual therapy and therapeutic exercise  Decreased strength - therapeutic exercise and therapeutic activities  Impaired muscle performance - neuro re-education  Decreased function - therapeutic activities  Impaired posture - neuro re-education    Therapy Evaluation Codes:   Cumulative Therapy Evaluation is: Low complexity.    Previous and current functional limitations:  (See Goal Flow Sheet for this information)    Short term and Long term goals: (See Goal Flow Sheet for this information)     Communication ability:  Patient appears to be able to clearly communicate and understand verbal and written communication and follow directions correctly.  Treatment Explanation - The following has been discussed with the patient:   RX ordered/plan of care  Anticipated outcomes  Possible risks and side effects  This patient would benefit from PT intervention to resume normal activities.   Rehab potential is good.    Frequency:  1 X week,  once daily  Duration:  for 8 weeks  Discharge Plan:  Achieve all LTG.  Independent in home treatment program.  Reach maximal therapeutic benefit.    Please refer to the daily flowsheet for treatment today, total treatment time and time spent performing 1:1 timed codes.

## 2021-10-01 ENCOUNTER — OFFICE VISIT (OUTPATIENT)
Dept: FAMILY MEDICINE | Facility: CLINIC | Age: 66
End: 2021-10-01
Payer: OTHER MISCELLANEOUS

## 2021-10-01 VITALS
HEIGHT: 60 IN | RESPIRATION RATE: 16 BRPM | HEART RATE: 75 BPM | BODY MASS INDEX: 27.09 KG/M2 | WEIGHT: 138 LBS | DIASTOLIC BLOOD PRESSURE: 85 MMHG | OXYGEN SATURATION: 99 % | SYSTOLIC BLOOD PRESSURE: 129 MMHG

## 2021-10-01 DIAGNOSIS — G47.9 SLEEP DISORDER: ICD-10-CM

## 2021-10-01 DIAGNOSIS — G89.29 OTHER CHRONIC PAIN: ICD-10-CM

## 2021-10-01 DIAGNOSIS — M54.42 ACUTE LEFT-SIDED LOW BACK PAIN WITH LEFT-SIDED SCIATICA: Primary | ICD-10-CM

## 2021-10-01 PROCEDURE — 99213 OFFICE O/P EST LOW 20 MIN: CPT | Performed by: FAMILY MEDICINE

## 2021-10-01 RX ORDER — ZOLPIDEM TARTRATE 5 MG/1
5 TABLET ORAL
Qty: 30 TABLET | Refills: 0 | Status: SHIPPED | OUTPATIENT
Start: 2021-10-01 | End: 2021-11-15

## 2021-10-01 RX ORDER — HYDROCODONE BITARTRATE AND ACETAMINOPHEN 5; 325 MG/1; MG/1
1 TABLET ORAL EVERY 6 HOURS PRN
Qty: 15 TABLET | Refills: 0 | Status: SHIPPED | OUTPATIENT
Start: 2021-10-01 | End: 2021-10-19

## 2021-10-01 ASSESSMENT — MIFFLIN-ST. JEOR: SCORE: 1074.52

## 2021-10-01 NOTE — PROGRESS NOTES
"        Sofiya Sainz is a 66 year old who presents for the following health issues fell on her sacrum off a loading ladder at [employer] Sept. 4/2021-she struck her low back and sacrum     HPI     work comp injury f/u, c/o low back pain, sacral contusion, lumbar sciatica           Review of Systems      REVIEW OF SYSTEMS    Generally has been  feeling well until this episode. No problems with vision, hearing, dental or neck pain.Has  airborne or ingestion allergy  No chest pain, palpitations, dyspnea, change in bowel habits, blood  in stool or dyspepsia.  No rashes, changing moles, weakness, lassitude or back problems.  No chronic issues . No dysuria  Patient has  Been  a smoker. No problems with significant headaches.        Objective    /85 (BP Location: Right arm, Patient Position: Chair, Cuff Size: Adult Regular)   Pulse 75   Resp 16   Ht 1.511 m (4' 11.5\")   Wt 62.6 kg (138 lb)   LMP  (LMP Unknown)   SpO2 99%   Breastfeeding No   BMI 27.41 kg/m    Body mass index is 27.41 kg/m .  Physical Exam   GENERAL: healthy, alert and no distress  EYES: Eyes grossly normal to inspection, PERRL and conjunctivae and sclerae normal  HENT: ear canals and TM's normal, nose and mouth without ulcers or lesions  NECK: no adenopathy, no asymmetry, masses, or scars and thyroid normal to palpation  RESP: lungs clear to auscultation - no rales, rhonchi or wheezes  CV: regular rate and rhythm, normal S1 S2, no S3 or S4, no murmur, click or rub, no peripheral edema and peripheral pulses strong  ABDOMEN: soft, nontender, no hepatosplenomegaly, no masses and bowel sounds normal  MS:tender L5S1 interspace , left leg pain on straight leg raise , no loss of sensation  SKIN: no suspicious lesions or rashes  NEURO: Normal strength and tone, mentation intact and speech normal  PSYCH: mentation appears normal, affect normal/bright    (M54.42) Acute left-sided low back pain with left-sided sciatica  (primary encounter " diagnosis)  Comment: Pt is in progress  Plan: analgesic and muscle relaxant infrequently     One month follow up

## 2021-10-01 NOTE — LETTER
[facility]  [address] [place], [address]  [phone]        October 1, 2021    Emeli Granados                                                                                                                                                       [address] [facility] [address]      Restrictions for Sept 4 /2021 low back injury.  Lifting restriction of 25 pounds through Oct 31/2021.          Lars Oconnor MD

## 2021-10-19 DIAGNOSIS — M54.42 ACUTE LEFT-SIDED LOW BACK PAIN WITH LEFT-SIDED SCIATICA: ICD-10-CM

## 2021-10-19 RX ORDER — HYDROCODONE BITARTRATE AND ACETAMINOPHEN 5; 325 MG/1; MG/1
1 TABLET ORAL EVERY 6 HOURS PRN
Qty: 15 TABLET | Refills: 0 | Status: SHIPPED | OUTPATIENT
Start: 2021-10-19 | End: 2021-10-24

## 2021-10-21 DIAGNOSIS — G89.29 OTHER CHRONIC PAIN: ICD-10-CM

## 2021-10-22 RX ORDER — TRAMADOL HYDROCHLORIDE 50 MG/1
50 TABLET ORAL 3 TIMES DAILY
Qty: 90 TABLET | Refills: 0 | Status: SHIPPED | OUTPATIENT
Start: 2021-10-22 | End: 2021-11-26

## 2021-10-22 NOTE — TELEPHONE ENCOUNTER
Routing refill request to provider for review/approval because:  Drug not on the [facility] refill protocol     Summer Harding RN   [facility]  -- Triage Nurse

## 2021-10-27 ENCOUNTER — TELEPHONE (OUTPATIENT)
Dept: FAMILY MEDICINE | Facility: CLINIC | Age: 66
End: 2021-10-27

## 2021-10-27 DIAGNOSIS — J20.9 ACUTE BRONCHITIS, UNSPECIFIED ORGANISM: ICD-10-CM

## 2021-10-27 RX ORDER — ALBUTEROL SULFATE 90 UG/1
AEROSOL, METERED RESPIRATORY (INHALATION)
Qty: 18 G | Refills: 0 | Status: SHIPPED | OUTPATIENT
Start: 2021-10-27 | End: 2021-11-23

## 2021-10-27 NOTE — TELEPHONE ENCOUNTER
"Fax from [facility], \"please resend as PROAIR 8.5 gm inhaler for pt insurance coverage\", we have generic listed, sent one month, pt has appointment scheduled, added asthma to visit  Prescription approved per [facility] Refill Protocol.  Gayle Broderick RN, BSN  Message handled by CLINIC NURSE.      "

## 2021-10-29 ENCOUNTER — OFFICE VISIT (OUTPATIENT)
Dept: FAMILY MEDICINE | Facility: CLINIC | Age: 66
End: 2021-10-29
Payer: COMMERCIAL

## 2021-10-29 VITALS
SYSTOLIC BLOOD PRESSURE: 122 MMHG | RESPIRATION RATE: 16 BRPM | HEART RATE: 80 BPM | WEIGHT: 143.3 LBS | BODY MASS INDEX: 28.46 KG/M2 | DIASTOLIC BLOOD PRESSURE: 81 MMHG

## 2021-10-29 DIAGNOSIS — M54.40 ACUTE BILATERAL LOW BACK PAIN WITH SCIATICA, SCIATICA LATERALITY UNSPECIFIED: Primary | ICD-10-CM

## 2021-10-29 PROCEDURE — 99213 OFFICE O/P EST LOW 20 MIN: CPT | Performed by: FAMILY MEDICINE

## 2021-10-29 RX ORDER — HYDROCODONE BITARTRATE AND ACETAMINOPHEN 5; 325 MG/1; MG/1
1 TABLET ORAL EVERY 6 HOURS PRN
Qty: 15 TABLET | Refills: 0 | Status: SHIPPED | OUTPATIENT
Start: 2021-10-29 | End: 2022-01-24

## 2021-10-29 NOTE — LETTER
[facility]  [address] [place], [address]  [phone]        October 29, 2021    Emeli Granados                                                                                                                                                       [address] [facility] [address]  Ongoing work restrictions for on the job injury to low back September 4/2021.  Lifting is limited to 25 pounds through 1/1/2022.           Lars Oconnor MD

## 2021-10-29 NOTE — PROGRESS NOTES
Subjective   Emeli is a 66 year old who presents for the following health issues injury at work with low back and hip pain after a work injury fall :  She still feels very limited     HPI     Pt here to f/u on work comp injury. Bilateral low back pain and right buttock, Has been working with restrictions .         Review of Systems      REVIEW OF SYSTEMS    Generally has been not  feeling well since l this episode. No problems with vision, hearing, dental or neck pain.Has hip and back pain  And new issues with  airborne or ingestion allergy  No chest pain, palpitations, dyspnea, change in bowel habits, blood    in stool or dyspepsia.  No rashes, changing moles, weakness, lassitude or back problems.  No chronic issues . No dysuria  Patient  a smoker. No problems with significant headaches.        Objective    /81 (BP Location: Right arm, Patient Position: Sitting, Cuff Size: Adult Regular)   Pulse 80   Resp 16   Wt 65 kg (143 lb 4.8 oz)   LMP  (LMP Unknown)   BMI 28.46 kg/m    Body mass index is 28.46 kg/m .  Physical Exam   GENERAL: healthy, alert and no distress  EYES: Eyes grossly normal to inspection, PERRL and conjunctivae and sclerae normal  HENT: ear canals and TM's normal, nose and mouth without ulcers or lesions  NECK: no adenopathy, no asymmetry, masses, or scars and thyroid normal to palpation  RESP: lungs clear to auscultation - no rales, rhonchi or wheezes  CV: regular rate and rhythm, normal S1 S2, no S3 or S4, no murmur, click or rub, no peripheral edema and peripheral pulses strong  ABDOMEN: soft, nontender, no hepatosplenomegaly, no masses and bowel sounds normal  MS: no gross musculoskeletal defects noted, no edema  SKIN: no suspicious lesions or rashes  NEURO: Normal strength and tone, mentation intact and speech normal  PSYCH: mentation appears normal, affect normal/bright    (M54.40) Acute bilateral low back pain with sciatica, sciatica laterality unspecified  (primary  encounter diagnosis)  Comment: reviewed her work restrictions  Plan: (M54.40) Acute bilateral low back pain with sciatica, sciatica laterality unspecified  (primary encounter diagnosis)  Comment:   Plan: HYDROcodone-acetaminophen (NORCO) 5-325 MG         tablet        Discussed PT, , work restrictions with regard to her back

## 2021-10-30 ENCOUNTER — HEALTH MAINTENANCE LETTER (OUTPATIENT)
Age: 66
End: 2021-10-30

## 2021-11-12 DIAGNOSIS — G47.9 SLEEP DISORDER: ICD-10-CM

## 2021-11-15 ENCOUNTER — OFFICE VISIT (OUTPATIENT)
Dept: FAMILY MEDICINE | Facility: CLINIC | Age: 66
End: 2021-11-15
Payer: COMMERCIAL

## 2021-11-15 VITALS
HEART RATE: 71 BPM | TEMPERATURE: 98.4 F | HEIGHT: 59 IN | SYSTOLIC BLOOD PRESSURE: 114 MMHG | WEIGHT: 145 LBS | DIASTOLIC BLOOD PRESSURE: 78 MMHG | BODY MASS INDEX: 29.23 KG/M2 | OXYGEN SATURATION: 95 %

## 2021-11-15 DIAGNOSIS — Z01.818 PREOP GENERAL PHYSICAL EXAM: Primary | ICD-10-CM

## 2021-11-15 DIAGNOSIS — G47.9 SLEEP DISORDER: ICD-10-CM

## 2021-11-15 LAB
BASOPHILS # BLD AUTO: 0 10E3/UL (ref 0–0.2)
BASOPHILS NFR BLD AUTO: 0 %
EOSINOPHIL # BLD AUTO: 0.1 10E3/UL (ref 0–0.7)
EOSINOPHIL NFR BLD AUTO: 2 %
ERYTHROCYTE [DISTWIDTH] IN BLOOD BY AUTOMATED COUNT: 13.6 % (ref 10–15)
HCT VFR BLD AUTO: 42.3 % (ref 35–47)
HGB BLD-MCNC: 13.9 G/DL (ref 11.7–15.7)
LYMPHOCYTES # BLD AUTO: 2.4 10E3/UL (ref 0.8–5.3)
LYMPHOCYTES NFR BLD AUTO: 40 %
MCH RBC QN AUTO: 31 PG (ref 26.5–33)
MCHC RBC AUTO-ENTMCNC: 32.9 G/DL (ref 31.5–36.5)
MCV RBC AUTO: 94 FL (ref 78–100)
MONOCYTES # BLD AUTO: 0.7 10E3/UL (ref 0–1.3)
MONOCYTES NFR BLD AUTO: 11 %
NEUTROPHILS # BLD AUTO: 2.8 10E3/UL (ref 1.6–8.3)
NEUTROPHILS NFR BLD AUTO: 47 %
PLATELET # BLD AUTO: 227 10E3/UL (ref 150–450)
RBC # BLD AUTO: 4.49 10E6/UL (ref 3.8–5.2)
WBC # BLD AUTO: 5.9 10E3/UL (ref 4–11)

## 2021-11-15 PROCEDURE — 36415 COLL VENOUS BLD VENIPUNCTURE: CPT | Performed by: FAMILY MEDICINE

## 2021-11-15 PROCEDURE — 99214 OFFICE O/P EST MOD 30 MIN: CPT | Performed by: FAMILY MEDICINE

## 2021-11-15 PROCEDURE — 93000 ELECTROCARDIOGRAM COMPLETE: CPT | Performed by: FAMILY MEDICINE

## 2021-11-15 PROCEDURE — 85025 COMPLETE CBC W/AUTO DIFF WBC: CPT | Performed by: FAMILY MEDICINE

## 2021-11-15 RX ORDER — ZOLPIDEM TARTRATE 5 MG/1
5 TABLET ORAL
Qty: 30 TABLET | Refills: 1 | Status: SHIPPED | OUTPATIENT
Start: 2021-11-15 | End: 2021-11-15

## 2021-11-15 RX ORDER — ZOLPIDEM TARTRATE 5 MG/1
5 TABLET ORAL
Qty: 30 TABLET | Refills: 1 | Status: SHIPPED | OUTPATIENT
Start: 2021-11-15 | End: 2021-12-31

## 2021-11-15 ASSESSMENT — MIFFLIN-ST. JEOR: SCORE: 1098.35

## 2021-11-15 NOTE — PROGRESS NOTES
[facility]  [address] [place] [address]  Phone: [phone]  Primary Provider: Lars Oconnor Dr.       PREOPERATIVE EVALUATION:  Today's date: 11/15/2021    Emeli Granados is a 66 year old female who presents for a preoperative evaluation.    Surgical Information:  Surgery/Procedure:carpel tunnel surgery/ right hand  Surgery Location: [facility]  Surgeon: Dr. Kearney  Surgery Date: 11/23/21  Time of Surgery: 11:45 AM  Where patient plans to recover: At home with family  Fax number for surgical facility: Fax # [phone]    Type of Anesthesia Anticipated: to be determined    Assessment & Plan     The proposed surgical procedure is considered LOW risk.she's fit for surgery without further studies         History of  Tobacco use, chronic opioid             RECOMMENDATION:  APPROVAL GIVEN to proceed with proposed procedure, without further diagnostic evaluation.    Review of external notes as documented above     Painful  Right dominant  Wrist  With cts and other degenerative              Subjective     HPI related to upcoming procedure: hand pain, history of repetitive stress    Health Care Directive:  Patient does not have a Health Care Directive or Living Will:     Preoperative Review of :   reviewed - controlled substances reflected in medication list.    For wrist and hand surgery carpal tunnel Dr Kearney      Review of Systems  CONSTITUTIONAL: NEGATIVE for fever, chills, change in weight  INTEGUMENTARY/SKIN: NEGATIVE for worrisome rashes, moles or lesions  EYES: NEGATIVE for vision changes or irritation  ENT/MOUTH: NEGATIVE for ear, mouth and throat problems  RESP: NEGATIVE for significant cough or SOB  CV: NEGATIVE for chest pain, palpitations or peripheral edema  GI: NEGATIVE for nausea, abdominal pain, heartburn, or change in bowel habits  : NEGATIVE for frequency, dysuria, or hematuria  MUSCULOSKELETAL: NEGATIVE for  significant arthralgias or myalgia  NEURO: NEGATIVE for weakness, dizziness or paresthesias  ENDOCRINE: NEGATIVE for temperature intolerance, skin/hair changes  HEME: NEGATIVE for bleeding problems  PSYCHIATRIC: NEGATIVE for changes in mood or affect    Patient Active Problem List    Diagnosis Date Noted     Acute bilateral low back pain without sciatica 09/28/2021     Priority: Medium     Adenomyomatosis of gallbladder 06/22/2021     Priority: Medium     Diverticulosis of colon 06/22/2021     Priority: Medium     Bacteremia 06/10/2021     Priority: Medium     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  All refills should only be approved by this provider, or covering partner.    Medication(s): hydrocodone.   Maximum quantity per month: 20  Clinic visit frequency required: Q 3 months     Controlled substance agreement:  Encounter-Level CSA - 07/17/2017:          Controlled Substance Agreement - Scan on 7/31/2017  9:32 PM : CONTROLLED SUBSTANCE AGREEMENT (below)            Encounter-Level CSA - 07/17/2017:          Controlled Substance Agreement - Scan on 1/8/2015  9:46 AM :  Clinics Controlled Medication Agreement 1-7-15 (below)              Pain Clinic evaluation in the past: No    DIRE Total Score(s):  No flowsheet data found.    Last [facility] website verification:04/15/2020 sr   [email or web address]         Moderate persistent asthma with exacerbation 12/26/2017     Priority: Medium     Back pain 05/19/2015     Priority: Medium     Osteopenia 05/18/2015     Priority: Medium     Acute hepatitis C virus infection      Priority: Medium     Dr. Diaz is GI specialist - in remission  Problem list name updated by automated process. Provider to review       CARDIOVASCULAR SCREENING; LDL GOAL LESS THAN 130 10/31/2010     Priority: Medium      Disorder of bone and cartilage 01/14/2005     Priority: Medium     Problem list name updated by automated process. 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     Polycythemia 08/08/2012     Past Surgical History:   Procedure Laterality Date     COLONOSCOPY  2004, 2012    repeat in 2022     Dilatation of the esophagus once due to dysphagia and stricture  2003     Ganglion cyst removal       HEMORRHOIDECTOMY BANDING       HYSTERECTOMY       LAPAROSCOPIC SALPINGO-OOPHORECTOMY Bilateral 10/23/2015    Procedure: LAPAROSCOPIC SALPINGO-OOPHORECTOMY;  Surgeon: Johnathan Steve MD;  Location: [facility] OR     Thumb surgery Right      TONSILLECTOMY       Current Outpatient Medications   Medication Sig Dispense Refill     albuterol (PROAIR HFA/PROVENTIL HFA/VENTOLIN HFA) 108 (90 Base) MCG/ACT inhaler One or two puffs q6h as needed for wheeze 18 g 0     albuterol (PROVENTIL) (2.5 MG/3ML) 0.083% neb solution INHALE 3 MILLILITERS (2.5 MG) BY NEBULIZATION ROUTE EVERY 6 HOURS AS NEEDED FOR SHORTNESS OF BREATH/DYSPNEA OR WHEEZING 30 mL 0     ascorbic acid (VITAMIN C) 1000 MG TABS Take 1,000 mg by mouth daily       cholecalciferol (VITAMIN D3) 1000 UNIT tablet Take 1,000 Units by mouth daily       cyclobenzaprine (FLEXERIL) 5 MG tablet Take 1 tablet (5 mg) by mouth at bedtime as needed, may repeat once for muscle spasms 30 tablet 0     Multiple Vitamins-Minerals (MULTIVITAMIN OR) Take 1 tablet by mouth daily Per pt, uses ones without Iron       traMADol (ULTRAM) 50 MG tablet Take 1 tablet (50 mg) by mouth 3 times daily For chronic  "neck pain 90 tablet 0     vitamin E 400 UNITS TABS Take 400 Units by mouth daily       zolpidem (AMBIEN) 5 MG tablet Take 1 tablet (5 mg) by mouth nightly as needed for sleep 30 tablet 1       Allergies   Allergen Reactions     Metaproterenol Sulfate Other (See Comments)     alupent inhaler-shaking     Percocet [Oxycodone-Acetaminophen] Itching        Social History     Tobacco Use     Smoking status: Former Smoker     Packs/day: 0.50     Years: 30.00     Pack years: 15.00     Types: Cigarettes     Start date: 10/13/1967     Quit date: 2019     Years since quittin.0     Smokeless tobacco: Never Used     Tobacco comment: quit 2019   Substance Use Topics     Alcohol use: No     Comment: sober since 99       History   Drug Use No         Objective     /78 (BP Location: Left arm, Patient Position: Chair, Cuff Size: Adult Large)   Pulse 71   Temp 98.4  F (36.9  C) (Oral)   Ht 1.499 m (4' 11\")   Wt 65.8 kg (145 lb)   LMP  (LMP Unknown)   SpO2 95%   BMI 29.29 kg/m      Physical Exam    GENERAL APPEARANCE: healthy, alert and no distress     EYES: EOMI,  PERRL     HENT: ear canals and TM's normal and nose and mouth without ulcers or lesions     NECK: no adenopathy, no asymmetry, masses, or scars and thyroid normal to palpation     RESP: lungs clear to auscultation - no rales, rhonchi or wheezes     CV: regular rates and rhythm, normal S1 S2, no S3 or S4 and no murmur, click or rub     ABDOMEN:  soft, nontender, no HSM or masses and bowel sounds normal     MS: extremities normal- no gross deformities noted, no evidence of inflammation in joints, FROM in all extremities.     SKIN: no suspicious lesions or rashes     NEURO: Normal strength and tone, sensory exam grossly normal, mentation intact and speech normal     PSYCH: mentation appears normal. and affect normal/bright     LYMPHATICS: No cervical adenopathy    Recent Labs   Lab Test 21  0959 21  0614 21  0543   HGB  --  12.3 " 12.1   PLT  --  189 162    143 143   POTASSIUM 3.8 4.1 3.9   CR 0.78 0.71 0.70        Diagnostics:  Hematology    EKG stable from prior, she is a smoker    Revised Cardiac Risk Index (RCRI):  The patient has the following serious cardiovascular risks for perioperative complications:   - No serious cardiac risks = 0 points     Fit for surgery without  Further  Studies        Signed Electronically by: Lars Oconnor MD  Copy of this evaluation report is provided to requesting physician.

## 2021-11-15 NOTE — PATIENT INSTRUCTIONS

## 2021-11-19 ENCOUNTER — ALLIED HEALTH/NURSE VISIT (OUTPATIENT)
Dept: FAMILY MEDICINE | Facility: CLINIC | Age: 66
End: 2021-11-19
Payer: COMMERCIAL

## 2021-11-19 DIAGNOSIS — Z01.818 PREOP GENERAL PHYSICAL EXAM: ICD-10-CM

## 2021-11-19 LAB — SARS-COV-2 RNA RESP QL NAA+PROBE: NEGATIVE

## 2021-11-19 PROCEDURE — 99207 PR NO CHARGE NURSE ONLY: CPT

## 2021-11-19 PROCEDURE — U0003 INFECTIOUS AGENT DETECTION BY NUCLEIC ACID (DNA OR RNA); SEVERE ACUTE RESPIRATORY SYNDROME CORONAVIRUS 2 (SARS-COV-2) (CORONAVIRUS DISEASE [COVID-19]), AMPLIFIED PROBE TECHNIQUE, MAKING USE OF HIGH THROUGHPUT TECHNOLOGIES AS DESCRIBED BY CMS-2020-01-R: HCPCS

## 2021-11-19 PROCEDURE — U0005 INFEC AGEN DETEC AMPLI PROBE: HCPCS

## 2021-11-19 NOTE — PROGRESS NOTES
Pt here for PCR Covid test for surgery on Tuesday 11/23/2021, swabbed patient and will be sent out stat    Vesna Jj/MELANI  [place]---[facility]

## 2021-11-21 DIAGNOSIS — J20.9 ACUTE BRONCHITIS, UNSPECIFIED ORGANISM: ICD-10-CM

## 2021-11-23 RX ORDER — ALBUTEROL SULFATE 90 UG/1
AEROSOL, METERED RESPIRATORY (INHALATION)
Qty: 8.5 G | Refills: 1 | Status: SHIPPED | OUTPATIENT
Start: 2021-11-23 | End: 2022-11-16

## 2021-11-23 NOTE — TELEPHONE ENCOUNTER
Prescription approved per [facility] Refill Protocol.  Pt has appointment scheduled  Gayle Broderick RN, BSN  Message handled by CLINIC NURSE.

## 2021-11-26 DIAGNOSIS — G89.29 OTHER CHRONIC PAIN: ICD-10-CM

## 2021-11-26 RX ORDER — HYDROCODONE BITARTRATE AND ACETAMINOPHEN 5; 325 MG/1; MG/1
1 TABLET ORAL EVERY 6 HOURS PRN
Qty: 15 TABLET | Refills: 0 | OUTPATIENT
Start: 2021-11-26

## 2021-11-26 RX ORDER — TRAMADOL HYDROCHLORIDE 50 MG/1
50 TABLET ORAL 3 TIMES DAILY
Qty: 90 TABLET | Refills: 0 | Status: SHIPPED | OUTPATIENT
Start: 2021-11-26 | End: 2021-12-31

## 2021-11-26 NOTE — TELEPHONE ENCOUNTER
Routing refill request to provider for review/approval because:  Drug not on the [facility] refill protocol     Rimma Livingston RN on 11/26/2021 at 9:55 AM

## 2021-12-14 ENCOUNTER — TELEPHONE (OUTPATIENT)
Dept: FAMILY MEDICINE | Facility: CLINIC | Age: 66
End: 2021-12-14
Payer: COMMERCIAL

## 2021-12-14 DIAGNOSIS — Z01.818 PREOPERATIVE EXAMINATION: Primary | ICD-10-CM

## 2021-12-14 NOTE — TELEPHONE ENCOUNTER
General Call:     Who is calling:  Emeli    Reason for Call:  Pre Surgery Covid test    What are your questions or concerns:  Per patient she is having surgery on 12/2021-per surgeon just needs Covid test-Dr Barros is the surgeion    Date of last appointment with provider:     Okay to leave a detailed message:Yes at Cell number on file:    Telephone Information:   Mobile [phone]      [facility] fax # [phone]

## 2021-12-15 ENCOUNTER — LAB (OUTPATIENT)
Dept: LAB | Facility: CLINIC | Age: 66
End: 2021-12-15
Attending: FAMILY MEDICINE
Payer: COMMERCIAL

## 2021-12-15 DIAGNOSIS — Z01.818 PREOPERATIVE EXAMINATION: ICD-10-CM

## 2021-12-15 PROCEDURE — U0003 INFECTIOUS AGENT DETECTION BY NUCLEIC ACID (DNA OR RNA); SEVERE ACUTE RESPIRATORY SYNDROME CORONAVIRUS 2 (SARS-COV-2) (CORONAVIRUS DISEASE [COVID-19]), AMPLIFIED PROBE TECHNIQUE, MAKING USE OF HIGH THROUGHPUT TECHNOLOGIES AS DESCRIBED BY CMS-2020-01-R: HCPCS

## 2021-12-15 PROCEDURE — U0005 INFEC AGEN DETEC AMPLI PROBE: HCPCS

## 2021-12-16 ENCOUNTER — NURSE TRIAGE (OUTPATIENT)
Dept: NURSING | Facility: CLINIC | Age: 66
End: 2021-12-16
Payer: COMMERCIAL

## 2021-12-16 LAB — SARS-COV-2 RNA RESP QL NAA+PROBE: NEGATIVE

## 2021-12-16 NOTE — TELEPHONE ENCOUNTER

## 2021-12-25 ENCOUNTER — HEALTH MAINTENANCE LETTER (OUTPATIENT)
Age: 66
End: 2021-12-25

## 2021-12-31 ENCOUNTER — OFFICE VISIT (OUTPATIENT)
Dept: FAMILY MEDICINE | Facility: CLINIC | Age: 66
End: 2021-12-31
Payer: COMMERCIAL

## 2021-12-31 VITALS
WEIGHT: 146 LBS | DIASTOLIC BLOOD PRESSURE: 70 MMHG | RESPIRATION RATE: 16 BRPM | OXYGEN SATURATION: 97 % | SYSTOLIC BLOOD PRESSURE: 119 MMHG | BODY MASS INDEX: 29.49 KG/M2 | TEMPERATURE: 98.9 F | HEART RATE: 72 BPM

## 2021-12-31 DIAGNOSIS — G89.29 OTHER CHRONIC PAIN: ICD-10-CM

## 2021-12-31 DIAGNOSIS — S39.92XA BACK INJURY, INITIAL ENCOUNTER: ICD-10-CM

## 2021-12-31 DIAGNOSIS — S79.912A HIP INJURY, LEFT, INITIAL ENCOUNTER: ICD-10-CM

## 2021-12-31 DIAGNOSIS — G47.9 SLEEP DISORDER: ICD-10-CM

## 2021-12-31 PROCEDURE — 99214 OFFICE O/P EST MOD 30 MIN: CPT | Performed by: FAMILY MEDICINE

## 2021-12-31 RX ORDER — ZOLPIDEM TARTRATE 5 MG/1
5 TABLET ORAL
Qty: 30 TABLET | Refills: 0 | Status: SHIPPED | OUTPATIENT
Start: 2021-12-31 | End: 2022-02-03

## 2021-12-31 RX ORDER — TRAMADOL HYDROCHLORIDE 50 MG/1
50 TABLET ORAL 3 TIMES DAILY
Qty: 90 TABLET | Refills: 0 | Status: SHIPPED | OUTPATIENT
Start: 2021-12-31 | End: 2022-02-24

## 2021-12-31 RX ORDER — CYCLOBENZAPRINE HCL 5 MG
TABLET ORAL
Qty: 30 TABLET | Refills: 0 | Status: SHIPPED | OUTPATIENT
Start: 2021-12-31 | End: 2022-01-24

## 2021-12-31 NOTE — PROGRESS NOTES
Subjective   Emeli is a 66 year old who presents for the following health issues     HPI     Medication Followup    Taking Medication as prescribed: yes    Side Effects:  None    Medication Helping Symptoms:  Yes         Concern - F/u work comp back pain fell off a ladder, into a rack and had left leg tingling and low back torsional sprain with low and mid back pain   She'd like to see Orthopedics , has been going to PT at [facility]  Onset: 4 months  Description: Still has back pain starting to get pain in left triceps pain also .She'd like to get an orthopedic consultation about the back and neck and requests [facility].    I    Review of Systems      REVIEW OF SYSTEMS    Generally has had hand surgery  No problems with vision, hearing, dental or neck pain.Has left shoulder pain as above  airborne or ingestion allergy  No chest pain, palpitations, dyspnea, change in bowel habits, blood  in stool or dyspepsia.  No rashes, changing moles, weakness, lassitude or back problems.  No chronic issues . No dysuria  Patient has been  a smoker. No problems with significant headaches.  Reviewed sleep onset history issues and chronic neck and back pain   Shoulder pain with no new  injury for one  weeks.    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 Moderate trapezius discomfort is noted with  shoulder shrug and axillary nerve is intact. Full neck ROM      no associated epicondylitis at the elbow    She had hand surgery over the past two months and gets PT for that    Specific rotator cuff defect or tenderness is not  palpable.    Trial of medical treatment and consider back to PT     (G47.9) Sleep disorder  Comment:   Plan: zolpidem (AMBIEN) 5 MG tablet            (G89.29) Other chronic pain  Comment:   Plan: traMADol (ULTRAM) 50 MG  tablet        Historically for back and neck pain     (S39.92XA) Back injury, initial encounter  Comment:   Plan: cyclobenzaprine (FLEXERIL) 5 MG tablet        Work injury slowly improving     (S79.912A) Hip injury, left, initial encounter  Comment:   Plan: cyclobenzaprine (FLEXERIL) 5 MG tablet        Med has helped this area especially.

## 2021-12-31 NOTE — LETTER
[facility]  [address] [place], [address]  [phone]        December 31, 2021    Emeli Granados                                                                                                                                                       [address] [facility] [address]            Dear Emeli, This it to certify that I have referred you to [facility] for evaluation and therapy of your low back and left leg pain secondary to your fall in injury at work September 4/2021.         Lars Oconnor MD

## 2022-01-11 DIAGNOSIS — J20.9 ACUTE BRONCHITIS, UNSPECIFIED ORGANISM: ICD-10-CM

## 2022-01-13 RX ORDER — ALBUTEROL SULFATE 0.83 MG/ML
SOLUTION RESPIRATORY (INHALATION)
Qty: 75 ML | Refills: 0 | Status: SHIPPED | OUTPATIENT
Start: 2022-01-13 | End: 2022-08-03

## 2022-01-24 ENCOUNTER — OFFICE VISIT (OUTPATIENT)
Dept: FAMILY MEDICINE | Facility: CLINIC | Age: 67
End: 2022-01-24
Payer: COMMERCIAL

## 2022-01-24 VITALS
DIASTOLIC BLOOD PRESSURE: 76 MMHG | TEMPERATURE: 98.2 F | WEIGHT: 148.6 LBS | OXYGEN SATURATION: 97 % | BODY MASS INDEX: 29.17 KG/M2 | SYSTOLIC BLOOD PRESSURE: 119 MMHG | HEIGHT: 60 IN | HEART RATE: 102 BPM | RESPIRATION RATE: 16 BRPM

## 2022-01-24 DIAGNOSIS — S39.92XA BACK INJURY, INITIAL ENCOUNTER: ICD-10-CM

## 2022-01-24 DIAGNOSIS — G89.29 OTHER CHRONIC PAIN: ICD-10-CM

## 2022-01-24 DIAGNOSIS — Z00.00 ENCOUNTER FOR MEDICARE ANNUAL WELLNESS EXAM: Primary | ICD-10-CM

## 2022-01-24 DIAGNOSIS — S79.912A HIP INJURY, LEFT, INITIAL ENCOUNTER: ICD-10-CM

## 2022-01-24 DIAGNOSIS — M54.40 ACUTE BILATERAL LOW BACK PAIN WITH SCIATICA, SCIATICA LATERALITY UNSPECIFIED: ICD-10-CM

## 2022-01-24 DIAGNOSIS — Z12.31 VISIT FOR SCREENING MAMMOGRAM: ICD-10-CM

## 2022-01-24 DIAGNOSIS — K21.00 GASTROESOPHAGEAL REFLUX DISEASE WITH ESOPHAGITIS, UNSPECIFIED WHETHER HEMORRHAGE: ICD-10-CM

## 2022-01-24 PROBLEM — E80.0: Status: RESOLVED | Noted: 2020-11-20 | Resolved: 2022-01-24

## 2022-01-24 PROCEDURE — 90750 HZV VACC RECOMBINANT IM: CPT | Performed by: FAMILY MEDICINE

## 2022-01-24 PROCEDURE — 90471 IMMUNIZATION ADMIN: CPT | Performed by: FAMILY MEDICINE

## 2022-01-24 PROCEDURE — 99397 PER PM REEVAL EST PAT 65+ YR: CPT | Mod: 25 | Performed by: FAMILY MEDICINE

## 2022-01-24 RX ORDER — HYDROCODONE BITARTRATE AND ACETAMINOPHEN 5; 325 MG/1; MG/1
1 TABLET ORAL EVERY 6 HOURS PRN
Qty: 15 TABLET | Refills: 0 | Status: SHIPPED | OUTPATIENT
Start: 2022-01-24 | End: 2022-02-11

## 2022-01-24 RX ORDER — CYCLOBENZAPRINE HCL 10 MG
10 TABLET ORAL 3 TIMES DAILY PRN
Qty: 30 TABLET | Refills: 3 | Status: SHIPPED | OUTPATIENT
Start: 2022-01-24 | End: 2022-03-08

## 2022-01-24 ASSESSMENT — ANXIETY QUESTIONNAIRES
1. FEELING NERVOUS, ANXIOUS, OR ON EDGE: NOT AT ALL
GAD7 TOTAL SCORE: 0
3. WORRYING TOO MUCH ABOUT DIFFERENT THINGS: NOT AT ALL
5. BEING SO RESTLESS THAT IT IS HARD TO SIT STILL: NOT AT ALL
6. BECOMING EASILY ANNOYED OR IRRITABLE: NOT AT ALL
4. TROUBLE RELAXING: NOT AT ALL
7. FEELING AFRAID AS IF SOMETHING AWFUL MIGHT HAPPEN: NOT AT ALL
7. FEELING AFRAID AS IF SOMETHING AWFUL MIGHT HAPPEN: NOT AT ALL
2. NOT BEING ABLE TO STOP OR CONTROL WORRYING: NOT AT ALL
GAD7 TOTAL SCORE: 0
GAD7 TOTAL SCORE: 0

## 2022-01-24 ASSESSMENT — ENCOUNTER SYMPTOMS
WEAKNESS: 0
CONSTIPATION: 1
FREQUENCY: 0
CHILLS: 0
SORE THROAT: 0
DIZZINESS: 0
FEVER: 0
ABDOMINAL PAIN: 0
HEMATOCHEZIA: 0
DIARRHEA: 0
HEADACHES: 0
SHORTNESS OF BREATH: 0
COUGH: 0
PALPITATIONS: 0
NAUSEA: 0
EYE PAIN: 0
MYALGIAS: 1
BREAST MASS: 0
HEMATURIA: 0
DYSURIA: 0
ARTHRALGIAS: 0
NERVOUS/ANXIOUS: 0
PARESTHESIAS: 0
HEARTBURN: 1
JOINT SWELLING: 0

## 2022-01-24 ASSESSMENT — ASTHMA QUESTIONNAIRES: ACT_TOTALSCORE: 23

## 2022-01-24 ASSESSMENT — ACTIVITIES OF DAILY LIVING (ADL): CURRENT_FUNCTION: NO ASSISTANCE NEEDED

## 2022-01-24 ASSESSMENT — MIFFLIN-ST. JEOR: SCORE: 1122.61

## 2022-01-24 NOTE — PATIENT INSTRUCTIONS
Patient Education   Personalized Prevention Plan  You are due for the preventive services outlined below.  Your care team is available to assist you in scheduling these services.  If you have already completed any of these items, please share that information with your care team to update in your medical record.  Health Maintenance Due   Topic Date Due     Zoster (Shingles) Vaccine (2 of 3) 06/16/2016     Asthma Action Plan - yearly  02/25/2020     Pneumococcal Vaccine (2 of 2 - PPSV23) 09/29/2020     Mammogram  08/08/2021     Annual Wellness Visit  11/20/2021     FALL RISK ASSESSMENT  11/20/2021     Asthma Control Test  01/26/2022

## 2022-01-24 NOTE — LETTER
[facility]  [address] [place], [place], [id number] [phone]        January 24, 2022    RE: Emeli Granados                                                                                                                                                       [address] [facility] [address]          To Whom It May Concern,  Emeli Granados is a patient of mine.   I have seen her for left shoulder injury and back injury.   She may return to work assuming her MRI is okay.   She should not lift greater than 20 lbs and not lift or reach overhead with left arm.              Sincerely,    Emma Byrne M.D.

## 2022-01-24 NOTE — PROGRESS NOTES
"  {PROVIDER CHARTING PREFERENCE:162913}    Subjective   Emeli is a 66 year old who presents for the following health issues {ACCOMPANIED BY STATEMENT (Optional):871572}    Healthy Habits:     In general, how would you rate your overall health?  Good    Frequency of exercise:  None    Do you usually eat at least 4 servings of fruit and vegetables a day, include whole grains    & fiber and avoid regularly eating high fat or \"junk\" foods?  No    Taking medications regularly:  Yes    Medication side effects:  None    Ability to successfully perform activities of daily living:  No assistance needed    Home Safety:  No safety concerns identified    Hearing Impairment:  No hearing concerns    In the past 6 months, have you been bothered by leaking of urine?  No    In general, how would you rate your overall mental or emotional health?  Good      PHQ-2 Total Score: 0    Additional concerns today:  No       {SUPERLIST (Optional):533850}  {additonal problems for provider to add (Optional):447663}    Review of Systems   Constitutional: Negative for chills and fever.   HENT: Negative for congestion, ear pain, hearing loss and sore throat.    Eyes: Negative for pain and visual disturbance.   Respiratory: Negative for cough and shortness of breath.    Cardiovascular: Negative for chest pain, palpitations and peripheral edema.   Gastrointestinal: Positive for constipation and heartburn. Negative for abdominal pain, diarrhea, hematochezia and nausea.   Breasts:  Negative for tenderness, breast mass and discharge.   Genitourinary: Negative for dysuria, frequency, genital sores, hematuria, pelvic pain, urgency, vaginal bleeding and vaginal discharge.   Musculoskeletal: Positive for myalgias. Negative for arthralgias and joint swelling.   Skin: Negative for rash.   Neurological: Negative for dizziness, weakness, headaches and paresthesias.   Psychiatric/Behavioral: Negative for mood changes. The patient is not nervous/anxious.     "   {ROS COMP (Optional):988875}      Objective    LMP  (LMP Unknown)   There is no height or weight on file to calculate BMI.  Physical Exam   {Exam List (Optional):396550}    {Diagnostic Test Results (Optional):424958}    {AMBULATORY ATTESTATION (Optional):739673}        Answers for HPI/ROS submitted by the patient on 1/24/2022  If you checked off any problems, how difficult have these problems made it for you to do your work, take care of things at home, or get along with other people?: Not difficult at all  PHQ9 TOTAL SCORE: 0  HAMLET 7 TOTAL SCORE: 0

## 2022-01-24 NOTE — PROGRESS NOTES
"SUBJECTIVE:   Emeli Granados is a 66 year old female who presents for Preventive Visit.    She needs a note for work.   She fell and injured her back and left shoulder last fall.   She has been off work since that time but due to bilateral carpal tunnel surgery and recover.   Her back and shoulder are still giving her trouble.   She sees [facility].   They did MRI of back and shoulder but results are not back yet.   She needs note to return to work on 2/2.  She may need some limitations.         Patient has been advised of split billing requirements and indicates understanding: Yes  Are you in the first 12 months of your Medicare coverage?  No    Healthy Habits:     In general, how would you rate your overall health?  Good    Frequency of exercise:  None    Do you usually eat at least 4 servings of fruit and vegetables a day, include whole grains    & fiber and avoid regularly eating high fat or \"junk\" foods?  No    Taking medications regularly:  Yes    Medication side effects:  None    Ability to successfully perform activities of daily living:  No assistance needed    Home Safety:  No safety concerns identified    Hearing Impairment:  No hearing concerns    In the past 6 months, have you been bothered by leaking of urine?  No    In general, how would you rate your overall mental or emotional health?  Good      PHQ-2 Total Score: 0    Additional concerns today:  No    Do you feel safe in your environment? Yes    Have you ever done Advance Care Planning? (For example, a Health Directive, POLST, or a discussion with a medical provider or your loved ones about your wishes): No, advance care planning information given to patient to review.  Patient plans to discuss their wishes with loved ones or provider.         Fall risk  Fallen 2 or more times in the past year?: No  Any fall with injury in the past year?: No    Cognitive Screening   1) Repeat 3 items (Leader, Season, Table)    2) Clock draw: NORMAL  3) 3 item recall: " Recalls 3 objects  Results: 3 items recalled: COGNITIVE IMPAIRMENT LESS LIKELY    Mini-CogTM Copyright CHANDA Irene. Licensed by the author for use in [facility]; reprinted with permission ([email or web address]). All rights reserved.      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 Not Applicable   Prescreen: >3 drinks/day or >7 drinks/week? -               Current providers sharing in care for this patient include:   Patient Care Team:  Lars Oconnor MD as PCP - General (Family Practice)  Lars Oconnor MD as Assigned PCP    The following health maintenance items are reviewed in Epic and correct as of today:  Health Maintenance Due   Topic Date Due     ZOSTER IMMUNIZATION (2 of 3) 2016     ASTHMA ACTION PLAN  2020     Pneumococcal Vaccine: 65+ Years (2 of 2 - PPSV23) 2020     MAMMO SCREENING  2021     MEDICARE ANNUAL WELLNESS VISIT  2021     FALL RISK ASSESSMENT  2021     ASTHMA CONTROL TEST  2022     Labs reviewed in [facility]  Mammogram Screening: Mammogram Screening: Recommended mammography every 1-2 years with patient discussion and risk factor consideration  Any new diagnosis of family breast, ovarian, or bowel cancer? No    FHS-7: No flowsheet data found.    Mammogram Screening: Recommended mammography every 1-2 years with patient discussion and  "risk factor consideration  Pertinent mammograms are reviewed under the imaging tab.    Review of Systems   Constitutional: Negative for chills and fever.   HENT: Negative for congestion, ear pain, hearing loss and sore throat.    Eyes: Negative for pain and visual disturbance.   Respiratory: Negative for cough and shortness of breath.    Cardiovascular: Negative for chest pain, palpitations and peripheral edema.   Gastrointestinal: Positive for constipation and heartburn. Negative for abdominal pain, diarrhea, hematochezia and nausea.   Breasts:  Negative for tenderness, breast mass and discharge.   Genitourinary: Negative for dysuria, frequency, genital sores, hematuria, pelvic pain, urgency, vaginal bleeding and vaginal discharge.   Musculoskeletal: Positive for myalgias. Negative for arthralgias and joint swelling.   Skin: Negative for rash.   Neurological: Negative for dizziness, weakness, headaches and paresthesias.   Psychiatric/Behavioral: Negative for mood changes. The patient is not nervous/anxious.          OBJECTIVE:   /76 (BP Location: Right arm, Patient Position: Sitting, Cuff Size: Adult Regular)   Pulse 102   Temp 98.2  F (36.8  C) (Oral)   Resp 16   Ht 1.511 m (4' 11.5\")   Wt 67.4 kg (148 lb 9.6 oz)   LMP  (LMP Unknown)   SpO2 97%   Breastfeeding No   BMI 29.51 kg/m   Estimated body mass index is 29.51 kg/m  as calculated from the following:    Height as of this encounter: 1.511 m (4' 11.5\").    Weight as of this encounter: 67.4 kg (148 lb 9.6 oz).  Physical Exam  GENERAL: healthy, alert and no distress  EYES: Eyes grossly normal to inspection, PERRL and conjunctivae and sclerae normal  HENT: ear canals and TM's normal, nose and mouth without ulcers or lesions  NECK: no adenopathy, no asymmetry, masses, or scars and thyroid normal to palpation  RESP: lungs clear to auscultation - no rales, rhonchi or wheezes  BREAST: normal without masses, tenderness or nipple discharge and no palpable " axillary masses or adenopathy  CV: regular rate and rhythm, normal S1 S2, no S3 or S4, no murmur, click or rub, no peripheral edema and peripheral pulses strong  ABDOMEN: soft, nontender, no hepatosplenomegaly, no masses and bowel sounds normal  MS: no gross musculoskeletal defects noted, no edema  SKIN: no suspicious lesions or rashes  NEURO: Normal strength and tone, mentation intact and speech normal  PSYCH: mentation appears normal, affect normal/bright  LYMPH: no cervical, supraclavicular, axillary, or inguinal adenopathy  Left Shoulder exam:  There is no swelling, redness or gross deformity of the shoulder joints.  There is full range of motion with some pain in mid arch.  The empty can sign is + but in back of shoulder.  The impingement sign is NEG.  The rotater cuff strength is 5/5 with some pain with internal rotation and none with external rotation.      Diagnostic Test Results:  Labs reviewed in Epic    ASSESSMENT / PLAN:   (Z00.00) Encounter for Medicare annual wellness exam  (primary encounter diagnosis)  Comment: overall doing well - due for labs next summer      (Z12.31) Visit for screening mammogram  Comment:   Plan: MA SCREENING DIGITAL BILAT - Future  (s+30)            (S39.92XA) Back injury, initial encounter  Comment: Refills per [facility]    Plan: cyclobenzaprine (FLEXERIL) 10 MG tablet        MRI is pending    (S79.912A) Hip injury, left, initial encounter  Comment: stable  Plan: cyclobenzaprine (FLEXERIL) 10 MG tablet            (G89.29) Other chronic pain  Comment: signed pain agreement  [facility]     (M54.40) Acute bilateral low back pain with sciatica, sciatica laterality unspecified  Comment:   Plan: HYDROcodone-acetaminophen (NORCO) 5-325 MG         tablet            (K21.00) Gastroesophageal reflux disease with esophagitis, unspecified whether hemorrhage  Comment:   Plan: omeprazole (PRILOSEC) 20 MG DR capsule        Will try short course  "prilosec          COUNSELING:  Reviewed preventive health counseling, as reflected in patient instructions  Special attention given to:       Colon cancer screening    Estimated body mass index is 29.49 kg/m  as calculated from the following:    Height as of 11/15/21: 1.499 m (4' 11\").    Weight as of 12/31/21: 66.2 kg (146 lb).        She reports that she quit smoking about 2 years ago. Her smoking use included cigarettes. She started smoking about 54 years ago. She has a 15.00 pack-year smoking history. She has never used smokeless tobacco.      Appropriate preventive services were discussed with this patient, including applicable screening as appropriate for cardiovascular disease, diabetes, osteopenia/osteoporosis, and glaucoma.  As appropriate for age/gender, discussed screening for colorectal cancer, prostate cancer, breast cancer, and cervical cancer. Checklist reviewing preventive services available has been given to the patient.    Reviewed patients plan of care and provided an AVS. The Intermediate Care Plan ( asthma action plan, low back pain action plan, and migraine action plan) for Emeli meets the Care Plan requirement. This Care Plan has been established and reviewed with the Patient.    Counseling Resources:  ATP IV Guidelines  Pooled Cohorts Equation Calculator  Breast Cancer Risk Calculator  Breast Cancer: Medication to Reduce Risk  FRAX Risk Assessment  ICSI Preventive Guidelines  Dietary Guidelines for Americans, 2010  [employer]'s MyPlate  ASA Prophylaxis  Lung CA Screening    Emma Byrne MD  [facility]    Identified Health Risks:  Answers for HPI/ROS submitted by the patient on 1/24/2022  If you checked off any problems, how difficult have these problems made it for you to do your work, take care of things at home, or get along with other people?: Not difficult at all  PHQ9 TOTAL SCORE: 0  HAMLET 7 TOTAL SCORE: 0      "

## 2022-01-24 NOTE — LETTER
[facility]  [address] [place], [place], [address]  [phone]        January 24, 2022    RE: Emeli Granados                                                                                                                                                       [address] [facility] [address]              To Whom It May Concern,  Emeli Granados is a patient of mine.   I have seen her for left shoulder injury and back injury.   She may return to work assuming her MRI is okay on 2/2/2022.   She should not lift greater than 20 lbs and not lift or reach overhead with left arm.              Sincerely,    Emma Byrne M.D.

## 2022-01-24 NOTE — LETTER
Opioid / Opioid Plus Controlled Substance Agreement    This is an agreement between you and your provider about the safe and appropriate use of controlled substance/opioids prescribed by your care team. Controlled substances are medicines that can cause physical and mental dependence (abuse).    There are strict laws about having and using these medicines. We here at [facility] are committing to working with you in your efforts to get better. To support you in this work, we ll help you schedule regular office appointments for medicine refills. If we must cancel or change your appointment for any reason, we ll make sure you have enough medicine to last until your next appointment.     As a Provider, I will:    Listen carefully to your concerns and treat you with respect.     Recommend a treatment plan that I believe is in your best interest. This plan may involve therapies other than opioid pain medication.     Talk with you often about the possible benefits, and the risk of harm of any medicine that we prescribe for you.     Provide a plan on how to taper (discontinue or go off) using this medicine if the decision is made to stop its use.    As a Patient, I understand that opioid(s):     Are a controlled substance prescribed by my care team to help me function or work and manage my condition(s).     Are strong medicines and can cause serious side effects such as:    Drowsiness, which can seriously affect my driving ability    A lower breathing rate, enough to cause death    Harm to my thinking ability     Depression     Abuse of and addiction to this medicine    Need to be taken exactly as prescribed. Combining opioids with certain medicines or chemicals (such as illegal drugs, sedatives, sleeping pills, and benzodiazepines) can be dangerous or even fatal. If I stop opioids suddenly, I may have severe withdrawal symptoms.    Do not work for all types of pain nor for all patients. If they re not helpful, I may  be asked to stop them.        The risks, benefits and side effects of these medicine(s) were explained to me. I agree that:  1. I will take part in other treatments as advised by my care team. This may be psychiatry or counseling, physical therapy, behavioral therapy, group treatment or a referral to a specialist.     2. I will keep all my appointments. I understand that this is part of the monitoring of opioids. My care team may require an office visit for EVERY opioid/controlled substance refill. If I miss appointments or don t follow instructions, my care team may stop my medicine.    3. I will take my medicines as prescribed. I will not change the dose or schedule unless my care team tells me to. There will be no refills if I run out early.     4. I may be asked to come to the clinic and complete a urine drug test or complete a pill count at any time. If I don t give a urine sample or participate in a pill count, the care team may stop my medicine.    5. I will only receive prescriptions from this clinic for chronic pain. If I am treated by another provider for acute pain issues, I will tell them that I am taking opioid pain medication for chronic pain and that I have a treatment agreement with this provider. I will inform my [facility] care team within one business day if I am given a prescription for any pain medication by another healthcare provider. My [facility] care team can contact other providers and pharmacists about my use of any medicines.    6. It is up to me to make sure that I don t run out of my medicines on weekends or holidays. If my care team is willing to refill my opioid prescription without a visit, I must request refills only during office hours. Refills may take up to 3 business days to process. I will use one pharmacy to fill all my opioid and other controlled substance prescriptions. I will notify the clinic about any changes to my insurance or medication  availability.    7. I am responsible for my prescriptions. If the medicine/prescription is lost, stolen or destroyed, it will not be replaced. I also agree not to share controlled substance medicines with anyone.    8. I am aware I should not use any illegal or recreational drugs. I agree not to drink alcohol unless my care team says I can.       9. If I enroll in the [place] Medical Cannabis program, I will tell my care team prior to my next refill.     10. I will tell my care team right away if I become pregnant, have a new medical problem treated outside of my regular clinic, or have a change in my medications.    11. I understand that this medicine can affect my thinking, judgment and reaction time. Alcohol and drugs affect the brain and body, which can affect the safety of my driving. Being under the influence of alcohol or drugs can affect my decision-making, behaviors, personal safety, and the safety of others. Driving while impaired (DWI) can occur if a person is driving, operating, or in physical control of a car, motorcycle, boat, snowmobile, ATV, motorbike, off-road vehicle, or any other motor vehicle ([place] Statute 169A.20). I understand the risk if I choose to drive or operate any vehicle or machinery.    I understand that if I do not follow any of the conditions above, my prescriptions or treatment may be stopped or changed.          Opioids  What You Need to Know    What are opioids?   Opioids are pain medicines that must be prescribed by a doctor. They are also known as narcotics.     Examples are:   1. morphine (MS Contin, Marlyn)  2. oxycodone (Oxycontin)  3. oxycodone and acetaminophen (Percocet)  4. hydrocodone and acetaminophen (Vicodin, Norco)   5. fentanyl patch (Duragesic)   6. hydromorphone (Dilaudid)   7. methadone  8. codeine (Tylenol #3)     What do opioids do well?   Opioids are best for severe short-term pain such as after a surgery or injury. They may work well for cancer pain. They may  help some people with long-lasting (chronic) pain.     What do opioids NOT do well?   Opioids never get rid of pain entirely, and they don t work well for most patients with chronic pain. Opioids don t reduce swelling, one of the causes of pain.                                    Other ways to manage chronic pain and improve function include:       Treat the health problem that may be causing pain    Anti-inflammation medicines, which reduce swelling and tenderness, such as ibuprofen (Advil, Motrin) or naproxen (Aleve)    Acetaminophen (Tylenol)    Antidepressants and anti-seizure medicines, especially for nerve pain    Topical treatments such as patches or creams    Injections or nerve blocks    Chiropractic or osteopathic treatment    Acupuncture, massage, deep breathing, meditation, visual imagery, aromatherapy    Use heat or ice at the pain site    Physical therapy     Exercise    Stop smoking    Take part in therapy       Risks and side effects     Talk to your doctor before you start or decide to keep taking opioids. Possible side effects include:      Lowering your breathing rate enough to cause death    Overdose, including death, especially if taking higher than prescribed doses    Worse depression symptoms; less pleasure in things you usually enjoy    Feeling tired or sluggish    Slower thoughts or cloudy thinking    Being more sensitive to pain over time; pain is harder to control    Trouble sleeping or restless sleep    Changes in hormone levels (for example, less testosterone)    Changes in sex drive or ability to have sex    Constipation    Unsafe driving    Itching and sweating    Dizziness    Nausea, throwing up and dry mouth    What else should I know about opioids?    Opioids may lead to dependence, tolerance, or addiction.      Dependence means that if you stop or reduce the medicine too quickly, you will have withdrawal symptoms. These include loose poop (diarrhea), jitters, flu-like symptoms,  nervousness and tremors. Dependence is not the same as addiction.                       Tolerance means needing higher doses over time to get the same effect. This may increase the chance of serious side effects.      Addiction is when people improperly use a substance that harms their body, their mind or their relations with others. Use of opiates can cause a relapse of addiction if you have a history of drug or alcohol abuse.      People who have used opioids for a long time may have a lower quality of life, worse depression, higher levels of pain and more visits to doctors.    You can overdose on opioids. Take these steps to lower your risk of overdose:    1. Recognize the signs:  Signs of overdose include decrease or loss of consciousness (blackout), slowed breathing, trouble waking up and blue lips. If someone is worried about overdose, they should call 911.    2. Talk to your doctor about Narcan (naloxone).   If you are at risk for overdose, you may be given a prescription for Narcan. This medicine very quickly reverses the effects of opioids.   If you overdose, a friend or family member can give you Narcan while waiting for the ambulance. They need to know the signs of overdose and how to give Narcan.     3. Don't use alcohol or street drugs.   Taking them with opioids can cause death.    4. Do not take any of these medicines unless your doctor says it s OK. Taking these with opioids can cause death:    Benzodiazepines, such as lorazepam (Ativan), alprazolam (Xanax) or diazepam (Valium)    Muscle relaxers, such as cyclobenzaprine (Flexeril)    Sleeping pills like zolpidem (Ambien)     Other opioids      How to keep you and other people safe while taking opioids:    1. Never share your opioids with others.  Opioid medicines are regulated by the Drug Enforcement Agency (ELSIE). Selling or sharing medications is a criminal act.    2. Be sure to store opioids in a secure place, locked up if possible. Young children  can easily swallow them and overdose.    3. When you are traveling with your medicines, keep them in the original bottles. If you use a pill box, be sure you also carry a copy of your medicine list from your clinic or pharmacy.    4. Safe disposal of opioids    Most pharmacies have places to get rid of medicine, called disposal kiosks. Medicine disposal options are also available in every [facility]. Search your county and  medication disposal  to find more options. You can find more details at:  [email or web address]     I agree that my provider, clinic care team, and pharmacy may work with any city, state or federal law enforcement agency that investigates the misuse, sale, or other diversion of my controlled medicine. I will allow my provider to discuss my care with, or share a copy of, this agreement with any other treating provider, pharmacy or emergency room where I receive care.    I have read this agreement and have asked questions about anything I did not understand.    _______________________________________________________  Patient Signature - Emeli Granados _____________________                   Date     _______________________________________________________  Provider Signature - Emma Byrne MD   _____________________                   Date     _______________________________________________________  Witness Signature (required if provider not present while patient signing)   _____________________                   Date

## 2022-01-25 ASSESSMENT — PATIENT HEALTH QUESTIONNAIRE - PHQ9: SUM OF ALL RESPONSES TO PHQ QUESTIONS 1-9: 0

## 2022-01-25 ASSESSMENT — ANXIETY QUESTIONNAIRES: GAD7 TOTAL SCORE: 0

## 2022-02-03 DIAGNOSIS — G47.9 SLEEP DISORDER: ICD-10-CM

## 2022-02-03 RX ORDER — ZOLPIDEM TARTRATE 5 MG/1
5 TABLET ORAL
Qty: 30 TABLET | Refills: 0 | Status: SHIPPED | OUTPATIENT
Start: 2022-02-03 | End: 2022-03-09

## 2022-02-10 DIAGNOSIS — M54.40 ACUTE BILATERAL LOW BACK PAIN WITH SCIATICA, SCIATICA LATERALITY UNSPECIFIED: ICD-10-CM

## 2022-02-11 RX ORDER — HYDROCODONE BITARTRATE AND ACETAMINOPHEN 5; 325 MG/1; MG/1
1 TABLET ORAL EVERY 6 HOURS PRN
Qty: 15 TABLET | Refills: 0 | Status: SHIPPED | OUTPATIENT
Start: 2022-02-11 | End: 2022-04-13

## 2022-02-24 ENCOUNTER — MYC MEDICAL ADVICE (OUTPATIENT)
Dept: FAMILY MEDICINE | Facility: CLINIC | Age: 67
End: 2022-02-24
Payer: COMMERCIAL

## 2022-02-24 DIAGNOSIS — G89.29 OTHER CHRONIC PAIN: ICD-10-CM

## 2022-02-24 DIAGNOSIS — M54.40 ACUTE BILATERAL LOW BACK PAIN WITH SCIATICA, SCIATICA LATERALITY UNSPECIFIED: ICD-10-CM

## 2022-02-24 RX ORDER — HYDROCODONE BITARTRATE AND ACETAMINOPHEN 5; 325 MG/1; MG/1
1 TABLET ORAL EVERY 6 HOURS PRN
Qty: 15 TABLET | Refills: 0 | OUTPATIENT
Start: 2022-02-24

## 2022-02-24 RX ORDER — TRAMADOL HYDROCHLORIDE 50 MG/1
50 TABLET ORAL 3 TIMES DAILY
Qty: 90 TABLET | Refills: 0 | Status: SHIPPED | OUTPATIENT
Start: 2022-02-24 | End: 2022-04-05

## 2022-02-24 NOTE — TELEPHONE ENCOUNTER
Routing refill request to provider for review/approval because:  Drug not on the [facility] refill protocol     Rimma Livingston RN on 2/24/2022 at 10:18 AM

## 2022-02-25 NOTE — TELEPHONE ENCOUNTER
I have to say no . Your surgeon has the option of treating you for the shoulder pain preoperatively and postoperatively.

## 2022-02-25 NOTE — TELEPHONE ENCOUNTER
See [employer] Message.  -The Phenobarb is for her dog who has seizures.   -Pt does not take Tramadol and Hydrocodone together.   -Requesting refill to get her to surgery on March 15th.     Nathaly Eaton, RN, BSN, PHN  [facility]

## 2022-03-01 ENCOUNTER — TELEPHONE (OUTPATIENT)
Dept: FAMILY MEDICINE | Facility: CLINIC | Age: 67
End: 2022-03-01
Payer: COMMERCIAL

## 2022-03-01 NOTE — TELEPHONE ENCOUNTER
"Pt calls,     S-(situation): wants shoulder issue added to work comp injury from 9/8/21    B-(background): see urgent care visit, f/u visits with GABINO JONES MD, KARLEE and pain clinic, no work up on shoulder found, mentioned shoulder but focus was on back, pt went to [facility] Jan 2021 and found has shoulder tear, wants as work comp    A-(assessment): follow up shoulder    R-(recommendations): pt wants to confirm with GABINO JONES MD, \"can he write note that left shoulder was part of injury?\", routed to Gabino Jones MD, please confirm work comp questions and advise, route to inform pt  Gayle Broderick, RN, BSN  [facility]      "

## 2022-03-08 ENCOUNTER — TELEPHONE (OUTPATIENT)
Dept: FAMILY MEDICINE | Facility: CLINIC | Age: 67
End: 2022-03-08
Payer: COMMERCIAL

## 2022-03-08 DIAGNOSIS — S79.912A HIP INJURY, LEFT, INITIAL ENCOUNTER: ICD-10-CM

## 2022-03-08 DIAGNOSIS — S39.92XA BACK INJURY, INITIAL ENCOUNTER: ICD-10-CM

## 2022-03-08 DIAGNOSIS — M25.519 ACUTE SHOULDER PAIN, UNSPECIFIED LATERALITY: Primary | ICD-10-CM

## 2022-03-08 RX ORDER — CYCLOBENZAPRINE HCL 10 MG
10 TABLET ORAL 2 TIMES DAILY PRN
Qty: 30 TABLET | Refills: 3 | Status: SHIPPED | OUTPATIENT
Start: 2022-03-08 | End: 2022-04-05

## 2022-03-08 NOTE — TELEPHONE ENCOUNTER
She is calling to get the prescription of her muscle relaxer changed to her shoulder not her neck. She needs a copy of that for herself as well.     She says the injury os for her shoulder. Injury report at work says it is for the shoulder not her back or neck. Gretta Bailey RN on 3/8/2022 at 9:57 AM

## 2022-03-09 ENCOUNTER — NURSE TRIAGE (OUTPATIENT)
Dept: FAMILY MEDICINE | Facility: CLINIC | Age: 67
End: 2022-03-09
Payer: COMMERCIAL

## 2022-03-09 DIAGNOSIS — G47.9 SLEEP DISORDER: ICD-10-CM

## 2022-03-09 RX ORDER — ZOLPIDEM TARTRATE 5 MG/1
5 TABLET ORAL
Qty: 30 TABLET | Refills: 0 | Status: SHIPPED | OUTPATIENT
Start: 2022-03-09 | End: 2022-04-05

## 2022-03-09 NOTE — TELEPHONE ENCOUNTER
Reason for Disposition    Lightheadedness (dizziness) present now, after 2 hours of rest and fluids    Additional Information    Negative: Chest pain    Negative: Rectal bleeding, bloody stool, or tarry-black stool    Negative: Vomiting is the main symptom    Negative: Diarrhea is the main symptom    Negative: Headache is the main symptom    Negative: Heat exhaustion suspected (i.e., dehydration from heat exposure)    Negative: Patient states that he/she is having an anxiety/panic attack    Negative: Shock suspected (e.g., cold/pale/clammy skin, too weak to stand, low BP, rapid pulse)    Negative: Difficult to awaken or acting confused (e.g., disoriented, slurred speech)    Negative: Fainted, and still feels dizzy afterwards    Negative: Severe difficulty breathing (e.g., struggling for each breath, speaks in single words)    Negative: Overdose (accidental or intentional) of medications    Negative: New neurologic deficit that is present now: * Weakness of the face, arm, or leg on one side of the body * Numbness of the face, arm, or leg on one side of the body * Loss of speech or garbled speech    Negative: Heart beating < 50 beats per minute OR > 140 beats per minute    Negative: Sounds like a life-threatening emergency to the triager    Negative: SEVERE dizziness (e.g., unable to stand, requires support to walk, feels like passing out now)    Negative: SEVERE headache or neck pain    Negative: Spinning or tilting sensation (vertigo) present now and one or more stroke risk factors (i.e., hypertension, diabetes, prior stroke/TIA, heart attack, age over 60) (Exception: prior physician evaluation for this AND no different/worse than usual)    Negative: Loss of vision or double vision    Negative: Extra heart beats OR irregular heart beating (i.e., 'palpitations')    Negative: Difficulty breathing    Negative: Drinking very little and has signs of dehydration (e.g., no urine > 12 hours, very dry mouth, very  "lightheaded)    Negative: Follows bleeding (e.g., stomach, rectum, vagina) (Exception: became dizzy from sight of small amount blood)    Negative: Patient sounds very sick or weak to the triager    Answer Assessment - Initial Assessment Questions  1. DESCRIPTION: \"Describe your dizziness.\"      Woke up dizzy this am, unable to describe per patient, lightheaded, everything spinning  2. LIGHTHEADED: \"Do you feel lightheaded?\" (e.g., somewhat faint, woozy, weak upon standing)      Yes, nausea and ate cereal and drank gatorade and water  3. VERTIGO: \"Do you feel like either you or the room is spinning or tilting?\" (i.e. vertigo)      no  4. SEVERITY: \"How bad is it?\"  \"Do you feel like you are going to faint?\" \"Can you stand and walk?\"    - MILD - walking normally    - MODERATE - interferes with normal activities (e.g., work, school)     - SEVERE - unable to stand, requires support to walk, feels like passing out now.       mild  5. ONSET:  \"When did the dizziness begin?\"      This am  6. AGGRAVATING FACTORS: \"Does anything make it worse?\" (e.g., standing, change in head position)      When lays down it gets worse and then gets better after laying a bit  7. HEART RATE: \"Can you tell me your heart rate?\" \"How many beats in 15 seconds?\"  (Note: not all patients can do this)        unable  8. CAUSE: \"What do you think is causing the dizziness?\"      Took Tylenol PM last night  9. RECURRENT SYMPTOM: \"Have you had dizziness before?\" If so, ask: \"When was the last time?\" \"What happened that time?\"      no  10. OTHER SYMPTOMS: \"Do you have any other symptoms?\" (e.g., fever, chest pain, vomiting, diarrhea, bleeding)        A little nausea but gone now  11. PREGNANCY: \"Is there any chance you are pregnant?\" \"When was your last menstrual period?\"        n/a    Protocols used: DIZZINESS-A-OH      "

## 2022-03-18 DIAGNOSIS — K21.00 GASTROESOPHAGEAL REFLUX DISEASE WITH ESOPHAGITIS, UNSPECIFIED WHETHER HEMORRHAGE: ICD-10-CM

## 2022-03-24 ENCOUNTER — PATIENT OUTREACH (OUTPATIENT)
Dept: FAMILY MEDICINE | Facility: CLINIC | Age: 67
End: 2022-03-24
Payer: COMMERCIAL

## 2022-03-24 NOTE — TELEPHONE ENCOUNTER
Pt left message for PAL  -She is a pt of Dr. Oconnor  -She was going through her pill bottle and he wrote a couple things wrong on her pill bottles. He wrote neck pain, but it was for shoulder pain.   -States she was injured at work on September 4th, was seen September 8th.   -Pt wants a call back to see if it can be fixed.   -Chart review shows she saw Dr. Oconnor on 12/31/2021.  -Pt is requesting medical records as she needs surgery for her shoulder.   -Advised we cannot change what is written on her pill bottle and provider has since retired.  -Directed pt to request medical records with providers notes to help support workmans comp request.     Nathaly TAMEZ RN, BSN, PHN - Patient Advocate Liaison - PAL RN  [facility]  [phone]

## 2022-04-05 ENCOUNTER — TELEPHONE (OUTPATIENT)
Dept: FAMILY MEDICINE | Facility: CLINIC | Age: 67
End: 2022-04-05
Payer: COMMERCIAL

## 2022-04-05 DIAGNOSIS — G47.9 SLEEP DISORDER: ICD-10-CM

## 2022-04-05 DIAGNOSIS — M25.519 ACUTE SHOULDER PAIN, UNSPECIFIED LATERALITY: ICD-10-CM

## 2022-04-05 DIAGNOSIS — G89.29 OTHER CHRONIC PAIN: ICD-10-CM

## 2022-04-05 RX ORDER — TRAMADOL HYDROCHLORIDE 50 MG/1
50 TABLET ORAL 3 TIMES DAILY
Qty: 90 TABLET | Refills: 0 | Status: SHIPPED | OUTPATIENT
Start: 2022-04-05 | End: 2022-05-02

## 2022-04-05 RX ORDER — ZOLPIDEM TARTRATE 5 MG/1
5 TABLET ORAL
Qty: 30 TABLET | Refills: 0 | Status: SHIPPED | OUTPATIENT
Start: 2022-04-09 | End: 2022-05-02

## 2022-04-05 RX ORDER — CYCLOBENZAPRINE HCL 10 MG
10 TABLET ORAL 2 TIMES DAILY PRN
Qty: 30 TABLET | Refills: 3 | Status: SHIPPED | OUTPATIENT
Start: 2022-04-05 | End: 2022-04-06

## 2022-04-05 NOTE — TELEPHONE ENCOUNTER
Routing refill request to provider for review/approval because:  Drug not on the [facility] refill protocol   Drug interaction warning    Rimma Livingston RN on 4/5/2022 at 10:56 AM

## 2022-04-05 NOTE — TELEPHONE ENCOUNTER
Dr. Garcia   Please review message below - clarify direction on new RX pended and resend with directions you prefer     cyclobenzaprine (FLEXERIL) 10 MG tablet 30 tablet 3 4/5/2022  No   Sig - Route: Take 1 tablet (10 mg) by mouth 2 times daily as needed for other (shoulder pain) Take one daily as needed for muscle spasm. Do not take within 6 hours of tramadol or        Thank you,   Felisa Brown, Registered Nurse, PAL (Patient Advocate Liason)   [facility]   [phone]

## 2022-04-05 NOTE — TELEPHONE ENCOUNTER
Reason for Call:  Medication or medication refill:    Do you use a [facility]?  Name of the pharmacy and phone number for the current request:  [facility]    Name of the medication requested: MUSCLE RELAXER     Other request: PRESCRIPTION INSTRUCTIONS STATE TO TAKE 1 TAB 2X DAY AND TAKE 1 TAB 1X A DAY. PHARMACY IS HOPING TO GET THIS CLARIFIED.    Can we leave a detailed message on this number? YES    Phone number patient can be reached at: Other phone number:  [phone]    Best Time: ANYTIME THEY CLOSE AT 9PM    Call taken on 4/5/2022 at 12:45 PM by Meghan Mo

## 2022-04-06 RX ORDER — CYCLOBENZAPRINE HCL 10 MG
10 TABLET ORAL 2 TIMES DAILY PRN
Qty: 30 TABLET | Refills: 3 | Status: SHIPPED | OUTPATIENT
Start: 2022-04-06 | End: 2022-07-11

## 2022-04-07 ENCOUNTER — TELEPHONE (OUTPATIENT)
Dept: FAMILY MEDICINE | Facility: CLINIC | Age: 67
End: 2022-04-07
Payer: COMMERCIAL

## 2022-04-07 NOTE — TELEPHONE ENCOUNTER
Spoke with pt.   -Advised provider will be out of office until May.   -Pt would like to be seen sooner.   -Schedule with Kayla Rodriguez, virtual appointment, 4/14/22 @8:10    Nathaly Eaton RN, BSN, PHN  [facility]

## 2022-04-07 NOTE — TELEPHONE ENCOUNTER
Reason for Call:  Same Day Appointment, Requested Provider:  Emma Carpenter    Reason for visit: had to miss apt 4/6/22 due to  falling  - would like to reschedule sooner than May due to medications    Duration of symptoms: n/a    Have you been treated for this in the past? Yes    Additional comments: n/a    Can we leave a detailed message on this number? YES    Phone number patient can be reached at: Cell number on file:    Telephone Information:   Mobile [phone]       Best Time: anytime    Call taken on 4/7/2022 at 10:53 AM by Ashlyn Garcia

## 2022-04-13 DIAGNOSIS — M54.40 ACUTE BILATERAL LOW BACK PAIN WITH SCIATICA, SCIATICA LATERALITY UNSPECIFIED: ICD-10-CM

## 2022-04-13 RX ORDER — HYDROCODONE BITARTRATE AND ACETAMINOPHEN 5; 325 MG/1; MG/1
1 TABLET ORAL EVERY 6 HOURS PRN
Qty: 15 TABLET | Refills: 0 | Status: SHIPPED | OUTPATIENT
Start: 2022-04-13 | End: 2022-05-23

## 2022-04-13 NOTE — TELEPHONE ENCOUNTER
Pt requesting refill due to ongoing back pain. She was thinking about having back surgery but no longer planning on it. Please address.

## 2022-04-13 NOTE — TELEPHONE ENCOUNTER
I'm seeing that pain agreement signed in 1/22 - it is important according to the agreement that she does not get tramadol or hydrocodone from any other doctors moving forward unless they are covering for me.   If she does in the ER or for surgery just let us know

## 2022-04-15 ENCOUNTER — VIRTUAL VISIT (OUTPATIENT)
Dept: FAMILY MEDICINE | Facility: CLINIC | Age: 67
End: 2022-04-15
Payer: MEDICARE

## 2022-04-15 DIAGNOSIS — M54.40 ACUTE BILATERAL LOW BACK PAIN WITH SCIATICA, SCIATICA LATERALITY UNSPECIFIED: Primary | ICD-10-CM

## 2022-04-15 DIAGNOSIS — M25.519 ACUTE SHOULDER PAIN, UNSPECIFIED LATERALITY: ICD-10-CM

## 2022-04-15 PROCEDURE — 99214 OFFICE O/P EST MOD 30 MIN: CPT | Mod: TEL | Performed by: PHYSICIAN ASSISTANT

## 2022-04-15 NOTE — LETTER
April 15, 2022      Emeli Granados  [address] [facility] [address]        To Whom It May Concern:    Emeli Granados was seen in our clinic. She may return to work with the following: limited to light duty - lifting no greater than 20 pounds. This restriction should remain in place for 3 months unless a new note is given sooner.       Sincerely,        Roscoe Hu PA-C

## 2022-04-15 NOTE — PROGRESS NOTES
Emeli is a 67 year old who is being evaluated via a billable telephone visit.      What phone number would you like to be contacted at? [phone]  How would you like to obtain your AVS? MyChart    Assessment & Plan     Acute bilateral low back pain with sciatica, sciatica laterality unspecified    Stable. Not due for medication refills. Work note updated today.      Acute shoulder pain, unspecified laterality    Stable. Work note updated.                    No follow-ups on file.    KAUSHAL Rodriguez [facility]   Emeli is a 67 year old who presents for the following health issues     HPI     Pain History:  When did you first notice your pain? - More than 6 weeks   Have you seen this provider for your pain in the past?   Yes   Where in your body do you have pain? Shoulder, low back   Are you seeing anyone else for your pain? Yes - pt is seeing surgeons     PHQ-9 SCORE 7/26/2021 7/26/2021 1/24/2022   PHQ-9 Total Score [facility] - 0 0   PHQ-9 Total Score 0 0 0       HAMLET-7 SCORE 10/30/2019 5/12/2021 1/24/2022   Total Score 0 (minimal anxiety) - 0 (minimal anxiety)   Total Score 0 0 0       Chronic Pain Follow Up:    Location of pain: low back, left shoulder  Analgesia/pain control:    - Recent changes:  none    - Overall control: Tolerable with discomfort    - Current treatments: advil, tramadol, vicodin    Adherence:     - Do you ever take more pain medicine than prescribed? No    - When did you take your last dose of pain medicine?  This morning    Adverse effects: No     PDMP Review       Value Time User    State PDMP site checked  Yes 4/5/2022 11:35 AM Emma Byrne MD        Last CSA Agreement:   CSA -- Patient Level:    Controlled Substance Agreement - Opioid - Scan on 1/24/2022  5:10 PM  Controlled Substance Agreement - Opioid - Scan on 5/3/2021  6:23 PM  Controlled Substance Agreement - Opioid - Scan on 7/29/2019  3:09 PM       Last UDS:  7/31/2021      Review of Systems   Constitutional, HEENT, cardiovascular, pulmonary, gi and gu systems are negative, except as otherwise noted.      Objective           Vitals:  No vitals were obtained today due to virtual visit.    Physical Exam   healthy, alert and no distress  PSYCH: Alert and oriented times 3; coherent speech, normal   rate and volume, able to articulate logical thoughts, able   to abstract reason, no tangential thoughts, no hallucinations   or delusions  Her affect is normal and pleasant  RESP: No cough, no audible wheezing, able to talk in full sentences  Remainder of exam unable to be completed due to telephone visits                Phone call duration: 6 minutes

## 2022-05-02 ENCOUNTER — OFFICE VISIT (OUTPATIENT)
Dept: FAMILY MEDICINE | Facility: CLINIC | Age: 67
End: 2022-05-02
Payer: COMMERCIAL

## 2022-05-02 VITALS
TEMPERATURE: 97.9 F | WEIGHT: 149.8 LBS | HEIGHT: 60 IN | HEART RATE: 67 BPM | OXYGEN SATURATION: 95 % | BODY MASS INDEX: 29.41 KG/M2 | SYSTOLIC BLOOD PRESSURE: 112 MMHG | RESPIRATION RATE: 14 BRPM | DIASTOLIC BLOOD PRESSURE: 72 MMHG

## 2022-05-02 DIAGNOSIS — G89.29 OTHER CHRONIC PAIN: ICD-10-CM

## 2022-05-02 DIAGNOSIS — Z12.31 ENCOUNTER FOR SCREENING MAMMOGRAM FOR BREAST CANCER: Primary | ICD-10-CM

## 2022-05-02 DIAGNOSIS — K21.00 GASTROESOPHAGEAL REFLUX DISEASE WITH ESOPHAGITIS, UNSPECIFIED WHETHER HEMORRHAGE: ICD-10-CM

## 2022-05-02 DIAGNOSIS — R82.90 ABNORMAL URINE ODOR: ICD-10-CM

## 2022-05-02 DIAGNOSIS — M85.852 OSTEOPENIA OF BOTH THIGHS: ICD-10-CM

## 2022-05-02 DIAGNOSIS — G47.9 SLEEP DISORDER: ICD-10-CM

## 2022-05-02 DIAGNOSIS — L21.9 SEBORRHEIC DERMATITIS: ICD-10-CM

## 2022-05-02 DIAGNOSIS — L81.4 SOLAR LENTIGINOSIS: ICD-10-CM

## 2022-05-02 DIAGNOSIS — Z13.6 CARDIOVASCULAR SCREENING; LDL GOAL LESS THAN 130: ICD-10-CM

## 2022-05-02 DIAGNOSIS — M85.851 OSTEOPENIA OF BOTH THIGHS: ICD-10-CM

## 2022-05-02 LAB
ALBUMIN SERPL-MCNC: 3.5 G/DL (ref 3.4–5)
ALBUMIN UR-MCNC: NEGATIVE MG/DL
ALP SERPL-CCNC: 100 U/L (ref 40–150)
ALT SERPL W P-5'-P-CCNC: 28 U/L (ref 0–50)
ANION GAP SERPL CALCULATED.3IONS-SCNC: 3 MMOL/L (ref 3–14)
APPEARANCE UR: CLEAR
AST SERPL W P-5'-P-CCNC: 25 U/L (ref 0–45)
BILIRUB SERPL-MCNC: 0.4 MG/DL (ref 0.2–1.3)
BILIRUB UR QL STRIP: NEGATIVE
BUN SERPL-MCNC: 21 MG/DL (ref 7–30)
CALCIUM SERPL-MCNC: 8.9 MG/DL (ref 8.5–10.1)
CHLORIDE BLD-SCNC: 110 MMOL/L (ref 94–109)
CHOLEST SERPL-MCNC: 165 MG/DL
CO2 SERPL-SCNC: 27 MMOL/L (ref 20–32)
COLOR UR AUTO: YELLOW
CREAT SERPL-MCNC: 0.68 MG/DL (ref 0.52–1.04)
ERYTHROCYTE [DISTWIDTH] IN BLOOD BY AUTOMATED COUNT: 13.6 % (ref 10–15)
FASTING STATUS PATIENT QL REPORTED: NO
GFR SERPL CREATININE-BSD FRML MDRD: >90 ML/MIN/1.73M2
GLUCOSE BLD-MCNC: 97 MG/DL (ref 70–99)
GLUCOSE UR STRIP-MCNC: NEGATIVE MG/DL
HCT VFR BLD AUTO: 43.3 % (ref 35–47)
HDLC SERPL-MCNC: 52 MG/DL
HGB BLD-MCNC: 14.3 G/DL (ref 11.7–15.7)
HGB UR QL STRIP: NEGATIVE
KETONES UR STRIP-MCNC: NEGATIVE MG/DL
LDLC SERPL CALC-MCNC: 79 MG/DL
LEUKOCYTE ESTERASE UR QL STRIP: NEGATIVE
MCH RBC QN AUTO: 30.8 PG (ref 26.5–33)
MCHC RBC AUTO-ENTMCNC: 33 G/DL (ref 31.5–36.5)
MCV RBC AUTO: 93 FL (ref 78–100)
NITRATE UR QL: NEGATIVE
NONHDLC SERPL-MCNC: 113 MG/DL
PH UR STRIP: 6 [PH] (ref 5–7)
PLATELET # BLD AUTO: 258 10E3/UL (ref 150–450)
POTASSIUM BLD-SCNC: 4.1 MMOL/L (ref 3.4–5.3)
PROT SERPL-MCNC: 6.9 G/DL (ref 6.8–8.8)
RBC # BLD AUTO: 4.65 10E6/UL (ref 3.8–5.2)
SODIUM SERPL-SCNC: 140 MMOL/L (ref 133–144)
SP GR UR STRIP: 1.02 (ref 1–1.03)
TRIGL SERPL-MCNC: 172 MG/DL
UROBILINOGEN UR STRIP-ACNC: 0.2 E.U./DL
WBC # BLD AUTO: 6.1 10E3/UL (ref 4–11)

## 2022-05-02 PROCEDURE — 81003 URINALYSIS AUTO W/O SCOPE: CPT | Performed by: FAMILY MEDICINE

## 2022-05-02 PROCEDURE — 99214 OFFICE O/P EST MOD 30 MIN: CPT | Performed by: FAMILY MEDICINE

## 2022-05-02 PROCEDURE — 80061 LIPID PANEL: CPT | Performed by: FAMILY MEDICINE

## 2022-05-02 PROCEDURE — 85027 COMPLETE CBC AUTOMATED: CPT | Performed by: FAMILY MEDICINE

## 2022-05-02 PROCEDURE — 36415 COLL VENOUS BLD VENIPUNCTURE: CPT | Performed by: FAMILY MEDICINE

## 2022-05-02 PROCEDURE — 80053 COMPREHEN METABOLIC PANEL: CPT | Performed by: FAMILY MEDICINE

## 2022-05-02 RX ORDER — GABAPENTIN 300 MG/1
CAPSULE ORAL
COMMUNITY
Start: 2022-04-02 | End: 2022-05-02

## 2022-05-02 RX ORDER — HYDROCODONE BITARTRATE AND ACETAMINOPHEN 10; 325 MG/1; MG/1
1 TABLET ORAL 2 TIMES DAILY PRN
Qty: 15 TABLET | Refills: 0 | Status: SHIPPED | OUTPATIENT
Start: 2022-05-02 | End: 2022-05-23

## 2022-05-02 RX ORDER — TRAMADOL HYDROCHLORIDE 50 MG/1
50 TABLET ORAL 3 TIMES DAILY
Qty: 90 TABLET | Refills: 0 | Status: SHIPPED | OUTPATIENT
Start: 2022-05-05 | End: 2022-05-23

## 2022-05-02 RX ORDER — ZOLPIDEM TARTRATE 5 MG/1
5 TABLET ORAL
Qty: 30 TABLET | Refills: 5 | Status: SHIPPED | OUTPATIENT
Start: 2022-05-05 | End: 2022-11-02

## 2022-05-02 NOTE — LETTER
[facility]  [address] [place], [place], [id number] [phone]        May 2, 2022    RE: Emeli Granados                                                                                                                                                       [address] [facility] [address]            To Whom It May Concern,  Emeli Granados is a patient of mine.   She will need to have restrictions at work due to chronic back issues and an upcoming left shoulder surgery.  She will need to have a restriction of lifting no greater than 20 lbs and please allow her to have a bottle of water near her at all times.             Sincerely,    Emma Byrne M.D.

## 2022-05-02 NOTE — PROGRESS NOTES
Assessment & Plan     Encounter for screening mammogram for breast cancer    - ZOSTER VACCINE RECOMBINANT ADJUVANTED (SHINGRIX)  - *MA Screening Digital Bilateral    Sleep disorder  Stable  Refills per epicare     - zolpidem (AMBIEN) 5 MG tablet  Dispense: 30 tablet; Refill: 5  - CBC with platelets    Other chronic pain  Stable  Injection tomorrow and shoulder surgery in next 30 days  Refills per epicare     - traMADol (ULTRAM) 50 MG tablet  Dispense: 90 tablet; Refill: 0  - HYDROcodone-acetaminophen (NORCO)  MG per tablet  Dispense: 15 tablet; Refill: 0  - Comprehensive metabolic panel (BMP + Alb, Alk Phos, ALT, AST, Total. Bili, TP)  - CBC with platelets    Gastroesophageal reflux disease with esophagitis, unspecified whether hemorrhage  Stable  Refills per epicare    - omeprazole (PRILOSEC) 20 MG DR capsule  Dispense: 90 capsule; Refill: 3  - Comprehensive metabolic panel (BMP + Alb, Alk Phos, ALT, AST, Total. Bili, TP)    Osteopenia of both thighs  stable    CARDIOVASCULAR SCREENING; LDL GOAL LESS THAN 130    - Lipid panel reflex to direct LDL Non-fasting    Solar lentiginosis    - Adult Dermatology Referral    Seborrheic dermatitis    - Adult Dermatology Referral    Abnormal urine odor    - UA Macro with Reflex to Micro and Culture - lab collect  - UA Macro with Reflex to Micro and Culture - lab collect        30 minutes spent on the date of the encounter doing chart review, review of test results, interpretation of tests, patient visit and documentation        See Patient Instructions    Return in about 8 months (around 1/2/2023) for Physical Exam.    Emma Byrne MD  [facility]    Sofiya Sainz is a 67 year old who presents for the following health issues - she has a few concerns:  1- her urine smells funny.   No dysuria but she did have infection in the last year and wants to make sure it is okay  2- she is here for her pain management.   She had a number of  surgeries on her hands.   She does have arthritis now.     She also has back pain.   She is having injection tomorrow.   She is nervous.   She has never had injections before.   Also her left shoulder hurts and she has surgery scheduled for that at the end of this month.   3- she has some spots on her skin and would like to get set up with derm for regular skin checks.   4- she needs some refills on her meds    HPI      Pt would like to have blood work and urine done.    Pain History:  Where in your body do you have pain? Back Pain  Onset/Duration: A while   Description:   Location of pain: low back left  Character of pain: sharp  Pain radiation: radiates into the left buttocks and radiates into the left leg  New numbness or weakness in legs, not attributed to pain: no   Intensity: Currently 7/10, At its worst 10/10  Progression of Symptoms: same  History:   Specific cause: fall , work-related   Pain interferes with job: no   History of back problems: no prior back problems  Any previous MRI or X-rays: Yes--at [facility].  Date Unsure of exact date   Sees a specialist for back pain: Yes, Dr. Gerber, contact made with the following plan: Cortizone shot   Alleviating factors:   Improved by: heat and muscle relaxants    Precipitating factors:  Worsened by: Bending  Therapies tried and outcome: heat and muscle relaxants     Accompanying Signs & Symptoms:  Risk of Fracture: Age >64  Risk of Cauda Equina: None  Risk of Infection: None  Risk of Cancer: None  Risk of Ankylosing Spondylitis: Onset at age <35, male, AND morning back stiffness no        PDMP Review       Value Time User    State [facility] site checked  Yes 5/2/2022 11:33 AM O&#39;Emma Merrill MD        Last [facility] website verification:  done on 5/2/2022   [email or web address]      Past Medical History:   Diagnosis Date     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     Polycythemia 2012    resolved now       Past Surgical History:   Procedure Laterality Date     CARPAL TUNNEL RELEASE RT/LT Right 2021    and trigger finger and tendon repair     CARPAL TUNNEL RELEASE RT/LT Left 2021    and tendon repair     COLONOSCOPY  ,     repeat in      Dilatation of the esophagus once due to dysphagia and stricture       Ganglion cyst removal       HEMORRHOIDECTOMY BANDING       HYSTERECTOMY       LAPAROSCOPIC SALPINGO-OOPHORECTOMY Bilateral 10/23/2015    Procedure: LAPAROSCOPIC SALPINGO-OOPHORECTOMY;  Surgeon: Johnathan Steve MD;  Location: [facility] OR     Thumb surgery Right      TONSILLECTOMY         MEDICATIONS:  Current Outpatient Medications   Medication     albuterol (PROAIR HFA/PROVENTIL HFA/VENTOLIN HFA) 108 (90 Base) MCG/ACT inhaler     albuterol (PROVENTIL) (2.5 MG/3ML) 0.083% neb solution     ascorbic acid (VITAMIN C) 1000 MG TABS     cholecalciferol (VITAMIN D3) 1000 UNIT tablet     cyclobenzaprine (FLEXERIL) 10 MG tablet     HYDROcodone-acetaminophen (NORCO) 5-325 MG tablet     Multiple Vitamins-Minerals (MULTIVITAMIN OR)     omeprazole (PRILOSEC) 20 MG DR capsule     traMADol (ULTRAM) 50 MG tablet     vitamin E 400 UNITS TABS     zolpidem (AMBIEN) 5 MG tablet     No current facility-administered medications for this visit.       SOCIAL HISTORY:  Social History     Tobacco Use     Smoking status: Former Smoker     Packs/day: 0.50     Years: 30.00     Pack years: 15.00     Types: Cigarettes     Start date: 10/13/1967     Quit date: 2019     Years since quittin.4     Smokeless tobacco: Never Used     Tobacco comment: quit 2019   Substance Use Topics     Alcohol use: No      "Comment: sober since 9/11/99       Family History   Problem Relation Age of Onset     Hypertension Mother      Cerebrovascular Disease Mother         TIA's     Depression Mother      Cancer - colorectal Maternal Grandmother         dx at 65     Lipids Father      C.A.D. Father         angioplasty at 65     Musculoskeletal Disorder Father      Alcohol/Drug Daughter         in remission     Breast Cancer No family hx of        Review of Systems   Constitutional, HEENT, cardiovascular, pulmonary, gi and gu systems are negative, except as otherwise noted.      Objective    Temp 97.9  F (36.6  C) (Oral)   Ht 1.511 m (4' 11.5\")   Wt 67.9 kg (149 lb 12.8 oz)   LMP  (LMP Unknown)   BMI 29.75 kg/m    Body mass index is 29.75 kg/m .  Physical Exam   GENERAL: healthy, alert and no distress  EYES: Eyes grossly normal to inspection, PERRL and conjunctivae and sclerae normal  HENT: ear canals and TM's normal, nose and mouth without ulcers or lesions  NECK: no adenopathy, no asymmetry, masses, or scars and thyroid normal to palpation  RESP: lungs clear to auscultation - no rales, rhonchi or wheezes  CV: regular rate and rhythm, normal S1 S2, no S3 or S4, no murmur, click or rub, no peripheral edema and peripheral pulses strong  ABDOMEN: soft, nontender, no hepatosplenomegaly, no masses and bowel sounds normal  MS: no gross musculoskeletal defects noted, no edema  SKIN: multiple solar lentigo on legs and arms and trunk.   Scattered seb keratosis as well and spider veins  NEURO: Normal strength and tone, mentation intact and speech normal  PSYCH: mentation appears normal, affect normal/bright  LYMPH: no cervical, supraclavicular, axillary, or inguinal adenopathy    Lab on 12/15/2021   Component Date Value Ref Range Status     SARS CoV2 PCR 12/15/2021 Negative  Negative, Testing sent to reference lab. Results will be returned via unsolicited result Final    NEGATIVE: SARS-CoV-2 (COVID-19) RNA not detected, presumed negative. "

## 2022-05-23 ENCOUNTER — ANCILLARY PROCEDURE (OUTPATIENT)
Dept: GENERAL RADIOLOGY | Facility: CLINIC | Age: 67
End: 2022-05-23
Attending: FAMILY MEDICINE
Payer: COMMERCIAL

## 2022-05-23 ENCOUNTER — OFFICE VISIT (OUTPATIENT)
Dept: FAMILY MEDICINE | Facility: CLINIC | Age: 67
End: 2022-05-23
Payer: MEDICARE

## 2022-05-23 ENCOUNTER — TELEPHONE (OUTPATIENT)
Dept: FAMILY MEDICINE | Facility: CLINIC | Age: 67
End: 2022-05-23

## 2022-05-23 VITALS
RESPIRATION RATE: 18 BRPM | HEART RATE: 71 BPM | TEMPERATURE: 98 F | SYSTOLIC BLOOD PRESSURE: 111 MMHG | WEIGHT: 147.4 LBS | BODY MASS INDEX: 28.94 KG/M2 | DIASTOLIC BLOOD PRESSURE: 77 MMHG | HEIGHT: 60 IN | OXYGEN SATURATION: 92 %

## 2022-05-23 DIAGNOSIS — G89.29 OTHER CHRONIC PAIN: ICD-10-CM

## 2022-05-23 DIAGNOSIS — Z01.818 PREOP GENERAL PHYSICAL EXAM: Primary | ICD-10-CM

## 2022-05-23 PROCEDURE — 99214 OFFICE O/P EST MOD 30 MIN: CPT | Performed by: FAMILY MEDICINE

## 2022-05-23 PROCEDURE — 71046 X-RAY EXAM CHEST 2 VIEWS: CPT | Mod: TC | Performed by: RADIOLOGY

## 2022-05-23 RX ORDER — HYDROCODONE BITARTRATE AND ACETAMINOPHEN 10; 325 MG/1; MG/1
1 TABLET ORAL 2 TIMES DAILY PRN
Qty: 15 TABLET | Refills: 0 | Status: SHIPPED | OUTPATIENT
Start: 2022-06-02 | End: 2022-05-23

## 2022-05-23 RX ORDER — TRAMADOL HYDROCHLORIDE 50 MG/1
50 TABLET ORAL 3 TIMES DAILY
Qty: 90 TABLET | Refills: 0 | Status: SHIPPED | OUTPATIENT
Start: 2022-06-05 | End: 2022-07-11

## 2022-05-23 RX ORDER — HYDROCODONE BITARTRATE AND ACETAMINOPHEN 10; 325 MG/1; MG/1
1 TABLET ORAL 2 TIMES DAILY PRN
Qty: 30 TABLET | Refills: 0 | Status: SHIPPED | OUTPATIENT
Start: 2022-06-02 | End: 2022-07-11

## 2022-05-23 NOTE — PROGRESS NOTES
[facility]  [id number] [place] [id number]  Phone: [phone]  Primary Provider: Emma Byrne  Pre-op Performing Provider: EMMA BYRNE      PREOPERATIVE EVALUATION:  Today's date: 5/23/2022    Emeli Granados is a 67 year old female who presents for a preoperative evaluation.    Surgical Information:  Surgery/Procedure: Left shoulder surgery  Surgery Location: [facility]  Surgeon: Dr. Cristóbal Verma  Surgery Date: 5/31/2022  Time of Surgery: 9:30am  Where patient plans to recover: At home with family  Fax number for surgical facility: .    Type of Anesthesia Anticipated: General    Assessment & Plan     The proposed surgical procedure is considered LOW risk.    Preop general physical exam  Fit for surgery   - XR Chest 2 Views    Other chronic pain  Stable  Refills placed for appropriate time  Postop may need something more for pain per orthopedist    - HYDROcodone-acetaminophen (NORCO)  MG per tablet  Dispense: 15 tablet; Refill: 0  - traMADol (ULTRAM) 50 MG tablet  Dispense: 90 tablet; Refill: 0      Possible Sleep Apnea: 1   STOP-Bang Total Score 5/23/2022   Total Score 1 - not tx for high blood pressure    Risk Stratification 0 - 2: Low Risk for HAILEY          Risks and Recommendations:  The patient has the following additional risks and recommendations for perioperative complications:   - No identified additional risk factors other than previously addressed    Medication Instructions:  will not take vitamins prior to surgery - other meds okay     RECOMMENDATION:  APPROVAL GIVEN to proceed with proposed procedure, without further diagnostic evaluation.        20 minutes spent on the date of the encounter doing chart review, review of test results, interpretation of tests, patient visit and documentation         Subjective     HPI related to upcoming procedure: Emeli Granados is a 67 year old woman who is having her left shoulder surgery.   She is  having rotator cuff repair.  Dr. Verma is doing the surgery on 5/31.   She needs a refill on her hydrocodone for her next refill.       Preop Questions 5/23/2022   1. Have you ever had a heart attack or stroke? No   2. Have you ever had surgery on your heart or blood vessels, such as a stent placement, a coronary artery bypass, or surgery on an artery in your head, neck, heart, or legs? No   3. Do you have chest pain with activity? No   4. Do you have a history of  heart failure? No   5. Do you currently have a cold, bronchitis or symptoms of other infection? No   6. Do you have a cough, shortness of breath, or wheezing? No   7. Do you or anyone in your family have previous history of blood clots? No   8. Do you or does anyone in your family have a serious bleeding problem such as prolonged bleeding following surgeries or cuts? No   9. Have you ever had problems with anemia or been told to take iron pills? No   10. Have you had any abnormal blood loss such as black, tarry or bloody stools, or abnormal vaginal bleeding? No   11. Have you ever had a blood transfusion? No   12. Are you willing to have a blood transfusion if it is medically needed before, during, or after your surgery? Yes   13. Have you or any of your relatives ever had problems with anesthesia? No   14. Do you have sleep apnea, excessive snoring or daytime drowsiness? UNKNOWN    15. Do you have any artifical heart valves or other implanted medical devices like a pacemaker, defibrillator, or continuous glucose monitor? No   16. Do you have artificial joints? No   17. Are you allergic to latex? No       Health Care Directive:  Patient does not have a Health Care Directive or Living Will: Advance Directive received and scanned. Click on Code in the patient header to view.    Preoperative Review of :   reviewed - controlled substances reflected in medication list.      Status of Chronic Conditions:  See problem list for active medical problems.   Problems all longstanding and stable, except as noted/documented.  See ROS for pertinent symptoms related to these conditions.      Review of Systems  Constitutional, neuro, ENT, endocrine, pulmonary, cardiac, gastrointestinal, genitourinary, musculoskeletal, integument and psychiatric systems are negative, except as otherwise noted.    Patient Active Problem List    Diagnosis Date Noted     Acute bilateral low back pain without sciatica 09/28/2021     Priority: Medium     Adenomyomatosis of gallbladder 06/22/2021     Priority: Medium     Diverticulosis of colon 06/22/2021     Priority: Medium     Bacteremia 06/10/2021     Priority: Medium     Other chronic pain 01/02/2018     Priority: Medium     Patient is followed by Emma Byrne MD for ongoing prescription of pain medication.  All refills should only be approved by this provider, or covering partner.    Medication(s): hydrocodone. 10/325 mg  Maximum quantity per month: 15  Clinic visit frequency required: Q 6 months     Tramadol 50 mg 90 per month    Controlled substance agreement:  CSA -- Encounter Level on 07/17/2017:    Controlled Substance Agreement - Scan on 7/31/2017  9:32 PM: CONTROLLED SUBSTANCE AGREEMENT     CSA -- Encounter Level on 01/07/2015:    Controlled Substance Agreement - Scan on 1/8/2015  9:46 AM: [facility] Controlled Medication Agreement 1-7-15     CSA -- Patient Level:    Controlled Substance Agreement - Opioid - Scan on 5/3/2021  6:23 PM  Controlled Substance Agreement - Opioid - Scan on 7/29/2019  3:09 PM       Pain Clinic evaluation in the past: No    DIRE Total Score(s):  No flowsheet data found.    Last [facility] website verification:05/2/2022 sr   [email or web address]         Moderate persistent asthma with exacerbation 12/26/2017     Priority: Medium     Back pain 05/19/2015     Priority: Medium     Osteopenia 05/18/2015     Priority: Medium     CARDIOVASCULAR SCREENING; LDL GOAL LESS THAN 130 10/31/2010     Priority: Medium      Disorder of bone and cartilage 01/14/2005     Priority: Medium     Problem list name updated by automated process. 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     Polycythemia 08/08/2012    resolved now     Past Surgical History:   Procedure Laterality Date     CARPAL TUNNEL RELEASE RT/LT Right 11/23/2021    and trigger finger and tendon repair     CARPAL TUNNEL RELEASE RT/LT Left 12/20/2021    and tendon repair     COLONOSCOPY  2004, 2012    repeat in 2022     Dilatation of the esophagus once due to dysphagia and stricture  2003     Ganglion cyst removal       HEMORRHOIDECTOMY BANDING       HYSTERECTOMY       LAPAROSCOPIC SALPINGO-OOPHORECTOMY Bilateral 10/23/2015    Procedure: LAPAROSCOPIC SALPINGO-OOPHORECTOMY;  Surgeon: Johnathan Steve MD;  Location: [facility] OR     Thumb surgery Right      TONSILLECTOMY       Current Outpatient Medications   Medication Sig Dispense Refill     albuterol (PROAIR HFA/PROVENTIL HFA/VENTOLIN HFA) 108 (90 Base) MCG/ACT inhaler INHALE 1 OR 2 puffs EVERY 6 HOURS as needed for wheezing. 8.5 g 1     albuterol (PROVENTIL) (2.5 MG/3ML) 0.083% neb solution INHALE 3 MILLILITERS (2.5 MG) BY NEBULIZATION ROUTE EVERY 6 HOURS AS NEEDED FOR SHORTNESS OF BREATH/DYSPNEA OR WHEEZING. 75 mL 0     ascorbic acid (VITAMIN C) 1000 MG TABS Take 1,000 mg by mouth daily       cholecalciferol (VITAMIN D3) 1000 UNIT tablet Take 1,000 Units by mouth daily       cyclobenzaprine (FLEXERIL) 10 MG tablet Take 1 tablet (10 mg) by mouth 2 times daily as needed for other (shoulder pain) . Do not take within 6 hours  of tramadol or zolpidem 30 tablet 3     HYDROcodone-acetaminophen (NORCO)  MG per tablet Take 1 tablet by mouth 2 times daily as needed for severe pain 15 tablet 0     HYDROcodone-acetaminophen (NORCO) 5-325 MG tablet Take 1 tablet by mouth every 6 hours as needed for pain or moderate to severe pain 15 tablet 0     Multiple Vitamins-Minerals (MULTIVITAMIN OR) Take 1 tablet by mouth daily Per pt, uses ones without Iron       omeprazole (PRILOSEC) 20 MG DR capsule Take 1 capsule (20 mg) by mouth daily 90 capsule 3     traMADol (ULTRAM) 50 MG tablet Take 1 tablet (50 mg) by mouth 3 times daily For chronic neck pain 90 tablet 0     vitamin E 400 UNITS TABS Take 400 Units by mouth daily       zolpidem (AMBIEN) 5 MG tablet Take 1 tablet (5 mg) by mouth nightly as needed for sleep Do not take with tramadol 30 tablet 5       Allergies   Allergen Reactions     Acetaminophen Other (See Comments)     Metaproterenol Sulfate Other (See Comments)     alupent inhaler-shaking     Percocet [Oxycodone-Acetaminophen] Itching        Social History     Tobacco Use     Smoking status: Former Smoker     Packs/day: 0.50     Years: 30.00     Pack years: 15.00     Types: Cigarettes     Start date: 10/13/1967     Quit date: 2019     Years since quittin.5     Smokeless tobacco: Never Used     Tobacco comment: quit 2019   Substance Use Topics     Alcohol use: No     Comment: sober since 99     Family History   Problem Relation Age of Onset     Hypertension Mother      Cerebrovascular Disease Mother         TIA's     Depression Mother      Cancer - colorectal Maternal Grandmother         dx at 65     Lipids Father      C.A.D. Father         angioplasty at 65     Musculoskeletal Disorder Father      Alcohol/Drug Daughter         in remission     Breast Cancer No family hx of      History   Drug Use No         Objective     /77 (BP Location: Right arm, Patient Position: Sitting, Cuff Size: Adult Regular)   Pulse 71    "Temp 98  F (36.7  C) (Oral)   Resp 18   Ht 1.511 m (4' 11.5\")   Wt 66.9 kg (147 lb 6.4 oz)   LMP  (LMP Unknown)   SpO2 92%   BMI 29.27 kg/m      Physical Exam    GENERAL APPEARANCE: healthy, alert and no distress     EYES: EOMI, PERRL     HENT: ear canals and TM's normal and nose and mouth without ulcers or lesions     NECK: no adenopathy, no asymmetry, masses, or scars and thyroid normal to palpation     RESP: lungs clear to auscultation - no rales, rhonchi or wheezes     CV: regular rates and rhythm, normal S1 S2, no S3 or S4 and no murmur, click or rub     ABDOMEN:  soft, nontender, no HSM or masses and bowel sounds normal     MS: extremities normal- no gross deformities noted, no evidence of inflammation in joints, FROM in all extremities.     SKIN: no suspicious lesions or rashes     NEURO: Normal strength and tone, sensory exam grossly normal, mentation intact and speech normal     PSYCH: mentation appears normal. and affect normal/bright     LYMPHATICS: No cervical adenopathy    Recent Labs   Lab Test 05/02/22  1204 11/15/21  1149 06/22/21  0959   HGB 14.3 13.9  --     227  --      --  141   POTASSIUM 4.1  --  3.8   CR 0.68  --  0.78        Diagnostics:  Recent Results (from the past 720 hour(s))   Comprehensive metabolic panel (BMP + Alb, Alk Phos, ALT, AST, Total. Bili, TP)    Collection Time: 05/02/22 12:04 PM   Result Value Ref Range    Sodium 140 133 - 144 mmol/L    Potassium 4.1 3.4 - 5.3 mmol/L    Chloride 110 (H) 94 - 109 mmol/L    Carbon Dioxide (CO2) 27 20 - 32 mmol/L    Anion Gap 3 3 - 14 mmol/L    Urea Nitrogen 21 7 - 30 mg/dL    Creatinine 0.68 0.52 - 1.04 mg/dL    Calcium 8.9 8.5 - 10.1 mg/dL    Glucose 97 70 - 99 mg/dL    Alkaline Phosphatase 100 40 - 150 U/L    AST 25 0 - 45 U/L    ALT 28 0 - 50 U/L    Protein Total 6.9 6.8 - 8.8 g/dL    Albumin 3.5 3.4 - 5.0 g/dL    Bilirubin Total 0.4 0.2 - 1.3 mg/dL    GFR Estimate >90 >60 mL/min/1.73m2   CBC with platelets    Collection " Time: 05/02/22 12:04 PM   Result Value Ref Range    WBC Count 6.1 4.0 - 11.0 10e3/uL    RBC Count 4.65 3.80 - 5.20 10e6/uL    Hemoglobin 14.3 11.7 - 15.7 g/dL    Hematocrit 43.3 35.0 - 47.0 %    MCV 93 78 - 100 fL    MCH 30.8 26.5 - 33.0 pg    MCHC 33.0 31.5 - 36.5 g/dL    RDW 13.6 10.0 - 15.0 %    Platelet Count 258 150 - 450 10e3/uL   Lipid panel reflex to direct LDL Non-fasting    Collection Time: 05/02/22 12:04 PM   Result Value Ref Range    Cholesterol 165 <200 mg/dL    Triglycerides 172 (H) <150 mg/dL    Direct Measure HDL 52 >=50 mg/dL    LDL Cholesterol Calculated 79 <=100 mg/dL    Non HDL Cholesterol 113 <130 mg/dL    Patient Fasting > 8hrs? No    UA Macro with Reflex to Micro and Culture - lab collect    Collection Time: 05/02/22 12:13 PM    Specimen: Urine, Midstream   Result Value Ref Range    Color Urine Yellow Colorless, Straw, Light Yellow, Yellow    Appearance Urine Clear Clear    Glucose Urine Negative Negative mg/dL    Bilirubin Urine Negative Negative    Ketones Urine Negative Negative mg/dL    Specific Gravity Urine 1.020 1.003 - 1.035    Blood Urine Negative Negative    pH Urine 6.0 5.0 - 7.0    Protein Albumin Urine Negative Negative mg/dL    Urobilinogen Urine 0.2 0.2, 1.0 E.U./dL    Nitrite Urine Negative Negative    Leukocyte Esterase Urine Negative Negative      No EKG required, no history of coronary heart disease, significant arrhythmia, peripheral arterial disease or other structural heart disease.   CXR done today     Revised Cardiac Risk Index (RCRI):  The patient has the following serious cardiovascular risks for perioperative complications:   - No serious cardiac risks = 0 points     RCRI Interpretation: 0 points: Class I (very low risk - 0.4% complication rate)           Signed Electronically by: Emma Byrne MD  Copy of this evaluation report is provided to requesting physician.

## 2022-05-23 NOTE — PATIENT INSTRUCTIONS
Preparing for Your Surgery  Getting started  A nurse will call you to review your health history and instructions. They will give you an arrival time based on your scheduled surgery time. Please be ready to share:    Your doctor's clinic name and phone number    Your medical, surgical and anesthesia history    A list of allergies and sensitivities    A list of medicines, including herbal treatments and over-the-counter drugs    Whether the patient has a legal guardian (ask how to send us the papers in advance)  Please tell us if you're pregnant--or if there's any chance you might be pregnant. Some surgeries may injure a fetus (unborn baby), so they require a pregnancy test. Surgeries that are safe for a fetus don't always need a test, and you can choose whether to have one.   If you have a child who's having surgery, please ask for a copy of Preparing for Your Child's Surgery.    Preparing for surgery    Within 30 days of surgery: Have a pre-op exam (sometimes called an H&P, or History and Physical). This can be done at a clinic or pre-operative center.  ? If you're having a , you may not need this exam. Talk to your care team.    At your pre-op exam, talk to your care team about all medicines you take. If you need to stop any medicines before surgery, ask when to start taking them again.  ? We do this for your safety. Many medicines can make you bleed too much during surgery. Some change how well surgery (anesthesia) drugs work.    Call your insurance company to let them know you're having surgery. (If you don't have insurance, call [phone].)    Call your clinic if there's any change in your health. This includes signs of a cold or flu (sore throat, runny nose, cough, rash, fever). It also includes a scrape or scratch near the surgery site.    If you have questions on the day of surgery, call your hospital or surgery center.  COVID testing  You may need to be tested for COVID-19 before having  surgery. If so, we will give you instructions.  Eating and drinking guidelines  For your safety: Unless your surgeon tells you otherwise, follow the guidelines below.    Eat and drink as usual until 8 hours before surgery. After that, no food or milk.    Drink clear liquids until 2 hours before surgery. These are liquids you can see through, like water, Gatorade and Propel Water. You may also have black coffee and tea (no cream or milk).    Nothing by mouth within 2 hours of surgery. This includes gum, candy and breath mints.    If you drink alcohol: Stop drinking it the night before surgery.    If your care team tells you to take medicine on the morning of surgery, it's okay to take it with a sip of water.  Preventing infection    Shower or bathe the night before and morning of your surgery. Follow the instructions your clinic gave you. (If no instructions, use regular soap.)    Don't shave or clip hair near your surgery site. We'll remove the hair if needed.    Don't smoke or vape the morning of surgery. You may chew nicotine gum up to 2 hours before surgery. A nicotine patch is okay.  ? Note: Some surgeries require you to completely quit smoking and nicotine. Check with your surgeon.    Your care team will make every effort to keep you safe from infection. We will:  ? Clean our hands often with soap and water (or an alcohol-based hand rub).  ? Clean the skin at your surgery site with a special soap that kills germs.  ? Give you a special gown to keep you warm. (Cold raises the risk of infection.)  ? Wear special hair covers, masks, gowns and gloves during surgery.  ? Give antibiotic medicine, if prescribed. Not all surgeries need antibiotics.  What to bring on the day of surgery    Photo ID and insurance card    Copy of your health care directive, if you have one    Glasses and hearing aides (bring cases)  ? You can't wear contacts during surgery    Inhaler and eye drops, if you use them (tell us about these when  you arrive)    CPAP machine or breathing device, if you use them    A few personal items, if spending the night    If you have . . .  ? A pacemaker, ICD (cardiac defibrillator) or other implant: Bring the ID card.  ? An implanted stimulator: Bring the remote control.  ? A legal guardian: Bring a copy of the certified (court-stamped) guardianship papers.  Please remove any jewelry, including body piercings. Leave jewelry and other valuables at home.  If you're going home the day of surgery    You must have a responsible adult drive you home. They should stay with you overnight as well.    If you don't have someone to stay with you, and you aren't safe to go home alone, we may keep you overnight. Insurance often won't pay for this.  After surgery  If it's hard to control your pain or you need more pain medicine, please call your surgeon's office.  Questions?   If you have any questions for your care team, list them here: _________________________________________________________________________________________________________________________________________________________________________ ____________________________________ ____________________________________ ____________________________________  For informational purposes only. Not to replace the advice of your health care provider. Copyright   2003, 2019 [facility]. All rights reserved. Clinically reviewed by Maranda Ricketts MD. [employer] [id number] - REV 07/21.

## 2022-05-23 NOTE — TELEPHONE ENCOUNTER
Patient called in and stated that she is in need of a more   HYDROcodone-acetaminophen (NORCO)  MG per tablet  She stated that she takes 10 a week and does not take traMADol (ULTRAM) 50 MG tablet when she takes the Norco . Please advise if she can get more of the HYDROcodone-acetaminophen (NORCO)  MG  Please advise if this is appropriate .    Eunice Leiva

## 2022-06-01 ENCOUNTER — TELEPHONE (OUTPATIENT)
Dept: FAMILY MEDICINE | Facility: CLINIC | Age: 67
End: 2022-06-01
Payer: COMMERCIAL

## 2022-06-01 NOTE — TELEPHONE ENCOUNTER
Patient calling and states she had surgery yesterday and Dr. Verma said her bones are mush and she needs to be put on a osteoporosis med right away.  Discussed visit would be needed.  Advised could start with E-Visit and see if Dr. Byrne feels that is sufficient.  States her mychart is not working.  Advised video visit.  Transferred to appts to schedule.  Elisabeth Freeman RN

## 2022-06-07 ENCOUNTER — VIRTUAL VISIT (OUTPATIENT)
Dept: FAMILY MEDICINE | Facility: CLINIC | Age: 67
End: 2022-06-07
Payer: COMMERCIAL

## 2022-06-07 DIAGNOSIS — M85.819 OSTEOPENIA OF SHOULDER, UNSPECIFIED LATERALITY: Primary | ICD-10-CM

## 2022-06-07 PROCEDURE — 99213 OFFICE O/P EST LOW 20 MIN: CPT | Mod: GT | Performed by: FAMILY MEDICINE

## 2022-06-07 RX ORDER — OXYCODONE HYDROCHLORIDE 5 MG/1
TABLET ORAL
COMMUNITY
Start: 2022-05-31 | End: 2022-07-11

## 2022-06-07 NOTE — PROGRESS NOTES
Emeli is a 67 year old who is being evaluated via a billable video visit.      How would you like to obtain your AVS? MyChart  If the video visit is dropped, the invitation should be resent by: Text to cell phone: [phone]  Will anyone else be joining your video visit? No    Video Start Time: 10:48 AM    Assessment & Plan     Osteopenia of shoulder, unspecified laterality  Need to recheck dexa  Will likely use prolia every 6 month injection    - DX Hip/Pelvis/Spine        25 minutes spent on the date of the encounter doing chart review, review of test results, patient visit and documentation        See Patient Instructions    Return in about 6 months (around 12/7/2022) for Medication recheck, blood pressure check.    Emma Byrne MD  [facility]   Emeli is a 67 year old who presents for the following health issues - she had shoulder surgery a few weeks ago and her doctor/surgeon told her that her bone was very soft and brittle.    She had 6 years ago fosamax rx in the past [id number].   Her last dexa in 2018 showed ostepenia.   She had normal calcium level very recently and takes supplements.   Her last vitamin D level was 4 years ago and was normal.       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     Polycythemia 08/08/2012    resolved now       Past Surgical History:   Procedure Laterality Date     CARPAL TUNNEL RELEASE RT/LT Right 11/23/2021    and trigger finger and tendon  repair     CARPAL TUNNEL RELEASE RT/LT Left 2021    and tendon repair     COLONOSCOPY  ,     repeat in      Dilatation of the esophagus once due to dysphagia and stricture       Ganglion cyst removal       HEMORRHOIDECTOMY BANDING       HYSTERECTOMY       LAPAROSCOPIC SALPINGO-OOPHORECTOMY Bilateral 10/23/2015    Procedure: LAPAROSCOPIC SALPINGO-OOPHORECTOMY;  Surgeon: Johnathan Steve MD;  Location: [facility] OR     subscapularous repair and biceps tendon repair  2022    Dr. Verma at [facility]     Thumb surgery Right      TONSILLECTOMY         MEDICATIONS:  Current Outpatient Medications   Medication     albuterol (PROAIR HFA/PROVENTIL HFA/VENTOLIN HFA) 108 (90 Base) MCG/ACT inhaler     albuterol (PROVENTIL) (2.5 MG/3ML) 0.083% neb solution     ascorbic acid (VITAMIN C) 1000 MG TABS     cholecalciferol (VITAMIN D3) 1000 UNIT tablet     cyclobenzaprine (FLEXERIL) 10 MG tablet     HYDROcodone-acetaminophen (NORCO)  MG per tablet     Multiple Vitamins-Minerals (MULTIVITAMIN OR)     omeprazole (PRILOSEC) 20 MG DR capsule     oxyCODONE (ROXICODONE) 5 MG tablet     traMADol (ULTRAM) 50 MG tablet     vitamin E 400 UNITS TABS     zolpidem (AMBIEN) 5 MG tablet     No current facility-administered medications for this visit.       SOCIAL HISTORY:  Social History     Tobacco Use     Smoking status: Former Smoker     Packs/day: 0.50     Years: 30.00     Pack years: 15.00     Types: Cigarettes     Start date: 10/13/1967     Quit date: 2019     Years since quittin.5     Smokeless tobacco: Never Used     Tobacco comment: quit 2019   Substance Use Topics     Alcohol use: No     Comment: sober since 99       Family History   Problem Relation Age of Onset     Hypertension Mother      Cerebrovascular Disease Mother         TIA's     Depression Mother      Cancer - colorectal Maternal Grandmother         dx at 65     Lipids Father      C.A.D. Father         angioplasty at 65     Musculoskeletal  Disorder Father      Alcohol/Drug Daughter         in remission     Breast Cancer No family hx of            Review of Systems   Constitutional, HEENT, cardiovascular, pulmonary, gi and gu systems are negative, except as otherwise noted.      Objective           Vitals:  No vitals were obtained today due to virtual visit.    Physical Exam   GENERAL: Healthy, alert and no distress  EYES: Eyes grossly normal to inspection.  No discharge or erythema, or obvious scleral/conjunctival abnormalities.  RESP: No audible wheeze, cough, or visible cyanosis.  No visible retractions or increased work of breathing.    SKIN: Visible skin clear. No significant rash, abnormal pigmentation or lesions.  NEURO: Cranial nerves grossly intact.  Mentation and speech appropriate for age.  PSYCH: Mentation appears normal, affect normal/bright, judgement and insight intact, normal speech and appearance well-groomed.    Office Visit on 05/02/2022   Component Date Value Ref Range Status     Sodium 05/02/2022 140  133 - 144 mmol/L Final     Potassium 05/02/2022 4.1  3.4 - 5.3 mmol/L Final     Chloride 05/02/2022 110 (A) 94 - 109 mmol/L Final     Carbon Dioxide (CO2) 05/02/2022 27  20 - 32 mmol/L Final     Anion Gap 05/02/2022 3  3 - 14 mmol/L Final     Urea Nitrogen 05/02/2022 21  7 - 30 mg/dL Final     Creatinine 05/02/2022 0.68  0.52 - 1.04 mg/dL Final     Calcium 05/02/2022 8.9  8.5 - 10.1 mg/dL Final     Glucose 05/02/2022 97  70 - 99 mg/dL Final     Alkaline Phosphatase 05/02/2022 100  40 - 150 U/L Final     AST 05/02/2022 25  0 - 45 U/L Final     ALT 05/02/2022 28  0 - 50 U/L Final     Protein Total 05/02/2022 6.9  6.8 - 8.8 g/dL Final     Albumin 05/02/2022 3.5  3.4 - 5.0 g/dL Final     Bilirubin Total 05/02/2022 0.4  0.2 - 1.3 mg/dL Final     GFR Estimate 05/02/2022 >90  >60 mL/min/1.73m2 Final    Effective December 21, 2021 eGFRcr in adults is calculated using the 2021 CKD-EPI creatinine equation which includes age and gender ( et  al., [facility], DOI: 10.1056/GTWYen4936945)     WBC Count 05/02/2022 6.1  4.0 - 11.0 10e3/uL Final     RBC Count 05/02/2022 4.65  3.80 - 5.20 10e6/uL Final     Hemoglobin 05/02/2022 14.3  11.7 - 15.7 g/dL Final     Hematocrit 05/02/2022 43.3  35.0 - 47.0 % Final     MCV 05/02/2022 93  78 - 100 fL Final     MCH 05/02/2022 30.8  26.5 - 33.0 pg Final     MCHC 05/02/2022 33.0  31.5 - 36.5 g/dL Final     RDW 05/02/2022 13.6  10.0 - 15.0 % Final     Platelet Count 05/02/2022 258  150 - 450 10e3/uL Final     Cholesterol 05/02/2022 165  <200 mg/dL Final     Triglycerides 05/02/2022 172 (A) <150 mg/dL Final     Direct Measure HDL 05/02/2022 52  >=50 mg/dL Final     LDL Cholesterol Calculated 05/02/2022 79  <=100 mg/dL Final     Non HDL Cholesterol 05/02/2022 113  <130 mg/dL Final     Patient Fasting > 8hrs? 05/02/2022 No   Final     Color Urine 05/02/2022 Yellow  Colorless, Straw, Light Yellow, Yellow Final     Appearance Urine 05/02/2022 Clear  Clear Final     Glucose Urine 05/02/2022 Negative  Negative mg/dL Final     Bilirubin Urine 05/02/2022 Negative  Negative Final     Ketones Urine 05/02/2022 Negative  Negative mg/dL Final     Specific Gravity Urine 05/02/2022 1.020  1.003 - 1.035 Final     Blood Urine 05/02/2022 Negative  Negative Final     pH Urine 05/02/2022 6.0  5.0 - 7.0 Final     Protein Albumin Urine 05/02/2022 Negative  Negative mg/dL Final     Urobilinogen Urine 05/02/2022 0.2  0.2, 1.0 E.U./dL Final     Nitrite Urine 05/02/2022 Negative  Negative Final     Leukocyte Esterase Urine 05/02/2022 Negative  Negative Final               Video-Visit Details    Type of service:  Video Visit    Video End Time:11:17 AM    Originating Location (pt. Location): Home    Distant Location (provider location):  [facility]     Platform used for Video Visit: Jenifer

## 2022-06-07 NOTE — Clinical Note
Just FYI on her.    She is very likely going to be going on prolia and I told her my nurse would call her once we got her dexa results to try and get her on prolia.   Stand by

## 2022-06-16 ENCOUNTER — ANCILLARY PROCEDURE (OUTPATIENT)
Dept: BONE DENSITY | Facility: CLINIC | Age: 67
End: 2022-06-16
Attending: FAMILY MEDICINE
Payer: OTHER MISCELLANEOUS

## 2022-06-16 DIAGNOSIS — M85.819 OSTEOPENIA OF SHOULDER, UNSPECIFIED LATERALITY: ICD-10-CM

## 2022-06-16 PROCEDURE — 77080 DXA BONE DENSITY AXIAL: CPT | Performed by: INTERNAL MEDICINE

## 2022-06-17 ENCOUNTER — TELEPHONE (OUTPATIENT)
Dept: FAMILY MEDICINE | Facility: CLINIC | Age: 67
End: 2022-06-17
Payer: COMMERCIAL

## 2022-06-17 NOTE — TELEPHONE ENCOUNTER
Patient calling and states she had dexa yesterday and is to get a Prolia shot.  Discussed Dr. Byrne has not reviewed dexa result yet and has not advised Prolia at this time.  Discussed [facility] and needing order.  FYI to provider.  Elisabeth Freeman RN

## 2022-06-20 NOTE — TELEPHONE ENCOUNTER
Dr. Garcia     How much Calcium and Vitamin D would you like patient to take?     Currently taking 600 mg calcium and Vitamin D 1000 international unit(s) daily     Felisa Brown, Registered Nurse, PAL (Patient Advocate Liason)   [facility]   [phone]

## 2022-06-20 NOTE — TELEPHONE ENCOUNTER
The good news it is looks like she can go without the prolia for the next 2-3 years and we will get another scan at that time and re evaluate

## 2022-06-20 NOTE — TELEPHONE ENCOUNTER
Total calcium intake should be 1500 mg per day in diet and supplements and vitamin D is good amount

## 2022-06-20 NOTE — TELEPHONE ENCOUNTER
Patient informed.   Klever Redmond RN - Patient Advocate and Liaison (PAL)  [facility]   [phone]

## 2022-06-22 ENCOUNTER — NURSE TRIAGE (OUTPATIENT)
Dept: NURSING | Facility: CLINIC | Age: 67
End: 2022-06-22

## 2022-06-22 NOTE — TELEPHONE ENCOUNTER
"Patient asking about prescription for Hydrocodone she requested yesterday before she goes out of town.  \"I just had surgery three weeks ago.\"    Patient says she is going out of town 6-23-22 and coming back in eight days on July 1st..    This medication has already been pended.  Originally prescribed on 6-2-22 Disp: 30 Refill: 0    Will route to provider    Yuliet Pruitt RN  [facility]      Reason for Disposition    Caller requesting a CONTROLLED substance prescription refill (e.g., narcotics, ADHD medicines)    Protocols used: MEDICATION QUESTION CALL-A-AH      "

## 2022-06-23 ENCOUNTER — ALLIED HEALTH/NURSE VISIT (OUTPATIENT)
Dept: FAMILY MEDICINE | Facility: CLINIC | Age: 67
End: 2022-06-23
Payer: COMMERCIAL

## 2022-06-23 DIAGNOSIS — G89.29 OTHER CHRONIC PAIN: Primary | ICD-10-CM

## 2022-06-23 PROCEDURE — 99207 PR NO CHARGE NURSE ONLY: CPT

## 2022-06-23 NOTE — PROGRESS NOTES
Pt walk in to discuss refill request of Norco. Pt had requested refill of Norco on 6/21/22 and pt is heading out of town on vacation today 6/23/22. Sent message to Emma Byrne MD who informs pt isn't due for refill until 7/3/22 and if pt is having more pain and needing more meds this month due to recent surgery, than pt should contact prescribing physician.    Patient was given an opportunity to ask questions, verbalized understanding of plan, and is agreeable.    Randi Romero RN

## 2022-07-11 ENCOUNTER — OFFICE VISIT (OUTPATIENT)
Dept: FAMILY MEDICINE | Facility: CLINIC | Age: 67
End: 2022-07-11
Payer: MEDICARE

## 2022-07-11 VITALS
TEMPERATURE: 98.7 F | WEIGHT: 149.6 LBS | OXYGEN SATURATION: 95 % | BODY MASS INDEX: 29.71 KG/M2 | DIASTOLIC BLOOD PRESSURE: 64 MMHG | RESPIRATION RATE: 16 BRPM | SYSTOLIC BLOOD PRESSURE: 125 MMHG | HEART RATE: 86 BPM

## 2022-07-11 DIAGNOSIS — M25.519 ACUTE SHOULDER PAIN, UNSPECIFIED LATERALITY: ICD-10-CM

## 2022-07-11 DIAGNOSIS — Z79.899 ENCOUNTER FOR LONG-TERM (CURRENT) USE OF MEDICATIONS: Primary | ICD-10-CM

## 2022-07-11 DIAGNOSIS — N39.0 URINARY TRACT INFECTION WITHOUT HEMATURIA, SITE UNSPECIFIED: ICD-10-CM

## 2022-07-11 DIAGNOSIS — M85.859 OSTEOPENIA OF HIP, UNSPECIFIED LATERALITY: ICD-10-CM

## 2022-07-11 DIAGNOSIS — G89.29 OTHER CHRONIC PAIN: ICD-10-CM

## 2022-07-11 LAB — CREAT UR-MCNC: 50 MG/DL

## 2022-07-11 PROCEDURE — 99214 OFFICE O/P EST MOD 30 MIN: CPT | Performed by: FAMILY MEDICINE

## 2022-07-11 PROCEDURE — 80307 DRUG TEST PRSMV CHEM ANLYZR: CPT | Performed by: FAMILY MEDICINE

## 2022-07-11 RX ORDER — CYCLOBENZAPRINE HCL 10 MG
10 TABLET ORAL PRN
Qty: 30 TABLET | Refills: 3 | COMMUNITY
Start: 2022-07-11 | End: 2022-07-11

## 2022-07-11 RX ORDER — TRAMADOL HYDROCHLORIDE 50 MG/1
50 TABLET ORAL 3 TIMES DAILY
Qty: 90 TABLET | Refills: 0 | Status: SHIPPED | OUTPATIENT
Start: 2022-07-11 | End: 2022-08-16

## 2022-07-11 RX ORDER — CYCLOBENZAPRINE HCL 10 MG
10 TABLET ORAL PRN
Qty: 30 TABLET | Refills: 3 | Status: SHIPPED | OUTPATIENT
Start: 2022-07-11 | End: 2022-09-26

## 2022-07-11 RX ORDER — HYDROCODONE BITARTRATE AND ACETAMINOPHEN 10; 325 MG/1; MG/1
1 TABLET ORAL 2 TIMES DAILY PRN
Qty: 30 TABLET | Refills: 0 | Status: SHIPPED | OUTPATIENT
Start: 2022-07-11 | End: 2022-08-16

## 2022-07-11 ASSESSMENT — ASTHMA QUESTIONNAIRES: ACT_TOTALSCORE: 23

## 2022-07-11 NOTE — PATIENT INSTRUCTIONS
You may schedule your mammogram at [facility] by calling [phone] and asking to schedule your mammogram or you may schedule with [facility] in [place] by calling [phone].

## 2022-07-11 NOTE — PROGRESS NOTES
ua    Assessment & Plan     Encounter for long-term (current) use of medications      Acute shoulder pain, unspecified laterality  Will start PT soon - surgery was about 2 months ago  - cyclobenzaprine (FLEXERIL) 10 MG tablet  Dispense: 30 tablet; Refill: 3    Other chronic pain  Stable  [facility]     - [id number] - Urine Drug Confirmation Panel (Comprehensive)  - HYDROcodone-acetaminophen (NORCO)  MG per tablet  Dispense: 30 tablet; Refill: 0  - traMADol (ULTRAM) 50 MG tablet  Dispense: 90 tablet; Refill: 0    Osteopenia of hip, unspecified laterality  Already had 5 years fosamax  Could consider prolia but will reassess in 2 years    Urinary tract infection without hematuria, site unspecified  Had in hospital    - UA Macro with Reflex to Micro and Culture - lab collect        35 minutes spent on the date of the encounter doing chart review, review of outside records, review of test results, patient visit and documentation        See Patient Instructions    Return in about 6 months (around 1/11/2023) for Physical Exam.    Emma Byrne MD  [facility]    Sofiya Sainz is a 67 year old, presenting for the following health issues:  She continues to have left shoulder pain.   She had surgery 2 months ago.   Se got an rx for oxycodone for that but now needs refills of her pill for chronic pain.    She gets 90 tramadol and 30 hydrocodone every 30 days or sometimes they last longer.   She has been out for about a week.   She is doing at home exercises for her left shoulder but has not started PT yet.   She had dxa after surgeon noted bones were mushy during operation on her shoulder.    dexa showed osteopenia.   She has had 5 years of fosamax already.       Her back is also bothering her since the injury last fall.   She would like a refill  Of the muscle relaxer she uses at night.   She had one injection and that helped back in April or May and wonders if another would be  out of the question.   She has an appt with that doctor coming up.   Work Comp      HPI     Pain History:  When did you first notice your pain? - Post-Acute Pain   Have you seen any provider previously for this issue? Yes -   How has your pain affected your ability to work? 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 Musculoskeletal problem/pain  Onset/Duration: 10 months  Description  Location: Shoulder- left  Joint Swelling: No  Redness: No  Pain: YES- 6/10  Warmth: No  Intensity:  moderate, severe  Progression of Symptoms:  same  Accompanying signs and symptoms:   Fevers: No  Numbness/tingling/weakness: No  History  Trauma to the area: YES  Recent illness:  No  Previous similar problem: YES  Previous evaluation:  YES  Precipitating or alleviating factors:  Aggravating factors include: none  Therapies tried and outcome: ice, Ibuprofen and tylenol, nothing helps.      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     Polycythemia 08/08/2012    resolved now       Past Surgical History:   Procedure Laterality Date     CARPAL TUNNEL RELEASE RT/LT Right 11/23/2021    and trigger finger and tendon repair     CARPAL TUNNEL RELEASE RT/LT Left 12/20/2021    and tendon repair     COLONOSCOPY  2004, 2012    repeat in 2022     Dilatation of the esophagus once due to dysphagia and stricture  2003     Ganglion cyst removal       HEMORRHOIDECTOMY BANDING       HYSTERECTOMY       LAPAROSCOPIC SALPINGO-OOPHORECTOMY Bilateral 10/23/2015    Procedure: LAPAROSCOPIC  SALPINGO-OOPHORECTOMY;  Surgeon: Johnathan Steve MD;  Location: [facility] OR     subscapularous repair and biceps tendon repair  2022    Dr. Verma at [facility]     Thumb surgery Right      TONSILLECTOMY         MEDICATIONS:  Current Outpatient Medications   Medication     cyclobenzaprine (FLEXERIL) 10 MG tablet     HYDROcodone-acetaminophen (NORCO)  MG per tablet     traMADol (ULTRAM) 50 MG tablet     albuterol (PROAIR HFA/PROVENTIL HFA/VENTOLIN HFA) 108 (90 Base) MCG/ACT inhaler     albuterol (PROVENTIL) (2.5 MG/3ML) 0.083% neb solution     ascorbic acid (VITAMIN C) 1000 MG TABS     cholecalciferol (VITAMIN D3) 1000 UNIT tablet     Multiple Vitamins-Minerals (MULTIVITAMIN OR)     omeprazole (PRILOSEC) 20 MG DR capsule     vitamin E 400 UNITS TABS     zolpidem (AMBIEN) 5 MG tablet     No current facility-administered medications for this visit.       SOCIAL HISTORY:  Social History     Tobacco Use     Smoking status: Former Smoker     Packs/day: 0.50     Years: 30.00     Pack years: 15.00     Types: Cigarettes     Start date: 10/13/1967     Quit date: 2019     Years since quittin.6     Smokeless tobacco: Never Used     Tobacco comment: quit 2019   Substance Use Topics     Alcohol use: No     Comment: sober since 99       Family History   Problem Relation Age of Onset     Hypertension Mother      Cerebrovascular Disease Mother         TIA's     Depression Mother      Cancer - colorectal Maternal Grandmother         dx at 65     Lipids Father      C.A.D. Father         angioplasty at 65     Musculoskeletal Disorder Father      Alcohol/Drug Daughter         in remission     Breast Cancer No family hx of            Review of Systems   Constitutional, HEENT, cardiovascular, pulmonary, gi and gu systems are negative, except as otherwise noted.      Objective    /64 (BP Location: Right arm, Patient Position: Chair, Cuff Size: Adult Regular)   Pulse 86   Temp 98.7  F (37.1  C) (Oral)   Resp 16   Wt  67.9 kg (149 lb 9.6 oz)   LMP  (LMP Unknown)   SpO2 95%   BMI 29.71 kg/m    Body mass index is 29.71 kg/m .  Physical Exam   GENERAL: healthy, alert, no distress, elderly and left shoulder in sling  EYES: Eyes grossly normal to inspection, PERRL and conjunctivae and sclerae normal  NECK: no adenopathy, no asymmetry, masses, or scars and thyroid normal to palpation  RESP: lungs clear to auscultation - no rales, rhonchi or wheezes  CV: regular rate and rhythm, normal S1 S2, no S3 or S4, no murmur, click or rub, no peripheral edema and peripheral pulses strong  MS: no gross musculoskeletal defects noted, no edema  SKIN: no suspicious lesions or rashes  NEURO: Normal strength and tone, mentation intact and speech normal  PSYCH: mentation appears normal, affect normal/bright  LYMPH: no cervical, supraclavicular, axillary, or inguinal adenopathy    Office Visit on 05/02/2022   Component Date Value Ref Range Status     Sodium 05/02/2022 140  133 - 144 mmol/L Final     Potassium 05/02/2022 4.1  3.4 - 5.3 mmol/L Final     Chloride 05/02/2022 110 (A) 94 - 109 mmol/L Final     Carbon Dioxide (CO2) 05/02/2022 27  20 - 32 mmol/L Final     Anion Gap 05/02/2022 3  3 - 14 mmol/L Final     Urea Nitrogen 05/02/2022 21  7 - 30 mg/dL Final     Creatinine 05/02/2022 0.68  0.52 - 1.04 mg/dL Final     Calcium 05/02/2022 8.9  8.5 - 10.1 mg/dL Final     Glucose 05/02/2022 97  70 - 99 mg/dL Final     Alkaline Phosphatase 05/02/2022 100  40 - 150 U/L Final     AST 05/02/2022 25  0 - 45 U/L Final     ALT 05/02/2022 28  0 - 50 U/L Final     Protein Total 05/02/2022 6.9  6.8 - 8.8 g/dL Final     Albumin 05/02/2022 3.5  3.4 - 5.0 g/dL Final     Bilirubin Total 05/02/2022 0.4  0.2 - 1.3 mg/dL Final     GFR Estimate 05/02/2022 >90  >60 mL/min/1.73m2 Final    Effective December 21, 2021 eGFRcr in adults is calculated using the 2021 CKD-EPI creatinine equation which includes age and gender (Giovanny et al., [facility]JM, DOI: 10.1056/IOHNre4560119)     WBC  Count 05/02/2022 6.1  4.0 - 11.0 10e3/uL Final     RBC Count 05/02/2022 4.65  3.80 - 5.20 10e6/uL Final     Hemoglobin 05/02/2022 14.3  11.7 - 15.7 g/dL Final     Hematocrit 05/02/2022 43.3  35.0 - 47.0 % Final     MCV 05/02/2022 93  78 - 100 fL Final     MCH 05/02/2022 30.8  26.5 - 33.0 pg Final     MCHC 05/02/2022 33.0  31.5 - 36.5 g/dL Final     RDW 05/02/2022 13.6  10.0 - 15.0 % Final     Platelet Count 05/02/2022 258  150 - 450 10e3/uL Final     Cholesterol 05/02/2022 165  <200 mg/dL Final     Triglycerides 05/02/2022 172 (A) <150 mg/dL Final     Direct Measure HDL 05/02/2022 52  >=50 mg/dL Final     LDL Cholesterol Calculated 05/02/2022 79  <=100 mg/dL Final     Non HDL Cholesterol 05/02/2022 113  <130 mg/dL Final     Patient Fasting > 8hrs? 05/02/2022 No   Final     Color Urine 05/02/2022 Yellow  Colorless, Straw, Light Yellow, Yellow Final     Appearance Urine 05/02/2022 Clear  Clear Final     Glucose Urine 05/02/2022 Negative  Negative mg/dL Final     Bilirubin Urine 05/02/2022 Negative  Negative Final     Ketones Urine 05/02/2022 Negative  Negative mg/dL Final     Specific Gravity Urine 05/02/2022 1.020  1.003 - 1.035 Final     Blood Urine 05/02/2022 Negative  Negative Final     pH Urine 05/02/2022 6.0  5.0 - 7.0 Final     Protein Albumin Urine 05/02/2022 Negative  Negative mg/dL Final     Urobilinogen Urine 05/02/2022 0.2  0.2, 1.0 E.U./dL Final     Nitrite Urine 05/02/2022 Negative  Negative Final     Leukocyte Esterase Urine 05/02/2022 Negative  Negative Final                   .  ..

## 2022-07-13 LAB
N-NORTRAMADOL/CREAT UR CFM: 5760 NG/MG {CREAT}
O-NORTRAMADOL UR CFM-MCNC: 2880 NG/ML
TRAMADOL CTO UR CFM-MCNC: 188 NG/ML
TRAMADOL/CREAT UR: 376 NG/MG {CREAT}

## 2022-07-22 ENCOUNTER — OFFICE VISIT (OUTPATIENT)
Dept: PODIATRY | Facility: CLINIC | Age: 67
End: 2022-07-22
Payer: MEDICARE

## 2022-07-22 VITALS — WEIGHT: 149 LBS | BODY MASS INDEX: 29.59 KG/M2 | SYSTOLIC BLOOD PRESSURE: 122 MMHG | DIASTOLIC BLOOD PRESSURE: 70 MMHG

## 2022-07-22 DIAGNOSIS — L60.8 CHANGE IN NAIL APPEARANCE: Primary | ICD-10-CM

## 2022-07-22 DIAGNOSIS — L84 CALLUS OF FOOT: ICD-10-CM

## 2022-07-22 PROCEDURE — 99203 OFFICE O/P NEW LOW 30 MIN: CPT | Performed by: PODIATRIST

## 2022-07-22 NOTE — PATIENT INSTRUCTIONS
Thank you for choosing [facility] Podiatry / Foot & Ankle Surgery!    DR. KENNY'S CLINIC LOCATIONS:     [facility] TRIAGE LINE: [phone]   [address] [place] [address] APPOINTMENTS: [phone]   [place] [address] RADIOLOGY: [phone]    SET UP SURGERY: [phone]    BILLING QUESTIONS: [phone]   [facility] [place] [facility] FAX: [phone] [id number]    [place] [address]      Follow up: 6 weeks    Next steps:       CALLUS / CORNS / IPKs  When there is excessive friction or pressure on the skin, the body responds by making the skin thicker to protect the deeper structures from becoming exposed. While this works well to protect the deeper structures, the thickened skin can increase pressure and pain.    CALLUS: Flat, diffuse thickening are simple calluses and they are usually caused by friction. Often these are the result of rubbing on a shoe or going barefoot.    CORNS: Calluses with a central core between the toes are called corns. These result from prominent joints on adjacent toes rubbing together. Theses are a symptom of bone malalignment and will always recur unless the underlying bones are addressed surgically.    IPKs: Calluses with a central core on the ball of the foot are usually IPKs (intractable plantar keratosis). These are caused by excessive pressure from the metatarsals, the bones that make up the ball of the foot. Often one of these bones is too long or too prominent.  Again, these will always recur unless the underlying bone issue is addressed. There is no cure for these. They will either go away by themselves, recur, or more could develop.    ROUTINE MAINTENANCE  1. File them down with a pumice stone or callus file a couple times a week.   2. An electric callus removing device. Amope Pedi Perfect Electronic Pedicure Foot File and Callus Remover can be a good option.   3. Lotion can be applied to soften the callus. A urea based cream such as Kersal or Vanicream or  thicker cream with shea butter are good options.  4. Toe spacers or toe covers can be used for corns, gel pads can be used for other lesions on the bottom of the foot.   If there is a surgical pathology noted, such as a prominent bone, often this needs to be addressed surgically to minimize recurrence. However, sometimes the lesion simply migrates to another spot after surgery, so it is not a guaranteed cure.     **If you come back to clinic for treatment, insurance does not cover it, and you would be billed. This charge could range from $100 - $227**

## 2022-07-22 NOTE — LETTER
7/22/2022         RE: Emeli Garnados  [address]  [facility] [address]        Dear Colleague,    Thank you for referring your patient, Emeli Granados, to the [facility]. Please see a copy of my visit note below.    ASSESSMENT:  Encounter Diagnoses   Name Primary?     Change in nail appearance Yes     Callus of foot      MEDICAL DECISION MAKING:  Given that the hyperpigmentation of the bilateral hallux nail plate is  symmetrical and location, I suspect this is related to some sort of mechanical stress on the nail unit and mild subungual bleeding.  This might be related to the interphalangeus angle, some hallux abduction and contacting the second toe and footwear.    Nonetheless, an abnormality of the nailbed must be considered.  I explained that the subungual melanoma and other skin problems is very rare.  Monitoring is warranted.    Options discussed:  Taking a wait and watch approach to see if there is proximal clearing, given time.  Total or partial nail removal for evaluation of the nailbed and possible punch biopsy.    At this time she opts to take a wait and watch approach.  Photograph was taken and is seen below.  I have asked her to return no later than 6 weeks for reevaluation.    Disclaimer: This note consists of symbols derived from keyboarding, dictation and/or voice recognition software. As a result, there may be errors in the script that have gone undetected. Please consider this when interpreting information found in this chart.    Lloyd Garcia, ASHOK, FACFAS, [facility] Department of Podiatry/Foot & Ankle Surgery      ____________________________________________________________________    HPI:       Emeli presents today concerned about changes noted in both great toenails.  She removed some nail paint and saw discoloration.  No injury.  No change in activities.  She also inquires about calluses on her big toes.  She reports a 20 pound weight  gain due to recovery from left shoulder surgery.  She works at a [employer].    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     Polycythemia 08/08/2012    resolved now   *  *  Past Surgical History:   Procedure Laterality Date     CARPAL TUNNEL RELEASE RT/LT Right 11/23/2021    and trigger finger and tendon repair     CARPAL TUNNEL RELEASE RT/LT Left 12/20/2021    and tendon repair     COLONOSCOPY  2004, 2012    repeat in 2022     Dilatation of the esophagus once due to dysphagia and stricture  2003     Ganglion cyst removal       HEMORRHOIDECTOMY BANDING       HYSTERECTOMY       LAPAROSCOPIC SALPINGO-OOPHORECTOMY Bilateral 10/23/2015    Procedure: LAPAROSCOPIC SALPINGO-OOPHORECTOMY;  Surgeon: Johnathan Steve MD;  Location: [facility] OR     subscapularous repair and biceps tendon repair  05/2022    Dr. Verma at [facility]     Thumb surgery Right      TONSILLECTOMY     *  *  Current Outpatient Medications   Medication Sig Dispense Refill     albuterol (PROAIR HFA/PROVENTIL HFA/VENTOLIN HFA) 108 (90 Base) MCG/ACT inhaler INHALE 1 OR 2 puffs EVERY 6 HOURS as needed for wheezing. 8.5 g 1     albuterol (PROVENTIL) (2.5 MG/3ML) 0.083% neb solution INHALE 3 MILLILITERS (2.5 MG) BY NEBULIZATION ROUTE EVERY 6 HOURS AS NEEDED FOR SHORTNESS OF BREATH/DYSPNEA OR WHEEZING. 75 mL 0     ascorbic acid (VITAMIN C) 1000 MG TABS Take 1,000 mg by mouth daily       cyclobenzaprine (FLEXERIL) 10 MG tablet Take 1 tablet (10 mg) by mouth as needed for other (shoulder pain) . Do not take within 6 hours of tramadol or zolpidem 30 tablet 3     HYDROcodone-acetaminophen (NORCO)   MG per tablet Take 1 tablet by mouth 2 times daily as needed for severe pain 30 tablet 0     Multiple Vitamins-Minerals (MULTIVITAMIN OR) Take 1 tablet by mouth daily Per pt, uses ones without Iron       omeprazole (PRILOSEC) 20 MG DR capsule Take 1 capsule (20 mg) by mouth daily 90 capsule 3     traMADol (ULTRAM) 50 MG tablet Take 1 tablet (50 mg) by mouth 3 times daily For chronic neck pain 90 tablet 0     vitamin D3 (CHOLECALCIFEROL) 1000 units (25 mcg) tablet Take 1,000 Units by mouth daily       vitamin E 400 UNITS TABS Take 400 Units by mouth daily       zolpidem (AMBIEN) 5 MG tablet Take 1 tablet (5 mg) by mouth nightly as needed for sleep Do not take with tramadol 30 tablet 5         EXAM:    Vitals: /70   Wt 67.6 kg (149 lb)   LMP  (LMP Unknown)   BMI 29.59 kg/m    BMI: Body mass index is 29.59 kg/m .    Constitutional:  Emeli Granados is in no apparent distress, appears well-nourished.  Cooperative with history and physical exam.    Vascular:  Pedal pulses are palpable for both the DP and PT arteries.  CFT < 3 sec.  No edema.      Neuro: Light touch sensation is intact to the L4, L5, S1 distributions  No evidence of weakness, spasticity, or contracture in the lower extremities.     Derm: Normal texture and turgor.  No erythema, ecchymosis, or cyanosis.  No open lesions.     There is hyperpigmentation that appears subungual at the proximal lateral aspect, bilateral hallux nail plate.  No other abnormality seen in this region.  Please see photograph below.    Bilateral hyperkeratotic lesion, plantar medial hallux.    Musculoskeletal:    Lower extremity muscle strength is normal. No gross deformities.  With the forefoot loaded, there is abnormal limitation of hallux dorsiflexion bilaterally.       Media Information            Document Information    Other:  Photograph      07/22/2022 9:26 AM   Attached To:   Office Visit on 7/22/22 with Lloyd Garcia DPM     Source  Information    Lloyd Garcia DPM  [facility] Podiat[facility]             Again, thank you for allowing me to participate in the care of your patient.        Sincerely,        Lloyd Garcia DPM

## 2022-07-22 NOTE — PROGRESS NOTES
ASSESSMENT:  Encounter Diagnoses   Name Primary?     Change in nail appearance Yes     Callus of foot      MEDICAL DECISION MAKING:  Given that the hyperpigmentation of the bilateral hallux nail plate is  symmetrical and location, I suspect this is related to some sort of mechanical stress on the nail unit and mild subungual bleeding.  This might be related to the interphalangeus angle, some hallux abduction and contacting the second toe and footwear.    Nonetheless, an abnormality of the nailbed must be considered.  I explained that the subungual melanoma and other skin problems is very rare.  Monitoring is warranted.    Options discussed:  Taking a wait and watch approach to see if there is proximal clearing, given time.  Total or partial nail removal for evaluation of the nailbed and possible punch biopsy.    At this time she opts to take a wait and watch approach.  Photograph was taken and is seen below.  I have asked her to return no later than 6 weeks for reevaluation.    Disclaimer: This note consists of symbols derived from keyboarding, dictation and/or voice recognition software. As a result, there may be errors in the script that have gone undetected. Please consider this when interpreting information found in this chart.    Lloyd Garcia, ASHOK, FACFAS, MS    [facility] Department of Podiatry/Foot & Ankle Surgery      ____________________________________________________________________    HPI:       Emeli presents today concerned about changes noted in both great toenails.  She removed some nail paint and saw discoloration.  No injury.  No change in activities.  She also inquires about calluses on her big toes.  She reports a 20 pound weight gain due to recovery from left shoulder surgery.  She works at a [employer].    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     Polycythemia 08/08/2012    resolved now   *  *  Past Surgical History:   Procedure Laterality Date     CARPAL TUNNEL RELEASE RT/LT Right 11/23/2021    and trigger finger and tendon repair     CARPAL TUNNEL RELEASE RT/LT Left 12/20/2021    and tendon repair     COLONOSCOPY  2004, 2012    repeat in 2022     Dilatation of the esophagus once due to dysphagia and stricture  2003     Ganglion cyst removal       HEMORRHOIDECTOMY BANDING       HYSTERECTOMY       LAPAROSCOPIC SALPINGO-OOPHORECTOMY Bilateral 10/23/2015    Procedure: LAPAROSCOPIC SALPINGO-OOPHORECTOMY;  Surgeon: Johnathan Steve MD;  Location: [facility] OR     subscapularous repair and biceps tendon repair  05/2022    Dr. Verma at [facility]     Thumb surgery Right      TONSILLECTOMY     *  *  Current Outpatient Medications   Medication Sig Dispense Refill     albuterol (PROAIR HFA/PROVENTIL HFA/VENTOLIN HFA) 108 (90 Base) MCG/ACT inhaler INHALE 1 OR 2 puffs EVERY 6 HOURS as needed for wheezing. 8.5 g 1     albuterol (PROVENTIL) (2.5 MG/3ML) 0.083% neb solution INHALE 3 MILLILITERS (2.5 MG) BY NEBULIZATION ROUTE EVERY 6 HOURS AS NEEDED FOR SHORTNESS OF BREATH/DYSPNEA OR WHEEZING. 75 mL 0     ascorbic acid (VITAMIN C) 1000 MG TABS Take 1,000 mg by mouth daily       cyclobenzaprine (FLEXERIL) 10 MG tablet Take 1 tablet (10 mg) by mouth as needed for other (shoulder pain) . Do not take within 6 hours of tramadol or zolpidem 30 tablet 3     HYDROcodone-acetaminophen (NORCO)  MG per tablet Take 1 tablet by mouth 2 times daily as needed for severe pain 30 tablet 0     Multiple Vitamins-Minerals (MULTIVITAMIN OR) Take 1 tablet by mouth daily Per pt, uses ones without Iron       omeprazole (PRILOSEC) 20 MG DR capsule Take 1 capsule (20 mg)  by mouth daily 90 capsule 3     traMADol (ULTRAM) 50 MG tablet Take 1 tablet (50 mg) by mouth 3 times daily For chronic neck pain 90 tablet 0     vitamin D3 (CHOLECALCIFEROL) 1000 units (25 mcg) tablet Take 1,000 Units by mouth daily       vitamin E 400 UNITS TABS Take 400 Units by mouth daily       zolpidem (AMBIEN) 5 MG tablet Take 1 tablet (5 mg) by mouth nightly as needed for sleep Do not take with tramadol 30 tablet 5         EXAM:    Vitals: /70   Wt 67.6 kg (149 lb)   LMP  (LMP Unknown)   BMI 29.59 kg/m    BMI: Body mass index is 29.59 kg/m .    Constitutional:  Emeli Granados is in no apparent distress, appears well-nourished.  Cooperative with history and physical exam.    Vascular:  Pedal pulses are palpable for both the DP and PT arteries.  CFT < 3 sec.  No edema.      Neuro: Light touch sensation is intact to the L4, L5, S1 distributions  No evidence of weakness, spasticity, or contracture in the lower extremities.     Derm: Normal texture and turgor.  No erythema, ecchymosis, or cyanosis.  No open lesions.     There is hyperpigmentation that appears subungual at the proximal lateral aspect, bilateral hallux nail plate.  No other abnormality seen in this region.  Please see photograph below.    Bilateral hyperkeratotic lesion, plantar medial hallux.    Musculoskeletal:    Lower extremity muscle strength is normal. No gross deformities.  With the forefoot loaded, there is abnormal limitation of hallux dorsiflexion bilaterally.       Media Information            Document Information    Other:  Photograph      07/22/2022 9:26 AM   Attached To:   Office Visit on 7/22/22 with Lloyd Garcia DPM     Source Information    Lloyd Garcia DPM  [facility]

## 2022-07-28 ENCOUNTER — TELEPHONE (OUTPATIENT)
Dept: FAMILY MEDICINE | Facility: CLINIC | Age: 67
End: 2022-07-28

## 2022-07-28 NOTE — TELEPHONE ENCOUNTER
Fax from [facility] that zplpidem 5 mg needs PA.    CMM key- [id number]    Elisabeth Freeman RN

## 2022-07-28 NOTE — TELEPHONE ENCOUNTER
Central Prior Authorization Team   Phone: [phone]      PA Initiation    Medication: Zolpidem  Insurance Company: [employer] - Phone [phone] Fax [phone]  Pharmacy Filling the Rx: [id number] [place], [place] - [address]  Filling Pharmacy Phone: [phone]  Filling Pharmacy Fax:    Start Date: 7/28/2022

## 2022-07-28 NOTE — TELEPHONE ENCOUNTER
Prior Authorization Approval    Authorization Effective Date: 6/28/2022  Authorization Expiration Date: 7/28/2023  Medication: Zolpidem-APPROVED  Approved Dose/Quantity:   Reference #:     Insurance Company: [employer] - Phone [phone] Fax [phone]  Expected CoPay:       CoPay Card Available:      Foundation Assistance Needed:    Which Pharmacy is filling the prescription (Not needed for infusion/clinic administered): [id number] - [place], [place] - [address] [address]  Pharmacy Notified: Yes  Patient Notified: No

## 2022-07-29 DIAGNOSIS — J20.9 ACUTE BRONCHITIS, UNSPECIFIED ORGANISM: ICD-10-CM

## 2022-08-02 NOTE — TELEPHONE ENCOUNTER
Routing refill request to provider for review/approval because:  Drug interaction/allergy warning    Elisabeth Freeman RN

## 2022-08-03 RX ORDER — ALBUTEROL SULFATE 0.83 MG/ML
SOLUTION RESPIRATORY (INHALATION)
Qty: 75 ML | Refills: 0 | Status: SHIPPED | OUTPATIENT
Start: 2022-08-03 | End: 2022-11-16

## 2022-08-15 DIAGNOSIS — G89.29 OTHER CHRONIC PAIN: ICD-10-CM

## 2022-08-15 NOTE — TELEPHONE ENCOUNTER
Routing refill request to provider for review/approval because:  Drug not on the [facility] refill protocol     Monica Crowe RN

## 2022-08-16 RX ORDER — HYDROCODONE BITARTRATE AND ACETAMINOPHEN 10; 325 MG/1; MG/1
1 TABLET ORAL 2 TIMES DAILY PRN
Qty: 30 TABLET | Refills: 0 | Status: SHIPPED | OUTPATIENT
Start: 2022-08-16 | End: 2022-08-16

## 2022-08-16 RX ORDER — TRAMADOL HYDROCHLORIDE 50 MG/1
50 TABLET ORAL 3 TIMES DAILY
Qty: 90 TABLET | Refills: 0 | Status: SHIPPED | OUTPATIENT
Start: 2022-08-16 | End: 2022-09-26

## 2022-08-16 RX ORDER — HYDROCODONE BITARTRATE AND ACETAMINOPHEN 10; 325 MG/1; MG/1
1 TABLET ORAL 2 TIMES DAILY PRN
Qty: 30 TABLET | Refills: 0 | Status: SHIPPED | OUTPATIENT
Start: 2022-08-16 | End: 2022-11-01

## 2022-08-16 NOTE — TELEPHONE ENCOUNTER
Transmission to pharmacy failed (8/16/2022  7:22 AM CDT), please resend hydrocodone  Gayle Broderick RN, BSN  [facility]

## 2022-09-06 ENCOUNTER — OFFICE VISIT (OUTPATIENT)
Dept: DERMATOLOGY | Facility: CLINIC | Age: 67
End: 2022-09-06
Attending: FAMILY MEDICINE
Payer: COMMERCIAL

## 2022-09-06 DIAGNOSIS — D18.01 ANGIOMA OF SKIN: ICD-10-CM

## 2022-09-06 DIAGNOSIS — D22.9 NEVUS: Primary | ICD-10-CM

## 2022-09-06 DIAGNOSIS — L82.1 SEBORRHEIC KERATOSIS: ICD-10-CM

## 2022-09-06 DIAGNOSIS — L81.4 LENTIGO: ICD-10-CM

## 2022-09-06 PROCEDURE — 99203 OFFICE O/P NEW LOW 30 MIN: CPT | Performed by: PHYSICIAN ASSISTANT

## 2022-09-06 NOTE — LETTER
9/6/2022         RE: Emeli Granados  [address] [facility] [address]        Dear Colleague,    Thank you for referring your patient, Emeli Granados, to the [facility]. Please see a copy of my visit note below.    HPI:   Chief complaints: Emeli Granados is a pleasant 67 year old female who presents for Full skin cancer screening to rule out skin cancer   Last Skin Exam: n/a      1st Baseline: yes  Personal HX of Skin Cancer: no   Personal HX of Malignant Melanoma: no   Family HX of Skin Cancer / Malignant Melanoma: no  Personal HX of Atypical Moles:   no  Risk factors: history of sun exposure and burns; limited sunscreen use currently   New / Changing lesions:yes new spot on the brow  Social History:   On review of systems, there are no further skin complaints, patient is feeling otherwise well.   ROS of the following were done and are negative: Constitutional, Eyes, Ears, Nose,   Mouth, Throat, Cardiovascular, Respiratory, GI, Genitourinary, Musculoskeletal,   Psychiatric, Endocrine, Allergic/Immunologic.    PHYSICAL EXAM:   LMP  (LMP Unknown)   Breastfeeding No   Skin exam performed as follows: Type 2 skin. Mood appropriate  Alert and Oriented X 3. Well developed, well nourished in no distress.  General appearance: Normal  Head including face: Normal  Eyes: conjunctiva and lids: Normal  Mouth: Lips, teeth, gums: Normal  Neck: Normal  Chest-breast/axillae: Normal  Back: Normal  Spleen and liver: Normal  Cardiovascular: Exam of peripheral vascular system by observation for swelling, varicosities, edema: Normal  Genitalia: groin, buttocks: Normal  Extremities: digits/nails (clubbing): Normal  Eccrine and Apocrine glands: Normal  Right upper extremity: Normal  Left upper extremity: Normal  Right lower extremity: Normal  Left lower extremity: Normal  Skin: Scalp and body hair: See below    Pt deferred exam of breasts, groin, buttocks: No    Other physical  findings:  1. Multiple pigmented macules on extremities and trunk  2. Multiple pigmented macules on face, trunk and extremities  3. Multiple vascular papules on trunk, arms and legs  4. Multiple scattered keratotic plaques       Except as noted above, no other signs of skin cancer or melanoma.     ASSESSMENT/PLAN:   Benign Full skin cancer screening today. . Patient with history of none  Advised on monthly self exams and 1 year  Patient Education: Appropriate brochures given.    1. Multiple benign appearing melanocytic nevi on arms, legs and trunk. Discussed ABCDEs of melanoma and sunscreen.   2. Multiple lentigos on arms, legs and trunk. Advised benign, no treatment needed.  3. Multiple scattered angiomas. Advised benign, no treatment needed.   4. Seborrheic keratosis on arms, legs and trunk. Advised benign, no treatment needed.              Follow-up: yearly/PRN sooner    1.) Patient was asked about new and changing moles. YES  2.) Patient received a complete physical skin examination: YES  3.) Patient was counseled to perform a monthly self skin examination: YES  Scribed By: Ani Cabezas, MS, PA-C          Again, thank you for allowing me to participate in the care of your patient.        Sincerely,        Ani Cabezas PA-C

## 2022-09-11 ENCOUNTER — HOSPITAL ENCOUNTER (EMERGENCY)
Facility: CLINIC | Age: 67
Discharge: HOME | End: 2022-09-11
Attending: INTERNAL MEDICINE | Admitting: INTERNAL MEDICINE
Payer: MEDICARE

## 2022-09-11 ENCOUNTER — APPOINTMENT (OUTPATIENT)
Dept: CT IMAGING | Facility: CLINIC | Age: 67
End: 2022-09-11
Attending: INTERNAL MEDICINE
Payer: MEDICARE

## 2022-09-11 VITALS
HEART RATE: 72 BPM | SYSTOLIC BLOOD PRESSURE: 109 MMHG | DIASTOLIC BLOOD PRESSURE: 67 MMHG | RESPIRATION RATE: 18 BRPM | TEMPERATURE: 98.6 F | OXYGEN SATURATION: 95 %

## 2022-09-11 DIAGNOSIS — R93.2 ABNORMAL CT SCAN, GALLBLADDER: ICD-10-CM

## 2022-09-11 DIAGNOSIS — R07.9 CHEST PAIN, UNSPECIFIED TYPE: ICD-10-CM

## 2022-09-11 LAB
ANION GAP SERPL CALCULATED.3IONS-SCNC: 11 MMOL/L (ref 7–15)
BASOPHILS # BLD AUTO: 0 10E3/UL (ref 0–0.2)
BASOPHILS NFR BLD AUTO: 0 %
BUN SERPL-MCNC: 17.6 MG/DL (ref 8–23)
CALCIUM SERPL-MCNC: 9.6 MG/DL (ref 8.8–10.2)
CHLORIDE SERPL-SCNC: 102 MMOL/L (ref 98–107)
CREAT SERPL-MCNC: 0.68 MG/DL (ref 0.51–0.95)
D DIMER PPP FEU-MCNC: 0.53 UG/ML FEU (ref 0–0.5)
DEPRECATED HCO3 PLAS-SCNC: 25 MMOL/L (ref 22–29)
EOSINOPHIL # BLD AUTO: 0.1 10E3/UL (ref 0–0.7)
EOSINOPHIL NFR BLD AUTO: 1 %
ERYTHROCYTE [DISTWIDTH] IN BLOOD BY AUTOMATED COUNT: 13.7 % (ref 10–15)
GFR SERPL CREATININE-BSD FRML MDRD: >90 ML/MIN/1.73M2
GLUCOSE SERPL-MCNC: 97 MG/DL (ref 70–99)
HCT VFR BLD AUTO: 45 % (ref 35–47)
HGB BLD-MCNC: 14.8 G/DL (ref 11.7–15.7)
HOLD SPECIMEN: NORMAL
IMM GRANULOCYTES # BLD: 0.1 10E3/UL
IMM GRANULOCYTES NFR BLD: 0 %
LYMPHOCYTES # BLD AUTO: 2.5 10E3/UL (ref 0.8–5.3)
LYMPHOCYTES NFR BLD AUTO: 21 %
MCH RBC QN AUTO: 31.3 PG (ref 26.5–33)
MCHC RBC AUTO-ENTMCNC: 32.9 G/DL (ref 31.5–36.5)
MCV RBC AUTO: 95 FL (ref 78–100)
MONOCYTES # BLD AUTO: 0.9 10E3/UL (ref 0–1.3)
MONOCYTES NFR BLD AUTO: 8 %
NEUTROPHILS # BLD AUTO: 8.2 10E3/UL (ref 1.6–8.3)
NEUTROPHILS NFR BLD AUTO: 70 %
NRBC # BLD AUTO: 0 10E3/UL
NRBC BLD AUTO-RTO: 0 /100
PLATELET # BLD AUTO: 254 10E3/UL (ref 150–450)
POTASSIUM SERPL-SCNC: 3.8 MMOL/L (ref 3.4–5.3)
RBC # BLD AUTO: 4.73 10E6/UL (ref 3.8–5.2)
SODIUM SERPL-SCNC: 138 MMOL/L (ref 136–145)
TROPONIN T SERPL HS-MCNC: 7 NG/L
WBC # BLD AUTO: 11.8 10E3/UL (ref 4–11)

## 2022-09-11 PROCEDURE — 36415 COLL VENOUS BLD VENIPUNCTURE: CPT | Performed by: EMERGENCY MEDICINE

## 2022-09-11 PROCEDURE — 85025 COMPLETE CBC W/AUTO DIFF WBC: CPT | Performed by: EMERGENCY MEDICINE

## 2022-09-11 PROCEDURE — 250N000009 HC RX 250: Performed by: INTERNAL MEDICINE

## 2022-09-11 PROCEDURE — 84484 ASSAY OF TROPONIN QUANT: CPT | Performed by: INTERNAL MEDICINE

## 2022-09-11 PROCEDURE — 250N000011 HC RX IP 250 OP 636: Performed by: INTERNAL MEDICINE

## 2022-09-11 PROCEDURE — 85379 FIBRIN DEGRADATION QUANT: CPT | Performed by: INTERNAL MEDICINE

## 2022-09-11 PROCEDURE — 80048 BASIC METABOLIC PNL TOTAL CA: CPT | Performed by: INTERNAL MEDICINE

## 2022-09-11 PROCEDURE — 99285 EMERGENCY DEPT VISIT HI MDM: CPT | Mod: 25

## 2022-09-11 PROCEDURE — 93005 ELECTROCARDIOGRAM TRACING: CPT

## 2022-09-11 PROCEDURE — 71275 CT ANGIOGRAPHY CHEST: CPT

## 2022-09-11 PROCEDURE — 85025 COMPLETE CBC W/AUTO DIFF WBC: CPT | Performed by: INTERNAL MEDICINE

## 2022-09-11 RX ORDER — IOPAMIDOL 755 MG/ML
500 INJECTION, SOLUTION INTRAVASCULAR ONCE
Status: COMPLETED | OUTPATIENT
Start: 2022-09-11 | End: 2022-09-11

## 2022-09-11 RX ADMIN — IOPAMIDOL 64 ML: 755 INJECTION, SOLUTION INTRAVENOUS at 14:42

## 2022-09-11 RX ADMIN — SODIUM CHLORIDE 86 ML: 9 INJECTION, SOLUTION INTRAVENOUS at 14:42

## 2022-09-11 ASSESSMENT — ACTIVITIES OF DAILY LIVING (ADL)
ADLS_ACUITY_SCORE: 35
ADLS_ACUITY_SCORE: 35

## 2022-09-11 NOTE — ED TRIAGE NOTES
Friday night started having chest pressure. If felt like pressure squeezing between shoulder blades and in the middle of her chest. Also felt SOB. Feeling fatigued yesterday and now having chest pain under the breast intermittent, but more often there than it is gone.

## 2022-09-11 NOTE — ED PROVIDER NOTES
History     Chief Complaint:  Chest Pain       HPI   Emeli Granados is a 67 year old female who presents with chest pain.  She states that she was watching television with her daughter on September 9 in the evening when she suddenly developed substernal chest pain radiating straight through to the back.  She describes it as feeling like she is being squeezed between 2 fists.  She also felt short of breath and diaphoretic.  Her daughter looked up her symptoms and was afraid she might be having heart trouble so asked her mother to come in.  Emeli however says she has had symptoms like this in the past that of been self-limited so she waited.  Within about 15 minutes the symptoms improve though have never completely resolved since then.  Patient notes that she is planning to go into clinic because she notes early satiety had epigastric distress after eating.    ROS:  Review of Systems   All other systems reviewed and are negative.         Allergies:  Metaproterenol Sulfate  Percocet [Oxycodone-Acetaminophen]     Medications:    albuterol (PROAIR HFA/PROVENTIL HFA/VENTOLIN HFA) 108 (90 Base) MCG/ACT inhaler  albuterol (PROVENTIL) (2.5 MG/3ML) 0.083% neb solution  ascorbic acid (VITAMIN C) 1000 MG TABS  cyclobenzaprine (FLEXERIL) 10 MG tablet  HYDROcodone-acetaminophen (NORCO)  MG per tablet  Multiple Vitamins-Minerals (MULTIVITAMIN OR)  omeprazole (PRILOSEC) 20 MG DR capsule  traMADol (ULTRAM) 50 MG tablet  vitamin D3 (CHOLECALCIFEROL) 1000 units (25 mcg) tablet  vitamin E 400 UNITS TABS  zolpidem (AMBIEN) 5 MG tablet        Past Medical History:    Past Medical History:   Diagnosis Date     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     Diverticula of colon 01/01/2014     Emphysema lung      Esophageal stricture      Hemorrhoids      Hepatitis C 01/01/1996     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     Polycythemia 08/08/2012       Past Surgical History:    Past Surgical History:   Procedure  Laterality Date     CARPAL TUNNEL RELEASE RT/LT Right 11/23/2021    and trigger finger and tendon repair     CARPAL TUNNEL RELEASE RT/LT Left 12/20/2021    and tendon repair     COLONOSCOPY  2004, 2012    repeat in 2022     Dilatation of the esophagus once due to dysphagia and stricture  2003     Ganglion cyst removal       HEMORRHOIDECTOMY BANDING       HYSTERECTOMY       LAPAROSCOPIC SALPINGO-OOPHORECTOMY Bilateral 10/23/2015    Procedure: LAPAROSCOPIC SALPINGO-OOPHORECTOMY;  Surgeon: Johnathan Steve MD;  Location: [facility] OR     subscapularous repair and biceps tendon repair  05/2022    Dr. Verma at [facility]     Thumb surgery Right      TONSILLECTOMY          Family History:    family history includes Alcohol/Drug in her daughter; C.A.D. in her father; Cancer - colorectal in her maternal grandmother; Cerebrovascular Disease in her mother; Depression in her mother; Hypertension in her mother; Lipids in her father; Musculoskeletal Disorder in her father.    Social History:   reports that she quit smoking about 2 years ago. Her smoking use included cigarettes. She started smoking about 54 years ago. She has a 15.00 pack-year smoking history. She has never used smokeless tobacco. She reports that she does not drink alcohol and does not use drugs.  PCP: Emma Byrne     Physical Exam     Patient Vitals for the past 24 hrs:   BP Temp Temp src Pulse Resp SpO2   09/11/22 1330 109/67 -- -- 72 -- 95 %   09/11/22 1320 115/77 -- -- 70 -- 96 %   09/11/22 1300 135/67 -- -- 72 -- 97 %   09/11/22 1218 124/76 -- -- -- -- --   09/11/22 1217 -- 98.6  F (37  C) Temporal 85 18 95 %        Physical Exam  Constitutional:       Comments: Pleasant and cooperative   HENT:      Mouth/Throat:      Pharynx: No posterior oropharyngeal erythema.   Eyes:      Conjunctiva/sclera: Conjunctivae normal.   Cardiovascular:      Rate and Rhythm: Normal rate and regular rhythm.      Heart sounds: Normal heart sounds.   Pulmonary:      Effort: Pulmonary  effort is normal.      Breath sounds: Normal breath sounds.   Abdominal:      General: Bowel sounds are normal. There is no distension.      Palpations: Abdomen is soft.      Tenderness: There is abdominal tenderness in the epigastric area. There is no guarding or rebound.   Musculoskeletal:         General: Normal range of motion.      Cervical back: Neck supple.   Skin:     General: Skin is warm and dry.   Neurological:      Mental Status: She is alert.         Emergency Department Course   ECG:  ECG results from 04/08/16   EKG 12 lead     Value    Interpretation ECG Click View Image link to view waveform and result       Imaging:  CT Chest Pulmonary Embolism w Contrast   Preliminary Result   IMPRESSION:   1.  No evidence for pulmonary embolism, acute thoracic aortic abnormality, or acute airspace disease.   2.  Emphysema.   3.  Focal nodular lesion at the gallbladder fundus could be an ill-defined gallstone, but a gallbladder mass is in the differential. Suggest further assessment with correlation with ultrasound and/or MRI.               Report per radiology    Laboratory:  Labs Ordered and Resulted from Time of ED Arrival to Time of ED Departure   CBC WITH PLATELETS AND DIFFERENTIAL - Abnormal       Result Value    WBC Count 11.8 (*)     RBC Count 4.73      Hemoglobin 14.8      Hematocrit 45.0      MCV 95      MCH 31.3      MCHC 32.9      RDW 13.7      Platelet Count 254      % Neutrophils 70      % Lymphocytes 21      % Monocytes 8      % Eosinophils 1      % Basophils 0      % Immature Granulocytes 0      NRBCs per 100 WBC 0      Absolute Neutrophils 8.2      Absolute Lymphocytes 2.5      Absolute Monocytes 0.9      Absolute Eosinophils 0.1      Absolute Basophils 0.0      Absolute Immature Granulocytes 0.1      Absolute NRBCs 0.0     D DIMER QUANTITATIVE - Abnormal    D-Dimer Quantitative 0.53 (*)    BASIC METABOLIC PANEL - Normal    Creatinine 0.68      Sodium 138      Potassium 3.8      Urea Nitrogen 17.6       Chloride 102      Carbon Dioxide (CO2) 25      Anion Gap 11      Glucose 97      GFR Estimate >90      Calcium 9.6     TROPONIN T, HIGH SENSITIVITY - Normal    Troponin T, High Sensitivity 7          Interventions:  Medications   iopamidol (ISOVUE-370) solution 500 mL (64 mLs Intravenous Given 9/11/22 1442)   for CT scan flush use (86 mLs Intravenous Given 9/11/22 1442)        Disposition:  The patient was discharged to home.     Impression & Plan      Medical Decision Making:    Emeli Granados is a 67 year old female who presents to the emergency department after having about 36 hours of ongoing chest pain.  EKG is normal and troponin negative, ruling out cardiac ischemia.  I was concerned about underlying lung abnormality.  D-dimer was slightly elevated so I proceeded to CT.  This shows no evidence of pulmonary embolus.  Some emphysematous changes.  Also radiology noted a gallbladder abnormality.  Certainly biliary colic could explain her GI symptoms and could be related to chest pain.  She is already on a proton pump inhibitor.  I will have her follow-up closely with primary care to pursue further gallbladder work-up and other evaluation.      Diagnosis:    ICD-10-CM    1. Chest pain, unspecified type  R07.9    2. Abnormal CT scan, gallbladder  R93.2         Discharge Medications:  New Prescriptions    No medications on file        9/11/2022   Rimma Mendoza MD [place], Susan Gail, MD  09/11/22 1539

## 2022-09-11 NOTE — DISCHARGE INSTRUCTIONS
Discharge Instructions  Chest Pain    You have been seen today for chest pain or discomfort.  At this time, your doctor has found no signs that your chest pain is due to a serious or life-threatening condition, (or you have declined more testing and/or admission to the hospital). However, sometimes there is a serious problem that does not show up right away. Your evaluation today may not be complete and you may need further testing and evaluation.     You need to follow-up with your regular doctor within 3 days.    Return to the Emergency Department if:  Your chest pain changes, gets worse, starts to happen more often, or comes with less activity.  You are short of breath.  You get very weak or tired.  You pass out or faint.  You have any new symptoms, like fever, cough, numb legs, or you cough up blood.  You have anything else that worries you.      If your doctor today has told you to follow-up with your regular doctor, it is very important that you make an appointment with your clinic and go to the appointment.  If you do not follow-up with your primary doctor, it may result in missing an important development which could result in permanent injury or disability and/or lasting pain.  If there is any problem keeping your appointment, call your doctor or return to the Emergency Department.    If you were given a prescription for medicine here today, be sure to read all of the information (including the package insert) that comes with your prescription.  This will include important information about the medicine, its side effects, and any warnings that you need to know about.  The pharmacist who fills the prescription can provide more information and answer questions you may have about the medicine.  If you have questions or concerns that the pharmacist cannot address, please call or return to the Emergency Department.     Opioid Medication Information    Pain medications are among the most commonly prescribed  medicines, so we are including this information for all our patients. If you did not receive pain medication or get a prescription for pain medicine, you can ignore it.     You may have been given a prescription for an opioid (narcotic) pain medicine and/or have received a pain medicine while here in the Emergency Department. These medicines can make you drowsy or impaired. You must not drive, operate dangerous equipment, or engage in any other dangerous activities while taking these medications. If you drive while taking these medications, you could be arrested for DUI, or driving under the influence. Do not drink any alcohol while you are taking these medications.     Opioid pain medications can cause addiction. If you have a history of chemical dependency of any type, you are at a higher risk of becoming addicted to pain medications.  Only take these prescribed medications to treat your pain when all other options have been tried. Take it for as short a time and as few doses as possible. Store your pain pills in a secure place, as they are frequently stolen and provide a dangerous opportunity for children or visitors in your house to start abusing these powerful medications. We will not replace any lost or stolen medicine.  As soon as your pain is better, you should flush all your remaining medication.     Many prescription pain medications contain Tylenol  (acetaminophen), including Vicodin , Tylenol #3 , Norco , Lortab , and Percocet .  You should not take any extra pills of Tylenol  if you are using these prescription medications or you can get very sick.  Do not ever take more than 3000 mg of acetaminophen in any 24 hour period.    All opioids tend to cause constipation. Drink plenty of water and eat foods that have a lot of fiber, such as fruits, vegetables, prune juice, apple juice and high fiber cereal.  Take a laxative if you don t move your bowels at least every other day. Miralax , Milk of Magnesia,  Colace , or Senna  can be used to keep you regular.      Remember that you can always come back to the Emergency Department if you are not able to see your regular doctor in the amount of time listed above, if you get any new symptoms, or if there is anything that worries you.

## 2022-09-12 ENCOUNTER — TELEPHONE (OUTPATIENT)
Dept: FAMILY MEDICINE | Facility: CLINIC | Age: 67
End: 2022-09-12

## 2022-09-12 LAB
ATRIAL RATE - MUSE: 68 BPM
DIASTOLIC BLOOD PRESSURE - MUSE: NORMAL MMHG
INTERPRETATION ECG - MUSE: NORMAL
P AXIS - MUSE: 64 DEGREES
PR INTERVAL - MUSE: 144 MS
QRS DURATION - MUSE: 82 MS
QT - MUSE: 372 MS
QTC - MUSE: 395 MS
R AXIS - MUSE: 13 DEGREES
SYSTOLIC BLOOD PRESSURE - MUSE: NORMAL MMHG
T AXIS - MUSE: 39 DEGREES
VENTRICULAR RATE- MUSE: 68 BPM

## 2022-09-12 NOTE — TELEPHONE ENCOUNTER
Emma Carpenter MD-    Scheduled patient for visit on 9/19/22 in SB5 visit slot as you had no other available appointments, please advise if you would like rescheduled further out.     Rukhsana REICH RN, BSN, PHN  [facility]

## 2022-09-12 NOTE — TELEPHONE ENCOUNTER
Can you make sure patient has scheduled ER follow up so we can discuss further management into her symptoms?

## 2022-09-19 ENCOUNTER — OFFICE VISIT (OUTPATIENT)
Dept: FAMILY MEDICINE | Facility: CLINIC | Age: 67
End: 2022-09-19
Payer: COMMERCIAL

## 2022-09-19 VITALS
HEART RATE: 105 BPM | BODY MASS INDEX: 29.72 KG/M2 | WEIGHT: 147.4 LBS | OXYGEN SATURATION: 95 % | SYSTOLIC BLOOD PRESSURE: 123 MMHG | DIASTOLIC BLOOD PRESSURE: 88 MMHG | HEIGHT: 59 IN

## 2022-09-19 DIAGNOSIS — R10.13 EPIGASTRIC PAIN: ICD-10-CM

## 2022-09-19 DIAGNOSIS — Z23 HIGH PRIORITY FOR 2019-NCOV VACCINE: Primary | ICD-10-CM

## 2022-09-19 PROCEDURE — 90662 IIV NO PRSV INCREASED AG IM: CPT | Performed by: FAMILY MEDICINE

## 2022-09-19 PROCEDURE — 99213 OFFICE O/P EST LOW 20 MIN: CPT | Mod: 25 | Performed by: FAMILY MEDICINE

## 2022-09-19 PROCEDURE — 91313 COVID-19,PF,MODERNA BIVALENT: CPT | Performed by: FAMILY MEDICINE

## 2022-09-19 PROCEDURE — 0134A COVID-19,PF,MODERNA BIVALENT: CPT | Performed by: FAMILY MEDICINE

## 2022-09-19 PROCEDURE — 90471 IMMUNIZATION ADMIN: CPT | Performed by: FAMILY MEDICINE

## 2022-09-19 PROCEDURE — 87338 HPYLORI STOOL AG IA: CPT | Performed by: FAMILY MEDICINE

## 2022-09-19 NOTE — PROGRESS NOTES
"  Assessment & Plan     Epigastric pain  First wonder about ulcers or dyspepsia, then wonder about gallbladder, then if both are ruled out would do stress echo    Will do trial of omeprazole    - US Abdomen Limited  - Adult GI  Referral - Procedure Only  - omeprazole (PRILOSEC) 20 MG DR capsule  Dispense: 30 capsule; Refill: 5  - Helicobacter pylori Antigen Stool  - Helicobacter pylori Antigen Stool    High priority for 2019-nCoV vaccine    - COVID-19,PF,MODERNA BIVALENT 18+Yrs        24 minutes spent on the date of the encounter doing chart review, review of test results, interpretation of tests, patient visit and documentation        BMI:   Estimated body mass index is 29.77 kg/m  as calculated from the following:    Height as of this encounter: 1.499 m (4' 11\").    Weight as of this encounter: 66.9 kg (147 lb 6.4 oz).       See Patient Instructions    No follow-ups on file.   Follow-up Visit   Expected date:  Oct 19, 2022 (Approximate)      Follow Up Appointment Details:     Follow-up with whom?: Me    Follow-Up for what?: Acute Issue Recheck    Additional Details: on abdominal pain    How?: Video    Is this an as-needed follow-up?: No                    Emma Byrne MD  [facility]    Sofiya Sainz is a 67 year old, presenting for the following health issues:  Last week while watching a movie she got chest pain and it went through her chest to her back.   She did go to the ER for this and they could not find why.  It lasted for about 15 minutes and she was sweaty.   It was like a pressure.   She is also noted early fullness and bloating.   It feels like her food just sits in her stomach.    This has been about 6 months.    The chest pain has happened about 4 times.       ER F/U (09/11/22)      HPI     ED/UC Followup:    Facility:  [facility]  Date of visit: 09/11/22  Reason for visit: Chest pain, abdominal discomfort  Current Status: Pt states is currently " feeling bloated, tends to feel on and off pressure in chest when this happens.    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     Polycythemia 08/08/2012    resolved now       Past Surgical History:   Procedure Laterality Date     CARPAL TUNNEL RELEASE RT/LT Right 11/23/2021    and trigger finger and tendon repair     CARPAL TUNNEL RELEASE RT/LT Left 12/20/2021    and tendon repair     COLONOSCOPY  2004, 2012    repeat in 2022     Dilatation of the esophagus once due to dysphagia and stricture  2003     Ganglion cyst removal       HEMORRHOIDECTOMY BANDING       HYSTERECTOMY       LAPAROSCOPIC SALPINGO-OOPHORECTOMY Bilateral 10/23/2015    Procedure: LAPAROSCOPIC SALPINGO-OOPHORECTOMY;  Surgeon: Johnathan Steve MD;  Location: [facility] OR     subscapularous repair and biceps tendon repair  05/2022    Dr. Verma at [facility]     Thumb surgery Right      TONSILLECTOMY         MEDICATIONS:  Current Outpatient Medications   Medication     omeprazole (PRILOSEC) 20 MG DR capsule     albuterol (PROAIR HFA/PROVENTIL HFA/VENTOLIN HFA) 108 (90 Base) MCG/ACT inhaler     albuterol (PROVENTIL) (2.5 MG/3ML) 0.083% neb solution     ascorbic acid (VITAMIN C) 1000 MG TABS     cyclobenzaprine (FLEXERIL) 10 MG tablet     HYDROcodone-acetaminophen (NORCO)  MG per tablet     Multiple Vitamins-Minerals (MULTIVITAMIN OR)     omeprazole (PRILOSEC) 20 MG DR capsule     traMADol (ULTRAM) 50 MG tablet     vitamin D3 (CHOLECALCIFEROL) 1000 units (25 mcg) tablet     vitamin E 400 UNITS TABS     zolpidem (AMBIEN) 5 MG tablet     No current facility-administered medications  "for this visit.       SOCIAL HISTORY:  Social History     Tobacco Use     Smoking status: Former Smoker     Packs/day: 0.50     Years: 30.00     Pack years: 15.00     Types: Cigarettes     Start date: 10/13/1967     Quit date: 2019     Years since quittin.8     Smokeless tobacco: Never Used     Tobacco comment: quit 2019   Substance Use Topics     Alcohol use: No     Comment: sober since 99       Family History   Problem Relation Age of Onset     Hypertension Mother      Cerebrovascular Disease Mother         TIA's     Depression Mother      Cancer - colorectal Maternal Grandmother         dx at 65     Lipids Father      C.A.D. Father         angioplasty at 65     Musculoskeletal Disorder Father      Alcohol/Drug Daughter         in remission     Breast Cancer No family hx of          Review of Systems   Constitutional, HEENT, cardiovascular, pulmonary, gi and gu systems are negative, except as otherwise noted.      Objective    /88 (BP Location: Right arm, Patient Position: Sitting, Cuff Size: Adult Regular)   Pulse 105   Ht 1.499 m (4' 11\")   Wt 66.9 kg (147 lb 6.4 oz)   LMP  (LMP Unknown)   SpO2 95%   BMI 29.77 kg/m    Body mass index is 29.77 kg/m .  Physical Exam   GENERAL: alert, no distress and over weight  EYES: Eyes grossly normal to inspection, PERRL and conjunctivae and sclerae normal  HENT: ear canals and TM's normal, nose and mouth without ulcers or lesions  NECK: no adenopathy, no asymmetry, masses, or scars and thyroid normal to palpation  RESP: lungs clear to auscultation - no rales, rhonchi or wheezes  CV: regular rate and rhythm, normal S1 S2, no S3 or S4, no murmur, click or rub, no peripheral edema and peripheral pulses strong  ABDOMEN: tenderness epigastric and RUQ, no organomegaly or masses, liver span normal to percussion and bowel sounds normal  MS: no gross musculoskeletal defects noted, no edema  SKIN: no suspicious lesions or rashes  NEURO: Normal strength and " tone, mentation intact and speech normal  PSYCH: mentation appears normal, affect normal/bright  LYMPH: no cervical, supraclavicular, axillary, or inguinal adenopathy    Admission on 09/11/2022, Discharged on 09/11/2022   Component Date Value Ref Range Status     Creatinine 09/11/2022 0.68  0.51 - 0.95 mg/dL Final     Sodium 09/11/2022 138  136 - 145 mmol/L Final     Potassium 09/11/2022 3.8  3.4 - 5.3 mmol/L Final     Urea Nitrogen 09/11/2022 17.6  8.0 - 23.0 mg/dL Final     Chloride 09/11/2022 102  98 - 107 mmol/L Final     Carbon Dioxide (CO2) 09/11/2022 25  22 - 29 mmol/L Final     Anion Gap 09/11/2022 11  7 - 15 mmol/L Final     Glucose 09/11/2022 97  70 - 99 mg/dL Final     GFR Estimate 09/11/2022 >90  >60 mL/min/1.73m2 Final    Effective December 21, 2021 eGFRcr in adults is calculated using the 2021 CKD-EPI creatinine equation which includes age and gender (Giovanny et al., NEJ, DOI: 10.1056/SFMClw6493619)     Calcium 09/11/2022 9.6  8.8 - 10.2 mg/dL Final     Troponin T, High Sensitivity 09/11/2022 7  <=14 ng/L Final     WBC Count 09/11/2022 11.8 (A) 4.0 - 11.0 10e3/uL Final     RBC Count 09/11/2022 4.73  3.80 - 5.20 10e6/uL Final     Hemoglobin 09/11/2022 14.8  11.7 - 15.7 g/dL Final     Hematocrit 09/11/2022 45.0  35.0 - 47.0 % Final     MCV 09/11/2022 95  78 - 100 fL Final     MCH 09/11/2022 31.3  26.5 - 33.0 pg Final     MCHC 09/11/2022 32.9  31.5 - 36.5 g/dL Final     RDW 09/11/2022 13.7  10.0 - 15.0 % Final     Platelet Count 09/11/2022 254  150 - 450 10e3/uL Final     % Neutrophils 09/11/2022 70  % Final     % Lymphocytes 09/11/2022 21  % Final     % Monocytes 09/11/2022 8  % Final     % Eosinophils 09/11/2022 1  % Final     % Basophils 09/11/2022 0  % Final     % Immature Granulocytes 09/11/2022 0  % Final     NRBCs per 100 WBC 09/11/2022 0  <1 /100 Final     Absolute Neutrophils 09/11/2022 8.2  1.6 - 8.3 10e3/uL Final     Absolute Lymphocytes 09/11/2022 2.5  0.8 - 5.3 10e3/uL Final     Absolute  Monocytes 09/11/2022 0.9  0.0 - 1.3 10e3/uL Final     Absolute Eosinophils 09/11/2022 0.1  0.0 - 0.7 10e3/uL Final     Absolute Basophils 09/11/2022 0.0  0.0 - 0.2 10e3/uL Final     Absolute Immature Granulocytes 09/11/2022 0.1  <=0.4 10e3/uL Final     Absolute NRBCs 09/11/2022 0.0  10e3/uL Final     Hold Specimen 09/11/2022 JIC   Final     Ventricular Rate 09/11/2022 68  BPM Final     Atrial Rate 09/11/2022 68  BPM Final     PA Interval 09/11/2022 144  ms Final     QRS Duration 09/11/2022 82  ms Final     QT 09/11/2022 372  ms Final     QTc 09/11/2022 395  ms Final     P Axis 09/11/2022 64  degrees Final     R AXIS 09/11/2022 13  degrees Final     T Axis 09/11/2022 39  degrees Final     Interpretation ECG 09/11/2022    Final                    Value:Sinus rhythm  Low voltage QRS  Borderline ECG  When compared with ECG of 08-APR-2016 08:33,  No significant change was found  Confirmed by - EMERGENCY ROOM, PHYSICIAN ([id number]),  MARITZA OSBORNE (Cass) on 9/12/2022 7:20:36 AM       D-Dimer Quantitative 09/11/2022 0.53 (A) 0.00 - 0.50 ug/mL FEU Final

## 2022-09-22 ENCOUNTER — TELEPHONE (OUTPATIENT)
Dept: FAMILY MEDICINE | Facility: CLINIC | Age: 67
End: 2022-09-22

## 2022-09-22 DIAGNOSIS — M25.519 ACUTE SHOULDER PAIN, UNSPECIFIED LATERALITY: ICD-10-CM

## 2022-09-22 NOTE — TELEPHONE ENCOUNTER
Dr. Garcia     Pharmacy is unable to fill this rx as below   Please correct and resend   Can not accept just as needed, needs to state a max per day ? Up to how many times?     cyclobenzaprine (FLEXERIL) 10 MG tablet 30 tablet 3 7/11/2022  No   Sig - Route: Take 1 tablet (10 mg) by mouth as needed for other (shoulder pain) . Do not take within 6 hours of tramadol or zolpidem - Oral     Felisa Brown, Registered Nurse, PAL (Patient Advocate Liason)   [facility]   [phone]

## 2022-09-23 DIAGNOSIS — G89.29 OTHER CHRONIC PAIN: ICD-10-CM

## 2022-09-26 ENCOUNTER — TELEPHONE (OUTPATIENT)
Dept: FAMILY MEDICINE | Facility: CLINIC | Age: 67
End: 2022-09-26

## 2022-09-26 RX ORDER — TRAMADOL HYDROCHLORIDE 50 MG/1
50 TABLET ORAL 3 TIMES DAILY
Qty: 90 TABLET | Refills: 0 | Status: SHIPPED | OUTPATIENT
Start: 2022-09-26 | End: 2022-12-12

## 2022-09-26 RX ORDER — CYCLOBENZAPRINE HCL 10 MG
10 TABLET ORAL 2 TIMES DAILY PRN
Qty: 30 TABLET | Refills: 3 | Status: SHIPPED | OUTPATIENT
Start: 2022-09-26 | End: 2023-10-31

## 2022-09-26 NOTE — TELEPHONE ENCOUNTER
Patient called with questions regarding stool sample. Transferred to lab so that her questions could be addressed.     Rukhsana REICH RN, BSN, PHN  [facility]

## 2022-09-28 LAB — H PYLORI AG STL QL IA: NEGATIVE

## 2022-10-12 ENCOUNTER — APPOINTMENT (OUTPATIENT)
Dept: GENERAL RADIOLOGY | Facility: CLINIC | Age: 67
End: 2022-10-12
Attending: EMERGENCY MEDICINE
Payer: MEDICARE

## 2022-10-12 ENCOUNTER — HOSPITAL ENCOUNTER (OUTPATIENT)
Facility: CLINIC | Age: 67
Setting detail: OBSERVATION
Discharge: HOME | End: 2022-10-13
Attending: EMERGENCY MEDICINE | Admitting: HOSPITALIST
Payer: MEDICARE

## 2022-10-12 DIAGNOSIS — G89.29 OTHER CHRONIC PAIN: ICD-10-CM

## 2022-10-12 DIAGNOSIS — R00.0 TACHYARRHYTHMIA: ICD-10-CM

## 2022-10-12 LAB
ALBUMIN SERPL BCG-MCNC: 3.7 G/DL (ref 3.5–5.2)
ALP SERPL-CCNC: 101 U/L (ref 35–104)
ALT SERPL W P-5'-P-CCNC: 32 U/L (ref 10–35)
ANION GAP SERPL CALCULATED.3IONS-SCNC: 12 MMOL/L (ref 7–15)
APTT PPP: 30 SECONDS (ref 22–38)
AST SERPL W P-5'-P-CCNC: 27 U/L (ref 10–35)
BASOPHILS # BLD AUTO: 0.1 10E3/UL (ref 0–0.2)
BASOPHILS NFR BLD AUTO: 1 %
BILIRUB SERPL-MCNC: 0.4 MG/DL
BUN SERPL-MCNC: 12.4 MG/DL (ref 8–23)
CALCIUM SERPL-MCNC: 9.5 MG/DL (ref 8.8–10.2)
CHLORIDE SERPL-SCNC: 103 MMOL/L (ref 98–107)
CREAT SERPL-MCNC: 0.79 MG/DL (ref 0.51–0.95)
DEPRECATED HCO3 PLAS-SCNC: 25 MMOL/L (ref 22–29)
EOSINOPHIL # BLD AUTO: 0.1 10E3/UL (ref 0–0.7)
EOSINOPHIL NFR BLD AUTO: 1 %
ERYTHROCYTE [DISTWIDTH] IN BLOOD BY AUTOMATED COUNT: 13.6 % (ref 10–15)
GFR SERPL CREATININE-BSD FRML MDRD: 81 ML/MIN/1.73M2
GLUCOSE SERPL-MCNC: 111 MG/DL (ref 70–99)
HCT VFR BLD AUTO: 47.3 % (ref 35–47)
HGB BLD-MCNC: 15.4 G/DL (ref 11.7–15.7)
HOLD SPECIMEN: NORMAL
IMM GRANULOCYTES # BLD: 0 10E3/UL
IMM GRANULOCYTES NFR BLD: 1 %
INR PPP: 0.99 (ref 0.85–1.15)
LYMPHOCYTES # BLD AUTO: 2.5 10E3/UL (ref 0.8–5.3)
LYMPHOCYTES NFR BLD AUTO: 34 %
MAGNESIUM SERPL-MCNC: 1.8 MG/DL (ref 1.7–2.3)
MCH RBC QN AUTO: 31.4 PG (ref 26.5–33)
MCHC RBC AUTO-ENTMCNC: 32.6 G/DL (ref 31.5–36.5)
MCV RBC AUTO: 97 FL (ref 78–100)
MONOCYTES # BLD AUTO: 0.6 10E3/UL (ref 0–1.3)
MONOCYTES NFR BLD AUTO: 8 %
NEUTROPHILS # BLD AUTO: 4.2 10E3/UL (ref 1.6–8.3)
NEUTROPHILS NFR BLD AUTO: 55 %
NRBC # BLD AUTO: 0 10E3/UL
NRBC BLD AUTO-RTO: 0 /100
PLATELET # BLD AUTO: 289 10E3/UL (ref 150–450)
POTASSIUM SERPL-SCNC: 4.1 MMOL/L (ref 3.4–5.3)
PROT SERPL-MCNC: 6.5 G/DL (ref 6.4–8.3)
RBC # BLD AUTO: 4.9 10E6/UL (ref 3.8–5.2)
SARS-COV-2 RNA RESP QL NAA+PROBE: NEGATIVE
SODIUM SERPL-SCNC: 140 MMOL/L (ref 136–145)
TROPONIN T SERPL HS-MCNC: 14 NG/L
TROPONIN T SERPL HS-MCNC: 62 NG/L
TSH SERPL DL<=0.005 MIU/L-ACNC: 1.32 UIU/ML (ref 0.3–4.2)
WBC # BLD AUTO: 7.5 10E3/UL (ref 4–11)

## 2022-10-12 PROCEDURE — 99219 PR INITIAL OBSERVATION CARE,LEVEL II: CPT | Performed by: PHYSICIAN ASSISTANT

## 2022-10-12 PROCEDURE — C9803 HOPD COVID-19 SPEC COLLECT: HCPCS

## 2022-10-12 PROCEDURE — 84484 ASSAY OF TROPONIN QUANT: CPT | Performed by: PHYSICIAN ASSISTANT

## 2022-10-12 PROCEDURE — 84484 ASSAY OF TROPONIN QUANT: CPT | Performed by: EMERGENCY MEDICINE

## 2022-10-12 PROCEDURE — 250N000013 HC RX MED GY IP 250 OP 250 PS 637: Performed by: PHYSICIAN ASSISTANT

## 2022-10-12 PROCEDURE — 83735 ASSAY OF MAGNESIUM: CPT | Performed by: PHYSICIAN ASSISTANT

## 2022-10-12 PROCEDURE — G0378 HOSPITAL OBSERVATION PER HR: HCPCS

## 2022-10-12 PROCEDURE — 99285 EMERGENCY DEPT VISIT HI MDM: CPT | Mod: 25

## 2022-10-12 PROCEDURE — 85730 THROMBOPLASTIN TIME PARTIAL: CPT | Performed by: EMERGENCY MEDICINE

## 2022-10-12 PROCEDURE — 93005 ELECTROCARDIOGRAM TRACING: CPT | Mod: 76

## 2022-10-12 PROCEDURE — 93005 ELECTROCARDIOGRAM TRACING: CPT

## 2022-10-12 PROCEDURE — 71045 X-RAY EXAM CHEST 1 VIEW: CPT

## 2022-10-12 PROCEDURE — 85025 COMPLETE CBC W/AUTO DIFF WBC: CPT | Performed by: EMERGENCY MEDICINE

## 2022-10-12 PROCEDURE — 36415 COLL VENOUS BLD VENIPUNCTURE: CPT | Performed by: PHYSICIAN ASSISTANT

## 2022-10-12 PROCEDURE — 85610 PROTHROMBIN TIME: CPT | Performed by: EMERGENCY MEDICINE

## 2022-10-12 PROCEDURE — U0005 INFEC AGEN DETEC AMPLI PROBE: HCPCS | Performed by: EMERGENCY MEDICINE

## 2022-10-12 PROCEDURE — 36415 COLL VENOUS BLD VENIPUNCTURE: CPT | Performed by: EMERGENCY MEDICINE

## 2022-10-12 PROCEDURE — 80053 COMPREHEN METABOLIC PANEL: CPT | Performed by: EMERGENCY MEDICINE

## 2022-10-12 PROCEDURE — 84443 ASSAY THYROID STIM HORMONE: CPT | Performed by: PHYSICIAN ASSISTANT

## 2022-10-12 RX ORDER — NALOXONE HYDROCHLORIDE 0.4 MG/ML
0.4 INJECTION, SOLUTION INTRAMUSCULAR; INTRAVENOUS; SUBCUTANEOUS
Status: DISCONTINUED | OUTPATIENT
Start: 2022-10-12 | End: 2022-10-13 | Stop reason: HOSPADM

## 2022-10-12 RX ORDER — AMOXICILLIN 250 MG
1 CAPSULE ORAL 2 TIMES DAILY PRN
Status: DISCONTINUED | OUTPATIENT
Start: 2022-10-12 | End: 2022-10-13 | Stop reason: HOSPADM

## 2022-10-12 RX ORDER — NALOXONE HYDROCHLORIDE 0.4 MG/ML
0.2 INJECTION, SOLUTION INTRAMUSCULAR; INTRAVENOUS; SUBCUTANEOUS
Status: DISCONTINUED | OUTPATIENT
Start: 2022-10-12 | End: 2022-10-13 | Stop reason: HOSPADM

## 2022-10-12 RX ORDER — ACETAMINOPHEN 325 MG/1
650 TABLET ORAL EVERY 6 HOURS PRN
Status: DISCONTINUED | OUTPATIENT
Start: 2022-10-12 | End: 2022-10-13 | Stop reason: HOSPADM

## 2022-10-12 RX ORDER — ZOLPIDEM TARTRATE 5 MG/1
5 TABLET ORAL
Status: DISCONTINUED | OUTPATIENT
Start: 2022-10-12 | End: 2022-10-12

## 2022-10-12 RX ORDER — DILTIAZEM HYDROCHLORIDE 120 MG/1
120 CAPSULE, COATED, EXTENDED RELEASE ORAL EVERY 24 HOURS
Status: DISCONTINUED | OUTPATIENT
Start: 2022-10-12 | End: 2022-10-12

## 2022-10-12 RX ORDER — ONDANSETRON 4 MG/1
4 TABLET, ORALLY DISINTEGRATING ORAL EVERY 6 HOURS PRN
Status: DISCONTINUED | OUTPATIENT
Start: 2022-10-12 | End: 2022-10-13 | Stop reason: HOSPADM

## 2022-10-12 RX ORDER — ONDANSETRON 2 MG/ML
4 INJECTION INTRAMUSCULAR; INTRAVENOUS EVERY 6 HOURS PRN
Status: DISCONTINUED | OUTPATIENT
Start: 2022-10-12 | End: 2022-10-13 | Stop reason: HOSPADM

## 2022-10-12 RX ORDER — TRAMADOL HYDROCHLORIDE 50 MG/1
50 TABLET ORAL 3 TIMES DAILY PRN
Status: DISCONTINUED | OUTPATIENT
Start: 2022-10-12 | End: 2022-10-13 | Stop reason: HOSPADM

## 2022-10-12 RX ORDER — PANTOPRAZOLE SODIUM 40 MG/1
40 TABLET, DELAYED RELEASE ORAL
Status: DISCONTINUED | OUTPATIENT
Start: 2022-10-13 | End: 2022-10-13 | Stop reason: HOSPADM

## 2022-10-12 RX ORDER — LIDOCAINE 40 MG/G
CREAM TOPICAL
Status: DISCONTINUED | OUTPATIENT
Start: 2022-10-12 | End: 2022-10-12

## 2022-10-12 RX ORDER — DILTIAZEM HYDROCHLORIDE 120 MG/1
120 CAPSULE, COATED, EXTENDED RELEASE ORAL EVERY 24 HOURS
Status: DISCONTINUED | OUTPATIENT
Start: 2022-10-12 | End: 2022-10-13 | Stop reason: HOSPADM

## 2022-10-12 RX ORDER — AMOXICILLIN 250 MG
2 CAPSULE ORAL 2 TIMES DAILY PRN
Status: DISCONTINUED | OUTPATIENT
Start: 2022-10-12 | End: 2022-10-13 | Stop reason: HOSPADM

## 2022-10-12 RX ORDER — ACETAMINOPHEN 650 MG/1
650 SUPPOSITORY RECTAL EVERY 6 HOURS PRN
Status: DISCONTINUED | OUTPATIENT
Start: 2022-10-12 | End: 2022-10-13 | Stop reason: HOSPADM

## 2022-10-12 RX ADMIN — ZOLPIDEM TARTRATE 2.5 MG: 5 TABLET, FILM COATED ORAL at 23:46

## 2022-10-12 RX ADMIN — DILTIAZEM HYDROCHLORIDE 120 MG: 120 CAPSULE, COATED, EXTENDED RELEASE ORAL at 18:10

## 2022-10-12 ASSESSMENT — ENCOUNTER SYMPTOMS
NUMBNESS: 0
COUGH: 0
ABDOMINAL PAIN: 0
DIAPHORESIS: 1
SHORTNESS OF BREATH: 1
PALPITATIONS: 1
DYSURIA: 0
FEVER: 0
WEAKNESS: 0

## 2022-10-12 ASSESSMENT — ACTIVITIES OF DAILY LIVING (ADL)
ADLS_ACUITY_SCORE: 35

## 2022-10-12 NOTE — ED NOTES
[facility]  ED Nurse Handoff Report    Emeli Granados is a 67 year old female   ED Chief complaint: Chest Pain and Palpitations  . ED Diagnosis:   Final diagnoses:   Tachyarrhythmia     Allergies:   Allergies   Allergen Reactions     Metaproterenol Sulfate Other (See Comments)     alupent inhaler-shaking     Percocet [Oxycodone-Acetaminophen] Itching       Code Status: Full Code  Activity level - Baseline/Home:  Independent. Activity Level - Current:   Independent. Lift room needed: No. Bariatric: No   Needed: No   Isolation: No. Infection: Not Applicable.     Vital Signs:   Vitals:    10/12/22 1008 10/12/22 1015 10/12/22 1016 10/12/22 1145   BP:   (!) 112/100 110/64   Pulse:    85   Resp:       Temp: 97  F (36.1  C)      SpO2:  97%  94%       Cardiac Rhythm:  ,      Pain level:    Patient confused: No. Patient Falls Risk: No.   Elimination Status: Has voided   Patient Report - Initial Complaint: Palpitations. Focused Assessment: A&O. Presents to ED with c/o palpitations and chest pain after waking this morning. States her watch was reading 180 bpm at the time. On arrival HR 170s, converted shortly after arrival. HR currently 80-90s. Denies chest pain.   Tests Performed: xray. Abnormal Results:   XR Chest Port 1 View   Preliminary Result   IMPRESSION: Mild bibasilar opacities likely represent atelectasis. The   lungs are otherwise clear. No pneumothorax. Heart size appears stable.   Pulmonary vascularity is within normal limits.        Labs Ordered and Resulted from Time of ED Arrival to Time of ED Departure   COMPREHENSIVE METABOLIC PANEL - Abnormal       Result Value    Sodium 140      Potassium 4.1      Chloride 103      Carbon Dioxide (CO2) 25      Anion Gap 12      Urea Nitrogen 12.4      Creatinine 0.79      Calcium 9.5      Glucose 111 (*)     Alkaline Phosphatase 101      AST 27      ALT 32      Protein Total 6.5      Albumin 3.7      Bilirubin Total 0.4      GFR Estimate 81      CBC WITH PLATELETS AND DIFFERENTIAL - Abnormal    WBC Count 7.5      RBC Count 4.90      Hemoglobin 15.4      Hematocrit 47.3 (*)     MCV 97      MCH 31.4      MCHC 32.6      RDW 13.6      Platelet Count 289      % Neutrophils 55      % Lymphocytes 34      % Monocytes 8      % Eosinophils 1      % Basophils 1      % Immature Granulocytes 1      NRBCs per 100 WBC 0      Absolute Neutrophils 4.2      Absolute Lymphocytes 2.5      Absolute Monocytes 0.6      Absolute Eosinophils 0.1      Absolute Basophils 0.1      Absolute Immature Granulocytes 0.0      Absolute NRBCs 0.0     INR - Normal    INR 0.99     PARTIAL THROMBOPLASTIN TIME - Normal    aPTT 30     TROPONIN T, HIGH SENSITIVITY - Normal    Troponin T, High Sensitivity 14     MAGNESIUM - Normal    Magnesium 1.8     TSH WITH FREE T4 REFLEX   COVID-19 VIRUS (CORONAVIRUS) BY PCR   ABO/RH TYPE AND SCREEN     .   Treatments provided:   Family Comments: NA  OBS brochure/video discussed/provided to patient:  Yes  ED Medications: Medications - No data to display  Drips infusing:  No  For the majority of the shift, the patient's behavior Green. Interventions performed were NA.    Sepsis treatment initiated: No     Patient tested for COVID 19 prior to admission: YES    ED Nurse Name/Phone Number: Shanti Boland RN,   1:45 PM  RECEIVING UNIT ED HANDOFF REVIEW    Above ED Nurse Handoff Report was reviewed: Yes  Reviewed by: Lita Ravi RN on October 12, 2022 at 2:55 PM

## 2022-10-12 NOTE — H&P
[facility]  Internal Medicine  H & P      Patient Name: Emeli Granados MRN# [id number]   Age: 67 year old YOB: 1955     Date of Admission:10/12/2022    Primary care provider: Emma Byrne  Date of Service: 10/12/2022         Assessment and Plan:   Emeli Granados is a 67 year old female with a history of Osteopenia, Diverticulosis, Esophageal Stricture, Hepatitis C, Cervical Cancer, Former Smoker, Emphysema who presents to the ED today with chest pain.      Chest Pain  Palpitations  Tachycardia - pt presents with acute onset of chest pressure, palpitations, diaphoresis, clammy sensation occurring at 8am today.  She was found to have a HR of 175 and EKG with a Wide QRS tachyarrhythmia.  Patient spontaneously converted back to NSR while in the ED.  Pt reports similar episodes occurring over the past one year, now happening more frequently.  Pt with no prior hx of CAD, HLD, HTN, DM, Obesity.  She is a former smoker with evidence of Emphysema on imaging.  Laboratory work up on admission is unremarkable.  CXR with no acute abnormalities.  - Cardiology consulted by ED staff, briefly discussed with Cardiology on call who felt rhythm may represent AVNRT vs. AVRT and could start Diltiazem.  IV Diltazem not ordered in the ED as pt converted back to NSR.  Cardiology recommended oral Diltiazem 120mg daily  - serial troponin levels  - telemetry monitoring  - add on magnesium and tsh  - echocardiogram  - likely discharge on a monitor with EP referral      Emphysema  Former Smoker - lungs CTA    CODE: full  Diet/IVF: regular  DVT ppx: scd    Mallory Mtz MS PA-C  Physician Assistant   Hospitalist Service  Pager: [phone]           Chief Complaint:   Chest Pressure, Palpitations.         HPI:   67 year old female with a history of Osteopenia, Diverticulosis, Esophageal Stricture, Hepatitis C, Cervical Cancer, Former Smoker, Emphysema who presents to the ED today with chest pressure and  palpitations.    Patient reports she awoke this morning at 8am feeling clammy, diaphoretic with a diffuse chest pressure and heart racing.  Her symptoms persisted intermittently for several hours prompting her to present to the ED.  Patient denies any radiation of the pain to her left arm, she has chronic LUE pain due to a prior injury.  Patient reports similar episodes occurring 1-2 times per month over the past one year.  Episodes can occur at rest or with activity and resolve completely with rest within 10-15 minutes.  Patient reports the episodes have been occurring more frequently lately and today's episode was the longest in duration.  Patient reports her symptoms resolved while she was in the ED ambulating back from the bathroom.  She is currently asymptomatic.         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     Polycythemia 08/08/2012    resolved now          Past Surgical History:     Past Surgical History:   Procedure Laterality Date     CARPAL TUNNEL RELEASE RT/LT Right 11/23/2021    and trigger finger and tendon repair     CARPAL TUNNEL RELEASE RT/LT Left 12/20/2021    and tendon repair     COLONOSCOPY  2004, 2012    repeat in 2022     Dilatation of the esophagus once due to dysphagia and stricture  2003     Ganglion cyst removal       HEMORRHOIDECTOMY BANDING       HYSTERECTOMY       LAPAROSCOPIC SALPINGO-OOPHORECTOMY Bilateral 10/23/2015    Procedure: LAPAROSCOPIC SALPINGO-OOPHORECTOMY;  Surgeon: Johnathan Steve MD;  Location: [facility] OR     subscapularous repair and biceps tendon repair  05/2022     Dr. Verma at [facility]     Thumb surgery Right      TONSILLECTOMY            Social History:     Social History     Socioeconomic History     Marital status:      Spouse name: Boyfriend - Fritz     Number of children: 3     Years of education: Not on file     Highest education level: Not on file   Occupational History     Occupation: work in [employer]     Employer: [employer]   Tobacco Use     Smoking status: Former     Packs/day: 0.50     Years: 30.00     Pack years: 15.00     Types: Cigarettes     Start date: 10/13/1967     Quit date: 2019     Years since quittin.9     Smokeless tobacco: Never     Tobacco comments:     quit 2019   Vaping Use     Vaping Use: Never used   Substance and Sexual Activity     Alcohol use: No     Comment: sober since 99     Drug use: No     Sexual activity: Not Currently     Partners: Male   Other Topics Concern     Parent/sibling w/ CABG, MI or angioplasty before 65F 55M? Yes   Social History Narrative     Not on file     Social Determinants of Health     Financial Resource Strain: Not on file   Food Insecurity: Not on file   Transportation Needs: Not on file   Physical Activity: Not on file   Stress: Not on file   Social Connections: Not on file   Intimate Partner Violence: Not on file   Housing Stability: Not on file          Family History:     Family History   Problem Relation Age of Onset     Hypertension Mother      Cerebrovascular Disease Mother         TIA's     Depression Mother      Cancer - colorectal Maternal Grandmother         dx at 65     Lipids Father      C.A.D. Father         angioplasty at 65     Musculoskeletal Disorder Father      Alcohol/Drug Daughter         in remission     Breast Cancer No family hx of           Allergies:      Allergies   Allergen Reactions     Metaproterenol Sulfate Other (See Comments)     alupent inhaler-shaking     Percocet [Oxycodone-Acetaminophen] Itching          Medications:     Prior to Admission medications     Medication Sig Last Dose Taking? Auth Provider Long Term End Date   albuterol (PROAIR HFA/PROVENTIL HFA/VENTOLIN HFA) 108 (90 Base) MCG/ACT inhaler INHALE 1 OR 2 puffs EVERY 6 HOURS as needed for wheezing.   Lars Oconnor MD Yes    albuterol (PROVENTIL) (2.5 MG/3ML) 0.083% neb solution INHALE 3 MILLILITERS (2.5 MG) BY NEBULIZATION ROUTE EVERY 6 HOURS AS NEEDED FOR SHORTNESS OF BREATH/DYSPNEA OR WHEEZING.   Emma Byrne MD Yes    ascorbic acid (VITAMIN C) 1000 MG TABS Take 1,000 mg by mouth daily   Unknown, Entered By History     cyclobenzaprine (FLEXERIL) 10 MG tablet Take 1 tablet (10 mg) by mouth 2 times daily as needed for muscle spasms or other (shoulder pain) . Do not take within 6 hours of tramadol or zolpidem   Emma Byrne MD     HYDROcodone-acetaminophen (NORCO)  MG per tablet Take 1 tablet by mouth 2 times daily as needed for severe pain   Kayla Rodriguez, PAAydeeC     Multiple Vitamins-Minerals (MULTIVITAMIN OR) Take 1 tablet by mouth daily Per pt, uses ones without Iron   Reported, Patient     omeprazole (PRILOSEC) 20 MG DR capsule Take 1 capsule (20 mg) by mouth daily   Emma Byrne MD     omeprazole (PRILOSEC) 20 MG DR capsule Take 1 capsule (20 mg) by mouth daily   Emma Byrne MD     traMADol (ULTRAM) 50 MG tablet Take 1 tablet (50 mg) by mouth 3 times daily For chronic neck pain   Emma Byrne MD     vitamin D3 (CHOLECALCIFEROL) 1000 units (25 mcg) tablet Take 1,000 Units by mouth daily   Unknown, Entered By History     vitamin E 400 UNITS TABS Take 400 Units by mouth daily   Unknown, Entered By History     zolpidem (AMBIEN) 5 MG tablet Take 1 tablet (5 mg) by mouth nightly as needed for sleep Do not take with tramadol   Emma Byrne MD Yes           Review of Systems:   A complete ROS was performed and is negative other than what is stated in the HPI.       Physical Exam:   Blood pressure 110/64, pulse 85, temperature 97  F (36.1  C), resp. rate 18, SpO2 94 %, not  currently breastfeeding.  General: Alert, interactive, NAD  HEENT: AT/NC  Chest/Resp: clear to auscultation bilaterally, no crackles or wheezes  Heart/CV: regular rate and rhythm, no murmur  Abdomen/GI: Soft, nontender, nondistended. +BS.  Extremities/MSK: No LE edema  Skin: Warm and dry  Neuro: Alert & oriented x 3, no focal deficits, moves all extremities equally         Labs:   ROUTINE ICU LABS (Last four results)  CMP  Recent Labs   Lab 10/12/22  1018      POTASSIUM 4.1   CHLORIDE 103   CO2 25   ANIONGAP 12   *   BUN 12.4   CR 0.79   GFRESTIMATED 81   ALAN 9.5   PROTTOTAL 6.5   ALBUMIN 3.7   BILITOTAL 0.4   ALKPHOS 101   AST 27   ALT 32     CBC  Recent Labs   Lab 10/12/22  1018   WBC 7.5   RBC 4.90   HGB 15.4   HCT 47.3*   MCV 97   MCH 31.4   MCHC 32.6   RDW 13.6        INR  Recent Labs   Lab 10/12/22  1018   INR 0.99          Imaging/Procedures:     Results for orders placed or performed during the hospital encounter of 10/12/22   XR Chest Port 1 View    Narrative    CHEST ONE VIEW PORTABLE October 12, 2022 10:44 AM     HISTORY: Tachyarrhythmia.    COMPARISON: 5/23/2022.      Impression    IMPRESSION: Mild bibasilar opacities likely represent atelectasis. The  lungs are otherwise clear. No pneumothorax. Heart size appears stable.  Pulmonary vascularity is within normal limits.

## 2022-10-12 NOTE — ED PROVIDER NOTES
History   Chief Complaint:  Chest Pain and Palpitations       The history is provided by the patient.      Emeli Granados is a 67 year old female who presents with chest pain. The patient states that when she woke up this morning around 0800 she started feeling chest pain and her Apple watch noted that her rate rate was measuring between 190-200 bpm. These episodes of chest pain, high heart rate, diaphoresis, shortness of breath, and palpitations are intermittent, lasting about one minute in length. In the ED, she notes that she feels slightly better, but still has a high heart rate. She denies any fever, cough, visual disturbance, hearing changes, abdominal pain, dysuria, or numbness/weakness in her arms or legs. Of note, the patient did say that she had similar issues in the past and did come in to the ER on 9/11 for this issue. She also has no personal history of heart issues.      Review of Systems   Constitutional: Positive for diaphoresis. Negative for fever.   HENT: Negative for hearing loss.    Eyes: Negative for visual disturbance.   Respiratory: Positive for shortness of breath. Negative for cough.    Cardiovascular: Positive for chest pain and palpitations.        (+) High heart rate   Gastrointestinal: Negative for abdominal pain.   Genitourinary: Negative for dysuria.   Neurological: Negative for weakness and numbness.   All other systems reviewed and are negative.      Allergies:  Metaproterenol sulfate  Percocet    Medications:  Flexeril  Norco  Omeprazole  Tramadol  Cholecalciferol  Albuterol    Past Medical History:     Disorder of bone and cartilage  Osteopenia  Asthma  Adenomyomatosis of gallbladder  Diverticulosis     Past Surgical History:    Carpal tunnel release x2  Colonoscopy x2  Dilatation  Ganglion cyst removal  Hemorrhoidectomy  Hysterectomy  Laparoscopic salpingo-oophorectomy  Subscapularous repair and biceps tendon repair  Thumb surgery  Tonsillectomy     Family History:     Hypertension - mother  TIA - mother  Depression - mother  CAD - father  Musculoskeletal disorder - father    Social History:  Patient came from home.  Patient is unaccompanied in the ED.  PCP: Emma Byrne     Physical Exam     Patient Vitals for the past 24 hrs:   BP Temp Pulse Resp SpO2   10/12/22 1145 110/64 -- 85 -- 94 %   10/12/22 1016 (!) 112/100 -- -- -- --   10/12/22 1015 -- -- -- -- 97 %   10/12/22 1008 -- 97  F (36.1  C) -- -- --   10/12/22 1007 -- -- (!) 186 18 --       Physical Exam  General: Laying on the ED bed, no distress  HEENT: Normocephalic, atraumatic  Cardiac: Radial pulses 2+, regular tachycardia  Pulm: Breathing comfortably, no accessory muscle usage, no conversational dyspnea, and lungs clear bilaterally  GI: Abdomen soft, nontender, no rigidity or guarding  MSK: No deformities, calves nontender  Skin: Warm and dry  Neuro: Moves all extremities x4, no focal deficits  Psych: Normal mood and affect     Emergency Department Course   ECG 1  ECG obtained at 1016, ECG read at 1025  Wide QRS tachycardia  Nonspecific intraventricular block  Abnormal ECG  Wide QRS tachyarrhythmia new from prior ECG dated 9/11/22.  Rate 175 bpm. MI interval * ms. QRS duration 134 ms. QT/QTc 268/457 ms. P-R-T axes * 13 37.    ECG 2  ECG obtained at 1109, ECG read at 1111  Sinus rhythm with premature atrial complexes  Low voltage QRS  Cannot rule out anterior infarct, age undetermined  NSR with PAC's new from prior ECG1 dated 10/12/22.  Rate 89 bpm. MI interval 148 ms. QRS duration 82 ms. QT/QTc 354/430 ms. P-R-T axes 39 -8 24.    Imaging:  XR Chest Port 1 View   Preliminary Result   IMPRESSION: Mild bibasilar opacities likely represent atelectasis. The   lungs are otherwise clear. No pneumothorax. Heart size appears stable.   Pulmonary vascularity is within normal limits.        Report per radiology    Laboratory:  Labs Ordered and Resulted from Time of ED Arrival to Time of ED Departure   COMPREHENSIVE METABOLIC  PANEL - Abnormal       Result Value    Sodium 140      Potassium 4.1      Chloride 103      Carbon Dioxide (CO2) 25      Anion Gap 12      Urea Nitrogen 12.4      Creatinine 0.79      Calcium 9.5      Glucose 111 (*)     Alkaline Phosphatase 101      AST 27      ALT 32      Protein Total 6.5      Albumin 3.7      Bilirubin Total 0.4      GFR Estimate 81     CBC WITH PLATELETS AND DIFFERENTIAL - Abnormal    WBC Count 7.5      RBC Count 4.90      Hemoglobin 15.4      Hematocrit 47.3 (*)     MCV 97      MCH 31.4      MCHC 32.6      RDW 13.6      Platelet Count 289      % Neutrophils 55      % Lymphocytes 34      % Monocytes 8      % Eosinophils 1      % Basophils 1      % Immature Granulocytes 1      NRBCs per 100 WBC 0      Absolute Neutrophils 4.2      Absolute Lymphocytes 2.5      Absolute Monocytes 0.6      Absolute Eosinophils 0.1      Absolute Basophils 0.1      Absolute Immature Granulocytes 0.0      Absolute NRBCs 0.0     INR - Normal    INR 0.99     PARTIAL THROMBOPLASTIN TIME - Normal    aPTT 30     TROPONIN T, HIGH SENSITIVITY - Normal    Troponin T, High Sensitivity 14     MAGNESIUM - Normal    Magnesium 1.8     TSH WITH FREE T4 REFLEX   ABO/RH TYPE AND SCREEN          Emergency Department Course:       Reviewed:  I reviewed nursing notes, vitals and past medical history    Assessments:  1011 I obtained history and examined the patient as noted above.   1025 I rechecked the patient and explained findings.   1258 I rechecked and updated the patient.    Consults:  1050 I consulted with Dr. Doran with cardiology about the patient's history and plan of care.   1225 I consulted with Mallory Mtz for Dr. Weldon of the hospitalist service and discussed patient admission. They accepted care of the patient.  1338 I consulted with Mallory Mtz for Dr. Weldon to update them about the patient.     Interventions:  1025 Vagal maneuvers, unsuccessful  1025 1 L crystalloid bolus    Disposition:  The patient was admitted  to the hospital under the care of Dr. Weldon.     Impression & Plan     Medical Decision Makin-year-old female presents with chest pain, diaphoresis, dyspnea with a heart rate in the 170s as above.  Heart rhythm appears normal on the monitor and is indeed normal on twelve-lead EKG with a QRS duration of 134 ms.  Prior EKG from September shows normal sinus rhythm with a possible delta wave in lead II.  Differential includes SVT with aberrancy, ventricular tachycardia, or other tachyarrhythmia.  Patient's blood pressure is stable and there is no need for electrocardioversion immediately.  Vagal maneuvers were attempted at the bedside unsuccessfully.  I spoke with cardiology who recommended a diltiazem bolus after reviewing the EKG on the patient's prior EKG.  Before this was accomplished, the patient walked to the bathroom and spontaneous converted back to normal sinus rhythm.  Repeat twelve-lead EKG shows several PACs with normal sinus rhythm.  Plan at this time is for admission to the hospitalist service for further evaluation and treatment of this as of yet undifferentiated tachyarrhythmia.      Diagnosis:    ICD-10-CM    1. Tachyarrhythmia  R00.0           Scribe Disclosure:  Carmen LAYNE, am serving as a scribe at 10:11 AM on 10/12/2022 to document services personally performed by Chaparro Muir MD based on my observations and the provider's statements to me.     MD Wood HENSON Colin, MD  10/12/22 1749

## 2022-10-12 NOTE — PHARMACY-ADMISSION MEDICATION HISTORY
Admission medication history interview status for this patient is complete. See [facility] admission navigator for allergy information, prior to admission medications and immunization status.     Medication history interview done, indicate source(s): Patient @ [phone]  Medication history resources (including written lists, pill bottles, clinic- record):None      Changes made to PTA medication list:  Added: none  Changed: tramadol to prn  Reported as Not Taking: none  Removed: prilosec (duplicate)    Actions taken by pharmacist (provider contacted, etc):None     Additional medication history information:None    Medication reconciliation/reorder completed by provider prior to medication history?  n   (Y/N)     For patients on insulin therapy:   Do you use sliding scale insulin based on blood sugars?   What is your pre-meal insulin coverage?    Do you typically eat three meals a day?   How many times do you check your blood glucose per day?   How many episodes of hypoglycemia do you typically have per month?   Do you have a Continuous Glucose Monitor (CGM)?      Prior to Admission medications    Medication Sig Last Dose Taking? Auth Provider Long Term End Date   albuterol (PROAIR HFA/PROVENTIL HFA/VENTOLIN HFA) 108 (90 Base) MCG/ACT inhaler INHALE 1 OR 2 puffs EVERY 6 HOURS as needed for wheezing.  Yes Lars Oconnor MD Yes    albuterol (PROVENTIL) (2.5 MG/3ML) 0.083% neb solution INHALE 3 MILLILITERS (2.5 MG) BY NEBULIZATION ROUTE EVERY 6 HOURS AS NEEDED FOR SHORTNESS OF BREATH/DYSPNEA OR WHEEZING.  -Yes Emma Byrne MD Yes    ascorbic acid (VITAMIN C) 1000 MG TABS Take 1,000 mg by mouth daily 10/11/2022 Yes Unknown, Entered By History     cyclobenzaprine (FLEXERIL) 10 MG tablet Take 1 tablet (10 mg) by mouth 2 times daily as needed for muscle spasms or other (shoulder pain) . Do not take within 6 hours of tramadol or zolpidem  Yes Emma Byrne MD     HYDROcodone-acetaminophen (NORCO)  MG per  tablet Take 1 tablet by mouth 2 times daily as needed for severe pain  Yes Kayla Rodriguez, PAAydeeC     Multiple Vitamins-Minerals (MULTIVITAMIN OR) Take 1 tablet by mouth daily Per pt, uses ones without Iron 10/11/2022 Yes Reported, Patient     omeprazole (PRILOSEC) 20 MG DR capsule Take 1 capsule (20 mg) by mouth daily 10/12/2022 at am Yes Emma Byrne MD     traMADol (ULTRAM) 50 MG tablet Take 1 tablet (50 mg) by mouth 3 times daily For chronic neck pain  Patient taking differently: Take 50 mg by mouth 3 times daily as needed For chronic neck pain  Yes Emma Byrne MD     vitamin D3 (CHOLECALCIFEROL) 1000 units (25 mcg) tablet Take 1,000 Units by mouth daily 10/11/2022 Yes Unknown, Entered By History     vitamin E 400 UNITS TABS Take 400 Units by mouth daily 10/11/2022 Yes Unknown, Entered By History     zolpidem (AMBIEN) 5 MG tablet Take 1 tablet (5 mg) by mouth nightly as needed for sleep Do not take with tramadol 10/11/2022 Yes Emma Byrne MD Yes

## 2022-10-13 ENCOUNTER — APPOINTMENT (OUTPATIENT)
Dept: CARDIOLOGY | Facility: CLINIC | Age: 67
End: 2022-10-13
Attending: PHYSICIAN ASSISTANT
Payer: MEDICARE

## 2022-10-13 VITALS
SYSTOLIC BLOOD PRESSURE: 138 MMHG | TEMPERATURE: 98.8 F | OXYGEN SATURATION: 94 % | DIASTOLIC BLOOD PRESSURE: 63 MMHG | BODY MASS INDEX: 29.77 KG/M2 | RESPIRATION RATE: 18 BRPM | HEART RATE: 76 BPM | HEIGHT: 59 IN

## 2022-10-13 LAB
ATRIAL RATE - MUSE: 170 BPM
ATRIAL RATE - MUSE: 89 BPM
CHOLEST SERPL-MCNC: 219 MG/DL
DIASTOLIC BLOOD PRESSURE - MUSE: NORMAL MMHG
DIASTOLIC BLOOD PRESSURE - MUSE: NORMAL MMHG
HDLC SERPL-MCNC: 53 MG/DL
INTERPRETATION ECG - MUSE: NORMAL
INTERPRETATION ECG - MUSE: NORMAL
LDLC SERPL CALC-MCNC: 115 MG/DL
LVEF ECHO: NORMAL
NONHDLC SERPL-MCNC: 166 MG/DL
P AXIS - MUSE: 39 DEGREES
P AXIS - MUSE: NORMAL DEGREES
PR INTERVAL - MUSE: 148 MS
PR INTERVAL - MUSE: NORMAL MS
QRS DURATION - MUSE: 134 MS
QRS DURATION - MUSE: 82 MS
QT - MUSE: 268 MS
QT - MUSE: 354 MS
QTC - MUSE: 430 MS
QTC - MUSE: 457 MS
R AXIS - MUSE: -8 DEGREES
R AXIS - MUSE: 13 DEGREES
SYSTOLIC BLOOD PRESSURE - MUSE: NORMAL MMHG
SYSTOLIC BLOOD PRESSURE - MUSE: NORMAL MMHG
T AXIS - MUSE: 24 DEGREES
T AXIS - MUSE: 37 DEGREES
TRIGL SERPL-MCNC: 254 MG/DL
TROPONIN T SERPL HS-MCNC: 19 NG/L
VENTRICULAR RATE- MUSE: 175 BPM
VENTRICULAR RATE- MUSE: 89 BPM

## 2022-10-13 PROCEDURE — 93325 DOPPLER ECHO COLOR FLOW MAPG: CPT | Mod: 26 | Performed by: INTERNAL MEDICINE

## 2022-10-13 PROCEDURE — 84484 ASSAY OF TROPONIN QUANT: CPT | Performed by: PHYSICIAN ASSISTANT

## 2022-10-13 PROCEDURE — 93306 TTE W/DOPPLER COMPLETE: CPT | Mod: 26 | Performed by: INTERNAL MEDICINE

## 2022-10-13 PROCEDURE — 99217 PR OBSERVATION CARE DISCHARGE: CPT | Performed by: PHYSICIAN ASSISTANT

## 2022-10-13 PROCEDURE — G0378 HOSPITAL OBSERVATION PER HR: HCPCS

## 2022-10-13 PROCEDURE — 999N000208 ECHO STRESS ECHOCARDIOGRAM

## 2022-10-13 PROCEDURE — 80061 LIPID PANEL: CPT | Performed by: PHYSICIAN ASSISTANT

## 2022-10-13 PROCEDURE — 36415 COLL VENOUS BLD VENIPUNCTURE: CPT | Performed by: PHYSICIAN ASSISTANT

## 2022-10-13 PROCEDURE — 93242 EXT ECG>48HR<7D RECORDING: CPT

## 2022-10-13 PROCEDURE — 250N000013 HC RX MED GY IP 250 OP 250 PS 637: Performed by: PHYSICIAN ASSISTANT

## 2022-10-13 PROCEDURE — 999N000208 ECHOCARDIOGRAM COMPLETE

## 2022-10-13 PROCEDURE — 255N000002 HC RX 255 OP 636: Performed by: HOSPITALIST

## 2022-10-13 PROCEDURE — 93016 CV STRESS TEST SUPVJ ONLY: CPT | Performed by: INTERNAL MEDICINE

## 2022-10-13 PROCEDURE — 93350 STRESS TTE ONLY: CPT | Mod: 26 | Performed by: INTERNAL MEDICINE

## 2022-10-13 PROCEDURE — 93321 DOPPLER ECHO F-UP/LMTD STD: CPT | Mod: 26 | Performed by: INTERNAL MEDICINE

## 2022-10-13 PROCEDURE — 93244 EXT ECG>48HR<7D REV&INTERPJ: CPT | Performed by: INTERNAL MEDICINE

## 2022-10-13 PROCEDURE — 93018 CV STRESS TEST I&R ONLY: CPT | Performed by: INTERNAL MEDICINE

## 2022-10-13 RX ORDER — ASPIRIN 81 MG/1
81 TABLET, CHEWABLE ORAL DAILY
Status: DISCONTINUED | OUTPATIENT
Start: 2022-10-13 | End: 2022-10-13 | Stop reason: HOSPADM

## 2022-10-13 RX ORDER — DILTIAZEM HYDROCHLORIDE 120 MG/1
120 CAPSULE, COATED, EXTENDED RELEASE ORAL EVERY MORNING
Qty: 30 CAPSULE | Refills: 0 | Status: SHIPPED | OUTPATIENT
Start: 2022-10-13 | End: 2022-11-02

## 2022-10-13 RX ADMIN — ASPIRIN 81 MG CHEWABLE TABLET 81 MG: 81 TABLET CHEWABLE at 13:13

## 2022-10-13 RX ADMIN — HUMAN ALBUMIN MICROSPHERES AND PERFLUTREN 3 ML: 10; .22 INJECTION, SOLUTION INTRAVENOUS at 13:11

## 2022-10-13 RX ADMIN — PANTOPRAZOLE SODIUM 40 MG: 40 TABLET, DELAYED RELEASE ORAL at 06:51

## 2022-10-13 RX ADMIN — HUMAN ALBUMIN MICROSPHERES AND PERFLUTREN 3 ML: 10; .22 INJECTION, SOLUTION INTRAVENOUS at 08:17

## 2022-10-13 ASSESSMENT — ACTIVITIES OF DAILY LIVING (ADL)
ADLS_ACUITY_SCORE: 35

## 2022-10-13 NOTE — DISCHARGE SUMMARY
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 In brief, this is a 67 year old female with a history of Osteopenia, Diverticulosis, Esophageal Stricture, Hepatitis C, Cervical Cancer, Former Smoker, Emphysema who presents to the ED on 10/12 with chest pain and palpitations.     Chest Pain  Palpitations  Tachycardia - pt presented with an acute onset of chest pressure, palpitations, diaphoresis, clammy sensation occurring at 8am the day of admission.  She was found to have a HR of 175 and EKG with a Wide QRS tachyarrhythmia.  Patient spontaneously converted back to NSR while in the ED.  Pt reports similar episodes of palpitations occurring over the past one year, now happening more frequently.  Additionally, she described BUSTILLO not necessarily associated with palpitations episodes as well.  Pt with no prior hx of CAD, HLD, HTN, DM, Obesity.  She is a former smoker with evidence of Emphysema on imaging.  Laboratory work up on admission was unremarkable.  CXR with no acute abnormalities.  - Cardiology was consulted by ED staff, briefly discussed with Cardiology on call who felt rhythm may represent AVNRT vs. AVRT and could start Diltiazem.  IV Diltazem not ordered in the ED as pt converted back to NSR.  Cardiology recommended oral Diltiazem 120mg daily  - serial troponin level peaked at 62.  Likely related to severe tachycardia as stress test was normal.   - telemetry monitoring overnight revealed sinus rhythm.  - echocardiogram was wnl  - exercise stress echocardiogram was normal with no evidence of  stress-induced ischemia.  - patient was discharged home with a zio patch  - patient referred to Electrophysiologist Cardiology      Emphysema  Former Smoker - lungs CTA.  Hx of BUSTILLO which may be related to underlying emphysema as well.  Outpatient PFTs were recommended.    HLD - results of lipid panel returned after patient had discharged home.  Tchol, LDL, Trig elevated.  I discussed with the patient over the phone and called in Atorvastatin 10mg daily to her pharmacy.  Patient will also increase her exercise daily and instructed to start a low cholesterol diet.  She will follow up with PCP for further monitoring.      Procedures/Imaging:     Results for orders placed or performed during the hospital encounter of 10/12/22   XR Chest Port 1 View    Narrative    CHEST ONE VIEW PORTABLE 2022 10:44 AM     HISTORY: Tachyarrhythmia.    COMPARISON: 2022.      Impression    IMPRESSION: Mild bibasilar opacities likely represent atelectasis. The  lungs are otherwise clear. No pneumothorax. Heart size appears stable.  Pulmonary vascularity is within normal limits.    MYRIAM GUTIERREZ MD         SYSTEM ID:  [id number]   Echocardiogram Complete     Value    LVEF  60-65%    Narrative    [id number]  [id number]  [id number]  [id number]^LIGIA^JERRY^S     [facility]  Echocardiography Laboratory  [address] [place] [address]     Name: MARQUIS BHATTI  MRN: [id number]  : 1955  Study Date: 10/13/2022 07:53 AM  Age: 67 yrs  Gender: Female  Patient Location: [facility]  Reason For Study: Tachycardia  Ordering Physician: JERRY STRONG  Performed By: Marian Hunter     BSA: 1.6 m2  Height: 59 in  Weight: 147 lb  HR: 72  BP: 111/76 mmHg  ______________________________________________________________________________  Procedure  Complete Portable Echo Adult. Optison (NDC #[id number]) given  intravenously.  ______________________________________________________________________________  Interpretation Summary     Left ventricular systolic function is normal.The visual ejection fraction is  60-65%.  The right ventricular systolic function is normal.  No significant valvular regurgitation or stenposis on doppler interrogation.  The inferior vena cava was normal in size with preserved respiratory  variability.  ______________________________________________________________________________  Left Ventricle  The left ventricle is normal in size. There is normal left ventricular wall  thickness. Left ventricular systolic function is normal. The visual ejection  fraction is 60-65%. Diastolic Doppler findings (E/E' ratio and/or other  parameters) suggest left ventricular filling pressures are indeterminate. No  regional wall motion abnormalities noted.     Right Ventricle  The right ventricle is normal size. The right ventricular systolic function is  normal.     Atria  Normal left atrial size. Right atrial size is normal. There is no color  Doppler evidence of an atrial shunt.     Mitral Valve  There is trace mitral regurgitation.     Tricuspid Valve  There is trace tricuspid regurgitation. The right ventricular systolic  pressure is approximated at 24.6 mmHg plus the right atrial pressure.     Aortic Valve  The aortic valve is not well visualized. No aortic regurgitation is present.  No aortic stenosis is present.     Pulmonic Valve  The pulmonic valve is not well visualized. There is no pulmonic valvular  stenosis.     Vessels  Normal size ascending aorta. The inferior vena cava was normal in size with  preserved respiratory variability.     Pericardium  There is no pericardial effusion.     Rhythm  Sinus rhythm was noted.  ______________________________________________________________________________  MMode/2D Measurements & Calculations     IVSd: 1.1 cm  LVIDd: 3.6 cm  LVIDs: 2.1 cm  LVPWd: 0.85 cm  FS: 41.8  %  LV mass(C)d: 107.2 grams  LV mass(C)dI: 66.3 grams/m2  asc Aorta Diam: 3.0 cm  LVOT diam: 1.9 cm  LVOT area: 2.8 cm2  LA Volume (BP): 23.5 ml  LA Volume Index (BP): 14.5 ml/m2  RWT: 0.47     Doppler Measurements & Calculations  MV E max roberto: 62.9 cm/sec  MV dec time: 0.06 sec  Ao V2 max: 140.0 cm/sec  Ao max P.0 mmHg  Ao V2 mean: 86.8 cm/sec  Ao mean PG: 3.0 mmHg  Ao V2 VTI: 25.9 cm  SHANTELL(I,D): 2.2 cm2  SHANTELL(V,D): 2.0 cm2  LV V1 max P.1 mmHg  LV V1 max: 101.0 cm/sec  LV V1 VTI: 20.5 cm  SV(LVOT): 58.1 ml  SI(LVOT): 35.9 ml/m2  PA acc time: 0.06 sec  TR max roberto: 248.0 cm/sec  TR max P.6 mmHg  AV Roberto Ratio (DI): 0.72  SHANTELL Index (cm2/m2): 1.4  E/E' av.8  Lateral E/e': 8.9  Medial E/e': 8.6     ______________________________________________________________________________  Report approved by: Jerome Hanks 10/13/2022 09:08 AM         Echo Stress Echocardiogram    Narrative    [id number]  [id number]  [id number]  [id number]^LIGIA^JERRY^S     [facility]  Echocardiography Laboratory  [address] [place] [address]     Name: MARQUIS BHATTI  MRN: [id number]  : 1955  Study Date: 10/13/2022 12:52 PM  Age: 67 yrs  Gender: Female  Patient Location: [facility]  Reason For Study: BUSTILLO  History: Shortness of Breath  Ordering Physician: JERRY STRONG  Referring Physician: Emma Garcia  Performed By: Arabella Warner     BSA: 1.6 m2  Height: 59 in  Weight: 145 lb  HR: 85  BP: 106/74 mmHg  ______________________________________________________________________________  Procedure  Stress Echo Complete.  ______________________________________________________________________________  Interpretation Summary  A low workload was achieved.  There was no chest pain or significant ST changes with exercise.  This was a normal stress echocardiogram with no evidence of stress-induced  ischemia.  Normal resting wall motion and no stress-induced wall motion  "abnormality.  ______________________________________________________________________________  Stress  The patient exercised 4:30.  RPP 05012.  Exercise was stopped due to fatigue.  There was a normal BP response to exercise.  The patient exhibited no chest pain during exercise.  A low workload was achieved.  No arrhythmia noted.  There was no chest pain or significant ST changes with exercise.  This was a normal stress echocardiogram with no evidence of stress-induced  ischemia.  The visual ejection fraction is 60-65%.  Normal resting wall motion and no stress-induced wall motion abnormality.     Baseline  Normal baseline electrocardiogram.  Normal left ventricular function and wall motion at rest.     Stress Results             Protocol:  Sekou          Maximum Predicted HR:   152 bpm             Target HR: 129 bpm        % Maximum Predicted HR: 88 %         Stage  DurationHeart Rate  BP                   Comment             (mm:ss)   (bpm)     Stage 1   3:00     123    130/74     Stage 2   1:30     133      /   Marquez Treadmill Score: 4.5 (Moderate Risk)    RecoveryR  5:00      86    110/70FAC: Below Average            Stress Duration:   4:30 mm:ss *        Recovery Time: 5:00 mm:ss         Maximum Stress HR: 133 bpm *           METS:          6     ______________________________________________________________________________  Report approved by: Jerome Soto 10/13/2022 03:16 PM     ______________________________________________________________________________        Allergies:     Allergies   Allergen Reactions     Metaproterenol Sulfate Other (See Comments)     alupent inhaler-shaking     Percocet [Oxycodone-Acetaminophen] Itching        Subjective:   Patient denies any chest pain, shortness of breath, palpitations.      Physical Exam:   Blood pressure 138/63, pulse 76, temperature 98.8  F (37.1  C), temperature source Oral, resp. rate 18, height 1.499 m (4' 11\"), SpO2 94 %, not currently " breastfeeding.  General: Alert, interactive, NAD  HEENT: AT/NC  Resp: clear to auscultation bilaterally, no crackles or wheezes  Cardiac: regular rate and rhythm, no murmur  Abdomen: Soft, nontender, nondistended. +BS.  No rebound or guarding.  Extremities: No LE edema  Skin: Warm and dry  Neuro: Alert & oriented x 3, no focal deficits, moves all extremities equally     Discharge Medicatios:        Discharge Medication List as of 10/13/2022  4:02 PM      START taking these medications    Details   diltiazem ER COATED BEADS (CARDIZEM CD/CARTIA XT) 120 MG 24 hr capsule Take 1 capsule (120 mg) by mouth every morning Hold for blood pressure <110, Disp-30 capsule, R-0, E-Prescribe         CONTINUE these medications which have NOT CHANGED    Details   albuterol (PROAIR HFA/PROVENTIL HFA/VENTOLIN HFA) 108 (90 Base) MCG/ACT inhaler INHALE 1 OR 2 puffs EVERY 6 HOURS as needed for wheezing., Disp-8.5 g, R-1, E-Prescribe      albuterol (PROVENTIL) (2.5 MG/3ML) 0.083% neb solution INHALE 3 MILLILITERS (2.5 MG) BY NEBULIZATION ROUTE EVERY 6 HOURS AS NEEDED FOR SHORTNESS OF BREATH/DYSPNEA OR WHEEZING., Disp-75 mL, R-0, E-Prescribe      ascorbic acid (VITAMIN C) 1000 MG TABS Take 1,000 mg by mouth daily, Historical      cyclobenzaprine (FLEXERIL) 10 MG tablet Take 1 tablet (10 mg) by mouth 2 times daily as needed for muscle spasms or other (shoulder pain) . Do not take within 6 hours of tramadol or zolpidem, Disp-30 tablet, R-3, E-Prescribe      HYDROcodone-acetaminophen (NORCO)  MG per tablet Take 1 tablet by mouth 2 times daily as needed for severe pain, Disp-30 tablet, R-0, E-Prescribe      Multiple Vitamins-Minerals (MULTIVITAMIN OR) Take 1 tablet by mouth daily Per pt, uses ones without Iron, Historical      omeprazole (PRILOSEC) 20 MG DR capsule Take 1 capsule (20 mg) by mouth daily, Disp-30 capsule, R-5, E-Prescribe      traMADol (ULTRAM) 50 MG tablet Take 1 tablet (50 mg) by mouth 3 times daily For chronic neck pain,  Disp-90 tablet, R-0, E-Prescribe      vitamin D3 (CHOLECALCIFEROL) 1000 units (25 mcg) tablet Take 1,000 Units by mouth daily, Historical      vitamin E 400 UNITS TABS Take 400 Units by mouth daily, Historical      zolpidem (AMBIEN) 5 MG tablet Take 1 tablet (5 mg) by mouth nightly as needed for sleep Do not take with tramadol, Disp-30 tablet, R-5, E-Prescribe             Instructions Given to Patient as Discharge:     Discharge Procedure Orders   Reason for your hospital stay   Order Comments: You were hospitalized due to chest pain and palpitations.  You had a stress test which was normal and an echocardiogram which was normal.  You were started on a medication to help with palpitations.  Please wear the zio patch and follow up with Electrophysiology in clinic     Follow-up and recommended labs and tests    Order Comments: Follow up with PCP within one week and Electrophysiology in clinic     Activity   Order Comments: Your activity upon discharge: activity as tolerated     Order Specific Question Answer Comments   Is discharge order? Yes      Cardiology Electrophysiology (EP) IP Consult     Diet   Order Comments: Follow this diet upon discharge: Orders Placed This Encounter      NPO for Medical/Clinical Reasons Except for: Meds, Other; Specify: no caffeine     Order Specific Question Answer Comments   Is discharge order? Yes        Pending Tests at Discharge:   Lipid panel    Discharge Disposition:     Discharged to home     Mallory MALAVE-C  Hospitalist Service  Pager [phone]    >30 minutes was spent in discharge planning, care coordination, physical examination and medication reconciliation on the date of discharge, 10/13/2022

## 2022-10-13 NOTE — PROVIDER NOTIFICATION
troponin came back at 62. was 14 @ 10am. vss  and no pain. room air. ate well, took her diltiazem.  Thanks  Sent to admitting pager, obs, and charge nurse updated.

## 2022-10-13 NOTE — PLAN OF CARE
PRIMARY DIAGNOSIS: CHEST PAIN  OUTPATIENT/OBSERVATION GOALS TO BE MET BEFORE DISCHARGE:  1. Ruled out acute coronary syndrome (negative or stable Troponin):  Yes  2. Pain Status: Pain free.  3. Appropriate provocative testing performed: Yes  - Stress Test Procedure: Echo, Troponin completed every  6 hours. Result 14 at 10am and 62 at 6PM.     - Interpretation of cardiac rhythm per telemetry tech: Tele reading PAC HR 60's    4. Cleared by Consultants (if applicable):No  5. Return to near baseline physical activity: Yes  Discharge Planner Nurse   Safe discharge environment identified: Yes  Barriers to discharge: Yes       Entered by: Radha Gordon RN 10/13/2022 4:44 AM        Pt denies chest pain, N/V, headache, SOB. Slept much of the night. Ambulate to the bathroom independently. Void well  X 2. Tolerated oral intake.   Please review provider order for any additional goals.   Nurse to notify provider when observation goals have been met and patient is ready for discharge.

## 2022-10-13 NOTE — PLAN OF CARE
Goal Outcome Evaluation:  Vitals stable. Denies pain, sob, or chest pressure. Requesting sleeping pill and extra pillows. Given teaching on diltiazem and questions answered. Encourage to discuss with cardiologist in morning. Given pen and paper to write notes and her questions for the doctor.

## 2022-10-13 NOTE — PLAN OF CARE
PRIMARY DIAGNOSIS: CHEST PAIN  OUTPATIENT/OBSERVATION GOALS TO BE MET BEFORE DISCHARGE:  1. Ruled out acute coronary syndrome (negative or stable Troponin):  trops-19 this morning  2. Pain Status: Pain free.  3. Appropriate provocative testing performed: Yes  - Stress Test Procedure: Echo Completed.  - Interpretation of cardiac rhythm per telemetry tech: SR 70'S    4. Cleared by Consultants (if applicable):No  5. Return to near baseline physical activity: Yes  Vitals are Temp: 98.9  F (37.2  C) Temp src: Oral BP: 107/71 Pulse: 81   Resp: 18 SpO2: 95 %.  Patient is Alert and Oriented x4. Pt is independent with no assistive devices .  Pt is NPO.  Pt is denying pain.  Patient is Saline locked. Pt on tele. Pt had  Stress ECHO. Pt reporting no chest pain or SOB. Possible discharge today.  Will continue to monitor and provide cares.   Discharge Planner Nurse   Safe discharge environment identified: Yes  Barriers to discharge: Yes       Entered by: Abeba Potter RN 10/13/2022      Please review provider order for any additional goals.   Nurse to notify provider when observation goals have been met and patient is ready for discharge.

## 2022-10-13 NOTE — PLAN OF CARE
PRIMARY DIAGNOSIS: CHEST PAIN  OUTPATIENT/OBSERVATION GOALS TO BE MET BEFORE DISCHARGE:  1. Ruled out acute coronary syndrome (negative or stable Troponin):  trops-19 this morning  2. Pain Status: Pain free.  3. Appropriate provocative testing performed: Yes  - Stress Test Procedure: Echo Completed  - Interpretation of cardiac rhythm per telemetry tech: SR 70'S    4. Cleared by Consultants (if applicable):No  5. Return to near baseline physical activity: Yes  Vitals are Temp: 98.9  F (37.2  C) Temp src: Oral BP: 107/71 Pulse: 81   Resp: 18 SpO2: 95 %.  Patient is Alert and Oriented x4. Pt is independent with no assistive devices .  Pt is a Regular diet.  Pt is denying pain.  Patient is Saline locked. Pt on tele. Pt had an Echo this morning.  Discharge Planner Nurse   Safe discharge environment identified: Yes  Barriers to discharge: Yes       Entered by: Abeba Potter RN 10/13/2022      Please review provider order for any additional goals.   Nurse to notify provider when observation goals have been met and patient is ready for discharge.

## 2022-10-13 NOTE — PLAN OF CARE
Goal Outcome Evaluation:         Iv removed. Discharge instructions reviewed with pt and questions answered. Given discharge medications and has telemety monitoring set up and understands how to use it. Return to clinic Friday for folow up. Home with  and belongings.

## 2022-10-13 NOTE — PLAN OF CARE
PRIMARY DIAGNOSIS: CHEST PAIN  OUTPATIENT/OBSERVATION GOALS TO BE MET BEFORE DISCHARGE:  1. Ruled out acute coronary syndrome (negative or stable Troponin):  Yes  2. Pain Status: Pain free.  3. Appropriate provocative testing performed: Yes  - Stress Test Procedure: Echo, Troponin completed every  6 hours. Result 14 at 10am and 62 at 6PM. Pt have another Lab draw at 0000. Result pending.   - Interpretation of cardiac rhythm per telemetry tech: Tele reading SR with PAC 70's    4. Cleared by Consultants (if applicable):No  5. Return to near baseline physical activity: Yes  Discharge Planner Nurse   Safe discharge environment identified: Yes  Barriers to discharge: Yes       Entered by: Radha Gordon RN 10/12/2022 11:56 PM     Please review provider order for any additional goals.   Nurse to notify provider when observation goals have been met and patient is ready for discharge.

## 2022-10-14 ENCOUNTER — PATIENT OUTREACH (OUTPATIENT)
Dept: CARE COORDINATION | Facility: CLINIC | Age: 67
End: 2022-10-14

## 2022-10-14 NOTE — PROGRESS NOTES
"Clinic Care Coordination Contact  [facility]: Post-Discharge Note  SITUATION                                                      Admission:    Admission Date: 10/12/22   Reason for Admission: Chest pressure, Palipitations  Discharge:   Discharge Date: 10/13/22  Discharge Diagnosis: Chest Pain, Palpitations, Tachycardia    BACKGROUND                                                      Per hospital discharge summary and inpatient provider notes:    67 year old female with a history of Osteopenia, Diverticulosis, Esophageal Stricture, Hepatitis C, Cervical Cancer, Former Smoker, Emphysema who presents to the ED today with chest pressure and palpitations.     Patient reports she awoke this morning at 8am feeling clammy, diaphoretic with a diffuse chest pressure and heart racing.  Her symptoms persisted intermittently for several hours prompting her to present to the ED.  Patient denies any radiation of the pain to her left arm, she has chronic LUE pain due to a prior injury.  Patient reports similar episodes occurring 1-2 times per month over the past one year.  Episodes can occur at rest or with activity and resolve completely with rest within 10-15 minutes.  Patient reports the episodes have been occurring more frequently lately and today's episode was the longest in duration.  Patient reports her symptoms resolved while she was in the ED ambulating back from the bathroom.  She is currently asymptomatic.    ASSESSMENT      Discharge Assessment  How are you doing now that you are home?: \"Well I'm at the grocery store now so I guess I'm doing okay yeah.\"  How are your symptoms? (Red Flag symptoms escalate to triage hotline per guidelines): Improved  Do you feel your condition is stable enough to be safe at home until your provider visit?: Yes  Does the patient have their discharge instructions? : Yes  Does the patient have questions regarding their discharge instructions? : No  Were you started on any new " medications or were there changes to any of your previous medications? : Yes  Does the patient have all of their medications?: Yes  Do you have questions regarding any of your medications? : Yes (see comment) (Pt had a question regarding diltiazem ER COATED BEADS (CARDIZEM CD/CARTIA XT) [facility] reviewed AVS with patient.)  Do you have all of your needed medical supplies or equipment (DME)?  (i.e. oxygen tank, CPAP, cane, etc.): Yes  Discharge follow-up appointment scheduled within 14 calendar days? : No (Pt does not currently have health insurance. Pt plans to talk to their employer and call number provided by [place] before scheduling a fu appt.)  Is patient agreeable to assistance with scheduling? : Yes    Post-op ([facility] CTA Only)  If the patient had a surgery or procedure, do they have any questions for a nurse?: No      PLAN                                                      Outpatient Plan:      Follow-up and recommended labs and tests  Follow up with PCP within one week and Electrophysiology in clinic    Future Appointments   Date Time Provider Department Center   9/8/2023 11:15 AM [facility]         For any urgent concerns, please contact our 24 hour nurse triage line: [phone] ([phone])       NICHO[facility] Maxwell  [phone]  [facility] Care Resource [facility]

## 2022-10-26 ENCOUNTER — TELEPHONE (OUTPATIENT)
Dept: FAMILY MEDICINE | Facility: CLINIC | Age: 67
End: 2022-10-26

## 2022-10-26 NOTE — TELEPHONE ENCOUNTER
Patient calling and wanting results of cardiac monitor and states was not given any results at hospital.  Discussed cardiac monitor result in process.  Discussed she is to have hospital follow-up though.  States she called to schedule and could not get in.  Scheduled her for 11/2/22 12:40 pm.  Cancelled 12/21/22 as that visit is too far out for hospital follow-up.  Advised can discuss results at visit and hopefully cardiac monitor results will be back by then.  Patient agrees with plan.  Elisabeth Freeman RN

## 2022-10-27 DIAGNOSIS — G89.29 OTHER CHRONIC PAIN: ICD-10-CM

## 2022-10-28 ENCOUNTER — TRANSFERRED RECORDS (OUTPATIENT)
Dept: HEALTH INFORMATION MANAGEMENT | Facility: CLINIC | Age: 67
End: 2022-10-28

## 2022-11-01 ENCOUNTER — PATIENT OUTREACH (OUTPATIENT)
Dept: FAMILY MEDICINE | Facility: CLINIC | Age: 67
End: 2022-11-01

## 2022-11-01 RX ORDER — HYDROCODONE BITARTRATE AND ACETAMINOPHEN 10; 325 MG/1; MG/1
1 TABLET ORAL 2 TIMES DAILY PRN
Qty: 30 TABLET | Refills: 0 | Status: SHIPPED | OUTPATIENT
Start: 2022-11-01 | End: 2022-11-02

## 2022-11-01 NOTE — TELEPHONE ENCOUNTER
Dr. Garcia -   Patient has an appt scheduled with you tomorrow   She has not scheduled visit with cardiology due to no insurance     Appt with Medicare tomorrow AM, has to be declined for medicare prior to applying for Emma Saldana MD  P Cr Triage  Her monitor showed a few runs of supraventricular tachycardia which could be the cause of her symptoms.    Can you help her get the cards appt that is pending from her last visit scheduled?  Also if you would like to discuss options for treatment in the meantime, we could do a virtual appointment     Felisa Brown Registered Nurse, PAL (Patient Advocate Liason)   [facility]   [phone]

## 2022-11-02 ENCOUNTER — PATIENT OUTREACH (OUTPATIENT)
Dept: FAMILY MEDICINE | Facility: CLINIC | Age: 67
End: 2022-11-02

## 2022-11-02 ENCOUNTER — OFFICE VISIT (OUTPATIENT)
Dept: FAMILY MEDICINE | Facility: CLINIC | Age: 67
End: 2022-11-02
Payer: MEDICARE

## 2022-11-02 VITALS
BODY MASS INDEX: 30.4 KG/M2 | TEMPERATURE: 98.4 F | HEIGHT: 59 IN | WEIGHT: 150.8 LBS | HEART RATE: 71 BPM | DIASTOLIC BLOOD PRESSURE: 62 MMHG | OXYGEN SATURATION: 96 % | SYSTOLIC BLOOD PRESSURE: 104 MMHG | RESPIRATION RATE: 14 BRPM

## 2022-11-02 DIAGNOSIS — Z12.11 SCREEN FOR COLON CANCER: Primary | ICD-10-CM

## 2022-11-02 DIAGNOSIS — G47.9 SLEEP DISORDER: ICD-10-CM

## 2022-11-02 DIAGNOSIS — R00.0 TACHYARRHYTHMIA: ICD-10-CM

## 2022-11-02 DIAGNOSIS — R14.0 BLOATING SYMPTOM: ICD-10-CM

## 2022-11-02 DIAGNOSIS — G89.29 OTHER CHRONIC PAIN: ICD-10-CM

## 2022-11-02 PROCEDURE — 99214 OFFICE O/P EST MOD 30 MIN: CPT | Performed by: FAMILY MEDICINE

## 2022-11-02 RX ORDER — ZOLPIDEM TARTRATE 5 MG/1
5 TABLET ORAL
Qty: 30 TABLET | Refills: 5 | Status: SHIPPED | OUTPATIENT
Start: 2022-11-07 | End: 2023-05-07

## 2022-11-02 RX ORDER — DILTIAZEM HYDROCHLORIDE 120 MG/1
120 CAPSULE, COATED, EXTENDED RELEASE ORAL EVERY MORNING
Qty: 30 CAPSULE | Refills: 11 | Status: SHIPPED | OUTPATIENT
Start: 2022-11-02 | End: 2023-10-31

## 2022-11-02 RX ORDER — ATORVASTATIN CALCIUM 10 MG/1
10 TABLET, FILM COATED ORAL DAILY
COMMUNITY
Start: 2022-10-18 | End: 2022-11-23

## 2022-11-02 RX ORDER — HYDROCODONE BITARTRATE AND ACETAMINOPHEN 10; 325 MG/1; MG/1
1 TABLET ORAL 2 TIMES DAILY PRN
Qty: 30 TABLET | Refills: 0 | Status: SHIPPED | OUTPATIENT
Start: 2022-11-02 | End: 2022-12-12

## 2022-11-02 NOTE — TELEPHONE ENCOUNTER
Dr. Carpenter    Please review refill requests pended     RN spoke to pharmacy - they did not receive the hydrocodone rx that was sent please resend     Ambien rx is  and will need new order sent     Felisa Brown Registered Nurse, PAL (Patient Advocate Liason)   [facility]   [phone]

## 2022-11-02 NOTE — PROGRESS NOTES
"  Assessment & Plan     Screen for colon cancer  Will await cards approval - GI would not do until then    Tachyarrhythmia  SVT  Handout on the above diagnosis was given to the patient and discussed  Discussed valsalva and carotid massage and med use   May need ablation at some point  Only 5 times in last 6 months    - diltiazem ER COATED BEADS (CARDIZEM CD/CARTIA XT) 120 MG 24 hr capsule  Dispense: 30 capsule; Refill: 11  - Adult Cardiology Eval  Referral    Bloating symptom  CT done in ER showed gallstones vs polyp or  mass  - US Abdomen Limited        30 minutes spent on the date of the encounter doing chart review, review of test results, interpretation of tests, patient visit and documentation      MED REC REQUIRED  Post Medication Reconciliation Status:       BMI:   Estimated body mass index is 30.46 kg/m  as calculated from the following:    Height as of this encounter: 1.499 m (4' 11\").    Weight as of this encounter: 68.4 kg (150 lb 12.8 oz).       See Patient Instructions    No follow-ups on file.   Follow-up Visit   Expected date:  Jan 25, 2023 (Approximate)      Follow Up Appointment Details:     Follow-up with whom?: Me    Follow-Up for what?: Well Child Check    How?: In Person    Is this an as-needed follow-up?: No                    Emma Byrne MD  [facility]    Sofiya Sainz is a 67 year old, presenting for the following health issues:  Hospital F/U      Lists of hospitals in the [place]     Hospital Follow-up Visit:    Hospital/Nursing Home/IP Rehab Facility: [facility]  Date of Admission: 10/12/2022  Date of Discharge: 10/13/2022  Reason(s) for Admission: Tachyarrhythmia     Was your hospitalization related to COVID-19? No   Problems taking medications regularly:  None  Medication changes since discharge: Yes but pt is unsure of which medication   Problems adhering to non-medication therapy:  None    Summary of hospitalization:  [facility] " discharge summary reviewed  Diagnostic Tests/Treatments reviewed.  Follow up needed: cards IP  Other Healthcare Providers Involved in Patient s Care:         Surgical follow-up appointment - with EP cards  Update since discharge: improved. 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     Polycythemia 08/08/2012    resolved now       Past Surgical History:   Procedure Laterality Date     CARPAL TUNNEL RELEASE RT/LT Right 11/23/2021    and trigger finger and tendon repair     CARPAL TUNNEL RELEASE RT/LT Left 12/20/2021    and tendon repair     COLONOSCOPY  2004, 2012    repeat in 2022     Dilatation of the esophagus once due to dysphagia and stricture  2003     Ganglion cyst removal       HEMORRHOIDECTOMY BANDING       HYSTERECTOMY       LAPAROSCOPIC SALPINGO-OOPHORECTOMY Bilateral 10/23/2015    Procedure: LAPAROSCOPIC SALPINGO-OOPHORECTOMY;  Surgeon: Johnathan Steve MD;  Location: [facility] OR     subscapularous repair and biceps tendon repair  05/2022    Dr. Verma at [facility]     Thumb surgery Right      TONSILLECTOMY         MEDICATIONS:  Current Outpatient Medications   Medication     diltiazem ER COATED BEADS (CARDIZEM CD/CARTIA XT) 120 MG 24 hr capsule     albuterol (PROAIR HFA/PROVENTIL HFA/VENTOLIN HFA) 108 (90 Base) MCG/ACT inhaler     albuterol (PROVENTIL) (2.5 MG/3ML) 0.083% neb solution     ascorbic acid (VITAMIN C) 1000 MG TABS     atorvastatin (LIPITOR) 10 MG tablet     cyclobenzaprine (FLEXERIL) 10 MG tablet     HYDROcodone-acetaminophen (NORCO)  MG per tablet     Multiple  "Vitamins-Minerals (MULTIVITAMIN OR)     omeprazole (PRILOSEC) 20 MG DR capsule     traMADol (ULTRAM) 50 MG tablet     vitamin D3 (CHOLECALCIFEROL) 1000 units (25 mcg) tablet     vitamin E 400 UNITS TABS     zolpidem (AMBIEN) 5 MG tablet     No current facility-administered medications for this visit.       SOCIAL HISTORY:  Social History     Tobacco Use     Smoking status: Former     Packs/day: 0.50     Years: 30.00     Pack years: 15.00     Types: Cigarettes     Start date: 10/13/1967     Quit date: 2019     Years since quittin.9     Smokeless tobacco: Never     Tobacco comments:     quit 2019   Substance Use Topics     Alcohol use: No     Comment: sober since 99       Family History   Problem Relation Age of Onset     Hypertension Mother      Cerebrovascular Disease Mother         TIA's     Depression Mother      Cancer - colorectal Maternal Grandmother         dx at 65     Lipids Father      C.A.D. Father         angioplasty at 65     Musculoskeletal Disorder Father      Alcohol/Drug Daughter         in remission     Breast Cancer No family hx of          Review of Systems   Constitutional, HEENT, cardiovascular, pulmonary, gi and gu systems are negative, except as otherwise noted.      Objective    /62 (BP Location: Right arm, Patient Position: Sitting, Cuff Size: Adult Regular)   Pulse 71   Temp 98.4  F (36.9  C) (Oral)   Resp 14   Ht 1.499 m (4' 11\")   Wt 68.4 kg (150 lb 12.8 oz)   LMP  (LMP Unknown)   SpO2 96%   BMI 30.46 kg/m    Body mass index is 30.46 kg/m .  Physical Exam   GENERAL: healthy, alert and no distress  EYES: Eyes grossly normal to inspection, PERRL and conjunctivae and sclerae normal  NECK: no adenopathy, no asymmetry, masses, or scars and thyroid normal to palpation  RESP: lungs clear to auscultation - no rales, rhonchi or wheezes  CV: regular rate and rhythm, normal S1 S2, no S3 or S4, no murmur, click or rub, no peripheral edema and peripheral pulses " strong  MS: no gross musculoskeletal defects noted, no edema  SKIN: no suspicious lesions or rashes  PSYCH: mentation appears normal, affect normal/bright  LYMPH: no cervical, supraclavicular, axillary, or inguinal adenopathy    Admission on 10/12/2022, Discharged on 10/13/2022   Component Date Value Ref Range Status     Ventricular Rate 10/12/2022 175  BPM Final     Atrial Rate 10/12/2022 170  BPM Final     QRS Duration 10/12/2022 134  ms Final     QT 10/12/2022 268  ms Final     QTc 10/12/2022 457  ms Final     R AXIS 10/12/2022 13  degrees Final     T Axis 10/12/2022 37  degrees Final     Interpretation ECG 10/12/2022    Final                    Value:Wide QRS tachycardia  Non-specific intra-ventricular conduction block  Abnormal ECG  When compared with ECG of 11-SEP-2022 12:35,  Wide QRS tachycardia has replaced Sinus rhythm  Vent. rate has increased  BPM  Confirmed by - EMERGENCY ROOM, PHYSICIAN ([id number]),  BRYAN RITTER ([id number]) on 10/13/2022 6:44:02 AM       Hold Specimen 10/12/2022 JIC   Final     Hold Specimen 10/12/2022 JIC   Final     Hold Specimen 10/12/2022 JIC   Final     Hold Specimen 10/12/2022 JIC   Final     INR 10/12/2022 0.99  0.85 - 1.15 Final     aPTT 10/12/2022 30  22 - 38 Seconds Final     Sodium 10/12/2022 140  136 - 145 mmol/L Final     Potassium 10/12/2022 4.1  3.4 - 5.3 mmol/L Final     Chloride 10/12/2022 103  98 - 107 mmol/L Final     Carbon Dioxide (CO2) 10/12/2022 25  22 - 29 mmol/L Final     Anion Gap 10/12/2022 12  7 - 15 mmol/L Final     Urea Nitrogen 10/12/2022 12.4  8.0 - 23.0 mg/dL Final     Creatinine 10/12/2022 0.79  0.51 - 0.95 mg/dL Final     Calcium 10/12/2022 9.5  8.8 - 10.2 mg/dL Final     Glucose 10/12/2022 111 (H)  70 - 99 mg/dL Final     Alkaline Phosphatase 10/12/2022 101  35 - 104 U/L Final     AST 10/12/2022 27  10 - 35 U/L Final     ALT 10/12/2022 32  10 - 35 U/L Final     Protein Total 10/12/2022 6.5  6.4 - 8.3 g/dL Final     Albumin 10/12/2022 3.7  3.5 -  5.2 g/dL Final     Bilirubin Total 10/12/2022 0.4  <=1.2 mg/dL Final     GFR Estimate 10/12/2022 81  >60 mL/min/1.73m2 Final    Effective December 21, 2021 eGFRcr in adults is calculated using the 2021 CKD-EPI creatinine equation which includes age and gender (Giovanny et al., [place], DOI: 10.1056/OKMNqr9237484)     Troponin T, High Sensitivity 10/12/2022 14  <=14 ng/L Final    Either a High Sensitivity Troponin T baseline (0 hours) value = 100 ng/mL, or an increase in High Sensitivity Troponin T = 7 ng/mL at 2 hours compared to 0 hours (2-0 hours), suggests myocardial injury, and urgent clinical attention is required.    If the 2-0 hours increase is<7 ng/mL, a High Sensitivity Troponin T result above gender-specific reference ranges warrants further evaluation.   Recommendations for further evaluation include correlation with clinical decision-making tool (e.g., HEART), a 3rd High Sensitivity Troponin T test 2 hours after the 2nd (a 20% change from baseline would represent concern), admission for observation, close PCC/cardiology follow-up, or urgent outpatient provocative testing.     WBC Count 10/12/2022 7.5  4.0 - 11.0 10e3/uL Final     RBC Count 10/12/2022 4.90  3.80 - 5.20 10e6/uL Final     Hemoglobin 10/12/2022 15.4  11.7 - 15.7 g/dL Final     Hematocrit 10/12/2022 47.3 (H)  35.0 - 47.0 % Final     MCV 10/12/2022 97  78 - 100 fL Final     MCH 10/12/2022 31.4  26.5 - 33.0 pg Final     MCHC 10/12/2022 32.6  31.5 - 36.5 g/dL Final     RDW 10/12/2022 13.6  10.0 - 15.0 % Final     Platelet Count 10/12/2022 289  150 - 450 10e3/uL Final     % Neutrophils 10/12/2022 55  % Final     % Lymphocytes 10/12/2022 34  % Final     % Monocytes 10/12/2022 8  % Final     % Eosinophils 10/12/2022 1  % Final     % Basophils 10/12/2022 1  % Final     % Immature Granulocytes 10/12/2022 1  % Final     NRBCs per 100 WBC 10/12/2022 0  <1 /100 Final     Absolute Neutrophils 10/12/2022 4.2  1.6 - 8.3 10e3/uL Final     Absolute Lymphocytes  10/12/2022 2.5  0.8 - 5.3 10e3/uL Final     Absolute Monocytes 10/12/2022 0.6  0.0 - 1.3 10e3/uL Final     Absolute Eosinophils 10/12/2022 0.1  0.0 - 0.7 10e3/uL Final     Absolute Basophils 10/12/2022 0.1  0.0 - 0.2 10e3/uL Final     Absolute Immature Granulocytes 10/12/2022 0.0  <=0.4 10e3/uL Final     Absolute NRBCs 10/12/2022 0.0  10e3/uL Final     Ventricular Rate 10/12/2022 89  BPM Final     Atrial Rate 10/12/2022 89  BPM Final     HI Interval 10/12/2022 148  ms Final     QRS Duration 10/12/2022 82  ms Final     QT 10/12/2022 354  ms Final     QTc 10/12/2022 430  ms Final     P Axis 10/12/2022 39  degrees Final     R AXIS 10/12/2022 -8  degrees Final     T Axis 10/12/2022 24  degrees Final     Interpretation ECG 10/12/2022    Final                    Value:Sinus rhythm with Premature atrial complexes  Low voltage QRS  Cannot rule out Anterior infarct , age undetermined  Abnormal ECG  When compared with ECG of 12-OCT-2022 10:16, (unconfirmed)  Sinus rhythm has replaced Wide QRS tachycardia  Vent. rate has decreased BY  86 BPM  Confirmed by - EMERGENCY ROOM, PHYSICIAN ([id number]),  BRYAN RITTER ([id number]) on 10/13/2022 6:44:04 AM       TSH 10/12/2022 1.32  0.30 - 4.20 uIU/mL Final     Magnesium 10/12/2022 1.8  1.7 - 2.3 mg/dL Final     SARS CoV2 PCR 10/12/2022 Negative  Negative Final    NEGATIVE: SARS-CoV-2 (COVID-19) RNA not detected, presumed negative.     LVEF  10/13/2022 60-65%   Final     Troponin T, High Sensitivity 10/12/2022 62 (H)  <=14 ng/L Final    Either a High Sensitivity Troponin T baseline (0 hours) value = 100 ng/mL, or an increase in High Sensitivity Troponin T = 7 ng/mL at 2 hours compared to 0 hours (2-0 hours), suggests myocardial injury, and urgent clinical attention is required.    If the 2-0 hours increase is<7 ng/mL, a High Sensitivity Troponin T result above gender-specific reference ranges warrants further evaluation.   Recommendations for further evaluation include correlation with  clinical decision-making tool (e.g., HEART), a 3rd High Sensitivity Troponin T test 2 hours after the 2nd (a 20% change from baseline would represent concern), admission for observation, close PCC/cardiology follow-up, or urgent outpatient provocative testing.     Troponin T, High Sensitivity 10/13/2022 19 (H)  <=14 ng/L Final    Either a High Sensitivity Troponin T baseline (0 hours) value = 100 ng/mL, or an increase in High Sensitivity Troponin T = 7 ng/mL at 2 hours compared to 0 hours (2-0 hours), suggests myocardial injury, and urgent clinical attention is required.    If the 2-0 hours increase is<7 ng/mL, a High Sensitivity Troponin T result above gender-specific reference ranges warrants further evaluation.   Recommendations for further evaluation include correlation with clinical decision-making tool (e.g., HEART), a 3rd High Sensitivity Troponin T test 2 hours after the 2nd (a 20% change from baseline would represent concern), admission for observation, close PCC/cardiology follow-up, or urgent outpatient provocative testing.     Cholesterol 10/13/2022 219 (H)  <200 mg/dL Final     Triglycerides 10/13/2022 254 (H)  <150 mg/dL Final     Direct Measure HDL 10/13/2022 53  >=50 mg/dL Final     LDL Cholesterol Calculated 10/13/2022 115 (H)  <=100 mg/dL Final     Non HDL Cholesterol 10/13/2022 166 (H)  <130 mg/dL Final

## 2022-11-08 ENCOUNTER — OFFICE VISIT (OUTPATIENT)
Dept: CARDIOLOGY | Facility: CLINIC | Age: 67
End: 2022-11-08
Attending: FAMILY MEDICINE
Payer: MEDICARE

## 2022-11-08 VITALS
BODY MASS INDEX: 30.74 KG/M2 | HEART RATE: 79 BPM | SYSTOLIC BLOOD PRESSURE: 115 MMHG | DIASTOLIC BLOOD PRESSURE: 80 MMHG | WEIGHT: 152.5 LBS | HEIGHT: 59 IN

## 2022-11-08 DIAGNOSIS — I47.10 SVT (SUPRAVENTRICULAR TACHYCARDIA) (H): ICD-10-CM

## 2022-11-08 PROCEDURE — 99204 OFFICE O/P NEW MOD 45 MIN: CPT | Performed by: INTERNAL MEDICINE

## 2022-11-08 NOTE — PROGRESS NOTES
"Service Date: 11/08/2022    HISTORY OF PRESENT ILLNESS:    I had the pleasure of seeing Ms. Emeli Granados, delightful 67-year-old female who has been referred by the hospitalist service at [facility] for evaluation of SVT.    The patient has history of hepatitis, cervical cancer, emphysema, osteopenia, diverticulosis.  For the past year, she has had episodes where she feels that her chest becomes \"compressed from front to back.\" She experiences heart racing sensation.  On 2 or 3 occasions, she has gone to urgent care or emergency room, but by the time she came in, this sensation had already resolved.      On 10/12/2022, she developed a similar sensation that lasted longer.  In the emergency room she was in SVT at 186 beats per minute.  Her ECG shows short RP tachycardia.      She complained of significant chest tightness and was diaphoretic.  She spontaneously converted back to normal rhythm and was admitted to the hospital for observation.  The next day, she had a stress echocardiogram, which was normal at a nondiagnostic double product.    She was prescribed diltiazem  mg daily upon discharge.  She tells me that on the prescription the label reads \"do not take the medicine if systolic blood pressure is less than 110.\"  The patient has checked her blood pressure consistently, it is usually in the 120s, but has not taken the medication since discharge.    She is modestly physically active.  Last week, she started a new job at [employer].  She has not had any further heart racing or chest discomfort events since leaving the hospital.    SOCIAL HISTORY:  The patient is  and lives in [place] with her .  She is a nonsmoker, nondrinker.    FAMILY HISTORY:  Her father had CAD and started having coronary intervention/stents in his 60s.  Her mother had history of hypertension.      PHYSICAL EXAMINATION:    VITAL SIGNS:  Blood pressure 115/80, heart rate 79 and regular.  Weight 69 kg, BMI 30.8. "    GENERAL:  She is a pleasant woman in no distress.  HEENT:  Normocephalic.  NECK:  Supple.  LUNGS:  Completely clear.  CARDIOVASCULAR:  Regular rhythm without gallop, murmur or rub.  ABDOMEN:  Mildly obese, soft, nontender.  EXTREMITIES:  No edema.      DIAGNOSTIC STUDIES:    Recent laboratory tests:  Sodium 140, potassium 4.1, creatinine 0.79, calcium 9.5, cholesterol 219, , HDL 53, triglycerides 254.  TSH 1.32.    I have reviewed several 12-lead ECGs on this patient that show occasional ectopic beats.  No ventricular preexcitation.    I reviewed a 7-day cardiac monitor that she wore after her hospital discharge.  It showed frequent PACs (approximately 10% burden) and 3 SVT runs, longest of 16 beats.    As I mentioned earlier, her stress echocardiogram was nondiagnostic because of low workload.  She only exercised 4 minutes and 30 seconds.  There were no apparent wall motion abnormalities.  LVEF was normal.      IMPRESSION:    1.  Short RP tachycardia, likely reentrant SVT.  2.  Anginal type chest discomfort during SVT.  Recent submaximal stress echocardiogram showing no obvious abnormality.    I discussed the pathophysiology of SVT with Ms. Granados.  I reassured her it is not a life-threatening arrhythmia, but will almost certainly recur.    We discussed treatment options, which include:    A.  Drug therapy with a daily medication, such as diltiazem as prescribed at the hospital.  So far the patient has not taken diltiazem consistently.  B.  EP study and catheter ablation.  We discussed how we perform EP studies, the goal, technical aspects, risks and benefits.  There is a high likelihood of a successful single procedure with success rate exceeding 90% and risk of serious complication about 1% to 1.5%.    The patient will consider ablation at some point but because she recently started a new job, she does not feel comfortable taking time off for a procedure at the moment.     RECOMMENDATIONS:    1.  Start  diltiazem XT as previously prescribed.  2.  We will be happy to see her in followup on an as-needed basis.    I appreciate the opportunity to be part of her care.      Millie Dominguez MD< [facility]        cc:   Emma Byrne MD   [place] [id number]    D: 2022   T: 2022   MT: JANNIE    Name:     MARQUIS BHATTIMilad  MRN:      [id number]        Account:      [id number]   :      1955           Service Date: 2022       Document: [id number]

## 2022-11-08 NOTE — PROGRESS NOTES
HPI and Plan:   See dictation [id number]      Encounter Diagnosis   Name Primary?     Tachyarrhythmia        CURRENT MEDICATIONS:  Current Outpatient Medications   Medication Sig Dispense Refill     albuterol (PROAIR HFA/PROVENTIL HFA/VENTOLIN HFA) 108 (90 Base) MCG/ACT inhaler INHALE 1 OR 2 puffs EVERY 6 HOURS as needed for wheezing. 8.5 g 1     albuterol (PROVENTIL) (2.5 MG/3ML) 0.083% neb solution INHALE 3 MILLILITERS (2.5 MG) BY NEBULIZATION ROUTE EVERY 6 HOURS AS NEEDED FOR SHORTNESS OF BREATH/DYSPNEA OR WHEEZING. 75 mL 0     ascorbic acid (VITAMIN C) 1000 MG TABS Take 1,000 mg by mouth daily       atorvastatin (LIPITOR) 10 MG tablet Take 10 mg by mouth daily       cyclobenzaprine (FLEXERIL) 10 MG tablet Take 1 tablet (10 mg) by mouth 2 times daily as needed for muscle spasms or other (shoulder pain) . Do not take within 6 hours of tramadol or zolpidem 30 tablet 3     diltiazem ER COATED BEADS (CARDIZEM CD/CARTIA XT) 120 MG 24 hr capsule Take 1 capsule (120 mg) by mouth every morning Hold for blood pressure <110 30 capsule 11     HYDROcodone-acetaminophen (NORCO)  MG per tablet Take 1 tablet by mouth 2 times daily as needed for severe pain 30 tablet 0     Multiple Vitamins-Minerals (MULTIVITAMIN OR) Take 1 tablet by mouth daily Per pt, uses ones without Iron       omeprazole (PRILOSEC) 20 MG DR capsule Take 1 capsule (20 mg) by mouth daily 30 capsule 5     traMADol (ULTRAM) 50 MG tablet Take 1 tablet (50 mg) by mouth 3 times daily For chronic neck pain 90 tablet 0     vitamin D3 (CHOLECALCIFEROL) 1000 units (25 mcg) tablet Take 1,000 Units by mouth daily       vitamin E 400 UNITS TABS Take 400 Units by mouth daily       zolpidem (AMBIEN) 5 MG tablet Take 1 tablet (5 mg) by mouth nightly as needed for sleep Do not take with tramadol 30 tablet 5       ALLERGIES     Allergies   Allergen Reactions     Metaproterenol Sulfate Other (See Comments)     alupent inhaler-shaking     Percocet [Oxycodone-Acetaminophen]  Itching       PAST MEDICAL HISTORY:  Past Medical History:   Diagnosis Date     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     Polycythemia 08/08/2012    resolved now       PAST SURGICAL HISTORY:  Past Surgical History:   Procedure Laterality Date     CARPAL TUNNEL RELEASE RT/LT Right 11/23/2021    and trigger finger and tendon repair     CARPAL TUNNEL RELEASE RT/LT Left 12/20/2021    and tendon repair     COLONOSCOPY  2004, 2012    repeat in 2022     Dilatation of the esophagus once due to dysphagia and stricture  2003     Ganglion cyst removal       HEMORRHOIDECTOMY BANDING       HYSTERECTOMY       LAPAROSCOPIC SALPINGO-OOPHORECTOMY Bilateral 10/23/2015    Procedure: LAPAROSCOPIC SALPINGO-OOPHORECTOMY;  Surgeon: Johnathan Steve MD;  Location: [facility] OR     subscapularous repair and biceps tendon repair  05/2022    Dr[facility] Verma at [facility]     Thumb surgery Right      TONSILLECTOMY         FAMILY HISTORY:  Family History   Problem Relation Age of Onset     Hypertension Mother      Cerebrovascular Disease Mother         TIA's     Depression Mother      Cancer - colorectal Maternal Grandmother         dx at 65     Lipids Father      C.A.D. Father         angioplasty at 65     Musculoskeletal Disorder Father      Alcohol/Drug Daughter         in remission     Breast Cancer No family hx of        SOCIAL HISTORY:  Social History     Socioeconomic History     Marital status:      Spouse name: Boyfriend - Fritz     Number of children: 3     Years of education: None     Highest education level: None   Occupational History     Occupation: work in [place]     Employer: [employer]   Tobacco  Use     Smoking status: Former     Packs/day: 0.50     Years: 30.00     Pack years: 15.00     Types: Cigarettes     Start date: 10/13/1967     Quit date: 11/6/2019     Years since quitting: 3.0     Smokeless tobacco: Never     Tobacco comments:     quit 11/2019   Vaping Use     Vaping Use: Never used   Substance and Sexual Activity     Alcohol use: No     Comment: sober since 9/11/99     Drug use: No     Sexual activity: Not Currently     Partners: Male   Other Topics Concern     Parent/sibling w/ CABG, MI or angioplasty before 65F 55M? Yes       Review of Systems:  Skin:  not assessed       Eyes:  not assessed      ENT:  not assessed      Respiratory:  Positive for dyspnea on exertion Pt claims SOB with exertion is new and not normal- pt claimed she's gain 10 ibs since surgery and has noticed since then.   Cardiovascular:    chest pain;Positive for Pt claims CP described as 'tightness' which she claims occurs almost everyday- pt claims no SOB during chest tightness.  Gastroenterology: not assessed      Genitourinary:  not assessed      Musculoskeletal:  not assessed      Neurologic:  not assessed      Psychiatric:  not assessed      Heme/Lymph/Imm:  not assessed      Endocrine:  Negative            CC  Emma Byrne MD  [id number] [place] [address]

## 2022-11-08 NOTE — LETTER
11/8/2022    Emma Byrne MD  [address] [facility] [address]    RE: Emeli Granados       Dear Colleague,     I had the pleasure of seeing Emeli Granados in the [facility].  HPI and Plan:   See dictation [id number]      Encounter Diagnosis   Name Primary?     Tachyarrhythmia        CURRENT MEDICATIONS:  Current Outpatient Medications   Medication Sig Dispense Refill     albuterol (PROAIR HFA/PROVENTIL HFA/VENTOLIN HFA) 108 (90 Base) MCG/ACT inhaler INHALE 1 OR 2 puffs EVERY 6 HOURS as needed for wheezing. 8.5 g 1     albuterol (PROVENTIL) (2.5 MG/3ML) 0.083% neb solution INHALE 3 MILLILITERS (2.5 MG) BY NEBULIZATION ROUTE EVERY 6 HOURS AS NEEDED FOR SHORTNESS OF BREATH/DYSPNEA OR WHEEZING. 75 mL 0     ascorbic acid (VITAMIN C) 1000 MG TABS Take 1,000 mg by mouth daily       atorvastatin (LIPITOR) 10 MG tablet Take 10 mg by mouth daily       cyclobenzaprine (FLEXERIL) 10 MG tablet Take 1 tablet (10 mg) by mouth 2 times daily as needed for muscle spasms or other (shoulder pain) . Do not take within 6 hours of tramadol or zolpidem 30 tablet 3     diltiazem ER COATED BEADS (CARDIZEM CD/CARTIA XT) 120 MG 24 hr capsule Take 1 capsule (120 mg) by mouth every morning Hold for blood pressure <110 30 capsule 11     HYDROcodone-acetaminophen (NORCO)  MG per tablet Take 1 tablet by mouth 2 times daily as needed for severe pain 30 tablet 0     Multiple Vitamins-Minerals (MULTIVITAMIN OR) Take 1 tablet by mouth daily Per pt, uses ones without Iron       omeprazole (PRILOSEC) 20 MG DR capsule Take 1 capsule (20 mg) by mouth daily 30 capsule 5     traMADol (ULTRAM) 50 MG tablet Take 1 tablet (50 mg) by mouth 3 times daily For chronic neck pain 90 tablet 0     vitamin D3 (CHOLECALCIFEROL) 1000 units (25 mcg) tablet Take 1,000 Units by mouth daily       vitamin E 400 UNITS TABS Take 400 Units by mouth daily       zolpidem (AMBIEN) 5 MG tablet Take 1 tablet (5 mg) by mouth nightly as needed for  sleep Do not take with tramadol 30 tablet 5       ALLERGIES     Allergies   Allergen Reactions     Metaproterenol Sulfate Other (See Comments)     alupent inhaler-shaking     Percocet [Oxycodone-Acetaminophen] Itching       PAST MEDICAL HISTORY:  Past Medical History:   Diagnosis Date     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     Polycythemia 08/08/2012    resolved now       PAST SURGICAL HISTORY:  Past Surgical History:   Procedure Laterality Date     CARPAL TUNNEL RELEASE RT/LT Right 11/23/2021    and trigger finger and tendon repair     CARPAL TUNNEL RELEASE RT/LT Left 12/20/2021    and tendon repair     COLONOSCOPY  2004, 2012    repeat in 2022     Dilatation of the esophagus once due to dysphagia and stricture  2003     Ganglion cyst removal       HEMORRHOIDECTOMY BANDING       HYSTERECTOMY       LAPAROSCOPIC SALPINGO-OOPHORECTOMY Bilateral 10/23/2015    Procedure: LAPAROSCOPIC SALPINGO-OOPHORECTOMY;  Surgeon: Johnathan Steve MD;  Location: [facility] OR     subscapularous repair and biceps tendon repair  05/2022    Dr. Verma at [facility]     Thumb surgery Right      TONSILLECTOMY         FAMILY HISTORY:  Family History   Problem Relation Age of Onset     Hypertension Mother      Cerebrovascular Disease Mother         TIA's     Depression Mother      Cancer - colorectal Maternal Grandmother         dx at 65     Lipids Father      C.A.D. Father         angioplasty at 65     Musculoskeletal Disorder Father      Alcohol/Drug Daughter         in remission     Breast Cancer No family hx of        SOCIAL HISTORY:  Social History     Socioeconomic History     Marital status:      " Spouse name: Boyfrienmalina [facility]     Number of children: 3     Years of education: None     Highest education level: None   Occupational History     Occupation: work in [employer]     Employer: [employer]   Tobacco Use     Smoking status: Former     Packs/day: 0.50     Years: 30.00     Pack years: 15.00     Types: Cigarettes     Start date: 10/13/1967     Quit date: 11/6/2019     Years since quitting: 3.0     Smokeless tobacco: Never     Tobacco comments:     quit 11/2019   Vaping Use     Vaping Use: Never used   Substance and Sexual Activity     Alcohol use: No     Comment: sober since 9/11/99     Drug use: No     Sexual activity: Not Currently     Partners: Male   Other Topics Concern     Parent/sibling w/ CABG, MI or angioplasty before 65F 55M? Yes       Review of Systems:  Skin:  not assessed       Eyes:  not assessed      ENT:  not assessed      Respiratory:  Positive for dyspnea on exertion Pt claims SOB with exertion is new and not normal- pt claimed she's gain 10 ibs since surgery and has noticed since then.   Cardiovascular:    chest pain;Positive for Pt claims CP described as 'tightness' which she claims occurs almost everyday- pt claims no SOB during chest tightness.  Gastroenterology: not assessed      Genitourinary:  not assessed      Musculoskeletal:  not assessed      Neurologic:  not assessed      Psychiatric:  not assessed      Heme/Lymph/Imm:  not assessed      Endocrine:  Negative            CC  Emma Byrne MD  [address] [place] [address]                Service Date: 11/08/2022    HISTORY OF PRESENT ILLNESS:  I had the pleasure of seeing Ms. Emeli Granados, delightful 67-year-old female who has been referred by the hospitalist service at [facility] for evaluation of recurrent SVT.    The patient has history of hepatitis, cervical cancer, emphysema, osteopenia, diverticulosis.  For the past year, she has had episodes where she feels that her chest is \"compressed from front to back.\" " "She experiences heart racing sensation.  On 2 or 3 occasions, she has come to urgent care or emergency room, but by the time she came in, this sensation had already resolved.      On 10/12/2022, she developed similar sensation that lasted longer.  At this time, while in the emergency room, she was found to have SVT at a rate of 186 beats per minute.  This was nicely captured on a 12-lead ECG, which shows a short RP tachycardia.      She complained of significant chest tightness and was diaphoretic.  She spontaneously converted back to normal rhythm.  She was admitted to the hospital for observation.  The next day, she had a stress echocardiogram, which was normal, albeit at a nondiagnostic double product.    She was prescribed diltiazem  mg daily upon discharge.  She tells me that on the prescription and said \"do not take the medicine if systolic blood pressure is less than 110.\"  The patient has checked her blood pressure consistently, it is usually in the 120s, but has not taken the medication since discharge.    She is modestly physically active.  Last week, she started a new job at [employer].  She has not had any further heart racing or chest discomfort events since leaving the hospital.    SOCIAL HISTORY:  The patient is  and lives in [place] with her .  She is a nonsmoker, nondrinker.    FAMILY HISTORY:  Her father had CAD and started having coronary intervention/stents in his 60s.  Her mother had history of hypertension.    PHYSICAL EXAMINATION:    VITAL SIGNS:  Blood pressure 115/80, heart rate 79 and regular.  Weight 69 kg, BMI 30.8.    GENERAL:  She is a pleasant woman in no distress.  HEENT:  Normocephalic.  NECK:  Supple.  LUNGS:  Completely clear.  CARDIOVASCULAR:  Regular rhythm without gallop, murmur or rub.  ABDOMEN:  Mildly obese, soft, nontender.  EXTREMITIES:  No edema.    DIAGNOSTIC STUDIES:    Recent laboratory tests:  Sodium 140, potassium 4.1, creatinine 0.79, calcium " 9.5, cholesterol 219, , HDL 53, triglycerides 254.  TSH 1.32.    I have reviewed several 12-lead ECGs on this patient that show occasional ectopic beats.  No ventricular preexcitation.    I reviewed a 7-day cardiac monitor that she wore after her hospital discharge.  It showed frequent PACs (approximately 10% burden) and 3 SVT runs, longest of 16 beats.    As I mentioned earlier, her stress echocardiogram was nondiagnostic because of low workload.  She only exercised 4 minutes and 30 seconds.  There were no apparent wall motion abnormalities.  LVEF was normal.    IMPRESSION:    1.  Short RP tachycardia, likely a reentrant SVT.  2.  Anginal type chest discomfort during SVT.  Recent submaximal stress echocardiogram without obvious abnormality.    I discussed the pathophysiology of SVT with Ms. Granados.  I clarified that this is not a life-threatening problem, but will very likely recur.    We discussed treatment options, which include:    A.  Daily drug therapy with a medication such as diltiazem that was prescribed at the hospital, but the patient has not taken it consistently.  B.  EP study and catheter ablation.  We discussed how we perform EP studies, what is the goal, technical aspects, risks and benefits.  There is a high likelihood of a successful single procedure with success rate exceeding 90% with risk of serious complication about 1% to 1.5%.    The patient tells me that he would like to have an ablation at some point, but because she only recently started a new job, she does not want to take time off for an invasive procedure right now.  Therefore, she was started on diltiazem  mg as was prescribed at the hospital.    RECOMMENDATIONS:    1.  Start diltiazem XT as previously prescribed.  2.  We will be happy to see her in followup on an as-needed basis.    I appreciate the opportunity to be part of her care.    cc:   Emma Byrne MD   [address] [place] [address]     Millie Dominguez MD        D: 2022   T: 2022   MT: JANNIE    Name:     MARQUIS BHATTI  MRN:      [id number]        Account:      [id number]   :      1955           Service Date: 2022       Document: [id number]      Thank you for allowing me to participate in the care of your patient.      Sincerely,     Millie Dominguez MD     [facility]  cc:   Emma Byrne MD  [address] [place] [address]

## 2022-11-15 DIAGNOSIS — J20.9 ACUTE BRONCHITIS, UNSPECIFIED ORGANISM: ICD-10-CM

## 2022-11-16 RX ORDER — ALBUTEROL SULFATE 90 UG/1
AEROSOL, METERED RESPIRATORY (INHALATION)
Qty: 8.5 G | Refills: 0 | Status: SHIPPED | OUTPATIENT
Start: 2022-11-16 | End: 2023-03-06

## 2022-11-16 RX ORDER — ALBUTEROL SULFATE 0.83 MG/ML
SOLUTION RESPIRATORY (INHALATION)
Qty: 75 ML | Refills: 0 | Status: SHIPPED | OUTPATIENT
Start: 2022-11-16 | End: 2023-03-06

## 2022-11-16 NOTE — TELEPHONE ENCOUNTER
Prescription approved per [facility] Refill Protocol. Allergy/contradication reviewed. Tolerated in past.   Klever Redmond RN

## 2022-11-23 DIAGNOSIS — E78.5 HYPERLIPIDEMIA LDL GOAL <130: Primary | ICD-10-CM

## 2022-11-25 NOTE — TELEPHONE ENCOUNTER
Routing refill request to provider for review/approval because:  Medication is reported/historical  Randi Romero RN

## 2022-11-28 RX ORDER — ATORVASTATIN CALCIUM 10 MG/1
10 TABLET, FILM COATED ORAL DAILY
Qty: 90 TABLET | Refills: 1 | Status: SHIPPED | OUTPATIENT
Start: 2022-11-28 | End: 2023-03-06

## 2022-12-08 DIAGNOSIS — G89.29 OTHER CHRONIC PAIN: ICD-10-CM

## 2022-12-09 DIAGNOSIS — G89.29 OTHER CHRONIC PAIN: ICD-10-CM

## 2022-12-09 RX ORDER — TRAMADOL HYDROCHLORIDE 50 MG/1
50 TABLET ORAL 3 TIMES DAILY
Qty: 90 TABLET | Refills: 0 | Status: CANCELLED | OUTPATIENT
Start: 2022-12-09

## 2022-12-09 RX ORDER — HYDROCODONE BITARTRATE AND ACETAMINOPHEN 10; 325 MG/1; MG/1
1 TABLET ORAL 2 TIMES DAILY PRN
Qty: 30 TABLET | Refills: 0 | Status: CANCELLED | OUTPATIENT
Start: 2022-12-09

## 2022-12-12 RX ORDER — HYDROCODONE BITARTRATE AND ACETAMINOPHEN 10; 325 MG/1; MG/1
1 TABLET ORAL 2 TIMES DAILY PRN
Qty: 30 TABLET | Refills: 0 | Status: SHIPPED | OUTPATIENT
Start: 2022-12-12 | End: 2023-01-16

## 2022-12-12 RX ORDER — TRAMADOL HYDROCHLORIDE 50 MG/1
50 TABLET ORAL 3 TIMES DAILY
Qty: 90 TABLET | Refills: 0 | Status: SHIPPED | OUTPATIENT
Start: 2022-12-12 | End: 2023-01-16

## 2023-01-09 ENCOUNTER — TELEPHONE (OUTPATIENT)
Dept: FAMILY MEDICINE | Facility: CLINIC | Age: 68
End: 2023-01-09

## 2023-01-09 ENCOUNTER — OFFICE VISIT (OUTPATIENT)
Dept: URGENT CARE | Facility: URGENT CARE | Age: 68
End: 2023-01-09
Payer: MEDICARE

## 2023-01-09 VITALS
TEMPERATURE: 98.4 F | SYSTOLIC BLOOD PRESSURE: 120 MMHG | BODY MASS INDEX: 30.28 KG/M2 | OXYGEN SATURATION: 98 % | WEIGHT: 149.9 LBS | DIASTOLIC BLOOD PRESSURE: 76 MMHG | HEART RATE: 64 BPM

## 2023-01-09 DIAGNOSIS — L75.0 URINARY BODY ODOR: Primary | ICD-10-CM

## 2023-01-09 DIAGNOSIS — R39.9 URINARY SYMPTOM OR SIGN: ICD-10-CM

## 2023-01-09 LAB
ALBUMIN UR-MCNC: NEGATIVE MG/DL
APPEARANCE UR: CLEAR
BILIRUB UR QL STRIP: NEGATIVE
COLOR UR AUTO: YELLOW
GLUCOSE UR STRIP-MCNC: NEGATIVE MG/DL
HGB UR QL STRIP: NEGATIVE
KETONES UR STRIP-MCNC: NEGATIVE MG/DL
LEUKOCYTE ESTERASE UR QL STRIP: NEGATIVE
NITRATE UR QL: NEGATIVE
PH UR STRIP: 6 [PH] (ref 5–7)
SP GR UR STRIP: 1.01 (ref 1–1.03)
UROBILINOGEN UR STRIP-ACNC: 0.2 E.U./DL

## 2023-01-09 PROCEDURE — 99213 OFFICE O/P EST LOW 20 MIN: CPT | Performed by: FAMILY MEDICINE

## 2023-01-09 PROCEDURE — 81003 URINALYSIS AUTO W/O SCOPE: CPT

## 2023-01-09 NOTE — PROGRESS NOTES
URGENT CARE VISIT:    ASSESSMENT AND PLAN:      ICD-10-CM    1. Urinary body odor  L75.0 UA Macro with Reflex to Micro and Culture - lab collect     UA Macro with Reflex to Micro and Culture - lab collect      2. Urinary symptom or sign  R39.9 UA Macro with Reflex to Micro and Culture - lab collect     UA Macro with Reflex to Micro and Culture - lab collect          Differentials included but not limited to vaginitis, Urinary tract infection, kidney stones, Pyelonephritis, etc. Urine analysis is clean today not suggestive of infection.  I will have patient monitor symptoms at home and follow-up with primary.  In addition I have advised her to monitor and track fluid intake for odor contributed from these items.    Follow up with primary care provider with any problems, questions or concerns or if symptoms worsen or fail to improve. Patient verbalized understanding and is agreeable to plan. The patient was discharged ambulatory and in stable condition.      SUBJECTIVE:   Emeli Granados is a 67 year old female who  presents today for a possible UTI. Symptoms of voiding in small amounts and urinary odor have been going on for 2-3 month(s).  She reports increased appetite lately with greens and broccoli.  Patient denies urgency, frequency, burning, suprapubic pain and pressure, nausea, vomiting, fever and chills or vaginal symptoms. This patient does have a history of urinary tract infections 2022.     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     Polycythemia 08/08/2012    resolved now     Allergies: Metaproterenol sulfate and  Percocet [oxycodone-acetaminophen]   Medications:   Current Outpatient Medications   Medication Sig Dispense Refill     albuterol (PROAIR HFA/PROVENTIL HFA/VENTOLIN HFA) 108 (90 Base) MCG/ACT inhaler INHALE 1 OR 2 puffs EVERY 6 HOURS as needed for wheezing. 8.5 g 0     ascorbic acid (VITAMIN C) 1000 MG TABS Take 1,000 mg by mouth daily       atorvastatin (LIPITOR) 10 MG tablet Take 1 tablet (10 mg) by mouth daily 90 tablet 1     CALCIUM PO        diltiazem ER COATED BEADS (CARDIZEM CD/CARTIA XT) 120 MG 24 hr capsule Take 1 capsule (120 mg) by mouth every morning Hold for blood pressure <110 30 capsule 11     HYDROcodone-acetaminophen (NORCO)  MG per tablet Take 1 tablet by mouth 2 times daily as needed for severe pain 30 tablet 0     Multiple Vitamins-Minerals (MULTIVITAMIN OR) Take 1 tablet by mouth daily Per pt, uses ones without Iron       omeprazole (PRILOSEC) 20 MG DR capsule Take 1 capsule (20 mg) by mouth daily 30 capsule 5     traMADol (ULTRAM) 50 MG tablet Take 1 tablet (50 mg) by mouth 3 times daily For chronic neck pain. 90 tablet 0     vitamin D3 (CHOLECALCIFEROL) 1000 units (25 mcg) tablet Take 1,000 Units by mouth daily       vitamin E 400 UNITS TABS Take 400 Units by mouth daily       zolpidem (AMBIEN) 5 MG tablet Take 1 tablet (5 mg) by mouth nightly as needed for sleep Do not take with tramadol 30 tablet 5     albuterol (PROVENTIL) (2.5 MG/3ML) 0.083% neb solution INHALE 3 MILLILITERS (2.5 MG) BY NEBULIZATION ROUTE EVERY 6 HOURS AS NEEDED FOR SHORTNESS OF BREATH/DYSPNEA OR WHEEZING. (Patient not taking: Reported on 1/9/2023) 75 mL 0     cyclobenzaprine (FLEXERIL) 10 MG tablet Take 1 tablet (10 mg) by mouth 2 times daily as needed for muscle spasms or other (shoulder pain) . Do not take within 6 hours of tramadol or zolpidem (Patient not taking: Reported on 1/9/2023) 30 tablet 3     Social History:   Social History     Tobacco Use     Smoking status: Former     Packs/day: 0.50     Years: 30.00      Pack years: 15.00     Types: Cigarettes     Start date: 10/13/1967     Quit date: 11/6/2019     Years since quitting: 3.1     Smokeless tobacco: Never     Tobacco comments:     quit 11/2019   Substance Use Topics     Alcohol use: No     Comment: sober since 9/11/99       ROS:  Review of systems negative except as stated above.    OBJECTIVE:  /76   Pulse 64   Temp 98.4  F (36.9  C)   Wt 68 kg (149 lb 14.4 oz)   LMP  (LMP Unknown)   SpO2 98%   BMI 30.28 kg/m      GENERAL APPEARANCE: healthy, alert and no distress  EYES: EOMI and conjunctiva clear  NECK: supple, nontender, no lymphadenopathy  RESP: lungs clear to auscultation - no rales, rhonchi or wheezes  CV: regular rates and rhythm, normal S1 S2, no murmur noted  ABDOMEN:  soft, nontender, no HSM or masses   PSYCH: mentation appears normal and affect normal/bright  Back: Negative CVA tenderness    Labs:    Results for orders placed or performed in visit on 01/09/23   UA Macro with Reflex to Micro and Culture - lab collect     Status: Normal    Specimen: Urine, Clean Catch   Result Value Ref Range    Color Urine Yellow Colorless, Straw, Light Yellow, Yellow    Appearance Urine Clear Clear    Glucose Urine Negative Negative mg/dL    Bilirubin Urine Negative Negative    Ketones Urine Negative Negative mg/dL    Specific Gravity Urine 1.015 1.003 - 1.035    Blood Urine Negative Negative    pH Urine 6.0 5.0 - 7.0    Protein Albumin Urine Negative Negative mg/dL    Urobilinogen Urine 0.2 0.2, 1.0 E.U./dL    Nitrite Urine Negative Negative    Leukocyte Esterase Urine Negative Negative    Narrative    Microscopic not indicated

## 2023-01-09 NOTE — TELEPHONE ENCOUNTER
Called pt and relayed provider message below. Pt informs she is heading to urgent care in [place] now for back pain and UTI.    Randi Romero RN    
KARLEE - please review and advise.  Would you like to see pt today?  Roxi Robins, CMA    
Reason for call:  Same Day Appointment   Requested Provider: EDUARDO Byrne    PCP: [unfilled]    Reason for visit: UTI    Duration of symptoms:     Have you been treated for this in the past? No    Additional comments: Patient would like to see her PCP      Phone number to reach patient:  Cell number on file:    Telephone Information:   Mobile [phone]       Best Time:  Anytime    Can we leave a detailed message on this number?  YES    Travel screening: Not Applicable    
We could do phone or evisit if she would like  
29-Mar-2021

## 2023-01-12 DIAGNOSIS — G89.29 OTHER CHRONIC PAIN: ICD-10-CM

## 2023-01-16 RX ORDER — HYDROCODONE BITARTRATE AND ACETAMINOPHEN 10; 325 MG/1; MG/1
1 TABLET ORAL 2 TIMES DAILY PRN
Qty: 30 TABLET | Refills: 0 | Status: SHIPPED | OUTPATIENT
Start: 2023-01-16 | End: 2023-02-13

## 2023-01-16 RX ORDER — TRAMADOL HYDROCHLORIDE 50 MG/1
50 TABLET ORAL 3 TIMES DAILY
Qty: 90 TABLET | Refills: 0 | Status: SHIPPED | OUTPATIENT
Start: 2023-01-16 | End: 2023-02-13

## 2023-02-11 DIAGNOSIS — G89.29 OTHER CHRONIC PAIN: ICD-10-CM

## 2023-02-13 RX ORDER — HYDROCODONE BITARTRATE AND ACETAMINOPHEN 10; 325 MG/1; MG/1
1 TABLET ORAL 2 TIMES DAILY PRN
Qty: 30 TABLET | Refills: 0 | Status: SHIPPED | OUTPATIENT
Start: 2023-02-16 | End: 2023-03-06

## 2023-02-13 RX ORDER — TRAMADOL HYDROCHLORIDE 50 MG/1
50 TABLET ORAL 3 TIMES DAILY
Qty: 90 TABLET | Refills: 0 | Status: SHIPPED | OUTPATIENT
Start: 2023-02-16 | End: 2023-03-06

## 2023-02-14 NOTE — TELEPHONE ENCOUNTER
Pulmonary Critical Care Progress Note     Patient's name:  Barb Sanon  Medical Record Number: [id number]  Patient's account/billing number: [de-identified]  Patient's YOB: 1947  Age: 67 y.o. Date of Admission: 2/8/2023 12:23 PM  Date of Consult: 2/14/2023      Primary Care Physician: Madhavi Figueroa      Code Status: Full Code    Chief complaint: Acute hypoxic and hypercapnic respiratory failure. Assessment and Plan     Acute hypoxic and hypercapnic respiratory failure status postextubation requiring BiPAP. Aspiration pneumonia  Complete opacification of the right lung likely mucous plug with effusion  Acute kidney injury requiring hemodialysis  Metabolic encephalopathy  Chronic right pleural effusion with trapped lung  Cardiomyopathy with EF of 30%  Coronary disease status post stenting  A-fib with RVR  Hemoptysis resolved. Plan:  Pending therapeutic bronchoscopy likely he will require intubation given his respiratory status. Antibiotics started on Zosyn, check procalcitonin. Aspiration precautions. Keep tube feeds on hold. Hemodialysis per nephrology. Pending MRI today        Overnight:  Encephalopathic. Mental status waxing and waning. Off of pressors. Increasing oxygen requirement. Hypercapnic overnight requiring BiPAP. REVIEW OF SYSTEMS:  Review of Systems -   Unable to obtain encephalopathic        Physical Exam:    Vitals: /62   Pulse 92   Temp 97.7 °F (36.5 °C)   Resp (!) 33   Ht 6' 1\" (1.854 m)   Wt 259 lb 4.2 oz (117.6 kg)   SpO2 94%   BMI 34.21 kg/m²     Last Body weight:   Wt Readings from Last 3 Encounters:   02/14/23 259 lb 4.2 oz (117.6 kg)   01/30/23 260 lb (117.9 kg)   01/20/23 260 lb 2.3 oz (118 kg)       Body Mass Index : Body mass index is 34.21 kg/m².       Intake and Output summary:   Intake/Output Summary (Last 24 hours) at 2/14/2023 1117  Last data filed at 2/14/2023 1030  Gross per 24 hour   Intake 631.64 ml   Output 161 ml   Net Reason for Call:  Other call back    Detailed comments: patient would like a call back- she needs an updated note-     Phone Number Patient can be reached at: [phone]    Best Time: any    Can we leave a detailed message on this number? YES    Call taken on 4/7/2022 at 3:26 PM by Rita Mendes     470.64 ml       Physical Examination:     Gen: Moderate distress  Eyes: PERRL. Anicteric sclera. No conjunctival injection. ENT: No discharge. Posterior oropharynx clear. External appearance of ears and nose normal.  Neck: Trachea midline. No mass   Resp: There is increased work of breathing with diminished breath sounds more on the right  CV: Regular rate. Regular rhythm. systolic murmur or rub. ++ edema. GI: Soft, Non-tender. Non-distended. +BS  Skin: Warm, dry, w/o erythema. Lymph: No cervical or supraclavicular LAD. M/S: No cyanosis. No clubbing. Neuro:  awake, lethargic, no focal deficit       Laboratory findings:-    CBC:   Recent Labs     02/14/23  0342 02/14/23  0345   WBC 13.5*  --    HGB 11.3* 14.0     --      BMP:    Recent Labs     02/13/23  0810 02/13/23  1600 02/14/23  0342    136 137   K 4.8 4.8 5.3*    100 101   CO2 23 24 27   BUN 25* 31* 50*   CREATININE 1.6* 1.9* 2.4*   GLUCOSE 104* 119* 156*     S. Calcium:  Recent Labs     02/14/23  0342   CALCIUM 9.3     S. Magnesium:  Recent Labs     02/13/23  1600   MG 2.70*     S. Phosphorus:  Recent Labs     02/13/23  1600   PHOS 3.4     S. Glucose:  Recent Labs     02/14/23  0342   POCGLU 142*           Radiology Review:  Pertinent images / reports were reviewed as a part of this visit.     CXR reviewed  Critical Care time 35 minutes                    Olivier Araiza MD, M.D.            2/14/2023, 11:17 AM

## 2023-03-06 ENCOUNTER — OFFICE VISIT (OUTPATIENT)
Dept: FAMILY MEDICINE | Facility: CLINIC | Age: 68
End: 2023-03-06
Payer: COMMERCIAL

## 2023-03-06 VITALS
OXYGEN SATURATION: 96 % | DIASTOLIC BLOOD PRESSURE: 75 MMHG | BODY MASS INDEX: 29.84 KG/M2 | WEIGHT: 148 LBS | RESPIRATION RATE: 20 BRPM | HEART RATE: 69 BPM | SYSTOLIC BLOOD PRESSURE: 117 MMHG | TEMPERATURE: 98.3 F | HEIGHT: 59 IN

## 2023-03-06 DIAGNOSIS — R10.13 EPIGASTRIC PAIN: ICD-10-CM

## 2023-03-06 DIAGNOSIS — Z12.11 SPECIAL SCREENING FOR MALIGNANT NEOPLASMS, COLON: ICD-10-CM

## 2023-03-06 DIAGNOSIS — I47.10 SVT (SUPRAVENTRICULAR TACHYCARDIA) (H): ICD-10-CM

## 2023-03-06 DIAGNOSIS — E78.5 HYPERLIPIDEMIA LDL GOAL <130: ICD-10-CM

## 2023-03-06 DIAGNOSIS — G89.29 OTHER CHRONIC PAIN: ICD-10-CM

## 2023-03-06 DIAGNOSIS — Z00.00 ENCOUNTER FOR MEDICARE ANNUAL WELLNESS EXAM: Primary | ICD-10-CM

## 2023-03-06 DIAGNOSIS — R68.83 CHILLS: ICD-10-CM

## 2023-03-06 DIAGNOSIS — J20.9 ACUTE BRONCHITIS, UNSPECIFIED ORGANISM: ICD-10-CM

## 2023-03-06 DIAGNOSIS — R93.89 ABNORMAL CT SCAN: ICD-10-CM

## 2023-03-06 LAB
ALBUMIN SERPL BCG-MCNC: 3.9 G/DL (ref 3.5–5.2)
ALBUMIN UR-MCNC: NEGATIVE MG/DL
ALP SERPL-CCNC: 97 U/L (ref 35–104)
ALT SERPL W P-5'-P-CCNC: 24 U/L (ref 10–35)
ANION GAP SERPL CALCULATED.3IONS-SCNC: 12 MMOL/L (ref 7–15)
APPEARANCE UR: CLEAR
AST SERPL W P-5'-P-CCNC: 23 U/L (ref 10–35)
BILIRUB SERPL-MCNC: 0.5 MG/DL
BILIRUB UR QL STRIP: NEGATIVE
BUN SERPL-MCNC: 14.9 MG/DL (ref 8–23)
CALCIUM SERPL-MCNC: 9.5 MG/DL (ref 8.8–10.2)
CHLORIDE SERPL-SCNC: 105 MMOL/L (ref 98–107)
COLOR UR AUTO: YELLOW
CREAT SERPL-MCNC: 0.7 MG/DL (ref 0.51–0.95)
DEPRECATED HCO3 PLAS-SCNC: 25 MMOL/L (ref 22–29)
ERYTHROCYTE [DISTWIDTH] IN BLOOD BY AUTOMATED COUNT: 13.4 % (ref 10–15)
GFR SERPL CREATININE-BSD FRML MDRD: >90 ML/MIN/1.73M2
GLUCOSE SERPL-MCNC: 104 MG/DL (ref 70–99)
GLUCOSE UR STRIP-MCNC: NEGATIVE MG/DL
HCT VFR BLD AUTO: 41.8 % (ref 35–47)
HGB BLD-MCNC: 13.8 G/DL (ref 11.7–15.7)
HGB UR QL STRIP: NEGATIVE
KETONES UR STRIP-MCNC: NEGATIVE MG/DL
LEUKOCYTE ESTERASE UR QL STRIP: NEGATIVE
MCH RBC QN AUTO: 31.7 PG (ref 26.5–33)
MCHC RBC AUTO-ENTMCNC: 33 G/DL (ref 31.5–36.5)
MCV RBC AUTO: 96 FL (ref 78–100)
NITRATE UR QL: NEGATIVE
PH UR STRIP: 5.5 [PH] (ref 5–7)
PLATELET # BLD AUTO: 365 10E3/UL (ref 150–450)
POTASSIUM SERPL-SCNC: 4.1 MMOL/L (ref 3.4–5.3)
PROT SERPL-MCNC: 6.8 G/DL (ref 6.4–8.3)
RBC # BLD AUTO: 4.35 10E6/UL (ref 3.8–5.2)
SODIUM SERPL-SCNC: 142 MMOL/L (ref 136–145)
SP GR UR STRIP: 1.02 (ref 1–1.03)
TSH SERPL DL<=0.005 MIU/L-ACNC: 0.9 UIU/ML (ref 0.3–4.2)
UROBILINOGEN UR STRIP-ACNC: 0.2 E.U./DL
WBC # BLD AUTO: 8.2 10E3/UL (ref 4–11)

## 2023-03-06 PROCEDURE — 81003 URINALYSIS AUTO W/O SCOPE: CPT | Performed by: FAMILY MEDICINE

## 2023-03-06 PROCEDURE — 84443 ASSAY THYROID STIM HORMONE: CPT | Performed by: FAMILY MEDICINE

## 2023-03-06 PROCEDURE — 99213 OFFICE O/P EST LOW 20 MIN: CPT | Mod: 25 | Performed by: FAMILY MEDICINE

## 2023-03-06 PROCEDURE — 99397 PER PM REEVAL EST PAT 65+ YR: CPT | Performed by: FAMILY MEDICINE

## 2023-03-06 PROCEDURE — 85027 COMPLETE CBC AUTOMATED: CPT | Performed by: FAMILY MEDICINE

## 2023-03-06 PROCEDURE — 36415 COLL VENOUS BLD VENIPUNCTURE: CPT | Performed by: FAMILY MEDICINE

## 2023-03-06 PROCEDURE — 80053 COMPREHEN METABOLIC PANEL: CPT | Performed by: FAMILY MEDICINE

## 2023-03-06 RX ORDER — TRAMADOL HYDROCHLORIDE 50 MG/1
50 TABLET ORAL 3 TIMES DAILY
Qty: 90 TABLET | Refills: 0 | Status: SHIPPED | OUTPATIENT
Start: 2023-03-20 | End: 2023-04-24

## 2023-03-06 RX ORDER — ALBUTEROL SULFATE 0.83 MG/ML
SOLUTION RESPIRATORY (INHALATION)
Qty: 75 ML | Refills: 11 | Status: SHIPPED | OUTPATIENT
Start: 2023-03-06 | End: 2024-02-16

## 2023-03-06 RX ORDER — HYDROCODONE BITARTRATE AND ACETAMINOPHEN 10; 325 MG/1; MG/1
1 TABLET ORAL 2 TIMES DAILY PRN
Qty: 30 TABLET | Refills: 0 | Status: SHIPPED | OUTPATIENT
Start: 2023-03-20 | End: 2023-04-24

## 2023-03-06 RX ORDER — ATORVASTATIN CALCIUM 10 MG/1
10 TABLET, FILM COATED ORAL DAILY
Qty: 90 TABLET | Refills: 2 | Status: SHIPPED | OUTPATIENT
Start: 2023-03-06 | End: 2024-03-06

## 2023-03-06 RX ORDER — ALBUTEROL SULFATE 90 UG/1
AEROSOL, METERED RESPIRATORY (INHALATION)
Qty: 8.5 G | Refills: 11 | Status: SHIPPED | OUTPATIENT
Start: 2023-03-06 | End: 2024-02-16

## 2023-03-06 SDOH — ECONOMIC STABILITY: FOOD INSECURITY: WITHIN THE PAST 12 MONTHS, YOU WORRIED THAT YOUR FOOD WOULD RUN OUT BEFORE YOU GOT MONEY TO BUY MORE.: NEVER TRUE

## 2023-03-06 SDOH — ECONOMIC STABILITY: FOOD INSECURITY: WITHIN THE PAST 12 MONTHS, THE FOOD YOU BOUGHT JUST DIDN'T LAST AND YOU DIDN'T HAVE MONEY TO GET MORE.: NEVER TRUE

## 2023-03-06 SDOH — ECONOMIC STABILITY: INCOME INSECURITY: IN THE LAST 12 MONTHS, WAS THERE A TIME WHEN YOU WERE NOT ABLE TO PAY THE MORTGAGE OR RENT ON TIME?: NO

## 2023-03-06 SDOH — ECONOMIC STABILITY: TRANSPORTATION INSECURITY
IN THE PAST 12 MONTHS, HAS LACK OF TRANSPORTATION KEPT YOU FROM MEETINGS, WORK, OR FROM GETTING THINGS NEEDED FOR DAILY LIVING?: NO

## 2023-03-06 SDOH — HEALTH STABILITY: PHYSICAL HEALTH: ON AVERAGE, HOW MANY MINUTES DO YOU ENGAGE IN EXERCISE AT THIS LEVEL?: 10 MIN

## 2023-03-06 SDOH — HEALTH STABILITY: PHYSICAL HEALTH: ON AVERAGE, HOW MANY DAYS PER WEEK DO YOU ENGAGE IN MODERATE TO STRENUOUS EXERCISE (LIKE A BRISK WALK)?: 6 DAYS

## 2023-03-06 SDOH — ECONOMIC STABILITY: TRANSPORTATION INSECURITY
IN THE PAST 12 MONTHS, HAS THE LACK OF TRANSPORTATION KEPT YOU FROM MEDICAL APPOINTMENTS OR FROM GETTING MEDICATIONS?: NO

## 2023-03-06 SDOH — ECONOMIC STABILITY: INCOME INSECURITY: HOW HARD IS IT FOR YOU TO PAY FOR THE VERY BASICS LIKE FOOD, HOUSING, MEDICAL CARE, AND HEATING?: SOMEWHAT HARD

## 2023-03-06 ASSESSMENT — LIFESTYLE VARIABLES
HOW OFTEN DO YOU HAVE SIX OR MORE DRINKS ON ONE OCCASION: NEVER
HOW MANY STANDARD DRINKS CONTAINING ALCOHOL DO YOU HAVE ON A TYPICAL DAY: PATIENT DOES NOT DRINK
SKIP TO QUESTIONS 9-10: 1
AUDIT-C TOTAL SCORE: 0
HOW OFTEN DO YOU HAVE A DRINK CONTAINING ALCOHOL: NEVER

## 2023-03-06 ASSESSMENT — ASTHMA QUESTIONNAIRES
QUESTION_5 LAST FOUR WEEKS HOW WOULD YOU RATE YOUR ASTHMA CONTROL: COMPLETELY CONTROLLED
QUESTION_4 LAST FOUR WEEKS HOW OFTEN HAVE YOU USED YOUR RESCUE INHALER OR NEBULIZER MEDICATION (SUCH AS ALBUTEROL): ONCE A WEEK OR LESS
QUESTION_1 LAST FOUR WEEKS HOW MUCH OF THE TIME DID YOUR ASTHMA KEEP YOU FROM GETTING AS MUCH DONE AT WORK, SCHOOL OR AT HOME: NONE OF THE TIME
ACT_TOTALSCORE: 23
QUESTION_5 LAST FOUR WEEKS HOW WOULD YOU RATE YOUR ASTHMA CONTROL: COMPLETELY CONTROLLED
ACT_TOTALSCORE: 23
QUESTION_2 LAST FOUR WEEKS HOW OFTEN HAVE YOU HAD SHORTNESS OF BREATH: ONCE OR TWICE A WEEK
QUESTION_1 LAST FOUR WEEKS HOW MUCH OF THE TIME DID YOUR ASTHMA KEEP YOU FROM GETTING AS MUCH DONE AT WORK, SCHOOL OR AT HOME: NONE OF THE TIME
ACT_TOTALSCORE: 23
QUESTION_2 LAST FOUR WEEKS HOW OFTEN HAVE YOU HAD SHORTNESS OF BREATH: ONCE OR TWICE A WEEK
QUESTION_3 LAST FOUR WEEKS HOW OFTEN DID YOUR ASTHMA SYMPTOMS (WHEEZING, COUGHING, SHORTNESS OF BREATH, CHEST TIGHTNESS OR PAIN) WAKE YOU UP AT NIGHT OR EARLIER THAN USUAL IN THE MORNING: NOT AT ALL
QUESTION_4 LAST FOUR WEEKS HOW OFTEN HAVE YOU USED YOUR RESCUE INHALER OR NEBULIZER MEDICATION (SUCH AS ALBUTEROL): ONCE A WEEK OR LESS
QUESTION_3 LAST FOUR WEEKS HOW OFTEN DID YOUR ASTHMA SYMPTOMS (WHEEZING, COUGHING, SHORTNESS OF BREATH, CHEST TIGHTNESS OR PAIN) WAKE YOU UP AT NIGHT OR EARLIER THAN USUAL IN THE MORNING: NOT AT ALL

## 2023-03-06 ASSESSMENT — SOCIAL DETERMINANTS OF HEALTH (SDOH)
IN A TYPICAL WEEK, HOW MANY TIMES DO YOU TALK ON THE PHONE WITH FAMILY, FRIENDS, OR NEIGHBORS?: MORE THAN THREE TIMES A WEEK
DO YOU BELONG TO ANY CLUBS OR ORGANIZATIONS SUCH AS CHURCH GROUPS UNIONS, FRATERNAL OR ATHLETIC GROUPS, OR SCHOOL GROUPS?: NO
HOW OFTEN DO YOU GET TOGETHER WITH FRIENDS OR RELATIVES?: TWICE A WEEK
ARE YOU MARRIED, WIDOWED, DIVORCED, SEPARATED, NEVER MARRIED, OR LIVING WITH A PARTNER?: LIVING WITH PARTNER
HOW OFTEN DO YOU ATTEND CHURCH OR RELIGIOUS SERVICES?: NEVER

## 2023-03-06 NOTE — PROGRESS NOTES
SUBJECTIVE:   Emeli is a 68 year old who presents for Preventive Visit.    She was seen last fall for abnormal heart beats.   She saw cards and they offered meds vs ablation for SVT.   She is on meds and they are working well right now.   She also admits she is having epigastric pain and bloating for about 6 months.   She does not  Note that food or anything else makes it worse.  Sometimes curling up can put pressure on the area.    She also notes 2-3 months of feeling fluish.   She feels chills and feverish.   She has checked her temp at those times and the highest it has been was 99.9.    She tested negative for covid a week or two ago.        Patient has been advised of split billing requirements and indicates understanding: Yes  Are you in the first 12 months of your Medicare coverage?  No    History of Present Illness       Reason for visit:  Manju med check  Symptom onset:  3-4 weeks ago  Symptoms include:  Low fever chils it [id number] idrink water or take tylenol  Symptom intensity:  Mild  Symptom progression:  Staying the same  Had these symptoms before:  No  What makes it worse:  No  What makes it better:  Water    She eats 2-3 servings of fruits and vegetables daily.She consumes 1 sweetened beverage(s) daily.She exercises with enough effort to increase her heart rate 9 or less minutes per day.  She exercises with enough effort to increase her heart rate 5 days per week.   She is taking medications regularly.      Have you ever done Advance Care Planning? (For example, a Health Directive, POLST, or a discussion with a medical provider or your loved ones about your wishes): No, advance care planning information given to patient to review.  Advanced care planning was discussed at today's visit.       Fall risk    Cognitive Screening   1) Repeat 3 items (Leader, Season, Table)    2) Clock draw: NORMAL  3) 3 item recall: Recalls 3 objects  Results: 3 items recalled: COGNITIVE IMPAIRMENT LESS  LIKELY    Mini-CogTM Copyright CHANDA Irene. Licensed by the author for use in [facility]; reprinted with permission ([email or web address]). All rights reserved.      Do you have sleep apnea, excessive snoring or daytime drowsiness?: no    Reviewed and updated as needed this visit by clinical staff   Tobacco  Allergies  Meds              Reviewed and updated as needed this visit by Provider                 Social History     Tobacco Use     Smoking status: Former     Packs/day: 0.50     Years: 30.00     Pack years: 15.00     Types: Cigarettes     Start date: 10/13/1967     Quit date: 11/6/2019     Years since quitting: 3.3     Smokeless tobacco: Never     Tobacco comments:     quit 11/2019   Substance Use Topics     Alcohol use: No     Comment: sober since 9/11/99         Alcohol Use 1/24/2022   Prescreen: >3 drinks/day or >7 drinks/week? Not Applicable           Current providers sharing in care for this patient include:   Patient Care Team:  Emma Byrne MD as PCP - General (Family Medicine)  Emma Byrne MD as Assigned PCP  Ani Cabezas PA-C as Assigned Surgical Provider  Rukhsana Grimes RN as Personal Advocate & Liaison (PAL) (Family Medicine)  Millie Dominguez MD as MD (Cardiovascular Disease)  Millie Dominguez MD as Assigned Heart and Vascular Provider  Emma Byrne MD as Assigned Pain Medication Provider    The following health maintenance items are reviewed in Epic and correct as of today:  Health Maintenance   Topic Date Due     MAMMO SCREENING  08/08/2021     COLORECTAL CANCER SCREENING  10/22/2022     MEDICARE ANNUAL WELLNESS VISIT  01/24/2023     ASTHMA ACTION PLAN  01/24/2023     ZOSTER IMMUNIZATION (3 of 3) 03/21/2022     DTAP/TDAP/TD IMMUNIZATION (3 - Td or Tdap) 05/02/2025 (Originally 3/4/2025)     URINE DRUG SCREEN  07/11/2023     ASTHMA CONTROL TEST  09/06/2023     ANNUAL REVIEW OF HM ORDERS  11/02/2023     FALL RISK ASSESSMENT  03/06/2024     LIPID   "10/13/2027     ADVANCE CARE PLANNING  03/06/2028     DEXA  06/16/2037     HEPATITIS C SCREENING  Completed     PHQ-2 (once per calendar year)  Completed     INFLUENZA VACCINE  Completed     COVID-19 Vaccine  Completed     IPV IMMUNIZATION  Aged Out     MENINGITIS IMMUNIZATION  Aged Out     Pneumococcal Vaccine: 65+ Years  Discontinued     LUNG CANCER SCREENING  Discontinued     Labs reviewed in [facility]  Mammogram Screening: Mammogram Screening: Recommended mammography every 1-2 years with patient discussion and risk factor consideration    FHS-7:   Breast CA Risk Assessment (FHS-7) 3/6/2023   Did any of your first-degree relatives have breast or ovarian cancer? Yes   Did any of your relatives have bilateral breast cancer? No   Did any man in your family have breast cancer? No   Did any woman in your family have breast and ovarian cancer? No   Did any woman in your family have breast cancer before age 50 y? No   Do you have 2 or more relatives with breast and/or ovarian cancer? Yes   Do you have 2 or more relatives with breast and/or bowel cancer? No     click delete button to remove this line now  Mammogram Screening: Recommended annual mammography  Pertinent mammograms are reviewed under the imaging tab.    Review of Systems  Constitutional, HEENT, cardiovascular, pulmonary, gi and gu systems are negative, except as otherwise noted.    OBJECTIVE:   /75 (BP Location: Left arm, Patient Position: Sitting, Cuff Size: Adult Regular)   Pulse 69   Temp 98.3  F (36.8  C) (Oral)   Resp 20   Ht 1.499 m (4' 11\")   Wt 67.1 kg (148 lb)   LMP  (LMP Unknown)   SpO2 96%   BMI 29.89 kg/m   Estimated body mass index is 29.89 kg/m  as calculated from the following:    Height as of this encounter: 1.499 m (4' 11\").    Weight as of this encounter: 67.1 kg (148 lb).  Physical Exam  GENERAL APPEARANCE: healthy, alert and no distress  EYES: Eyes grossly normal to inspection, PERRL and conjunctivae and sclerae normal  HENT: ear " canals and TM's normal, nose and mouth without ulcers or lesions, oropharynx clear and oral mucous membranes moist  NECK: no adenopathy, no asymmetry, masses, or scars and thyroid normal to palpation  RESP: lungs clear to auscultation - no rales, rhonchi or wheezes  BREAST: normal without masses, tenderness or nipple discharge and no palpable axillary masses or adenopathy  CV: regular rate and rhythm, normal S1 S2, no S3 or S4, no murmur, click or rub, no peripheral edema and peripheral pulses strong  ABDOMEN: soft, nontender, no hepatosplenomegaly, no masses and bowel sounds normal  MS: no musculoskeletal defects are noted and gait is age appropriate without ataxia  SKIN: no suspicious lesions or rashes  NEURO: Normal strength and tone, sensory exam grossly normal, mentation intact and speech normal  PSYCH: mentation appears normal and affect normal/bright    Diagnostic Test Results:  Labs reviewed in Epic    ASSESSMENT / PLAN:   (Z00.00) Encounter for Medicare annual wellness exam  (primary encounter diagnosis)  Comment:   Plan: PRIMARY CARE FOLLOW-UP SCHEDULING, Asthma         Action Plan (AAP), CBC with platelets,         CANCELED: Basic metabolic panel  (Ca, Cl, CO2,         Creat, Gluc, K, Na, BUN)            (J20.9) Acute bronchitis, unspecified organism  Comment: stable  Plan: albuterol (PROAIR HFA/PROVENTIL HFA/VENTOLIN         HFA) 108 (90 Base) MCG/ACT inhaler, albuterol         (PROVENTIL) (2.5 MG/3ML) 0.083% neb solution        Better after flare last week    (E78.5) Hyperlipidemia LDL goal <130  Comment: continue  Plan: atorvastatin (LIPITOR) 10 MG tablet,         Comprehensive metabolic panel (BMP + Alb, Alk         Phos, ALT, AST, Total. Bili, TP)        Refills per [facility]     (G89.29) Other chronic pain  Comment: stable  Plan: HYDROcodone-acetaminophen (NORCO)  MG per        tablet, traMADol (ULTRAM) 50 MG tablet        New pain agreement signed     (I47.1) SVT (supraventricular tachycardia)  (H)  Comment: under control      (R68.83) Chills  Comment: not sure what is causing this - wonder if related to abdominal pain  Plan: CBC with platelets, TSH with free T4 reflex,         OFFICE/OUTPT VISIT,EST,LEVL III            (Z12.11) Special screening for malignant neoplasms, colon  Comment:   Plan: Colonoscopy Screening  Referral            (R93.89) Abnormal CT scan  Comment: something in gallbladder last fall and symptoms would be consistent with gallstones or cholecystitis  Plan: US Abdomen Complete, Comprehensive metabolic         panel (BMP + Alb, Alk Phos, ALT, AST, Total.         Bili, TP), OFFICE/OUTPT VISIT,EST,LEVL III            (R10.13) Epigastric pain  Comment: see above   Plan: US Abdomen Complete, Comprehensive metabolic         panel (BMP + Alb, Alk Phos, ALT, AST, Total.         Bili, TP), OFFICE/OUTPT VISIT,EST,LEVL III              Patient has been advised of split billing requirements and indicates understanding: Yes      COUNSELING:  Reviewed preventive health counseling, as reflected in patient instructions  Special attention given to:       Regular exercise       Immunizations    Discussed second shingrix             Colon cancer screening        She reports that she quit smoking about 3 years ago. Her smoking use included cigarettes. She started smoking about 55 years ago. She has a 15.00 pack-year smoking history. She has never used smokeless tobacco.      Appropriate preventive services were discussed with this patient, including applicable screening as appropriate for cardiovascular disease, diabetes, osteopenia/osteoporosis, and glaucoma.  As appropriate for age/gender, discussed screening for colorectal cancer, prostate cancer, breast cancer, and cervical cancer. Checklist reviewing preventive services available has been given to the patient.    Reviewed patients plan of care and provided an AVS. The Basic Care Plan (routine screening as documented in Health Maintenance)  for Emeli meets the Care Plan requirement. This Care Plan has been established and reviewed with the Patient.      Emma Byrne MD  [facility]    Identified Health Risks:

## 2023-03-06 NOTE — LETTER
My Asthma Action Plan    Name: Emeli Granados   YOB: 1955  Date: 3/6/2023   My doctor: Emma Byrne MD   My clinic: [facility]        My Rescue Medicine:   Albuterol inhaler (Proair/Ventolin/Proventil HFA)  2-4 puffs EVERY 4 HOURS as needed. Use a spacer if recommended by your provider.   My Asthma Severity:   Intermittent / Exercise Induced  Know your asthma triggers: smoke and upper respiratory infections             GREEN ZONE   Good Control    I feel good    No cough or wheeze    Can work, sleep and play without asthma symptoms       Take your asthma control medicine every day.     1. If exercise triggers your asthma, take your rescue medication    15 minutes before exercise or sports, and    During exercise if you have asthma symptoms  2. Spacer to use with inhaler: If you have a spacer, make sure to use it with your inhaler             YELLOW ZONE Getting Worse  I have ANY of these:    I do not feel good    Cough or wheeze    Chest feels tight    Wake up at night   1. Keep taking your Green Zone medications  2. Start taking your rescue medicine:    every 20 minutes for up to 1 hour. Then every 4 hours for 24-48 hours.  3. If you stay in the Yellow Zone for more than 12-24 hours, contact your doctor.  4. If you do not return to the Green Zone in 12-24 hours or you get worse, start taking your oral steroid medicine if prescribed by your provider.           RED ZONE Medical Alert - Get Help  I have ANY of these:    I feel awful    Medicine is not helping    Breathing getting harder    Trouble walking or talking    Nose opens wide to breathe       1. Take your rescue medicine NOW  2. If your provider has prescribed an oral steroid medicine, start taking it NOW  3. Call your doctor NOW  4. If you are still in the Red Zone after 20 minutes and you have not reached your doctor:    Take your rescue medicine again and    Call 911 or go to the emergency room right  away    See your regular doctor within 2 weeks of an Emergency Room or Urgent Care visit for follow-up treatment.          Annual Reminders:  Meet with Asthma Educator,  Flu Shot in the Fall, consider Pneumonia Vaccination for patients with asthma (aged 19 and older).    Pharmacy:    [id number] [id number]  WRITTEN PRESCRIPTION REQUESTED    Electronically signed by Emma Byrne MD   Date: 03/06/23                    Asthma Triggers  How To Control Things That Make Your Asthma Worse    Triggers are things that make your asthma worse.  Look at the list below to help you find your triggers and   what you can do about them. You can help prevent asthma flare-ups by staying away from your triggers.      Trigger                                                          What you can do   Cigarette Smoke  Tobacco smoke can make asthma worse. Do not allow smoking in your home, car or around you.  Be sure no one smokes at a child s day care or school.  If you smoke, ask your health care provider for ways to help you quit.  Ask family members to quit too.  Ask your health care provider for a referral to Quit Plan to help you quit smoking, or call 0-026-131-PLAN.     Colds, Flu, Bronchitis  These are common triggers of asthma. Wash your hands often.  Don t touch your eyes, nose or mouth.  Get a flu shot every year.     Dust Mites  These are tiny bugs that live in cloth or carpet. They are too small to see. Wash sheets and blankets in hot water every week.   Encase pillows and mattress in dust mite proof covers.  Avoid having carpet if you can. If you have carpet, vacuum weekly.   Use a dust mask and HEPA vacuum.   Pollen and Outdoor Mold  Some people are allergic to trees, grass, or weed pollen, or molds. Try to keep your windows closed.  Limit time out doors when pollen count is high.   Ask you health care provider about taking medicine during allergy season.     Animal Dander  Some people are  allergic to skin flakes, urine or saliva from pets with fur or feathers. Keep pets with fur or feathers out of your home.    If you can t keep the pet outdoors, then keep the pet out of your bedroom.  Keep the bedroom door closed.  Keep pets off cloth furniture and away from stuffed toys.     Mice, Rats, and Cockroaches  Some people are allergic to the waste from these pests.   Cover food and garbage.  Clean up spills and food crumbs.  Store grease in the refrigerator.   Keep food out of the bedroom.   Indoor Mold  This can be a trigger if your home has high moisture. Fix leaking faucets, pipes, or other sources of water.   Clean moldy surfaces.  Dehumidify basement if it is damp and smelly.   Smoke, Strong Odors, and Sprays  These can reduce air quality. Stay away from strong odors and sprays, such as perfume, powder, hair spray, paints, smoke incense, paint, cleaning products, candles and new carpet.   Exercise or Sports  Some people with asthma have this trigger. Be active!  Ask your doctor about taking medicine before sports or exercise to prevent symptoms.    Warm up for 5-10 minutes before and after sports or exercise.     Other Triggers of Asthma  Cold air:  Cover your nose and mouth with a scarf.  Sometimes laughing or crying can be a trigger.  Some medicines and food can trigger asthma.

## 2023-03-06 NOTE — LETTER
Opioid / Opioid Plus Controlled Substance Agreement    This is an agreement between you and your provider about the safe and appropriate use of controlled substance/opioids prescribed by your care team. Controlled substances are medicines that can cause physical and mental dependence (abuse).    There are strict laws about having and using these medicines. We here at [facility] are committing to working with you in your efforts to get better. To support you in this work, we ll help you schedule regular office appointments for medicine refills. If we must cancel or change your appointment for any reason, we ll make sure you have enough medicine to last until your next appointment.     As a Provider, I will:    Listen carefully to your concerns and treat you with respect.     Recommend a treatment plan that I believe is in your best interest. This plan may involve therapies other than opioid pain medication.     Talk with you often about the possible benefits, and the risk of harm of any medicine that we prescribe for you.     Provide a plan on how to taper (discontinue or go off) using this medicine if the decision is made to stop its use.    As a Patient, I understand that opioid(s):     Are a controlled substance prescribed by my care team to help me function or work and manage my condition(s).     Are strong medicines and can cause serious side effects such as:    Drowsiness, which can seriously affect my driving ability    A lower breathing rate, enough to cause death    Harm to my thinking ability     Depression     Abuse of and addiction to this medicine    Need to be taken exactly as prescribed. Combining opioids with certain medicines or chemicals (such as illegal drugs, sedatives, sleeping pills, and benzodiazepines) can be dangerous or even fatal. If I stop opioids suddenly, I may have severe withdrawal symptoms.    Do not work for all types of pain nor for all patients. If they re not helpful, I may  be asked to stop them.        The risks, benefits and side effects of these medicine(s) were explained to me. I agree that:  1. I will take part in other treatments as advised by my care team. This may be psychiatry or counseling, physical therapy, behavioral therapy, group treatment or a referral to a specialist.     2. I will keep all my appointments. I understand that this is part of the monitoring of opioids. My care team may require an office visit for EVERY opioid/controlled substance refill. If I miss appointments or don t follow instructions, my care team may stop my medicine.    3. I will take my medicines as prescribed. I will not change the dose or schedule unless my care team tells me to. There will be no refills if I run out early.     4. I may be asked to come to the clinic and complete a urine drug test or complete a pill count at any time. If I don t give a urine sample or participate in a pill count, the care team may stop my medicine.    5. I will only receive prescriptions from this clinic for chronic pain. If I am treated by another provider for acute pain issues, I will tell them that I am taking opioid pain medication for chronic pain and that I have a treatment agreement with this provider. I will inform my [facility] care team within one business day if I am given a prescription for any pain medication by another healthcare provider. My [facility] care team can contact other providers and pharmacists about my use of any medicines.    6. It is up to me to make sure that I don t run out of my medicines on weekends or holidays. If my care team is willing to refill my opioid prescription without a visit, I must request refills only during office hours. Refills may take up to 3 business days to process. I will use one pharmacy to fill all my opioid and other controlled substance prescriptions. I will notify the clinic about any changes to my insurance or medication  availability.    7. I am responsible for my prescriptions. If the medicine/prescription is lost, stolen or destroyed, it will not be replaced. I also agree not to share controlled substance medicines with anyone.    8. I am aware I should not use any illegal or recreational drugs. I agree not to drink alcohol unless my care team says I can.       9. If I enroll in the [place] Medical Cannabis program, I will tell my care team prior to my next refill.     10. I will tell my care team right away if I become pregnant, have a new medical problem treated outside of my regular clinic, or have a change in my medications.    11. I understand that this medicine can affect my thinking, judgment and reaction time. Alcohol and drugs affect the brain and body, which can affect the safety of my driving. Being under the influence of alcohol or drugs can affect my decision-making, behaviors, personal safety, and the safety of others. Driving while impaired (DWI) can occur if a person is driving, operating, or in physical control of a car, motorcycle, boat, snowmobile, ATV, motorbike, off-road vehicle, or any other motor vehicle ([place] Statute 169A.20). I understand the risk if I choose to drive or operate any vehicle or machinery.    I understand that if I do not follow any of the conditions above, my prescriptions or treatment may be stopped or changed.          Opioids  What You Need to Know    What are opioids?   Opioids are pain medicines that must be prescribed by a doctor. They are also known as narcotics.     Examples are:   1. morphine (MS Contin, Marlyn)  2. oxycodone (Oxycontin)  3. oxycodone and acetaminophen (Percocet)  4. hydrocodone and acetaminophen (Vicodin, Norco)   5. fentanyl patch (Duragesic)   6. hydromorphone (Dilaudid)   7. methadone  8. codeine (Tylenol #3)     What do opioids do well?   Opioids are best for severe short-term pain such as after a surgery or injury. They may work well for cancer pain. They may  help some people with long-lasting (chronic) pain.     What do opioids NOT do well?   Opioids never get rid of pain entirely, and they don t work well for most patients with chronic pain. Opioids don t reduce swelling, one of the causes of pain.                                    Other ways to manage chronic pain and improve function include:       Treat the health problem that may be causing pain    Anti-inflammation medicines, which reduce swelling and tenderness, such as ibuprofen (Advil, Motrin) or naproxen (Aleve)    Acetaminophen (Tylenol)    Antidepressants and anti-seizure medicines, especially for nerve pain    Topical treatments such as patches or creams    Injections or nerve blocks    Chiropractic or osteopathic treatment    Acupuncture, massage, deep breathing, meditation, visual imagery, aromatherapy    Use heat or ice at the pain site    Physical therapy     Exercise    Stop smoking    Take part in therapy       Risks and side effects     Talk to your doctor before you start or decide to keep taking opioids. Possible side effects include:      Lowering your breathing rate enough to cause death    Overdose, including death, especially if taking higher than prescribed doses    Worse depression symptoms; less pleasure in things you usually enjoy    Feeling tired or sluggish    Slower thoughts or cloudy thinking    Being more sensitive to pain over time; pain is harder to control    Trouble sleeping or restless sleep    Changes in hormone levels (for example, less testosterone)    Changes in sex drive or ability to have sex    Constipation    Unsafe driving    Itching and sweating    Dizziness    Nausea, throwing up and dry mouth    What else should I know about opioids?    Opioids may lead to dependence, tolerance, or addiction.      Dependence means that if you stop or reduce the medicine too quickly, you will have withdrawal symptoms. These include loose poop (diarrhea), jitters, flu-like symptoms,  nervousness and tremors. Dependence is not the same as addiction.                       Tolerance means needing higher doses over time to get the same effect. This may increase the chance of serious side effects.      Addiction is when people improperly use a substance that harms their body, their mind or their relations with others. Use of opiates can cause a relapse of addiction if you have a history of drug or alcohol abuse.      People who have used opioids for a long time may have a lower quality of life, worse depression, higher levels of pain and more visits to doctors.    You can overdose on opioids. Take these steps to lower your risk of overdose:    1. Recognize the signs:  Signs of overdose include decrease or loss of consciousness (blackout), slowed breathing, trouble waking up and blue lips. If someone is worried about overdose, they should call 911.    2. Talk to your doctor about Narcan (naloxone).   If you are at risk for overdose, you may be given a prescription for Narcan. This medicine very quickly reverses the effects of opioids.   If you overdose, a friend or family member can give you Narcan while waiting for the ambulance. They need to know the signs of overdose and how to give Narcan.     3. Don't use alcohol or street drugs.   Taking them with opioids can cause death.    4. Do not take any of these medicines unless your doctor says it s OK. Taking these with opioids can cause death:    Benzodiazepines, such as lorazepam (Ativan), alprazolam (Xanax) or diazepam (Valium)    Muscle relaxers, such as cyclobenzaprine (Flexeril)    Sleeping pills like zolpidem (Ambien)     Other opioids      How to keep you and other people safe while taking opioids:    1. Never share your opioids with others.  Opioid medicines are regulated by the Drug Enforcement Agency (ELSI[employer]). Selling or sharing medications is a criminal act.    2. Be sure to store opioids in a secure place, locked up if possible. Young children  can easily swallow them and overdose.    3. When you are traveling with your medicines, keep them in the original bottles. If you use a pill box, be sure you also carry a copy of your medicine list from your clinic or pharmacy.    4. Safe disposal of opioids    Most pharmacies have places to get rid of medicine, called disposal kiosks. Medicine disposal options are also available in every [facility]. Search your county and  medication disposal  to find more options. You can find more details at:  [email or web address]     I agree that my provider, clinic care team, and pharmacy may work with any city, state or federal law enforcement agency that investigates the misuse, sale, or other diversion of my controlled medicine. I will allow my provider to discuss my care with, or share a copy of, this agreement with any other treating provider, pharmacy or emergency room where I receive care.    I have read this agreement and have asked questions about anything I did not understand.    _______________________________________________________  Patient Signature - Emeli Granados _____________________                   Date     _______________________________________________________  Provider Signature - Emma Byrne MD   _____________________                   Date     _______________________________________________________  Witness Signature (required if provider not present while patient signing)   _____________________                   Date

## 2023-03-06 NOTE — PATIENT INSTRUCTIONS
Patient Education   Personalized Prevention Plan  You are due for the preventive services outlined below.  Your care team is available to assist you in scheduling these services.  If you have already completed any of these items, please share that information with your care team to update in your medical record.  Health Maintenance Due   Topic Date Due     Mammogram  08/08/2021     Colorectal Cancer Screening  10/22/2022     Asthma Control Test  01/11/2023     Annual Wellness Visit  01/24/2023     Asthma Action Plan - yearly  01/24/2023     FALL RISK ASSESSMENT  01/24/2023     Zoster (Shingles) Vaccine (3 of 3) 03/21/2022

## 2023-03-21 ENCOUNTER — HOSPITAL ENCOUNTER (OUTPATIENT)
Dept: ULTRASOUND IMAGING | Facility: CLINIC | Age: 68
Discharge: HOME | End: 2023-03-21
Attending: INTERNAL MEDICINE | Admitting: INTERNAL MEDICINE
Payer: COMMERCIAL

## 2023-03-21 DIAGNOSIS — R93.2 ABNORMAL CT SCAN, GALLBLADDER: ICD-10-CM

## 2023-03-21 PROCEDURE — 76705 ECHO EXAM OF ABDOMEN: CPT

## 2023-03-23 ENCOUNTER — ANCILLARY PROCEDURE (OUTPATIENT)
Dept: MAMMOGRAPHY | Facility: CLINIC | Age: 68
End: 2023-03-23
Payer: COMMERCIAL

## 2023-03-23 ENCOUNTER — TRANSFERRED RECORDS (OUTPATIENT)
Dept: HEALTH INFORMATION MANAGEMENT | Facility: CLINIC | Age: 68
End: 2023-03-23

## 2023-03-23 DIAGNOSIS — Z12.31 VISIT FOR SCREENING MAMMOGRAM: ICD-10-CM

## 2023-03-23 PROCEDURE — 77063 BREAST TOMOSYNTHESIS BI: CPT | Mod: TC | Performed by: RADIOLOGY

## 2023-03-23 PROCEDURE — 77067 SCR MAMMO BI INCL CAD: CPT | Mod: TC | Performed by: RADIOLOGY

## 2023-03-27 ENCOUNTER — TRANSFERRED RECORDS (OUTPATIENT)
Dept: HEALTH INFORMATION MANAGEMENT | Facility: CLINIC | Age: 68
End: 2023-03-27
Payer: COMMERCIAL

## 2023-04-15 ENCOUNTER — HOSPITAL ENCOUNTER (OUTPATIENT)
Dept: MRI IMAGING | Facility: CLINIC | Age: 68
Discharge: HOME | End: 2023-04-15
Attending: INTERNAL MEDICINE | Admitting: INTERNAL MEDICINE
Payer: COMMERCIAL

## 2023-04-15 DIAGNOSIS — R93.2 ABNORMAL GALLBLADDER ULTRASOUND: ICD-10-CM

## 2023-04-15 PROCEDURE — 255N000002 HC RX 255 OP 636: Performed by: INTERNAL MEDICINE

## 2023-04-15 PROCEDURE — A9585 GADOBUTROL INJECTION: HCPCS | Performed by: INTERNAL MEDICINE

## 2023-04-15 PROCEDURE — 74183 MRI ABD W/O CNTR FLWD CNTR: CPT

## 2023-04-15 RX ORDER — GADOBUTROL 604.72 MG/ML
7.5 INJECTION INTRAVENOUS ONCE
Status: COMPLETED | OUTPATIENT
Start: 2023-04-15 | End: 2023-04-15

## 2023-04-15 RX ADMIN — GADOBUTROL 7 ML: 604.72 INJECTION INTRAVENOUS at 09:19

## 2023-04-17 ENCOUNTER — TELEPHONE (OUTPATIENT)
Dept: FAMILY MEDICINE | Facility: CLINIC | Age: 68
End: 2023-04-17
Payer: COMMERCIAL

## 2023-04-17 DIAGNOSIS — D13.5 ADENOMYOMATOSIS OF GALLBLADDER: Primary | ICD-10-CM

## 2023-04-17 NOTE — TELEPHONE ENCOUNTER
Dr. Byrne-    RN pended MR, please review prior to signing.     Spoke to patient and informed of result. Advised patient Dr. Byrne would order repeat MRI and imagining would contact to schedule. Patient verbalized understanding and is agreeable.     Patient reports she has sores on her mouth that started after teeth were pulled. Notes this has been ongoing for a year.  RN asked if patient has discussed with dentist. Patient reports she has had a bad experience with her dentist and will not be going back to see him.   -Patient requests visit. Scheduled with Gina Arias PA-C on Wednesday. Informed patient could try e-visit and cancel visit with Gina if not needed.     Patient was given an opportunity to ask questions, verbalized understanding of plan, and is agreeable.    Rukhsana REICH RN, BSN, PHN - PAL (patient advocate liaison)  [facility]  [phone]

## 2023-04-17 NOTE — TELEPHONE ENCOUNTER
----- Message from Randi Romero RN sent at 4/17/2023  9:43 AM CDT -----    ----- Message -----  From: Emma Byrne MD  Sent: 4/16/2023  11:30 PM CDT  To: Cr Triage    Appears to be benign growth in gallbladder and one more scan in 6 months is recommended to assure stability.   Can you place the order for 6 months from now for another MRI and I will cosign?

## 2023-04-19 ENCOUNTER — OFFICE VISIT (OUTPATIENT)
Dept: FAMILY MEDICINE | Facility: CLINIC | Age: 68
End: 2023-04-19
Payer: COMMERCIAL

## 2023-04-19 VITALS
TEMPERATURE: 98.2 F | DIASTOLIC BLOOD PRESSURE: 64 MMHG | HEIGHT: 59 IN | RESPIRATION RATE: 14 BRPM | BODY MASS INDEX: 28.83 KG/M2 | WEIGHT: 143 LBS | OXYGEN SATURATION: 97 % | HEART RATE: 74 BPM | SYSTOLIC BLOOD PRESSURE: 102 MMHG

## 2023-04-19 DIAGNOSIS — K13.0 ANGULAR CHEILITIS: Primary | ICD-10-CM

## 2023-04-19 DIAGNOSIS — K59.00 CONSTIPATION, UNSPECIFIED CONSTIPATION TYPE: ICD-10-CM

## 2023-04-19 PROCEDURE — 99213 OFFICE O/P EST LOW 20 MIN: CPT | Performed by: PHYSICIAN ASSISTANT

## 2023-04-19 RX ORDER — MUPIROCIN 20 MG/G
OINTMENT TOPICAL 2 TIMES DAILY
Qty: 30 G | Refills: 1 | Status: SHIPPED | OUTPATIENT
Start: 2023-04-19 | End: 2023-10-31

## 2023-04-19 RX ORDER — DOCUSATE SODIUM 100 MG/1
100 CAPSULE, LIQUID FILLED ORAL 2 TIMES DAILY PRN
Qty: 30 CAPSULE | Refills: 1 | Status: SHIPPED | OUTPATIENT
Start: 2023-04-19 | End: 2024-08-12

## 2023-04-19 NOTE — PROGRESS NOTES
Assessment & Plan     Angular cheilitis  Work on denture fit with dentist.  Start with topical mupirocin and barrier cream. If not improving in the next couple weeks consider clotrimazole.   - mupirocin (BACTROBAN) 2 % external ointment; Apply topically 2 times daily    Constipation, unspecified constipation type  Try using daily Colace. On chronic narcotics and I recommended she have a bowel regimen because she will get constipated. She did not like Miralax.  - docusate sodium (COLACE) 100 MG capsule; Take 1 capsule (100 mg) by mouth 2 times daily as needed for constipation    Prescription drug management  21 minutes spent by me on the date of the encounter doing chart review, history and exam, documentation and further activities per the note        KAUSHAL Orozco [facility]    Sofiya Sainz is a 68 year old, presenting for the following health issues:  Mouth/Lip Problem         View : No data to display.              History of Present Illness       Reason for visit:  For my    She eats 2-3 servings of fruits and vegetables daily.She consumes 1 sweetened beverage(s) daily.She exercises with enough effort to increase her heart rate 10 to 19 minutes per day.  She exercises with enough effort to increase her heart rate 3 or less days per week.   She is taking medications regularly.       Concern - Sores on mouth   Onset: 2021/2022   Description: Painful, cracked sores on outsides of mouth   Intensity: moderate  Progression of Symptoms:  worsening  Precipitating factors:        Worsened by: Area getting wet   Alleviating factors:        Improved by: cornstarch (temporarily)      She has tried using numerous topical treatments without improvement.    She reports after eating the food seems to just sit there. She is taking laxatives often. On chronic narcotics.    Patient has had trouble with dentures fit.    Review of Systems   GENERAL:  No fevers        Objective    BP  "102/64 (BP Location: Right arm, Patient Position: Sitting, Cuff Size: Adult Regular)   Pulse 74   Temp 98.2  F (36.8  C) (Oral)   Resp 14   Ht 1.499 m (4' 11\")   Wt 64.9 kg (143 lb)   LMP  (LMP Unknown)   SpO2 97%   BMI 28.88 kg/m    Body mass index is 28.88 kg/m .  Physical Exam   GENERAL: No acute distress  HEENT: Normocephalic. Cracking along the angles of the lips bilaterally.  NEURO: Alert and non-focal              "

## 2023-04-19 NOTE — PATIENT INSTRUCTIONS
Follow up with dentist regarding dentures.    Topical mupirocin twice daily then cover with either zinc oxide or Aquaphor as a barrier cream.

## 2023-04-20 ENCOUNTER — TRANSFERRED RECORDS (OUTPATIENT)
Dept: HEALTH INFORMATION MANAGEMENT | Facility: CLINIC | Age: 68
End: 2023-04-20
Payer: COMMERCIAL

## 2023-04-23 DIAGNOSIS — G89.29 OTHER CHRONIC PAIN: ICD-10-CM

## 2023-04-24 RX ORDER — HYDROCODONE BITARTRATE AND ACETAMINOPHEN 10; 325 MG/1; MG/1
1 TABLET ORAL 2 TIMES DAILY PRN
Qty: 30 TABLET | Refills: 0 | Status: SHIPPED | OUTPATIENT
Start: 2023-04-24 | End: 2023-05-17

## 2023-04-24 RX ORDER — TRAMADOL HYDROCHLORIDE 50 MG/1
50 TABLET ORAL 3 TIMES DAILY
Qty: 90 TABLET | Refills: 0 | Status: SHIPPED | OUTPATIENT
Start: 2023-04-24 | End: 2023-05-17

## 2023-04-24 NOTE — TELEPHONE ENCOUNTER
RN does not see CSA as option on HCM, unable to add topic.     Shila- are you able to advise?     Rukhsana REICH RN, BSN, PHN - PAL (patient advocate liaison)  [facility]  [phone]

## 2023-04-25 NOTE — TELEPHONE ENCOUNTER
Dr. Byrne-    CSA agreement placed in [facility]..  -Please sign and route encounter back to this RN to  completed CSA.     Spoke to patient and informed would need to complete CSA. Apologized that previous CSA was not placed in chart. Patient was very understanding and willing to return to the clinic prior to scheduling follow-up appointment.     -Scheduled patient for nurse only appointment on Friday to complete CSA with this RN.     Rukhsana REICH RN, BSN, PHN - PAL (patient advocate liaison)  [facility]  [phone]

## 2023-04-25 NOTE — TELEPHONE ENCOUNTER
Emeli is now back to work at [employer].    The pain is so bad.  She has been taking 3pills a day of Tramadol    Refused Prescriptions:                       Disp   Refills    traMADol (ULTRAM) 50 MG tablet [Pharmacy M*60 tab*0        Sig: TAKE ONE TABLET BY MOUTH TWICE A DAY AS NEEDED FOR           PAIN. TAKE WITH FOOD  Refused By: GABINO JONES  Reason for Refusal: Patient has requested refill too soon     Type Of Destruction Used: Curettage

## 2023-04-25 NOTE — TELEPHONE ENCOUNTER
Can I have someone contact her and have her sign this again next time she comes in?    Express our apologies for have lost the paperwork.   I will sign as well and we can have on hold until her next appt

## 2023-04-26 NOTE — TELEPHONE ENCOUNTER
RN picked up CSA and placed on this RNs desk for appointment on Friday.     Rukhsana REICH RN, BSN, PHN - PAL (patient advocate liaison)  [facility]  [phone]

## 2023-04-28 NOTE — TELEPHONE ENCOUNTER
Patient did not show up for nurse only visit today to complete CSA. Patient reports she forgot and requests to come another day. Patient does not have schedule for next week at this time. Will call this RN to reschedule appointment.     RN will monitor and contact patient on Tuesday if patient has not returned call at that time.     Rukhsana REICH RN, BSN, PHN - PAL (patient advocate liaison)  [facility]  [phone]

## 2023-05-02 ENCOUNTER — ALLIED HEALTH/NURSE VISIT (OUTPATIENT)
Dept: FAMILY MEDICINE | Facility: CLINIC | Age: 68
End: 2023-05-02
Payer: COMMERCIAL

## 2023-05-02 DIAGNOSIS — G89.29 OTHER CHRONIC PAIN: Primary | ICD-10-CM

## 2023-05-02 PROCEDURE — 99207 PR NO CHARGE NURSE ONLY: CPT

## 2023-05-02 NOTE — TELEPHONE ENCOUNTER
Called patient to reschedule, patient reports she will come to the clinic today around 2:00pm. RN scheduled nurse only appointment.     Rukhsana REICH RN, BSN, PHN - PAL (patient advocate liaison)  [facility]  [phone]

## 2023-05-02 NOTE — PROGRESS NOTES
Met with patient in clinic, reviewed CSA agreement, patient signed. This RN witnessed patient sign and added signature as witness on CSA.    This RN brought completed CSA to  a given to Emeli to be uploaded to patient chart.     Patient reports she dropped a window on her foot last week, and wanted to know if there were any providers available to review. No appointments available today. Rn advised visit with provider, attempted to schedule patient for visit in clinic tomorrow, patient declined. Reports she will go to orthopedic urgent care.     Patient reports she was seen by podiatry last year and is concerned regarding how her toe grew back in. RN advised patient follow-up with podiatrist. Message sent via [employer] with number for scheduling and follow-up.     Advised patient call with any questions or if patient would like to be seen by a provider regarding toe or foot pain. Patient verbalized understanding and is agreeable.     Rukhsana REICH RN, BSN, PHN - PAL (patient advocate liaison)  [facility]  [phone]

## 2023-05-05 DIAGNOSIS — G47.9 SLEEP DISORDER: ICD-10-CM

## 2023-05-05 NOTE — TELEPHONE ENCOUNTER
Patient calling, thought she had one more refill left. She is out of medication.     TRENTON Pascual on 5/5/2023 at 2:14 PM

## 2023-05-07 RX ORDER — ZOLPIDEM TARTRATE 5 MG/1
5 TABLET ORAL
Qty: 30 TABLET | Refills: 0 | Status: SHIPPED | OUTPATIENT
Start: 2023-05-07 | End: 2023-06-07

## 2023-05-17 DIAGNOSIS — G89.29 OTHER CHRONIC PAIN: ICD-10-CM

## 2023-05-17 RX ORDER — TRAMADOL HYDROCHLORIDE 50 MG/1
50 TABLET ORAL EVERY 6 HOURS PRN
Qty: 90 TABLET | Refills: 0 | Status: SHIPPED | OUTPATIENT
Start: 2023-05-24 | End: 2023-06-26

## 2023-05-17 RX ORDER — HYDROCODONE BITARTRATE AND ACETAMINOPHEN 5; 325 MG/1; MG/1
1 TABLET ORAL EVERY 6 HOURS PRN
Qty: 30 TABLET | Refills: 0 | Status: SHIPPED | OUTPATIENT
Start: 2023-05-24 | End: 2023-05-24

## 2023-05-19 NOTE — TELEPHONE ENCOUNTER
Routing refill request to provider for review/approval because:  Drug not on the [facility] refill protocol         
cyclobenzaprine (FLEXERIL) 10 MG tablet      Last Written Prescription Date:  08/07/2019  Last Fill Quantity: 30 tablet,   # refills: 0  Last Office Visit: 07/29/2019  Future Office visit:    Next 5 appointments (look out 90 days)    Oct 30, 2019  3:25 PM CDT  Office visit with Lars Oconnor MD, [facility] EXAM ROOM 15  [facility] [address] [place] [id number] [phone]           Routing refill request to provider for review/approval because:  Drug not on the [facility], [facility] or  Health refill protocol or controlled substance    
5

## 2023-05-24 ENCOUNTER — TELEPHONE (OUTPATIENT)
Dept: FAMILY MEDICINE | Facility: CLINIC | Age: 68
End: 2023-05-24
Payer: COMMERCIAL

## 2023-05-24 DIAGNOSIS — G89.29 OTHER CHRONIC PAIN: ICD-10-CM

## 2023-05-24 RX ORDER — HYDROCODONE BITARTRATE AND ACETAMINOPHEN 5; 325 MG/1; MG/1
TABLET ORAL
Qty: 30 TABLET | Refills: 0 | Status: SHIPPED | OUTPATIENT
Start: 2023-05-24 | End: 2023-06-07

## 2023-05-24 NOTE — TELEPHONE ENCOUNTER
Pharmacist calling to clarify sig for Norco. He is awaiting new prescription.    Sig - Route: Take 1 tablet by mouth every 6 hours as needed for severe pain Take 1 tablet by mouth 2 times daily as needed for severe pain (7-10)     Monica Crowe RN

## 2023-06-06 DIAGNOSIS — G47.9 SLEEP DISORDER: ICD-10-CM

## 2023-06-07 DIAGNOSIS — G89.29 OTHER CHRONIC PAIN: ICD-10-CM

## 2023-06-07 RX ORDER — HYDROCODONE BITARTRATE AND ACETAMINOPHEN 10; 325 MG/1; MG/1
1 TABLET ORAL 2 TIMES DAILY PRN
Qty: 30 TABLET | Refills: 0 | Status: CANCELLED | OUTPATIENT
Start: 2023-06-07

## 2023-06-07 RX ORDER — ZOLPIDEM TARTRATE 5 MG/1
5 TABLET ORAL
Qty: 30 TABLET | Refills: 0 | Status: SHIPPED | OUTPATIENT
Start: 2023-06-07 | End: 2023-07-06

## 2023-06-07 RX ORDER — HYDROCODONE BITARTRATE AND ACETAMINOPHEN 10; 325 MG/1; MG/1
1 TABLET ORAL 2 TIMES DAILY PRN
Qty: 30 TABLET | Refills: 0 | Status: SHIPPED | OUTPATIENT
Start: 2023-06-07 | End: 2023-07-03

## 2023-06-07 NOTE — TELEPHONE ENCOUNTER
Dr. Byrne-    Please review: It appears the request for Hydrocodone-acetaminophen (Norco) 5-325 MG tablet came in incorrectly from the pharmacy. Unsure if patient called in wrong prescription or it was pended incorrectly by pharmacy.      -Chart review, this only happened one time.     Please route back to TRENTON Smyth to inform patient    Last prescription 5-325:  HYDROcodone-acetaminophen (NORCO) 5-325 MG tablet 30 tablet 0 5/24/2023  No   Sig: Take 1 tablet by mouth 2 times daily as needed for severe pain (7-10)   Sent to pharmacy as: HYDROcodone-Acetaminophen 5-325 MG Oral Tablet (NORCO)   Class: E-Prescribe   Earliest Fill Date: 5/24/2023   Notes to Pharmacy: Clarification requested - not another rx   Order: [id number]   E-Prescribing Status: Receipt confirmed by pharmacy (5/24/2023  1:52 PM CDT)     Last prescription  MG sent below  HYDROcodone-acetaminophen (NORCO)  MG per tablet (Discontinued) 30 tablet 0 4/24/2023 5/17/2023 No   Sig - Route: Take 1 tablet by mouth 2 times daily as needed for severe pain (7-10) - Oral   Sent to pharmacy as: HYDROcodone-Acetaminophen  MG Oral Tablet (NORCO)   Class: E-Prescribe   Earliest Fill Date: 4/24/2023   Order: [id number]   E-Prescribing Status: Receipt confirmed by pharmacy (4/24/2023 12:26 PM CDT)     Rukhsana REICH RN, BSN, PHN - PAL (patient advocate liaison)  [facility]  [phone]

## 2023-06-16 NOTE — TELEPHONE ENCOUNTER
Provider reviewed note, correct prescription sent to pharmacy.     Rukhsana REICH RN, BSN, PHN - PAL (patient advocate liaison)  [facility]  [phone]

## 2023-06-23 DIAGNOSIS — G89.29 OTHER CHRONIC PAIN: ICD-10-CM

## 2023-06-26 RX ORDER — TRAMADOL HYDROCHLORIDE 50 MG/1
50 TABLET ORAL EVERY 6 HOURS PRN
Qty: 90 TABLET | Refills: 0 | Status: SHIPPED | OUTPATIENT
Start: 2023-06-26 | End: 2023-08-19

## 2023-06-29 DIAGNOSIS — G89.29 OTHER CHRONIC PAIN: ICD-10-CM

## 2023-07-01 NOTE — TELEPHONE ENCOUNTER
This rx should be dated 7/7/23 but I cannot cancel and order with the date I need.   Not sure how or why this keeps happening

## 2023-07-03 RX ORDER — HYDROCODONE BITARTRATE AND ACETAMINOPHEN 10; 325 MG/1; MG/1
1 TABLET ORAL 2 TIMES DAILY PRN
Qty: 30 TABLET | Refills: 0 | OUTPATIENT
Start: 2023-07-03

## 2023-07-03 RX ORDER — HYDROCODONE BITARTRATE AND ACETAMINOPHEN 10; 325 MG/1; MG/1
1 TABLET ORAL EVERY 4 HOURS PRN
Qty: 30 TABLET | Refills: 0 | Status: SHIPPED | OUTPATIENT
Start: 2023-07-07 | End: 2023-07-07

## 2023-07-06 DIAGNOSIS — G47.9 SLEEP DISORDER: ICD-10-CM

## 2023-07-06 RX ORDER — ZOLPIDEM TARTRATE 5 MG/1
5 TABLET ORAL
Qty: 30 TABLET | Refills: 0 | Status: SHIPPED | OUTPATIENT
Start: 2023-07-06 | End: 2023-07-31

## 2023-07-07 NOTE — TELEPHONE ENCOUNTER
Pt calls, informs:    ~[facility] shipment of Hydrocodone did not come in, Jodi informs can only dispense #10 today  ~will be able to dispense #20 next week  ~will need provider to resend rx for #20  ~will have pharmacy send fax to KARLEE confirming  ~pt wants today as doing double shift at work and aware [facility] not in clinic to sent elsewhere today    Routing refill request to provider for review/approval because:  Drug not on the [facility] refill protocol   K[facility]E, please advise, if okay sent #20 and confirm start date  Gayle Broderick RN, BSN  [facility]

## 2023-07-10 RX ORDER — HYDROCODONE BITARTRATE AND ACETAMINOPHEN 10; 325 MG/1; MG/1
1 TABLET ORAL EVERY 4 HOURS PRN
Qty: 20 TABLET | Refills: 0 | Status: SHIPPED | OUTPATIENT
Start: 2023-07-10 | End: 2023-08-01

## 2023-07-12 ENCOUNTER — OFFICE VISIT (OUTPATIENT)
Dept: FAMILY MEDICINE | Facility: CLINIC | Age: 68
End: 2023-07-12
Payer: COMMERCIAL

## 2023-07-12 VITALS
HEART RATE: 94 BPM | TEMPERATURE: 99.7 F | HEIGHT: 59 IN | RESPIRATION RATE: 16 BRPM | WEIGHT: 142 LBS | SYSTOLIC BLOOD PRESSURE: 106 MMHG | DIASTOLIC BLOOD PRESSURE: 60 MMHG | BODY MASS INDEX: 28.63 KG/M2 | OXYGEN SATURATION: 94 %

## 2023-07-12 DIAGNOSIS — R50.9 FEVER, UNSPECIFIED FEVER CAUSE: Primary | ICD-10-CM

## 2023-07-12 LAB
DEPRECATED S PYO AG THROAT QL EIA: NEGATIVE
GROUP A STREP BY PCR: NOT DETECTED
SARS-COV-2 RNA RESP QL NAA+PROBE: NEGATIVE

## 2023-07-12 PROCEDURE — 99213 OFFICE O/P EST LOW 20 MIN: CPT | Performed by: PHYSICIAN ASSISTANT

## 2023-07-12 PROCEDURE — 87651 STREP A DNA AMP PROBE: CPT | Performed by: PHYSICIAN ASSISTANT

## 2023-07-12 PROCEDURE — 87635 SARS-COV-2 COVID-19 AMP PRB: CPT | Performed by: PHYSICIAN ASSISTANT

## 2023-07-12 NOTE — PROGRESS NOTES
"  Assessment & Plan     Fever, unspecified fever cause    Negative rapid strep test. Await COVID testing. Since symptoms started a few hours ago, likely viral so continue to monitor. Supportive cares discussed including Tylenol.    - Streptococcus A Rapid Screen w/Reflex to PCR - Clinic Collect  - Symptomatic COVID-19 Virus (Coronavirus) by PCR; Future  - Symptomatic COVID-19 Virus (Coronavirus) by PCR Nose  - Group A Streptococcus PCR Throat Swab             BMI:   Estimated body mass index is 28.68 kg/m  as calculated from the following:    Height as of this encounter: 1.499 m (4' 11\").    Weight as of this encounter: 64.4 kg (142 lb).           KAUSHAL Rodriguez [facility]    Sofiya Sainz is a 68 year old, presenting for the following health issues:  URI    HPI     Acute Illness  Onset/Duration: today  Symptoms:  Fever: YES  Chills/Sweats: YES  Headache (location?): No  Sinus Pressure: No  Conjunctivitis:  No  Ear Pain: no  Rhinorrhea: No  Congestion: No  Sore Throat: YES- improved now  Cough: no  Wheeze: No  Decreased Appetite: YES  Nausea: No  Vomiting: No  Diarrhea: No  Dysuria/Freq.: No  Dysuria or Hematuria: No  Fatigue/Achiness: YES- body aches  Sick/Strep Exposure: YES- coworker- bronchitis  Therapies tried and outcome: tylenol- helps        Review of Systems   Constitutional, HEENT, cardiovascular, pulmonary, gi and gu systems are negative, except as otherwise noted.        Objective    /60 (BP Location: Right arm, Patient Position: Chair, Cuff Size: Adult Regular)   Pulse 94   Temp 99.7  F (37.6  C) (Oral)   Resp 16   Ht 1.499 m (4' 11\")   Wt 64.4 kg (142 lb)   LMP  (LMP Unknown)   SpO2 94%   BMI 28.68 kg/m    Body mass index is 28.68 kg/m .       Physical Exam   GENERAL: healthy, alert and no distress  EYES: Eyes grossly normal to inspection, PERRL and conjunctivae and sclerae normal  HENT: ear canals and TM's normal, nose and mouth without ulcers " or lesions  RESP: lungs clear to auscultation - no rales, rhonchi or wheezes  CV: regular rate and rhythm, normal S1 S2, no S3 or S4, no murmur, click or rub, no peripheral edema and peripheral pulses strong  MS: no gross musculoskeletal defects noted, no edema  SKIN: no suspicious lesions or rashes  NEURO: Normal strength and tone, mentation intact and speech normal  PSYCH: mentation appears normal, affect normal/bright  LYMPH: anterior cervical: slightly enlarged tender nodes    Results for orders placed or performed in visit on 07/12/23 (from the past 24 hour(s))   Streptococcus A Rapid Screen w/Reflex to PCR - Clinic Collect    Specimen: Throat; Swab   Result Value Ref Range    Group A Strep antigen Negative Negative

## 2023-07-12 NOTE — LETTER
July 12, 2023      Emeli Granados  [address] [facility] [address]        To Whom It May Concern:    Emeli Granados  was seen on 7/12/2023.  Please excuse her  until fever free for 24 hours and test results negative due to illness.        Sincerely,        Roscoe Hu PA-C

## 2023-07-13 ENCOUNTER — OFFICE VISIT (OUTPATIENT)
Dept: URGENT CARE | Facility: URGENT CARE | Age: 68
End: 2023-07-13
Payer: COMMERCIAL

## 2023-07-13 VITALS
HEART RATE: 64 BPM | RESPIRATION RATE: 20 BRPM | SYSTOLIC BLOOD PRESSURE: 120 MMHG | DIASTOLIC BLOOD PRESSURE: 68 MMHG | OXYGEN SATURATION: 95 % | TEMPERATURE: 101.1 F

## 2023-07-13 DIAGNOSIS — R50.9 FEVER, UNSPECIFIED FEVER CAUSE: Primary | ICD-10-CM

## 2023-07-13 LAB
ALBUMIN UR-MCNC: NEGATIVE MG/DL
APPEARANCE UR: CLEAR
BACTERIA #/AREA URNS HPF: ABNORMAL /HPF
BASOPHILS # BLD AUTO: 0 10E3/UL (ref 0–0.2)
BASOPHILS NFR BLD AUTO: 0 %
BILIRUB UR QL STRIP: NEGATIVE
COLOR UR AUTO: YELLOW
EOSINOPHIL # BLD AUTO: 0 10E3/UL (ref 0–0.7)
EOSINOPHIL NFR BLD AUTO: 0 %
ERYTHROCYTE [DISTWIDTH] IN BLOOD BY AUTOMATED COUNT: 13.1 % (ref 10–15)
GLUCOSE UR STRIP-MCNC: NEGATIVE MG/DL
HCT VFR BLD AUTO: 43.1 % (ref 35–47)
HGB BLD-MCNC: 14.5 G/DL (ref 11.7–15.7)
HGB UR QL STRIP: ABNORMAL
IMM GRANULOCYTES # BLD: 0.1 10E3/UL
IMM GRANULOCYTES NFR BLD: 0 %
KETONES UR STRIP-MCNC: NEGATIVE MG/DL
LEUKOCYTE ESTERASE UR QL STRIP: NEGATIVE
LYMPHOCYTES # BLD AUTO: 2 10E3/UL (ref 0.8–5.3)
LYMPHOCYTES NFR BLD AUTO: 13 %
MCH RBC QN AUTO: 31.4 PG (ref 26.5–33)
MCHC RBC AUTO-ENTMCNC: 33.6 G/DL (ref 31.5–36.5)
MCV RBC AUTO: 93 FL (ref 78–100)
MONOCYTES # BLD AUTO: 0.7 10E3/UL (ref 0–1.3)
MONOCYTES NFR BLD AUTO: 5 %
NEUTROPHILS # BLD AUTO: 12.4 10E3/UL (ref 1.6–8.3)
NEUTROPHILS NFR BLD AUTO: 81 %
NITRATE UR QL: NEGATIVE
PH UR STRIP: 5.5 [PH] (ref 5–7)
PLATELET # BLD AUTO: 228 10E3/UL (ref 150–450)
RBC # BLD AUTO: 4.62 10E6/UL (ref 3.8–5.2)
RBC #/AREA URNS AUTO: ABNORMAL /HPF
SP GR UR STRIP: 1.01 (ref 1–1.03)
SQUAMOUS #/AREA URNS AUTO: ABNORMAL /LPF
UROBILINOGEN UR STRIP-ACNC: 1 E.U./DL
WBC # BLD AUTO: 15.3 10E3/UL (ref 4–11)
WBC #/AREA URNS AUTO: ABNORMAL /HPF

## 2023-07-13 PROCEDURE — 99214 OFFICE O/P EST MOD 30 MIN: CPT | Performed by: PHYSICIAN ASSISTANT

## 2023-07-13 PROCEDURE — 81001 URINALYSIS AUTO W/SCOPE: CPT | Performed by: PHYSICIAN ASSISTANT

## 2023-07-13 PROCEDURE — 86618 LYME DISEASE ANTIBODY: CPT | Performed by: PHYSICIAN ASSISTANT

## 2023-07-13 PROCEDURE — 85025 COMPLETE CBC W/AUTO DIFF WBC: CPT | Performed by: PHYSICIAN ASSISTANT

## 2023-07-13 PROCEDURE — 36415 COLL VENOUS BLD VENIPUNCTURE: CPT | Performed by: PHYSICIAN ASSISTANT

## 2023-07-13 ASSESSMENT — ENCOUNTER SYMPTOMS
RHINORRHEA: 0
SORE THROAT: 0
COUGH: 0
DIARRHEA: 0
FREQUENCY: 0
VOMITING: 0
ABDOMINAL PAIN: 0
FEVER: 1
FLANK PAIN: 1
DYSURIA: 0
ARTHRALGIAS: 0

## 2023-07-13 NOTE — PROGRESS NOTES
Assessment & Plan:        ICD-10-CM    1. Fever, unspecified fever cause  R50.9 UA Macroscopic with reflex to Microscopic and Culture - Clinic Collect     UA Microscopic with Reflex to Culture     CBC with platelets and differential     Lyme Disease Total Abs Bld with Reflex to Confirm CLIA     CBC with platelets and differential     Lyme Disease Total Abs Bld with Reflex to Confirm CLIA            Plan/Clinical Decision Making:    Patient with acute febrile illness for 2 days. Having fever and body aches. Has had mild scratchy throat, no major throat pain with mild neck adenopathy . Negative strep and covid yesterday. No travel or rashes. Had some flank pain yesterday, no urinary symptoms. Febrile, mild left neck adenopathy, otherwise normal exam. UA was negative for infection today. CBC WBC 15.3, neutrophils 12.4. Lyme's testing done. Will contact if positive. At this time suspect viral illness. Can continue with Tylenol and ibuprofen for symptoms, rest, fluids.   Consider further evaluation if worsening or not improving.       Return if symptoms worsen or fail to improve, for in 5-7 days.     At the end of the encounter, I discussed results, diagnosis, medications. Discussed red flags for immediate return to clinic/ER, as well as indications for follow up if no improvement. Patient understood and agreed to plan. Patient was stable for discharge.        Shanti Villegas PA-C on 7/13/2023 at 2:30 PM      35 minutes spent on the date of the encounter doing chart review, history and exam, documentation and further activities per the note      Subjective:     HPI:    Emeli is a 68 year old female who presents to clinic today for the following health issues:  Chief Complaint   Patient presents with     Fever     Follow up visit 7/12 for fever-sx not getting better, body ache, right flank pain, neg strep/covid--need work note for tomorrow     HPI    Patient developed fever 2 days ago. Seen for this and covid test  and strep PCR  that was negative.   Exposed to someone with illness.   Fever: 102 to 103.6.   Has been alternating Advil and Tylenol. Started that last night and has really helped with symptoms.   Denies URI symptoms. Initially had some flank pain on right side, now resolved. Had a little scratchy throat.   No travel. No tick bites.     History obtained from the patient.    Review of Systems   Constitutional: Positive for fever.   HENT: Negative for congestion, rhinorrhea and sore throat.    Respiratory: Negative for cough.    Gastrointestinal: Negative for abdominal pain, diarrhea and vomiting.   Genitourinary: Positive for flank pain. Negative for dysuria, frequency and urgency.   Musculoskeletal: Negative for arthralgias.   Skin: Negative for rash.       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     Polycythemia 08/08/2012    resolved now       Social History     Tobacco Use     Smoking status: Former     Packs/day: 0.50     Years: 30.00     Pack years: 15.00     Types: Cigarettes     Start date: 10/13/1967     Quit date: 11/6/2019     Years since quitting: 3.6     Smokeless tobacco: Never     Tobacco comments:     quit 11/2019    Substance Use Topics     Alcohol use: No     Comment: sober since 9/11/99             Objective:     Vitals:    07/13/23 1425   BP: 120/68   Pulse: 64   Resp: 20   Temp: (!) 101.1  F (38.4  C)   SpO2: 95%         Physical Exam   EXAM:   Pleasant, alert, appropriate appearance. NAD.  Head Exam: Normocephalic, atraumatic.  Eye Exam:  PERRLA, EOMI, non icteric/injection.    Ear Exam: TMs grey without bulging. Normal canals.  Normal pinna.  Nose Exam: Normal external nose.    OroPharynx Exam:  Moist mucous membranes. No erythema, pharynx without exudate or hypertrophy.  Neck/Thyroid Exam:  Mild neck adenopathy  Chest/Respiratory Exam: CTAB.  Cardiovascular Exam: RRR. No murmur or rubs.  ABD: soft, Non-tender,  no rebound/guarding.  No masses/organomegaly.  Ext/musculoskeletal: Grossly intact, No edema  Neuro: CN II-XII intact grossly intact.  Skin: no rash or lesion.      Results:  Results for orders placed or performed in visit on 07/13/23   UA Macroscopic with reflex to Microscopic and Culture - Clinic Collect     Status: Abnormal    Specimen: Urine, Midstream   Result Value Ref Range    Color Urine Yellow Colorless, Straw, Light Yellow, Yellow    Appearance Urine Clear Clear    Glucose Urine Negative Negative mg/dL    Bilirubin Urine Negative Negative    Ketones Urine Negative Negative mg/dL    Specific Gravity Urine 1.015 1.003 - 1.035    Blood Urine Small (A) Negative    pH Urine 5.5 5.0 - 7.0    Protein Albumin Urine Negative Negative mg/dL    Urobilinogen Urine 1.0 0.2, 1.0 E.U./dL    Nitrite Urine Negative Negative    Leukocyte Esterase Urine Negative Negative   UA Microscopic with Reflex to Culture     Status: Abnormal   Result Value Ref Range    Bacteria Urine Few (A) None Seen /HPF    RBC Urine 0-2 0-2 /HPF /HPF    WBC Urine None Seen 0-5 /HPF /HPF    Squamous Epithelials Urine Few (A) None Seen /LPF    Narrative    Urine Culture not indicated   CBC with platelets and differential     Status: Abnormal    Result Value Ref Range    WBC Count 15.3 (H) 4.0 - 11.0 10e3/uL    RBC Count 4.62 3.80 - 5.20 10e6/uL    Hemoglobin 14.5 11.7 - 15.7 g/dL    Hematocrit 43.1 35.0 - 47.0 %    MCV 93 78 - 100 fL    MCH 31.4 26.5 - 33.0 pg    MCHC 33.6 31.5 - 36.5 g/dL    RDW 13.1 10.0 - 15.0 %    Platelet Count 228 150 - 450 10e3/uL    % Neutrophils 81 %    % Lymphocytes 13 %    % Monocytes 5 %    % Eosinophils 0 %    % Basophils 0 %    % Immature Granulocytes 0 %    Absolute Neutrophils 12.4 (H) 1.6 - 8.3 10e3/uL    Absolute Lymphocytes 2.0 0.8 - 5.3 10e3/uL    Absolute Monocytes 0.7 0.0 - 1.3 10e3/uL    Absolute Eosinophils 0.0 0.0 - 0.7 10e3/uL    Absolute Basophils 0.0 0.0 - 0.2 10e3/uL    Absolute Immature Granulocytes 0.1 <=0.4 10e3/uL   CBC with platelets and differential     Status: Abnormal    Narrative    The following orders were created for panel order CBC with platelets and differential.  Procedure                               Abnormality         Status                     ---------                               -----------         ------                     CBC with platelets and d...[[id number]]  Abnormal            Final result                 Please view results for these tests on the individual orders.

## 2023-07-17 LAB — B BURGDOR IGG+IGM SER QL: 0.03

## 2023-07-26 DIAGNOSIS — G47.9 SLEEP DISORDER: ICD-10-CM

## 2023-07-26 NOTE — TELEPHONE ENCOUNTER
Pharmacy note:  Patient wants to refill early-does not like the new -wants pharmacy to order old brand for her-needs new RX.

## 2023-07-28 DIAGNOSIS — G47.9 SLEEP DISORDER: ICD-10-CM

## 2023-07-31 RX ORDER — ZOLPIDEM TARTRATE 5 MG/1
5 TABLET ORAL
Qty: 30 TABLET | Refills: 0 | OUTPATIENT
Start: 2023-07-31

## 2023-07-31 RX ORDER — ZOLPIDEM TARTRATE 5 MG/1
5 TABLET ORAL
Qty: 30 TABLET | Refills: 0 | Status: SHIPPED | OUTPATIENT
Start: 2023-08-07 | End: 2023-09-09

## 2023-07-31 NOTE — TELEPHONE ENCOUNTER
Patient calling back.  Advised of below.  Patient states the pharmacy told her she could get new RX as she does not like current .  She states they said she could bring back.  Advised if she chooses to bring them back to then have pharmacy contact us with how many she turned in and message can be sent at that time to see if Dr. Byrne willing to give RX for quantity she turned in.  Patient then states she will just wait til 8/7/23 and get whole RX.  Also wondering when hydrocodone can be filled.  Discussed I can not see date she picked up but last RX 7/10/23.  Advised would be 30 days from date she picked up.  Discussed refill has not been requested so to request when time for refill.  Patient agrees with plan.  Elisabeth Freeman RN

## 2023-07-31 NOTE — TELEPHONE ENCOUNTER
Message left for patient to return call to this RN PAL - please transfer if available     Direct number left for this RN PAL on message for return call     Felisa Brown Registered Nurse PAL (patient advocate liaison)   [facility] [phone]

## 2023-08-01 DIAGNOSIS — G89.29 OTHER CHRONIC PAIN: ICD-10-CM

## 2023-08-01 RX ORDER — HYDROCODONE BITARTRATE AND ACETAMINOPHEN 10; 325 MG/1; MG/1
1 TABLET ORAL EVERY 4 HOURS PRN
Qty: 30 TABLET | Refills: 0 | Status: SHIPPED | OUTPATIENT
Start: 2023-08-10 | End: 2023-09-05

## 2023-08-02 ENCOUNTER — TELEPHONE (OUTPATIENT)
Dept: FAMILY MEDICINE | Facility: CLINIC | Age: 68
End: 2023-08-02
Payer: COMMERCIAL

## 2023-08-03 NOTE — TELEPHONE ENCOUNTER
Covering Providers     RN spoke to pharmacist at [facility]     On 7/7/2023 patient picked up # 10 tablets of Norco   On 7/12/2023 patient picked up the remaining #20 tablets     Per problem list patient is to get #30 tablets in 1 month     Felisa Brown Registered Nurse, PAL (Patient Advocate Liaison)   [facility]   [phone]     
Declined, refill as indicated on prescription given.  Dr. Byrne please check on this patient, she is asking for  narcotics early!  
Dr. Gonzalez   Please review  - RN's are unable to view this any longer     Patient has a prescription at the pharmacy to  on 8/10/23 per chart review     After reviewing  please let us know if you would authorize early fill?     Last RX was sent for 20 tablets by Dr. Garcia on 7/10/2023     Thank you   Felisa Brown, Registered Nurse, PAL (Patient Advocate Liaison)   [facility]   [phone]         
Hello, can we look into this, I don't know this patient.  Then we can huddle.    
I reviewed the  and it shows the patient received 10 tablets on 7/7/23 and 20 tablets on 7/10/23. Can we call the pharmacy to confirm this is correct?  
Patient only received #20 of Norco for the month of July. Picked up prescription on 7/10/23.    Patient is completely out of medication. Would like permission to  Norco prescription early.    Routing to provider for consideration.    Monica Crowe RN   
RN PAL spoke to patient     Explained that she has already received her allotment of norco for the month #30 dates of  provided     Patient states she does not remember this, advised she can check with her pharmacy on the  dates     Advised to follow up with pharmacy to discuss when she can  her August allotment that has already been sent by pcp     Discussed pain contract     No further questions     Felisa Brown, Registered Nurse, PAL (Patient Advocate Liaison)   [facility]   [phone]         
other

## 2023-08-09 ENCOUNTER — TELEPHONE (OUTPATIENT)
Dept: FAMILY MEDICINE | Facility: CLINIC | Age: 68
End: 2023-08-09
Payer: COMMERCIAL

## 2023-08-09 NOTE — TELEPHONE ENCOUNTER
Prior Auth Needed:      Disp Refills Start End GOVIND    zolpidem (AMBIEN) 5 MG tablet 30 tablet 0 2023  No   Sig - Route: Take 1 tablet (5 mg) by mouth nightly as needed for sleep Do not take with tramadol - Oral   Sent to pharmacy as: Zolpidem Tartrate 5 MG Oral Tablet (AMBIEN)   Class: E-Prescribe   Order: [id number]       Key: [id number]  Patient Last Name: Roberta  : 1955

## 2023-08-14 NOTE — TELEPHONE ENCOUNTER
Central Prior Authorization Team   Phone: [phone]    PA Initiation    Medication: Zolpidem Tartrate 5mg Tablets  Insurance Company: [employer] - Phone [phone] Fax [phone]  Pharmacy Filling the Rx: [id number] [place], [place] - [address]  Filling Pharmacy Phone: [phone]  Filling Pharmacy Fax:    Start Date: 8/14/2023

## 2023-08-15 NOTE — TELEPHONE ENCOUNTER
Prior Authorization Approval    Authorization Effective Date: 7/15/2023  Authorization Expiration Date: 8/13/2024  Medication: Zolpidem Tartrate 5mg Tablets  Approved Dose/Quantity:    Reference #:     Insurance Company: [employer] - Phone [phone] Fax [phone]  Expected CoPay:       CoPay Card Available:      Foundation Assistance Needed:    Which Pharmacy is filling the prescription (Not needed for infusion/clinic administered): [id number] - [facility] [address] [facility]  Pharmacy Notified: Yes  Patient Notified: Yes   **Instructed pharmacy to notify patient when script is ready to /ship.**

## 2023-08-19 DIAGNOSIS — G89.29 OTHER CHRONIC PAIN: ICD-10-CM

## 2023-08-19 RX ORDER — TRAMADOL HYDROCHLORIDE 50 MG/1
50 TABLET ORAL EVERY 6 HOURS PRN
Qty: 90 TABLET | Refills: 0 | Status: SHIPPED | OUTPATIENT
Start: 2023-08-19 | End: 2023-09-26

## 2023-08-30 ENCOUNTER — TRANSFERRED RECORDS (OUTPATIENT)
Dept: HEALTH INFORMATION MANAGEMENT | Facility: CLINIC | Age: 68
End: 2023-08-30

## 2023-09-03 DIAGNOSIS — G89.29 OTHER CHRONIC PAIN: ICD-10-CM

## 2023-09-05 RX ORDER — HYDROCODONE BITARTRATE AND ACETAMINOPHEN 10; 325 MG/1; MG/1
1 TABLET ORAL EVERY 4 HOURS PRN
Qty: 30 TABLET | Refills: 0 | Status: SHIPPED | OUTPATIENT
Start: 2023-09-10 | End: 2023-10-08

## 2023-09-05 RX ORDER — HYDROCODONE BITARTRATE AND ACETAMINOPHEN 10; 325 MG/1; MG/1
1 TABLET ORAL EVERY 4 HOURS PRN
Qty: 30 TABLET | Refills: 0 | OUTPATIENT
Start: 2023-09-05

## 2023-09-08 ENCOUNTER — OFFICE VISIT (OUTPATIENT)
Dept: DERMATOLOGY | Facility: CLINIC | Age: 68
End: 2023-09-08
Payer: COMMERCIAL

## 2023-09-08 DIAGNOSIS — D48.5 NEOPLASM OF UNCERTAIN BEHAVIOR OF SKIN: ICD-10-CM

## 2023-09-08 DIAGNOSIS — L81.4 LENTIGO: ICD-10-CM

## 2023-09-08 DIAGNOSIS — G47.9 SLEEP DISORDER: ICD-10-CM

## 2023-09-08 DIAGNOSIS — D18.01 ANGIOMA OF SKIN: ICD-10-CM

## 2023-09-08 DIAGNOSIS — D22.9 NEVUS: Primary | ICD-10-CM

## 2023-09-08 DIAGNOSIS — L82.1 SEBORRHEIC KERATOSIS: ICD-10-CM

## 2023-09-08 PROCEDURE — 99213 OFFICE O/P EST LOW 20 MIN: CPT | Mod: 25 | Performed by: PHYSICIAN ASSISTANT

## 2023-09-08 PROCEDURE — 11102 TANGNTL BX SKIN SINGLE LES: CPT | Performed by: PHYSICIAN ASSISTANT

## 2023-09-08 PROCEDURE — 88305 TISSUE EXAM BY PATHOLOGIST: CPT | Performed by: PATHOLOGY

## 2023-09-08 ASSESSMENT — PAIN SCALES - GENERAL: PAINLEVEL: NO PAIN (0)

## 2023-09-08 NOTE — LETTER
9/8/2023         RE: Emeli Granados  [address] [facility] [address]        Dear Colleague,    Thank you for referring your patient, Emeli Granados, to the [facility]. Please see a copy of my visit note below.    HPI:   Chief complaints: Emeli Granados is a pleasant 68 year old female who presents for Full skin cancer screening to rule out skin cancer   Last Skin Exam: 1 year ago     1st Baseline: No  Personal HX of Skin Cancer: no   Personal HX of Malignant Melanoma: no   Family HX of Skin Cancer / Malignant Melanoma: no  Personal HX of Atypical Moles:   no  Risk factors: history of sun exposure and burns; limited sunscreen use currently   New / Changing lesions:yes new spot on the brow  Social History: works at [employer] - has had two work place injuries recently   On review of systems, there are no further skin complaints, patient is feeling otherwise well.   ROS of the following were done and are negative: Constitutional, Eyes, Ears, Nose,   Mouth, Throat, Cardiovascular, Respiratory, GI, Genitourinary, Musculoskeletal,   Psychiatric, Endocrine, Allergic/Immunologic.    PHYSICAL EXAM:   LMP  (LMP Unknown)   Skin exam performed as follows: Type 2 skin. Mood appropriate  Alert and Oriented X 3. Well developed, well nourished in no distress.  General appearance: Normal  Head including face: Normal  Eyes: conjunctiva and lids: Normal  Mouth: Lips, teeth, gums: Normal  Neck: Normal  Chest-breast/axillae: Normal  Back: Normal  Spleen and liver: Normal  Cardiovascular: Exam of peripheral vascular system by observation for swelling, varicosities, edema: Normal  Genitalia: groin, buttocks: Normal  Extremities: digits/nails (clubbing): Normal  Eccrine and Apocrine glands: Normal  Right upper extremity: Normal  Left upper extremity: Normal  Right lower extremity: Normal  Left lower extremity: Normal  Skin: Scalp and body hair: See below    Pt deferred exam of  breasts, groin, buttocks: No    Other physical findings:  1. Multiple pigmented macules on extremities and trunk  2. Multiple pigmented macules on face, trunk and extremities  3. Multiple vascular papules on trunk, arms and legs  4. Multiple scattered keratotic plaques  5. 5 mm hyperkeratotic papule on the left supra brow       Except as noted above, no other signs of skin cancer or melanoma.     ASSESSMENT/PLAN:   Benign Full skin cancer screening today. . Patient with history of none  Advised on monthly self exams and 1 year  Patient Education: Appropriate brochures given.    Multiple benign appearing melanocytic nevi on arms, legs and trunk. Discussed ABCDEs of melanoma and sunscreen.   Multiple lentigos on arms, legs and trunk. Advised benign, no treatment needed.  Multiple scattered angiomas. Advised benign, no treatment needed.   Seborrheic keratosis on arms, legs and trunk. Advised benign, no treatment needed.  R/o SCC on the left supra brow. Shave biopsy performed.  Area cleaned and anesthetized with 1% lidocaine with epinephrine.  Dermablade used to remove the lesion and sent to pathology. Bleeding was cauterized. Pt tolerated procedure well with no complications.                 Follow-up: yearly/PRN sooner    1.) Patient was asked about new and changing moles. YES  2.) Patient received a complete physical skin examination: YES  3.) Patient was counseled to perform a monthly self skin examination: YES  Scribed By: Ani Cabezas MS, KAUSHAL      Again, thank you for allowing me to participate in the care of your patient.        Sincerely,        Ani Cabezas PA-C

## 2023-09-08 NOTE — PROGRESS NOTES
HPI:   Chief complaints: Emeli Granados is a pleasant 68 year old female who presents for Full skin cancer screening to rule out skin cancer   Last Skin Exam: 1 year ago     1st Baseline: No  Personal HX of Skin Cancer: no   Personal HX of Malignant Melanoma: no   Family HX of Skin Cancer / Malignant Melanoma: no  Personal HX of Atypical Moles:   no  Risk factors: history of sun exposure and burns; limited sunscreen use currently   New / Changing lesions:yes new spot on the brow  Social History: works at [employer] - has had two work place injuries recently   On review of systems, there are no further skin complaints, patient is feeling otherwise well.   ROS of the following were done and are negative: Constitutional, Eyes, Ears, Nose,   Mouth, Throat, Cardiovascular, Respiratory, GI, Genitourinary, Musculoskeletal,   Psychiatric, Endocrine, Allergic/Immunologic.    PHYSICAL EXAM:   LMP  (LMP Unknown)   Skin exam performed as follows: Type 2 skin. Mood appropriate  Alert and Oriented X 3. Well developed, well nourished in no distress.  General appearance: Normal  Head including face: Normal  Eyes: conjunctiva and lids: Normal  Mouth: Lips, teeth, gums: Normal  Neck: Normal  Chest-breast/axillae: Normal  Back: Normal  Spleen and liver: Normal  Cardiovascular: Exam of peripheral vascular system by observation for swelling, varicosities, edema: Normal  Genitalia: groin, buttocks: Normal  Extremities: digits/nails (clubbing): Normal  Eccrine and Apocrine glands: Normal  Right upper extremity: Normal  Left upper extremity: Normal  Right lower extremity: Normal  Left lower extremity: Normal  Skin: Scalp and body hair: See below    Pt deferred exam of breasts, groin, buttocks: No    Other physical findings:  1. Multiple pigmented macules on extremities and trunk  2. Multiple pigmented macules on face, trunk and extremities  3. Multiple vascular papules on trunk, arms and legs  4. Multiple scattered keratotic plaques  5.  5 mm hyperkeratotic papule on the left supra brow       Except as noted above, no other signs of skin cancer or melanoma.     ASSESSMENT/PLAN:   Benign Full skin cancer screening today. . Patient with history of none  Advised on monthly self exams and 1 year  Patient Education: Appropriate brochures given.    Multiple benign appearing melanocytic nevi on arms, legs and trunk. Discussed ABCDEs of melanoma and sunscreen.   Multiple lentigos on arms, legs and trunk. Advised benign, no treatment needed.  Multiple scattered angiomas. Advised benign, no treatment needed.   Seborrheic keratosis on arms, legs and trunk. Advised benign, no treatment needed.  R/o SCC on the left supra brow. Shave biopsy performed.  Area cleaned and anesthetized with 1% lidocaine with epinephrine.  Dermablade used to remove the lesion and sent to pathology. Bleeding was cauterized. Pt tolerated procedure well with no complications.                 Follow-up: yearly/PRN sooner    1.) Patient was asked about new and changing moles. YES  2.) Patient received a complete physical skin examination: YES  3.) Patient was counseled to perform a monthly self skin examination: YES  Scribed By: Ani Cabezas MS, PA-C

## 2023-09-08 NOTE — PATIENT INSTRUCTIONS
Wound Care Instructions     FOR SUPERFICIAL WOUNDS     [facility]  [phone]                 AFTER 24 HOURS YOU SHOULD REMOVE THE BANDAGE AND BEGIN DAILY DRESSING CHANGES AS FOLLOWS:     1) Remove Dressing.     2) Clean and dry the area with tap water using a Q-tip or sterile gauze pad.     3) Apply Vaseline, Aquaphor, Polysporin ointment or Bacitracin ointment over entire wound.  Do NOT use Neosporin ointment.     4) Cover the wound with a band-aid, or a sterile non-stick gauze pad and micropore paper tape    REPEAT THESE INSTRUCTIONS AT LEAST ONCE A DAY UNTIL THE WOUND HAS COMPLETELY HEALED.    It is an old wives tale that a wound heals better when it is exposed to air and allowed to dry out. The wound will heal faster with a better cosmetic result if it is kept moist with ointment and covered with a bandage.    **Do not let the wound dry out.**    Supplies Needed:      *Cotton tipped applicators (Q-tips)    *Vaseline, Aquaphor, Polysporin or Bacitracin Ointment (NOT NEOSPORIN)    *Band-aids or non-stick gauze pads and micropore paper tape.      PATIENT INFORMATION:    During the healing process you will notice a number of changes. All wounds develop a small halo of redness surrounding the wound.  This means healing is occurring. Severe itching with extensive redness usually indicates sensitivity to the ointment or bandage tape used to dress the wound.  You should call our office if this develops.      Swelling  and/or discoloration around your surgical site is common, particularly when performed around the eye.    All wounds normally drain.  The larger the wound the more drainage there will be.  After 7-10 days, you will notice the wound beginning to shrink and new skin will begin to grow.  The wound is healed when you can see skin has formed over the entire area.  A healed wound has a healthy, shiny look to the surface and is red to dark pink in color to normalize.  Wounds may  take approximately 4-6 weeks to heal.  Larger wounds may take 6-8 weeks.  After the wound is healed you may discontinue dressing changes.    You may experience a sensation of tightness as your wound heals. This is normal and will gradually subside.    Your healed wound may be sensitive to temperature changes. This sensitivity improves with time, but if you re having a lot of discomfort, try to avoid temperature extremes.    Patients frequently experience itching after their wound appears to have healed because of the continue healing under the skin.  Plain Vaseline will help relieve the itching.      POSSIBLE COMPLICATIONS    BLEEDING:    Leave the bandage in place.  Use tightly rolled up gauze or a cloth to apply direct pressure over the bandage for 30  minutes.  Reapply pressure for an additional 30 minutes if necessary  Use additional gauze and tape to maintain pressure once the bleeding has stopped.

## 2023-09-09 RX ORDER — ZOLPIDEM TARTRATE 5 MG/1
TABLET ORAL
Qty: 30 TABLET | Refills: 0 | Status: SHIPPED | OUTPATIENT
Start: 2023-09-09 | End: 2023-10-12

## 2023-09-12 LAB
PATH REPORT.COMMENTS IMP SPEC: NORMAL
PATH REPORT.COMMENTS IMP SPEC: NORMAL
PATH REPORT.FINAL DX SPEC: NORMAL
PATH REPORT.GROSS SPEC: NORMAL
PATH REPORT.MICROSCOPIC SPEC OTHER STN: NORMAL
PATH REPORT.RELEVANT HX SPEC: NORMAL

## 2023-09-26 DIAGNOSIS — G89.29 OTHER CHRONIC PAIN: ICD-10-CM

## 2023-09-26 RX ORDER — TRAMADOL HYDROCHLORIDE 50 MG/1
50 TABLET ORAL EVERY 6 HOURS PRN
Qty: 90 TABLET | Refills: 0 | Status: SHIPPED | OUTPATIENT
Start: 2023-09-26 | End: 2023-12-20

## 2023-10-05 DIAGNOSIS — G89.29 OTHER CHRONIC PAIN: ICD-10-CM

## 2023-10-08 RX ORDER — HYDROCODONE BITARTRATE AND ACETAMINOPHEN 10; 325 MG/1; MG/1
1 TABLET ORAL DAILY PRN
Qty: 30 TABLET | Refills: 0 | Status: SHIPPED | OUTPATIENT
Start: 2023-10-10 | End: 2023-12-01

## 2023-10-12 ENCOUNTER — TRANSFERRED RECORDS (OUTPATIENT)
Dept: HEALTH INFORMATION MANAGEMENT | Facility: CLINIC | Age: 68
End: 2023-10-12

## 2023-10-12 DIAGNOSIS — G47.9 SLEEP DISORDER: ICD-10-CM

## 2023-10-12 RX ORDER — ZOLPIDEM TARTRATE 5 MG/1
TABLET ORAL
Qty: 30 TABLET | Refills: 0 | Status: SHIPPED | OUTPATIENT
Start: 2023-10-12 | End: 2023-11-15

## 2023-10-16 ENCOUNTER — TELEPHONE (OUTPATIENT)
Dept: FAMILY MEDICINE | Facility: CLINIC | Age: 68
End: 2023-10-16
Payer: COMMERCIAL

## 2023-10-16 NOTE — TELEPHONE ENCOUNTER
Reason for Call:  Appointment Request    Patient requesting this type of appt: Pre-op    Requested provider: Emma Byrne    Reason patient unable to be scheduled: Not with their preferred provider    When does patient want to be seen/preferred time:       Comments: DOS 11/6     Could we send this information to you in [facility] or would you prefer to receive a phone call?:   Patient would prefer a phone call   Okay to leave a detailed message?: Yes at Home number on file [phone] (home)    Call taken on 10/16/2023 at 1:34 PM by Sara Gardiner PTA

## 2023-10-19 ENCOUNTER — TRANSFERRED RECORDS (OUTPATIENT)
Dept: HEALTH INFORMATION MANAGEMENT | Facility: CLINIC | Age: 68
End: 2023-10-19

## 2023-10-24 DIAGNOSIS — R10.13 EPIGASTRIC PAIN: ICD-10-CM

## 2023-10-31 ENCOUNTER — ANCILLARY PROCEDURE (OUTPATIENT)
Dept: GENERAL RADIOLOGY | Facility: CLINIC | Age: 68
End: 2023-10-31
Attending: FAMILY MEDICINE
Payer: COMMERCIAL

## 2023-10-31 ENCOUNTER — OFFICE VISIT (OUTPATIENT)
Dept: FAMILY MEDICINE | Facility: CLINIC | Age: 68
End: 2023-10-31
Payer: COMMERCIAL

## 2023-10-31 VITALS
TEMPERATURE: 98.1 F | DIASTOLIC BLOOD PRESSURE: 77 MMHG | BODY MASS INDEX: 27.46 KG/M2 | OXYGEN SATURATION: 95 % | HEIGHT: 59 IN | WEIGHT: 136.2 LBS | RESPIRATION RATE: 16 BRPM | HEART RATE: 74 BPM | SYSTOLIC BLOOD PRESSURE: 116 MMHG

## 2023-10-31 DIAGNOSIS — D13.5 ADENOMYOMATOSIS OF GALLBLADDER: ICD-10-CM

## 2023-10-31 DIAGNOSIS — G47.9 SLEEP DISORDER: ICD-10-CM

## 2023-10-31 DIAGNOSIS — Z29.11 NEED FOR VACCINATION AGAINST RESPIRATORY SYNCYTIAL VIRUS: ICD-10-CM

## 2023-10-31 DIAGNOSIS — M54.50 ACUTE BILATERAL LOW BACK PAIN WITHOUT SCIATICA: ICD-10-CM

## 2023-10-31 DIAGNOSIS — M19.031 PRIMARY OSTEOARTHRITIS OF RIGHT WRIST: ICD-10-CM

## 2023-10-31 DIAGNOSIS — Z01.818 PRE-OPERATIVE GENERAL PHYSICAL EXAMINATION: ICD-10-CM

## 2023-10-31 DIAGNOSIS — Z23 NEED FOR SHINGLES VACCINE: ICD-10-CM

## 2023-10-31 DIAGNOSIS — Z12.11 SCREEN FOR COLON CANCER: Primary | ICD-10-CM

## 2023-10-31 DIAGNOSIS — F11.20 CONTINUOUS OPIOID DEPENDENCE (H): ICD-10-CM

## 2023-10-31 DIAGNOSIS — R00.0 TACHYARRHYTHMIA: ICD-10-CM

## 2023-10-31 DIAGNOSIS — Z13.6 CARDIOVASCULAR SCREENING; LDL GOAL LESS THAN 130: ICD-10-CM

## 2023-10-31 LAB
ANION GAP SERPL CALCULATED.3IONS-SCNC: 11 MMOL/L (ref 7–15)
BUN SERPL-MCNC: 14.5 MG/DL (ref 8–23)
CALCIUM SERPL-MCNC: 9.7 MG/DL (ref 8.8–10.2)
CHLORIDE SERPL-SCNC: 106 MMOL/L (ref 98–107)
CHOLEST SERPL-MCNC: 172 MG/DL
CREAT SERPL-MCNC: 0.69 MG/DL (ref 0.51–0.95)
CREAT UR-MCNC: 129 MG/DL
DEPRECATED HCO3 PLAS-SCNC: 28 MMOL/L (ref 22–29)
EGFRCR SERPLBLD CKD-EPI 2021: >90 ML/MIN/1.73M2
ERYTHROCYTE [DISTWIDTH] IN BLOOD BY AUTOMATED COUNT: 13 % (ref 10–15)
GLUCOSE SERPL-MCNC: 110 MG/DL (ref 70–99)
HCT VFR BLD AUTO: 47.1 % (ref 35–47)
HDLC SERPL-MCNC: 82 MG/DL
HGB BLD-MCNC: 16.1 G/DL (ref 11.7–15.7)
LDLC SERPL CALC-MCNC: 73 MG/DL
MCH RBC QN AUTO: 31.9 PG (ref 26.5–33)
MCHC RBC AUTO-ENTMCNC: 34.2 G/DL (ref 31.5–36.5)
MCV RBC AUTO: 94 FL (ref 78–100)
NONHDLC SERPL-MCNC: 90 MG/DL
PLATELET # BLD AUTO: 281 10E3/UL (ref 150–450)
POTASSIUM SERPL-SCNC: 4 MMOL/L (ref 3.4–5.3)
RBC # BLD AUTO: 5.04 10E6/UL (ref 3.8–5.2)
SODIUM SERPL-SCNC: 145 MMOL/L (ref 135–145)
TRIGL SERPL-MCNC: 85 MG/DL
WBC # BLD AUTO: 14.8 10E3/UL (ref 4–11)

## 2023-10-31 PROCEDURE — 99214 OFFICE O/P EST MOD 30 MIN: CPT | Performed by: FAMILY MEDICINE

## 2023-10-31 PROCEDURE — G0480 DRUG TEST DEF 1-7 CLASSES: HCPCS | Performed by: FAMILY MEDICINE

## 2023-10-31 PROCEDURE — 71046 X-RAY EXAM CHEST 2 VIEWS: CPT | Mod: TC | Performed by: RADIOLOGY

## 2023-10-31 PROCEDURE — 80048 BASIC METABOLIC PNL TOTAL CA: CPT | Performed by: FAMILY MEDICINE

## 2023-10-31 PROCEDURE — 80061 LIPID PANEL: CPT | Performed by: FAMILY MEDICINE

## 2023-10-31 PROCEDURE — 36415 COLL VENOUS BLD VENIPUNCTURE: CPT | Performed by: FAMILY MEDICINE

## 2023-10-31 PROCEDURE — 85027 COMPLETE CBC AUTOMATED: CPT | Performed by: FAMILY MEDICINE

## 2023-10-31 RX ORDER — RESPIRATORY SYNCYTIAL VIRUS VACCINE 120MCG/0.5
0.5 KIT INTRAMUSCULAR ONCE
Qty: 1 EACH | Refills: 0 | Status: CANCELLED | OUTPATIENT
Start: 2023-10-31 | End: 2023-10-31

## 2023-10-31 RX ORDER — DILTIAZEM HYDROCHLORIDE 120 MG/1
120 CAPSULE, COATED, EXTENDED RELEASE ORAL EVERY MORNING
Qty: 30 CAPSULE | Refills: 11 | Status: ON HOLD | OUTPATIENT
Start: 2023-10-31 | End: 2024-08-28

## 2023-10-31 RX ORDER — CYCLOBENZAPRINE HCL 5 MG
5 TABLET ORAL 2 TIMES DAILY PRN
Qty: 30 TABLET | Refills: 2 | Status: SHIPPED | OUTPATIENT
Start: 2023-10-31 | End: 2024-03-11

## 2023-10-31 RX ORDER — POLYETHYLENE GLYCOL-3350 AND ELECTROLYTES 236; 6.74; 5.86; 2.97; 22.74 G/274.31G; G/274.31G; G/274.31G; G/274.31G; G/274.31G
POWDER, FOR SOLUTION ORAL
COMMUNITY
Start: 2023-10-06 | End: 2024-02-09

## 2023-10-31 SDOH — HEALTH STABILITY: PHYSICAL HEALTH: ON AVERAGE, HOW MANY DAYS PER WEEK DO YOU ENGAGE IN MODERATE TO STRENUOUS EXERCISE (LIKE A BRISK WALK)?: 7 DAYS

## 2023-10-31 ASSESSMENT — ASTHMA QUESTIONNAIRES
ACT_TOTALSCORE: 24
QUESTION_2 LAST FOUR WEEKS HOW OFTEN HAVE YOU HAD SHORTNESS OF BREATH: NOT AT ALL
QUESTION_1 LAST FOUR WEEKS HOW MUCH OF THE TIME DID YOUR ASTHMA KEEP YOU FROM GETTING AS MUCH DONE AT WORK, SCHOOL OR AT HOME: A LITTLE OF THE TIME
ACT_TOTALSCORE: 24
QUESTION_4 LAST FOUR WEEKS HOW OFTEN HAVE YOU USED YOUR RESCUE INHALER OR NEBULIZER MEDICATION (SUCH AS ALBUTEROL): NOT AT ALL
QUESTION_3 LAST FOUR WEEKS HOW OFTEN DID YOUR ASTHMA SYMPTOMS (WHEEZING, COUGHING, SHORTNESS OF BREATH, CHEST TIGHTNESS OR PAIN) WAKE YOU UP AT NIGHT OR EARLIER THAN USUAL IN THE MORNING: NOT AT ALL
QUESTION_5 LAST FOUR WEEKS HOW WOULD YOU RATE YOUR ASTHMA CONTROL: COMPLETELY CONTROLLED

## 2023-10-31 ASSESSMENT — LIFESTYLE VARIABLES
HOW MANY STANDARD DRINKS CONTAINING ALCOHOL DO YOU HAVE ON A TYPICAL DAY: PATIENT DOES NOT DRINK
SKIP TO QUESTIONS 9-10: 1
HOW OFTEN DO YOU HAVE SIX OR MORE DRINKS ON ONE OCCASION: NEVER
HOW OFTEN DO YOU HAVE A DRINK CONTAINING ALCOHOL: NEVER
AUDIT-C TOTAL SCORE: 0

## 2023-10-31 ASSESSMENT — SOCIAL DETERMINANTS OF HEALTH (SDOH)
ARE YOU MARRIED, WIDOWED, DIVORCED, SEPARATED, NEVER MARRIED, OR LIVING WITH A PARTNER?: LIVING WITH PARTNER
HOW OFTEN DO YOU ATTENT MEETINGS OF THE CLUB OR ORGANIZATION YOU BELONG TO?: NEVER
DO YOU BELONG TO ANY CLUBS OR ORGANIZATIONS SUCH AS CHURCH GROUPS UNIONS, FRATERNAL OR ATHLETIC GROUPS, OR SCHOOL GROUPS?: NO

## 2023-10-31 ASSESSMENT — PAIN SCALES - GENERAL: PAINLEVEL: SEVERE PAIN (6)

## 2023-10-31 NOTE — PROGRESS NOTES
[facility]  [id number] [place] [id number]  Phone: [phone]  Primary Provider: Dionne Byrne  Pre-op Performing Provider: DIONNE BYRNE      PREOPERATIVE EVALUATION:  Today's date: 10/31/2023    Emeli is a 68 year old female who presents for a preoperative evaluation.      10/31/2023     9:54 AM   Additional Questions   Roomed by Heena SHEFFIELD CMA     Surgical Information:  Surgery/Procedure: Right hand surgery  Surgery Location: [facility]  Surgeon: Dr. Barros  Surgery Date: 11/6/2023  Time of Surgery: TBD  Where patient plans to recover: At home with family  Fax number for surgical facility: [phone]    Assessment & Plan     The proposed surgical procedure is considered LOW risk.    Need for shingles vaccine  Will do in future     Need for vaccination against respiratory syncytial virus  Will do in future    Screen for colon cancer  Has scheduled    F11.2 - Continuous opioid dependence (H)  Stable  Would like to get off ambien   - [id number] - Urine Drug Confirmation Panel (Comprehensive)  - Med Therapy Management Referral  - [id number] - Urine Drug Confirmation Panel (Comprehensive)    Pre-operative general physical examination  Fit for surgery   - REVIEW OF HEALTH MAINTENANCE PROTOCOL ORDERS  - CBC with platelets  - Basic metabolic panel  (Ca, Cl, CO2, Creat, Gluc, K, Na, BUN)  - XR Chest 2 Views  - CBC with platelets  - Basic metabolic panel  (Ca, Cl, CO2, Creat, Gluc, K, Na, BUN)    Tachyarrhythmia  Stable  continue  - diltiazem ER COATED BEADS (CARDIZEM CD/CARTIA XT) 120 MG 24 hr capsule  Dispense: 30 capsule; Refill: 11    Acute bilateral low back pain without sciatica  Stable   - cyclobenzaprine (FLEXERIL) 5 MG tablet  Dispense: 30 tablet; Refill: 2  - Med Therapy Management Referral    CARDIOVASCULAR SCREENING; LDL GOAL LESS THAN 130    - Lipid panel reflex to direct LDL Non-fasting  - Lipid panel reflex to direct LDL  Non-fasting    Adenomyomatosis of gallbladder  Due for recheck   - MR Liver wo & w Contrast    Primary osteoarthritis of right wrist  Reason for surgery   - XR Chest 2 Views    Sleep disorder  Would like to get off ambien   - Med Therapy Management Referral        Possible Sleep Apnea: NO           - No identified additional risk factors other than previously addressed    Antiplatelet or Anticoagulation Medication Instructions:   - Patient is on no antiplatelet or anticoagulation medications.    Additional Medication Instructions:  Patient is to take all scheduled medications on the day of surgery    RECOMMENDATION:  APPROVAL GIVEN to proceed with proposed procedure, without further diagnostic evaluation.      32 minutes spent by me on the date of the encounter doing chart review, history and exam, documentation and further activities per the note      Subjective     HPI related to upcoming procedure: Emeli Granados is a 68 year old woman here for preop prior to having surgery on her right wrist due to DJD and bone on bone.   She is having surgery on 11/6/23.          10/31/2023     9:46 AM   Preop Questions   1. Have you ever had a heart attack or stroke? No   2. Have you ever had surgery on your heart or blood vessels, such as a stent placement, a coronary artery bypass, or surgery on an artery in your head, neck, heart, or legs? No   3. Do you have chest pain with activity? No   4. Do you have a history of  heart failure? No   5. Do you currently have a cold, bronchitis or symptoms of other infection? No   6. Do you have a cough, shortness of breath, or wheezing? No   7. Do you or anyone in your family have previous history of blood clots? No   8. Do you or does anyone in your family have a serious bleeding problem such as prolonged bleeding following surgeries or cuts? No   9. Have you ever had problems with anemia or been told to take iron pills? No   10. Have you had any abnormal blood loss such as black,  tarry or bloody stools, or abnormal vaginal bleeding? No   11. Have you ever had a blood transfusion? No   12. Are you willing to have a blood transfusion if it is medically needed before, during, or after your surgery? Yes   13. Have you or any of your relatives ever had problems with anesthesia? No   14. Do you have sleep apnea, excessive snoring or daytime drowsiness? UNKNOWN -    15. Do you have any artifical heart valves or other implanted medical devices like a pacemaker, defibrillator, or continuous glucose monitor? No   16. Do you have artificial joints? No   17. Are you allergic to latex? No       Health Care Directive:  Patient does not have a Health Care Directive or Living Will: Discussed advance care planning with patient; information given to patient to review.    Preoperative Review of :   reviewed - controlled substances reflected in medication list.      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 uterus and left the ovaries    Polycythemia 08/08/2012    resolved now       Past Surgical History:   Procedure Laterality Date    CARPAL TUNNEL RELEASE RT/LT Right 11/23/2021    and trigger finger and tendon repair    CARPAL TUNNEL RELEASE RT/LT Left 12/20/2021    and tendon repair    COLONOSCOPY  2004, 2012    repeat in 2022    Dilatation of the esophagus once due to dysphagia and stricture  2003    Ganglion cyst removal      HEMORRHOIDECTOMY BANDING      HYSTERECTOMY  1998    LAPAROSCOPIC SALPINGO-OOPHORECTOMY Bilateral 10/23/2015    Procedure: LAPAROSCOPIC SALPINGO-OOPHORECTOMY;  Surgeon: Johnathan Steve MD;  Location: [facility] OR    subscapularous repair  and biceps tendon repair  05/2022    Dr[facility] Verma at [facility]    Thumb surgery Right     TONSILLECTOMY         MEDICATIONS:  Current Outpatient Medications   Medication    albuterol (PROAIR HFA/PROVENTIL HFA/VENTOLIN HFA) 108 (90 Base) MCG/ACT inhaler    albuterol (PROVENTIL) (2.5 MG/3ML) 0.083% neb solution    ascorbic acid (VITAMIN C) 1000 MG TABS    atorvastatin (LIPITOR) 10 MG tablet    CALCIUM PO    cyclobenzaprine (FLEXERIL) 5 MG tablet    diltiazem ER COATED BEADS (CARDIZEM CD/CARTIA XT) 120 MG 24 hr capsule    docusate sodium (COLACE) 100 MG capsule    HYDROcodone-acetaminophen (NORCO)  MG per tablet    Multiple Vitamins-Minerals (MULTIVITAMIN OR)    omeprazole (PRILOSEC) 20 MG DR capsule    traMADol (ULTRAM) 50 MG tablet    vitamin D3 (CHOLECALCIFEROL) 1000 units (25 mcg) tablet    vitamin E 400 UNITS TABS    zolpidem (AMBIEN) 5 MG tablet    GAVILYTE-G 236 g suspension     No current facility-administered medications for this visit.       SOCIAL HISTORY:  Social History     Tobacco Use    Smoking status: Former     Packs/day: 0.50     Years: 30.00     Additional pack years: 0.00     Total pack years: 15.00     Types: Cigarettes     Start date: 10/13/1967     Quit date: 11/6/2019     Years since quitting: 3.9    Smokeless tobacco: Never    Tobacco comments:     quit 11/2019   Substance Use Topics    Alcohol use: No     Comment: sober since 9/11/99       Family History   Problem Relation Age of Onset    Hypertension Mother     Cerebrovascular Disease Mother         TIA's    Depression Mother     Cancer - colorectal Maternal Grandmother         dx at 65    Lipids Father     C.A.D. Father         angioplasty at 65    Musculoskeletal Disorder Father     Alcohol/Drug Daughter         in remission    Breast Cancer No family hx of            Review of Systems  Constitutional, neuro, ENT, endocrine, pulmonary, cardiac, gastrointestinal, genitourinary, musculoskeletal, integument and psychiatric systems are negative,  except as otherwise noted.    Patient Active Problem List    Diagnosis Date Noted    SVT (supraventricular tachycardia) 03/06/2023     Priority: Medium    Acute bilateral low back pain without sciatica 09/28/2021     Priority: Medium    Adenomyomatosis of gallbladder 06/22/2021     Priority: Medium    Diverticulosis of colon 06/22/2021     Priority: Medium    Bacteremia 06/10/2021     Priority: Medium    Other chronic pain 01/02/2018     Priority: Medium     Patient is followed by Emma Byrne MD for ongoing prescription of pain medication.  All refills should only be approved by this provider, or covering partner.    Medication(s): hydrocodone. 10/325 mg  Maximum quantity per month: 30  Clinic visit frequency required: Q 6 months     Tramadol 50 mg 90 per month    Controlled substance agreement:  CSA -- Encounter Level on 07/17/2017:    Controlled Substance Agreement - Scan on 7/31/2017  9:32 PM: CONTROLLED SUBSTANCE AGREEMENT       CSA -- Encounter Level on 01/07/2015:    Controlled Substance Agreement - Scan on 1/8/2015  9:46 AM: [facility] Controlled Medication Agreement 1-7-15       CSA -- Patient Level:    Controlled Substance Agreement - Opioid - Scan on 5/3/2021  6:23 PM  Controlled Substance Agreement - Opioid - Scan on 7/29/2019  3:09 PM     Pain Clinic evaluation in the past: No    DIRE Total Score(s):  No flowsheet data found.    Last [facility] website verification:05/2/2022 sr   [email or web address]        Moderate persistent asthma with exacerbation 12/26/2017     Priority: Medium    Back pain 05/19/2015     Priority: Medium    Osteopenia 05/18/2015     Priority: Medium    CARDIOVASCULAR SCREENING; LDL GOAL LESS THAN 130 10/31/2010     Priority: Medium    Disorder of bone and cartilage 01/14/2005     Priority: Medium     Problem list name updated by automated process. 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 uterus and left the ovaries    Polycythemia 08/08/2012    resolved now     Past Surgical History:   Procedure Laterality Date    CARPAL TUNNEL RELEASE RT/LT Right 11/23/2021    and trigger finger and tendon repair    CARPAL TUNNEL RELEASE RT/LT Left 12/20/2021    and tendon repair    COLONOSCOPY  2004, 2012    repeat in 2022    Dilatation of the esophagus once due to dysphagia and stricture  2003    Ganglion cyst removal      HEMORRHOIDECTOMY BANDING      HYSTERECTOMY  1998    LAPAROSCOPIC SALPINGO-OOPHORECTOMY Bilateral 10/23/2015    Procedure: LAPAROSCOPIC SALPINGO-OOPHORECTOMY;  Surgeon: Johnathan Steve MD;  Location: [facility] OR    subscapularous repair and biceps tendon repair  05/2022    Dr[facility] Verma at [facility]    Thumb surgery Right     TONSILLECTOMY       Current Outpatient Medications   Medication Sig Dispense Refill    albuterol (PROAIR HFA/PROVENTIL HFA/VENTOLIN HFA) 108 (90 Base) MCG/ACT inhaler INHALE 1 OR 2 puffs EVERY 6 HOURS as needed for wheezing. Strength: 108 (90 Base) MCG/ACT 8.5 g 11    GAVILYTE-G 236 g suspension Take by oral route as directed in colon prep instructions received from [facility].*      albuterol (PROVENTIL) (2.5 MG/3ML) 0.083% neb solution INHALE 3 MILLILITERS (2.5 MG) BY NEBULIZATION ROUTE EVERY 6 HOURS AS NEEDED FOR SHORTNESS OF BREATH/DYSPNEA OR WHEEZING. Strength: (2.5 MG/3ML) 0.083% (Patient not taking: Reported on 10/31/2023) 75 mL 11    ascorbic acid (VITAMIN C) 1000 MG TABS Take 1,000 mg by mouth daily      atorvastatin (LIPITOR) 10 MG tablet Take 1 tablet (10 mg) by mouth daily 90 tablet 2    CALCIUM PO       cyclobenzaprine (FLEXERIL) 10 MG tablet Take 1 tablet (10 mg) by mouth 2 times daily as  needed for muscle spasms or other (shoulder pain) . Do not take within 6 hours of tramadol or zolpidem 30 tablet 3    diltiazem ER COATED BEADS (CARDIZEM CD/CARTIA XT) 120 MG 24 hr capsule Take 1 capsule (120 mg) by mouth every morning Hold for blood pressure <110 30 capsule 11    docusate sodium (COLACE) 100 MG capsule Take 1 capsule (100 mg) by mouth 2 times daily as needed for constipation 30 capsule 1    HYDROcodone-acetaminophen (NORCO)  MG per tablet Take 1 tablet by mouth daily as needed for severe pain NOt to exceed more than 30 per month 30 tablet 0    Multiple Vitamins-Minerals (MULTIVITAMIN OR) Take 1 tablet by mouth daily Per pt, uses ones without Iron      mupirocin (BACTROBAN) 2 % external ointment Apply topically 2 times daily 30 g 1    omeprazole (PRILOSEC) 20 MG DR capsule TAKE 1 CAPSULE (20 MG) BY MOUTH DAILY. 90 capsule 1    traMADol (ULTRAM) 50 MG tablet Take 1 tablet (50 mg) by mouth every 6 hours as needed for severe pain 90 tablet 0    vitamin D3 (CHOLECALCIFEROL) 1000 units (25 mcg) tablet Take 1,000 Units by mouth daily      vitamin E 400 UNITS TABS Take 400 Units by mouth daily      zolpidem (AMBIEN) 5 MG tablet TAKE 1 TABLET (5 MG) BY MOUTH NIGHTLY AS NEEDED FOR SLEEP. DO NOT TAKE WITH TRAMADOL. MUST BE NDC # [address]-12 PER PT REQUEST. 30 tablet 0       Allergies   Allergen Reactions    Metaproterenol Sulfate Other (See Comments)     alupent inhaler-shaking    Percocet [Oxycodone-Acetaminophen] Itching        Social History     Tobacco Use    Smoking status: Former     Packs/day: 0.50     Years: 30.00     Additional pack years: 0.00     Total pack years: 15.00     Types: Cigarettes     Start date: 10/13/1967     Quit date: 11/6/2019     Years since quitting: 3.9    Smokeless tobacco: Never    Tobacco comments:     quit 11/2019   Substance Use Topics    Alcohol use: No     Comment: sober since 9/11/99     Family History   Problem Relation Age of Onset    Hypertension Mother      "Cerebrovascular Disease Mother         TIA's    Depression Mother     Cancer - colorectal Maternal Grandmother         dx at 65    Lipids Father     C.A.D. Father         angioplasty at 65    Musculoskeletal Disorder Father     Alcohol/Drug Daughter         in remission    Breast Cancer No family hx of      History   Drug Use No         Objective     /77 (BP Location: Right arm, Patient Position: Sitting, Cuff Size: Adult Regular)   Pulse 74   Temp 98.1  F (36.7  C) (Oral)   Resp 16   Ht 1.499 m (4' 11\")   Wt 61.8 kg (136 lb 3.2 oz)   LMP  (LMP Unknown)   SpO2 95%   BMI 27.51 kg/m      Physical Exam    GENERAL APPEARANCE: healthy, alert and no distress     EYES: EOMI, PERRL     HENT: ear canals and TM's normal and nose and mouth without ulcers or lesions     NECK: no adenopathy, no asymmetry, masses, or scars and thyroid normal to palpation     RESP: lungs clear to auscultation - no rales, rhonchi or wheezes     CV: regular rates and rhythm, normal S1 S2, no S3 or S4 and no murmur, click or rub     ABDOMEN:  soft, nontender, no HSM or masses and bowel sounds normal     MS: extremities normal- no gross deformities noted, no evidence of inflammation in joints, FROM in all extremities.     SKIN: no suspicious lesions or rashes     NEURO: Normal strength and tone, sensory exam grossly normal, mentation intact and speech normal     PSYCH: mentation appears normal. and affect normal/bright     LYMPHATICS: No cervical adenopathy    Recent Labs   Lab Test 07/13/23  1446 03/06/23  1107 10/12/22  1018   HGB 14.5 13.8 15.4    365 289   INR  --   --  0.99   NA  --  142 140   POTASSIUM  --  4.1 4.1   CR  --  0.70 0.79        Diagnostics:  Labs pending at this time.  Results will be reviewed when available.   No EKG required for low risk surgery (cataract, skin procedure, breast biopsy, etc).    Revised Cardiac Risk Index (RCRI):  The patient has the following serious cardiovascular risks for perioperative " complications:   - No serious cardiac risks = 0 points     RCRI Interpretation: 0 points: Class I (very low risk - 0.4% complication rate)         Signed Electronically by: Emma Byrne MD  Copy of this evaluation report is provided to requesting physician.

## 2023-11-01 ENCOUNTER — TELEPHONE (OUTPATIENT)
Dept: FAMILY MEDICINE | Facility: CLINIC | Age: 68
End: 2023-11-01
Payer: COMMERCIAL

## 2023-11-01 NOTE — TELEPHONE ENCOUNTER
MTM referral from: [facility] visit (referral by provider)    MTM referral outreach attempt #1 on November 1, 2023 at 9:24 AM      Outcome: Patient is not interested at this time because she has upcoming surgeries. Patient will call back to schedule at a later time, will route to MTM Pharmacist/Provider as an FYI. Thank you for the referral.     Use [employer] needed for the carrier/Plan on the flowsheet      Anuja Banerjee CPhT  [facility]

## 2023-11-06 ENCOUNTER — TRANSFERRED RECORDS (OUTPATIENT)
Dept: HEALTH INFORMATION MANAGEMENT | Facility: CLINIC | Age: 68
End: 2023-11-06

## 2023-11-06 LAB
HYDROMORPHONE UR CFM-MCNC: 54 NG/ML
HYDROMORPHONE/CREAT UR: 42 NG/MG {CREAT}
N-NORTRAMADOL/CREAT UR CFM: 1023 NG/MG {CREAT}
O-NORTRAMADOL UR CFM-MCNC: 1320 NG/ML
TRAMADOL CTO UR CFM-MCNC: 253 NG/ML
TRAMADOL/CREAT UR: 196 NG/MG {CREAT}

## 2023-11-15 ENCOUNTER — TELEPHONE (OUTPATIENT)
Dept: FAMILY MEDICINE | Facility: CLINIC | Age: 68
End: 2023-11-15
Payer: COMMERCIAL

## 2023-11-15 DIAGNOSIS — G47.9 SLEEP DISORDER: ICD-10-CM

## 2023-11-15 DIAGNOSIS — E78.5 HYPERLIPIDEMIA LDL GOAL <130: ICD-10-CM

## 2023-11-15 RX ORDER — ATORVASTATIN CALCIUM 10 MG/1
10 TABLET, FILM COATED ORAL DAILY
Qty: 90 TABLET | Refills: 0 | OUTPATIENT
Start: 2023-11-15

## 2023-11-15 RX ORDER — ZOLPIDEM TARTRATE 5 MG/1
TABLET ORAL
Qty: 30 TABLET | Refills: 5 | Status: SHIPPED | OUTPATIENT
Start: 2023-11-15 | End: 2024-03-11

## 2023-11-15 NOTE — TELEPHONE ENCOUNTER
Patient requesting update for Ambien prescription refill. RN informed patient we received request today and it was waiting for provider approval.        Isha NIX RN on 11/15/2023 at 2:19 PM

## 2023-11-21 ENCOUNTER — HOSPITAL ENCOUNTER (EMERGENCY)
Facility: CLINIC | Age: 68
Discharge: HOME | End: 2023-11-21
Attending: EMERGENCY MEDICINE | Admitting: EMERGENCY MEDICINE
Payer: COMMERCIAL

## 2023-11-21 ENCOUNTER — APPOINTMENT (OUTPATIENT)
Dept: GENERAL RADIOLOGY | Facility: CLINIC | Age: 68
End: 2023-11-21
Attending: EMERGENCY MEDICINE
Payer: COMMERCIAL

## 2023-11-21 VITALS
RESPIRATION RATE: 18 BRPM | BODY MASS INDEX: 28.28 KG/M2 | DIASTOLIC BLOOD PRESSURE: 64 MMHG | WEIGHT: 140 LBS | HEART RATE: 62 BPM | TEMPERATURE: 97 F | OXYGEN SATURATION: 97 % | SYSTOLIC BLOOD PRESSURE: 114 MMHG

## 2023-11-21 DIAGNOSIS — Z86.79 HISTORY OF SUPRAVENTRICULAR TACHYCARDIA: ICD-10-CM

## 2023-11-21 DIAGNOSIS — R00.2 PALPITATIONS: ICD-10-CM

## 2023-11-21 DIAGNOSIS — R07.89 CHEST PRESSURE: ICD-10-CM

## 2023-11-21 LAB
ANION GAP SERPL CALCULATED.3IONS-SCNC: 8 MMOL/L (ref 7–15)
ATRIAL RATE - MUSE: 55 BPM
BASOPHILS # BLD AUTO: 0 10E3/UL (ref 0–0.2)
BASOPHILS NFR BLD AUTO: 0 %
BUN SERPL-MCNC: 22.4 MG/DL (ref 8–23)
CALCIUM SERPL-MCNC: 8.5 MG/DL (ref 8.8–10.2)
CHLORIDE SERPL-SCNC: 107 MMOL/L (ref 98–107)
CREAT SERPL-MCNC: 0.82 MG/DL (ref 0.51–0.95)
D DIMER PPP FEU-MCNC: 0.43 UG/ML FEU (ref 0–0.5)
DEPRECATED HCO3 PLAS-SCNC: 27 MMOL/L (ref 22–29)
DIASTOLIC BLOOD PRESSURE - MUSE: NORMAL MMHG
EGFRCR SERPLBLD CKD-EPI 2021: 77 ML/MIN/1.73M2
EOSINOPHIL # BLD AUTO: 0 10E3/UL (ref 0–0.7)
EOSINOPHIL NFR BLD AUTO: 0 %
ERYTHROCYTE [DISTWIDTH] IN BLOOD BY AUTOMATED COUNT: 13.7 % (ref 10–15)
GLUCOSE SERPL-MCNC: 100 MG/DL (ref 70–99)
HCT VFR BLD AUTO: 42.5 % (ref 35–47)
HGB BLD-MCNC: 14.1 G/DL (ref 11.7–15.7)
HOLD SPECIMEN: NORMAL
IMM GRANULOCYTES # BLD: 0.1 10E3/UL
IMM GRANULOCYTES NFR BLD: 1 %
INTERPRETATION ECG - MUSE: NORMAL
LYMPHOCYTES # BLD AUTO: 1.9 10E3/UL (ref 0.8–5.3)
LYMPHOCYTES NFR BLD AUTO: 20 %
MAGNESIUM SERPL-MCNC: 1.8 MG/DL (ref 1.7–2.3)
MCH RBC QN AUTO: 32.2 PG (ref 26.5–33)
MCHC RBC AUTO-ENTMCNC: 33.2 G/DL (ref 31.5–36.5)
MCV RBC AUTO: 97 FL (ref 78–100)
MONOCYTES # BLD AUTO: 0.8 10E3/UL (ref 0–1.3)
MONOCYTES NFR BLD AUTO: 8 %
NEUTROPHILS # BLD AUTO: 6.9 10E3/UL (ref 1.6–8.3)
NEUTROPHILS NFR BLD AUTO: 71 %
NRBC # BLD AUTO: 0 10E3/UL
NRBC BLD AUTO-RTO: 0 /100
P AXIS - MUSE: 65 DEGREES
PLATELET # BLD AUTO: 289 10E3/UL (ref 150–450)
POTASSIUM SERPL-SCNC: 3.9 MMOL/L (ref 3.4–5.3)
PR INTERVAL - MUSE: 144 MS
QRS DURATION - MUSE: 84 MS
QT - MUSE: 392 MS
QTC - MUSE: 375 MS
R AXIS - MUSE: -4 DEGREES
RBC # BLD AUTO: 4.38 10E6/UL (ref 3.8–5.2)
SODIUM SERPL-SCNC: 142 MMOL/L (ref 135–145)
SYSTOLIC BLOOD PRESSURE - MUSE: NORMAL MMHG
T AXIS - MUSE: 22 DEGREES
TROPONIN T SERPL HS-MCNC: <6 NG/L
TROPONIN T SERPL HS-MCNC: <6 NG/L
TSH SERPL DL<=0.005 MIU/L-ACNC: 1.06 UIU/ML (ref 0.3–4.2)
VENTRICULAR RATE- MUSE: 55 BPM
WBC # BLD AUTO: 9.8 10E3/UL (ref 4–11)

## 2023-11-21 PROCEDURE — 85004 AUTOMATED DIFF WBC COUNT: CPT | Performed by: EMERGENCY MEDICINE

## 2023-11-21 PROCEDURE — 84484 ASSAY OF TROPONIN QUANT: CPT | Performed by: EMERGENCY MEDICINE

## 2023-11-21 PROCEDURE — 80048 BASIC METABOLIC PNL TOTAL CA: CPT | Performed by: EMERGENCY MEDICINE

## 2023-11-21 PROCEDURE — 84443 ASSAY THYROID STIM HORMONE: CPT | Performed by: EMERGENCY MEDICINE

## 2023-11-21 PROCEDURE — 83735 ASSAY OF MAGNESIUM: CPT | Performed by: EMERGENCY MEDICINE

## 2023-11-21 PROCEDURE — 93005 ELECTROCARDIOGRAM TRACING: CPT

## 2023-11-21 PROCEDURE — 71046 X-RAY EXAM CHEST 2 VIEWS: CPT

## 2023-11-21 PROCEDURE — 36415 COLL VENOUS BLD VENIPUNCTURE: CPT | Performed by: EMERGENCY MEDICINE

## 2023-11-21 PROCEDURE — 99285 EMERGENCY DEPT VISIT HI MDM: CPT | Mod: 25

## 2023-11-21 PROCEDURE — 85379 FIBRIN DEGRADATION QUANT: CPT | Performed by: EMERGENCY MEDICINE

## 2023-11-21 ASSESSMENT — ACTIVITIES OF DAILY LIVING (ADL): ADLS_ACUITY_SCORE: 35

## 2023-11-21 NOTE — ED PROVIDER NOTES
Repeat troponin- neg.  Discharge home per Luis Angel's orders    MD Laura Schultz Kristi Jo Schneider, MD  11/21/23 [id number]

## 2023-11-21 NOTE — ED TRIAGE NOTES
Pt here for chest pain that varies in intensity - began in the last hour. Had wrist surgery on 11/6.

## 2023-11-21 NOTE — DISCHARGE INSTRUCTIONS
Heart monitor to evaluate for frequency of SVT.  Please arrange an appointment with cardiology to review these results.  Return immediately if you have worsening symptoms or new concerns of any kind.

## 2023-11-21 NOTE — ED PROVIDER NOTES
History     Chief Complaint:  Chest Pain       The history is provided by the patient.      Emeli Granados is a 68 year old female with a history of tachyarrhythmia on diltiazem who presents with chest pain.  60 to 90 minutes prior to arrival she developed substernal chest pressure that feels as if someone is pushing on her with a fist.  She has the exact same sensation straight through on her back.  This occurs intermittently, approximately every 20 minutes, and lasts 3 to 4 minutes at a time then resolve spontaneously.  She has associated palpitations, dyspnea, and diaphoresis.  This is the exact same sensation she had when she was admitted for SVT 1 year ago.  She is recovering from a wrist surgery 11/6/2023.  She has started preparing for a colonoscopy.  She denies leg pain or swelling.  She has no symptoms during interview.    Independent Historian:    As above    Review of External Notes:  Discharge summary 10/13/2022.  EP visit November 2022.    Medications:    albuterol (PROAIR HFA/PROVENTIL HFA/VENTOLIN HFA) 108 (90 Base) MCG/ACT inhaler  albuterol (PROVENTIL) (2.5 MG/3ML) 0.083% neb solution  ascorbic acid (VITAMIN C) 1000 MG TABS  atorvastatin (LIPITOR) 10 MG tablet  CALCIUM PO  cyclobenzaprine (FLEXERIL) 5 MG tablet  diltiazem ER COATED BEADS (CARDIZEM CD/CARTIA XT) 120 MG 24 hr capsule  docusate sodium (COLACE) 100 MG capsule  GAVILYTE-G 236 g suspension  HYDROcodone-acetaminophen (NORCO)  MG per tablet  Multiple Vitamins-Minerals (MULTIVITAMIN OR)  omeprazole (PRILOSEC) 20 MG DR capsule  traMADol (ULTRAM) 50 MG tablet  vitamin D3 (CHOLECALCIFEROL) 1000 units (25 mcg) tablet  vitamin E 400 UNITS TABS  zolpidem (AMBIEN) 5 MG tablet    Past Medical History:    Past Medical History:   Diagnosis Date    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    Diverticula of colon 01/01/2014    Emphysema lung     Esophageal stricture     Hemorrhoids     Hepatitis C 01/01/1996    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    Polycythemia 08/08/2012       Past Surgical History:    Past Surgical History:   Procedure Laterality Date    CARPAL TUNNEL RELEASE RT/LT Right 11/23/2021    and trigger finger and tendon repair    CARPAL TUNNEL RELEASE RT/LT Left 12/20/2021    and tendon repair    COLONOSCOPY  2004, 2012    repeat in 2022    Dilatation of the esophagus once due to dysphagia and stricture  2003    Ganglion cyst removal      HEMORRHOIDECTOMY BANDING      HYSTERECTOMY  1998    LAPAROSCOPIC SALPINGO-OOPHORECTOMY Bilateral 10/23/2015    Procedure: LAPAROSCOPIC SALPINGO-OOPHORECTOMY;  Surgeon: Johnathan Steve MD;  Location: [facility] OR    subscapularous repair and biceps tendon repair  05/2022    Dr. Verma at [facility]    Thumb surgery Right     TONSILLECTOMY        Physical Exam   Patient Vitals for the past 24 hrs:   BP Temp Temp src Pulse Resp SpO2 Weight   11/21/23 1354 -- -- -- 56 18 94 % --   11/21/23 1324 135/78 -- -- 58 -- -- --   11/21/23 1300 (!) 143/71 97  F (36.1  C) Temporal 66 24 97 % 63.5 kg (140 lb)        Physical Exam  General: Well-developed and well-nourished. Well appearing elderly woman. Cooperative.  Head:  Atraumatic.  Eyes:  Conjunctivae, lids, and sclerae are normal.  ENT:    Normal nose. Moist mucous membranes.  Neck:  Supple. Normal range of motion.  CV:  Regular rate and rhythm. Normal heart sounds with no murmurs, rubs, or gallops detected.  Resp:  No respiratory distress. Clear to auscultation bilaterally without decreased breath sounds, wheezing, rales, or rhonchi.  GI:  Soft. Non-distended. Non-tender.    MS:  Normal ROM with cast on the right wrist. No bilateral lower extremity edema or calf tenderness.  Skin:  Warm. Non-diaphoretic. No pallor.  Neuro:  Awake. A&Ox3. Normal strength.  Psych: Normal mood and affect. Normal speech.  Vitals reviewed.    Emergency Department Course   EKG  Indication: chest pressure  Time: 1317  Rate 55 bpm. IA interval 144. QRS duration 84. QT/QTc 392/375.    Sinus bradycardia  Otherwise normal ECG  No acute ST changes.  No change as compared to prior, dated 10/12/22.    Imaging:  XR Chest 2 Views   Final Result   IMPRESSION: Aortic calcification. Lungs clear. No pleural effusions.   Heart size upper limits of normal. Pulmonary vascularity is within   normal limits.      MYRIAM GUTIERREZ MD            SYSTEM ID:  [id number]      Leadless EKG Monitor 3 to 7 Days    (Results Pending)       Laboratory:  Labs Ordered and Resulted from Time of ED Arrival to Time of ED Departure   BASIC METABOLIC PANEL - Abnormal       Result Value    Sodium 142      Potassium 3.9      Chloride 107      Carbon Dioxide (CO2) 27      Anion Gap 8      Urea Nitrogen 22.4      Creatinine 0.82      GFR Estimate 77      Calcium 8.5 (*)     Glucose 100 (*)    D DIMER QUANTITATIVE - Normal    D-Dimer Quantitative 0.43     TROPONIN T, HIGH SENSITIVITY - Normal    Troponin T, High Sensitivity <6     MAGNESIUM - Normal    Magnesium 1.8     TSH WITH FREE T4 REFLEX - Normal    TSH 1.06     CBC WITH PLATELETS AND DIFFERENTIAL    WBC Count 9.8      RBC Count 4.38      Hemoglobin 14.1      Hematocrit 42.5      MCV 97      MCH 32.2      MCHC 33.2      RDW 13.7      Platelet Count 289      % Neutrophils 71      % Lymphocytes 20      % Monocytes 8      % Eosinophils 0      % Basophils 0      % Immature Granulocytes 1      NRBCs per 100 WBC 0      Absolute Neutrophils 6.9      Absolute Lymphocytes 1.9      Absolute Monocytes 0.8      Absolute Eosinophils 0.0      Absolute Basophils 0.0      Absolute Immature Granulocytes 0.1      Absolute NRBCs 0.0     TROPONIN T, HIGH SENSITIVITY        Emergency Department Course & Assessments:  Assessments:  1450 I updated the patient on findings and plan.  She is comfortable with discharge pending repeat troponin.    Independent Interpretation (X-rays, CTs, rhythm strip):  I independently interpreted her chest x-ray and see no pneumonia or  pneumothorax.    Consultations/Discussion of Management or Tests:  Not applicable    Social Determinants of Health affecting care:  Supportive neighbor  Established care providers     Disposition:  Care of the patient was transferred to my colleague, Dr. Sanchez, pending repeat troponin.     Impression & Plan    Medical Decision Making:  Emeli is a 68 year old woman presenting with chest pain.  She developed substernal pressure 60 to 90 minutes prior to arrival which has been occurring intermittently about every 20 minutes, lasting 3 to 4 minutes before resolving spontaneously.  She has palpitations, dyspnea, diaphoresis.  This is a similar sensation she has had when she has had had SVT in the past.  She is well-appearing on exam with normal to bradycardic heart rates.    EKG is reassuring without acute ST changes or arrhythmias.  Initial troponin is undetectable.  She will require repeat troponin given the initial onset of chest pain.  However, she describes this as similar to when she has had SVT which makes an ACS picture less likely.  D-dimer is negative essentially ruling out pulmonary embolism.  Intermittent pain makes aortic dissection unlikely.  She does have some calcifications on chest x-ray but it is otherwise unremarkable.  Her laboratory studies are otherwise reassuring.    She is appropriate for discharge as her primary concern is recurrence of arrhythmia.  I will discharge her with a Zio patch.  She will follow-up with her cardiologist.  At the end of her shift she was endorsed to my partner, Dr. Sanchez, pending repeat troponin.  If this is still normal she is appropriate for discharge with low threshold for return.    Diagnosis:    ICD-10-CM    1. Chest pressure  R07.89 Leadless EKG Monitor 3 to 7 Days      2. Palpitations  R00.2 Leadless EKG Monitor 3 to 7 Days      3. History of supraventricular tachycardia  Z86.79 Leadless EKG Monitor 3 to 7 Days           Discharge Medications:  New  Prescriptions    No medications on file      11/21/2023   Cortney Plasencia MD Dixson, Kylie S, MD  12/02/23 1617

## 2023-11-24 ENCOUNTER — TRANSFERRED RECORDS (OUTPATIENT)
Dept: HEALTH INFORMATION MANAGEMENT | Facility: CLINIC | Age: 68
End: 2023-11-24
Payer: COMMERCIAL

## 2023-11-28 ENCOUNTER — HOSPITAL ENCOUNTER (OUTPATIENT)
Dept: MRI IMAGING | Facility: CLINIC | Age: 68
Discharge: HOME | End: 2023-11-28
Attending: FAMILY MEDICINE | Admitting: FAMILY MEDICINE
Payer: COMMERCIAL

## 2023-11-28 DIAGNOSIS — D13.5 ADENOMYOMATOSIS OF GALLBLADDER: ICD-10-CM

## 2023-11-28 PROCEDURE — 255N000002 HC RX 255 OP 636: Mod: JZ | Performed by: FAMILY MEDICINE

## 2023-11-28 PROCEDURE — 74183 MRI ABD W/O CNTR FLWD CNTR: CPT

## 2023-11-28 PROCEDURE — A9585 GADOBUTROL INJECTION: HCPCS | Mod: JZ | Performed by: FAMILY MEDICINE

## 2023-11-28 RX ORDER — GADOBUTROL 604.72 MG/ML
6.5 INJECTION INTRAVENOUS ONCE
Status: COMPLETED | OUTPATIENT
Start: 2023-11-28 | End: 2023-11-28

## 2023-11-28 RX ADMIN — GADOBUTROL 6.5 ML: 604.72 INJECTION INTRAVENOUS at 10:32

## 2023-11-29 ENCOUNTER — HOSPITAL ENCOUNTER (OUTPATIENT)
Dept: CARDIOLOGY | Facility: CLINIC | Age: 68
Discharge: HOME | End: 2023-11-29
Attending: EMERGENCY MEDICINE | Admitting: EMERGENCY MEDICINE
Payer: COMMERCIAL

## 2023-11-29 DIAGNOSIS — R00.2 PALPITATIONS: ICD-10-CM

## 2023-11-29 DIAGNOSIS — Z86.79 HISTORY OF SUPRAVENTRICULAR TACHYCARDIA: ICD-10-CM

## 2023-11-29 DIAGNOSIS — R07.89 CHEST PRESSURE: ICD-10-CM

## 2023-11-29 PROCEDURE — 93244 EXT ECG>48HR<7D REV&INTERPJ: CPT | Performed by: INTERNAL MEDICINE

## 2023-11-29 PROCEDURE — 93242 EXT ECG>48HR<7D RECORDING: CPT

## 2023-11-30 ENCOUNTER — NURSE TRIAGE (OUTPATIENT)
Dept: NURSING | Facility: CLINIC | Age: 68
End: 2023-11-30
Payer: COMMERCIAL

## 2023-11-30 NOTE — TELEPHONE ENCOUNTER
Patient calling. On 11/5/2023, she was putting [place] lights up and fell off the ladder onto her buttocks. She's sitting on a donut cushion and using a heating pad. She didn't go to a doctor.     She's on oxycodone and a muscle relaxant due to hand surgery on 11/6/2023. She states that ibuprofen and acetaminophen are ineffective.     Pain is on the right side of her tailbone/buttocks, with radiation up her back and down her buttocks. Rates pain 7/10 at rest. Pain becomes worse with standing from sitting, walking and turning. Heat makes it a little better.        Reason for Disposition   Numbness of a leg or foot (i.e., loss of sensation)    Additional Information   Negative: Dangerous mechanism of injury (e.g., MVA, contact sports, trampoline, diving, fall > 10 feet or 3 meters)  (Exception: Back pain began > 1 hour after injury.)   Negative: Weakness (i.e., paralysis, loss of muscle strength) of the leg(s) or foot and sudden onset after back injury   Negative: Numbness (i.e., loss of sensation) of the leg(s) or foot and sudden onset after back injury   Negative: Major bleeding (actively dripping or spurting) that can't be stopped   Negative: Bullet, knife or other serious penetrating wound   Negative: Shock suspected (e.g., cold/pale/clammy skin, too weak to stand, low BP, rapid pulse)   Negative: Sounds like a life-threatening emergency to the triager   Negative: Back pain from overuse (work, exercise, gardening) OR from twisting, lifting, or bending injury   Negative: Back pain not from an injury   Negative: SEVERE pain in kidney area (flank) that follows a direct blow to that site   Negative: Blood in urine (red, pink, or tea-colored)   Negative: Unable to urinate (or only a few drops) > 4 hours and bladder feels very full (e.g., palpable bladder or strong urge to urinate)   Negative: Loss of bladder or bowel control (urine or bowel incontinence; wetting self, leaking stool) of new-onset   Negative: Numbness  (loss of sensation) in groin or rectal area   Negative: Skin is split open or gaping (length > 1/2 inch or 12 mm)   Negative: Puncture wound of back   Negative: Bleeding won't stop after 10 minutes of direct pressure (using correct technique)   Negative: Sounds like a serious injury to the triager   Negative: Weakness of a leg or foot (e.g., unable to bear weight, dragging foot)    Protocols used: Back Injury-A-OH

## 2023-11-30 NOTE — TELEPHONE ENCOUNTER
"Called pt and relayed provider message below. Patient declines urgent care today, stating \"I can't go tonight, I am watching my grandson tonight\". She requests appointment in afternoon tomorrow.    Scheduled pt with Gina Arias PA-C tomorrow at 2:10 pm    Randi REICH RN      "

## 2023-12-01 ENCOUNTER — ANCILLARY PROCEDURE (OUTPATIENT)
Dept: GENERAL RADIOLOGY | Facility: CLINIC | Age: 68
End: 2023-12-01
Attending: PHYSICIAN ASSISTANT
Payer: COMMERCIAL

## 2023-12-01 ENCOUNTER — OFFICE VISIT (OUTPATIENT)
Dept: FAMILY MEDICINE | Facility: CLINIC | Age: 68
End: 2023-12-01
Payer: COMMERCIAL

## 2023-12-01 VITALS
TEMPERATURE: 98.4 F | DIASTOLIC BLOOD PRESSURE: 77 MMHG | OXYGEN SATURATION: 94 % | RESPIRATION RATE: 12 BRPM | HEART RATE: 76 BPM | BODY MASS INDEX: 28.51 KG/M2 | HEIGHT: 59 IN | WEIGHT: 141.4 LBS | SYSTOLIC BLOOD PRESSURE: 121 MMHG

## 2023-12-01 DIAGNOSIS — M54.41 ACUTE MIDLINE LOW BACK PAIN WITH RIGHT-SIDED SCIATICA: Primary | ICD-10-CM

## 2023-12-01 DIAGNOSIS — M54.41 ACUTE MIDLINE LOW BACK PAIN WITH RIGHT-SIDED SCIATICA: ICD-10-CM

## 2023-12-01 DIAGNOSIS — G89.29 OTHER CHRONIC PAIN: ICD-10-CM

## 2023-12-01 PROCEDURE — 99213 OFFICE O/P EST LOW 20 MIN: CPT | Performed by: PHYSICIAN ASSISTANT

## 2023-12-01 PROCEDURE — 72100 X-RAY EXAM L-S SPINE 2/3 VWS: CPT | Mod: TC | Performed by: PREVENTIVE MEDICINE

## 2023-12-01 PROCEDURE — 72220 X-RAY EXAM SACRUM TAILBONE: CPT | Mod: TC | Performed by: RADIOLOGY

## 2023-12-01 RX ORDER — HYDROCODONE BITARTRATE AND ACETAMINOPHEN 10; 325 MG/1; MG/1
1 TABLET ORAL DAILY PRN
Qty: 30 TABLET | Refills: 0 | Status: SHIPPED | OUTPATIENT
Start: 2023-12-01 | End: 2024-01-02

## 2023-12-01 RX ORDER — RESPIRATORY SYNCYTIAL VIRUS VACCINE 120MCG/0.5
0.5 KIT INTRAMUSCULAR ONCE
Qty: 1 EACH | Refills: 0 | Status: CANCELLED | OUTPATIENT
Start: 2023-12-01 | End: 2023-12-01

## 2023-12-01 ASSESSMENT — ANXIETY QUESTIONNAIRES
1. FEELING NERVOUS, ANXIOUS, OR ON EDGE: NOT AT ALL
8. IF YOU CHECKED OFF ANY PROBLEMS, HOW DIFFICULT HAVE THESE MADE IT FOR YOU TO DO YOUR WORK, TAKE CARE OF THINGS AT HOME, OR GET ALONG WITH OTHER PEOPLE?: NOT DIFFICULT AT ALL
4. TROUBLE RELAXING: NOT AT ALL
7. FEELING AFRAID AS IF SOMETHING AWFUL MIGHT HAPPEN: NOT AT ALL
7. FEELING AFRAID AS IF SOMETHING AWFUL MIGHT HAPPEN: NOT AT ALL
5. BEING SO RESTLESS THAT IT IS HARD TO SIT STILL: NOT AT ALL
3. WORRYING TOO MUCH ABOUT DIFFERENT THINGS: NOT AT ALL
GAD7 TOTAL SCORE: 0
2. NOT BEING ABLE TO STOP OR CONTROL WORRYING: NOT AT ALL
6. BECOMING EASILY ANNOYED OR IRRITABLE: NOT AT ALL
IF YOU CHECKED OFF ANY PROBLEMS ON THIS QUESTIONNAIRE, HOW DIFFICULT HAVE THESE PROBLEMS MADE IT FOR YOU TO DO YOUR WORK, TAKE CARE OF THINGS AT HOME, OR GET ALONG WITH OTHER PEOPLE: NOT DIFFICULT AT ALL

## 2023-12-01 ASSESSMENT — PATIENT HEALTH QUESTIONNAIRE - PHQ9
SUM OF ALL RESPONSES TO PHQ QUESTIONS 1-9: 0
SUM OF ALL RESPONSES TO PHQ QUESTIONS 1-9: 0
10. IF YOU CHECKED OFF ANY PROBLEMS, HOW DIFFICULT HAVE THESE PROBLEMS MADE IT FOR YOU TO DO YOUR WORK, TAKE CARE OF THINGS AT HOME, OR GET ALONG WITH OTHER PEOPLE: NOT DIFFICULT AT ALL

## 2023-12-01 NOTE — PROGRESS NOTES
Assessment & Plan     Acute midline low back pain with right-sided sciatica  Fall 3-4 weeks ago imaging is indicated. X-rays ordered today.   Follow up with back specialist as planned.   Continue with use of the donut.  - XR Lumbar Spine 2/3 Views; Future  - XR Sacrum and Coccyx 2 Views; Future    Other chronic pain  Refilled for patient she is due.  - HYDROcodone-acetaminophen (NORCO)  MG per tablet; Take 1 tablet by mouth daily as needed for severe pain NOt to exceed more than 30 per month    Review of external notes as documented elsewhere in note  Ordering of each unique test  Prescription drug management  26 minutes spent by me on the date of the encounter doing chart review, history and exam, documentation and further activities per the note       Gina Arias PA-C  [facility]    Sofiya Sainz is a 68 year old, presenting for the following health issues:  Musculoskeletal Problem        12/1/2023     2:21 PM   Additional Questions   Roomed by Irene DICK       Musculoskeletal Problem  This is a new problem. The current episode started more than 1 month ago. The problem has been unchanged. Nothing aggravates the symptoms. She has tried heat and rest for the symptoms. The treatment provided mild relief.   History of Present Illness       Reason for visit:  Butt  Symptom onset:  3-4 weeks ago  Symptoms include:  Hard to sit and walk  Symptom intensity:  Severe  Symptom progression:  Staying the same  Had these symptoms before:  No  What makes it worse:  Walking sitting  What makes it better:  Laying down    She eats 2-3 servings of fruits and vegetables daily.She consumes 2 sweetened beverage(s) daily.She exercises with enough effort to increase her heart rate 9 or less minutes per day.  She exercises with enough effort to increase her heart rate 3 or less days per week.   She is taking medications regularly.     Pain History:  When did you first notice your pain? 1  "month   Have you seen anyone else for your pain? No  How has your pain affected your ability to work? Not applicable  Where in your body do you have pain? Back Pain  Onset/Duration: 1 month (11/5/23- fall occurs)  Description:   Location of pain: low back right and gluteus right  Character of pain: Patient states hurts like hell, unable to describe the character of pain   Pain radiation: radiates into the right buttocks and radiates into the right leg  New numbness or weakness in legs, not attributed to pain: no   Intensity: Currently 8/10, At its worst 10/10, Severe  Progression of Symptoms: worsening and constant  History:   Specific cause: fall - she fell off a ladder onto her right buttocks  Pain interferes with job: N/A  History of back problems: lower back  Any previous MRI or X-rays: None  Sees a specialist for back pain: No  Alleviating factors:   Improved by: heat and rest    Precipitating factors:  Worsened by: Sitting and Walking  Therapies tried and outcome: heat - mild help    She reports she has had some shooting pain with movement of the right leg (this has been since the injection she had with Dr. Epperson 11/16/23.  She has a follow up with [facility]- Dr. Cartagena 12/14/23    Accompanying Signs & Symptoms:  Risk of Fracture: Recent history of trauma or blunt force    She is using ibuprofen and Tylenol for pain.  She does have Flexeril as well which seemed to help too.  She has also been using heat.  She has been using IcyHot patches too.    Review of Systems   GENERAL:  No fevers  MUSCULOSKELETAL: As noted in HPI          Objective    /77 (BP Location: Left arm, Patient Position: Sitting, Cuff Size: Adult Regular)   Pulse 76   Temp 98.4  F (36.9  C) (Oral)   Resp 12   Ht 1.499 m (4' 11\")   Wt 64.1 kg (141 lb 6.4 oz)   LMP  (LMP Unknown)   SpO2 94%   BMI 28.56 kg/m    Body mass index is 28.56 kg/m .  Physical Exam   GENERAL: No acute distress  HEENT: Normocephalic  EXTREMITIES: Midline " tenderness over lower lumbar spine, sacrum and coccyx. Tenderness over the right lumbar paraspinous muscles. Tenderness over the left lumbar paraspinous muscles.  NEURO: Alert, non-focal

## 2023-12-05 ENCOUNTER — TELEPHONE (OUTPATIENT)
Dept: FAMILY MEDICINE | Facility: CLINIC | Age: 68
End: 2023-12-05
Payer: COMMERCIAL

## 2023-12-05 NOTE — TELEPHONE ENCOUNTER
Reason for Call:  Appointment Request    Patient requesting this type of appt:  follow-up from a fall. She is still having back pain and fever.    Requested provider: Emma Byrne    Reason patient unable to be scheduled: Not within requested timeframe    When does patient want to be seen/preferred time:  asap    Comments: patient wondering if she could be worked in asap    Could we send this information to you in [facility] or would you prefer to receive a phone call?:   Patient would prefer a phone call   Okay to leave a detailed message?: Yes at [phone]     Call taken on 12/5/2023 at 2:29 PM by Randi Rolon

## 2023-12-05 NOTE — TELEPHONE ENCOUNTER
"RN PAL placed call to patient to discuss request below     Fall  11/6/2023 seen on 12/1/2023   Procedure at [facility],injections in L4-5   Continues with back pain, has refused to do further injections as recommended as it was to painful   RN asked patient to schedule a follow up appt with [facility], and patient states she has one and will follow up     Fever   onset 2 days ago, comes and goes low grade 100   Took ibuprofen   Home covid negative today   Urine smells \"funny\" approx 1 month ago     RN scheduled   Next 5 appointments (look out 90 days)      Dec 06, 2023 11:30 AM  (Arrive by 11:10 AM)  Provider Visit with Gina Arias PA-C  [facility] ([facility] ) [address] [place] [id number] [phone]          Advised patient to drink extra fluids   Tylenol/ibuprofen if not allergic alternating for fever discomfort   If symptoms worsen prior to scheduled appointment, to call 24 hour nurse line, patient agrees to this plan     Felisa Brown Registered Nurse, PAL (Patient Advocate Liaison)   [facility]   [phone]     "

## 2023-12-06 ENCOUNTER — OFFICE VISIT (OUTPATIENT)
Dept: FAMILY MEDICINE | Facility: CLINIC | Age: 68
End: 2023-12-06
Payer: COMMERCIAL

## 2023-12-06 VITALS
HEIGHT: 59 IN | HEART RATE: 90 BPM | DIASTOLIC BLOOD PRESSURE: 82 MMHG | SYSTOLIC BLOOD PRESSURE: 120 MMHG | BODY MASS INDEX: 28.35 KG/M2 | WEIGHT: 140.6 LBS | TEMPERATURE: 99.6 F | OXYGEN SATURATION: 96 %

## 2023-12-06 DIAGNOSIS — R50.9 FEVER, UNSPECIFIED FEVER CAUSE: ICD-10-CM

## 2023-12-06 DIAGNOSIS — R39.9 URINARY SYMPTOM OR SIGN: Primary | ICD-10-CM

## 2023-12-06 DIAGNOSIS — M54.41 ACUTE MIDLINE LOW BACK PAIN WITH RIGHT-SIDED SCIATICA: ICD-10-CM

## 2023-12-06 DIAGNOSIS — S76.011D MUSCLE STRAIN OF RIGHT GLUTEAL REGION, SUBSEQUENT ENCOUNTER: ICD-10-CM

## 2023-12-06 DIAGNOSIS — R58 ECCHYMOSIS: ICD-10-CM

## 2023-12-06 LAB
ALBUMIN UR-MCNC: NEGATIVE MG/DL
ANION GAP SERPL CALCULATED.3IONS-SCNC: 12 MMOL/L (ref 7–15)
APPEARANCE UR: CLEAR
BACTERIA #/AREA URNS HPF: ABNORMAL /HPF
BASOPHILS # BLD AUTO: 0 10E3/UL (ref 0–0.2)
BASOPHILS NFR BLD AUTO: 0 %
BILIRUB UR QL STRIP: NEGATIVE
BUN SERPL-MCNC: 15.8 MG/DL (ref 8–23)
CALCIUM SERPL-MCNC: 9.5 MG/DL (ref 8.8–10.2)
CHLORIDE SERPL-SCNC: 104 MMOL/L (ref 98–107)
COLOR UR AUTO: YELLOW
CREAT SERPL-MCNC: 0.68 MG/DL (ref 0.51–0.95)
DEPRECATED HCO3 PLAS-SCNC: 27 MMOL/L (ref 22–29)
EGFRCR SERPLBLD CKD-EPI 2021: >90 ML/MIN/1.73M2
EOSINOPHIL # BLD AUTO: 0.1 10E3/UL (ref 0–0.7)
EOSINOPHIL NFR BLD AUTO: 1 %
ERYTHROCYTE [DISTWIDTH] IN BLOOD BY AUTOMATED COUNT: 12.9 % (ref 10–15)
FERRITIN SERPL-MCNC: 308 NG/ML (ref 11–328)
GLUCOSE SERPL-MCNC: 89 MG/DL (ref 70–99)
GLUCOSE UR STRIP-MCNC: NEGATIVE MG/DL
HCT VFR BLD AUTO: 42.7 % (ref 35–47)
HGB BLD-MCNC: 14.5 G/DL (ref 11.7–15.7)
HGB UR QL STRIP: ABNORMAL
IMM GRANULOCYTES # BLD: 0.1 10E3/UL
IMM GRANULOCYTES NFR BLD: 1 %
IRON BINDING CAPACITY (ROCHE): 259 UG/DL (ref 240–430)
IRON SATN MFR SERPL: 22 % (ref 15–46)
IRON SERPL-MCNC: 56 UG/DL (ref 37–145)
KETONES UR STRIP-MCNC: ABNORMAL MG/DL
LEUKOCYTE ESTERASE UR QL STRIP: NEGATIVE
LYMPHOCYTES # BLD AUTO: 1.7 10E3/UL (ref 0.8–5.3)
LYMPHOCYTES NFR BLD AUTO: 20 %
MCH RBC QN AUTO: 32.1 PG (ref 26.5–33)
MCHC RBC AUTO-ENTMCNC: 34 G/DL (ref 31.5–36.5)
MCV RBC AUTO: 95 FL (ref 78–100)
MONOCYTES # BLD AUTO: 0.7 10E3/UL (ref 0–1.3)
MONOCYTES NFR BLD AUTO: 8 %
NEUTROPHILS # BLD AUTO: 6.2 10E3/UL (ref 1.6–8.3)
NEUTROPHILS NFR BLD AUTO: 71 %
NITRATE UR QL: NEGATIVE
PH UR STRIP: 6 [PH] (ref 5–7)
PLATELET # BLD AUTO: 275 10E3/UL (ref 150–450)
POTASSIUM SERPL-SCNC: 4.6 MMOL/L (ref 3.4–5.3)
RBC # BLD AUTO: 4.52 10E6/UL (ref 3.8–5.2)
RBC #/AREA URNS AUTO: ABNORMAL /HPF
SODIUM SERPL-SCNC: 143 MMOL/L (ref 135–145)
SP GR UR STRIP: 1.02 (ref 1–1.03)
SQUAMOUS #/AREA URNS AUTO: ABNORMAL /LPF
UROBILINOGEN UR STRIP-ACNC: 0.2 E.U./DL
WBC # BLD AUTO: 8.7 10E3/UL (ref 4–11)
WBC #/AREA URNS AUTO: ABNORMAL /HPF

## 2023-12-06 PROCEDURE — 82728 ASSAY OF FERRITIN: CPT | Performed by: PHYSICIAN ASSISTANT

## 2023-12-06 PROCEDURE — 99214 OFFICE O/P EST MOD 30 MIN: CPT | Performed by: PHYSICIAN ASSISTANT

## 2023-12-06 PROCEDURE — 85025 COMPLETE CBC W/AUTO DIFF WBC: CPT | Performed by: PHYSICIAN ASSISTANT

## 2023-12-06 PROCEDURE — 81001 URINALYSIS AUTO W/SCOPE: CPT | Performed by: PHYSICIAN ASSISTANT

## 2023-12-06 PROCEDURE — 36415 COLL VENOUS BLD VENIPUNCTURE: CPT | Performed by: PHYSICIAN ASSISTANT

## 2023-12-06 PROCEDURE — 83550 IRON BINDING TEST: CPT | Performed by: PHYSICIAN ASSISTANT

## 2023-12-06 PROCEDURE — 83540 ASSAY OF IRON: CPT | Performed by: PHYSICIAN ASSISTANT

## 2023-12-06 PROCEDURE — 80048 BASIC METABOLIC PNL TOTAL CA: CPT | Performed by: PHYSICIAN ASSISTANT

## 2023-12-06 ASSESSMENT — ENCOUNTER SYMPTOMS: FEVER: 1

## 2023-12-06 NOTE — PROGRESS NOTES
Assessment & Plan     Urinary symptom or sign  Urine does not show any significant signs of infection. Unclear what the etiology of her symptoms are. Keep hydrated.  Checking other labs to assess for why her temperature has been elevated.  - UA Macroscopic with reflex to Microscopic and Culture - Lab Collect; Future  - UA Macroscopic with reflex to Microscopic and Culture - Lab Collect  - UA Microscopic with Reflex to Culture  - Basic metabolic panel  (Ca, Cl, CO2, Creat, Gluc, K, Na, BUN); Future  - CBC with platelets and differential; Future  - Basic metabolic panel  (Ca, Cl, CO2, Creat, Gluc, K, Na, BUN)  - CBC with platelets and differential    Muscle strain of right gluteal region, subsequent encounter  Patient's pain in the low back seems to be more gluteal at this time and she feels she may have torn something. Continue with conservative treatment. Could consider PT. She has follow up with her back team soon.  X-ray obtained at last visit were negative for fractures.    Acute midline low back pain with right-sided sciatica  As noted above.    Fever, unspecified fever cause  Unclear cause. Does not seem to be URI as she has no symptoms. Offered COVID and flu testing but she declines today. Should improve with time if viral.  CBC shows no elevated white blood cell count.  - Basic metabolic panel  (Ca, Cl, CO2, Creat, Gluc, K, Na, BUN); Future  - CBC with platelets and differential; Future  - Basic metabolic panel  (Ca, Cl, CO2, Creat, Gluc, K, Na, BUN)  - CBC with platelets and differential    Ecchymosis  Patient reports more bruising recently. She would like to have some iron testing.  - Iron and iron binding capacity; Future  - Ferritin; Future  - Iron and iron binding capacity  - Ferritin    Review of external notes as documented elsewhere in note  Review of the result(s) of each unique test - UA and CBC  Ordering of each unique test      Gina Arias PA-C  [facility]  MICHELLE Sainz is a 68 year old, presenting for the following health issues:  RECHECK (F/U 12/1/23 visit -back pain), Fever (C/o fever X 4 days), and Urinary Problem (C/o strong odor, hesitancy,frequency)        12/6/2023    11:24 AM   Additional Questions   Roomed by Lillian   Accompanied by Self       Fever  Associated symptoms include a fever.        Concern - F/U back pain (fall about 3 weeks ago)  Onset:   Description: pain (right gluteal area- feels worse with movement and feels like a tear)still the same but gets mild relief with Vicodin and ibuprofen  Intensity: severe  Progression of Symptoms:  same  Accompanying Signs & Symptoms: fever and urinary concerns  Previous history of similar problem:   Precipitating factors:        Worsened by:   Alleviating factors:        Improved by:   Therapies tried and outcome: Vicodin (1/2 tablet 1-2 times daily) and ibuprofen and Tylenol, heat    Genitourinary - Female  Onset/Duration: uncertain  Description:   Painful urination (Dysuria): No           Frequency: YES  Blood in urine (Hematuria): No  Delay in urine (Hesitency): YES  Intensity: severe  Progression of Symptoms:  worsening  Accompanying Signs & Symptoms:  Fever/chills: YES- 100 F  Flank pain: YES  Nausea and vomiting: No  Vaginal symptoms: odor and dark yellow color  Abdominal/Pelvic Pain: No  History:   History of frequent UTI s: possibly  History of kidney stones: possibly  Sexually Active: No  Possibility of pregnancy: No  Precipitating or alleviating factors: None  Therapies tried and outcome: Increase fluid intake    She denies any cough, rhinorrhea, congestion or sore throat. COVID testing at home was negative.  She denies any hematochezia. She denies any abdominal pain.    She had surgery on the right wrist 12/6/23  She had colonoscopy about 2 weeks ago, 12/24/23      Review of Systems   Constitutional:  Positive for fever.         Objective    /82 (BP Location: Left arm, Patient  "Position: Sitting, Cuff Size: Adult Regular)   Pulse 90   Temp 99.6  F (37.6  C) (Oral)   Ht 1.499 m (4' 11\")   Wt 63.8 kg (140 lb 9.6 oz)   LMP  (LMP Unknown)   SpO2 96%   BMI 28.40 kg/m    Body mass index is 28.4 kg/m .  Physical Exam   GENERAL: No acute distress  HEENT: Normocephalic  : No CVA tenderness bilaterally.   MSK: Tenderness in the right inferior gluteal area.  NEURO: Alert and non-focal      Results for orders placed or performed in visit on 12/06/23 (from the past 24 hour(s))   UA Macroscopic with reflex to Microscopic and Culture - Lab Collect    Specimen: Urine, Clean Catch   Result Value Ref Range    Color Urine Yellow Colorless, Straw, Light Yellow, Yellow    Appearance Urine Clear Clear    Glucose Urine Negative Negative mg/dL    Bilirubin Urine Negative Negative    Ketones Urine Trace (A) Negative mg/dL    Specific Gravity Urine 1.020 1.003 - 1.035    Blood Urine Trace (A) Negative    pH Urine 6.0 5.0 - 7.0    Protein Albumin Urine Negative Negative mg/dL    Urobilinogen Urine 0.2 0.2, 1.0 E.U./dL    Nitrite Urine Negative Negative    Leukocyte Esterase Urine Negative Negative   CBC with platelets and differential    Narrative    The following orders were created for panel order CBC with platelets and differential.  Procedure                               Abnormality         Status                     ---------                               -----------         ------                     CBC with platelets and d...[[id number]]                                                   Please view results for these tests on the individual orders.                   "

## 2023-12-14 ENCOUNTER — MEDICAL CORRESPONDENCE (OUTPATIENT)
Dept: HEALTH INFORMATION MANAGEMENT | Facility: CLINIC | Age: 68
End: 2023-12-14
Payer: COMMERCIAL

## 2023-12-20 ENCOUNTER — OFFICE VISIT (OUTPATIENT)
Dept: FAMILY MEDICINE | Facility: CLINIC | Age: 68
End: 2023-12-20
Payer: COMMERCIAL

## 2023-12-20 ENCOUNTER — TELEPHONE (OUTPATIENT)
Dept: FAMILY MEDICINE | Facility: CLINIC | Age: 68
End: 2023-12-20

## 2023-12-20 VITALS
SYSTOLIC BLOOD PRESSURE: 133 MMHG | HEIGHT: 59 IN | WEIGHT: 140.8 LBS | OXYGEN SATURATION: 95 % | RESPIRATION RATE: 10 BRPM | BODY MASS INDEX: 28.39 KG/M2 | DIASTOLIC BLOOD PRESSURE: 74 MMHG | TEMPERATURE: 98 F | HEART RATE: 74 BPM

## 2023-12-20 DIAGNOSIS — R50.9 FEVER, UNSPECIFIED FEVER CAUSE: ICD-10-CM

## 2023-12-20 DIAGNOSIS — M54.50 ACUTE BILATERAL LOW BACK PAIN WITHOUT SCIATICA: ICD-10-CM

## 2023-12-20 DIAGNOSIS — G89.29 OTHER CHRONIC PAIN: Primary | ICD-10-CM

## 2023-12-20 DIAGNOSIS — M54.50 CHRONIC LOW BACK PAIN, UNSPECIFIED BACK PAIN LATERALITY, UNSPECIFIED WHETHER SCIATICA PRESENT: ICD-10-CM

## 2023-12-20 DIAGNOSIS — G89.29 CHRONIC LOW BACK PAIN, UNSPECIFIED BACK PAIN LATERALITY, UNSPECIFIED WHETHER SCIATICA PRESENT: ICD-10-CM

## 2023-12-20 DIAGNOSIS — D13.5 ADENOMYOMATOSIS OF GALLBLADDER: ICD-10-CM

## 2023-12-20 DIAGNOSIS — R50.9 PERSISTENT FEVER: ICD-10-CM

## 2023-12-20 LAB
BASOPHILS # BLD AUTO: 0 10E3/UL (ref 0–0.2)
BASOPHILS NFR BLD AUTO: 0 %
CRP SERPL-MCNC: 33.2 MG/L
EOSINOPHIL # BLD AUTO: 0.1 10E3/UL (ref 0–0.7)
EOSINOPHIL NFR BLD AUTO: 1 %
ERYTHROCYTE [DISTWIDTH] IN BLOOD BY AUTOMATED COUNT: 12.9 % (ref 10–15)
ERYTHROCYTE [SEDIMENTATION RATE] IN BLOOD BY WESTERGREN METHOD: 60 MM/HR (ref 0–30)
HCT VFR BLD AUTO: 39.6 % (ref 35–47)
HGB BLD-MCNC: 13.3 G/DL (ref 11.7–15.7)
IMM GRANULOCYTES # BLD: 0.1 10E3/UL
IMM GRANULOCYTES NFR BLD: 1 %
LYMPHOCYTES # BLD AUTO: 2.4 10E3/UL (ref 0.8–5.3)
LYMPHOCYTES NFR BLD AUTO: 27 %
MCH RBC QN AUTO: 31.8 PG (ref 26.5–33)
MCHC RBC AUTO-ENTMCNC: 33.6 G/DL (ref 31.5–36.5)
MCV RBC AUTO: 95 FL (ref 78–100)
MONOCYTES # BLD AUTO: 0.7 10E3/UL (ref 0–1.3)
MONOCYTES NFR BLD AUTO: 8 %
NEUTROPHILS # BLD AUTO: 5.7 10E3/UL (ref 1.6–8.3)
NEUTROPHILS NFR BLD AUTO: 64 %
PLATELET # BLD AUTO: 420 10E3/UL (ref 150–450)
RBC # BLD AUTO: 4.18 10E6/UL (ref 3.8–5.2)
WBC # BLD AUTO: 8.9 10E3/UL (ref 4–11)

## 2023-12-20 PROCEDURE — 85025 COMPLETE CBC W/AUTO DIFF WBC: CPT | Performed by: PHYSICIAN ASSISTANT

## 2023-12-20 PROCEDURE — 36415 COLL VENOUS BLD VENIPUNCTURE: CPT | Performed by: PHYSICIAN ASSISTANT

## 2023-12-20 PROCEDURE — 85652 RBC SED RATE AUTOMATED: CPT | Performed by: PHYSICIAN ASSISTANT

## 2023-12-20 PROCEDURE — 86140 C-REACTIVE PROTEIN: CPT | Performed by: PHYSICIAN ASSISTANT

## 2023-12-20 PROCEDURE — 99214 OFFICE O/P EST MOD 30 MIN: CPT | Performed by: PHYSICIAN ASSISTANT

## 2023-12-20 RX ORDER — RESPIRATORY SYNCYTIAL VIRUS VACCINE 120MCG/0.5
0.5 KIT INTRAMUSCULAR ONCE
Qty: 1 EACH | Refills: 0 | Status: CANCELLED | OUTPATIENT
Start: 2023-12-20 | End: 2023-12-20

## 2023-12-20 RX ORDER — TRAMADOL HYDROCHLORIDE 50 MG/1
50 TABLET ORAL EVERY 6 HOURS PRN
Qty: 90 TABLET | Refills: 0 | Status: SHIPPED | OUTPATIENT
Start: 2023-12-20 | End: 2024-03-11

## 2023-12-20 ASSESSMENT — ENCOUNTER SYMPTOMS: FEVER: 1

## 2023-12-20 NOTE — TELEPHONE ENCOUNTER
RN PAL placed call to [facility] reviewed message below     They will send to Dr. Vega to review and decide on next steps   Some providers at [facility] are able to view epic results if unable will return call and RN can fax     Felisa Brown Registered Nurse, PAL (Patient Advocate Liaison)   [facility]   [phone]

## 2023-12-20 NOTE — PROGRESS NOTES
Assessment & Plan     Other chronic pain  Refilled for patient. She is stable on her current treatment. Should go back to PCP management.  - traMADol (ULTRAM) 50 MG tablet; Take 1 tablet (50 mg) by mouth every 6 hours as needed for severe pain    Fever, unspecified fever cause  Recheck CBC. Patient reports low grade fevers are improving. She denies any URI symptoms. Discussed getting a x-ray of the chest today but patient declines. She is having imaging with her spine team in the area she had an injection (after the injection patient's fevers started). No abnormal lung sounds today thus further imaging testing not pursued. Recheck CBC today.  - CBC with platelets and differential; Future  - CBC with platelets and differential    Acute bilateral low back pain without sciatica  Following spine. She reports it has improved some since last visit.    Adenomyomatosis of gallbladder  Reviewed results of liver MRI with patient from November. She has yet to schedule with general surgery referral placed.    Low back pain  Ordered by spine.  - CRP inflammation  - Erythrocyte sedimentation rate auto    Persistent fever  As noted above.  - CRP inflammation  - Erythrocyte sedimentation rate auto    Review of external notes as documented elsewhere in note  Review of the result(s) of each unique test - CBC  Ordering of each unique test  Prescription drug management      Gina Arias PA-C  [facility]    Sofiya Sainz is a 68 year old, presenting for the following health issues:  Musculoskeletal Problem and Labs Only        12/20/2023    11:13 AM   Additional Questions   Roomed by Irene ARDON       Musculoskeletal Problem  This is a new problem. The current episode started more than 1 month ago. The problem has been gradually worsening. Associated symptoms include a fever. Nothing aggravates the symptoms. She has tried nothing for the symptoms. The treatment provided no relief.   History of  Present Illness       Reason for visit:  Pain in my right side of butt down leg andlower back fevers low grade feversfor a  Symptom onset:  More than a month  Symptom intensity:  Severe  Symptom progression:  Worsening  Had these symptoms before:  Yes  Has tried/received treatment for these symptoms:  No    She eats 2-3 servings of fruits and vegetables daily.She consumes 1 sweetened beverage(s) daily.She exercises with enough effort to increase her heart rate 10 to 19 minutes per day.  She exercises with enough effort to increase her heart rate 3 or less days per week.   She is taking medications regularly.       Pain History:  When did you first notice your pain? 10/30/2023  Have you seen this provider for your pain in the past? Yes   Where in your body do you have pain? R side of gluteal area, to lower back and down R leg  Are you seeing anyone else for your pain? No        7/26/2021     1:45 PM 1/24/2022     3:09 PM 12/1/2023     2:02 PM   PHQ-9 SCORE   PHQ-9 Total Score MyChart  0 0   PHQ-9 Total Score 0 0 0           5/12/2021     2:02 PM 1/24/2022     3:11 PM 12/1/2023     2:03 PM   HAMLET-7 SCORE   Total Score  0 (minimal anxiety) 0 (minimal anxiety)   Total Score 0 0 0               12/1/2023     2:26 PM 12/20/2023    11:27 AM   PEG Score   PEG Total Score 9.33 9       PDMP Review         Value Time User    State PDMP site checked  Yes 12/20/2023 11:42 AM Gina Arias, KAUSHAL          Last CSA Agreement:   CSA -- Patient Level:     [Media Unavailable] Controlled Substance Agreement - Opioid - Scan on 5/2/2023  2:17 PM   [Media Unavailable] Controlled Substance Agreement - Opioid - Scan on 1/24/2022  5:10 PM   [Media Unavailable] Controlled Substance Agreement - Opioid - Scan on 5/3/2021  6:23 PM   [Media Unavailable] Controlled Substance Agreement - Opioid - Scan on 7/29/2019  3:09 PM       Last UDS: 11/6/2023          Review of Systems   Constitutional:  Positive for fever.          Objective    /74  "(BP Location: Right arm, Patient Position: Chair, Cuff Size: Adult Regular)   Pulse 74   Temp 98  F (36.7  C) (Oral)   Resp 10   Ht 1.499 m (4' 11\")   Wt 63.9 kg (140 lb 12.8 oz)   LMP  (LMP Unknown)   SpO2 95%   BMI 28.44 kg/m    Body mass index is 28.44 kg/m .  Physical Exam   GENERAL: No acute distress  HEENT: Normocephalic, PERRL  CARDIAC: Regular rate and rhythm. No murmurs.  PULMONARY: Lungs are clear to auscultation bilaterally. No wheezes, rhonchi or crackles.  NEURO: Alert and non-focal      Results for orders placed or performed in visit on 12/20/23 (from the past 24 hour(s))   CBC with platelets and differential    Narrative    The following orders were created for panel order CBC with platelets and differential.  Procedure                               Abnormality         Status                     ---------                               -----------         ------                     CBC with platelets and d...[[id number]]                      Final result                 Please view results for these tests on the individual orders.   CBC with platelets and differential   Result Value Ref Range    WBC Count 8.9 4.0 - 11.0 10e3/uL    RBC Count 4.18 3.80 - 5.20 10e6/uL    Hemoglobin 13.3 11.7 - 15.7 g/dL    Hematocrit 39.6 35.0 - 47.0 %    MCV 95 78 - 100 fL    MCH 31.8 26.5 - 33.0 pg    MCHC 33.6 31.5 - 36.5 g/dL    RDW 12.9 10.0 - 15.0 %    Platelet Count 420 150 - 450 10e3/uL    % Neutrophils 64 %    % Lymphocytes 27 %    % Monocytes 8 %    % Eosinophils 1 %    % Basophils 0 %    % Immature Granulocytes 1 %    Absolute Neutrophils 5.7 1.6 - 8.3 10e3/uL    Absolute Lymphocytes 2.4 0.8 - 5.3 10e3/uL    Absolute Monocytes 0.7 0.0 - 1.3 10e3/uL    Absolute Eosinophils 0.1 0.0 - 0.7 10e3/uL    Absolute Basophils 0.0 0.0 - 0.2 10e3/uL    Absolute Immature Granulocytes 0.1 <=0.4 10e3/uL     *Note: Due to a large number of results and/or encounters for the requested time period, some results have not " been displayed. A complete set of results can be found in Results Review.

## 2023-12-20 NOTE — TELEPHONE ENCOUNTER
Please call [facility]. Dr. Vega has a MRI of the liver ordered and scheduled for 12/31/23 and patient just had one about 3 weeks ago. Based on this most recent one referral to general surgery was recommended and I reviewed this with patient today who was given the information to schedule.

## 2023-12-30 DIAGNOSIS — G89.29 OTHER CHRONIC PAIN: ICD-10-CM

## 2024-01-02 RX ORDER — HYDROCODONE BITARTRATE AND ACETAMINOPHEN 10; 325 MG/1; MG/1
1 TABLET ORAL DAILY PRN
Qty: 30 TABLET | Refills: 0 | Status: SHIPPED | OUTPATIENT
Start: 2024-01-02 | End: 2024-01-31

## 2024-01-08 ENCOUNTER — TRANSFERRED RECORDS (OUTPATIENT)
Dept: HEALTH INFORMATION MANAGEMENT | Facility: CLINIC | Age: 69
End: 2024-01-08

## 2024-01-10 ENCOUNTER — TRANSFERRED RECORDS (OUTPATIENT)
Dept: HEALTH INFORMATION MANAGEMENT | Facility: CLINIC | Age: 69
End: 2024-01-10
Payer: COMMERCIAL

## 2024-01-11 ENCOUNTER — TRANSFERRED RECORDS (OUTPATIENT)
Dept: HEALTH INFORMATION MANAGEMENT | Facility: CLINIC | Age: 69
End: 2024-01-11

## 2024-01-23 NOTE — LETTER
[facility]  [address]  [facility] [address]  [phone]      July 15, 2019    RE:  Emeli Granados                                                                                                                                                       [address] [facility] [address]            To whom it may concern:    Emeli Granados is under my professional care for    Right foot pain  Sprain of right foot, initial encounter.   She  may return to work with the following: Light duty- unable to stand more than 2 hours continuously,  Then needs at least 15 minutes without weight bearing ,  May wear foot splint at work          Sincerely,        Randi Swenson MD    [facility]        
Resident

## 2024-01-30 DIAGNOSIS — G89.29 OTHER CHRONIC PAIN: ICD-10-CM

## 2024-01-31 RX ORDER — HYDROCODONE BITARTRATE AND ACETAMINOPHEN 10; 325 MG/1; MG/1
1 TABLET ORAL DAILY PRN
Qty: 30 TABLET | Refills: 0 | Status: SHIPPED | OUTPATIENT
Start: 2024-02-02 | End: 2024-02-09

## 2024-02-07 ENCOUNTER — OFFICE VISIT (OUTPATIENT)
Dept: FAMILY MEDICINE | Facility: CLINIC | Age: 69
End: 2024-02-07
Payer: COMMERCIAL

## 2024-02-07 VITALS
HEART RATE: 88 BPM | DIASTOLIC BLOOD PRESSURE: 80 MMHG | BODY MASS INDEX: 29.43 KG/M2 | WEIGHT: 146 LBS | HEIGHT: 59 IN | SYSTOLIC BLOOD PRESSURE: 123 MMHG | TEMPERATURE: 99 F | OXYGEN SATURATION: 95 % | RESPIRATION RATE: 15 BRPM

## 2024-02-07 DIAGNOSIS — J02.9 SORE THROAT: ICD-10-CM

## 2024-02-07 DIAGNOSIS — M54.50 ACUTE BILATERAL LOW BACK PAIN WITHOUT SCIATICA: Primary | ICD-10-CM

## 2024-02-07 LAB
DEPRECATED S PYO AG THROAT QL EIA: NEGATIVE
GROUP A STREP BY PCR: NOT DETECTED

## 2024-02-07 PROCEDURE — 87635 SARS-COV-2 COVID-19 AMP PRB: CPT | Performed by: PHYSICIAN ASSISTANT

## 2024-02-07 PROCEDURE — 87651 STREP A DNA AMP PROBE: CPT | Performed by: PHYSICIAN ASSISTANT

## 2024-02-07 PROCEDURE — 99214 OFFICE O/P EST MOD 30 MIN: CPT | Performed by: PHYSICIAN ASSISTANT

## 2024-02-07 ASSESSMENT — PAIN SCALES - GENERAL: PAINLEVEL: NO PAIN (0)

## 2024-02-07 NOTE — COMMUNITY RESOURCES LIST (ENGLISH)
02/07/2024   [facility]  N/A  For questions about this resource list or additional care needs, please contact your primary care clinic or care manager.  Phone: [phone]   Email: N/A   Address: [address] [place] [address]   Hours: N/A        Food and Nutrition       Food pantry  1  [facility] Food [facility] Distance: 1.67 miles      [facility]   [id number] [place] [address] [address] [place] [address]  Language: English  Hours: Mon 4:00 PM - 6:00 PM , Tue 11:00 AM - 2:00 PM , Wed 4:00 PM - 6:00 PM , Thu 1:00 PM - 3:00 PM  Fees: Free   Phone: (206) 126-2389 Email: resources@[employer].org Website: https://[employer].org/[place]/mission-outpost/     2  [facility] Distance: 1.67 miles      [facility], [facility]   [address] [place] [address]  Language: English  Hours: Mon 4:00 PM - 6:00 PM , Tue 11:00 AM - 1:00 PM , Wed 4:00 PM - 6:00 PM , Thu 10:30 AM - 12:30 PM  Fees: Free   Phone: (350) 966-2570 Email: info@[employer].org Website: http://[employer].org     SNAP application assistance  3  [address] [place] [address] Distance: 5.01 miles      In-Person   [id number] [place] [address]  Language: English  Hours: Mon 8:00 AM - 4:00 PM , Tue 8:00 AM - 7:00 PM , Wed - Thu 8:00 AM - 4:00 PM  Fees: Free   Phone: (681) 543-5208 Email: info@[employer] Website: https://[facility].org/resources/resource-centers/     4  Community [employer] Partnership (CAP) [facility] Distance: 6.28 miles      In-Person   [address] [address]  Language: English, Slovenian  Hours: Mon - Fri 8:00 AM - 8:00 PM  Fees: Free   Phone: (279) 709-6231 Email: info@[employer]LUVHAN.org Website: http://www.capagenGnodal.org     Soup kitchen or free meals  5  [place] [facility] Distance: 4.36 miles      Pickup   [address] [place] [address]  Language: English,  Malagasy  Hours: Mon - Thu 5:30 PM - 6:30 PM  Fees: Free   Phone: (936) 244-4189 Email: robert@[employer] Website: http://[employer]/?page_id=5168     6  [facility] Distance: 6.96 miles      Pickup   [address] [place] [address]  Language: English  Hours: Mon - Fri 5:00 PM - 6:00 PM  Fees: Free   Phone: (713) 716-7183 Email: contactus@[employer] Website: https://www.[employer].org/          Important Numbers & Websites       Emergency Services   911  [facility]   311  Poison Control   (587) 305-4138  Suicide Prevention Lifeline   (253) 573-9631 (TALK)  Child Abuse Hotline   (260) 829-5711 (4-A-Child)  Sexual Assault Hotline   (275) 198-5892 (HOPE)  National Runaway Safeline   (675) 822-6492 (RUNAWAY)  All-Options Talkline   (594) 657-7384  Substance Abuse Referral   (909) 556-3240 (HELP)

## 2024-02-07 NOTE — PROGRESS NOTES
Assessment & Plan     Acute bilateral low back pain without sciatica  Regarding her back pain I would like to review the [facility] specialist information prior to making recommendations. She signed a BLOSSOM today to get records.  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  She has not had any falls since her last MRI. She will bring these imaging reports for us as well.    Sore throat  She is worried she is coming down with cold like symptoms. Wondering how to protect her . I recommended masking and washing hands. Unfortunately there is no injection to help her recovery more quickly.  Strep testing negative today. COVID testing pending. She may be interested in Paxlovid if positive.  - Streptococcus A Rapid Screen w/Reflex to PCR - Clinic Collect  - Symptomatic COVID-19 Virus (Coronavirus) by PCR; Future  - Symptomatic COVID-19 Virus (Coronavirus) by PCR Nose  - Group A Streptococcus PCR Throat Swab    Review of external notes as documented elsewhere in note  Review of the result(s) of each unique test - strep throat  Ordering of each unique test  Prescription drug management  30 minutes spent by me on the date of the encounter doing chart review, history and exam, documentation and further activities per the note      Subjective   Emeli is a 68 year old, presenting for the following health issues:  Muscle Pain        2/7/2024     1:57 PM   Additional Questions   Roomed by Bernarda Justice     History of Present Illness       Back Pain:  She presents for follow up of back pain. Patient's back pain is a recurring problem.  Location of back pain:  Right lower back and right middle of back  Description of back pain: shooting and other  Back pain spreads: nowhere    Since patient first noticed back pain, pain is: unchanged  Does back pain interfere with her job:  No       She eats 2-3 servings of fruits  "and vegetables daily.She consumes 1 sweetened beverage(s) daily.She exercises with enough effort to increase her heart rate 10 to 19 minutes per day.  She exercises with enough effort to increase her heart rate 4 days per week.   She is taking medications regularly.     Patient reports she had a MRI and has been following Dr. Cartagena 12/14/23.  Patient initially fell in November seen in clinic previously for this (see previous notes)  The patient reports she fell about a month ago as well and having left sided buttock pain.    Patient would like to discuss getting a CT scan.      Acute Illness  Acute illness concerns: Sore throat   Onset/Duration: 2 days  Symptoms:  Fever: No  Chills/Sweats: Yes  Headache (location?): No  Sinus Pressure: No  Conjunctivitis:  No  Ear Pain: no  Rhinorrhea: YES  Congestion: YES  Sore Throat: YES  Cough: no  Wheeze: YES  Decreased Appetite: No  Nausea: No  Vomiting: No  Diarrhea: No  Dysuria/Freq.: No  Dysuria or Hematuria: No  Fatigue/Achiness: No  Sick/Strep Exposure: Yes, daughter cold symptoms   Therapies tried and outcome: ibuprofen         Objective    /80 (BP Location: Right arm, Patient Position: Sitting, Cuff Size: Adult Regular)   Pulse 88   Temp 99  F (37.2  C) (Oral)   Resp 15   Ht 1.499 m (4' 11\")   Wt 66.2 kg (146 lb)   LMP  (LMP Unknown)   SpO2 95%   Breastfeeding No   BMI 29.49 kg/m    Body mass index is 29.49 kg/m .  Physical Exam   GENERAL: No acute distress  HEENT: Normocephalic, PERRL, Canals patent, bilateral TM's non-erythematous and non-bulging. Turbinates normal in appearance bilaterally. Posterior oropharynx erythematous but without exudate.  EXTREMITIES: No midline tenderness over the lumbar spine. Tenderness over the right lumbar paraspinous muscles. Tenderness over the left lumbar paraspinous muscles.  Right lower extremity: 5/5 strength with flexion of the knee, 5/5 strength with extension of the knee, 5/5 strength with flexion of the hip, " 5/5 strength with dorsiflexion, 5/5 strength with plantar flexion. Straight leg raise negative.   Left lower extremity: 5/5 strength with flexion of the knee, 5/5 strength with extension of the knee, 5/5 strength with flexion of the hip, 5/5 strength with dorsiflexion, 5/5 strength with plantar flexion. Straight leg raise negative.   CARDIAC: Regular rate and rhythm. No murmurs.  PULMONARY: Lungs are clear to auscultation bilaterally. No wheezes, rhonchi or crackles.  NEURO: Alert and non-focal      Results for orders placed or performed in visit on 02/07/24 (from the past 24 hour(s))   Streptococcus A Rapid Screen w/Reflex to PCR - Clinic Collect    Specimen: Throat; Swab   Result Value Ref Range    Group A Strep antigen Negative Negative     *Note: Due to a large number of results and/or encounters for the requested time period, some results have not been displayed. A complete set of results can be found in Results Review.           Signed Electronically by: Gina Arias PA-C

## 2024-02-08 ENCOUNTER — TELEPHONE (OUTPATIENT)
Dept: FAMILY MEDICINE | Facility: CLINIC | Age: 69
End: 2024-02-08
Payer: COMMERCIAL

## 2024-02-08 LAB — SARS-COV-2 RNA RESP QL NAA+PROBE: NEGATIVE

## 2024-02-08 NOTE — TELEPHONE ENCOUNTER
Patient calling and states she got worse after visit.  T- 104 after leaving.  Came down to 101 with Tylenol and ibuprofen.  Has not taken temp recently.  After leaving cold went into chest, coughing up brown phlegm.  Asking for antibiotic and/or cough medicine.  Not doing any cough medicine.  Ribs hurt from coughing.  Discussed COVID test still in process.  Elisabeth Freeman RN

## 2024-02-08 NOTE — TELEPHONE ENCOUNTER
I would recommend repeat evaluation (consider influenza testing, not obtained yesterday since she did not have a fever) and trying cough medication (Mucinex or Mucinex DM) prior to adding antibiotics. COVID testing is negative.

## 2024-02-08 NOTE — TELEPHONE ENCOUNTER
Patient calling back.  Advised of below.  She will go to [facility] for evaluation.  Elisabeth Freeman RN

## 2024-02-09 ENCOUNTER — APPOINTMENT (OUTPATIENT)
Dept: GENERAL RADIOLOGY | Facility: CLINIC | Age: 69
DRG: 193 | End: 2024-02-09
Attending: EMERGENCY MEDICINE
Payer: COMMERCIAL

## 2024-02-09 ENCOUNTER — HOSPITAL ENCOUNTER (INPATIENT)
Facility: CLINIC | Age: 69
LOS: 4 days | Discharge: HOME | DRG: 193 | End: 2024-02-13
Attending: EMERGENCY MEDICINE | Admitting: INTERNAL MEDICINE
Payer: COMMERCIAL

## 2024-02-09 ENCOUNTER — TELEPHONE (OUTPATIENT)
Dept: FAMILY MEDICINE | Facility: CLINIC | Age: 69
End: 2024-02-09

## 2024-02-09 DIAGNOSIS — R09.02 HYPOXIA: ICD-10-CM

## 2024-02-09 DIAGNOSIS — J44.1 COPD EXACERBATION (H): Primary | ICD-10-CM

## 2024-02-09 DIAGNOSIS — R06.2 WHEEZING: ICD-10-CM

## 2024-02-09 DIAGNOSIS — M84.48XA SACRAL INSUFFICIENCY FRACTURE, INITIAL ENCOUNTER: Primary | ICD-10-CM

## 2024-02-09 DIAGNOSIS — M54.50 ACUTE BILATERAL LOW BACK PAIN WITHOUT SCIATICA: ICD-10-CM

## 2024-02-09 DIAGNOSIS — J10.1 INFLUENZA A: ICD-10-CM

## 2024-02-09 LAB
ANION GAP SERPL CALCULATED.3IONS-SCNC: 11 MMOL/L (ref 7–15)
BASOPHILS # BLD AUTO: 0 10E3/UL (ref 0–0.2)
BASOPHILS NFR BLD AUTO: 0 %
BUN SERPL-MCNC: 12.1 MG/DL (ref 8–23)
CALCIUM SERPL-MCNC: 9 MG/DL (ref 8.8–10.2)
CHLORIDE SERPL-SCNC: 100 MMOL/L (ref 98–107)
CREAT SERPL-MCNC: 0.69 MG/DL (ref 0.51–0.95)
DEPRECATED HCO3 PLAS-SCNC: 26 MMOL/L (ref 22–29)
EGFRCR SERPLBLD CKD-EPI 2021: >90 ML/MIN/1.73M2
EOSINOPHIL # BLD AUTO: 0 10E3/UL (ref 0–0.7)
EOSINOPHIL NFR BLD AUTO: 0 %
ERYTHROCYTE [DISTWIDTH] IN BLOOD BY AUTOMATED COUNT: 12.8 % (ref 10–15)
FLUAV RNA SPEC QL NAA+PROBE: POSITIVE
FLUBV RNA RESP QL NAA+PROBE: NEGATIVE
GLUCOSE SERPL-MCNC: 121 MG/DL (ref 70–99)
HCO3 BLDV-SCNC: 27 MMOL/L (ref 21–28)
HCT VFR BLD AUTO: 40.9 % (ref 35–47)
HGB BLD-MCNC: 13.6 G/DL (ref 11.7–15.7)
HOLD SPECIMEN: NORMAL
IMM GRANULOCYTES # BLD: 0 10E3/UL
IMM GRANULOCYTES NFR BLD: 0 %
LACTATE BLD-SCNC: 0.6 MMOL/L
LYMPHOCYTES # BLD AUTO: 1.2 10E3/UL (ref 0.8–5.3)
LYMPHOCYTES NFR BLD AUTO: 17 %
MAGNESIUM SERPL-MCNC: 1.7 MG/DL (ref 1.7–2.3)
MCH RBC QN AUTO: 31.5 PG (ref 26.5–33)
MCHC RBC AUTO-ENTMCNC: 33.3 G/DL (ref 31.5–36.5)
MCV RBC AUTO: 95 FL (ref 78–100)
MONOCYTES # BLD AUTO: 0.6 10E3/UL (ref 0–1.3)
MONOCYTES NFR BLD AUTO: 8 %
NEUTROPHILS # BLD AUTO: 5 10E3/UL (ref 1.6–8.3)
NEUTROPHILS NFR BLD AUTO: 75 %
NRBC # BLD AUTO: 0 10E3/UL
NRBC BLD AUTO-RTO: 0 /100
PCO2 BLDV: 44 MM HG (ref 40–50)
PH BLDV: 7.39 [PH] (ref 7.32–7.43)
PLATELET # BLD AUTO: 225 10E3/UL (ref 150–450)
PO2 BLDV: 29 MM HG (ref 25–47)
POTASSIUM SERPL-SCNC: 4.1 MMOL/L (ref 3.4–5.3)
RBC # BLD AUTO: 4.32 10E6/UL (ref 3.8–5.2)
RSV RNA SPEC NAA+PROBE: NEGATIVE
SAO2 % BLDV: 54 % (ref 70–75)
SARS-COV-2 RNA RESP QL NAA+PROBE: NEGATIVE
SODIUM SERPL-SCNC: 137 MMOL/L (ref 135–145)
WBC # BLD AUTO: 6.7 10E3/UL (ref 4–11)

## 2024-02-09 PROCEDURE — 87637 SARSCOV2&INF A&B&RSV AMP PRB: CPT | Performed by: EMERGENCY MEDICINE

## 2024-02-09 PROCEDURE — 96374 THER/PROPH/DIAG INJ IV PUSH: CPT

## 2024-02-09 PROCEDURE — 83735 ASSAY OF MAGNESIUM: CPT | Performed by: PHYSICIAN ASSISTANT

## 2024-02-09 PROCEDURE — 250N000011 HC RX IP 250 OP 636: Performed by: EMERGENCY MEDICINE

## 2024-02-09 PROCEDURE — 94640 AIRWAY INHALATION TREATMENT: CPT | Mod: 76

## 2024-02-09 PROCEDURE — 96361 HYDRATE IV INFUSION ADD-ON: CPT

## 2024-02-09 PROCEDURE — 71046 X-RAY EXAM CHEST 2 VIEWS: CPT

## 2024-02-09 PROCEDURE — 250N000013 HC RX MED GY IP 250 OP 250 PS 637: Performed by: EMERGENCY MEDICINE

## 2024-02-09 PROCEDURE — 94640 AIRWAY INHALATION TREATMENT: CPT

## 2024-02-09 PROCEDURE — 82803 BLOOD GASES ANY COMBINATION: CPT

## 2024-02-09 PROCEDURE — 999N000157 HC STATISTIC RCP TIME EA 10 MIN

## 2024-02-09 PROCEDURE — 250N000009 HC RX 250: Performed by: EMERGENCY MEDICINE

## 2024-02-09 PROCEDURE — 250N000013 HC RX MED GY IP 250 OP 250 PS 637: Performed by: PHYSICIAN ASSISTANT

## 2024-02-09 PROCEDURE — 120N000001 HC R&B MED SURG/OB

## 2024-02-09 PROCEDURE — 250N000011 HC RX IP 250 OP 636: Performed by: PHYSICIAN ASSISTANT

## 2024-02-09 PROCEDURE — 36415 COLL VENOUS BLD VENIPUNCTURE: CPT | Performed by: EMERGENCY MEDICINE

## 2024-02-09 PROCEDURE — 99207 PR NO BILLABLE SERVICE THIS VISIT: CPT | Performed by: INTERNAL MEDICINE

## 2024-02-09 PROCEDURE — 258N000003 HC RX IP 258 OP 636: Performed by: EMERGENCY MEDICINE

## 2024-02-09 PROCEDURE — 99285 EMERGENCY DEPT VISIT HI MDM: CPT | Mod: 25

## 2024-02-09 PROCEDURE — 99222 1ST HOSP IP/OBS MODERATE 55: CPT | Performed by: PHYSICIAN ASSISTANT

## 2024-02-09 PROCEDURE — 85025 COMPLETE CBC W/AUTO DIFF WBC: CPT | Performed by: EMERGENCY MEDICINE

## 2024-02-09 PROCEDURE — 250N000009 HC RX 250: Performed by: PHYSICIAN ASSISTANT

## 2024-02-09 PROCEDURE — 80048 BASIC METABOLIC PNL TOTAL CA: CPT | Performed by: EMERGENCY MEDICINE

## 2024-02-09 PROCEDURE — G0463 HOSPITAL OUTPT CLINIC VISIT: HCPCS | Mod: 25

## 2024-02-09 PROCEDURE — 999N000156 HC STATISTIC RCP CONSULT EA 30 MIN

## 2024-02-09 RX ORDER — ACETAMINOPHEN 325 MG/1
650 TABLET ORAL EVERY 4 HOURS PRN
Status: DISCONTINUED | OUTPATIENT
Start: 2024-02-09 | End: 2024-02-13 | Stop reason: HOSPADM

## 2024-02-09 RX ORDER — AMOXICILLIN 250 MG
1 CAPSULE ORAL 2 TIMES DAILY PRN
Status: DISCONTINUED | OUTPATIENT
Start: 2024-02-09 | End: 2024-02-13 | Stop reason: HOSPADM

## 2024-02-09 RX ORDER — OSELTAMIVIR PHOSPHATE 75 MG/1
75 CAPSULE ORAL 2 TIMES DAILY
Status: DISCONTINUED | OUTPATIENT
Start: 2024-02-09 | End: 2024-02-09

## 2024-02-09 RX ORDER — METHYLPREDNISOLONE SODIUM SUCCINATE 125 MG/2ML
60 INJECTION, POWDER, LYOPHILIZED, FOR SOLUTION INTRAMUSCULAR; INTRAVENOUS EVERY 12 HOURS
Status: DISCONTINUED | OUTPATIENT
Start: 2024-02-10 | End: 2024-02-11

## 2024-02-09 RX ORDER — IPRATROPIUM BROMIDE AND ALBUTEROL SULFATE 2.5; .5 MG/3ML; MG/3ML
3 SOLUTION RESPIRATORY (INHALATION) ONCE
Status: COMPLETED | OUTPATIENT
Start: 2024-02-09 | End: 2024-02-09

## 2024-02-09 RX ORDER — CALCIUM CARBONATE 500 MG/1
1000 TABLET, CHEWABLE ORAL 4 TIMES DAILY PRN
Status: DISCONTINUED | OUTPATIENT
Start: 2024-02-09 | End: 2024-02-13 | Stop reason: HOSPADM

## 2024-02-09 RX ORDER — PANTOPRAZOLE SODIUM 40 MG/1
40 TABLET, DELAYED RELEASE ORAL DAILY
Status: DISCONTINUED | OUTPATIENT
Start: 2024-02-10 | End: 2024-02-13 | Stop reason: HOSPADM

## 2024-02-09 RX ORDER — HYDROMORPHONE HYDROCHLORIDE 2 MG/1
2 TABLET ORAL EVERY 4 HOURS PRN
Status: DISCONTINUED | OUTPATIENT
Start: 2024-02-09 | End: 2024-02-10

## 2024-02-09 RX ORDER — NALOXONE HYDROCHLORIDE 0.4 MG/ML
0.4 INJECTION, SOLUTION INTRAMUSCULAR; INTRAVENOUS; SUBCUTANEOUS
Status: DISCONTINUED | OUTPATIENT
Start: 2024-02-09 | End: 2024-02-13 | Stop reason: HOSPADM

## 2024-02-09 RX ORDER — ONDANSETRON 2 MG/ML
4 INJECTION INTRAMUSCULAR; INTRAVENOUS EVERY 6 HOURS PRN
Status: DISCONTINUED | OUTPATIENT
Start: 2024-02-09 | End: 2024-02-13 | Stop reason: HOSPADM

## 2024-02-09 RX ORDER — AMOXICILLIN 250 MG
2 CAPSULE ORAL 2 TIMES DAILY PRN
Status: DISCONTINUED | OUTPATIENT
Start: 2024-02-09 | End: 2024-02-13 | Stop reason: HOSPADM

## 2024-02-09 RX ORDER — IPRATROPIUM BROMIDE AND ALBUTEROL SULFATE 2.5; .5 MG/3ML; MG/3ML
3 SOLUTION RESPIRATORY (INHALATION)
Status: DISCONTINUED | OUTPATIENT
Start: 2024-02-09 | End: 2024-02-12

## 2024-02-09 RX ORDER — ONDANSETRON 4 MG/1
4 TABLET, ORALLY DISINTEGRATING ORAL EVERY 6 HOURS PRN
Status: DISCONTINUED | OUTPATIENT
Start: 2024-02-09 | End: 2024-02-13 | Stop reason: HOSPADM

## 2024-02-09 RX ORDER — HYDROCODONE BITARTRATE AND ACETAMINOPHEN 10; 325 MG/1; MG/1
0.5 TABLET ORAL DAILY PRN
COMMUNITY
End: 2024-03-04

## 2024-02-09 RX ORDER — TRAMADOL HYDROCHLORIDE 50 MG/1
50 TABLET ORAL EVERY 6 HOURS PRN
Status: DISCONTINUED | OUTPATIENT
Start: 2024-02-09 | End: 2024-02-13 | Stop reason: HOSPADM

## 2024-02-09 RX ORDER — ENOXAPARIN SODIUM 100 MG/ML
40 INJECTION SUBCUTANEOUS EVERY 24 HOURS
Status: DISCONTINUED | OUTPATIENT
Start: 2024-02-09 | End: 2024-02-13 | Stop reason: HOSPADM

## 2024-02-09 RX ORDER — DILTIAZEM HYDROCHLORIDE 120 MG/1
120 CAPSULE, COATED, EXTENDED RELEASE ORAL EVERY MORNING
Status: DISCONTINUED | OUTPATIENT
Start: 2024-02-10 | End: 2024-02-13 | Stop reason: HOSPADM

## 2024-02-09 RX ORDER — LIDOCAINE 40 MG/G
CREAM TOPICAL
Status: DISCONTINUED | OUTPATIENT
Start: 2024-02-09 | End: 2024-02-13 | Stop reason: HOSPADM

## 2024-02-09 RX ORDER — ACETAMINOPHEN 500 MG
1000 TABLET ORAL ONCE
Status: COMPLETED | OUTPATIENT
Start: 2024-02-09 | End: 2024-02-09

## 2024-02-09 RX ORDER — ACETAMINOPHEN 650 MG/1
650 SUPPOSITORY RECTAL EVERY 4 HOURS PRN
Status: DISCONTINUED | OUTPATIENT
Start: 2024-02-09 | End: 2024-02-13 | Stop reason: HOSPADM

## 2024-02-09 RX ORDER — ALBUTEROL SULFATE 0.83 MG/ML
2.5 SOLUTION RESPIRATORY (INHALATION)
Status: DISCONTINUED | OUTPATIENT
Start: 2024-02-09 | End: 2024-02-13 | Stop reason: HOSPADM

## 2024-02-09 RX ORDER — NALOXONE HYDROCHLORIDE 0.4 MG/ML
0.2 INJECTION, SOLUTION INTRAMUSCULAR; INTRAVENOUS; SUBCUTANEOUS
Status: DISCONTINUED | OUTPATIENT
Start: 2024-02-09 | End: 2024-02-13 | Stop reason: HOSPADM

## 2024-02-09 RX ORDER — OSELTAMIVIR PHOSPHATE 75 MG/1
75 CAPSULE ORAL ONCE
Status: COMPLETED | OUTPATIENT
Start: 2024-02-09 | End: 2024-02-09

## 2024-02-09 RX ORDER — ATORVASTATIN CALCIUM 10 MG/1
10 TABLET, FILM COATED ORAL DAILY
Status: DISCONTINUED | OUTPATIENT
Start: 2024-02-09 | End: 2024-02-13 | Stop reason: HOSPADM

## 2024-02-09 RX ORDER — OSELTAMIVIR PHOSPHATE 75 MG/1
75 CAPSULE ORAL 2 TIMES DAILY
Status: DISCONTINUED | OUTPATIENT
Start: 2024-02-09 | End: 2024-02-13 | Stop reason: HOSPADM

## 2024-02-09 RX ORDER — DEXAMETHASONE SODIUM PHOSPHATE 10 MG/ML
10 INJECTION, SOLUTION INTRAMUSCULAR; INTRAVENOUS ONCE
Status: COMPLETED | OUTPATIENT
Start: 2024-02-09 | End: 2024-02-09

## 2024-02-09 RX ORDER — GUAIFENESIN/DEXTROMETHORPHAN 100-10MG/5
10 SYRUP ORAL EVERY 4 HOURS PRN
Status: DISCONTINUED | OUTPATIENT
Start: 2024-02-09 | End: 2024-02-13 | Stop reason: HOSPADM

## 2024-02-09 RX ADMIN — IPRATROPIUM BROMIDE AND ALBUTEROL SULFATE 3 ML: .5; 3 SOLUTION RESPIRATORY (INHALATION) at 20:47

## 2024-02-09 RX ADMIN — GUAIFENESIN SYRUP AND DEXTROMETHORPHAN 10 ML: 100; 10 SYRUP ORAL at 21:44

## 2024-02-09 RX ADMIN — ACETAMINOPHEN 650 MG: 325 TABLET, FILM COATED ORAL at 14:51

## 2024-02-09 RX ADMIN — OSELTAMIVIR PHOSPHATE 75 MG: 75 CAPSULE ORAL at 21:37

## 2024-02-09 RX ADMIN — Medication 1 MG: at 23:39

## 2024-02-09 RX ADMIN — SODIUM CHLORIDE 1000 ML: 9 INJECTION, SOLUTION INTRAVENOUS at 11:43

## 2024-02-09 RX ADMIN — DEXAMETHASONE SODIUM PHOSPHATE 10 MG: 10 INJECTION, SOLUTION INTRAMUSCULAR; INTRAVENOUS at 10:31

## 2024-02-09 RX ADMIN — OSELTAMIVIR PHOSPHATE 75 MG: 75 CAPSULE ORAL at 11:43

## 2024-02-09 RX ADMIN — IPRATROPIUM BROMIDE AND ALBUTEROL SULFATE 3 ML: .5; 3 SOLUTION RESPIRATORY (INHALATION) at 10:28

## 2024-02-09 RX ADMIN — HYDROMORPHONE HYDROCHLORIDE 2 MG: 2 TABLET ORAL at 21:44

## 2024-02-09 RX ADMIN — IPRATROPIUM BROMIDE AND ALBUTEROL SULFATE 3 ML: .5; 3 SOLUTION RESPIRATORY (INHALATION) at 15:39

## 2024-02-09 RX ADMIN — HYDROMORPHONE HYDROCHLORIDE 1 MG: 2 TABLET ORAL at 16:56

## 2024-02-09 RX ADMIN — GUAIFENESIN SYRUP AND DEXTROMETHORPHAN 10 ML: 100; 10 SYRUP ORAL at 16:57

## 2024-02-09 RX ADMIN — ATORVASTATIN CALCIUM 10 MG: 10 TABLET, FILM COATED ORAL at 14:51

## 2024-02-09 RX ADMIN — ACETAMINOPHEN 650 MG: 325 TABLET, FILM COATED ORAL at 23:39

## 2024-02-09 ASSESSMENT — ACTIVITIES OF DAILY LIVING (ADL)
ADLS_ACUITY_SCORE: 27
DRESSING/BATHING_DIFFICULTY: NO
DOING_ERRANDS_INDEPENDENTLY_DIFFICULTY: NO
FALL_HISTORY_WITHIN_LAST_SIX_MONTHS: NO
ADLS_ACUITY_SCORE: 37
VISION_MANAGEMENT: PRESCRIPTION
ADLS_ACUITY_SCORE: 27
HEARING_DIFFICULTY_OR_DEAF: NO
DIFFICULTY_COMMUNICATING: NO
ADLS_ACUITY_SCORE: 27
ADLS_ACUITY_SCORE: 27
CHANGE_IN_FUNCTIONAL_STATUS_SINCE_ONSET_OF_CURRENT_ILLNESS/INJURY: NO
ADLS_ACUITY_SCORE: 35
TOILETING_ISSUES: NO
DIFFICULTY_EATING/SWALLOWING: YES
WEAR_GLASSES_OR_BLIND: YES
ADLS_ACUITY_SCORE: 27
WALKING_OR_CLIMBING_STAIRS_DIFFICULTY: NO
CONCENTRATING,_REMEMBERING_OR_MAKING_DECISIONS_DIFFICULTY: NO

## 2024-02-09 NOTE — PROGRESS NOTES
"[facility]  Respiratory Care Note      [facility]     Date: 02/09/2024  Admission Dx: Influenza  Pulmonary History: COPD  Home Nebulizer/MDI Use: Albuterol Q6 prn, MDI Q6 prn  Home Oxygen: None  Acuity Level (RCAT flow sheet): 3  Aerosol Therapy initiated: Duoneb QID, Albuterol Q2 prn  Pulmonary Hygiene initiated: Cough and deep breathing techniques TID  Volume Expansion initiated: IS TID  Current Oxygen Requirements: 2 LPM NC  Current SpO2: 93%  Re-evaluation date: 2/12/2024  Patient Education: Education was performed with the patient in regards to indications/benefits and possible side effects of bronchodilators. Will continue to do education with patient.       See \"RT Assessments\" flow sheet for patient assessment scoring and Acuity Level Details.       Vital signs:  Temp: 100.1  F (37.8  C) Temp src: Oral BP: 111/43 Pulse: 91   Resp: 20 SpO2: 93 % O2 Device: Nasal cannula Oxygen Delivery: 2 LPM Height: 149.9 cm (4' 11\") Weight: 65.5 kg (144 lb 6.4 oz)  Estimated body mass index is 29.17 kg/m  as calculated from the following:    Height as of this encounter: 1.499 m (4' 11\").    Weight as of this encounter: 65.5 kg (144 lb 6.4 oz).      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 uterus and left the ovaries    Polycythemia 08/08/2012    resolved now       Past Surgical History:   Procedure Laterality Date    CARPAL TUNNEL RELEASE RT/LT Right 11/23/2021    and trigger finger and tendon repair    CARPAL TUNNEL RELEASE RT/LT Left 12/20/2021    and tendon repair    COLONOSCOPY  2004, 2012    repeat in 2022    COLONOSCOPY  11/24/2023    4 " polyps - repeat in 3 years    Dilatation of the esophagus once due to dysphagia and stricture      Ganglion cyst removal      HEMORRHOIDECTOMY BANDING      HYSTERECTOMY  1998    LAPAROSCOPIC SALPINGO-OOPHORECTOMY Bilateral 10/23/2015    Procedure: LAPAROSCOPIC SALPINGO-OOPHORECTOMY;  Surgeon: Johnathan Steve MD;  Location: [facility] OR    subscapularous repair and biceps tendon repair  2022    Dr. Verma at [facility]    Thumb surgery Right     TONSILLECTOMY         Family History   Problem Relation Age of Onset    Hypertension Mother     Cerebrovascular Disease Mother         TIA's    Depression Mother     Cancer - colorectal Maternal Grandmother         dx at 65    Lipids Father     C.A.D. Father         angioplasty at 65    Musculoskeletal Disorder Father     Alcohol/Drug Daughter         in remission    Breast Cancer No family hx of        Social History     Tobacco Use    Smoking status: Former     Packs/day: 0.50     Years: 30.00     Additional pack years: 0.00     Total pack years: 15.00     Types: Cigarettes     Start date: 10/13/1967     Quit date: 2019     Years since quittin.2    Smokeless tobacco: Never    Tobacco comments:     quit 2019   Substance Use Topics    Alcohol use: No     Comment: sober since 99     Study Result    Narrative & Impression   CHEST TWO VIEWS  2024 11:20 AM      HISTORY: Cough.     COMPARISON: 2023.                                                                      IMPRESSION: Mild probable atelectasis at both lung bases. The lungs  are otherwise clear. Aortic calcification. Heart size upper limits of  normal. Pulmonary vascularity is within normal limits. No pleural  effusions.     MYRIAM GUTIERREZ MD              PTA Med List   Medication Sig Last Dose    albuterol (PROAIR HFA/PROVENTIL HFA/VENTOLIN HFA) 108 (90 Base) MCG/ACT inhaler INHALE 1 OR 2 puffs EVERY 6 HOURS as needed for wheezing. Strength: 108 (90 Base) MCG/ACT prn    albuterol (PROVENTIL) (2.5  MG/3ML) 0.083% neb solution INHALE 3 MILLILITERS (2.5 MG) BY NEBULIZATION ROUTE EVERY 6 HOURS AS NEEDED FOR SHORTNESS OF BREATH/DYSPNEA OR WHEEZING. Strength: (2.5 MG/3ML) 0.083% prn    ascorbic acid (VITAMIN C) 1000 MG TABS Take 1,000 mg by mouth daily 2/8/2024    atorvastatin (LIPITOR) 10 MG tablet Take 1 tablet (10 mg) by mouth daily 2/8/2024    CALCIUM PO Take 1 tablet by mouth daily 2/8/2024    cyclobenzaprine (FLEXERIL) 5 MG tablet Take 1 tablet (5 mg) by mouth 2 times daily as needed for muscle spasms prn    diltiazem ER COATED BEADS (CARDIZEM CD/CARTIA XT) 120 MG 24 hr capsule Take 1 capsule (120 mg) by mouth every morning Hold for blood pressure <110 2/8/2024    docusate sodium (COLACE) 100 MG capsule Take 1 capsule (100 mg) by mouth 2 times daily as needed for constipation prn    HYDROcodone-acetaminophen (NORCO)  MG per tablet Take 0.5 tablets by mouth daily as needed for severe pain 2/8/2024    Multiple Vitamins-Minerals (MULTIVITAMIN OR) Take 1 tablet by mouth daily Per pt, uses ones without Iron 2/8/2024    omeprazole (PRILOSEC) 20 MG DR capsule TAKE 1 CAPSULE (20 MG) BY MOUTH DAILY. 2/8/2024    traMADol (ULTRAM) 50 MG tablet Take 1 tablet (50 mg) by mouth every 6 hours as needed for severe pain prn    vitamin D3 (CHOLECALCIFEROL) 1000 units (25 mcg) tablet Take 1,000 Units by mouth daily 2/8/2024    vitamin E 400 UNITS TABS Take 400 Units by mouth daily 2/8/2024    zolpidem (AMBIEN) 5 MG tablet TAKE 1 TABLET (5 MG) BY MOUTH NIGHTLY AS NEEDED FOR SLEEP. DO NOT TAKE WITH TRAMADOL. MUST BE NDC # [address]-30 PER PT REQUEST. 2/8/2024 at        Lloyd Zavaleta [facility]  2/9/2024

## 2024-02-09 NOTE — PHARMACY-ADMISSION MEDICATION HISTORY
Pharmacist Admission Medication History    Admission medication history is complete. The information provided in this note is only as accurate as the sources available at the time of the update.    Information Source(s): Patient and CareEverywhere/SureScripts via phone    Pertinent Information:     Changes made to PTA medication list:  Added: None  Deleted: Gavilyte (colonoscopy prep)  Changed: Norco 10/325mg 1 tab daily prn-->0.5tab daily prn    Allergies reviewed with patient and updates made in EHR: yes    Medication History Completed By: Liliana Soria PharmD 2/9/2024 11:44 AM    PTA Med List   Medication Sig Last Dose    albuterol (PROAIR HFA/PROVENTIL HFA/VENTOLIN HFA) 108 (90 Base) MCG/ACT inhaler INHALE 1 OR 2 puffs EVERY 6 HOURS as needed for wheezing. Strength: 108 (90 Base) MCG/ACT prn    albuterol (PROVENTIL) (2.5 MG/3ML) 0.083% neb solution INHALE 3 MILLILITERS (2.5 MG) BY NEBULIZATION ROUTE EVERY 6 HOURS AS NEEDED FOR SHORTNESS OF BREATH/DYSPNEA OR WHEEZING. Strength: (2.5 MG/3ML) 0.083% prn    ascorbic acid (VITAMIN C) 1000 MG TABS Take 1,000 mg by mouth daily 2/8/2024    atorvastatin (LIPITOR) 10 MG tablet Take 1 tablet (10 mg) by mouth daily 2/8/2024    CALCIUM PO Take 1 tablet by mouth daily 2/8/2024    cyclobenzaprine (FLEXERIL) 5 MG tablet Take 1 tablet (5 mg) by mouth 2 times daily as needed for muscle spasms prn    diltiazem ER COATED BEADS (CARDIZEM CD/CARTIA XT) 120 MG 24 hr capsule Take 1 capsule (120 mg) by mouth every morning Hold for blood pressure <110 2/8/2024    docusate sodium (COLACE) 100 MG capsule Take 1 capsule (100 mg) by mouth 2 times daily as needed for constipation prn    HYDROcodone-acetaminophen (NORCO)  MG per tablet Take 0.5 tablets by mouth daily as needed for severe pain 2/8/2024    Multiple Vitamins-Minerals (MULTIVITAMIN OR) Take 1 tablet by mouth daily Per pt, uses ones without Iron 2/8/2024    omeprazole (PRILOSEC) 20 MG DR capsule TAKE 1 CAPSULE (20 MG) BY  MOUTH DAILY. 2/8/2024    traMADol (ULTRAM) 50 MG tablet Take 1 tablet (50 mg) by mouth every 6 hours as needed for severe pain prn    vitamin D3 (CHOLECALCIFEROL) 1000 units (25 mcg) tablet Take 1,000 Units by mouth daily 2/8/2024    vitamin E 400 UNITS TABS Take 400 Units by mouth daily 2/8/2024    zolpidem (AMBIEN) 5 MG tablet TAKE 1 TABLET (5 MG) BY MOUTH NIGHTLY AS NEEDED FOR SLEEP. DO NOT TAKE WITH TRAMADOL. MUST BE NDC # [address]-30 PER PT REQUEST. 2/8/2024 at hs

## 2024-02-09 NOTE — TELEPHONE ENCOUNTER
Received notes from [facility] but none from Dr. Epperson or Dr. Cartagena who saw patient for her back pain. Can you call them to get these records?

## 2024-02-09 NOTE — ED NOTES
[facility]  ED Nurse Handoff Report    ED Chief complaint: Flu Symptoms  . ED Diagnosis:   Final diagnoses:   Hypoxia   Influenza A   Wheezing       Allergies:   Allergies   Allergen Reactions    Metaproterenol Sulfate Other (See Comments)     alupent inhaler-shaking    Percocet [Oxycodone-Acetaminophen] Itching       Code Status: Full Code    Activity level - Baseline/Home:  independent.  Activity Level - Current:   assist of 1.   Lift room needed: No.   Bariatric: No   Needed: No   Isolation: Yes.   Infection: Not Applicable  Influenza.     Respiratory status: Nasal cannula    Vital Signs (within 30 minutes):   Vitals:    02/09/24 1056 02/09/24 1130 02/09/24 1131 02/09/24 1145   BP:   135/72 (!) 153/64   Pulse:   109 111   Resp:       Temp:       TempSrc:       SpO2: (!) 89% (!) 81% 95% 93%       Cardiac Rhythm:  ,      Pain level:    Patient confused: No.   Patient Falls Risk: patient and family education.   Elimination Status: Has voided     Patient Report - Initial Complaint: Stuffy, fever, cough, weakness. Started 2 days ago.  .   Focused Assessment:     Musculoskeletal  MusculoskeletalMusculoskeletal WDL: .WDL except; mobilityGeneral Mobility: generalized weakness      1030 Neuro Cognitive  Neuro CognitiveCognitive/Neuro/Behavioral WDL: .WDL exceptLevel of Consciousness: lethargicArousal Level: opens eyes spontaneouslyOrientation: oriented x 4 MN       1030 Respiratory  RespiratoryAirway WDL: WDL  Respiratory WDLRespiratory WDL: .WDL except; rhythm/patternRhythm/Pattern, Respiratory: shortness of breath; tachypneic; labored         Abnormal Results:   Labs Ordered and Resulted from Time of ED Arrival to Time of ED Departure   INFLUENZA A/B, RSV, & SARS-COV2 PCR - Abnormal       Result Value    Influenza A PCR Positive (*)     Influenza B PCR Negative      RSV PCR Negative      SARS CoV2 PCR Negative     BASIC METABOLIC PANEL - Abnormal    Sodium 137      Potassium 4.1       Chloride 100      Carbon Dioxide (CO2) 26      Anion Gap 11      Urea Nitrogen 12.1      Creatinine 0.69      GFR Estimate >90      Calcium 9.0      Glucose 121 (*)    ISTAT GASES LACTATE VENOUS POCT - Abnormal    Lactic Acid POCT 0.6      Bicarbonate Venous POCT 27      O2 Sat, Venous POCT 54 (*)     pCO2 Venous POCT 44      pH Venous POCT 7.39      pO2 Venous POCT 29     CBC WITH PLATELETS AND DIFFERENTIAL    WBC Count 6.7      RBC Count 4.32      Hemoglobin 13.6      Hematocrit 40.9      MCV 95      MCH 31.5      MCHC 33.3      RDW 12.8      Platelet Count 225      % Neutrophils 75      % Lymphocytes 17      % Monocytes 8      % Eosinophils 0      % Basophils 0      % Immature Granulocytes 0      NRBCs per 100 WBC 0      Absolute Neutrophils 5.0      Absolute Lymphocytes 1.2      Absolute Monocytes 0.6      Absolute Eosinophils 0.0      Absolute Basophils 0.0      Absolute Immature Granulocytes 0.0      Absolute NRBCs 0.0          XR Chest 2 Views   Preliminary Result   IMPRESSION: Mild probable atelectasis at both lung bases. The lungs   are otherwise clear. Aortic calcification. Heart size upper limits of   normal. Pulmonary vascularity is within normal limits. No pleural   effusions.          Treatments provided: see MAR  Family Comments: s/o at bedside  OBS brochure/video discussed/provided to patient:  N/A  ED Medications:   Medications   sodium chloride 0.9% BOLUS 1,000 mL (1,000 mLs Intravenous $New Bag 2/9/24 1143)   acetaminophen (TYLENOL) tablet 1,000 mg (1,000 mg Oral Not Given 2/9/24 1029)   ipratropium - albuterol 0.5 mg/2.5 mg/3 mL (DUONEB) neb solution 3 mL (3 mLs Nebulization $Given 2/9/24 1028)   dexAMETHasone PF (DECADRON) injection 10 mg (10 mg Intravenous $Given 2/9/24 1031)   oseltamivir (TAMIFLU) capsule 75 mg (75 mg Oral $Given 2/9/24 1143)       Drips infusing:  No  For the majority of the shift this patient was Green.   Interventions performed were N/A.    Sepsis treatment initiated:  No    Cares/treatment/interventions/medications to be completed following ED care: oxygen support and weaning  RECEIVING UNIT ED HANDOFF REVIEW    Above ED Nurse Handoff Report was reviewed: Yes  Reviewed by: Keturah Lorenzana RN on February 9, 2024 at 12:49 PM     ED Nurse Name: Ani Gary RN  11:55 AM

## 2024-02-09 NOTE — ED PROVIDER NOTES
History     Chief Complaint:  Flu Symptoms     HPI   Emeli Granados is a 69 year old female with a history of asthma, emphysema who is accompanied by her partner who presents with shortness of breath, cough, myalgias, generalized weakness, and headache for three days. She denies fevers, nausea, vomiting, leg swelling, abdominal pain, chest pain. She was at 83% on room air and does not usually wear oxygen at home. She took 2 extra strength Tylenol this morning. She denies smoking or alcohol use. She lives in a house and denies falls. She receive  her flu and COVID shots. She has an inhaler which she used last night.     Independent Historian:   None - Patient Only    Review of External Notes:   See MDM.      Medications:    Albuterol inhaler  Albuterol neb solution  atorvastatin  Cyclobenzaprine   Diltiazem   Docusate sodium  Gavilyte-G  Hydrocodone-acetaminophen  Omeprazole   Tramadol   Zolpidem     Past Medical History:    Chronic low back pain   Disorders of porphyrin metabolism  Diverticula of colon  Emphysema   Esophageal stricture  Hemorrhoids  Hepatitis C  Malignant neoplasm of endocervix  Osteopenia shoulder  Polycythemia    Supraventricular tachycardia     Past Surgical History:    Carpal tunnel release rt/lt  Dilation of esophagus  Hemorrhoidectomy banding  Hysterectomy  Laparoscopic salpingo-oophorectomy  Subscapularis repair and biceps tendon repair  Thumb surgery, right  Tonsillectomy      Physical Exam   Patient Vitals for the past 24 hrs:   BP Temp Temp src Pulse Resp SpO2   02/09/24 1145 (!) 153/64 -- -- 111 -- 93 %   02/09/24 1131 135/72 -- -- 109 -- 95 %   02/09/24 1130 -- -- -- -- -- (!) 81 %   02/09/24 1056 -- -- -- -- -- (!) 89 %   02/09/24 1055 -- -- -- -- -- 90 %   02/09/24 1045 111/47 -- -- 115 -- --   02/09/24 1030 129/61 -- -- -- -- 98 %   02/09/24 1015 130/68 -- -- -- -- 94 %   02/09/24 1012 130/68 -- -- 110 (!) 40 96 %   02/09/24 1011 -- -- -- -- -- 93 %   02/09/24 1010 -- -- -- -- --  (!) 83 %   02/09/24 1002 137/59 (!) 102.3  F (39.1  C) Temporal 119 20 92 %      Physical Exam  Constitutional: Well developed, ill, nontox appearance  Head: Atraumatic.   Mouth/Throat: Oropharynx is clear and moist.   Neck:  no stridor  Eyes: no scleral icterus  Cardiovascular: Regular tachycardia, 2+ bilat radial pulses  Pulmonary/Chest: Increased respiratory effort, no wheezing bilaterally  Abdominal: ND, soft, NT, no rebound or guarding   Ext: Warm, well perfused  Neurological: A&O, symmetric facies, moves ext x4  Skin: Skin is warm and dry.   Psychiatric: Behavior is normal. Thought content normal.   Nursing note and vitals reviewed.    Emergency Department Course       Imaging:  XR Chest 2 Views   Preliminary Result   IMPRESSION: Mild probable atelectasis at both lung bases. The lungs   are otherwise clear. Aortic calcification. Heart size upper limits of   normal. Pulmonary vascularity is within normal limits. No pleural   effusions.         Laboratory:  Labs Ordered and Resulted from Time of ED Arrival to Time of ED Departure   INFLUENZA A/B, RSV, & SARS-COV2 PCR - Abnormal       Result Value    Influenza A PCR Positive (*)     Influenza B PCR Negative      RSV PCR Negative      SARS CoV2 PCR Negative     BASIC METABOLIC PANEL - Abnormal    Sodium 137      Potassium 4.1      Chloride 100      Carbon Dioxide (CO2) 26      Anion Gap 11      Urea Nitrogen 12.1      Creatinine 0.69      GFR Estimate >90      Calcium 9.0      Glucose 121 (*)    ISTAT GASES LACTATE VENOUS POCT - Abnormal    Lactic Acid POCT 0.6      Bicarbonate Venous POCT 27      O2 Sat, Venous POCT 54 (*)     pCO2 Venous POCT 44      pH Venous POCT 7.39      pO2 Venous POCT 29     CBC WITH PLATELETS AND DIFFERENTIAL    WBC Count 6.7      RBC Count 4.32      Hemoglobin 13.6      Hematocrit 40.9      MCV 95      MCH 31.5      MCHC 33.3      RDW 12.8      Platelet Count 225      % Neutrophils 75      % Lymphocytes 17      % Monocytes 8      %  Eosinophils 0      % Basophils 0      % Immature Granulocytes 0      NRBCs per 100 WBC 0      Absolute Neutrophils 5.0      Absolute Lymphocytes 1.2      Absolute Monocytes 0.6      Absolute Eosinophils 0.0      Absolute Basophils 0.0      Absolute Immature Granulocytes 0.0      Absolute NRBCs 0.0        Procedures   none    Emergency Department Course & Assessments:    Interventions:  Medications   sodium chloride 0.9% BOLUS 1,000 mL (1,000 mLs Intravenous $New Bag 24 1143)   acetaminophen (TYLENOL) tablet 1,000 mg (1,000 mg Oral Not Given 24 1029)   ipratropium - albuterol 0.5 mg/2.5 mg/3 mL (DUONEB) neb solution 3 mL (3 mLs Nebulization $Given 24 1028)   dexAMETHasone PF (DECADRON) injection 10 mg (10 mg Intravenous $Given 24 1031)   oseltamivir (TAMIFLU) capsule 75 mg (75 mg Oral $Given 24 1143)     Independent Interpretation (X-rays, CTs, rhythm strip):  See MDM.     Assessments/Consultations/Discussion of Management or Tests:   ED Course as of 24 1154      1019 I obtained the patient's history and examined as noted above.      Social Determinants of Health affecting care:   See MDM.     Disposition:  The patient was admitted to the hospital under the care of Dr. Rothman.     Impression & Plan      Medical Decision Makin year old female presenting w/ cough, fever, shortness of breath     Social determinants affecting patient's health include: Age increasing risk for presentation to the emergency department     I reviewed medical records from family practice office visit on 2024     DDx includes viral syndrome NOS, influenza-like illness, influenza, COVID-19, pneumonia, sepsis.  Less likely PE given level of fever and constellation of flulike symptoms.  Doubt meningitis, encephalitis given history and physical exam.  Labs significant for influenza A, COVID-19 negative, remaining labs unremarkable.  Imaging sig for no pneumothoraces and my independent  interpretation.  Patient presentation is consistent with influenza A.  Given hypoxia, the patient was admitted to the hospitalist service for further evaluation and management. Pt counseled on all results, disposition and diagnosis.  They are understanding and agreeable to plan. Patient admitted in guarded condition.      Diagnosis:    ICD-10-CM    1. Hypoxia  R09.02       2. Influenza A  J10.1       3. Wheezing  R06.2          Discharge Medications:  New Prescriptions    No medications on file      Scribe Disclosure:  IGina, am serving as a scribe at 10:18 AM on 2/9/2024 to document services personally performed by Teofilo Brunson MD based on my observations and the provider's statements to me.      2/9/2024   Teofilo Brunson MD Vaughn, Christopher E, MD  02/09/24 2451

## 2024-02-09 NOTE — PLAN OF CARE
Goal Outcome Evaluation:  Plan of Care Reviewed With: patient  Pertinent assessments: A&Ox4, Tachy HR in lower 100s. Increased Temp. Max temp 101.2. On 2L O2. Sats 95-97%. C/O sore throat. PRN Tylenol given x1. Denied SOB. SBA. Ambulates to room. Skin intact. Brace  on right hand. Pt. Stated it's for recent surgery. PIV is SL.  On regular diet.     Major Shift Events: Admitted to unit    Treatment Plan: Nebs, IV Steroids, Droplet precaution . Symptom management

## 2024-02-09 NOTE — H&P
"[facility]    History and Physical - Hospitalist Service       Date of Admission:  2/9/2024    Assessment & Plan      Emeli Granados is a 69 year old female with history of COPD, HTN, HLP and hepatitis C admitted on 2/9/2024.    In the ED she was febrile to 102.3, tachycardic to 119 and hypoxic to 83% requiring 2 L with pressure 137/59.  Lab work remarkable for normal BMP, glucose 121, lactic acid 0.6, venous pH 7.39 and normal CBC.  COVID and RSV are negative, influenza positive.  Chest x-ray is negative.  She was given dexamethasone 10 mg, DuoNeb, 1 litre of fluids and Tamiflu with inpatient admission.    #Acute hypoxemic respiratory failure due to COPD and influenza  #COPD exacerbation  #Influenza  -Presents with fever, cough and shortness of breath that started on 2/7  -Hypoxic and requiring 2 litres, CXR clear  -Start Tamiflu 75 mg BID  -Schedule duo nebs  -Solu medrol 60 mg IV BID  -Cough suppressants  -Wean oxygen as possible    #HTN  -Continue Diltiazem    #HLP  -Continue Atorvastatin        Diet:  Regular diet  DVT Prophylaxis: Enoxaparin (Lovenox) SQ  Michelle Catheter: Not present  Lines: None     Cardiac Monitoring: None  Code Status:  Full code    Clinically Significant Risk Factors Present on Admission                    # Acute Respiratory Failure: Documented O2 saturation < 91%.  Continue supplemental oxygen as needed     # Overweight: Estimated body mass index is 29.49 kg/m  as calculated from the following:    Height as of 2/7/24: 1.499 m (4' 11\").    Weight as of 2/7/24: 66.2 kg (146 lb).       # Asthma: noted on problem list        Disposition Plan      Expected Discharge Date: 02/11/2024                The patient's care was discussed with the Attending Physician, Dr. Rothman, Patient, Patient's Family, and ED Consultant(s).    Anuja Crowe PA-C  Hospitalist Service  [facility]  Securely message with [employer] (more info)  Text page via " [employer] Paging/Directory     ______________________________________________________________________    Chief Complaint   Cough, fever and shortness of breath    History is obtained from the patient    History of Present Illness   Emeli Granados is a 69 year old female who has felt ill for the past 2 days.  She reports cough that is nonproductive but feels like she should cough something up, shortness of breath and fevers.  She denies nausea, vomiting, abdominal pain and diarrhea.  She has no other sick contacts.  She did try her DuoNeb without any improvement.  She denies daily alcohol use and smoking but is a previous smoker.      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 uterus and left the ovaries    Polycythemia 08/08/2012    resolved now       Past Surgical History   Past Surgical History:   Procedure Laterality Date    CARPAL TUNNEL RELEASE RT/LT Right 11/23/2021    and trigger finger and tendon repair    CARPAL TUNNEL RELEASE RT/LT Left 12/20/2021    and tendon repair    COLONOSCOPY  2004, 2012    repeat in 2022    COLONOSCOPY  11/24/2023    4 polyps - repeat in 3 years    Dilatation of the esophagus once due to dysphagia and stricture  2003    Ganglion cyst removal      HEMORRHOIDECTOMY BANDING      HYSTERECTOMY  1998    LAPAROSCOPIC SALPINGO-OOPHORECTOMY Bilateral 10/23/2015    Procedure: LAPAROSCOPIC SALPINGO-OOPHORECTOMY;  Surgeon: Johnathan Steve MD;  Location: [facility] OR    subscapularous repair and biceps tendon repair  05/2022    Dr. Verma at [facility]    Thumb surgery Right     TONSILLECTOMY         Prior to Admission Medications   Prior  to Admission Medications   Prescriptions Last Dose Informant Patient Reported? Taking?   CALCIUM PO 2/8/2024  Yes Yes   Sig: Take 1 tablet by mouth daily   HYDROcodone-acetaminophen (NORCO)  MG per tablet 2/8/2024  Yes Yes   Sig: Take 0.5 tablets by mouth daily as needed for severe pain   Multiple Vitamins-Minerals (MULTIVITAMIN OR) 2/8/2024  Yes Yes   Sig: Take 1 tablet by mouth daily Per pt, uses ones without Iron   albuterol (PROAIR HFA/PROVENTIL HFA/VENTOLIN HFA) 108 (90 Base) MCG/ACT inhaler prn  No Yes   Sig: INHALE 1 OR 2 puffs EVERY 6 HOURS as needed for wheezing. Strength: 108 (90 Base) MCG/ACT   albuterol (PROVENTIL) (2.5 MG/3ML) 0.083% neb solution prn  No Yes   Sig: INHALE 3 MILLILITERS (2.5 MG) BY NEBULIZATION ROUTE EVERY 6 HOURS AS NEEDED FOR SHORTNESS OF BREATH/DYSPNEA OR WHEEZING. Strength: (2.5 MG/3ML) 0.083%   ascorbic acid (VITAMIN C) 1000 MG TABS 2/8/2024  Yes Yes   Sig: Take 1,000 mg by mouth daily   atorvastatin (LIPITOR) 10 MG tablet 2/8/2024  No Yes   Sig: Take 1 tablet (10 mg) by mouth daily   cyclobenzaprine (FLEXERIL) 5 MG tablet prn  No Yes   Sig: Take 1 tablet (5 mg) by mouth 2 times daily as needed for muscle spasms   diltiazem ER COATED BEADS (CARDIZEM CD/CARTIA XT) 120 MG 24 hr capsule 2/8/2024  No Yes   Sig: Take 1 capsule (120 mg) by mouth every morning Hold for blood pressure <110   docusate sodium (COLACE) 100 MG capsule prn  No Yes   Sig: Take 1 capsule (100 mg) by mouth 2 times daily as needed for constipation   omeprazole (PRILOSEC) 20 MG DR capsule 2/8/2024  No Yes   Sig: TAKE 1 CAPSULE (20 MG) BY MOUTH DAILY.   traMADol (ULTRAM) 50 MG tablet prn  No Yes   Sig: Take 1 tablet (50 mg) by mouth every 6 hours as needed for severe pain   vitamin D3 (CHOLECALCIFEROL) 1000 units (25 mcg) tablet 2/8/2024  Yes Yes   Sig: Take 1,000 Units by mouth daily   vitamin E 400 UNITS TABS 2/8/2024  Yes Yes   Sig: Take 400 Units by mouth daily   zolpidem (AMBIEN) 5 MG tablet 2/8/2024 at hs   No Yes   Sig: TAKE 1 TABLET (5 MG) BY MOUTH NIGHTLY AS NEEDED FOR SLEEP. DO NOT TAKE WITH TRAMADOL. MUST BE NDC # [address]-28 PER PT REQUEST.      Facility-Administered Medications: None          Physical Exam   Vital Signs: Temp: (!) 102.3  F (39.1  C) Temp src: Temporal BP: 113/70 Pulse: 102   Resp: 28 SpO2: 97 % O2 Device: Nasal cannula Oxygen Delivery: 2 LPM  Weight: 0 lbs 0 oz    General Appearance: Alert and orientated x 3  Respiratory: Lungs bilaterally sound tight with poor air movement. No wheezes.  Cardiovascular: RRR without murmur  GI: Bowel sounds are present without tenderness  Skin: No rashes or open sores      Medical Decision Making       55 MINUTES SPENT BY ME on the date of service doing chart review, history, exam, documentation & further activities per the note.      Data     I have personally reviewed the following data over the past 24 hrs:    6.7  \   13.6   / 225     137 100 12.1 /  121 (H)   4.1 26 0.69 \     Procal: N/A CRP: N/A Lactic Acid: 0.6         Imaging results reviewed over the past 24 hrs:   Recent Results (from the past 24 hour(s))   XR Chest 2 Views    Narrative    CHEST TWO VIEWS  2/9/2024 11:20 AM     HISTORY: Cough.    COMPARISON: 11/21/2023.      Impression    IMPRESSION: Mild probable atelectasis at both lung bases. The lungs  are otherwise clear. Aortic calcification. Heart size upper limits of  normal. Pulmonary vascularity is within normal limits. No pleural  effusions.

## 2024-02-10 LAB
ANION GAP SERPL CALCULATED.3IONS-SCNC: 9 MMOL/L (ref 7–15)
BUN SERPL-MCNC: 16.3 MG/DL (ref 8–23)
CALCIUM SERPL-MCNC: 9.1 MG/DL (ref 8.8–10.2)
CHLORIDE SERPL-SCNC: 106 MMOL/L (ref 98–107)
CREAT SERPL-MCNC: 0.52 MG/DL (ref 0.51–0.95)
DEPRECATED HCO3 PLAS-SCNC: 24 MMOL/L (ref 22–29)
EGFRCR SERPLBLD CKD-EPI 2021: >90 ML/MIN/1.73M2
ERYTHROCYTE [DISTWIDTH] IN BLOOD BY AUTOMATED COUNT: 12.6 % (ref 10–15)
GLUCOSE SERPL-MCNC: 139 MG/DL (ref 70–99)
HCT VFR BLD AUTO: 39.1 % (ref 35–47)
HGB BLD-MCNC: 12.9 G/DL (ref 11.7–15.7)
MAGNESIUM SERPL-MCNC: 1.9 MG/DL (ref 1.7–2.3)
MCH RBC QN AUTO: 31.5 PG (ref 26.5–33)
MCHC RBC AUTO-ENTMCNC: 33 G/DL (ref 31.5–36.5)
MCV RBC AUTO: 96 FL (ref 78–100)
PLATELET # BLD AUTO: 210 10E3/UL (ref 150–450)
POTASSIUM SERPL-SCNC: 4.2 MMOL/L (ref 3.4–5.3)
RBC # BLD AUTO: 4.09 10E6/UL (ref 3.8–5.2)
SODIUM SERPL-SCNC: 139 MMOL/L (ref 135–145)
WBC # BLD AUTO: 8.1 10E3/UL (ref 4–11)

## 2024-02-10 PROCEDURE — 94640 AIRWAY INHALATION TREATMENT: CPT

## 2024-02-10 PROCEDURE — 83735 ASSAY OF MAGNESIUM: CPT | Performed by: INTERNAL MEDICINE

## 2024-02-10 PROCEDURE — 120N000001 HC R&B MED SURG/OB

## 2024-02-10 PROCEDURE — 250N000009 HC RX 250: Performed by: PHYSICIAN ASSISTANT

## 2024-02-10 PROCEDURE — 250N000013 HC RX MED GY IP 250 OP 250 PS 637: Performed by: INTERNAL MEDICINE

## 2024-02-10 PROCEDURE — 250N000011 HC RX IP 250 OP 636: Performed by: PHYSICIAN ASSISTANT

## 2024-02-10 PROCEDURE — 85027 COMPLETE CBC AUTOMATED: CPT | Performed by: PHYSICIAN ASSISTANT

## 2024-02-10 PROCEDURE — 36415 COLL VENOUS BLD VENIPUNCTURE: CPT | Performed by: PHYSICIAN ASSISTANT

## 2024-02-10 PROCEDURE — 80048 BASIC METABOLIC PNL TOTAL CA: CPT | Performed by: PHYSICIAN ASSISTANT

## 2024-02-10 PROCEDURE — 99232 SBSQ HOSP IP/OBS MODERATE 35: CPT | Performed by: INTERNAL MEDICINE

## 2024-02-10 PROCEDURE — 999N000157 HC STATISTIC RCP TIME EA 10 MIN

## 2024-02-10 PROCEDURE — 94640 AIRWAY INHALATION TREATMENT: CPT | Mod: 76

## 2024-02-10 PROCEDURE — 36415 COLL VENOUS BLD VENIPUNCTURE: CPT | Performed by: INTERNAL MEDICINE

## 2024-02-10 PROCEDURE — 250N000013 HC RX MED GY IP 250 OP 250 PS 637: Performed by: PHYSICIAN ASSISTANT

## 2024-02-10 RX ORDER — HYDROCODONE BITARTRATE AND ACETAMINOPHEN 10; 325 MG/1; MG/1
1 TABLET ORAL DAILY PRN
Status: DISCONTINUED | OUTPATIENT
Start: 2024-02-10 | End: 2024-02-11

## 2024-02-10 RX ORDER — ZOLPIDEM TARTRATE 5 MG/1
5 TABLET ORAL
Status: DISCONTINUED | OUTPATIENT
Start: 2024-02-10 | End: 2024-02-13 | Stop reason: HOSPADM

## 2024-02-10 RX ADMIN — IPRATROPIUM BROMIDE AND ALBUTEROL SULFATE 3 ML: .5; 3 SOLUTION RESPIRATORY (INHALATION) at 07:22

## 2024-02-10 RX ADMIN — ACETAMINOPHEN 650 MG: 325 TABLET, FILM COATED ORAL at 23:28

## 2024-02-10 RX ADMIN — METHYLPREDNISOLONE SODIUM SUCCINATE 62.5 MG: 125 INJECTION, POWDER, FOR SOLUTION INTRAMUSCULAR; INTRAVENOUS at 08:59

## 2024-02-10 RX ADMIN — HYDROCODONE BITARTRATE AND ACETAMINOPHEN 1 TABLET: 10; 325 TABLET ORAL at 15:54

## 2024-02-10 RX ADMIN — OSELTAMIVIR PHOSPHATE 75 MG: 75 CAPSULE ORAL at 21:11

## 2024-02-10 RX ADMIN — ENOXAPARIN SODIUM 40 MG: 40 INJECTION SUBCUTANEOUS at 13:28

## 2024-02-10 RX ADMIN — ZOLPIDEM TARTRATE 5 MG: 5 TABLET ORAL at 22:17

## 2024-02-10 RX ADMIN — IPRATROPIUM BROMIDE AND ALBUTEROL SULFATE 3 ML: .5; 3 SOLUTION RESPIRATORY (INHALATION) at 12:15

## 2024-02-10 RX ADMIN — PANTOPRAZOLE SODIUM 40 MG: 40 TABLET, DELAYED RELEASE ORAL at 08:49

## 2024-02-10 RX ADMIN — IPRATROPIUM BROMIDE AND ALBUTEROL SULFATE 3 ML: .5; 3 SOLUTION RESPIRATORY (INHALATION) at 16:44

## 2024-02-10 RX ADMIN — METHYLPREDNISOLONE SODIUM SUCCINATE 62.5 MG: 125 INJECTION, POWDER, FOR SOLUTION INTRAMUSCULAR; INTRAVENOUS at 18:53

## 2024-02-10 RX ADMIN — HYDROMORPHONE HYDROCHLORIDE 2 MG: 2 TABLET ORAL at 05:29

## 2024-02-10 RX ADMIN — Medication 1 LOZENGE: at 23:28

## 2024-02-10 RX ADMIN — ATORVASTATIN CALCIUM 10 MG: 10 TABLET, FILM COATED ORAL at 08:49

## 2024-02-10 RX ADMIN — OSELTAMIVIR PHOSPHATE 75 MG: 75 CAPSULE ORAL at 08:49

## 2024-02-10 RX ADMIN — DILTIAZEM HYDROCHLORIDE 120 MG: 120 CAPSULE, EXTENDED RELEASE ORAL at 08:49

## 2024-02-10 RX ADMIN — IPRATROPIUM BROMIDE AND ALBUTEROL SULFATE 3 ML: .5; 3 SOLUTION RESPIRATORY (INHALATION) at 19:37

## 2024-02-10 RX ADMIN — ACETAMINOPHEN 650 MG: 325 TABLET, FILM COATED ORAL at 05:32

## 2024-02-10 ASSESSMENT — ACTIVITIES OF DAILY LIVING (ADL)
ADLS_ACUITY_SCORE: 27
ADLS_ACUITY_SCORE: 30
ADLS_ACUITY_SCORE: 27
ADLS_ACUITY_SCORE: 27
ADLS_ACUITY_SCORE: 30
ADLS_ACUITY_SCORE: 27
ADLS_ACUITY_SCORE: 30
ADLS_ACUITY_SCORE: 27
ADLS_ACUITY_SCORE: 27

## 2024-02-10 NOTE — PLAN OF CARE
Goal Outcome Evaluation:    END OF SHIFT SUMMARY     1900 - 2300    Pt alert and oriented x 4. On 1.5L nasal cannula, saturating 92-93%. PIV saline locked. Up with SBA. Voiding adequately. C/o chest pain, PRN PO Oxy given. Droplets precautions maintained.

## 2024-02-10 NOTE — PLAN OF CARE
Goal Outcome Evaluation:  Plan of Care Reviewed With: patient    Pertinent assessments: A&O x4. Afebrile, VSS. Wean off to 1L. Sats- 90-94%. Independent  with cares. Bathroom. Voiding without issues. PIV is SL. Denied SOB. LS diminished but clear. Encouraged deep breathing. On regular diet. Appetite good.      Major Shift Events: Wean of to 1L via NC    Treatment Plan: Duonebs, IV Solumedrol, pain & symptoms management, Supplemental O2 support- wean as possible

## 2024-02-10 NOTE — PROGRESS NOTES
[facility]    Hospitalist Progress Note      Assessment & Plan   Emeli Granados is a 69 year old woman who was admitted on 2/9/2024. PMH significant for COPD/emphysema, hypertension, hyperlipidemia, chronic pain on PRN opioids, history of SVT managed with diltiazem, adenomyomatosis of gallbladder, history of cervical cancer, osteopenia, remote tobacco use, and history of hepatitis C status post treatment. She presented for URI symptoms and malaise. She was found to have influenza A and have hypoxia to 83% requiring 2 L O2 by nasal canula. CXR without focal infiltrate. In the ER, patient was given dexamethasone 10 mg IV, DuoNeb, 1 litre of NS IVF, and Tamiflu. Patient was admitted to hospitalist service for further evaluation and treatment.      Acute respiratory failure with hypoxia secondary influenza A and COPD exacerbation  COPD exacerbation  Influenza A infection  - Patient presented with fever, cough, and shortness of breath that started on 2/7.  - CXR without infiltrate. Positive for influenza A. Significant wheezing with superimposed COPD exacerbation.  - Hypoxia to 83% on presentation. Requiring 1-2 L O2 at this time. Encourage ambulation and wean as tolerated.  - Continue Tamiflu course of 75 mg BID x 5 days.  - Continue scheduled duo nebs. PRN albuterol.  - Continue solumedrol 62.5 mg IV BID at this time with significant wheezing. Likely transition to oral tomorrow if improved and plan short burst treatment without taper if able.   - PRN guaifenesin-dextromethorphan reasonable to continue. PRN lozenges and throat spray as well for sore throat.      HTN  History of SVT   - Continue PTA Diltiazem     Hyperlipidemia  - Continue PTA Atorvastatin    Chronic pain  Recent right wrist surgery 12/6/23  - Will continue patient's PTA tramadol PRN q 6 hours and hydrocodone-acetaminophen (however cannot give 1/2 tab here; resume home dose on discharge) PRN daily.  - Follow outpatient with  primary care and orthopedics.    Adenomyomatosis of gallbladder  - Recently had updated MRI 11/28/23 of the liver/gallbladder. Follows with [facility]. Liver appears normal and no follow up needed. However, with polypoid lesion in gallbladder, cancer cannot be excluded. Stable in size from previous imaging.   - Patient has been recommended to follow with surgery outpatient, though has not yet made an appointment. Discussed importance of follow up. Would consider inpatient consult as possible patient is not ideal outpatient surgery candidate, but likely needs to recover from influenza first. Recommend referral on discharge and close follow up with PCP.    History of cervical cancer  Osteopenia  Remote tobacco use  History of hepatitis C status post treatment  Not active issues. Noted.    DVT Prophylaxis: Enoxaparin (Lovenox) SQ  Code Status: Full Code  Expected discharge: Anticipate discharge in next 1-2 days. Encourage ambulation and wean O2 as tolerated.    Monica Hopkins MD FACP  Hospitalist Service  [facility]      Interval History   No acute events. Continue to require 1-2 L O2. Patient complaining of sore throat. Ordering lozenges and throat spray. Resuming home pain medications PRN. Otherwise continuing current therapies.     -Data reviewed today: I reviewed all new labs and imaging results over the last 24 hours.     Physical Exam   Temp: 98.3  F (36.8  C) Temp src: Oral BP: 102/51 Pulse: 75   Resp: 20 SpO2: 93 % O2 Device: Nasal cannula Oxygen Delivery: 2 LPM  Vitals:    02/09/24 1357   Weight: 65.5 kg (144 lb 6.4 oz)     Vital Signs with Ranges  Temp:  [98.3  F (36.8  C)-102.3  F (39.1  C)] 98.3  F (36.8  C)  Pulse:  [] 75  Resp:  [16-40] 20  BP: (102-153)/(43-83) 102/51  SpO2:  [81 %-98 %] 93 %  No intake/output data recorded.    Constitutional: Pleasant woman resting in bed. Alert and oriented x3. No acute distress. On 1 L O2 at time of exam.   HEENT: NCAT. EOMI. Moist oral  mucosa.  Respiratory: Clear to auscultation bilaterally. No crackles. Does have expiratory wheezes, but improved from yesterday.  Cardiovascular: Regular rate and rhythm. No murmur.  GI: Distended, but soft. Non-tender. Normoactive bowel sounds.   Musculoskeletal: No gross deformities.   Neurologic: Alert and oriented x3. No focal neurologic deficits. Did not assess gait.    Medications      atorvastatin  10 mg Oral Daily    diltiazem ER COATED BEADS  120 mg Oral QAM    enoxaparin ANTICOAGULANT  40 mg Subcutaneous Q24H    ipratropium - albuterol 0.5 mg/2.5 mg/3 mL  3 mL Nebulization 4x daily    methylPREDNISolone  62.5 mg Intravenous Q12H    oseltamivir  75 mg Oral BID    pantoprazole  40 mg Oral Daily    sodium chloride (PF)  3 mL Intracatheter Q8H       Data   Recent Labs   Lab 02/10/24  0646 02/09/24  1022   WBC 8.1 6.7   HGB 12.9 13.6   MCV 96 95    225    137   POTASSIUM 4.2 4.1   CHLORIDE 106 100   CO2 24 26   BUN 16.3 12.1   CR 0.52 0.69   ANIONGAP 9 11   ALAN 9.1 9.0   * 121*       Recent Results (from the past 24 hour(s))   XR Chest 2 Views    Narrative    CHEST TWO VIEWS  2/9/2024 11:20 AM     HISTORY: Cough.    COMPARISON: 11/21/2023.      Impression    IMPRESSION: Mild probable atelectasis at both lung bases. The lungs  are otherwise clear. Aortic calcification. Heart size upper limits of  normal. Pulmonary vascularity is within normal limits. No pleural  effusions.    MYRIAM GUTIERREZ MD         SYSTEM ID:  [id number]

## 2024-02-10 NOTE — PLAN OF CARE
Goal Outcome Evaluation:           Overall Patient Progress: no changeOverall Patient Progress: no change     Pertinent assessments: Assumed care of pt from 7025-1774. Pt is A&O x4. Up SBA to the bathroom. Rated pain in her back and chest (from coughing) 7/10; PRN dilaudid 1mg x1 and PRN Robitussin x1. Pt's Tmax 100.0; unable to get more tylenol until 1850. PIV patent, saline locked. On 2L O2 NC. Droplet precautions maintained.    Major Shift Events: none    Treatment Plan: Duonebs, Solumedrol, symptom management, O2 support - wean as possible    Bedside Nurse: Antonella Montalvo RN

## 2024-02-10 NOTE — PLAN OF CARE
Goal Outcome Evaluation:      Plan of Care Reviewed With: patient    Overall Patient Progress: improvingOverall Patient Progress: improving  To Do:  End of Shift Summary  For vital signs and complete assessments, please see documentation flowsheets.     Pertinent assessments: Assumed care of pt from 7596-0166. Pt is A&O x4. Afebrile, VSS. Slept with 2L/NC. Up SBA to the bathroom. C/o headache, PRN tylenol given x 2 and dilauded x1  for pain with relief.  PIV saline locked. Voiding without issues.    Major Shift Events: none    Treatment Plan: Duonebs, Solumedrol, symptom management, O2 support - wean as possible    Bedside Nurse: Anai Cordova RN 02/10/2024 4:12 AM

## 2024-02-11 LAB
ANION GAP SERPL CALCULATED.3IONS-SCNC: 14 MMOL/L (ref 7–15)
BASOPHILS # BLD AUTO: 0 10E3/UL (ref 0–0.2)
BASOPHILS NFR BLD AUTO: 0 %
BUN SERPL-MCNC: 20.5 MG/DL (ref 8–23)
CALCIUM SERPL-MCNC: 9.2 MG/DL (ref 8.8–10.2)
CHLORIDE SERPL-SCNC: 105 MMOL/L (ref 98–107)
CREAT SERPL-MCNC: 0.59 MG/DL (ref 0.51–0.95)
DEPRECATED HCO3 PLAS-SCNC: 21 MMOL/L (ref 22–29)
EGFRCR SERPLBLD CKD-EPI 2021: >90 ML/MIN/1.73M2
EOSINOPHIL # BLD AUTO: 0 10E3/UL (ref 0–0.7)
EOSINOPHIL NFR BLD AUTO: 0 %
ERYTHROCYTE [DISTWIDTH] IN BLOOD BY AUTOMATED COUNT: 12.6 % (ref 10–15)
GLUCOSE SERPL-MCNC: 191 MG/DL (ref 70–99)
HCT VFR BLD AUTO: 43.1 % (ref 35–47)
HGB BLD-MCNC: 13.9 G/DL (ref 11.7–15.7)
IMM GRANULOCYTES # BLD: 0.1 10E3/UL
IMM GRANULOCYTES NFR BLD: 1 %
LYMPHOCYTES # BLD AUTO: 1.3 10E3/UL (ref 0.8–5.3)
LYMPHOCYTES NFR BLD AUTO: 8 %
MAGNESIUM SERPL-MCNC: 1.6 MG/DL (ref 1.7–2.3)
MAGNESIUM SERPL-MCNC: 1.7 MG/DL (ref 1.7–2.3)
MCH RBC QN AUTO: 31.3 PG (ref 26.5–33)
MCHC RBC AUTO-ENTMCNC: 32.3 G/DL (ref 31.5–36.5)
MCV RBC AUTO: 97 FL (ref 78–100)
MONOCYTES # BLD AUTO: 0.4 10E3/UL (ref 0–1.3)
MONOCYTES NFR BLD AUTO: 3 %
NEUTROPHILS # BLD AUTO: 14.6 10E3/UL (ref 1.6–8.3)
NEUTROPHILS NFR BLD AUTO: 88 %
NRBC # BLD AUTO: 0 10E3/UL
NRBC BLD AUTO-RTO: 0 /100
PLATELET # BLD AUTO: 287 10E3/UL (ref 150–450)
POTASSIUM SERPL-SCNC: 4.4 MMOL/L (ref 3.4–5.3)
RBC # BLD AUTO: 4.44 10E6/UL (ref 3.8–5.2)
SODIUM SERPL-SCNC: 140 MMOL/L (ref 135–145)
WBC # BLD AUTO: 16.5 10E3/UL (ref 4–11)

## 2024-02-11 PROCEDURE — 250N000009 HC RX 250: Performed by: PHYSICIAN ASSISTANT

## 2024-02-11 PROCEDURE — 99233 SBSQ HOSP IP/OBS HIGH 50: CPT | Performed by: INTERNAL MEDICINE

## 2024-02-11 PROCEDURE — 94640 AIRWAY INHALATION TREATMENT: CPT

## 2024-02-11 PROCEDURE — 83735 ASSAY OF MAGNESIUM: CPT | Performed by: INTERNAL MEDICINE

## 2024-02-11 PROCEDURE — 250N000011 HC RX IP 250 OP 636: Performed by: INTERNAL MEDICINE

## 2024-02-11 PROCEDURE — 250N000011 HC RX IP 250 OP 636: Performed by: PHYSICIAN ASSISTANT

## 2024-02-11 PROCEDURE — 250N000013 HC RX MED GY IP 250 OP 250 PS 637: Performed by: INTERNAL MEDICINE

## 2024-02-11 PROCEDURE — 85041 AUTOMATED RBC COUNT: CPT | Performed by: INTERNAL MEDICINE

## 2024-02-11 PROCEDURE — 999N000157 HC STATISTIC RCP TIME EA 10 MIN

## 2024-02-11 PROCEDURE — 120N000001 HC R&B MED SURG/OB

## 2024-02-11 PROCEDURE — 250N000013 HC RX MED GY IP 250 OP 250 PS 637: Performed by: PHYSICIAN ASSISTANT

## 2024-02-11 PROCEDURE — 94640 AIRWAY INHALATION TREATMENT: CPT | Mod: 76

## 2024-02-11 PROCEDURE — 80048 BASIC METABOLIC PNL TOTAL CA: CPT | Performed by: INTERNAL MEDICINE

## 2024-02-11 PROCEDURE — 36415 COLL VENOUS BLD VENIPUNCTURE: CPT | Performed by: INTERNAL MEDICINE

## 2024-02-11 RX ORDER — HYDROCODONE BITARTRATE AND ACETAMINOPHEN 10; 325 MG/1; MG/1
1 TABLET ORAL 2 TIMES DAILY PRN
Status: DISCONTINUED | OUTPATIENT
Start: 2024-02-11 | End: 2024-02-13 | Stop reason: HOSPADM

## 2024-02-11 RX ORDER — METHYLPREDNISOLONE SODIUM SUCCINATE 40 MG/ML
40 INJECTION, POWDER, LYOPHILIZED, FOR SOLUTION INTRAMUSCULAR; INTRAVENOUS EVERY 12 HOURS
Status: DISCONTINUED | OUTPATIENT
Start: 2024-02-11 | End: 2024-02-13 | Stop reason: HOSPADM

## 2024-02-11 RX ADMIN — SENNOSIDES AND DOCUSATE SODIUM 1 TABLET: 8.6; 5 TABLET ORAL at 22:20

## 2024-02-11 RX ADMIN — ENOXAPARIN SODIUM 40 MG: 40 INJECTION SUBCUTANEOUS at 13:57

## 2024-02-11 RX ADMIN — ZOLPIDEM TARTRATE 5 MG: 5 TABLET ORAL at 23:26

## 2024-02-11 RX ADMIN — METHYLPREDNISOLONE SODIUM SUCCINATE 62.5 MG: 125 INJECTION, POWDER, FOR SOLUTION INTRAMUSCULAR; INTRAVENOUS at 08:24

## 2024-02-11 RX ADMIN — IPRATROPIUM BROMIDE AND ALBUTEROL SULFATE 3 ML: .5; 3 SOLUTION RESPIRATORY (INHALATION) at 11:33

## 2024-02-11 RX ADMIN — HYDROCODONE BITARTRATE AND ACETAMINOPHEN 1 TABLET: 10; 325 TABLET ORAL at 08:23

## 2024-02-11 RX ADMIN — PANTOPRAZOLE SODIUM 40 MG: 40 TABLET, DELAYED RELEASE ORAL at 08:23

## 2024-02-11 RX ADMIN — OSELTAMIVIR PHOSPHATE 75 MG: 75 CAPSULE ORAL at 21:15

## 2024-02-11 RX ADMIN — OSELTAMIVIR PHOSPHATE 75 MG: 75 CAPSULE ORAL at 08:23

## 2024-02-11 RX ADMIN — IPRATROPIUM BROMIDE AND ALBUTEROL SULFATE 3 ML: .5; 3 SOLUTION RESPIRATORY (INHALATION) at 20:33

## 2024-02-11 RX ADMIN — METHYLPREDNISOLONE SODIUM SUCCINATE 40 MG: 40 INJECTION, POWDER, FOR SOLUTION INTRAMUSCULAR; INTRAVENOUS at 18:01

## 2024-02-11 RX ADMIN — HYDROCODONE BITARTRATE AND ACETAMINOPHEN 1 TABLET: 10; 325 TABLET ORAL at 22:21

## 2024-02-11 RX ADMIN — ATORVASTATIN CALCIUM 10 MG: 10 TABLET, FILM COATED ORAL at 08:24

## 2024-02-11 RX ADMIN — DILTIAZEM HYDROCHLORIDE 120 MG: 120 CAPSULE, EXTENDED RELEASE ORAL at 08:23

## 2024-02-11 ASSESSMENT — ACTIVITIES OF DAILY LIVING (ADL)
ADLS_ACUITY_SCORE: 27
ADLS_ACUITY_SCORE: 26
ADLS_ACUITY_SCORE: 27
ADLS_ACUITY_SCORE: 26
ADLS_ACUITY_SCORE: 26
ADLS_ACUITY_SCORE: 27
ADLS_ACUITY_SCORE: 26
ADLS_ACUITY_SCORE: 27

## 2024-02-11 NOTE — PLAN OF CARE
Goal Outcome Evaluation:   Plan of Care Reviewed With: patient  Pertinent assessments: A&Ox4, VSS. On RA.  Wean of Oxygen this shift. SOB/dyspnea with ambulation. Coughs intermittently. On Duoned QID. C/O generalized/Right hand pain. Norco given x1 with some effectiveness. Independent with cares. Ambulates within the room. Skin intact. PIV is SL.   Major Shift Events: PRN Norco x1  Treatment Plan: Tamiflu, IV solumedrol, pain management, droplet precautions, Discharging tomorrow.

## 2024-02-11 NOTE — PLAN OF CARE
Pertinent assessments: VSS on 1L NC. Dyspnea on exertion. Congested cough. LS diminished. Afebrile. A&Ox4. Up independently in the room. Droplet precautions in place. Tolerating a regular diet. Saline locked PIV.     Major Shift Events: Given PRN Norco x1. Given PRN Ambien x1.    Treatment Plan: Wean O2, Tamiflu, IV solumedrol, pain management, droplet precautions, and discharge pending.    Bedside Nurse: Amrit Scales RN

## 2024-02-11 NOTE — PROGRESS NOTES
[facility]    Medicine Progress Note - Hospitalist Service    Date of Admission:  2/9/2024    Assessment & Plan   Emeli Granados is a 69 year old woman who was admitted on 2/9/2024. PMH significant for COPD/emphysema, hypertension, hyperlipidemia, chronic pain on PRN opioids, history of SVT managed with diltiazem, adenomyomatosis of gallbladder, history of cervical cancer, osteopenia, remote tobacco use, and history of hepatitis C status post treatment. She presented for URI symptoms and malaise. She was found to have influenza A and have hypoxia to 83% requiring 2 L O2 by nasal canula. CXR without focal infiltrate. In the ER, patient was given dexamethasone 10 mg IV, DuoNeb, 1 litre of NS IVF, and Tamiflu. Patient was admitted to hospitalist service for further evaluation and treatment.      Acute respiratory failure with hypoxia secondary influenza A and COPD exacerbation  COPD exacerbation  Influenza A infection  - Patient presented with fever, cough, and shortness of breath that started on 2/7.  - CXR without infiltrate. Positive for influenza A. Significant wheezing with superimposed COPD exacerbation.  - Hypoxia to 83% on presentation. Requiring 1-2 L O2 at this time. Encourage ambulation and wean as tolerated.  - Continue Tamiflu course of 75 mg BID x 5 days.  - Continue scheduled duo nebs. PRN albuterol.  -Decrease steroids to 40 twice daily from current 60 mg twice daily      Likely transition to oral tomorrow if improved and plan short burst treatment without taper if able.   - PRN guaifenesin-dextromethorphan reasonable to continue. PRN lozenges and throat spray as well for sore throat.      #Steroid-induced hyperglycemia  -Anticipating improvement once off corticosteroids    HTN  History of SVT   - Continue PTA Diltiazem     Hyperlipidemia  - Continue PTA Atorvastatin    Chronic pain  Recent right wrist surgery 12/6/23  - Will continue patient's PTA tramadol PRN q 6 hours and  "hydrocodone-acetaminophen (however cannot give 1/2 tab here; resume home dose on discharge) PRN daily.  - Follow outpatient with primary care and orthopedics.    Adenomyomatosis of gallbladder  - Recently had updated MRI 11/28/23 of the liver/gallbladder. Follows with [facility]. Liver appears normal and no follow up needed. However, with polypoid lesion in gallbladder, cancer cannot be excluded. Stable in size from previous imaging.   -Earlier my colleague had a discussion regarding this :  -patient has been recommended to follow with surgery outpatient, though has not yet made an appointment. Discussed importance of follow up. Would consider inpatient consult as possible patient is not ideal outpatient surgery candidate, but likely needs to recover from influenza first. Recommend referral on discharge and close follow up with PCP.    History of cervical cancer  Osteopenia  Remote tobacco use  History of hepatitis C status post treatment  Not active issues. Noted.            Diet: Regular Diet Adult    DVT Prophylaxis: On Lovenox and Ambulate every shift  Michelle Catheter: Not present  Lines: None     Cardiac Monitoring: None  Code Status: Full Code      Clinically Significant Risk Factors            # Hypomagnesemia: Lowest Mg = 1.6 mg/dL in last 2 days, will replace as needed              # Overweight: Estimated body mass index is 29.17 kg/m  as calculated from the following:    Height as of this encounter: 1.499 m (4' 11\").    Weight as of this encounter: 65.5 kg (144 lb 6.4 oz)., PRESENT ON ADMISSION     # Asthma: noted on problem list        Disposition Plan   Anticipating still needing inpatient care at least in the next 1-2 more days        Jimmy Sharp MD, MD  Hospitalist Service  [facility]  Securely message with [employer] (more info)  Text page via [employer] Paging/Directory   ______________________________________________________________________    Interval History   I assumed medicine " service care.  Chart reviewed.  Stable hemodynamics overnight.  Still having coughing spells and wheezing with intermittent shortness of breath.  Reportedly able to tolerate oral diet.  No reported issues of mental status changes.  Remain afebrile.  Off oxygen this morning however still having intermittent coughing spells    Physical Exam   Vital Signs: Temp: 98  F (36.7  C) Temp src: Oral BP: 129/57 Pulse: 74   Resp: 20 SpO2: 93 % O2 Device: None (Room air) Oxygen Delivery: 1 LPM  Weight: 144 lbs 6.42 oz    HEENT; Atraumatic, normocephalic, pinkish conjuctiva, pupils bilateral reactive   Skin: warm and moist, no rashes  Lymphatics: no cervical or axillary lymphandenopathy  Lungs: equal chest expansion,  fair air entry scattered wheezing  Heart: normal rate, normal rhythm, no rubs or gallops.   Abdomen: normal bowel sounds, no tenderness, no peritoneal signs, no guarding  Extremities: no deformities, no edema   Neuro; follow commands, alert and oriented x3, spontaneous speech, coherent, moves all extremities spontaneously  Psych; no hallucination, euthymic mood, not agitated      Medical Decision Making       50 MINUTES SPENT BY ME on the date of service doing chart review, history, exam, documentation & further activities per the note.  MANAGEMENT DISCUSSED with the following over the past 24 hours: Yes   NOTE(S)/MEDICAL RECORDS REVIEWED over the past 24 hours: Yes       Data     I have personally reviewed the following data over the past 24 hrs:    16.5 (H)  \   13.9   / 287     140 105 20.5 /  191 (H)   4.4 21 (L) 0.59 \       Imaging results reviewed over the past 24 hrs:   No results found for this or any previous visit (from the past 24 hour(s)).

## 2024-02-11 NOTE — PLAN OF CARE
Goal Outcome Evaluation:      Plan of Care Reviewed With: patient    Overall Patient Progress: improvingOverall Patient Progress: improving  To Do:  End of Shift Summary  For vital signs and complete assessments, please see documentation flowsheets.     Pertinent assessments: Assumed cares 9134-0221. A&O x4. Afebrile, VSS on 1L NC. Dyspnea on exertion. Congested cough. LS diminished. Up independently in the room. Droplet precautions in place. C/o of sore throat, PRN tylenol and benzocaine lozenges given. PIV saline locked.    Major Shift Events: Uneventful    Treatment Plan: Wean O2, Tamiflu, IV solumedrol, pain management, droplet precautions, and discharge pending.    Bedside Nurse: Anai Cordova RN 02/11/2024 3:05 AM

## 2024-02-12 LAB
ANION GAP SERPL CALCULATED.3IONS-SCNC: 9 MMOL/L (ref 7–15)
BUN SERPL-MCNC: 21.4 MG/DL (ref 8–23)
CALCIUM SERPL-MCNC: 9.3 MG/DL (ref 8.8–10.2)
CHLORIDE SERPL-SCNC: 105 MMOL/L (ref 98–107)
CREAT SERPL-MCNC: 0.6 MG/DL (ref 0.51–0.95)
DEPRECATED HCO3 PLAS-SCNC: 27 MMOL/L (ref 22–29)
EGFRCR SERPLBLD CKD-EPI 2021: >90 ML/MIN/1.73M2
GLUCOSE SERPL-MCNC: 121 MG/DL (ref 70–99)
MAGNESIUM SERPL-MCNC: 1.9 MG/DL (ref 1.7–2.3)
PLATELET # BLD AUTO: 232 10E3/UL (ref 150–450)
POTASSIUM SERPL-SCNC: 4.6 MMOL/L (ref 3.4–5.3)
SODIUM SERPL-SCNC: 141 MMOL/L (ref 135–145)

## 2024-02-12 PROCEDURE — 36415 COLL VENOUS BLD VENIPUNCTURE: CPT | Performed by: PHYSICIAN ASSISTANT

## 2024-02-12 PROCEDURE — 250N000009 HC RX 250: Performed by: PHYSICIAN ASSISTANT

## 2024-02-12 PROCEDURE — 94640 AIRWAY INHALATION TREATMENT: CPT | Mod: 76

## 2024-02-12 PROCEDURE — 83735 ASSAY OF MAGNESIUM: CPT | Performed by: INTERNAL MEDICINE

## 2024-02-12 PROCEDURE — 85049 AUTOMATED PLATELET COUNT: CPT | Performed by: PHYSICIAN ASSISTANT

## 2024-02-12 PROCEDURE — 82374 ASSAY BLOOD CARBON DIOXIDE: CPT | Performed by: INTERNAL MEDICINE

## 2024-02-12 PROCEDURE — 250N000011 HC RX IP 250 OP 636: Performed by: INTERNAL MEDICINE

## 2024-02-12 PROCEDURE — 250N000011 HC RX IP 250 OP 636: Performed by: PHYSICIAN ASSISTANT

## 2024-02-12 PROCEDURE — 120N000001 HC R&B MED SURG/OB

## 2024-02-12 PROCEDURE — 250N000013 HC RX MED GY IP 250 OP 250 PS 637: Performed by: INTERNAL MEDICINE

## 2024-02-12 PROCEDURE — 999N000156 HC STATISTIC RCP CONSULT EA 30 MIN

## 2024-02-12 PROCEDURE — 250N000013 HC RX MED GY IP 250 OP 250 PS 637: Performed by: PHYSICIAN ASSISTANT

## 2024-02-12 PROCEDURE — 99232 SBSQ HOSP IP/OBS MODERATE 35: CPT | Performed by: INTERNAL MEDICINE

## 2024-02-12 PROCEDURE — 999N000157 HC STATISTIC RCP TIME EA 10 MIN

## 2024-02-12 RX ORDER — IPRATROPIUM BROMIDE AND ALBUTEROL SULFATE 2.5; .5 MG/3ML; MG/3ML
3 SOLUTION RESPIRATORY (INHALATION) EVERY 4 HOURS PRN
Status: DISCONTINUED | OUTPATIENT
Start: 2024-02-12 | End: 2024-02-13 | Stop reason: HOSPADM

## 2024-02-12 RX ADMIN — GUAIFENESIN SYRUP AND DEXTROMETHORPHAN 10 ML: 100; 10 SYRUP ORAL at 21:42

## 2024-02-12 RX ADMIN — HYDROCODONE BITARTRATE AND ACETAMINOPHEN 1 TABLET: 10; 325 TABLET ORAL at 21:38

## 2024-02-12 RX ADMIN — OSELTAMIVIR PHOSPHATE 75 MG: 75 CAPSULE ORAL at 21:38

## 2024-02-12 RX ADMIN — HYDROCODONE BITARTRATE AND ACETAMINOPHEN 1 TABLET: 10; 325 TABLET ORAL at 08:26

## 2024-02-12 RX ADMIN — PANTOPRAZOLE SODIUM 40 MG: 40 TABLET, DELAYED RELEASE ORAL at 08:26

## 2024-02-12 RX ADMIN — OSELTAMIVIR PHOSPHATE 75 MG: 75 CAPSULE ORAL at 08:26

## 2024-02-12 RX ADMIN — IPRATROPIUM BROMIDE AND ALBUTEROL SULFATE 3 ML: .5; 3 SOLUTION RESPIRATORY (INHALATION) at 07:26

## 2024-02-12 RX ADMIN — ATORVASTATIN CALCIUM 10 MG: 10 TABLET, FILM COATED ORAL at 08:26

## 2024-02-12 RX ADMIN — METHYLPREDNISOLONE SODIUM SUCCINATE 40 MG: 40 INJECTION, POWDER, FOR SOLUTION INTRAMUSCULAR; INTRAVENOUS at 19:21

## 2024-02-12 RX ADMIN — DILTIAZEM HYDROCHLORIDE 120 MG: 120 CAPSULE, EXTENDED RELEASE ORAL at 08:26

## 2024-02-12 RX ADMIN — METHYLPREDNISOLONE SODIUM SUCCINATE 40 MG: 40 INJECTION, POWDER, FOR SOLUTION INTRAMUSCULAR; INTRAVENOUS at 07:57

## 2024-02-12 RX ADMIN — ENOXAPARIN SODIUM 40 MG: 40 INJECTION SUBCUTANEOUS at 14:03

## 2024-02-12 RX ADMIN — ZOLPIDEM TARTRATE 5 MG: 5 TABLET ORAL at 22:18

## 2024-02-12 ASSESSMENT — ACTIVITIES OF DAILY LIVING (ADL)
ADLS_ACUITY_SCORE: 27
ADLS_ACUITY_SCORE: 26
ADLS_ACUITY_SCORE: 27
ADLS_ACUITY_SCORE: 26
ADLS_ACUITY_SCORE: 27
ADLS_ACUITY_SCORE: 27
ADLS_ACUITY_SCORE: 26
ADLS_ACUITY_SCORE: 27
ADLS_ACUITY_SCORE: 26
ADLS_ACUITY_SCORE: 27
ADLS_ACUITY_SCORE: 26
ADLS_ACUITY_SCORE: 27

## 2024-02-12 NOTE — PLAN OF CARE
Goal Outcome Evaluation:         o Do:  End of Shift Summary  For vital signs and complete assessments, please see documentation flowsheets.     Pertinent assessments: A&Ox4, VSS. On 2L oxygen @94%, denies SOB, nausea and pain, her TV is not working put orders in, independent in the room   Major Shift Events: New IV placed  Treatment Plan: Tamiflu, IV solumedrol, pain management, droplet precautions, Discharging tomorrow.

## 2024-02-12 NOTE — PROGRESS NOTES
[facility]    Medicine Progress Note - Hospitalist Service    Date of Admission:  2/9/2024    Assessment & Plan   Emeli Granados is a 69 year old woman who was admitted on 2/9/2024. PMH significant for COPD/emphysema, hypertension, hyperlipidemia, chronic pain on PRN opioids, history of SVT managed with diltiazem, adenomyomatosis of gallbladder, history of cervical cancer, osteopenia, remote tobacco use, and history of hepatitis C status post treatment. She presented for URI symptoms and malaise. She was found to have influenza A and have hypoxia to 83% requiring 2 L O2 by nasal canula. CXR without focal infiltrate. In the ER, patient was given dexamethasone 10 mg IV, DuoNeb, 1 litre of NS IVF, and Tamiflu. Patient was admitted to hospitalist service for further evaluation and treatment.      Acute respiratory failure with hypoxia secondary influenza A and COPD exacerbation  COPD exacerbation  Influenza A infection  - Patient presented with fever, cough, and shortness of breath that started on 2/7.  - CXR without infiltrate. Positive for influenza A. Significant wheezing with superimposed COPD exacerbation.  - Hypoxia to 83% on presentation.   -Attempted to wean her off from oxygen yesterday but overnight was requiring again oxygen support with dyspnea on exertion   -Continue current IV steroids for the day and if she shows improvement in the next 12 to 24 hours and weaned off and discontinued on oxygen perhaps this can be switched to oral steroids with intention to continue on a prednisone burst upon discharge     - Continue Tamiflu course of 75 mg BID x 5 days.  - Continue scheduled duo nebs. PRN albuterol.    - PRN guaifenesin-dextromethorphan reasonable to continue. PRN lozenges and throat spray as well for sore throat.      #Steroid-induced hyperglycemia  -Anticipating improvement once off corticosteroids    HTN  History of SVT   - Continue PTA Diltiazem     Hyperlipidemia  - Continue  "PTA Atorvastatin    Chronic pain  Recent right wrist surgery 12/6/23  - Will continue patient's PTA tramadol PRN q 6 hours and hydrocodone-acetaminophen (however cannot give 1/2 tab here; resume home dose on discharge) PRN daily.  - Follow outpatient with primary care and orthopedics.    Adenomyomatosis of gallbladder  - Recently had updated MRI 11/28/23 of the liver/gallbladder. Follows with [facility]. Liver appears normal and no follow up needed. However, with polypoid lesion in gallbladder, cancer cannot be excluded. Stable in size from previous imaging.   -Earlier my colleague had a discussion regarding this :  -patient has been recommended to follow with surgery outpatient, though has not yet made an appointment. Discussed importance of follow up. Would consider inpatient consult as possible patient is not ideal outpatient surgery candidate, but likely needs to recover from influenza first. Recommend referral on discharge and close follow up with PCP.    History of cervical cancer  Osteopenia  Remote tobacco use  History of hepatitis C status post treatment  Not active issues. Noted.            Diet: Regular Diet Adult    DVT Prophylaxis: On Lovenox and Ambulate every shift  Michelle Catheter: Not present  Lines: None     Cardiac Monitoring: None  Code Status: Full Code      Clinically Significant Risk Factors            # Hypomagnesemia: Lowest Mg = 1.6 mg/dL in last 2 days, will replace as needed              # Overweight: Estimated body mass index is 29.17 kg/m  as calculated from the following:    Height as of this encounter: 1.499 m (4' 11\").    Weight as of this encounter: 65.5 kg (144 lb 6.4 oz)., PRESENT ON ADMISSION     # Asthma: noted on problem list        Disposition Plan   Anticipating still needing inpatient care at least in the next 24 to 36 hours if continues to show improvement with her hypoxia and hopefully can be successfully weaned off from her oxygen support       Jimmy Sharp MD, " MD  Hospitalist Service  [facility]  Securely message with [employer] (more info)  Text page via [employer] Paging/Directory   ______________________________________________________________________    Interval History   Continuing medicine service care.  Chart reviewed.  Stable hemodynamics overnight.    She mentioned that she is feeling a little better but still having coughing spells.  This morning she was still requiring oxygen support.  Denies any ongoing nausea or vomiting or diarrhea.  She shared her concern that she is a little better but she thinks that she is not ready yet to go home in the setting of her coughing spells and current use of oxygen.      Physical Exam   Vital Signs: Temp: 98.1  F (36.7  C) Temp src: Oral BP: 127/53 Pulse: 77   Resp: 20 SpO2: 92 % O2 Device: Nasal cannula Oxygen Delivery: 2 LPM  Weight: 144 lbs 6.42 oz    HEENT; Atraumatic, normocephalic, pinkish conjuctiva, pupils bilateral reactive   Skin: warm and moist, no rashes  Lymphatics: no cervical or axillary lymphandenopathy  Lungs: equal chest expansion,  fair air entry scattered wheezing  Heart: normal rate, normal rhythm, no rubs or gallops.   Abdomen: normal bowel sounds, no tenderness, no peritoneal signs, no guarding  Extremities: no deformities, no edema   Neuro; follow commands, alert and oriented x3, spontaneous speech, coherent, moves all extremities spontaneously  Psych; no hallucination, euthymic mood, not agitated      Medical Decision Making       40 MINUTES SPENT BY ME on the date of service doing chart review, history, exam, documentation & further activities per the note.  MANAGEMENT DISCUSSED with the following over the past 24 hours: Yes   NOTE(S)/MEDICAL RECORDS REVIEWED over the past 24 hours: Yes       Data     I have personally reviewed the following data over the past 24 hrs:    16.5 (H)  \   13.9   / 232     141 105 21.4 /  121 (H)   4.6 27 0.60 \       Imaging results reviewed over the past  24 hrs:   No results found for this or any previous visit (from the past 24 hour(s)).

## 2024-02-12 NOTE — TELEPHONE ENCOUNTER
Spoke with Viri at [facility] [place] records department, requesting records from Dr. Epperson & Dr. Cartagena visits. They will fax over to [facility] fax #[phone]  KATRINA Pop

## 2024-02-12 NOTE — TELEPHONE ENCOUNTER
I reviewed records and it appears patient was found to have a right sacral fracture (this is the low low back) and I am wondering if that is the pain she is feeling in the buttock area. MRI of the low back also showed advanced arthritis with some nerve impingement (pressing on the nerve). Due to the fracture it appears orthopedics did not recommend further steroid injections. They recommended physical therapy and conservative treatment and then follow up with them.  I patient would like to see someone else I can place a referral. I would highly recommend following orthopedics spine as treatment other than physical therapy is with a specialist (such as orthopedic spine).    Patient is currently hospitalized. Can we call her once she is discharged to let her know this information.

## 2024-02-12 NOTE — PLAN OF CARE
Pertinent assessments: Assumed cares [id number]. A&Ox4, VSS, sats low 90s 2L NC. Dyspnea on exertion,  denies SOB. Infrequent congested cough. LS clear/dim. PRN norco given for wrist and ongoing chest pain with deep breathing. Up ind in room. Showered this shift. PIV saline locked. Regular diet well toleratated.    Major Shift Events: continues to need 2L NC.     Treatment Plan: Tamiflu, IV solumedrol, pain management, droplet precautions, Discharging tomorrow.     Bedside Nurse: JACQUES JAEGER RN         Problem: Infection  Goal: Absence of Infection Signs and Symptoms  Outcome: Progressing  Intervention: Prevent or Manage Infection  Recent Flowsheet Documentation  Taken 2/12/2024 0800 by Jacques Jaeger RN  Isolation Precautions: droplet precautions maintained     Problem: Pain Acute  Goal: Optimal Pain Control and Function  Outcome: Progressing  Intervention: Prevent or Manage Pain  Recent Flowsheet Documentation  Taken 2/12/2024 0800 by Jacques Jaeger RN  Medication Review/Management: medications reviewed   Goal Outcome Evaluation:      Plan of Care Reviewed With: patient    Overall Patient Progress: no changeOverall Patient Progress: no change

## 2024-02-12 NOTE — PROGRESS NOTES
"[facility]     Date: 02/12/24  Admission Dx: Influenza A  Pulmonary History: COPD hx, Emphysema hx  Home Nebulizer/MDI Use: Albuterol MDI Q6 prn, Albuterol Q6 prn  Home Oxygen: None  Acuity Level ([facility] flow sheet): 4  Aerosol Therapy initiated: Duoneb Q4 prn, Albuterol Q2 prn      Pulmonary Hygiene initiated: Coughing techniques      Volume Expansion initiated: Incentive Spirometry       Current Oxygen Requirements: Nasal cannula 2 LPM  Current SpO2: 92%    Re-evaluation date: 02/15/24    Patient Education: Discussed use and benefits of respiratory medications.       See \"RT Assessments\" flow sheet for patient assessment scoring and Acuity Level Details.           "

## 2024-02-12 NOTE — TELEPHONE ENCOUNTER
RN PAL chart review     Patient is currently inpatient at [facility] with influenza/hypoxia wheezing     Postponing for one day to review and follow up     Felisa Brown Registered Nurse, PAL (Patient Advocate Liaison)   [facility]   [phone]

## 2024-02-12 NOTE — PLAN OF CARE
Goal Outcome Evaluation:      Plan of Care Reviewed With: patient    Overall Patient Progress: improvingOverall Patient Progress: improving  To Do:  End of Shift Summary  For vital signs and complete assessments, please see documentation flowsheets.     Pertinent assessments: Assumed cares 0695-1212. A&Ox4, VSS. On 2L oxygen dyspnea on exertion , denies SOB, nausea and pain, Given PRN Ambien for sleep. Independent in the room. Voiding without issues, PIV saline locked.     Major Shift Events: Possible discharge today.    Treatment Plan: Tamiflu, IV solumedrol, pain management, droplet precautions, Discharging tomorrow.     Bedside Nurse: Anai Cordova RN 02/12/2024 2:33 AM

## 2024-02-13 ENCOUNTER — PATIENT OUTREACH (OUTPATIENT)
Dept: FAMILY MEDICINE | Facility: CLINIC | Age: 69
End: 2024-02-13
Payer: COMMERCIAL

## 2024-02-13 VITALS
WEIGHT: 144.4 LBS | BODY MASS INDEX: 29.11 KG/M2 | TEMPERATURE: 97.6 F | HEART RATE: 83 BPM | OXYGEN SATURATION: 92 % | HEIGHT: 59 IN | SYSTOLIC BLOOD PRESSURE: 143 MMHG | DIASTOLIC BLOOD PRESSURE: 71 MMHG | RESPIRATION RATE: 20 BRPM

## 2024-02-13 LAB
MAGNESIUM SERPL-MCNC: 1.9 MG/DL (ref 1.7–2.3)
POTASSIUM SERPL-SCNC: 4.1 MMOL/L (ref 3.4–5.3)

## 2024-02-13 PROCEDURE — 250N000013 HC RX MED GY IP 250 OP 250 PS 637: Performed by: INTERNAL MEDICINE

## 2024-02-13 PROCEDURE — 250N000011 HC RX IP 250 OP 636: Performed by: INTERNAL MEDICINE

## 2024-02-13 PROCEDURE — 84132 ASSAY OF SERUM POTASSIUM: CPT | Performed by: INTERNAL MEDICINE

## 2024-02-13 PROCEDURE — 99239 HOSP IP/OBS DSCHRG MGMT >30: CPT | Performed by: INTERNAL MEDICINE

## 2024-02-13 PROCEDURE — 250N000013 HC RX MED GY IP 250 OP 250 PS 637: Performed by: PHYSICIAN ASSISTANT

## 2024-02-13 PROCEDURE — 36415 COLL VENOUS BLD VENIPUNCTURE: CPT | Performed by: INTERNAL MEDICINE

## 2024-02-13 PROCEDURE — 83735 ASSAY OF MAGNESIUM: CPT | Performed by: INTERNAL MEDICINE

## 2024-02-13 RX ORDER — PREDNISONE 20 MG/1
40 TABLET ORAL DAILY
Qty: 10 TABLET | Refills: 0 | Status: SHIPPED | OUTPATIENT
Start: 2024-02-13 | End: 2024-02-18

## 2024-02-13 RX ORDER — OSELTAMIVIR PHOSPHATE 75 MG/1
75 CAPSULE ORAL 2 TIMES DAILY
Qty: 2 CAPSULE | Refills: 0 | Status: SHIPPED | OUTPATIENT
Start: 2024-02-13 | End: 2024-02-14

## 2024-02-13 RX ADMIN — Medication 1 LOZENGE: at 09:16

## 2024-02-13 RX ADMIN — HYDROCODONE BITARTRATE AND ACETAMINOPHEN 1 TABLET: 10; 325 TABLET ORAL at 09:16

## 2024-02-13 RX ADMIN — PANTOPRAZOLE SODIUM 40 MG: 40 TABLET, DELAYED RELEASE ORAL at 09:10

## 2024-02-13 RX ADMIN — METHYLPREDNISOLONE SODIUM SUCCINATE 40 MG: 40 INJECTION, POWDER, FOR SOLUTION INTRAMUSCULAR; INTRAVENOUS at 09:10

## 2024-02-13 RX ADMIN — GUAIFENESIN SYRUP AND DEXTROMETHORPHAN 10 ML: 100; 10 SYRUP ORAL at 09:16

## 2024-02-13 RX ADMIN — OSELTAMIVIR PHOSPHATE 75 MG: 75 CAPSULE ORAL at 09:10

## 2024-02-13 RX ADMIN — DILTIAZEM HYDROCHLORIDE 120 MG: 120 CAPSULE, EXTENDED RELEASE ORAL at 09:10

## 2024-02-13 RX ADMIN — ATORVASTATIN CALCIUM 10 MG: 10 TABLET, FILM COATED ORAL at 09:10

## 2024-02-13 ASSESSMENT — ACTIVITIES OF DAILY LIVING (ADL)
ADLS_ACUITY_SCORE: 27
ADLS_ACUITY_SCORE: 27
ADLS_ACUITY_SCORE: 26
ADLS_ACUITY_SCORE: 27
ADLS_ACUITY_SCORE: 26
ADLS_ACUITY_SCORE: 27
ADLS_ACUITY_SCORE: 27

## 2024-02-13 NOTE — PLAN OF CARE
For vital signs and complete assessments, please see documentation flowsheets.     Pertinent assessments: Aox4. VSS. O2 at 1L NC, pulse oxy meter in place sats above 93%. Endorses  pain with coughing, PRN Narco administered. PRN Ambien administered at bedtime. PRN robitussin administered for cough. Independent in room. Tolerating regular diet, denies N/V/D/C.     Major Shift Events uneventful    Treatment Plan: IV solumedrol, Tamiflu, pain control, symptom management, wea O2 as tolerated for discharge

## 2024-02-13 NOTE — DISCHARGE SUMMARY
"[facility]  Hospitalist Discharge Summary      Date of Admission:  2/9/2024  Date of Discharge:  2/13/2024  Discharging Provider: Jimmy Sharp MD, MD  Discharge Service: Hospitalist Service    Discharge Diagnoses   Acute respiratory failure with hypoxia secondary influenza A and COPD exacerbation   Steroid-induced hyperglycemia  History of COPD emphysema  History of SVT on maintenance diltiazem  Hypertension  Dyslipidemia  Prior history of cervical cancer      Clinically Significant Risk Factors     # Overweight: Estimated body mass index is 29.17 kg/m  as calculated from the following:    Height as of this encounter: 1.499 m (4' 11\").    Weight as of this encounter: 65.5 kg (144 lb 6.4 oz).       Follow-ups Needed After Discharge   Follow-up Appointments     Follow-up and recommended labs and tests       Follow up with primary care provider, Emma Byrne, within 7 days to   evaluate medication change, to evaluate treatment change, and for hospital   follow- up.            Unresulted Labs Ordered in the Past 30 Days of this Admission       No orders found from 1/10/2024 to 2/10/2024.            Oxygen Documentation  I certify that this patient, Emeli Granados has been under my care (or a nurse practitioner or physican's assistant working with me). This is the face-to-face encounter for oxygen medical necessity.      At the time of this encounter, I have reviewed the qualifying testing and have determined that supplemental oxygen is reasonable and necessary and is expected to improve the patient's condition in a home setting.       Patient has continued oxygen desaturation due to COPD J44.9.    If portability is ordered, is the patient mobile within the home? yes        Discharge Disposition   Discharged to home  Condition at discharge: Stable    Hospital Course     Continuing medicine service care.  No significant reported events overnight.  Remained afebrile.  Improving " respiratory symptomatology.  Will finish course of Tamiflu and prednisone burst upon discharge   no new medications with pain control as patient has current opiate therapy for recent surgery and postoperative state        Ms. Sainz is a pleasant 69-year-old lady who lives independently at home with her spouse and unfortunately presented here with increasing generalized weakness, coughing spells, lack of appetite, worsening shortness of breath with lightheadedness and some chest discomfort.  She was eventually found with acute hypoxic respiratory failure requiring oxygen support with underlying COPD exacerbation and concomitant influenza infection.  She was started on Tamiflu, supportive care for underlying COPD with breathing treatments, corticosteroids.  No obvious bacterial infection.  Fortunately she is responding with much decreased symptoms of cough and shortness of breath.  Remained afebrile.  Being weaned off from oxygen support currently between 1 to 2 L of oxygen.  She is hopeful not to go home with oxygen supplementation.  She is ambulatory.  Tolerating oral diet with no nausea or vomiting.  Plans for hospital discharge later today.    I will refer you to excerpts of prior progress notes as listed below for details of her hospitalization stay:    Emeli Granados is a 69 year old woman who was admitted on 2/9/2024. PMH significant for COPD/emphysema, hypertension, hyperlipidemia, chronic pain on PRN opioids, history of SVT managed with diltiazem, adenomyomatosis of gallbladder, history of cervical cancer, osteopenia, remote tobacco use, and history of hepatitis C status post treatment. She presented for URI symptoms and malaise. She was found to have influenza A and have hypoxia to 83% requiring 2 L O2 by nasal canula. CXR without focal infiltrate. In the ER, patient was given dexamethasone 10 mg IV, DuoNeb, 1 litre of NS IVF, and Tamiflu. Patient was admitted to hospitalist service for further  evaluation and treatment.      Acute respiratory failure with hypoxia secondary influenza A and COPD exacerbation  COPD exacerbation  Influenza A infection  - Patient presented with fever, cough, and shortness of breath that started on 2/7.  - CXR without infiltrate. Positive for influenza A. Significant wheezing with superimposed COPD exacerbation.  - Hypoxia to 83% on presentation.   -Attempted to wean her off from oxygen yesterday but overnight was requiring again oxygen support with dyspnea on exertion   -Continue current IV steroids for the day and if she shows improvement in the next 12 to 24 hours and weaned off and discontinued on oxygen perhaps this can be switched to oral steroids with intention to continue on a prednisone burst upon discharge     - Continue Tamiflu course of 75 mg BID x 5 days.  - Continue scheduled duo nebs. PRN albuterol.    - PRN guaifenesin-dextromethorphan reasonable to continue. PRN lozenges and throat spray as well for sore throat.      #Steroid-induced hyperglycemia  -Anticipating improvement once off corticosteroids    HTN  History of SVT   - Continue PTA Diltiazem     Hyperlipidemia  - Continue PTA Atorvastatin    Chronic pain  Recent right wrist surgery 12/6/23  - Will continue patient's PTA tramadol PRN q 6 hours and hydrocodone-acetaminophen (however cannot give 1/2 tab here; resume home dose on discharge) PRN daily.  - Follow outpatient with primary care and orthopedics.    Adenomyomatosis of gallbladder  - Recently had updated MRI 11/28/23 of the liver/gallbladder. Follows with [facility]. Liver appears normal and no follow up needed. However, with polypoid lesion in gallbladder, cancer cannot be excluded. Stable in size from previous imaging.   -Earlier my colleague had a discussion regarding this :  -patient has been recommended to follow with surgery outpatient, though has not yet made an appointment. Discussed importance of follow up. Would consider inpatient consult as  possible patient is not ideal outpatient surgery candidate, but likely needs to recover from influenza first. Recommend referral on discharge and close follow up with PCP.    History of cervical cancer  Osteopenia  Remote tobacco use  History of hepatitis C status post treatment  Not active issues. Noted.      Consultations This Hospital Stay   None    Code Status   Full Code    Time Spent on this Encounter   I, Jimmy Sharp MD, MD, personally saw the patient today and spent greater than 30 minutes discharging this patient.       Jimmy Sharp MD, MD  [address] SURG[facility]  [address] [place] [address]  Phone: [phone]  Fax: [phone]  ______________________________________________________________________    Physical Exam   Vital Signs: Temp: 97.9  F (36.6  C) Temp src: Oral BP: 131/56 Pulse: 70   Resp: 19 SpO2: 92 % O2 Device: None (Room air) Oxygen Delivery: 1 LPM  Weight: 144 lbs 6.42 oz  HEENT; Atraumatic, normocephalic, pinkish conjuctiva, pupils bilateral reactive   Skin: warm and moist, no rashes  Lungs: equal chest expansion, clear to auscultation, no wheezes, no stridor, no crackles,   Heart: normal rate, normal rhythm, no rubs or gallops.   Abdomen: normal bowel sounds, no tenderness, no peritoneal signs, no guarding  Extremities: no deformities, no edema   Neuro; follow commands, alert and oriented x3, spontaneous speech, coherent, moves all extremities spontaneously  Psych; no hallucination, euthymic mood, not agitated         Primary Care Physician   Emma Byrne    Discharge Orders      Reason for your hospital stay    Ms. Sainz is a pleasant 69-year-old lady who lives independently at home with her spouse and unfortunately presented here with increasing generalized weakness, coughing spells, lack of appetite, worsening shortness of breath with lightheadedness and some chest discomfort.  She was eventually found with acute hypoxic  respiratory failure requiring oxygen support with underlying COPD exacerbation and concomitant influenza infection.  She was started on Tamiflu, supportive care for underlying COPD with breathing treatments, corticosteroids.  No obvious bacterial infection.  Fortunately she is responding with much decreased symptoms of cough and shortness of breath.  Remained afebrile.  Being weaned off from oxygen support currently between 1 to 2 L of oxygen.  She is hopeful not to go home with oxygen supplementation.  She is ambulatory.  Tolerating oral diet with no nausea or vomiting.  Plans for hospital discharge later today.     Follow-up and recommended labs and tests     Follow up with primary care provider, Emma Byrne, within 7 days to evaluate medication change, to evaluate treatment change, and for hospital follow- up.     Activity    Your activity upon discharge: activity as tolerated     Full Code     Diet    Follow this diet upon discharge: Orders Placed This Encounter      Regular Diet Adult       Significant Results and Procedures   Most Recent 3 CBC's:  Recent Labs   Lab Test 02/12/24  0558 02/11/24  0922 02/10/24  0646 02/09/24  1022   WBC  --  16.5* 8.1 6.7   HGB  --  13.9 12.9 13.6   MCV  --  97 96 95    287 210 225     Most Recent 3 BMP's:  Recent Labs   Lab Test 02/12/24  0558 02/11/24  0922 02/10/24  0646    140 139   POTASSIUM 4.6 4.4 4.2   CHLORIDE 105 105 106   CO2 27 21* 24   BUN 21.4 20.5 16.3   CR 0.60 0.59 0.52   ANIONGAP 9 14 9   ALAN 9.3 9.2 9.1   * 191* 139*     Most Recent 2 LFT's:  Recent Labs   Lab Test 03/06/23  1107 10/12/22  1018   AST 23 27   ALT 24 32   ALKPHOS 97 101   BILITOTAL 0.5 0.4     Most Recent 3 INR's:  Recent Labs   Lab Test 10/12/22  1018   INR 0.99     Most Recent Cholesterol Panel:  Recent Labs   Lab Test 10/31/23  1110   CHOL 172   LDL 73   HDL 82   TRIG 85     7-Day Micro Results       Collected Updated Procedure Result Status      02/09/2024 1004  02/09/2024 1100 Symptomatic Influenza A/B, RSV, & SARS-CoV2 PCR (COVID-19) Nasopharyngeal [[id number]]    (Abnormal)   Swab from Nasopharyngeal    Final result Component Value   Influenza A PCR Positive   Influenza B PCR Negative   RSV PCR Negative   SARS CoV2 PCR Negative   NEGATIVE: SARS-CoV-2 (COVID-19) RNA not detected, presumed negative.            02/07/2024 1436 02/07/2024 1449 Streptococcus A Rapid Screen w/Reflex to PCR - Clinic Collect [[id number]]   Swab from Throat    Final result Component Value   Group A Strep antigen Negative            02/07/2024 1436 02/08/2024 1350 Symptomatic COVID-19 Virus (Coronavirus) by PCR Nose [[id number]]    Swab from Nose    Final result Component Value   SARS CoV2 PCR Negative   NEGATIVE: SARS-CoV-2 (COVID-19) RNA not detected, presumed negative.            02/07/2024 1436 02/07/2024 1720 Group A Streptococcus PCR Throat Swab [[id number]]    Swab from Throat    Final result Component Value   Group A strep by PCR Not Detected                  Most Recent TSH and T4:  Recent Labs   Lab Test 11/21/23  1321 10/12/22  1018 05/28/20  1150   TSH 1.06   < > 0.88   T4  --   --  1.03    < > = values in this interval not displayed.     Most Recent Hemoglobin A1c:  Recent Labs   Lab Test 02/25/19  1727   A1C 6.2*   ,   Results for orders placed or performed during the hospital encounter of 02/09/24   XR Chest 2 Views    Narrative    CHEST TWO VIEWS  2/9/2024 11:20 AM     HISTORY: Cough.    COMPARISON: 11/21/2023.      Impression    IMPRESSION: Mild probable atelectasis at both lung bases. The lungs  are otherwise clear. Aortic calcification. Heart size upper limits of  normal. Pulmonary vascularity is within normal limits. No pleural  effusions.    MYRIAM GUTIERREZ MD         SYSTEM ID:  [id number]     *Note: Due to a large number of results and/or encounters for the requested time period, some results have not been displayed. A complete set of results can be found in Results  Review.       Discharge Medications   Current Discharge Medication List        START taking these medications    Details   oseltamivir (TAMIFLU) 75 MG capsule Take 1 capsule (75 mg) by mouth 2 times daily for 2 doses  Qty: 2 capsule, Refills: 0    Associated Diagnoses: Influenza A      predniSONE (DELTASONE) 20 MG tablet Take 2 tablets (40 mg) by mouth daily for 5 days  Qty: 10 tablet, Refills: 0    Associated Diagnoses: Influenza A           CONTINUE these medications which have NOT CHANGED    Details   albuterol (PROAIR HFA/PROVENTIL HFA/VENTOLIN HFA) 108 (90 Base) MCG/ACT inhaler INHALE 1 OR 2 puffs EVERY 6 HOURS as needed for wheezing. Strength: 108 (90 Base) MCG/ACT  Qty: 8.5 g, Refills: 11    Comments: Pharmacy may dispense brand covered by insurance (Proair, or proventil or ventolin or generic albuterol inhaler)  Associated Diagnoses: Acute bronchitis, unspecified organism      albuterol (PROVENTIL) (2.5 MG/3ML) 0.083% neb solution INHALE 3 MILLILITERS (2.5 MG) BY NEBULIZATION ROUTE EVERY 6 HOURS AS NEEDED FOR SHORTNESS OF BREATH/DYSPNEA OR WHEEZING. Strength: (2.5 MG/3ML) 0.083%  Qty: 75 mL, Refills: 11    Comments: Do not fill now - just add refills  Associated Diagnoses: Acute bronchitis, unspecified organism      ascorbic acid (VITAMIN C) 1000 MG TABS Take 1,000 mg by mouth daily      atorvastatin (LIPITOR) 10 MG tablet Take 1 tablet (10 mg) by mouth daily  Qty: 90 tablet, Refills: 2    Associated Diagnoses: Hyperlipidemia LDL goal <130      CALCIUM PO Take 1 tablet by mouth daily      cyclobenzaprine (FLEXERIL) 5 MG tablet Take 1 tablet (5 mg) by mouth 2 times daily as needed for muscle spasms  Qty: 30 tablet, Refills: 2    Associated Diagnoses: Acute bilateral low back pain without sciatica      diltiazem ER COATED BEADS (CARDIZEM CD/CARTIA XT) 120 MG 24 hr capsule Take 1 capsule (120 mg) by mouth every morning Hold for blood pressure <110  Qty: 30 capsule, Refills: 11    Comments: ### DO NOT FILL NOW.  Please update patient's profile to reflect additional refills. ####  Associated Diagnoses: Tachyarrhythmia      docusate sodium (COLACE) 100 MG capsule Take 1 capsule (100 mg) by mouth 2 times daily as needed for constipation  Qty: 30 capsule, Refills: 1    Associated Diagnoses: Constipation, unspecified constipation type      HYDROcodone-acetaminophen (NORCO)  MG per tablet Take 0.5 tablets by mouth daily as needed for severe pain      Multiple Vitamins-Minerals (MULTIVITAMIN OR) Take 1 tablet by mouth daily Per pt, uses ones without Iron      omeprazole (PRILOSEC) 20 MG DR capsule TAKE 1 CAPSULE (20 MG) BY MOUTH DAILY.  Qty: 90 capsule, Refills: 1    Associated Diagnoses: Epigastric pain      traMADol (ULTRAM) 50 MG tablet Take 1 tablet (50 mg) by mouth every 6 hours as needed for severe pain  Qty: 90 tablet, Refills: 0    Associated Diagnoses: Other chronic pain      vitamin D3 (CHOLECALCIFEROL) 1000 units (25 mcg) tablet Take 1,000 Units by mouth daily      vitamin E 400 UNITS TABS Take 400 Units by mouth daily      zolpidem (AMBIEN) 5 MG tablet TAKE 1 TABLET (5 MG) BY MOUTH NIGHTLY AS NEEDED FOR SLEEP. DO NOT TAKE WITH TRAMADOL. MUST BE NDC # [address]-91 PER PT REQUEST.  Qty: 30 tablet, Refills: 5    Associated Diagnoses: Sleep disorder           Allergies   Allergies   Allergen Reactions    Metaproterenol Sulfate Other (See Comments)     alupent inhaler-shaking    Percocet [Oxycodone-Acetaminophen] Itching

## 2024-02-13 NOTE — TELEPHONE ENCOUNTER
"Hospital/TCU/ED for chronic condition Discharge Protocol    \"Hi, my name is Felisa Brown RN, a registered nurse, and I am calling from [facility].  I am calling to follow up and see how things are going for you after your recent emergency visit/hospital/TCU stay.\"    Tell me how you are doing now that you are home?\"   Feeling better since coming home, was very sick with influenza   New oxygen 2 LPM   No increase in breathing difficulties since coming home     Discharge Instructions    \"Let's review your discharge instructions.  What is/are the follow-up recommendations?  Pt. Response: follow up with pcp in 1 week     \"Has an appointment with your primary care provider been scheduled?\"   No (schedule appointment)    \"When you see the provider, I would recommend that you bring your medications with you.\"    Medications    \"Tell me what changed about your medicines when you discharged?\"    Changes to chronic meds?    2 or more - Epic MTM referral needed    \"What questions do you have about your medications?\"    None     New diagnoses of heart failure, COPD, diabetes, or MI?    No      Post Discharge Medication Reconciliation Status: discharge medications reconciled, continue medications without change.    Was MTM referral placed (*Make sure to put transitions as reason for referral)?   No, declines  Call Summary    \"What questions or concerns do you have about your recent visit and your follow-up care?\"     none    \"If you have questions or things don't continue to improve, we encourage you contact us through the main clinic number (give number).  Even if the clinic is not open, triage nurses are available 24/7 to help you.     We would like you to know that our clinic has extended hours (provide information).  We also have urgent care (provide details on closest location and hours/contact info)\"      \"Thank you for your time and take care!\"    Felisa Brown, Registered Nurse, PAL (Patient " Advocate Liaison)   [facility]   [phone]

## 2024-02-13 NOTE — TELEPHONE ENCOUNTER
Gina Arias, PAC    Patient is agreeable to spine ortho, please placed referral     RN PAL spoke to patient reviewed message below     Felisa Brown, Registered Nurse, PAL (Patient Advocate Liaison)   [facility]   [phone]

## 2024-02-13 NOTE — PROGRESS NOTES
Pt d/c'd home. Discharge instructions given & verbalized understanding. Discharge meds & home O2 sent with pt.

## 2024-02-13 NOTE — PLAN OF CARE
Goal Outcome Evaluation:     Pertinent assessments: Aox4. VSS. O2 at 1L NC, pulse oxy meter in place sats above 93%, diminished LS,  pain with coughing, Independent in room. Tolerating regular diet, denies N/V/D/, droplet precautions maintained, dispo pending weaning 02 .    Major Shift Events uneventful    Treatment Plan: IV solumedrol, Tamiflu, pain control, symptom management, wean O2 as tolerated for discharge, Mg & K replacement.  Bedside Nurse: John Liao RN

## 2024-02-13 NOTE — PROGRESS NOTES
Patient has been assessed for Home Oxygen needs. Oxygen readings:    *Pulse oximetry (SpO2) = 88% on room air at rest while awake.    *SpO2 improved to 92% on 1liters/minute at rest.    *SpO2 = 85% on room air during activity/with exercise.    *SpO2 improved to 90% on 1liters/minute during activity/with exercise.

## 2024-02-13 NOTE — LETTER
Emeli ELSIE Granados  [address] [place] [id number]    Thank you for choosing [facility] today for your health care needs.     [facility] is transforming primary care  At [facility], we re dedicated to constantly improve how we serve the health care needs of our patients and communities. We re currently making changes to the way we deliver care.     Changes you ll notice include:  An emphasis on building a relationship with a primary care provider  Access to a PAL (patient advocate and liaison) to help guide you with your care needs  Appointment lengths tailored to your specific needs and greater access to a care team to help you and your provider improve and maintain your health and well-being  Improved online access to your care team    Benefits of a primary care provider  If you don t have a designated primary care provider, we encourage you to get to know our care team online and find a provider you d like to see. Most of our providers have a short video on their online provider page. Visit [email or web address] to explore our providers and locations.    Benefits of having a primary care provider include:    They get to know you - your health history, family history and goals, making it easier to make a health plan together.   You get to know them - making health-related conversations and decisions easier    Primary care doctors help you when you re sick or hurt - but also focus on keeping you healthy with preventive care and screenings.    A doctor who sees you regularly is more likely to notice changes in your health.   You ll be connected to a broad care team who partners with your provider to support you.    Patient Advocate Liaison (PAL)   To help make sure you get the right care, at the right time, we include PALs, or Patient Advocate Liaisons, as part of your care team. Your PAL will be your first line of contact. They ll advocate for your needs and help you navigate our services,  connecting you with care team members and community resources to ensure your care is well coordinated. You ll be introduced to a PAL in an upcoming visit.     Expanded care team access with tailored appointment lengths  Depending on your health care needs, you may have longer or shorter appointments and see additional care team providers - including Medication Therapy Management (MTM) pharmacists, diabetes educators, behavioral health clinicians, or social workers. At times, they may be included in your visit with your provider, or you may see them individually.     Online access to your health care records and care team  [employer] is our online tool that makes it easy to see your health care information and communicate with your care team.     [employer] allows you to:   View your health maintenance plan so you know when you re due for a preventive screening  Send secure messages to your care team  View your health history and visit summaries   Schedule appointments   Complete questionnaires and eCheck-in before appointments    Get care from your provider with an e-visit    View and pay your bill     Sign up at [employer]. Once you have an account, you also can download the mobile chavez.     Connecting to fast and convenient care  When you need fast, convenient care - consider one of the following options:     Video Visit: A convenient care option for visiting with your provider out of the comfort of your own home. Most of the things you come to the clinic to address with your provider can now be done virtually through a video. This includes your chronic medication follow up, questions or concerns you may have, and even your annual Medicare Wellness Visit.     Phone Visit: Another convenient option for follow up of common problems that may require a more in-depth discussion with your provider.     E-visit: When you need acute care quickly, or have a quick question about your medication, an E-visit is completed  through [employer] and your provider will respond within one business day.      Izabela ZIMMERMAN RN  Patient Advocate Liaison - PAL RN  [facility]  [phone]

## 2024-02-14 ENCOUNTER — PATIENT OUTREACH (OUTPATIENT)
Dept: CARE COORDINATION | Facility: CLINIC | Age: 69
End: 2024-02-14
Payer: COMMERCIAL

## 2024-02-14 NOTE — PROGRESS NOTES
Connected Care Resource Center: [facility]    Background: Transitional Care Management program identified per system criteria and reviewed by [facility] team for possible outreach.    Assessment: Upon chart review, [facility] Team member will not proceed with patient outreach related to this episode of Transitional Care Management program due to reason below:    Patient has active communication with a nurse, provider or care team for reason of post-hospital follow up plan.  Outreach call by [facility] team not indicated to minimize duplicative efforts.     Patient had Hospital Follow-up call from ALISE RN 2/13/2024.    Plan: Transitional Care Management episode addressed appropriately per reason noted above.      Ashlyn Burrell RN  Connected [facility] [place], [facility]    *Connected Care Resource Team does NOT follow patient ongoing. Referrals are identified based on internal discharge reports and the outreach is to ensure patient has an understanding of their discharge instructions.

## 2024-02-16 ENCOUNTER — OFFICE VISIT (OUTPATIENT)
Dept: FAMILY MEDICINE | Facility: CLINIC | Age: 69
End: 2024-02-16
Payer: COMMERCIAL

## 2024-02-16 VITALS
WEIGHT: 144.8 LBS | DIASTOLIC BLOOD PRESSURE: 79 MMHG | HEART RATE: 75 BPM | SYSTOLIC BLOOD PRESSURE: 122 MMHG | HEIGHT: 59 IN | BODY MASS INDEX: 29.19 KG/M2 | OXYGEN SATURATION: 93 % | TEMPERATURE: 98.8 F | RESPIRATION RATE: 12 BRPM

## 2024-02-16 DIAGNOSIS — M84.48XA SACRAL INSUFFICIENCY FRACTURE, INITIAL ENCOUNTER: ICD-10-CM

## 2024-02-16 DIAGNOSIS — F11.20 CONTINUOUS OPIOID DEPENDENCE (H): ICD-10-CM

## 2024-02-16 DIAGNOSIS — M54.50 ACUTE BILATERAL LOW BACK PAIN WITHOUT SCIATICA: ICD-10-CM

## 2024-02-16 DIAGNOSIS — J96.01 ACUTE RESPIRATORY FAILURE WITH HYPOXIA (H): ICD-10-CM

## 2024-02-16 DIAGNOSIS — R06.2 WHEEZING: ICD-10-CM

## 2024-02-16 DIAGNOSIS — J20.9 ACUTE BRONCHITIS, UNSPECIFIED ORGANISM: ICD-10-CM

## 2024-02-16 DIAGNOSIS — J10.1 INFLUENZA A: Primary | ICD-10-CM

## 2024-02-16 DIAGNOSIS — G89.29 OTHER CHRONIC PAIN: ICD-10-CM

## 2024-02-16 DIAGNOSIS — D13.5 ADENOMYOMATOSIS OF GALLBLADDER: ICD-10-CM

## 2024-02-16 PROCEDURE — 99495 TRANSJ CARE MGMT MOD F2F 14D: CPT | Performed by: PHYSICIAN ASSISTANT

## 2024-02-16 RX ORDER — ALBUTEROL SULFATE 90 UG/1
AEROSOL, METERED RESPIRATORY (INHALATION)
Qty: 8.5 G | Refills: 5 | Status: SHIPPED | OUTPATIENT
Start: 2024-02-16 | End: 2024-09-11

## 2024-02-16 RX ORDER — ALBUTEROL SULFATE 0.83 MG/ML
SOLUTION RESPIRATORY (INHALATION)
Qty: 90 ML | Refills: 5 | Status: SHIPPED | OUTPATIENT
Start: 2024-02-16 | End: 2024-08-12

## 2024-02-16 RX ORDER — RESPIRATORY SYNCYTIAL VIRUS VACCINE 120MCG/0.5
0.5 KIT INTRAMUSCULAR ONCE
Qty: 1 EACH | Refills: 0 | Status: CANCELLED | OUTPATIENT
Start: 2024-02-16 | End: 2024-02-16

## 2024-02-16 RX ORDER — PREDNISONE 10 MG/1
TABLET ORAL
Qty: 14 TABLET | Refills: 0 | Status: SHIPPED | OUTPATIENT
Start: 2024-02-16 | End: 2024-02-25

## 2024-02-16 NOTE — PATIENT INSTRUCTIONS
Surgical Consult   [address] [facility]   [facility] [address]   Phone: [phone]     Use O2 for exertional activities or if O2 below 90%.

## 2024-02-16 NOTE — PROGRESS NOTES
Assessment & Plan     Influenza A  Symptoms improving. She is still needing to use O2 in particular with exertion.  - albuterol (PROVENTIL) (2.5 MG/3ML) 0.083% neb solution; INHALE 3 MILLILITERS (2.5 MG) BY NEBULIZATION ROUTE EVERY 6 HOURS AS NEEDED FOR SHORTNESS OF BREATH/DYSPNEA OR WHEEZING. Strength: (2.5 MG/3ML) 0.083%  - predniSONE (DELTASONE) 10 MG tablet; Take 2 tablets (20 mg) by mouth daily for 5 days, THEN 1 tablet (10 mg) daily for 4 days.    Acute respiratory failure with hypoxia (H)  As noted above.  - albuterol (PROVENTIL) (2.5 MG/3ML) 0.083% neb solution; INHALE 3 MILLILITERS (2.5 MG) BY NEBULIZATION ROUTE EVERY 6 HOURS AS NEEDED FOR SHORTNESS OF BREATH/DYSPNEA OR WHEEZING. Strength: (2.5 MG/3ML) 0.083%  - albuterol (PROAIR HFA/PROVENTIL HFA/VENTOLIN HFA) 108 (90 Base) MCG/ACT inhaler; INHALE 1 OR 2 puffs EVERY 6 HOURS as needed for wheezing. Strength: 108 (90 Base) MCG/ACT  - predniSONE (DELTASONE) 10 MG tablet; Take 2 tablets (20 mg) by mouth daily for 5 days, THEN 1 tablet (10 mg) daily for 4 days.    Acute bronchitis, unspecified organism  As noted above. Refills of inhaler and nebulizers provided.  - albuterol (PROVENTIL) (2.5 MG/3ML) 0.083% neb solution; INHALE 3 MILLILITERS (2.5 MG) BY NEBULIZATION ROUTE EVERY 6 HOURS AS NEEDED FOR SHORTNESS OF BREATH/DYSPNEA OR WHEEZING. Strength: (2.5 MG/3ML) 0.083%  - albuterol (PROAIR HFA/PROVENTIL HFA/VENTOLIN HFA) 108 (90 Base) MCG/ACT inhaler; INHALE 1 OR 2 puffs EVERY 6 HOURS as needed for wheezing. Strength: 108 (90 Base) MCG/ACT  - predniSONE (DELTASONE) 10 MG tablet; Take 2 tablets (20 mg) by mouth daily for 5 days, THEN 1 tablet (10 mg) daily for 4 days.    Wheezing  As noted above.  - albuterol (PROAIR HFA/PROVENTIL HFA/VENTOLIN HFA) 108 (90 Base) MCG/ACT inhaler; INHALE 1 OR 2 puffs EVERY 6 HOURS as needed for wheezing. Strength: 108 (90 Base) MCG/ACT    Continuous opioid dependence (H)  On Tramadol and hydrocodone/acetaminophen, managed by PCP  Dr. Escalante just had refill of the hydrocodone/acetaminophen 2/2/24.  She is currently taking 1 full tablet as needed rather than a 1/2 tablet.    Sacral insufficiency fracture, initial encounter  Noted on MRI obtained with [facility]. I reviewed the records previously and patient noted she wanted to get a second opinion from spine specialist. Referral to spine had been placed. She now is saying she may want to see [facility] instead. If this is the case she can call them to get an appointment with a different spine specialist.    Acute bilateral low back pain without sciatica  As noted above.    Other chronic pain  As noted above.    Adenomyomatosis of gallbladder   Reviewed with patient recommendation to see general surgery and number provided on AVS.    Review of external notes as documented elsewhere in note  Review of the result(s) of each unique test - labs and imaging from the hospitalization  Prescription drug management      MED REC REQUIRED  Post Medication Reconciliation Status:  Discharge medications reconciled, continue medications without change      Subjective   Emeli is a 69 year old, presenting for the following health issues:  Hospital F/U (ED to Hosp-Admission Discharged 2/9/2024 - 2/13/2024 (4 days) [facility], [facility] MD/Last attending   Treatment team Wheezing-Principal problem)        2/16/2024    10:14 AM   Additional Questions   Roomed by antonella [facility] Follow-up Visit:    Hospital/Nursing Home/IP Rehab Facility: [facility]  Date of Admission: 02-  Date of Discharge: 02-  Reason(s) for Admission: wheezing    Was your hospitalization related to COVID-19? No  influenza A and COPD exacerbation   Problems taking medications regularly:  None  Medication changes since discharge:        START taking these medications     Details   oseltamivir (TAMIFLU) 75 MG capsule Take 1 capsule (75 mg) by mouth 2 times daily  "for 2 doses  Qty: 2 capsule, Refills: 0     Associated Diagnoses: Influenza A       predniSONE (DELTASONE) 20 MG tablet Take 2 tablets (40 mg) by mouth daily for 5 days  Qty: 10 tablet, Refills: 0     Associated Diagnoses: Influenza A     Problems adhering to non-medication therapy:  None    Patient is a 69-year-old female who presents today for follow-up hospitalization.  Patient hospitalized on 2/9/2024 for influenza A with acute respiratory failure and hypoxia as well as COPD exacerbation.  She was discharged on 2/13/2024.  Patient was initially treated with Tamiflu as well as supportive cares for COPD and corticosteroids.  She was able to be weaned off of oxygen to 1 to 2 L but ultimately needed to go home with O2.    She has home O2 monitor and typically is in the 90's but has been as low as 84%.    Summary of hospitalization:  [facility] discharge summary reviewed  Diagnostic Tests/Treatments reviewed.  Follow up needed: None  Other Healthcare Providers Involved in Patient s Care:         None  Update since discharge: improved.       Plan of care communicated with patient               Objective    /79 (BP Location: Left arm, Patient Position: Sitting, Cuff Size: Adult Regular)   Pulse 75   Temp 98.8  F (37.1  C) (Oral)   Resp 12   Ht 1.499 m (4' 11\")   Wt 65.7 kg (144 lb 12.8 oz)   LMP  (LMP Unknown)   SpO2 93%   BMI 29.25 kg/m    Body mass index is 29.25 kg/m .    Physical Exam   GENERAL: No acute distress  HEENT: Normocephalic  CARDIAC: Regular rate and rhythm. No murmurs.  PULMONARY: Occasional wheezes and rhonchi.  NEURO: Alert and non-focal          Signed Electronically by: Gina Arias PA-C    "

## 2024-02-20 NOTE — PLAN OF CARE
To Do:  End of Shift Summary  For vital signs and complete assessments, please see documentation flowsheets.     Pertinent assessments: A&Ox4. Up SBA. VSS. Afebrile. Given norco for ongoing flank pain.    Major Shift Events: None - No growth on latest blood cultures.    Treatment Plan: Rocephin, continue to monitor. Possible home tomorrow with PO antibiotics.   No

## 2024-02-23 ENCOUNTER — TRANSFERRED RECORDS (OUTPATIENT)
Dept: HEALTH INFORMATION MANAGEMENT | Facility: CLINIC | Age: 69
End: 2024-02-23
Payer: COMMERCIAL

## 2024-02-28 ENCOUNTER — OFFICE VISIT (OUTPATIENT)
Dept: FAMILY MEDICINE | Facility: CLINIC | Age: 69
End: 2024-02-28
Payer: COMMERCIAL

## 2024-02-28 VITALS
HEIGHT: 59 IN | HEART RATE: 76 BPM | SYSTOLIC BLOOD PRESSURE: 126 MMHG | WEIGHT: 148.4 LBS | BODY MASS INDEX: 29.92 KG/M2 | RESPIRATION RATE: 12 BRPM | TEMPERATURE: 98.1 F | DIASTOLIC BLOOD PRESSURE: 67 MMHG | OXYGEN SATURATION: 97 %

## 2024-02-28 DIAGNOSIS — N30.00 ACUTE CYSTITIS WITHOUT HEMATURIA: ICD-10-CM

## 2024-02-28 DIAGNOSIS — R73.03 PREDIABETES: Primary | ICD-10-CM

## 2024-02-28 DIAGNOSIS — D72.829 LEUKOCYTOSIS, UNSPECIFIED TYPE: ICD-10-CM

## 2024-02-28 DIAGNOSIS — R32 URINARY INCONTINENCE, UNSPECIFIED TYPE: ICD-10-CM

## 2024-02-28 LAB
ALBUMIN UR-MCNC: NEGATIVE MG/DL
APPEARANCE UR: CLEAR
BACTERIA #/AREA URNS HPF: ABNORMAL /HPF
BILIRUB UR QL STRIP: NEGATIVE
COLOR UR AUTO: YELLOW
ERYTHROCYTE [DISTWIDTH] IN BLOOD BY AUTOMATED COUNT: 13.1 % (ref 10–15)
GLUCOSE UR STRIP-MCNC: NEGATIVE MG/DL
HBA1C MFR BLD: 6.2 % (ref 0–5.6)
HCT VFR BLD AUTO: 42.5 % (ref 35–47)
HGB BLD-MCNC: 13.8 G/DL (ref 11.7–15.7)
HGB UR QL STRIP: ABNORMAL
KETONES UR STRIP-MCNC: NEGATIVE MG/DL
LEUKOCYTE ESTERASE UR QL STRIP: ABNORMAL
MCH RBC QN AUTO: 31.8 PG (ref 26.5–33)
MCHC RBC AUTO-ENTMCNC: 32.5 G/DL (ref 31.5–36.5)
MCV RBC AUTO: 98 FL (ref 78–100)
MUCOUS THREADS #/AREA URNS LPF: PRESENT /LPF
NITRATE UR QL: NEGATIVE
PH UR STRIP: 5 [PH] (ref 5–7)
PLATELET # BLD AUTO: 204 10E3/UL (ref 150–450)
RBC # BLD AUTO: 4.34 10E6/UL (ref 3.8–5.2)
RBC #/AREA URNS AUTO: ABNORMAL /HPF
SP GR UR STRIP: 1.02 (ref 1–1.03)
SQUAMOUS #/AREA URNS AUTO: ABNORMAL /LPF
UROBILINOGEN UR STRIP-ACNC: 0.2 E.U./DL
WBC # BLD AUTO: 7.7 10E3/UL (ref 4–11)
WBC #/AREA URNS AUTO: ABNORMAL /HPF
WBC CLUMPS #/AREA URNS HPF: PRESENT /HPF

## 2024-02-28 PROCEDURE — 36415 COLL VENOUS BLD VENIPUNCTURE: CPT | Performed by: PHYSICIAN ASSISTANT

## 2024-02-28 PROCEDURE — 83036 HEMOGLOBIN GLYCOSYLATED A1C: CPT | Performed by: PHYSICIAN ASSISTANT

## 2024-02-28 PROCEDURE — 99214 OFFICE O/P EST MOD 30 MIN: CPT | Performed by: PHYSICIAN ASSISTANT

## 2024-02-28 PROCEDURE — 87086 URINE CULTURE/COLONY COUNT: CPT | Performed by: PHYSICIAN ASSISTANT

## 2024-02-28 PROCEDURE — 81001 URINALYSIS AUTO W/SCOPE: CPT | Performed by: PHYSICIAN ASSISTANT

## 2024-02-28 PROCEDURE — 85027 COMPLETE CBC AUTOMATED: CPT | Performed by: PHYSICIAN ASSISTANT

## 2024-02-28 RX ORDER — SULFAMETHOXAZOLE/TRIMETHOPRIM 800-160 MG
1 TABLET ORAL 2 TIMES DAILY
Qty: 6 TABLET | Refills: 0 | Status: SHIPPED | OUTPATIENT
Start: 2024-02-28 | End: 2024-03-02

## 2024-02-28 RX ORDER — RESPIRATORY SYNCYTIAL VIRUS VACCINE 120MCG/0.5
0.5 KIT INTRAMUSCULAR ONCE
Qty: 1 EACH | Refills: 0 | Status: CANCELLED | OUTPATIENT
Start: 2024-02-28 | End: 2024-02-28

## 2024-02-28 RX ORDER — METFORMIN HCL 500 MG
500 TABLET, EXTENDED RELEASE 24 HR ORAL
Qty: 30 TABLET | Refills: 5 | Status: SHIPPED | OUTPATIENT
Start: 2024-02-28 | End: 2024-09-04

## 2024-02-28 NOTE — PATIENT INSTRUCTIONS
Bladder irritants also can cause symptoms similar to a bladder infection. Bladder irritants include: caffeine (tea and coffee), alcohol, apple juice, lemon juice, soy sauce, carbonated drinks, pineapple, strawberries, tomatoes, yogurt and vinegar.    Prevention of urinary tract infection:    AVOID CHEMICAL IRRITTANTS: bath gels,  Perfumed products,  Deoderant pads or tampons, douching    CLOTHING that increases moisture and bacterial growth:  Nylon, lycra, pantihose, pantiliners     AVOID: tight clothing and thongs    ACIDIFY URINE: cranberry tablets instead of cranberry juice (with excess sugar) to acidify urine and decrease bacterial growth.     URINATE after intercourse    FLUIDS: 6-8 glasses water per day- 60 ounces approximately

## 2024-02-28 NOTE — PROGRESS NOTES
Assessment & Plan     Prediabetes  She is craving sugar. A1c shows prediabetes. She was agreeable to trying metformin to see if helps. Try lowest dose for now. Reviewed common side effects.  Discussed adjusting diet. Offered dietitian but she declines.   - Hemoglobin A1c; Future  - Hemoglobin A1c  - metFORMIN (GLUCOPHAGE XR) 500 MG 24 hr tablet; Take 1 tablet (500 mg) by mouth daily (with dinner)    Leukocytosis, unspecified type  Normalized after recheck, was elevated during hospitalization with influenza A.  Still using O2 from time to time. Can consider stopped at next visit.  - CBC with platelets; Future  - CBC with platelets    Urinary incontinence, unspecified type  UA today shows some signs of infection. Start on Bactrim while waiting for culture.  - UA Macroscopic with reflex to Microscopic and Culture - Lab Collect; Future  - UA Macroscopic with reflex to Microscopic and Culture - Lab Collect  - UA Microscopic with Reflex to Culture  - Urine Culture Aerobic Bacterial - lab collect; Future  - Urine Culture Aerobic Bacterial - lab collect  - sulfamethoxazole-trimethoprim (BACTRIM DS) 800-160 MG tablet; Take 1 tablet by mouth 2 times daily for 3 days    Acute cystitis without hematuria  As noted above.  - sulfamethoxazole-trimethoprim (BACTRIM DS) 800-160 MG tablet; Take 1 tablet by mouth 2 times daily for 3 days    Review of external notes as documented elsewhere in note  Review of the result(s) of each unique test - A1c, UA, CBC  Ordering of each unique test  Prescription drug management    MED REC REQUIRED  Post Medication Reconciliation Status:  Discharge medications reconciled and changed, see notes/orders  BMI      Subjective   Emeli is a 69 year old, presenting for the following health issues:  Consult (Concerned about how much sugar eating and would like to do blood work)        2/28/2024    10:20 AM   Additional Questions   Roomed by antonella borrero     History of Present Illness       Reason  "for visit:  Sugar  Symptom onset:  More than a month  Symptoms include:  Cravings  Symptom intensity:  Severe  Had these symptoms before:  No    She eats 2-3 servings of fruits and vegetables daily.She consumes 0 sweetened beverage(s) daily.She exercises with enough effort to increase her heart rate 9 or less minutes per day.  She exercises with enough effort to increase her heart rate 3 or less days per week.   She is taking medications regularly.       Go over Glucose levels due to such a high need for sugar    She reports she is \"craving\" sugar recently.         Objective    /67 (BP Location: Left arm, Patient Position: Sitting, Cuff Size: Adult Regular)   Pulse 76   Temp 98.1  F (36.7  C) (Oral)   Resp 12   Ht 1.499 m (4' 11\")   Wt 67.3 kg (148 lb 6.4 oz)   LMP  (LMP Unknown)   SpO2 97%   BMI 29.97 kg/m    Body mass index is 29.97 kg/m .  Physical Exam   GENERAL: No acute distress  HEENT: Normocephalic  NEURO: Alert and non-focal      Results for orders placed or performed in visit on 02/28/24 (from the past 24 hour(s))   Hemoglobin A1c   Result Value Ref Range    Hemoglobin A1C 6.2 (H) 0.0 - 5.6 %   CBC with platelets   Result Value Ref Range    WBC Count 7.7 4.0 - 11.0 10e3/uL    RBC Count 4.34 3.80 - 5.20 10e6/uL    Hemoglobin 13.8 11.7 - 15.7 g/dL    Hematocrit 42.5 35.0 - 47.0 %    MCV 98 78 - 100 fL    MCH 31.8 26.5 - 33.0 pg    MCHC 32.5 31.5 - 36.5 g/dL    RDW 13.1 10.0 - 15.0 %    Platelet Count 204 150 - 450 10e3/uL   UA Macroscopic with reflex to Microscopic and Culture - Lab Collect    Specimen: Urine, Midstream   Result Value Ref Range    Color Urine Yellow Colorless, Straw, Light Yellow, Yellow    Appearance Urine Clear Clear    Glucose Urine Negative Negative mg/dL    Bilirubin Urine Negative Negative    Ketones Urine Negative Negative mg/dL    Specific Gravity Urine 1.025 1.003 - 1.035    Blood Urine Trace (A) Negative    pH Urine 5.0 5.0 - 7.0    Protein Albumin Urine Negative " Negative mg/dL    Urobilinogen Urine 0.2 0.2, 1.0 E.U./dL    Nitrite Urine Negative Negative    Leukocyte Esterase Urine Trace (A) Negative   UA Microscopic with Reflex to Culture   Result Value Ref Range    Bacteria Urine Few (A) None Seen /HPF    RBC Urine 2-5 (A) 0-2 /HPF /HPF    WBC Urine 5-10 (A) 0-5 /HPF /HPF    Squamous Epithelials Urine Few (A) None Seen /LPF    WBC Clumps Urine Present (A) None Seen /HPF    Mucus Urine Present (A) None Seen /LPF    Narrative    Urine Culture not indicated     *Note: Due to a large number of results and/or encounters for the requested time period, some results have not been displayed. A complete set of results can be found in Results Review.           Signed Electronically by: Gina Arias PA-C

## 2024-02-29 ENCOUNTER — TELEPHONE (OUTPATIENT)
Dept: FAMILY MEDICINE | Facility: CLINIC | Age: 69
End: 2024-02-29
Payer: COMMERCIAL

## 2024-02-29 LAB — BACTERIA UR CULT: NORMAL

## 2024-02-29 NOTE — TELEPHONE ENCOUNTER
Patient Quality Outreach    Patient is due for the following:   Diabetes -  Eye Exam    Next Steps:   No follow up needed at this time.    Type of outreach:    Chart review performed, no outreach needed. and patient has been seen with [facility] 02-      Questions for provider review:    None           Trish Crowe MA

## 2024-03-04 DIAGNOSIS — G89.29 OTHER CHRONIC PAIN: ICD-10-CM

## 2024-03-04 DIAGNOSIS — M54.40 ACUTE BILATERAL LOW BACK PAIN WITH SCIATICA, SCIATICA LATERALITY UNSPECIFIED: Primary | ICD-10-CM

## 2024-03-04 DIAGNOSIS — F11.20 CONTINUOUS OPIOID DEPENDENCE (H): ICD-10-CM

## 2024-03-04 RX ORDER — HYDROCODONE BITARTRATE AND ACETAMINOPHEN 10; 325 MG/1; MG/1
0.5 TABLET ORAL DAILY PRN
Qty: 30 TABLET | Refills: 0 | Status: SHIPPED | OUTPATIENT
Start: 2024-03-04 | End: 2024-04-01

## 2024-03-06 ENCOUNTER — OFFICE VISIT (OUTPATIENT)
Dept: FAMILY MEDICINE | Facility: CLINIC | Age: 69
End: 2024-03-06
Payer: COMMERCIAL

## 2024-03-06 VITALS
SYSTOLIC BLOOD PRESSURE: 117 MMHG | HEIGHT: 59 IN | DIASTOLIC BLOOD PRESSURE: 68 MMHG | BODY MASS INDEX: 30.24 KG/M2 | HEART RATE: 85 BPM | OXYGEN SATURATION: 96 % | TEMPERATURE: 98.9 F | WEIGHT: 150 LBS

## 2024-03-06 DIAGNOSIS — E78.5 HYPERLIPIDEMIA LDL GOAL <130: ICD-10-CM

## 2024-03-06 DIAGNOSIS — R22.1 NECK SWELLING: Primary | ICD-10-CM

## 2024-03-06 PROCEDURE — 99213 OFFICE O/P EST LOW 20 MIN: CPT | Performed by: PHYSICIAN ASSISTANT

## 2024-03-06 RX ORDER — MOXIFLOXACIN 5 MG/ML
SOLUTION/ DROPS OPHTHALMIC
COMMUNITY
Start: 2024-03-02 | End: 2024-05-15

## 2024-03-06 RX ORDER — ATORVASTATIN CALCIUM 10 MG/1
10 TABLET, FILM COATED ORAL DAILY
Qty: 90 TABLET | Refills: 1 | Status: SHIPPED | OUTPATIENT
Start: 2024-03-06 | End: 2024-09-04

## 2024-03-06 RX ORDER — KETOROLAC TROMETHAMINE 5 MG/ML
SOLUTION OPHTHALMIC
COMMUNITY
Start: 2024-03-02 | End: 2024-05-22

## 2024-03-06 RX ORDER — ATORVASTATIN CALCIUM 10 MG/1
10 TABLET, FILM COATED ORAL DAILY
Qty: 90 TABLET | Refills: 2 | Status: CANCELLED | OUTPATIENT
Start: 2024-03-06

## 2024-03-06 RX ORDER — PREDNISOLONE ACETATE 10 MG/ML
SUSPENSION/ DROPS OPHTHALMIC
COMMUNITY
Start: 2024-03-02 | End: 2024-05-15

## 2024-03-06 NOTE — PATIENT INSTRUCTIONS
Spine specialist through [facility]  Please be aware that coverage of these services is subject to the terms and limitations of your health insurance plan.  Call member services at your health plan with any benefit or coverage questions.   [facility] will call you to coordinate your care as prescribed by your provider. If you don't hear from a representative within 2 business days, please call [phone].

## 2024-03-06 NOTE — PROGRESS NOTES
"  Assessment & Plan     Neck swelling  Unclear if truly a mass, fluid filled area or swelling along the left lateral and inferior neck and medial clavicle. Further assessment with ultrasound.  - US Head Neck Soft Tissue; Future    Hyperlipidemia LDL goal <130  Refilled for patient today. Lab up to date.  - atorvastatin (LIPITOR) 10 MG tablet; Take 1 tablet (10 mg) by mouth daily    Review of external notes as documented elsewhere in note  Ordering of each unique test  Prescription drug management    MED REC REQUIRED  Post Medication Reconciliation Status:  Discharge medications reconciled, continue medications without change      Subjective   Emeli is a 69 year old, presenting for the following health issues:  Mass (C/O 2 lumps on left shoulder with intermittent pain noticed about 6 months )      3/6/2024     8:44 AM   Additional Questions   Roomed by Lillian   Accompanied by Self         3/6/2024     8:44 AM   Patient Reported Additional Medications   Patient reports taking the following new medications metformine     Mass         Concern - Lump/mass  Onset: noticed 6 month's ago  Description: 2 lumps on left shoulder between neck and shoulder blade, with pain caused by some bra straps  Intensity: moderate  Progression of Symptoms:  worsening  Accompanying Signs & Symptoms: pain  Previous history of similar problem:   Precipitating factors:        Worsened by:   Alleviating factors:        Improved by:   Therapies tried and outcome: None        Objective    /68 (BP Location: Right arm, Patient Position: Sitting, Cuff Size: Adult Regular)   Pulse 85   Temp 98.9  F (37.2  C) (Oral)   Ht 1.499 m (4' 11\")   Wt 68 kg (150 lb)   LMP  (LMP Unknown)   SpO2 96%   BMI 30.30 kg/m    Body mass index is 30.3 kg/m .  Physical Exam   GENERAL: No acute distress  HEENT: Normocephalic  MSK: Soft prominence over the lateral and inferior left neck along the medial clavicle, unclear if defined mass vs fluid collection. " Tender to touch.  NEURO: Alert and non-focal            Signed Electronically by: Gina Arias PA-C

## 2024-03-11 ENCOUNTER — OFFICE VISIT (OUTPATIENT)
Dept: FAMILY MEDICINE | Facility: CLINIC | Age: 69
End: 2024-03-11
Payer: COMMERCIAL

## 2024-03-11 VITALS
BODY MASS INDEX: 29.84 KG/M2 | SYSTOLIC BLOOD PRESSURE: 132 MMHG | DIASTOLIC BLOOD PRESSURE: 82 MMHG | TEMPERATURE: 98.5 F | HEIGHT: 59 IN | WEIGHT: 148 LBS | HEART RATE: 80 BPM | OXYGEN SATURATION: 95 % | RESPIRATION RATE: 12 BRPM

## 2024-03-11 DIAGNOSIS — Z23 NEED FOR SHINGLES VACCINE: ICD-10-CM

## 2024-03-11 DIAGNOSIS — E78.5 HYPERLIPIDEMIA LDL GOAL <130: ICD-10-CM

## 2024-03-11 DIAGNOSIS — Z29.11 NEED FOR VACCINATION AGAINST RESPIRATORY SYNCYTIAL VIRUS: ICD-10-CM

## 2024-03-11 DIAGNOSIS — M54.50 ACUTE BILATERAL LOW BACK PAIN WITHOUT SCIATICA: ICD-10-CM

## 2024-03-11 DIAGNOSIS — Z23 NEED FOR PROPHYLACTIC VACCINATION AGAINST HEPATITIS A: ICD-10-CM

## 2024-03-11 DIAGNOSIS — D17.30 LIPOMA OF SKIN AND SUBCUTANEOUS TISSUE: ICD-10-CM

## 2024-03-11 DIAGNOSIS — D13.5 ADENOMYOMATOSIS OF GALLBLADDER: ICD-10-CM

## 2024-03-11 DIAGNOSIS — N39.46 URINARY INCONTINENCE, MIXED: ICD-10-CM

## 2024-03-11 DIAGNOSIS — M54.40 ACUTE BILATERAL LOW BACK PAIN WITH SCIATICA, SCIATICA LATERALITY UNSPECIFIED: ICD-10-CM

## 2024-03-11 DIAGNOSIS — J44.9 CHRONIC OBSTRUCTIVE PULMONARY DISEASE, UNSPECIFIED COPD TYPE (H): ICD-10-CM

## 2024-03-11 DIAGNOSIS — G47.9 SLEEP DISORDER: ICD-10-CM

## 2024-03-11 DIAGNOSIS — I47.10 PAROXYSMAL SUPRAVENTRICULAR TACHYCARDIA (H): ICD-10-CM

## 2024-03-11 DIAGNOSIS — Z00.00 ENCOUNTER FOR MEDICARE ANNUAL WELLNESS EXAM: Primary | ICD-10-CM

## 2024-03-11 DIAGNOSIS — F11.20 CONTINUOUS OPIOID DEPENDENCE (H): ICD-10-CM

## 2024-03-11 DIAGNOSIS — G89.29 OTHER CHRONIC PAIN: ICD-10-CM

## 2024-03-11 LAB
ALBUMIN SERPL BCG-MCNC: 4.3 G/DL (ref 3.5–5.2)
ALBUMIN UR-MCNC: NEGATIVE MG/DL
ALP SERPL-CCNC: 85 U/L (ref 40–150)
ALT SERPL W P-5'-P-CCNC: 40 U/L (ref 0–50)
ANION GAP SERPL CALCULATED.3IONS-SCNC: 10 MMOL/L (ref 7–15)
APPEARANCE UR: CLEAR
AST SERPL W P-5'-P-CCNC: 20 U/L (ref 0–45)
BILIRUB SERPL-MCNC: 0.3 MG/DL
BILIRUB UR QL STRIP: NEGATIVE
BUN SERPL-MCNC: 23.1 MG/DL (ref 8–23)
CALCIUM SERPL-MCNC: 9.6 MG/DL (ref 8.8–10.2)
CHLORIDE SERPL-SCNC: 104 MMOL/L (ref 98–107)
COLOR UR AUTO: YELLOW
CREAT SERPL-MCNC: 0.72 MG/DL (ref 0.51–0.95)
DEPRECATED HCO3 PLAS-SCNC: 27 MMOL/L (ref 22–29)
EGFRCR SERPLBLD CKD-EPI 2021: 89 ML/MIN/1.73M2
GLUCOSE SERPL-MCNC: 104 MG/DL (ref 70–99)
GLUCOSE UR STRIP-MCNC: NEGATIVE MG/DL
HGB UR QL STRIP: NEGATIVE
KETONES UR STRIP-MCNC: NEGATIVE MG/DL
LEUKOCYTE ESTERASE UR QL STRIP: NEGATIVE
NITRATE UR QL: NEGATIVE
PH UR STRIP: 5.5 [PH] (ref 5–7)
POTASSIUM SERPL-SCNC: 4.4 MMOL/L (ref 3.4–5.3)
PROT SERPL-MCNC: 6.9 G/DL (ref 6.4–8.3)
SODIUM SERPL-SCNC: 141 MMOL/L (ref 135–145)
SP GR UR STRIP: 1.02 (ref 1–1.03)
UROBILINOGEN UR STRIP-ACNC: 0.2 E.U./DL

## 2024-03-11 PROCEDURE — 80053 COMPREHEN METABOLIC PANEL: CPT | Performed by: FAMILY MEDICINE

## 2024-03-11 PROCEDURE — 36415 COLL VENOUS BLD VENIPUNCTURE: CPT | Performed by: FAMILY MEDICINE

## 2024-03-11 PROCEDURE — 81003 URINALYSIS AUTO W/O SCOPE: CPT | Performed by: FAMILY MEDICINE

## 2024-03-11 PROCEDURE — 99214 OFFICE O/P EST MOD 30 MIN: CPT | Mod: 25 | Performed by: FAMILY MEDICINE

## 2024-03-11 PROCEDURE — G0402 INITIAL PREVENTIVE EXAM: HCPCS | Performed by: FAMILY MEDICINE

## 2024-03-11 RX ORDER — CYCLOBENZAPRINE HCL 5 MG
5 TABLET ORAL 2 TIMES DAILY PRN
Qty: 30 TABLET | Refills: 2 | Status: SHIPPED | OUTPATIENT
Start: 2024-03-11 | End: 2024-04-23

## 2024-03-11 RX ORDER — TRAMADOL HYDROCHLORIDE 50 MG/1
50 TABLET ORAL EVERY 6 HOURS PRN
Qty: 90 TABLET | Refills: 0 | Status: SHIPPED | OUTPATIENT
Start: 2024-03-11 | End: 2024-08-05

## 2024-03-11 RX ORDER — ZOLPIDEM TARTRATE 5 MG/1
TABLET ORAL
Qty: 30 TABLET | Refills: 5 | Status: SHIPPED | OUTPATIENT
Start: 2024-04-29 | End: 2024-08-29

## 2024-03-11 RX ORDER — RESPIRATORY SYNCYTIAL VIRUS VACCINE 120MCG/0.5
0.5 KIT INTRAMUSCULAR ONCE
Qty: 1 EACH | Refills: 0 | Status: CANCELLED | OUTPATIENT
Start: 2024-03-11 | End: 2024-03-11

## 2024-03-11 SDOH — HEALTH STABILITY: PHYSICAL HEALTH: ON AVERAGE, HOW MANY DAYS PER WEEK DO YOU ENGAGE IN MODERATE TO STRENUOUS EXERCISE (LIKE A BRISK WALK)?: 3 DAYS

## 2024-03-11 ASSESSMENT — SOCIAL DETERMINANTS OF HEALTH (SDOH): HOW OFTEN DO YOU GET TOGETHER WITH FRIENDS OR RELATIVES?: THREE TIMES A WEEK

## 2024-03-11 NOTE — PATIENT INSTRUCTIONS
Preventive Care Advice   This is general advice given by our system to help you stay healthy. However, your care team may have specific advice just for you. Please talk to your care team about your preventive care needs.  Nutrition  Eat 5 or more servings of fruits and vegetables each day.  Try wheat bread, brown rice and whole grain pasta (instead of white bread, rice, and pasta).  Get enough calcium and vitamin D. Check the label on foods and aim for 100% of the RDA (recommended daily allowance).  Lifestyle  Exercise at least 150 minutes each week   (30 minutes a day, 5 days a week).  Do muscle strengthening activities 2 days a week. These help control your weight and prevent disease.  No smoking.  Wear sunscreen to prevent skin cancer.  Have a dental exam and cleaning every 6 months.  Yearly exams  See your health care team every year to talk about:  Any changes in your health.  Any medicines your care team has prescribed.  Preventive care, family planning, and ways to prevent chronic diseases.  Shots (vaccines)   HPV shots (up to age 26), if you've never had them before.  Hepatitis B shots (up to age 59), if you've never had them before.  COVID-19 shot: Get this shot when it's due.  Flu shot: Get a flu shot every year.  Tetanus shot: Get a tetanus shot every 10 years.  Pneumococcal, hepatitis A, and RSV shots: Ask your care team if you need these based on your risk.  Shingles shot (for age 50 and up).  General health tests  Diabetes screening:  Starting at age 35, Get screened for diabetes at least every 3 years.  If you are younger than age 35, ask your care team if you should be screened for diabetes.  Cholesterol test: At age 39, start having a cholesterol test every 5 years, or more often if advised.  Bone density scan (DEXA): At age 50, ask your care team if you should have this scan for osteoporosis (brittle bones).  Hepatitis C: Get tested at least once in your life.  STIs (sexually transmitted  infections)  Before age 24: Ask your care team if you should be screened for STIs.  After age 24: Get screened for STIs if you're at risk. You are at risk for STIs (including HIV) if:  You are sexually active with more than one person.  You don't use condoms every time.  You or a partner was diagnosed with a sexually transmitted infection.  If you are at risk for HIV, ask about PrEP medicine to prevent HIV.  Get tested for HIV at least once in your life, whether you are at risk for HIV or not.  Cancer screening tests  Cervical cancer screening: If you have a cervix, begin getting regular cervical cancer screening tests at age 21. Most people who have regular screenings with normal results can stop after age 65. Talk about this with your provider.  Breast cancer scan (mammogram): If you've ever had breasts, begin having regular mammograms starting at age 40. This is a scan to check for breast cancer.  Colon cancer screening: It is important to start screening for colon cancer at age 45.  Have a colonoscopy test every 10 years (or more often if you're at risk) Or, ask your provider about stool tests like a FIT test every year or Cologuard test every 3 years.  To learn more about your testing options, visit: [email or web address].pdf.  For help making a decision, visit: https://bit.ly/aq00635.  Prostate cancer screening test: If you have a prostate and are age 55 to 69, ask your provider if you would benefit from a yearly prostate cancer screening test.  Lung cancer screening: If you are a current or former smoker age 50 to 80, ask your care team if ongoing lung cancer screenings are right for you.  For informational purposes only. Not to replace the advice of your health care provider. Copyright   2023 [employer]. All rights reserved. Clinically reviewed by the [facility]. J. Hilburn 523323 - REV 01/24.    Learning About Stress  What is stress?     Stress is your  body's response to a hard situation. Your body can have a physical, emotional, or mental response. Stress is a fact of life for most people, and it affects everyone differently. What causes stress for you may not be stressful for someone else.  A lot of things can cause stress. You may feel stress when you go on a job interview, take a test, or run a race. This kind of short-term stress is normal and even useful. It can help you if you need to work hard or react quickly. For example, stress can help you finish an important job on time.  Long-term stress is caused by ongoing stressful situations or events. Examples of long-term stress include long-term health problems, ongoing problems at work, or conflicts in your family. Long-term stress can harm your health.  How does stress affect your health?  When you are stressed, your body responds as though you are in danger. It makes hormones that speed up your heart, make you breathe faster, and give you a burst of energy. This is called the fight-or-flight stress response. If the stress is over quickly, your body goes back to normal and no harm is done.  But if stress happens too often or lasts too long, it can have bad effects. Long-term stress can make you more likely to get sick, and it can make symptoms of some diseases worse. If you tense up when you are stressed, you may develop neck, shoulder, or low back pain. Stress is linked to high blood pressure and heart disease.  Stress also harms your emotional health. It can make you briceno, tense, or depressed. Your relationships may suffer, and you may not do well at work or school.  What can you do to manage stress?  You can try these things to help manage stress:   Do something active. Exercise or activity can help reduce stress. Walking is a great way to get started. Even everyday activities such as housecleaning or yard work can help.  Try yoga or benjamin chi. These techniques combine exercise and meditation. You may need  some training at first to learn them.  Do something you enjoy. For example, listen to music or go to a movie. Practice your hobby or do volunteer work.  Meditate. This can help you relax, because you are not worrying about what happened before or what may happen in the future.  Do guided imagery. Imagine yourself in any setting that helps you feel calm. You can use online videos, books, or a teacher to guide you.  Do breathing exercises. For example:  From a standing position, bend forward from the waist with your knees slightly bent. Let your arms dangle close to the floor.  Breathe in slowly and deeply as you return to a standing position. Roll up slowly and lift your head last.  Hold your breath for just a few seconds in the standing position.  Breathe out slowly and bend forward from the waist.  Let your feelings out. Talk, laugh, cry, and express anger when you need to. Talking with supportive friends or family, a counselor, or a ca leader about your feelings is a healthy way to relieve stress. Avoid discussing your feelings with people who make you feel worse.  Write. It may help to write about things that are bothering you. This helps you find out how much stress you feel and what is causing it. When you know this, you can find better ways to cope.  What can you do to prevent stress?  You might try some of these things to help prevent stress:  Manage your time. This helps you find time to do the things you want and need to do.  Get enough sleep. Your body recovers from the stresses of the day while you are sleeping.  Get support. Your family, friends, and community can make a difference in how you experience stress.  Limit your news feed. Avoid or limit time on social media or news that may make you feel stressed.  Do something active. Exercise or activity can help reduce stress. Walking is a great way to get started.  Where can you learn more?  Go to https://www.healthwise.net/patiented  Enter N032 in the  "search box to learn more about \"Learning About Stress.\"  Current as of: February 26, 2023               Content Version: 13.8    2526-9411 [employer].   Care instructions adapted under license by your healthcare professional. If you have questions about a medical condition or this instruction, always ask your healthcare professional. [employer] disclaims any warranty or liability for your use of this information.      Bladder Training: Care Instructions  Your Care Instructions     Bladder training is used to treat urge incontinence and stress incontinence. Urge incontinence means that the need to urinate comes on so fast that you can't get to a toilet in time. Stress incontinence means that you leak urine because of pressure on your bladder. For example, it may happen when you laugh, cough, or lift something heavy.  Bladder training can increase how long you can wait before you have to urinate. It can also help your bladder hold more urine. And it can give you better control over the urge to urinate.  It is important to remember that bladder training takes a few weeks to a few months to make a difference. You may not see results right away, but don't give up.  Follow-up care is a key part of your treatment and safety. Be sure to make and go to all appointments, and call your doctor if you are having problems. It's also a good idea to know your test results and keep a list of the medicines you take.  How can you care for yourself at home?  Work with your doctor to come up with a bladder training program that is right for you. You may use one or more of the following methods.  Delayed urination  In the beginning, try to keep from urinating for 5 minutes after you first feel the need to go.  While you wait, take deep, slow breaths to relax. Kegel exercises can also help you delay the need to go to the bathroom.  After some practice, when you can easily wait 5 minutes to urinate, try to wait " "10 minutes before you urinate.  Slowly increase the waiting period until you are able to control when you have to urinate.  Scheduled urination  Empty your bladder when you first wake up in the morning.  Schedule times throughout the day when you will urinate.  Start by going to the bathroom every hour, even if you don't need to go.  Slowly increase the time between trips to the bathroom.  When you have found a schedule that works well for you, keep doing it.  If you wake up during the night and have to urinate, do it. Apply your schedule to waking hours only.  Kegel exercises  These tighten and strengthen pelvic muscles, which can help you control the flow of urine. (If doing these exercises causes pain, stop doing them and talk with your doctor.) To do Kegel exercises:  Squeeze your muscles as if you were trying not to pass gas. Or squeeze your muscles as if you were stopping the flow of urine. Your belly, legs, and buttocks shouldn't move.  Hold the squeeze for 3 seconds, then relax for 5 to 10 seconds.  Start with 3 seconds, then add 1 second each week until you are able to squeeze for 10 seconds.  Repeat the exercise 10 times a session. Do 3 to 8 sessions a day.  When should you call for help?  Watch closely for changes in your health, and be sure to contact your doctor if:    Your incontinence is getting worse.     You do not get better as expected.   Where can you learn more?  Go to https://www.[employer].net/patiented  Enter V684 in the search box to learn more about \"Bladder Training: Care Instructions.\"  Current as of: February 28, 2023               Content Version: 13.8    0452-2969 [employer].   Care instructions adapted under license by your healthcare professional. If you have questions about a medical condition or this instruction, always ask your healthcare professional. [employer] disclaims any warranty or liability for your use of this information.      Chronic Pain: " Care Instructions  Your Care Instructions     Chronic pain is pain that lasts a long time (months or even years) and may or may not have a clear cause. It is different from acute pain, which usually does have a clear cause--like an injury or illness--and gets better over time. Chronic pain:  Lasts over time but may vary from day to day.  Does not go away despite efforts to end it.  May disrupt your sleep and lead to fatigue.  May cause depression or anxiety.  May make your muscles tense, causing more pain.  Can disrupt your work, hobbies, home life, and relationships with friends and family.  Chronic pain is a very real condition. It is not just in your head. Treatment can help and usually includes several methods used together, such as medicines, physical therapy, exercise, and other treatments. Learning how to relax and changing negative thought patterns can also help you cope.  Chronic pain is complex. Taking an active role in your treatment will help you better manage your pain. Tell your doctor if you have trouble dealing with your pain. You may have to try several things before you find what works best for you.  Follow-up care is a key part of your treatment and safety. Be sure to make and go to all appointments, and call your doctor if you are having problems. It's also a good idea to know your test results and keep a list of the medicines you take.  How can you care for yourself at home?  Pace yourself. Break up large jobs into smaller tasks. Save harder tasks for days when you have less pain, or go back and forth between hard tasks and easier ones. Take rest breaks.  Relax, and reduce stress. Relaxation techniques such as deep breathing or meditation can help.  Keep moving. Gentle, daily exercise can help reduce pain over the long run. Try low- or no-impact exercises such as walking, swimming, and stationary biking. Do stretches to stay flexible.  Try heat, cold packs, and massage.  Get enough sleep.  Chronic pain can make you tired and drain your energy. Talk with your doctor if you have trouble sleeping because of pain.  Think positive. Your thoughts can affect your pain level. Do things that you enjoy to distract yourself when you have pain instead of focusing on the pain. See a movie, read a book, listen to music, or spend time with a friend.  If you think you are depressed, talk to your doctor about treatment.  Keep a daily pain diary. Record how your moods, thoughts, sleep patterns, activities, and medicine affect your pain. You may find that your pain is worse during or after certain activities or when you are feeling a certain emotion. Having a record of your pain can help you and your doctor find the best ways to treat your pain.  Take pain medicines exactly as directed.  If the doctor gave you a prescription medicine for pain, take it as prescribed.  If you are not taking a prescription pain medicine, ask your doctor if you can take an over-the-counter medicine.  Reducing constipation caused by pain medicine  Talk to your doctor about a laxative. If a laxative doesn't work, your doctor may suggest a prescription medicine.  Include fruits, vegetables, beans, and whole grains in your diet each day. These foods are high in fiber.  If your doctor recommends it, get more exercise. Walking is a good choice. Bit by bit, increase the amount you walk every day. Try for at least 30 minutes on most days of the week.  Schedule time each day for a bowel movement. A daily routine may help. Take your time and do not strain when having a bowel movement.  When should you call for help?   Call your doctor now or seek immediate medical care if:    Your pain gets worse or is out of control.     You feel down or blue, or you do not enjoy things like you once did. You may be depressed, which is common in people with chronic pain. Depression can be treated.     You have vomiting or cramps for more than 2 hours.   Watch  "closely for changes in your health, and be sure to contact your doctor if:    You cannot sleep because of pain.     You are very worried or anxious about your pain.     You have trouble taking your pain medicine.     You have any concerns about your pain medicine.     You have trouble with bowel movements, such as:  No bowel movement in 3 days.  Blood in the anal area, in your stool, or on the toilet paper.  Diarrhea for more than 24 hours.   Where can you learn more?  Go to https://www.[employer].net/patiented  Enter N004 in the search box to learn more about \"Chronic Pain: Care Instructions.\"  Current as of: July 11, 2023               Content Version: 13.8    0828-3504 [employer].   Care instructions adapted under license by your healthcare professional. If you have questions about a medical condition or this instruction, always ask your healthcare professional. [employer] disclaims any warranty or liability for your use of this information.      "

## 2024-03-11 NOTE — LETTER

## 2024-03-11 NOTE — PROGRESS NOTES
Preventive Care Visit  [facility]  Emma Byrne MD, Family Medicine  Mar 11, 2024      SUBJECTIVE:   Emeli is a 69 year old, presenting for the following:  Wellness Visit (Annual wellness- bladder leaking//side of the neck is getting bigger on the left)        3/11/2024    10:18 AM   Additional Questions   Roomed by antonella borrero     Are you in the first 12 months of your Medicare coverage?  No    HPI    Today's PHQ-2 Score:       3/11/2024    10:10 AM   PHQ-2 (  [employer])   Q1: Little interest or pleasure in doing things 0   Q2: Feeling down, depressed or hopeless 0   PHQ-2 Score 0   Q1: Little interest or pleasure in doing things Not at all   Q2: Feeling down, depressed or hopeless Not at all   PHQ-2 Score 0     Have you ever done Advance Care Planning? (For example, a Health Directive, POLST, or a discussion with a medical provider or your loved ones about your wishes): No, advance care planning information given to patient to review.  Patient plans to discuss their wishes with loved ones or provider.         Fall risk  Yes - tailbone    Cognitive Screening   1) Repeat 3 items (village, baby, kitchen)    2) Clock draw: NORMAL  3) 3 item recall: Recalls 3 objects  Results: NORMAL clock, 1-2 items recalled: COGNITIVE IMPAIRMENT LESS LIKELY    Mini-CogTM Copyright S [employer]. Licensed by the author for use in [facility]; reprinted with permission (lisa@.[facility]). All rights reserved.      Do you have sleep apnea, excessive snoring or daytime drowsiness? : yes    Reviewed and updated as needed this visit by clinical staff     Meds            Reviewed and updated as needed this visit by Provider                  Social History     Tobacco Use    Smoking status: Former     Packs/day: 0.50     Years: 30.00     Additional pack years: 0.00     Total pack years: 15.00     Types: Cigarettes     Start date: 10/13/1967     Quit date: 2019     Years since quittin.3     Smokeless tobacco: Never    Tobacco comments:     quit 11/2019   Substance Use Topics    Alcohol use: No     Comment: sober since 9/11/99             3/11/2024    10:10 AM   Alcohol Use   Prescreen: >3 drinks/day or >7 drinks/week? Not Applicable     Do you have a current opioid prescription? No  Do you use any other controlled substances or medications that are not prescribed by a provider? None          Current providers sharing in care for this patient include:   Patient Care Team:  Emma Byrne MD as PCP - General (Family Medicine)  Emma Byrne MD as Assigned PCP  Ani Cabezas PA-C as Assigned Surgical Provider  Millie Dominguez MD as MD (Cardiovascular Disease)  Millie Dominguez MD as Assigned Heart and Vascular Provider  Emma Byrne MD as Assigned Pain Medication Provider  RosalindIzabela RN as Personal Advocate & Liaison (PAL) (Nurse)  Tiffanie More PA-C as Physician Assistant (Urology)    The following health maintenance items are reviewed in Epic and correct as of today:  Health Maintenance   Topic Date Due    HEPATITIS A IMMUNIZATION (1 of 2 - Risk 2-dose series) Never done    RSV VACCINE (Pregnancy & 60+) (1 - 1-dose 60+ series) Never done    ZOSTER IMMUNIZATION (3 of 3) 03/21/2022    MEDICARE ANNUAL WELLNESS VISIT  03/06/2024    DTAP/TDAP/TD IMMUNIZATION (2 - Td or Tdap) 05/02/2025 (Originally 3/4/2025)    CONTROLLED SUBSTANCE AGREEMENT FOR CHRONIC PAIN MANAGEMENT  05/02/2024    LIPID  10/31/2024    URINE DRUG SCREEN  10/31/2024    ANNUAL REVIEW OF HM ORDERS  10/31/2024    HAMLET ASSESSMENT  12/01/2024    PHQ-9  12/01/2024    FALL RISK ASSESSMENT  03/11/2025    MAMMO SCREENING  03/23/2025    COLORECTAL CANCER SCREENING  11/24/2026    GLUCOSE  02/12/2027    ADVANCE CARE PLANNING  10/31/2028    DEXA  06/16/2037    SPIROMETRY  Completed    HEPATITIS C SCREENING  Completed    COPD ACTION PLAN  Completed    PHQ-2 (once per calendar year)  Completed     INFLUENZA VACCINE  Completed    COVID-19 Vaccine  Completed    IPV IMMUNIZATION  Aged Out    HPV IMMUNIZATION  Aged Out    MENINGITIS IMMUNIZATION  Aged Out    RSV MONOCLONAL ANTIBODY  Aged Out    Pneumococcal Vaccine: 65+ Years  Discontinued    LUNG CANCER SCREENING  Discontinued     BP Readings from Last 3 Encounters:   03/11/24 132/82   03/06/24 117/68   02/28/24 126/67    Wt Readings from Last 3 Encounters:   03/11/24 67.1 kg (148 lb)   03/06/24 68 kg (150 lb)   02/28/24 67.3 kg (148 lb 6.4 oz)                  Patient Active Problem List   Diagnosis    Disorder of bone and cartilage    CARDIOVASCULAR SCREENING; LDL GOAL LESS THAN 130    Osteopenia    Back pain    Moderate persistent asthma with exacerbation    Other chronic pain    Bacteremia    Adenomyomatosis of gallbladder    Diverticulosis of colon    Acute bilateral low back pain without sciatica    Paroxysmal supraventricular tachycardia    F11.2 - Continuous opioid dependence (H)    Muscle strain of right gluteal region, subsequent encounter    Wheezing    Influenza A    Hypoxia    Hyperlipidemia LDL goal <130     Past Surgical History:   Procedure Laterality Date    CARPAL TUNNEL RELEASE RT/LT Right 11/23/2021    and trigger finger and tendon repair    CARPAL TUNNEL RELEASE RT/LT Left 12/20/2021    and tendon repair    COLONOSCOPY  2004, 2012    repeat in 2022    COLONOSCOPY  11/24/2023    4 polyps - repeat in 3 years    Dilatation of the esophagus once due to dysphagia and stricture  2003    Ganglion cyst removal      HEMORRHOIDECTOMY BANDING      HYSTERECTOMY  1998    LAPAROSCOPIC SALPINGO-OOPHORECTOMY Bilateral 10/23/2015    Procedure: LAPAROSCOPIC SALPINGO-OOPHORECTOMY;  Surgeon: Johnathan Steve MD;  Location: [facility] OR    subscapularous repair and biceps tendon repair  05/2022    Dr[facility] Verma at [facility]    Thumb surgery Right     TONSILLECTOMY      XR WRIST SURGERY FLORI RIGHT Right 11/2023    surgery for DJD and bone graft done       Social History      Tobacco Use    Smoking status: Former     Packs/day: 0.50     Years: 30.00     Additional pack years: 0.00     Total pack years: 15.00     Types: Cigarettes     Start date: 10/13/1967     Quit date: 2019     Years since quittin.3    Smokeless tobacco: Never    Tobacco comments:     quit 2019   Substance Use Topics    Alcohol use: No     Comment: sober since 99     Family History   Problem Relation Age of Onset    Hypertension Mother     Cerebrovascular Disease Mother         TIA's    Depression Mother     Cancer - colorectal Maternal Grandmother         dx at 65    Lipids Father     C.A.D. Father         angioplasty at 65    Musculoskeletal Disorder Father     Alcohol/Drug Daughter         in remission    Breast Cancer No family hx of          Current Outpatient Medications   Medication Sig Dispense Refill    albuterol (PROAIR HFA/PROVENTIL HFA/VENTOLIN HFA) 108 (90 Base) MCG/ACT inhaler INHALE 1 OR 2 puffs EVERY 6 HOURS as needed for wheezing. Strength: 108 (90 Base) MCG/ACT 8.5 g 5    albuterol (PROVENTIL) (2.5 MG/3ML) 0.083% neb solution INHALE 3 MILLILITERS (2.5 MG) BY NEBULIZATION ROUTE EVERY 6 HOURS AS NEEDED FOR SHORTNESS OF BREATH/DYSPNEA OR WHEEZING. Strength: (2.5 MG/3ML) 0.083% 90 mL 5    ascorbic acid (VITAMIN C) 1000 MG TABS Take 1,000 mg by mouth daily      atorvastatin (LIPITOR) 10 MG tablet Take 1 tablet (10 mg) by mouth daily 90 tablet 1    CALCIUM PO Take 1 tablet by mouth daily      cyclobenzaprine (FLEXERIL) 5 MG tablet Take 1 tablet (5 mg) by mouth 2 times daily as needed for muscle spasms 30 tablet 2    diltiazem ER COATED BEADS (CARDIZEM CD/CARTIA XT) 120 MG 24 hr capsule Take 1 capsule (120 mg) by mouth every morning Hold for blood pressure <110 30 capsule 11    docusate sodium (COLACE) 100 MG capsule Take 1 capsule (100 mg) by mouth 2 times daily as needed for constipation 30 capsule 1    HYDROcodone-acetaminophen (NORCO)  MG per tablet Take 0.5 tablets by mouth  "daily as needed for severe pain 30 tablet 0    ketorolac (ACULAR) 0.5 % ophthalmic solution INSTILL ONE DROP INTO RIGHT EYE FOUR TIMES DAILY. START 4 HOURS AFTER SURGERY. USE UNTIL EMPTY (MAX 3 WEEKS)*      metFORMIN (GLUCOPHAGE XR) 500 MG 24 hr tablet Take 1 tablet (500 mg) by mouth daily (with dinner) 30 tablet 5    moxifloxacin (VIGAMOX) 0.5 % ophthalmic solution INSTILL ONE DROP INTO RIGHT EYE FOUR TIMES DAILY. START 4 HOURS AFTER SURGERY. USE UNTIL EMPTY (MAX 3 WEEKS)*      Multiple Vitamins-Minerals (MULTIVITAMIN OR) Take 1 tablet by mouth daily Per pt, uses ones without Iron      omeprazole (PRILOSEC) 20 MG DR capsule TAKE 1 CAPSULE (20 MG) BY MOUTH DAILY. 90 capsule 1    prednisoLONE acetate (PRED FORTE) 1 % ophthalmic suspension INSTILL ONE DROP INTO RIGHT EYE FOUR TIMES DAILY. START 4 HOURS AFTER SURGERY. USE UNTIL EMPTY (MAX 3 WEEKS)*      traMADol (ULTRAM) 50 MG tablet Take 1 tablet (50 mg) by mouth every 6 hours as needed for severe pain 90 tablet 0    vitamin D3 (CHOLECALCIFEROL) 1000 units (25 mcg) tablet Take 1,000 Units by mouth daily      vitamin E 400 UNITS TABS Take 400 Units by mouth daily      zolpidem (AMBIEN) 5 MG tablet TAKE 1 TABLET (5 MG) BY MOUTH NIGHTLY AS NEEDED FOR SLEEP. DO NOT TAKE WITH TRAMADOL. MUST BE NDC # [address]-97 PER PT REQUEST. 30 tablet 5     Allergies   Allergen Reactions    Metaproterenol Sulfate Other (See Comments)     alupent inhaler-shaking    Percocet [Oxycodone-Acetaminophen] Itching     Mammogram Screening: Mammogram Screening: Recommended mammography every 1-2 years with patient discussion and risk factor consideration      Review of Systems     Review of Systems  Constitutional, HEENT, cardiovascular, pulmonary, gi and gu systems are negative, except as otherwise noted.    OBJECTIVE:   /82 (BP Location: Left arm, Patient Position: Sitting, Cuff Size: Adult Regular)   Pulse 80   Temp 98.5  F (36.9  C) (Oral)   Resp 12   Ht 1.499 m (4' 11\")   Wt 67.1 " "kg (148 lb)   LMP  (LMP Unknown)   SpO2 95%   BMI 29.89 kg/m     Estimated body mass index is 29.89 kg/m  as calculated from the following:    Height as of this encounter: 1.499 m (4' 11\").    Weight as of this encounter: 67.1 kg (148 lb).  Physical Exam  GENERAL: alert and no distress  EYES: Eyes grossly normal to inspection, PERRL and conjunctivae and sclerae normal  HENT: ear canals and TM's normal, nose and mouth without ulcers or lesions  NECK: no adenopathy, no asymmetry, masses, or scars  RESP: lungs clear to auscultation - no rales, rhonchi or wheezes  CV: regular rate and rhythm, normal S1 S2, no S3 or S4, no murmur, click or rub, no peripheral edema  ABDOMEN: soft, nontender, no hepatosplenomegaly, no masses and bowel sounds normal  MS: no gross musculoskeletal defects noted, no edema  SKIN: no suspicious lesions or rashes  NEURO: Normal strength and tone, mentation intact and speech normal  PSYCH: mentation appears normal, affect normal/bright  LYMPH: no cervical, supraclavicular, axillary, or inguinal adenopathy    Diagnostic Test Results:  Labs reviewed in Epic    ASSESSMENT / PLAN:   Need for shingles vaccine  Needs one more    Need for prophylactic vaccination against hepatitis A  Discussed and gave handout     Need for vaccination against respiratory syncytial virus  Discussed and will consider for next fall    Acute bilateral low back pain with sciatica, sciatica laterality unspecified  Chronic - stable    F11.2 - Continuous opioid dependence (H)  Stable  Signed CSA     Other chronic pain  Signed CSA    - traMADol (ULTRAM) 50 MG tablet  Dispense: 90 tablet; Refill: 0    Acute bilateral low back pain without sciatica  Stable  Will renew this  - cyclobenzaprine (FLEXERIL) 5 MG tablet  Dispense: 30 tablet; Refill: 2    Paroxysmal supraventricular tachycardia  Is on beta blocker for this     Encounter for Medicare annual wellness exam  Discussed healthy habits     Chronic obstructive pulmonary " "disease, unspecified COPD type (H)  New dx last month  Has inhalers at home     Hyperlipidemia LDL goal <130  Under control    - Comprehensive metabolic panel (BMP + Alb, Alk Phos, ALT, AST, Total. Bili, TP)  - Comprehensive metabolic panel (BMP + Alb, Alk Phos, ALT, AST, Total. Bili, TP)    Sleep disorder  Stable  Refills per epicare    - zolpidem (AMBIEN) 5 MG tablet  Dispense: 30 tablet; Refill: 5    Adenomyomatosis of gallbladder  Does not want surgery  Will recheck MR in another 6 months to assure stablitiy    - MR Liver wo & w Contrast    Urinary incontinence, mixed  New in the last month  Gave handouts of bladder training and kegels and if in 6 months not any better, consider uro gyn referral or med for this    - UA Macroscopic with reflex to Microscopic and Culture - Lab Collect  - UA Macroscopic with reflex to Microscopic and Culture - Lab Collect    Lipoma on neck  This is growing  Patient has appt with surgery coming up  Handout on the above diagnosis was given to the patient and discussed       Patient has been advised of split billing requirements and indicates understanding: Yes  MED REC REQUIRED  Post Medication Reconciliation Status:       Counseling  Reviewed preventive health counseling, as reflected in patient instructions  Special attention given to:       Regular exercise       Healthy diet/nutrition       Dental care       Immunizations  Discussed all - will get PCV20 first            Osteoporosis prevention/bone health    35 minutes spent on visit and review and orders       BMI  Estimated body mass index is 29.89 kg/m  as calculated from the following:    Height as of this encounter: 1.499 m (4' 11\").    Weight as of this encounter: 67.1 kg (148 lb).         She reports that she quit smoking about 4 years ago. Her smoking use included cigarettes. She started smoking about 56 years ago. She has a 15 pack-year smoking history. She has never used smokeless tobacco.      Appropriate preventive " services were discussed with this patient, including applicable screening as appropriate for fall prevention, nutrition, physical activity, Tobacco-use cessation, weight loss and cognition.  Checklist reviewing preventive services available has been given to the patient.    Reviewed patients plan of care and provided an AVS. The Intermediate Care Plan ( asthma action plan, low back pain action plan, and migraine action plan) for Emeli meets the Care Plan requirement. This Care Plan has been established and reviewed with the Patient.          Signed Electronically by: Emma Byrne MD    Identified Health Risks  I have reviewed Opioid Use Disorder and Substance Use Disorder risk factors and made any needed referrals.

## 2024-03-20 ENCOUNTER — HOSPITAL ENCOUNTER (OUTPATIENT)
Dept: ULTRASOUND IMAGING | Facility: CLINIC | Age: 69
Discharge: HOME | End: 2024-03-20
Attending: PHYSICIAN ASSISTANT | Admitting: PHYSICIAN ASSISTANT
Payer: COMMERCIAL

## 2024-03-20 DIAGNOSIS — R22.1 NECK SWELLING: ICD-10-CM

## 2024-03-20 PROCEDURE — 76536 US EXAM OF HEAD AND NECK: CPT

## 2024-03-27 ENCOUNTER — APPOINTMENT (OUTPATIENT)
Dept: GENERAL RADIOLOGY | Facility: CLINIC | Age: 69
End: 2024-03-27
Attending: EMERGENCY MEDICINE
Payer: COMMERCIAL

## 2024-03-27 ENCOUNTER — HOSPITAL ENCOUNTER (EMERGENCY)
Facility: CLINIC | Age: 69
Discharge: HOME | End: 2024-03-27
Attending: EMERGENCY MEDICINE | Admitting: EMERGENCY MEDICINE
Payer: COMMERCIAL

## 2024-03-27 VITALS
TEMPERATURE: 97.8 F | RESPIRATION RATE: 18 BRPM | WEIGHT: 152.12 LBS | SYSTOLIC BLOOD PRESSURE: 144 MMHG | BODY MASS INDEX: 30.67 KG/M2 | DIASTOLIC BLOOD PRESSURE: 79 MMHG | OXYGEN SATURATION: 98 % | HEIGHT: 59 IN | HEART RATE: 94 BPM

## 2024-03-27 DIAGNOSIS — R00.2 PALPITATIONS: ICD-10-CM

## 2024-03-27 LAB
ANION GAP SERPL CALCULATED.3IONS-SCNC: 13 MMOL/L (ref 7–15)
BASOPHILS # BLD AUTO: 0 10E3/UL (ref 0–0.2)
BASOPHILS NFR BLD AUTO: 0 %
BUN SERPL-MCNC: 17.1 MG/DL (ref 8–23)
CALCIUM SERPL-MCNC: 9.4 MG/DL (ref 8.8–10.2)
CHLORIDE SERPL-SCNC: 104 MMOL/L (ref 98–107)
CREAT SERPL-MCNC: 0.69 MG/DL (ref 0.51–0.95)
DEPRECATED HCO3 PLAS-SCNC: 23 MMOL/L (ref 22–29)
EGFRCR SERPLBLD CKD-EPI 2021: >90 ML/MIN/1.73M2
EOSINOPHIL # BLD AUTO: 0 10E3/UL (ref 0–0.7)
EOSINOPHIL NFR BLD AUTO: 0 %
ERYTHROCYTE [DISTWIDTH] IN BLOOD BY AUTOMATED COUNT: 14.4 % (ref 10–15)
GLUCOSE SERPL-MCNC: 145 MG/DL (ref 70–99)
HCT VFR BLD AUTO: 47.4 % (ref 35–47)
HGB BLD-MCNC: 15.8 G/DL (ref 11.7–15.7)
HOLD SPECIMEN: NORMAL
HOLD SPECIMEN: NORMAL
IMM GRANULOCYTES # BLD: 0.1 10E3/UL
IMM GRANULOCYTES NFR BLD: 1 %
LYMPHOCYTES # BLD AUTO: 2 10E3/UL (ref 0.8–5.3)
LYMPHOCYTES NFR BLD AUTO: 24 %
MCH RBC QN AUTO: 31.7 PG (ref 26.5–33)
MCHC RBC AUTO-ENTMCNC: 33.3 G/DL (ref 31.5–36.5)
MCV RBC AUTO: 95 FL (ref 78–100)
MONOCYTES # BLD AUTO: 0.6 10E3/UL (ref 0–1.3)
MONOCYTES NFR BLD AUTO: 7 %
NEUTROPHILS # BLD AUTO: 5.7 10E3/UL (ref 1.6–8.3)
NEUTROPHILS NFR BLD AUTO: 68 %
NRBC # BLD AUTO: 0 10E3/UL
NRBC BLD AUTO-RTO: 0 /100
PLATELET # BLD AUTO: 261 10E3/UL (ref 150–450)
POTASSIUM SERPL-SCNC: 3.9 MMOL/L (ref 3.4–5.3)
RBC # BLD AUTO: 4.98 10E6/UL (ref 3.8–5.2)
SODIUM SERPL-SCNC: 140 MMOL/L (ref 135–145)
TROPONIN T SERPL HS-MCNC: 6 NG/L
TROPONIN T SERPL HS-MCNC: <6 NG/L
WBC # BLD AUTO: 8.4 10E3/UL (ref 4–11)

## 2024-03-27 PROCEDURE — 36415 COLL VENOUS BLD VENIPUNCTURE: CPT | Performed by: EMERGENCY MEDICINE

## 2024-03-27 PROCEDURE — 85025 COMPLETE CBC W/AUTO DIFF WBC: CPT | Performed by: EMERGENCY MEDICINE

## 2024-03-27 PROCEDURE — 80048 BASIC METABOLIC PNL TOTAL CA: CPT | Performed by: EMERGENCY MEDICINE

## 2024-03-27 PROCEDURE — 93005 ELECTROCARDIOGRAM TRACING: CPT

## 2024-03-27 PROCEDURE — 71046 X-RAY EXAM CHEST 2 VIEWS: CPT

## 2024-03-27 PROCEDURE — 99285 EMERGENCY DEPT VISIT HI MDM: CPT | Mod: 25

## 2024-03-27 PROCEDURE — 84484 ASSAY OF TROPONIN QUANT: CPT | Mod: 91 | Performed by: EMERGENCY MEDICINE

## 2024-03-27 ASSESSMENT — COLUMBIA-SUICIDE SEVERITY RATING SCALE - C-SSRS
1. IN THE PAST MONTH, HAVE YOU WISHED YOU WERE DEAD OR WISHED YOU COULD GO TO SLEEP AND NOT WAKE UP?: NO
2. HAVE YOU ACTUALLY HAD ANY THOUGHTS OF KILLING YOURSELF IN THE PAST MONTH?: NO
6. HAVE YOU EVER DONE ANYTHING, STARTED TO DO ANYTHING, OR PREPARED TO DO ANYTHING TO END YOUR LIFE?: NO

## 2024-03-27 ASSESSMENT — ACTIVITIES OF DAILY LIVING (ADL)
ADLS_ACUITY_SCORE: 35
ADLS_ACUITY_SCORE: 33

## 2024-03-27 NOTE — ED PROVIDER NOTES
"  History     Chief Complaint:  Palpitations       The history is provided by the patient.      Emeli Granados is a 69 year old female with a history of SVT who presents to the ED with palpitations. She explains that for the past month she has had intermittent episodes of heart racing palpitations that feel consistent with her history of SVT. Today around 1400 she had one such episode that lasted roughly twenty minutes. She describes this episode as beginning with a racing heart and shortly thereafter developing a squeezing sensation over her left chest that she describes as \"two fists pushing inward on my chest from the front and back.\" She further endorses diaphoresis, shortness of breath, and nausea during the episode.  The episode has since resolved and is currently asymptomatic.    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    Polycythemia 08/08/2012       Past Surgical History:    Past Surgical History:   Procedure Laterality Date    CARPAL TUNNEL RELEASE RT/LT Right 11/23/2021    and trigger finger and tendon repair    CARPAL TUNNEL RELEASE RT/LT Left 12/20/2021    and tendon repair    COLONOSCOPY  2004, 2012    repeat in 2022    COLONOSCOPY  11/24/2023    4 polyps - repeat in 3 years    Dilatation of the esophagus once due to dysphagia and stricture  2003    Ganglion cyst removal      HEMORRHOIDECTOMY BANDING      HYSTERECTOMY  1998    LAPAROSCOPIC SALPINGO-OOPHORECTOMY Bilateral 10/23/2015    Procedure: " "LAPAROSCOPIC SALPINGO-OOPHORECTOMY;  Surgeon: Johnathan Steve MD;  Location: [facility] OR    subscapularous repair and biceps tendon repair  05/2022    Dr. Verma at [facility]    Thumb surgery Right     TONSILLECTOMY      XR WRIST SURGERY FLORI RIGHT Right 11/2023    surgery for DJD and bone graft done        Physical Exam   Patient Vitals for the past 24 hrs:   BP Temp Temp src Pulse Resp SpO2 Height Weight   03/27/24 1938 (!) 144/79 -- -- 94 18 98 % -- --   03/27/24 1528 127/88 97.8  F (36.6  C) Temporal 102 17 96 % 1.499 m (4' 11\") 69 kg (152 lb 1.9 oz)        Physical Exam      HEENT:    Oropharynx is moist  Eyes:    Conjunctiva normal  Neck:     Supple, no meningismus.     CV:     Regular rate and rhythm.      No murmurs, rubs or gallops.       No unilateral leg swelling.       2+ radial pulses bilateral.       No lower extremity edema.  PULM:    Clear to auscultation bilateral.       No respiratory distress.      Good air exchange.     No rales or wheezing.     No stridor.  ABD:    Soft, non-tender, non-distended.       No pulsatile masses.       No rebound, guarding or rigidity.  MSK:     No gross deformity to all four extremities.   LYMPH:   No cervical lymphadenopathy.  NEURO:   Alert. Good muscle tone, no atrophy.  Skin:    Warm, dry and intact.    Psych:    Mood is good and affect is appropriate.      Emergency Department Course   ECG  ECG taken at 1509, ECG read at 1605  Normal sinus rhythm  Low voltage QRS  Cannot rule out anterior infarct, age undetermined  Abnormal ECG   No significant change as compared to prior, dated 11/21/23.  Rate 91 bpm. NY interval 136 ms. QRS duration 76 ms. QT/QTc 340/418 ms. P-R-T axes 36 -19 43.     Imaging:  Chest XR,  PA & LAT   Final Result   IMPRESSION: No acute cardiopulmonary disease.      ANNETTE DICKINSON MD            SYSTEM ID:  [id number]           Laboratory:  Labs Ordered and Resulted from Time of ED Arrival to Time of ED Departure   BASIC METABOLIC PANEL - Abnormal       " Result Value    Sodium 140      Potassium 3.9      Chloride 104      Carbon Dioxide (CO2) 23      Anion Gap 13      Urea Nitrogen 17.1      Creatinine 0.69      GFR Estimate >90      Calcium 9.4      Glucose 145 (*)    CBC WITH PLATELETS AND DIFFERENTIAL - Abnormal    WBC Count 8.4      RBC Count 4.98      Hemoglobin 15.8 (*)     Hematocrit 47.4 (*)     MCV 95      MCH 31.7      MCHC 33.3      RDW 14.4      Platelet Count 261      % Neutrophils 68      % Lymphocytes 24      % Monocytes 7      % Eosinophils 0      % Basophils 0      % Immature Granulocytes 1      NRBCs per 100 WBC 0      Absolute Neutrophils 5.7      Absolute Lymphocytes 2.0      Absolute Monocytes 0.6      Absolute Eosinophils 0.0      Absolute Basophils 0.0      Absolute Immature Granulocytes 0.1      Absolute NRBCs 0.0     TROPONIN T, HIGH SENSITIVITY - Normal    Troponin T, High Sensitivity 6     TROPONIN T, HIGH SENSITIVITY - Normal    Troponin T, High Sensitivity <6            Emergency Department Course & Assessments:    Interventions:  Medications - No data to display     Independent Interpretation (X-rays, CTs, rhythm strip):  I independently reviewed chest x-ray which shows no pneumothorax or focal filtrate.    Consultations/Discussion of Management or Tests:   ED Course as of 03/27/24 2244   Wed Mar 27, 2024   1554 I obtained history and examined the patient as noted above.     Social Determinants of Health affecting care:   None    Disposition:  The patient was discharged.     Impression & Plan      MIPS (If applicable):  N/A    Medical Decision Makin-year-old female presents with a 20-minute episode of palpitations along with vague chest discomfort.  She has a history of SVT but no evidence of dysrhythmia during ED course.  EKG without ischemic changes.  Initial troponin within normal limits.  Based on short duration of symptoms, 2-hour delta troponin undertaken and unremarkable.  No indication for further serial enzymes.  She  has no active discomfort or symptoms to suggest PE, aortic dissection or aortic aneurysm.  Patient safe for discharge home.  Outpatient Zio patch ordered to evaluate for transient dysrhythmia.  Close follow-up with PCP and return to ED for worsening symptoms.    Diagnosis:    ICD-10-CM    1. Palpitations  R00.2 ZIO PATCH MAIL OUT           Discharge Medications:  Discharge Medication List as of 3/27/2024  7:36 PM             Scribe Disclosure:  I, TANIKA GALLEGOS, am serving as a scribe at 5:19 PM on 3/27/2024 to document services personally performed by John Alcala MD based on my observations and the provider's statements to me.     3/27/2024   John Alcala MD Matthews, Jeremiah R, MD  03/27/24 1728

## 2024-03-27 NOTE — ED TRIAGE NOTES
"Pt complains of intermittent palpitations that last for about 20-30 min that has been going on for the past month. Pt has hx of SVT. No other cardiac hx. Pt complains of \"sweaty hot,\" dizziness.         "

## 2024-03-28 ENCOUNTER — PATIENT OUTREACH (OUTPATIENT)
Dept: CARE COORDINATION | Facility: CLINIC | Age: 69
End: 2024-03-28
Payer: COMMERCIAL

## 2024-03-28 ENCOUNTER — ORDERS ONLY (AUTO-RELEASED) (OUTPATIENT)
Dept: EMERGENCY MEDICINE | Facility: CLINIC | Age: 69
End: 2024-03-28
Payer: COMMERCIAL

## 2024-03-28 DIAGNOSIS — R00.2 PALPITATIONS: ICD-10-CM

## 2024-03-28 LAB
ATRIAL RATE - MUSE: 91 BPM
DIASTOLIC BLOOD PRESSURE - MUSE: NORMAL MMHG
INTERPRETATION ECG - MUSE: NORMAL
P AXIS - MUSE: 36 DEGREES
PR INTERVAL - MUSE: 136 MS
QRS DURATION - MUSE: 76 MS
QT - MUSE: 340 MS
QTC - MUSE: 418 MS
R AXIS - MUSE: -19 DEGREES
SYSTOLIC BLOOD PRESSURE - MUSE: NORMAL MMHG
T AXIS - MUSE: 43 DEGREES
VENTRICULAR RATE- MUSE: 91 BPM

## 2024-03-28 NOTE — PROGRESS NOTES
Clinic Care Coordination Contact  Community Health Worker Initial Outreach    CHW Initial Information Gathering:  Referral Source: ED Follow-Up  CHW Additional Questions  If ED/Hospital discharge, follow-up appointment scheduled as recommended?: No  Patient agreeable to assistance with scheduling?: Yes  CHW assisted patient to schedule (specify): CHW scheduled ED follow-up with PCP on 4/23/2024. CHW offered to look for sooner appointment with alternate providers at [facility] and patient declined, is okay waiting for 04/23/2024 appointment.  MyChart active?: Yes    Patient accepts CC: No, patient declined at this time. Patient will be sent Care Coordination introduction letter for future reference.       Mallory Valadez  Community Health Worker  Connected [facility] Resource Center, [facility]    *Connected Care Resource Team does NOT follow patient ongoing. Referrals are identified based on internal discharge reports and the outreach is to ensure patient has an understanding of their discharge instructions.

## 2024-03-28 NOTE — LETTER
Emeli Granados  [address] [place] [id number]    Dear Emeli Granados,      I am a team member within the Connected Care Resource Center with [facility]. I recently contacted you to ensure you are doing well following a visit within our health system. I also wanted to take this chance to introduce Clinic Care Coordination should you have any interest in this program in the future.    Below is a description of Clinic Care Coordination and how this team can further assist you:       The Clinic Care Coordination team is made up of a Registered Nurse, , Financial Resource Worker, and a Community Health Worker who understand and can help navigate the health care system. The goal of clinic care coordination is to help you manage your health, improve access to care, and achieve optimal health outcomes. They work alongside your provider to assist you in determining your health and social needs, obtain health care and community resources, and provide you with necessary information and education. Clinic Care Coordination can work with you through any barriers and develop a care plan that helps coordinate and strengthen the relationship between you and your care team.    If you wish to connect with the Clinic Care Coordination Team, please let your [facility] Primary Care Provider or Clinic Care Team know and they can place a referral. The Clinic Care Coordination team will then reach out by phone to further support you.    We are focused on providing you with the highest-quality healthcare experience possible.    Sincerely,   Your care team with [facility]

## 2024-04-01 DIAGNOSIS — F11.20 CONTINUOUS OPIOID DEPENDENCE (H): ICD-10-CM

## 2024-04-01 DIAGNOSIS — G89.29 OTHER CHRONIC PAIN: ICD-10-CM

## 2024-04-01 DIAGNOSIS — M54.40 ACUTE BILATERAL LOW BACK PAIN WITH SCIATICA, SCIATICA LATERALITY UNSPECIFIED: ICD-10-CM

## 2024-04-01 RX ORDER — HYDROCODONE BITARTRATE AND ACETAMINOPHEN 10; 325 MG/1; MG/1
0.5 TABLET ORAL DAILY PRN
Qty: 30 TABLET | Refills: 0 | Status: SHIPPED | OUTPATIENT
Start: 2024-04-04 | End: 2024-04-05

## 2024-04-03 ENCOUNTER — VIRTUAL VISIT (OUTPATIENT)
Dept: UROLOGY | Facility: CLINIC | Age: 69
End: 2024-04-03
Payer: COMMERCIAL

## 2024-04-03 DIAGNOSIS — N39.3 FEMALE STRESS INCONTINENCE: ICD-10-CM

## 2024-04-03 DIAGNOSIS — R31.29 MICROSCOPIC HEMATURIA: Primary | ICD-10-CM

## 2024-04-03 PROCEDURE — 99204 OFFICE O/P NEW MOD 45 MIN: CPT | Mod: 95 | Performed by: PHYSICIAN ASSISTANT

## 2024-04-03 NOTE — LETTER
4/3/2024       RE: Emeli Granados  [address]  [facility] [address]     Dear Colleague,    Thank you for referring your patient, Emeli Granados, to the [facility] at [facility]. Please see a copy of my visit note below.    Virtual Visit Details    Type of service:  Video Visit   Video Start Time: 2:09 PM  Video End Time:2:24 PM    Originating Location (pt. Location): Home    Distant Location (provider location):  On-site  Platform used for Video Visit: Jenifer    CC: Hematuria.    HPI: It is a pleasure to see Ms. Emeli Granados, a pleasant 69 year old female, asked to be seen in consultation by Gina Arias PA-C for evaluation of micro hematuria.     The patient denies any gross hematuria.  Ms. Granados voids without difficulty.  She currently denies any dysuria, pyuria, hesitancy, intermittency, feelings of incomplete emptying, or any recent history of urinary tract infections or stones.    Worsening incontinence with coughing after hospitalization in Feb with COPD exacerbation.    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 uterus and left the ovaries    Polycythemia 08/08/2012    resolved now     Past Surgical History:   Procedure Laterality Date     CARPAL TUNNEL RELEASE RT/LT Right 11/23/2021    and trigger finger and tendon repair    CARPAL TUNNEL RELEASE RT/LT Left 12/20/2021    and tendon repair    COLONOSCOPY  2004, 2012    repeat in 2022    COLONOSCOPY  11/24/2023    4 polyps - repeat in 3 years    Dilatation of the esophagus once due to dysphagia and stricture  2003    Ganglion cyst removal      HEMORRHOIDECTOMY BANDING      HYSTERECTOMY  1998    LAPAROSCOPIC SALPINGO-OOPHORECTOMY Bilateral 10/23/2015    Procedure: LAPAROSCOPIC SALPINGO-OOPHORECTOMY;  Surgeon: Johnathan Steve MD;  Location: [facility] OR    subscapularous repair and biceps tendon repair  05/2022    Dr. Verma at [facility]    Thumb surgery Right     TONSILLECTOMY      XR WRIST SURGERY FLORI RIGHT Right 11/2023    surgery for DJD and bone graft done     Current Outpatient Medications   Medication Sig Dispense Refill    albuterol (PROAIR HFA/PROVENTIL HFA/VENTOLIN HFA) 108 (90 Base) MCG/ACT inhaler INHALE 1 OR 2 puffs EVERY 6 HOURS as needed for wheezing. Strength: 108 (90 Base) MCG/ACT 8.5 g 5    albuterol (PROVENTIL) (2.5 MG/3ML) 0.083% neb solution INHALE 3 MILLILITERS (2.5 MG) BY NEBULIZATION ROUTE EVERY 6 HOURS AS NEEDED FOR SHORTNESS OF BREATH/DYSPNEA OR WHEEZING. Strength: (2.5 MG/3ML) 0.083% 90 mL 5    ascorbic acid (VITAMIN C) 1000 MG TABS Take 1,000 mg by mouth daily      atorvastatin (LIPITOR) 10 MG tablet Take 1 tablet (10 mg) by mouth daily 90 tablet 1    CALCIUM PO Take 1 tablet by mouth daily      cyclobenzaprine (FLEXERIL) 5 MG tablet Take 1 tablet (5 mg) by mouth 2 times daily as needed for muscle spasms 30 tablet 2    diltiazem ER COATED BEADS (CARDIZEM CD/CARTIA XT) 120 MG 24 hr capsule Take 1 capsule (120 mg) by mouth every morning Hold for blood pressure <110 30 capsule 11    docusate sodium (COLACE) 100 MG capsule Take 1 capsule (100 mg) by mouth 2 times daily as needed for constipation 30 capsule 1    [START ON 4/4/2024] HYDROcodone-acetaminophen (NORCO)  MG per tablet Take  0.5 tablets by mouth daily as needed for severe pain 30 tablet 0    ketorolac (ACULAR) 0.5 % ophthalmic solution INSTILL ONE DROP INTO RIGHT EYE FOUR TIMES DAILY. START 4 HOURS AFTER SURGERY. USE UNTIL EMPTY (MAX 3 WEEKS)*      metFORMIN (GLUCOPHAGE XR) 500 MG 24 hr tablet Take 1 tablet (500 mg) by mouth daily (with dinner) 30 tablet 5    moxifloxacin (VIGAMOX) 0.5 % ophthalmic solution INSTILL ONE DROP INTO RIGHT EYE FOUR TIMES DAILY. START 4 HOURS AFTER SURGERY. USE UNTIL EMPTY (MAX 3 WEEKS)*      Multiple Vitamins-Minerals (MULTIVITAMIN OR) Take 1 tablet by mouth daily Per pt, uses ones without Iron      omeprazole (PRILOSEC) 20 MG DR capsule TAKE 1 CAPSULE (20 MG) BY MOUTH DAILY. 90 capsule 1    prednisoLONE acetate (PRED FORTE) 1 % ophthalmic suspension INSTILL ONE DROP INTO RIGHT EYE FOUR TIMES DAILY. START 4 HOURS AFTER SURGERY. USE UNTIL EMPTY (MAX 3 WEEKS)*      traMADol (ULTRAM) 50 MG tablet Take 1 tablet (50 mg) by mouth every 6 hours as needed for severe pain 90 tablet 0    vitamin D3 (CHOLECALCIFEROL) 1000 units (25 mcg) tablet Take 1,000 Units by mouth daily      vitamin E 400 UNITS TABS Take 400 Units by mouth daily      [START ON 4/29/2024] zolpidem (AMBIEN) 5 MG tablet TAKE 1 TABLET (5 MG) BY MOUTH NIGHTLY AS NEEDED FOR SLEEP. DO NOT TAKE WITH TRAMADOL. MUST BE NDC # [address]-60 PER PT REQUEST. 30 tablet 5     Allergies   Allergen Reactions    Metaproterenol Sulfate Other (See Comments)     alupent inhaler-shaking    Percocet [Oxycodone-Acetaminophen] Itching     FAMILY HISTORY: There is no reported history of genitourinary carcinoma.  There is no history of urolithiasis.      Social History     Socioeconomic History    Marital status:      Spouse name: Boyfriend - Fritz    Number of children: 3    Years of education: Not on file    Highest education level: Not on file   Occupational History    Occupation: work in [employer]     Employer: [employer]   Tobacco Use    Smoking status: Former      Packs/day: 0.50     Years: 30.00     Additional pack years: 0.00     Total pack years: 15.00     Types: Cigarettes     Start date: 10/13/1967     Quit date: 2019     Years since quittin.4    Smokeless tobacco: Never    Tobacco comments:     quit 2019   Vaping Use    Vaping Use: Never used   Substance and Sexual Activity    Alcohol use: No     Comment: sober since 99    Drug use: No    Sexual activity: Not Currently     Partners: Male   Other Topics Concern    Parent/sibling w/ CABG, MI or angioplasty before 65F 55M? Yes   Social History Narrative    Not on file     Social Determinants of Health     Financial Resource Strain: Low Risk  (3/11/2024)    Financial Resource Strain     Within the past 12 months, have you or your family members you live with been unable to get utilities (heat, electricity) when it was really needed?: No   Food Insecurity: Low Risk  (3/11/2024)    Food Insecurity     Within the past 12 months, did you worry that your food would run out before you got money to buy more?: No     Within the past 12 months, did the food you bought just not last and you didn t have money to get more?: No   Recent Concern: Food Insecurity - High Risk (2024)    Food Insecurity     Within the past 12 months, did you worry that your food would run out before you got money to buy more?: No     Within the past 12 months, did the food you bought just not last and you didn t have money to get more?: Yes   Transportation Needs: Low Risk  (3/11/2024)    Transportation Needs     Within the past 12 months, has lack of transportation kept you from medical appointments, getting your medicines, non-medical meetings or appointments, work, or from getting things that you need?: No   Physical Activity: Unknown (3/11/2024)    Exercise Vital Sign     Days of Exercise per Week: 3 days     Minutes of Exercise per Session: Not on file   Stress: No Stress Concern Present (3/11/2024)    [employer] - Occupational Stress Questionnaire     Feeling of Stress : Only a little   Social Connections: Unknown (3/11/2024)    Social Connection and Isolation Panel [NHANES]     Frequency of Communication with Friends and Family: Not on file     Frequency of Social Gatherings with Friends and Family: Three times a week     Attends Tenriism Services: Not on file     Active Member of Clubs or Organizations: No     Attends Club or Organization Meetings: Never     Marital Status: Living with partner   Interpersonal Safety: Low Risk  (10/31/2023)    Interpersonal Safety     Do you feel physically and emotionally safe where you currently live?: Yes     Within the past 12 months, have you been hit, slapped, kicked or otherwise physically hurt by someone?: No     Within the past 12 months, have you been humiliated or emotionally abused in other ways by your partner or ex-partner?: No   Housing Stability: Low Risk  (3/11/2024)    Housing Stability     Do you have housing? : Yes     Are you worried about losing your housing?: No       PHYSICAL EXAM:   There were no vitals filed for this visit.  PSYCH: NAD  EYES: EOMI  MOUTH: MMM  NEURO: AAO x3    Admission on 03/27/2024, Discharged on 03/27/2024   Component Date Value Ref Range Status    Ventricular Rate 03/27/2024 91  BPM Final    Atrial Rate 03/27/2024 91  BPM Final    [place] Interval 03/27/2024 136  ms Final    QRS Duration 03/27/2024 76  ms Final    QT 03/27/2024 340  ms Final    QTc 03/27/2024 418  ms Final    P Axis 03/27/2024 36  degrees Final    R AXIS 03/27/2024 -19  degrees Final    T Axis 03/27/2024 43  degrees Final    Interpretation ECG 03/27/2024    Final                    Value:Sinus rhythm  Low voltage QRS  Cannot rule out Anterior infarct , age undetermined  Abnormal ECG  When compared with ECG of 21-NOV-2023 13:17,  Vent. rate has increased BY  36 BPM  Confirmed by - EMERGENCY ROOM, PHYSICIAN ([id number]),  REHAN LUONG ([id number]) on 3/28/2024  7:05:30 AM      Troponin T, High Sensitivity 03/27/2024 6  <=14 ng/L Final    Either a High Sensitivity Troponin T baseline (0 hours) value = 100 ng/L, or an increase in High Sensitivity Troponin T = 7 ng/L at 2 hours compared to 0 hours (2-0 hours), suggests myocardial injury, and urgent clinical attention is required.    If the 2-0 hours increase is <7 ng/L, a High Sensitivity Troponin T result above gender-specific reference ranges warrants further evaluation.   Recommendations for further evaluation include correlation with clinical decision-making tool (e.g., HEART), a 3rd High Sensitivity Troponin T test 2 hours after the 2nd (a 20% change from baseline would represent concern), admission for observation, close PCC/cardiology follow-up, or urgent outpatient provocative testing.    Sodium 03/27/2024 140  135 - 145 mmol/L Final    Reference intervals for this test were updated on 09/26/2023 to more accurately reflect our healthy population. There may be differences in the flagging of prior results with similar values performed with this method. Interpretation of those prior results can be made in the context of the updated reference intervals.     Potassium 03/27/2024 3.9  3.4 - 5.3 mmol/L Final    Chloride 03/27/2024 104  98 - 107 mmol/L Final    Carbon Dioxide (CO2) 03/27/2024 23  22 - 29 mmol/L Final    Anion Gap 03/27/2024 13  7 - 15 mmol/L Final    Urea Nitrogen 03/27/2024 17.1  8.0 - 23.0 mg/dL Final    Creatinine 03/27/2024 0.69  0.51 - 0.95 mg/dL Final    GFR Estimate 03/27/2024 >90  >60 mL/min/1.73m2 Final    Calcium 03/27/2024 9.4  8.8 - 10.2 mg/dL Final    Glucose 03/27/2024 145 (H)  70 - 99 mg/dL Final    WBC Count 03/27/2024 8.4  4.0 - 11.0 10e3/uL Final    RBC Count 03/27/2024 4.98  3.80 - 5.20 10e6/uL Final    Hemoglobin 03/27/2024 15.8 (H)  11.7 - 15.7 g/dL Final    Hematocrit 03/27/2024 47.4 (H)  35.0 - 47.0 % Final    MCV 03/27/2024 95  78 - 100 fL Final    MCH 03/27/2024 31.7  26.5 - 33.0 pg  Final    MCHC 03/27/2024 33.3  31.5 - 36.5 g/dL Final    RDW 03/27/2024 14.4  10.0 - 15.0 % Final    Platelet Count 03/27/2024 261  150 - 450 10e3/uL Final    % Neutrophils 03/27/2024 68  % Final    % Lymphocytes 03/27/2024 24  % Final    % Monocytes 03/27/2024 7  % Final    % Eosinophils 03/27/2024 0  % Final    % Basophils 03/27/2024 0  % Final    % Immature Granulocytes 03/27/2024 1  % Final    NRBCs per 100 WBC 03/27/2024 0  <1 /100 Final    Absolute Neutrophils 03/27/2024 5.7  1.6 - 8.3 10e3/uL Final    Absolute Lymphocytes 03/27/2024 2.0  0.8 - 5.3 10e3/uL Final    Absolute Monocytes 03/27/2024 0.6  0.0 - 1.3 10e3/uL Final    Absolute Eosinophils 03/27/2024 0.0  0.0 - 0.7 10e3/uL Final    Absolute Basophils 03/27/2024 0.0  0.0 - 0.2 10e3/uL Final    Absolute Immature Granulocytes 03/27/2024 0.1  <=0.4 10e3/uL Final    Absolute NRBCs 03/27/2024 0.0  10e3/uL Final    Hold Specimen 03/27/2024 JI   Final    Hold Specimen 03/27/2024 [facility]   Final    Troponin T, High Sensitivity 03/27/2024 <6  <=14 ng/L Final    Either a High Sensitivity Troponin T baseline (0 hours) value = 100 ng/L, or an increase in High Sensitivity Troponin T = 7 ng/L at 2 hours compared to 0 hours (2-0 hours), suggests myocardial injury, and urgent clinical attention is required.    If the 2-0 hours increase is <7 ng/L, a High Sensitivity Troponin T result above gender-specific reference ranges warrants further evaluation.   Recommendations for further evaluation include correlation with clinical decision-making tool (e.g., HEART), a 3rd High Sensitivity Troponin T test 2 hours after the 2nd (a 20% change from baseline would represent concern), admission for observation, close PCC/cardiology follow-up, or urgent outpatient provocative testing.       IMAGING: none    ASSESSMENT and PLAN:    69 year old female with high risk micro hematuria and ANIKET.  The differential diagnosis at this point includes stone disease, infection, vaginal contaminant,  urothelial malignancy, renal disorder versus another yet unknown diagnosis.    At this time, recommend proceeding with comprehensive hematuria evaluation to include:  - Urine cytology to look for cells concerning for malignancy.  - CT urogram for upper tract imaging.  - Cystoscopy with the first available urologist to evaluate the interior of the bladder. Follow up for hematuria as recommended by urologist performing cystoscopic evaluation.  -PFPT eval, follow-up with me in 3 months to assess progress.     Thank you for allowing me to participate in Ms. Granados's care. I will keep you updated of her progress, but please do not hesitate to contact me with any questions.    Megan Vargas PA-C  [facility] Urology  30 minutes spent on the date of the encounter doing chart review, review of outside records, review of test results, interpretation of tests, patient visit and documentation.

## 2024-04-03 NOTE — NURSING NOTE
Is the patient currently in the state of [place]? YES    Visit mode:VIDEO    If the visit is dropped, the patient can be reconnected by: VIDEO VISIT: Text to cell phone:   Telephone Information:   Mobile [phone]       Will anyone else be joining the visit? NO  (If patient encounters technical issues they should call [phone])    How would you like to obtain your AVS? MyChart    Are changes needed to the allergy or medication list? No       Reason for visit: Consult    Estelita PROCTORF

## 2024-04-03 NOTE — PROGRESS NOTES
Virtual Visit Details    Type of service:  Video Visit   Video Start Time: 2:09 PM  Video End Time:2:24 PM    Originating Location (pt. Location): Home    Distant Location (provider location):  On-site  Platform used for Video Visit: Jenifer    CC: Hematuria.    HPI: It is a pleasure to see Ms. Emeli Granados, a pleasant 69 year old female, asked to be seen in consultation by Gina Arias PA-C for evaluation of micro hematuria.     The patient denies any gross hematuria.  Ms. Granados voids without difficulty.  She currently denies any dysuria, pyuria, hesitancy, intermittency, feelings of incomplete emptying, or any recent history of urinary tract infections or stones.    Worsening incontinence with coughing after hospitalization in Feb with COPD exacerbation.    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 uterus and left the ovaries    Polycythemia 08/08/2012    resolved now     Past Surgical History:   Procedure Laterality Date    CARPAL TUNNEL RELEASE RT/LT Right 11/23/2021    and trigger finger and tendon repair    CARPAL TUNNEL RELEASE RT/LT Left 12/20/2021    and tendon repair    COLONOSCOPY  2004, 2012    repeat in 2022    COLONOSCOPY  11/24/2023    4 polyps - repeat in 3 years    Dilatation of the esophagus once due to dysphagia and  stricture  2003    Ganglion cyst removal      HEMORRHOIDECTOMY BANDING      HYSTERECTOMY  1998    LAPAROSCOPIC SALPINGO-OOPHORECTOMY Bilateral 10/23/2015    Procedure: LAPAROSCOPIC SALPINGO-OOPHORECTOMY;  Surgeon: Johnathan Steve MD;  Location: [facility] OR    subscapularous repair and biceps tendon repair  05/2022    Dr. Verma at [facility]    Thumb surgery Right     TONSILLECTOMY      XR WRIST SURGERY FLORI RIGHT Right 11/2023    surgery for DJD and bone graft done     Current Outpatient Medications   Medication Sig Dispense Refill    albuterol (PROAIR HFA/PROVENTIL HFA/VENTOLIN HFA) 108 (90 Base) MCG/ACT inhaler INHALE 1 OR 2 puffs EVERY 6 HOURS as needed for wheezing. Strength: 108 (90 Base) MCG/ACT 8.5 g 5    albuterol (PROVENTIL) (2.5 MG/3ML) 0.083% neb solution INHALE 3 MILLILITERS (2.5 MG) BY NEBULIZATION ROUTE EVERY 6 HOURS AS NEEDED FOR SHORTNESS OF BREATH/DYSPNEA OR WHEEZING. Strength: (2.5 MG/3ML) 0.083% 90 mL 5    ascorbic acid (VITAMIN C) 1000 MG TABS Take 1,000 mg by mouth daily      atorvastatin (LIPITOR) 10 MG tablet Take 1 tablet (10 mg) by mouth daily 90 tablet 1    CALCIUM PO Take 1 tablet by mouth daily      cyclobenzaprine (FLEXERIL) 5 MG tablet Take 1 tablet (5 mg) by mouth 2 times daily as needed for muscle spasms 30 tablet 2    diltiazem ER COATED BEADS (CARDIZEM CD/CARTIA XT) 120 MG 24 hr capsule Take 1 capsule (120 mg) by mouth every morning Hold for blood pressure <110 30 capsule 11    docusate sodium (COLACE) 100 MG capsule Take 1 capsule (100 mg) by mouth 2 times daily as needed for constipation 30 capsule 1    [START ON 4/4/2024] HYDROcodone-acetaminophen (NORCO)  MG per tablet Take 0.5 tablets by mouth daily as needed for severe pain 30 tablet 0    ketorolac (ACULAR) 0.5 % ophthalmic solution INSTILL ONE DROP INTO RIGHT EYE FOUR TIMES DAILY. START 4 HOURS AFTER SURGERY. USE UNTIL EMPTY (MAX 3 WEEKS)*      metFORMIN (GLUCOPHAGE XR) 500 MG 24 hr tablet Take 1 tablet (500 mg) by mouth daily  (with dinner) 30 tablet 5    moxifloxacin (VIGAMOX) 0.5 % ophthalmic solution INSTILL ONE DROP INTO RIGHT EYE FOUR TIMES DAILY. START 4 HOURS AFTER SURGERY. USE UNTIL EMPTY (MAX 3 WEEKS)*      Multiple Vitamins-Minerals (MULTIVITAMIN OR) Take 1 tablet by mouth daily Per pt, uses ones without Iron      omeprazole (PRILOSEC) 20 MG DR capsule TAKE 1 CAPSULE (20 MG) BY MOUTH DAILY. 90 capsule 1    prednisoLONE acetate (PRED FORTE) 1 % ophthalmic suspension INSTILL ONE DROP INTO RIGHT EYE FOUR TIMES DAILY. START 4 HOURS AFTER SURGERY. USE UNTIL EMPTY (MAX 3 WEEKS)*      traMADol (ULTRAM) 50 MG tablet Take 1 tablet (50 mg) by mouth every 6 hours as needed for severe pain 90 tablet 0    vitamin D3 (CHOLECALCIFEROL) 1000 units (25 mcg) tablet Take 1,000 Units by mouth daily      vitamin E 400 UNITS TABS Take 400 Units by mouth daily      [START ON 2024] zolpidem (AMBIEN) 5 MG tablet TAKE 1 TABLET (5 MG) BY MOUTH NIGHTLY AS NEEDED FOR SLEEP. DO NOT TAKE WITH TRAMADOL. MUST BE NDC # [address]-38 PER PT REQUEST. 30 tablet 5     Allergies   Allergen Reactions    Metaproterenol Sulfate Other (See Comments)     alupent inhaler-shaking    Percocet [Oxycodone-Acetaminophen] Itching     FAMILY HISTORY: There is no reported history of genitourinary carcinoma.  There is no history of urolithiasis.      Social History     Socioeconomic History    Marital status:      Spouse name: Rossy Hu    Number of children: 3    Years of education: Not on file    Highest education level: Not on file   Occupational History    Occupation: work in [employer]     Employer: [employer]   Tobacco Use    Smoking status: Former     Packs/day: 0.50     Years: 30.00     Additional pack years: 0.00     Total pack years: 15.00     Types: Cigarettes     Start date: 10/13/1967     Quit date: 2019     Years since quittin.4    Smokeless tobacco: Never    Tobacco comments:     quit 2019   Vaping Use    Vaping Use: Never used   Substance  and Sexual Activity    Alcohol use: No     Comment: sober since 9/11/99    Drug use: No    Sexual activity: Not Currently     Partners: Male   Other Topics Concern    Parent/sibling w/ CABG, MI or angioplasty before 65F 55M? Yes   Social History Narrative    Not on file     Social Determinants of Health     Financial Resource Strain: Low Risk  (3/11/2024)    Financial Resource Strain     Within the past 12 months, have you or your family members you live with been unable to get utilities (heat, electricity) when it was really needed?: No   Food Insecurity: Low Risk  (3/11/2024)    Food Insecurity     Within the past 12 months, did you worry that your food would run out before you got money to buy more?: No     Within the past 12 months, did the food you bought just not last and you didn t have money to get more?: No   Recent Concern: Food Insecurity - High Risk (2/7/2024)    Food Insecurity     Within the past 12 months, did you worry that your food would run out before you got money to buy more?: No     Within the past 12 months, did the food you bought just not last and you didn t have money to get more?: Yes   Transportation Needs: Low Risk  (3/11/2024)    Transportation Needs     Within the past 12 months, has lack of transportation kept you from medical appointments, getting your medicines, non-medical meetings or appointments, work, or from getting things that you need?: No   Physical Activity: Unknown (3/11/2024)    Exercise Vital Sign     Days of Exercise per Week: 3 days     Minutes of Exercise per Session: Not on file   Stress: No Stress Concern Present (3/11/2024)    [place] [employer] - Occupational Stress Questionnaire     Feeling of Stress : Only a little   Social Connections: Unknown (3/11/2024)    Social Connection and Isolation Panel [NHANES]     Frequency of Communication with Friends and Family: Not on file     Frequency of Social Gatherings with Friends and Family: Three  times a week     Attends Spiritism Services: Not on file     Active Member of Clubs or Organizations: No     Attends Club or Organization Meetings: Never     Marital Status: Living with partner   Interpersonal Safety: Low Risk  (10/31/2023)    Interpersonal Safety     Do you feel physically and emotionally safe where you currently live?: Yes     Within the past 12 months, have you been hit, slapped, kicked or otherwise physically hurt by someone?: No     Within the past 12 months, have you been humiliated or emotionally abused in other ways by your partner or ex-partner?: No   Housing Stability: Low Risk  (3/11/2024)    Housing Stability     Do you have housing? : Yes     Are you worried about losing your housing?: No       PHYSICAL EXAM:   There were no vitals filed for this visit.  PSYCH: NAD  EYES: EOMI  MOUTH: MMM  NEURO: AAO x3    Admission on 03/27/2024, Discharged on 03/27/2024   Component Date Value Ref Range Status    Ventricular Rate 03/27/2024 91  BPM Final    Atrial Rate 03/27/2024 91  BPM Final    OR Interval 03/27/2024 136  ms Final    QRS Duration 03/27/2024 76  ms Final    QT 03/27/2024 340  ms Final    QTc 03/27/2024 418  ms Final    P Axis 03/27/2024 36  degrees Final    R AXIS 03/27/2024 -19  degrees Final    T Axis 03/27/2024 43  degrees Final    Interpretation ECG 03/27/2024    Final                    Value:Sinus rhythm  Low voltage QRS  Cannot rule out Anterior infarct , age undetermined  Abnormal ECG  When compared with ECG of 21-NOV-2023 13:17,  Vent. rate has increased BY  36 BPM  Confirmed by - EMERGENCY ROOM, PHYSICIAN ([id number]),  REHAN LUONG ([id number]) on 3/28/2024 7:05:30 AM      Troponin T, High Sensitivity 03/27/2024 6  <=14 ng/L Final    Either a High Sensitivity Troponin T baseline (0 hours) value = 100 ng/L, or an increase in High Sensitivity Troponin T = 7 ng/L at 2 hours compared to 0 hours (2-0 hours), suggests myocardial injury, and urgent clinical attention is required.     If the 2-0 hours increase is <7 ng/L, a High Sensitivity Troponin T result above gender-specific reference ranges warrants further evaluation.   Recommendations for further evaluation include correlation with clinical decision-making tool (e.g., HEART), a 3rd High Sensitivity Troponin T test 2 hours after the 2nd (a 20% change from baseline would represent concern), admission for observation, close PCC/cardiology follow-up, or urgent outpatient provocative testing.    Sodium 03/27/2024 140  135 - 145 mmol/L Final    Reference intervals for this test were updated on 09/26/2023 to more accurately reflect our healthy population. There may be differences in the flagging of prior results with similar values performed with this method. Interpretation of those prior results can be made in the context of the updated reference intervals.     Potassium 03/27/2024 3.9  3.4 - 5.3 mmol/L Final    Chloride 03/27/2024 104  98 - 107 mmol/L Final    Carbon Dioxide (CO2) 03/27/2024 23  22 - 29 mmol/L Final    Anion Gap 03/27/2024 13  7 - 15 mmol/L Final    Urea Nitrogen 03/27/2024 17.1  8.0 - 23.0 mg/dL Final    Creatinine 03/27/2024 0.69  0.51 - 0.95 mg/dL Final    GFR Estimate 03/27/2024 >90  >60 mL/min/1.73m2 Final    Calcium 03/27/2024 9.4  8.8 - 10.2 mg/dL Final    Glucose 03/27/2024 145 (H)  70 - 99 mg/dL Final    WBC Count 03/27/2024 8.4  4.0 - 11.0 10e3/uL Final    RBC Count 03/27/2024 4.98  3.80 - 5.20 10e6/uL Final    Hemoglobin 03/27/2024 15.8 (H)  11.7 - 15.7 g/dL Final    Hematocrit 03/27/2024 47.4 (H)  35.0 - 47.0 % Final    MCV 03/27/2024 95  78 - 100 fL Final    MCH 03/27/2024 31.7  26.5 - 33.0 pg Final    MCHC 03/27/2024 33.3  31.5 - 36.5 g/dL Final    RDW 03/27/2024 14.4  10.0 - 15.0 % Final    Platelet Count 03/27/2024 261  150 - 450 10e3/uL Final    % Neutrophils 03/27/2024 68  % Final    % Lymphocytes 03/27/2024 24  % Final    % Monocytes 03/27/2024 7  % Final    % Eosinophils 03/27/2024 0  % Final    %  Basophils 03/27/2024 0  % Final    % Immature Granulocytes 03/27/2024 1  % Final    NRBCs per 100 WBC 03/27/2024 0  <1 /100 Final    Absolute Neutrophils 03/27/2024 5.7  1.6 - 8.3 10e3/uL Final    Absolute Lymphocytes 03/27/2024 2.0  0.8 - 5.3 10e3/uL Final    Absolute Monocytes 03/27/2024 0.6  0.0 - 1.3 10e3/uL Final    Absolute Eosinophils 03/27/2024 0.0  0.0 - 0.7 10e3/uL Final    Absolute Basophils 03/27/2024 0.0  0.0 - 0.2 10e3/uL Final    Absolute Immature Granulocytes 03/27/2024 0.1  <=0.4 10e3/uL Final    Absolute NRBCs 03/27/2024 0.0  10e3/uL Final    Hold Specimen 03/27/2024 [facility]   Final    Hold Specimen 03/27/2024 [facility]   Final    Troponin T, High Sensitivity 03/27/2024 <6  <=14 ng/L Final    Either a High Sensitivity Troponin T baseline (0 hours) value = 100 ng/L, or an increase in High Sensitivity Troponin T = 7 ng/L at 2 hours compared to 0 hours (2-0 hours), suggests myocardial injury, and urgent clinical attention is required.    If the 2-0 hours increase is <7 ng/L, a High Sensitivity Troponin T result above gender-specific reference ranges warrants further evaluation.   Recommendations for further evaluation include correlation with clinical decision-making tool (e.g., HEART), a 3rd High Sensitivity Troponin T test 2 hours after the 2nd (a 20% change from baseline would represent concern), admission for observation, close PCC/cardiology follow-up, or urgent outpatient provocative testing.       IMAGING: none    ASSESSMENT and PLAN:    69 year old female with high risk micro hematuria and ANIKET.  The differential diagnosis at this point includes stone disease, infection, vaginal contaminant, urothelial malignancy, renal disorder versus another yet unknown diagnosis.    At this time, recommend proceeding with comprehensive hematuria evaluation to include:  - Urine cytology to look for cells concerning for malignancy.  - CT urogram for upper tract imaging.  - Cystoscopy with the first available urologist  to evaluate the interior of the bladder. Follow up for hematuria as recommended by urologist performing cystoscopic evaluation.  -PFPT eval, follow-up with me in 3 months to assess progress.     Thank you for allowing me to participate in Ms. Granados's care. I will keep you updated of her progress, but please do not hesitate to contact me with any questions.    Megan Vargas PA-C  [facility]  30 minutes spent on the date of the encounter doing chart review, review of outside records, review of test results, interpretation of tests, patient visit and documentation.

## 2024-04-03 NOTE — PATIENT INSTRUCTIONS
- Urine cytology to look for abnormal cells. Please make an appointment at your local [place] lab to leave a urine sample ([phone]).  - CT scan of the kidneys. You can call [phone] to schedule this.  - My office will call you to arrange a cystoscopy with the  urologist to evaluate the interior of the bladder. Follow up as recommended by the urologist.    CYSTOSCOPY    What is a Cystoscopy?  This is a procedure done to check for problems inside the bladder.  Problems may include polyps (growths), tumors, inflammation (swelling and redness) and other concerns.    The Urologist inserts a thin tube (called a cystoscope) into the bladder.  The tube is about the size of a pencil.  We will give you numbing medicine to reduce the pain or discomfort you may feel.    The Urologist will be able to see inside the bladder by filling the bladder with water.  The water makes it easier to see any problems that may be present. You will have a sense to need to urinate and this is normal.       How should I get ready for the exam?  Nothing to do to prepare. You may eat normally the day of the exam. There is no sedation, so you may drive yourself to and from if you can drive.       Please tell your doctor if:  You have a history of urinary tract infections.  You know that you have a tumor in your bladder.  You have bleeding problems.  You have any allergies.  You are or may be pregnant.      What happens after the exam?  You may go back to your normal diet and activity as you feel ready.    For the next two days after the exam, you may notice:  Some blood in your urine.  Some burning when you urinate (use the toilet).  An urge to urinate more often.  Bladder spasms.    These are normal after the procedure. They should go away on their own after a day or two.      You can help to relieve the above listed symptoms by:  Drinking 6 to 8 large glasses of water each day (includes drinks at meals).  This will help clear the  urine.  Take warm baths to relieve pain and bladder spasms.  Do not add anything to the bath water.  You may take Tylenol (acetaminophen) per label instructions for discomfort.   Please see one of the dedicated pelvic floor physical therapists ([facility] [phone]).    Please be aware that coverage of these services is subject to the terms and limitations of your health insurance plan.  Call member services at your health plan with any benefit or coverage questions.      Please bring the following to your appointment:    *Your personal calendar for scheduling future appointments  *Comfortable clothing

## 2024-04-04 ENCOUNTER — TELEPHONE (OUTPATIENT)
Dept: FAMILY MEDICINE | Facility: CLINIC | Age: 69
End: 2024-04-04
Payer: COMMERCIAL

## 2024-04-04 DIAGNOSIS — F11.20 CONTINUOUS OPIOID DEPENDENCE (H): ICD-10-CM

## 2024-04-04 DIAGNOSIS — G89.29 OTHER CHRONIC PAIN: ICD-10-CM

## 2024-04-04 DIAGNOSIS — M54.40 ACUTE BILATERAL LOW BACK PAIN WITH SCIATICA, SCIATICA LATERALITY UNSPECIFIED: ICD-10-CM

## 2024-04-04 NOTE — TELEPHONE ENCOUNTER
Patient calling.     States her prescription is incorrect for   HYDROcodone-acetaminophen (NORCO)  MG per tablet 30 tablet 0 4/4/2024 -- No   Sig - Route: Take 0.5 tablets by mouth daily as needed for severe pain - Oral       States she has previously gotten 1 tablet, not 0.5 tablets. Because of this patient did not want to  prescription if the dosage is wrong.     Please advise if patient should be taking 1 tablet and change prescription as needed. Route to [facility] for followup to inform patient.       Isha NIX RN on 4/4/2024 at 3:39 PM         Additional notes       Per chart review  Since 3/4 patient has been prescribed 0.5 tablets.  Previously other providers were prescribing 1 tablet.

## 2024-04-04 NOTE — TELEPHONE ENCOUNTER
Patient: Etelvina Kenney     Preferred Name: Etelvina   : 1971 Age: 51 year old   Date: May 3, 2023       Reason for visit:    This visit was performed remotely via live two-way video with patient's verbal consent.   Clinician Location: home  Patient Location: clinic    Etelvina was informed that consent to treat includes permission to submit charges to the applicable insurance on file. Etelvina was advised regarding the potential risk inherent in video/telephone visits, as the assessment may be limited due to what can be seen on the screen which potentially results in an incomplete assessment; as well as either of us may discontinue the video/telephone visit if it is.     Chief Complaint:   Chief Complaint   Patient presents with   • Video Visit        History of Present Illness:      Etelvina Kenney is a 51 year old year old female being seen today for a follow up on medication assisted treatment with buprenorphine 18-24mg daily  for opioid use disorder. she reports strict compliance with buprenorphone maintenance therapy and denies side effects of buprenorphone.     Pharmacologically, medications are well tolerated.     She has recently finished chemo and radiation for her rectal cancer. Her last radiation was last week Tuesday and her specialist told her it would continue to work for 2 weeks. Last chemo was last Saturday, had her pump removed. She reports she is feeling awful. Her pain is bad, has been really bad. She has been using the scheduled 18mg of suboxone plus the additional 6mg for a total of 24mg daily. She is having diarrhea. Her bottom is raw. Her oncologist is managing meds to control her diarrhea. Urinating is hard enough for her right now. She is having urinary incontinence. The 24mg of suboxone is helpful for the pain. She has pretty much been just sleeping and changing her briefs. She feels like an infant. She did not understand that after all of her treatment she may still  Dr. Byrne,    Pt calls and expresses frustration that her most recent script for Norco has been lowered to 0.5 tablets daily, and states this was not discussed.     Per medication chart review:   Disp Refills Start End GOVIND   HYDROcodone-acetaminophen (NORCO)  MG per tablet (Discontinued) 30 tablet 0 2/2/2024 2/9/2024 No   Sig - Route: Take 1 tablet by mouth daily as needed for severe pain NOt to exceed more than 30 per month - Oral   Sent to pharmacy as: HYDROcodone-Acetaminophen  MG Oral Tablet (NORCO)   Class: E-Prescribe   Earliest Fill Date: 2/2/2024   Reason for Discontinue: Med Rec(No AVS / No eCancel)   Order: [id number]   E-Prescribing Status: Receipt confirmed by pharmacy (1/31/2024 12:50 PM CST)      Disp Refills Start End GOVIND   HYDROcodone-acetaminophen (NORCO)  MG per tablet (Discontinued) 30 tablet 0 3/4/2024 4/1/2024 No   Sig - Route: Take 0.5 tablets by mouth daily as needed for severe pain - Oral   Sent to pharmacy as: HYDROcodone-Acetaminophen  MG Oral Tablet (NORCO)   Class: E-Prescribe   Earliest Fill Date: 3/4/2024   Order: [id number]   E-Prescribing Status: Receipt confirmed by pharmacy (3/4/2024 12:01 PM CST)     Pt would like prescription updated asap, please review and advise. Thank you!  Morris Ellis RN on 4/4/2024 at 4:44 PM     have to have surgery. They should know in 6 weeks if they will need to do surgery. If she has to have surgery, she would need a colostomy bag and that is her number 1 fear of this, from the beginning. Her dad had one her whole life and he hated it. He had Crohn's disease since he was a teenager and had a lot of surgeries.   She continues seeing the radiation specialist every week to check on her radiation burns.       Her daughter was just home from [place] last week.     Substance use since last visit?: denies  Depressive symptoms:  hard to day because i'm just so down with my cancer. I guess I would say it's ok. I feel like I'm where I should be or where anybody would be.   Manic or hypomanic symptoms: denies  Anxiety/Panic symptoms: it comes and goes. It seems to have gotten a little bit better. Sometimes i'll have 2 days in a row that it's bad.   Psychotic symptoms: denies  Sleep: sleeping well.   Appetite/weight changes: has lost maybe 15 lbs. Has some mouth sores and thrush. Hard to eat and drink. On fluconazole.   Concentration: álvaro  Energy: not good  Interest/pleasure/anhedonia: not so much  Somatic symptoms: cancer related pain  Suicidal thoughts: denies passive or active suicidal thoughts.   Nightmares/Flashbacks/Hyperarousal:denies  Cravings: none   Other concerns today: denies         Medications:   Current Outpatient Medications   Medication Sig Dispense Refill   • buprenorphine-naLOXone (SUBOXONE FILM) 12-3 MG sublingual film Place a half strip under the tongue 3 times daily. May also place a half strip daily as needed (cravings). 56 each 0   • fluconazole (DIFLUCAN) 100 MG tablet Take 1 tablet by mouth daily. 5 tablet 0   • amphetamine-dextroamphetamine (Adderall) 20 MG tablet Take 1 tablet by mouth 3 times daily. 90 tablet 0   • loperamide (Imodium A-D) 2 MG capsule Take 1 capsule by mouth 4 times daily as needed for Diarrhea. 40 capsule 1   • diphenoxylate-atropine (Lomotil) 2.5-0.025 MG tablet Take  1 tablet by mouth 4 times daily as needed for Diarrhea. 40 tablet 1   • solifenacin (VESIcare) 5 MG tablet Take 1 tablet by mouth daily. 14 tablet 1   • nystatin (MYCOSTATIN) 823055 UNIT/GM powder Apply topically 3 times daily. 30 g 3   • lidocaine (XYLOCAINE) 2 % jelly Apply as directed to affected areas. 60 mL 3   •  compounded derm cream Apply topically to the anal area after each radiation session, and any affected area of radiation 240 g 3   • tiZANidine (ZANAFLEX) 2 MG tablet Take 1 tablet by mouth every 8 hours as needed (muscle cramps). 45 tablet 0   • Skin Protectants, Misc. (InterDry 10\"x144\") SHEET Use as instructed to affected areas 1 each 3   • carvedilol (COREG) 6.25 MG tablet TAKE 1 TABLET BY MOUTH IN THE MORNING AND IN THE EVENING WITH MEALS 180 tablet 0   • gabapentin (NEURONTIN) 600 MG tablet Take 1 tablet by mouth in the morning and 1 tablet at noon and 1 tablet in the evening. 90 tablet 0   • fluoxetine (PROzac) 40 MG capsule Take 1 capsule by mouth daily. 30 capsule 0   • prochlorperazine (COMPAZINE) 10 MG tablet Take 1 tablet by mouth every 6 hours as needed for Nausea or Vomiting. 30 tablet 5   • LORazepam (Ativan) 1 MG tablet Take 1 tablet by mouth daily as needed for Anxiety (Before scans). 3 tablet 0   • nystatin (MYCOSTATIN) 258630 UNIT/GM cream Apply topically 2 times daily. 30 g 0   • blood glucose test strip Test blood sugar 4 times daily. Diagnosis: Diabetes Mellitus. Meter: One Touch 100 each 0   • blood glucose lancets Test blood sugar 4 times daily. Diagnosis: Diabetes Mellitus. Meter: One Touch 100 each 0   • cyanocobalamin (Vitamin B-12) 100 MCG tablet Take 1 tablet by mouth daily. 30 tablet 0   • Cholecalciferol (vitamin D3) 125 mcg (5,000 units) capsule Take 1 capsule by mouth daily. 30 capsule 0   • ibuprofen (MOTRIN) 400 MG tablet Take 1 tablet by mouth every 6 hours as needed for Pain. 30 tablet 0   • blood glucose meter Test blood sugar 4 times daily. Diagnosis: Diabetes  Mellitus. Meter: One Touch 1 kit 0   • simvastatin (ZOCOR) 40 MG tablet Take 1 tablet by mouth nightly. 90 tablet 3   • lisinopril (ZESTRIL) 20 MG tablet Take 1 tablet by mouth daily. 90 tablet 0   • dulaglutide (Trulicity) 0.75 MG/0.5ML pen-injector INJECT CONTENTS OF 1 PEN SUBCUTANEOUSLY EVERY 7 DAYS 3 mL 3   • omeprazole (PriLOSEC) 40 MG capsule Take 1 capsule by mouth 2 times daily. 180 capsule 11   • insulin degludec (Tresiba FlexTouch) 200 UNIT/ML pen-injector Inject 80 Units into the skin daily. Prime 2 units before each dose. 36 mL 3   • Insulin Lispro, 1 Unit Dial, (HumaLOG KwikPen) 100 UNIT/ML pen-injector Inject 30 Units into the skin 2 times daily (with meals). plus sliding scale 2:50>150. Max 80 units daily 60 mL 3     No current facility-administered medications for this encounter.       Problem List:  Patient Active Problem List   Diagnosis   • Obesity (BMI 30-39.9)   • Essential hypertension   • Cigarette nicotine dependence without complication   • Type 2 diabetes mellitus with hyperglycemia, with long-term current use of insulin (CMD)   • Obstructive sleep apnea   • History of cerebral aneurysm repair   • Opioid use disorder, severe, on maintenance therapy (CMD)   • Melanoma of female breast (CMD)   • Attention deficit hyperactivity disorder (ADHD), predominantly inattentive type   • Dyslipidemia   • History of pulmonary embolism   • Severe major depression (CMD)   • Vitamin D deficiency   • Thrombocytopenia (CMD)   • High risk medication use   • Liver cirrhosis (CMD)   • PHT (portal hypertension) (CMD)   • Finger numbness   • Primary osteoarthritis, right hand   • Psychosocial stressors   • Generalized anxiety disorder   • Colon polyps   • Rectal cancer (CMD)   • Cancer of anorectal junction (CMD)   • Cancer related pain   • Encounter for antineoplastic chemotherapy   • Central line clotted, initial encounter (CMD)       Allergies:  ALLERGIES:  No Known Allergies    Past Medical History:  Past  Medical History:   Diagnosis Date   • Arthritis    • Attention deficit disorder    • Bronchitis    • Carpal tunnel syndrome 2/10/2006   • Cerebral infarction (CMD) 2016    no residual    • COPD (chronic obstructive pulmonary disease) (CMD)    • Dental decay 10/13/2022   • Depression    • Diabetes mellitus (CMD)     Type 2- checks blood sugars 3 times a day   • Dyslipidemia    • Eczema    • Esophageal varices in cirrhosis (CMD)    • Essential (primary) hypertension    • Liver cirrhosis (CMD) 10/08/2021   • Macular hole of left eye    • Melanoma (CMD)     right breast   • Myopia with presbyopia 2016   • TIKA (obstructive sleep apnea)     not using CPAP   • PHT (portal hypertension) (CMD) 10/08/2021   • Pulmonary embolism and infarction 2011   • Pulmonary embolism, bilateral (CMD) 2011            Surgical History:  Past Surgical History:   Procedure Laterality Date   • ------------other-------------  2018    Check/Replace Port   • Brain surgery  2016    aneurysm   • Carpal tunnel release Right 05/15/2006    Right CTR Dr. Quintanilla   •  section, low transverse      and    • Colonoscopy  2022    2 colon polyps.  Repeat colonoscopy in 1 year given suboptimal bowel prep.   • Colonoscopy  2023   • Egd  2022   • Esophagogastroduodenoscopy transoral flex w/bx single or mult  2022    esophageal varices, gastritis, Repeat EGD in 4-6 weeks for banding   • Esophagogastroduodenoscopy transoral flex w/bx single or mult  2022    Small to moderate EV, PHG.   • Ivc filter placement  2018    Inferior venacavogram and Vena Caval Filter Placement   • Knee scope,diagnostic Left     knee scope   • Lymph node biopsy Right 2018    Dr Govea   • Mastectomy Right 2018    Dr Govea   • Open access colonoscopy  2022    Normal colon. Repeat in ten years.   • Part remv vitreous,ant apprch Left 2022    Dr Ignacio Cantrell   • Subcutaneous  port insertion  04/24/2018    Dr Govea   • Tonsillectomy and adenoidectomy      age 4   • Total abdom hysterectomy  1999       Family History:  Family History   Problem Relation Age of Onset   • Hypertension Mother    • Osteoarthritis Mother    • Crohn's Disease Father    • Migraine Father    • Substance Abuse Father         pills   • Cataracts Father    • Diabetes Sister    • Osteoarthritis Sister    • COPD Sister    • Patient is unaware of any medical problems Sister    • Diabetes Maternal Grandmother    • Patient is unaware of any medical problems Daughter    • Patient is unaware of any medical problems Daughter        Social History:  Social History     Tobacco Use   • Smoking status: Every Day     Current packs/day: 0.75     Average packs/day: 0.8 packs/day for 30.0 years (22.5 pk-yrs)     Types: Cigarettes   • Smokeless tobacco: Never   Vaping Use   • Vaping status: never used   Substance Use Topics   • Alcohol use: Yes     Comment: occasionally 0-1 every couple of months   • Drug use: No       Medications, allergies, nurse's note, past medical history, past surgical history, family history, and social history were reviewed.         Review Of Systems:      Medical Review Of Systems:     Gastrointestinal - denies nausea, vomitting     Neurological - denies dizziness, weakness     All other systems reviewed and negative for any acute symptoms           Current Evaluation:      MENTAL STATUS EXAM:     Appearance and behavior: 50yo female who appears her stated age. pleasant and cooperative  Eye contact:  good   Speech:  Regular in volume, rate and tone.    Mood: depressed            Affect:  Congruent.  Associations: intact   Thought Process:  Logical, coherent and goal-directed  Thought Content:  No evidence of obsessions, delusions  Perceptual abnormalities: no perceptual abnormalities detected  Suicidal Ideation: none  Homicidal Ideation: none  Orientation: oriented to time, place and person  Memory: recent  and remote memories intact  Language: no evidence of aphasia  Attention and concentration:adequate     Fund of Knowledge:  consistent with education and experiences as evidenced by vocabulary.     Insight:   fair based on appropriate recognition of impact of psychiatric symptoms and need for treatment.     Judgment:  fair, based on recent decisions.    Safety:  Noted in HPI.    Motivation to pursue treatment:  Good.    Vital Signs:  There were no vitals taken for this visit.        Diagnosis:   Opioid use disorder, severe, on maintenance therapy (CMD)  (primary encounter diagnosis)  Severe major depression (CMD)  Generalized anxiety disorder  Psychosocial stressors  Rectal cancer (CMD)  Cancer of anorectal junction (CMD)  Cancer related pain    Assessment and Plan:  Diagnoses   1. Opioid use disorder, severe, on maintenance therapy (CMD)  buprenorphine-naLOXone (SUBOXONE FILM) 12-3 MG sublingual film    continue suboxone 18mg-24mg daily, continue with topical pain relief per oncology for rectal pain. Recheck in 4 weeks, sooner prn. Continue gabapentin to increase effects of suboxone for pain relief.       2. Severe major depression (CMD)  Feels well controlled considering the circumstances. Advised to follow up sooner prn. She continues to report that individual therapy is not helpful for her and she does not want to pursue this.       3. Generalized anxiety disorder  Same as above      4. Psychosocial stressors  Same as above      5. Rectal cancer (CMD)  Same as above      6. Cancer of anorectal junction (CMD)  Same as above      7. Cancer related pain  Same as above            Medical Decision Making and Plan of Treatment:     Diagnostic impression, course, and prognosis of illness discussed with patient.   -Discussed diagnosis, recommended treatment, alternative treatment, the right to refuse treatment, and the benefits and alternatives of taking medication.   -Discussed the importance of sleep, exercise,  activity, and nutrition in relation to mood/thoughts.   -Patient verbalizes understanding of medication and their common side effects, voiced understanding of treatment plan, acceptance of potential risks and consequences, and provided informed consent to the treatment plan and to the medication regimen.   -Various treatment options discussed, and patient and provider in agreement.        The patient is sober and psychiatrically stable.  Continue buprenorphine 18-24mg daily  daily for opioid use disorder and cravings.        · Treatment plan and consents discussed. Pt verbalized agreement.     · Current medication regimen is effective, well tolerated with no changes needed at this time.  Prescriptions were sent to pharmacy.    Pt was given the opportunity to ask questions, there were no educational barriers to learning and all questions were answered. Pt verbalized understanding of medication education and training, voiced understanding of treatment plan, acceptance of potential risks and consequences, and has provided informed consent and agreed to take these medications.      Patient understands that if being prescribed control substances, random urine drug screens can take place and if the drug screen is positive for any substances other than those prescribed (even if it is an old prescription) AND/OR if patient is non-compliant with prescribed substances THEN controlled substances will be ceased. Also, if patient refuses random drug screen, provider may refuse to prescribe controlled substances if there is suspicion of use of other substances due to the risk of major side effects of mixing multiple substances and provider also may consider discharging patient for noncompliance with treatment plan.       There are no Patient Instructions on file for this visit.      Return in about 4 weeks (around 5/31/2023) for follow up.    CHENG Bautista

## 2024-04-05 RX ORDER — HYDROCODONE BITARTRATE AND ACETAMINOPHEN 10; 325 MG/1; MG/1
1 TABLET ORAL DAILY PRN
Qty: 30 TABLET | Refills: 0 | Status: SHIPPED | OUTPATIENT
Start: 2024-04-05 | End: 2024-05-01

## 2024-04-05 NOTE — TELEPHONE ENCOUNTER
Patient out of prescription (refill sent on 4/1/24 could not be filled because previous prescription written for 1/2 tablet daily and thus patient not due for fill). Has been taking 1 tablet daily (which is what she has been prescribed in the past). Prescription was for 1/2 tablet daily, but patient was not told about this change and thus has been taking how she has been taking the prescription (1 tablet daily).  Refill sent for her today after review of .   I will defer to PCP for review to see if the change to 1/2 tablet daily was intentional or if 1 tablet daily is still the prescription.

## 2024-04-05 NOTE — TELEPHONE ENCOUNTER
Patient has prescription and should use this until Dr. Byrne reviews.  Patient unable to fill prescription due to mix up with signature. See above.

## 2024-04-07 NOTE — TELEPHONE ENCOUNTER
She does get 30 per month.   I did not alter the sig.   Not sure how that happened.  No changes meant to happen

## 2024-04-08 NOTE — TELEPHONE ENCOUNTER
Prescription back to 1 tablet vs 0.5 tablets. Closing this encounter as nothing further is needed     Philip Benson RN  Patient Advocate Liaison (PAL)  [facility]    04/08/2024 at 8:32 AM

## 2024-04-09 NOTE — PROGRESS NOTES
"PHYSICIAN NOTE:  This visit was completed in person at the [facility].      I had the pleasure of seeing Ms. Emeli Granados for evaluation of SVT.      The patient is a very pleasant 69-year-old female with the following medical issues:  Short RP SVT. Documented in the ER in 10/2022, 186 bpm, spontaneous conversion. Treated with diltiazem.  Emphysema.  Diverticulosis.  H/o cervical cancer.  Osteopenia.  H/o hepatitis.    She is on diltiazem  mg daily. She has experienced 4 SVT events since November 2023. Longest episode lasted approximately 1 hour. She feels unwell during SVT with chest tightness and sometimes lightheadedness.    She recently completed a cardiac monitor. She mailed it back 3 days ago. I do not have results.    She does not like to take daily \"heart medication\".      PHYSICAL EXAMINATION:  Vital signs: 114/76, 84, 60.5 kg, BMI 30.5  General:  in no apparent distress  ENT/Mouth:  no nasal discharge.  Eyes:  normal conjunctivae.   Neck:  no thyromegaly or lymphadenopathy.  Chest/Lungs:  patient is not dyspneic.  Lungs CTA, without rales or wheezing  Cardiovascular: normal JVP, rhythm is regular.  No gallop, murmur or rub.    Abdomen:  no abdominal distention.    Extremities:  no edema  Skin:  no xanthelasma.    Neurologic:  alert & oriented x 3.    Vascular:  2+ carotids without bruits.        DIAGNOSTIC STUDIES (reviewed/interpreted in the clinic today):  Laboratory studies: cholesterol 72, HDL 82, LDL 73, potassium 3.9, creatinine 0.69      IMPRESSION:  Short RP SVT. She has had recent symptomatic recurrences on medical therapy. We discussed the options of increasing diltiazem VS pursuing catheter ablation. The pros and cons of both options were discussed in detail.  With with regard to the EP procedure I quoted the patient a high likelihood of SVT cure after a single procedure with low risk of associated complication. Potential complications include, but are not limited to, " bleeding, vascular injury, cardiac perforation, conduction system injury requiring pacemaker implantation, DVT, TIA/CVA, valve injury. A pamphlet with information regarding catheter ablation was provided.    RECOMMENDATIONS:  Ms. Granados is considering her options and will call back with her decision.    It was my pleasure seeing this delightful patient.  Please feel free to call with any questions.   Total time spent today, including time for chart review and documentation, was 30 minutes.    Millie Dominguez MD, FACC      (Chart documentation was completed, in part, using Dragon voice-recognition software. The note was reviewed, however grammatical and spelling errors may be present.)      CURRENT MEDICATIONS:  Current Outpatient Medications   Medication Sig Dispense Refill    albuterol (PROAIR HFA/PROVENTIL HFA/VENTOLIN HFA) 108 (90 Base) MCG/ACT inhaler INHALE 1 OR 2 puffs EVERY 6 HOURS as needed for wheezing. Strength: 108 (90 Base) MCG/ACT 8.5 g 5    albuterol (PROVENTIL) (2.5 MG/3ML) 0.083% neb solution INHALE 3 MILLILITERS (2.5 MG) BY NEBULIZATION ROUTE EVERY 6 HOURS AS NEEDED FOR SHORTNESS OF BREATH/DYSPNEA OR WHEEZING. Strength: (2.5 MG/3ML) 0.083% 90 mL 5    ascorbic acid (VITAMIN C) 1000 MG TABS Take 1,000 mg by mouth daily      atorvastatin (LIPITOR) 10 MG tablet Take 1 tablet (10 mg) by mouth daily 90 tablet 1    CALCIUM PO Take 1 tablet by mouth daily      cyclobenzaprine (FLEXERIL) 5 MG tablet Take 1 tablet (5 mg) by mouth 2 times daily as needed for muscle spasms 30 tablet 2    diltiazem ER COATED BEADS (CARDIZEM CD/CARTIA XT) 120 MG 24 hr capsule Take 1 capsule (120 mg) by mouth every morning Hold for blood pressure <110 30 capsule 11    docusate sodium (COLACE) 100 MG capsule Take 1 capsule (100 mg) by mouth 2 times daily as needed for constipation 30 capsule 1    HYDROcodone-acetaminophen (NORCO)  MG per tablet Take 1 tablet by mouth daily as needed for severe pain 30 tablet 0    ketorolac  (ACULAR) 0.5 % ophthalmic solution INSTILL ONE DROP INTO RIGHT EYE FOUR TIMES DAILY. START 4 HOURS AFTER SURGERY. USE UNTIL EMPTY (MAX 3 WEEKS)*      metFORMIN (GLUCOPHAGE XR) 500 MG 24 hr tablet Take 1 tablet (500 mg) by mouth daily (with dinner) 30 tablet 5    moxifloxacin (VIGAMOX) 0.5 % ophthalmic solution INSTILL ONE DROP INTO RIGHT EYE FOUR TIMES DAILY. START 4 HOURS AFTER SURGERY. USE UNTIL EMPTY (MAX 3 WEEKS)*      Multiple Vitamins-Minerals (MULTIVITAMIN OR) Take 1 tablet by mouth daily Per pt, uses ones without Iron      omeprazole (PRILOSEC) 20 MG DR capsule TAKE 1 CAPSULE (20 MG) BY MOUTH DAILY. 90 capsule 1    prednisoLONE acetate (PRED FORTE) 1 % ophthalmic suspension INSTILL ONE DROP INTO RIGHT EYE FOUR TIMES DAILY. START 4 HOURS AFTER SURGERY. USE UNTIL EMPTY (MAX 3 WEEKS)*      traMADol (ULTRAM) 50 MG tablet Take 1 tablet (50 mg) by mouth every 6 hours as needed for severe pain 90 tablet 0    vitamin D3 (CHOLECALCIFEROL) 1000 units (25 mcg) tablet Take 1,000 Units by mouth daily      vitamin E 400 UNITS TABS Take 400 Units by mouth daily      [START ON 4/29/2024] zolpidem (AMBIEN) 5 MG tablet TAKE 1 TABLET (5 MG) BY MOUTH NIGHTLY AS NEEDED FOR SLEEP. DO NOT TAKE WITH TRAMADOL. MUST BE NDC # [address]-50 PER PT REQUEST. 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 uterus and left the ovaries    Polycythemia 08/08/2012    resolved now       PAST  SURGICAL HISTORY:  Past Surgical History:   Procedure Laterality Date    CARPAL TUNNEL RELEASE RT/LT Right 2021    and trigger finger and tendon repair    CARPAL TUNNEL RELEASE RT/LT Left 2021    and tendon repair    COLONOSCOPY  ,     repeat in     COLONOSCOPY  2023    4 polyps - repeat in 3 years    Dilatation of the esophagus once due to dysphagia and stricture      Ganglion cyst removal      HEMORRHOIDECTOMY BANDING      HYSTERECTOMY  1998    LAPAROSCOPIC SALPINGO-OOPHORECTOMY Bilateral 10/23/2015    Procedure: LAPAROSCOPIC SALPINGO-OOPHORECTOMY;  Surgeon: Johnathan Steve MD;  Location: [facility] OR    subscapularous repair and biceps tendon repair  2022    Dr. Verma at [facility]    Thumb surgery Right     TONSILLECTOMY      XR WRIST SURGERY FLORI RIGHT Right 2023    surgery for DJD and bone graft done       FAMILY HISTORY:  Family History   Problem Relation Age of Onset    Hypertension Mother     Cerebrovascular Disease Mother         TIA's    Depression Mother     Cancer - colorectal Maternal Grandmother         dx at 65    Lipids Father     C.A.D. Father         angioplasty at 65    Musculoskeletal Disorder Father     Alcohol/Drug Daughter         in remission    Breast Cancer No family hx of        SOCIAL HISTORY:  Social History     Socioeconomic History    Marital status:      Spouse name: Boyfriend - Fritz    Number of children: 3   Occupational History    Occupation: work in [employer]     Employer: [employer]   Tobacco Use    Smoking status: Former     Packs/day: 0.50     Years: 30.00     Additional pack years: 0.00     Total pack years: 15.00     Types: Cigarettes     Start date: 10/13/1967     Quit date: 2019     Years since quittin.4    Smokeless tobacco: Never    Tobacco comments:     quit 2019   Vaping Use    Vaping Use: Never used   Substance and Sexual Activity    Alcohol use: No     Comment: sober since 99    Drug use: No    Sexual activity: Not  Currently     Partners: Male   Other Topics Concern    Parent/sibling w/ CABG, MI or angioplasty before 65F 55M? Yes     Social Determinants of Health     Financial Resource Strain: Low Risk  (3/11/2024)    Financial Resource Strain     Within the past 12 months, have you or your family members you live with been unable to get utilities (heat, electricity) when it was really needed?: No   Food Insecurity: Low Risk  (3/11/2024)    Food Insecurity     Within the past 12 months, did you worry that your food would run out before you got money to buy more?: No     Within the past 12 months, did the food you bought just not last and you didn t have money to get more?: No   Recent Concern: Food Insecurity - High Risk (2/7/2024)    Food Insecurity     Within the past 12 months, did you worry that your food would run out before you got money to buy more?: No     Within the past 12 months, did the food you bought just not last and you didn t have money to get more?: Yes   Transportation Needs: Low Risk  (3/11/2024)    Transportation Needs     Within the past 12 months, has lack of transportation kept you from medical appointments, getting your medicines, non-medical meetings or appointments, work, or from getting things that you need?: No   Physical Activity: Unknown (3/11/2024)    Exercise Vital Sign     Days of Exercise per Week: 3 days   Stress: No Stress Concern Present (3/11/2024)    [employer] [place] [employer] - Occupational Stress Questionnaire     Feeling of Stress : Only a little   Social Connections: Unknown (3/11/2024)    Social Connection and Isolation Panel [NHANES]     Frequency of Social Gatherings with Friends and Family: Three times a week     Active Member of Clubs or Organizations: No     Attends Club or Organization Meetings: Never     Marital Status: Living with partner   Interpersonal Safety: Low Risk  (10/31/2023)    Interpersonal Safety     Do you feel physically and emotionally safe where  you currently live?: Yes     Within the past 12 months, have you been hit, slapped, kicked or otherwise physically hurt by someone?: No     Within the past 12 months, have you been humiliated or emotionally abused in other ways by your partner or ex-partner?: No   Housing Stability: Low Risk  (3/11/2024)    Housing Stability     Do you have housing? : Yes     Are you worried about losing your housing?: No       Review of Systems:  Skin:          Eyes:         ENT:         Respiratory:          Cardiovascular:         Gastroenterology:        Genitourinary:         Musculoskeletal:         Neurologic:         Psychiatric:         Heme/Lymph/Imm:         Endocrine:           Physical Exam:  Vitals: LMP  (LMP Unknown)     Constitutional:           Skin:             Head:           Eyes:           Lymph:      ENT:           Neck:           Respiratory:            Cardiac:                                                           GI:           Extremities and Muscular Skeletal:                 Neurological:           Psych:           CC  Millie Dominguez MD  [address]  [place] [id number]

## 2024-04-10 ENCOUNTER — OFFICE VISIT (OUTPATIENT)
Dept: CARDIOLOGY | Facility: CLINIC | Age: 69
End: 2024-04-10
Payer: COMMERCIAL

## 2024-04-10 VITALS
DIASTOLIC BLOOD PRESSURE: 76 MMHG | HEIGHT: 59 IN | SYSTOLIC BLOOD PRESSURE: 114 MMHG | BODY MASS INDEX: 30.44 KG/M2 | HEART RATE: 84 BPM | WEIGHT: 151 LBS | OXYGEN SATURATION: 96 %

## 2024-04-10 DIAGNOSIS — I47.10 SVT (SUPRAVENTRICULAR TACHYCARDIA) (H): Primary | ICD-10-CM

## 2024-04-10 PROCEDURE — 99214 OFFICE O/P EST MOD 30 MIN: CPT | Performed by: INTERNAL MEDICINE

## 2024-04-10 NOTE — LETTER
"4/10/2024    Emma Byrne MD  [address] [facility] [address]    RE: Emeli Granados       Dear Colleague,     I had the pleasure of seeing Emeli Granados in the [facility].  PHYSICIAN NOTE:  This visit was completed in person at the [facility].      I had the pleasure of seeing Ms. Emeli Granados for evaluation of SVT.      The patient is a very pleasant 69-year-old female with the following medical issues:  Short RP SVT. Documented in the ER in 10/2022, 186 bpm, spontaneous conversion. Treated with diltiazem.  Emphysema.  Diverticulosis.  H/o cervical cancer.  Osteopenia.  H/o hepatitis.    She is on diltiazem  mg daily. She has experienced 4 SVT events since November 2023. Longest episode lasted approximately 1 hour. She feels unwell during SVT with chest tightness and sometimes lightheadedness.    She recently completed a cardiac monitor. She mailed it back 3 days ago. I do not have results.    She does not like to take daily \"heart medication\".      PHYSICAL EXAMINATION:  Vital signs: 114/76, 84, 60.5 kg, BMI 30.5  General:  in no apparent distress  ENT/Mouth:  no nasal discharge.  Eyes:  normal conjunctivae.   Neck:  no thyromegaly or lymphadenopathy.  Chest/Lungs:  patient is not dyspneic.  Lungs CTA, without rales or wheezing  Cardiovascular: normal JVP, rhythm is regular.  No gallop, murmur or rub.    Abdomen:  no abdominal distention.    Extremities:  no edema  Skin:  no xanthelasma.    Neurologic:  alert & oriented x 3.    Vascular:  2+ carotids without bruits.        DIAGNOSTIC STUDIES (reviewed/interpreted in the clinic today):  Laboratory studies: cholesterol 72, HDL 82, LDL 73, potassium 3.9, creatinine 0.69      IMPRESSION:  Short RP SVT. She has had recent symptomatic recurrences on medical therapy. We discussed the options of increasing diltiazem VS pursuing catheter ablation. The pros and cons of both options were discussed in detail.  With " with regard to the EP procedure I quoted the patient a high likelihood of SVT cure after a single procedure with low risk of associated complication. Potential complications include, but are not limited to, bleeding, vascular injury, cardiac perforation, conduction system injury requiring pacemaker implantation, DVT, TIA/CVA, valve injury. A pamphlet with information regarding catheter ablation was provided.    RECOMMENDATIONS:  Ms. Granados is considering her options and will call back with her decision.    It was my pleasure seeing this delightful patient.  Please feel free to call with any questions.   Total time spent today, including time for chart review and documentation, was 30 minutes.    Millie Dominguez MD, [facility]      (Chart documentation was completed, in part, using Dragon voice-recognition software. The note was reviewed, however grammatical and spelling errors may be present.)      CURRENT MEDICATIONS:  Current Outpatient Medications   Medication Sig Dispense Refill    albuterol (PROAIR HFA/PROVENTIL HFA/VENTOLIN HFA) 108 (90 Base) MCG/ACT inhaler INHALE 1 OR 2 puffs EVERY 6 HOURS as needed for wheezing. Strength: 108 (90 Base) MCG/ACT 8.5 g 5    albuterol (PROVENTIL) (2.5 MG/3ML) 0.083% neb solution INHALE 3 MILLILITERS (2.5 MG) BY NEBULIZATION ROUTE EVERY 6 HOURS AS NEEDED FOR SHORTNESS OF BREATH/DYSPNEA OR WHEEZING. Strength: (2.5 MG/3ML) 0.083% 90 mL 5    ascorbic acid (VITAMIN C) 1000 MG TABS Take 1,000 mg by mouth daily      atorvastatin (LIPITOR) 10 MG tablet Take 1 tablet (10 mg) by mouth daily 90 tablet 1    CALCIUM PO Take 1 tablet by mouth daily      cyclobenzaprine (FLEXERIL) 5 MG tablet Take 1 tablet (5 mg) by mouth 2 times daily as needed for muscle spasms 30 tablet 2    diltiazem ER COATED BEADS (CARDIZEM CD/CARTIA XT) 120 MG 24 hr capsule Take 1 capsule (120 mg) by mouth every morning Hold for blood pressure <110 30 capsule 11    docusate sodium (COLACE) 100 MG capsule Take 1 capsule  (100 mg) by mouth 2 times daily as needed for constipation 30 capsule 1    HYDROcodone-acetaminophen (NORCO)  MG per tablet Take 1 tablet by mouth daily as needed for severe pain 30 tablet 0    ketorolac (ACULAR) 0.5 % ophthalmic solution INSTILL ONE DROP INTO RIGHT EYE FOUR TIMES DAILY. START 4 HOURS AFTER SURGERY. USE UNTIL EMPTY (MAX 3 WEEKS)*      metFORMIN (GLUCOPHAGE XR) 500 MG 24 hr tablet Take 1 tablet (500 mg) by mouth daily (with dinner) 30 tablet 5    moxifloxacin (VIGAMOX) 0.5 % ophthalmic solution INSTILL ONE DROP INTO RIGHT EYE FOUR TIMES DAILY. START 4 HOURS AFTER SURGERY. USE UNTIL EMPTY (MAX 3 WEEKS)*      Multiple Vitamins-Minerals (MULTIVITAMIN OR) Take 1 tablet by mouth daily Per pt, uses ones without Iron      omeprazole (PRILOSEC) 20 MG DR capsule TAKE 1 CAPSULE (20 MG) BY MOUTH DAILY. 90 capsule 1    prednisoLONE acetate (PRED FORTE) 1 % ophthalmic suspension INSTILL ONE DROP INTO RIGHT EYE FOUR TIMES DAILY. START 4 HOURS AFTER SURGERY. USE UNTIL EMPTY (MAX 3 WEEKS)*      traMADol (ULTRAM) 50 MG tablet Take 1 tablet (50 mg) by mouth every 6 hours as needed for severe pain 90 tablet 0    vitamin D3 (CHOLECALCIFEROL) 1000 units (25 mcg) tablet Take 1,000 Units by mouth daily      vitamin E 400 UNITS TABS Take 400 Units by mouth daily      [START ON 4/29/2024] zolpidem (AMBIEN) 5 MG tablet TAKE 1 TABLET (5 MG) BY MOUTH NIGHTLY AS NEEDED FOR SLEEP. DO NOT TAKE WITH TRAMADOL. MUST BE NDC # [address]-17 PER PT REQUEST. 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 Diaz is GI specialist - in remission    Malignant neoplasm of endocervix 1988    took uterus and left the ovaries    Polycythemia 2012    resolved now       PAST SURGICAL HISTORY:  Past Surgical History:   Procedure Laterality Date    CARPAL TUNNEL RELEASE RT/LT Right 2021    and trigger finger and tendon repair    CARPAL TUNNEL RELEASE RT/LT Left 2021    and tendon repair    COLONOSCOPY  ,     repeat in     COLONOSCOPY  2023    4 polyps - repeat in 3 years    Dilatation of the esophagus once due to dysphagia and stricture      Ganglion cyst removal      HEMORRHOIDECTOMY BANDING      HYSTERECTOMY      LAPAROSCOPIC SALPINGO-OOPHORECTOMY Bilateral 10/23/2015    Procedure: LAPAROSCOPIC SALPINGO-OOPHORECTOMY;  Surgeon: Johnathan Steve MD;  Location: [facility] OR    subscapularous repair and biceps tendon repair  2022    Dr. Verma at [facility]    Thumb surgery Right     TONSILLECTOMY      XR WRIST SURGERY FLORI RIGHT Right 2023    surgery for DJD and bone graft done       FAMILY HISTORY:  Family History   Problem Relation Age of Onset    Hypertension Mother     Cerebrovascular Disease Mother         TIA's    Depression Mother     Cancer - colorectal Maternal Grandmother         dx at 65    Lipids Father     C.A.D. Father         angioplasty at 65    Musculoskeletal Disorder Father     Alcohol/Drug Daughter         in remission    Breast Cancer No family hx of        SOCIAL HISTORY:  Social History     Socioeconomic History    Marital status:      Spouse name: Boyfriend - Fritz    Number of children: 3   Occupational History    Occupation: work in [employer]     Employer: [employer]   Tobacco Use    Smoking status: Former     Packs/day: 0.50     Years: 30.00     Additional pack years: 0.00     Total pack years: 15.00     Types: Cigarettes     Start date: 10/13/1967     Quit date: 2019     Years since quittin.4    Smokeless tobacco:  Never    Tobacco comments:     quit 11/2019   Vaping Use    Vaping Use: Never used   Substance and Sexual Activity    Alcohol use: No     Comment: sober since 9/11/99    Drug use: No    Sexual activity: Not Currently     Partners: Male   Other Topics Concern    Parent/sibling w/ CABG, MI or angioplasty before 65F 55M? Yes     Social Determinants of Health     Financial Resource Strain: Low Risk  (3/11/2024)    Financial Resource Strain     Within the past 12 months, have you or your family members you live with been unable to get utilities (heat, electricity) when it was really needed?: No   Food Insecurity: Low Risk  (3/11/2024)    Food Insecurity     Within the past 12 months, did you worry that your food would run out before you got money to buy more?: No     Within the past 12 months, did the food you bought just not last and you didn t have money to get more?: No   Recent Concern: Food Insecurity - High Risk (2/7/2024)    Food Insecurity     Within the past 12 months, did you worry that your food would run out before you got money to buy more?: No     Within the past 12 months, did the food you bought just not last and you didn t have money to get more?: Yes   Transportation Needs: Low Risk  (3/11/2024)    Transportation Needs     Within the past 12 months, has lack of transportation kept you from medical appointments, getting your medicines, non-medical meetings or appointments, work, or from getting things that you need?: No   Physical Activity: Unknown (3/11/2024)    Exercise Vital Sign     Days of Exercise per Week: 3 days   Stress: No Stress Concern Present (3/11/2024)    [employer] [place] [employer] - Occupational Stress Questionnaire     Feeling of Stress : Only a little   Social Connections: Unknown (3/11/2024)    Social Connection and Isolation Panel [NHANES]     Frequency of Social Gatherings with Friends and Family: Three times a week     Active Member of Clubs or Organizations: No      Attends Club or Organization Meetings: Never     Marital Status: Living with partner   Interpersonal Safety: Low Risk  (10/31/2023)    Interpersonal Safety     Do you feel physically and emotionally safe where you currently live?: Yes     Within the past 12 months, have you been hit, slapped, kicked or otherwise physically hurt by someone?: No     Within the past 12 months, have you been humiliated or emotionally abused in other ways by your partner or ex-partner?: No   Housing Stability: Low Risk  (3/11/2024)    Housing Stability     Do you have housing? : Yes     Are you worried about losing your housing?: No       Review of Systems:  Skin:          Eyes:         ENT:         Respiratory:          Cardiovascular:         Gastroenterology:        Genitourinary:         Musculoskeletal:         Neurologic:         Psychiatric:         Heme/Lymph/Imm:         Endocrine:           Physical Exam:  Vitals: LMP  (LMP Unknown)     Constitutional:           Skin:             Head:           Eyes:           Lymph:      ENT:           Neck:           Respiratory:            Cardiac:                                                           GI:           Extremities and Muscular Skeletal:                 Neurological:           Psych:           CC  Millie Dominguez MD  [address] [facility]  [place] [address]      Thank you for allowing me to participate in the care of your patient.      Sincerely,     Millie Dominguez MD     [facility]

## 2024-04-11 DIAGNOSIS — R10.13 EPIGASTRIC PAIN: ICD-10-CM

## 2024-04-12 ENCOUNTER — OFFICE VISIT (OUTPATIENT)
Dept: NEUROSURGERY | Facility: CLINIC | Age: 69
End: 2024-04-12
Attending: PHYSICIAN ASSISTANT
Payer: OTHER MISCELLANEOUS

## 2024-04-12 VITALS
HEART RATE: 91 BPM | HEIGHT: 59 IN | DIASTOLIC BLOOD PRESSURE: 69 MMHG | WEIGHT: 151 LBS | BODY MASS INDEX: 30.44 KG/M2 | SYSTOLIC BLOOD PRESSURE: 144 MMHG

## 2024-04-12 DIAGNOSIS — M84.48XA SACRAL INSUFFICIENCY FRACTURE, INITIAL ENCOUNTER: ICD-10-CM

## 2024-04-12 DIAGNOSIS — M54.16 LUMBAR RADICULOPATHY: Primary | ICD-10-CM

## 2024-04-12 DIAGNOSIS — M54.50 ACUTE BILATERAL LOW BACK PAIN WITHOUT SCIATICA: ICD-10-CM

## 2024-04-12 LAB
ANION GAP SERPL CALCULATED.3IONS-SCNC: 16 MMOL/L (ref 3–14)
BASOPHILS # BLD AUTO: 0 10E3/UL (ref 0–0.2)
BASOPHILS NFR BLD AUTO: 0 %
BUN SERPL-MCNC: 14 MG/DL (ref 7–30)
CALCIUM SERPL-MCNC: 9.9 MG/DL (ref 8.5–10.1)
CHLORIDE BLD-SCNC: 104 MMOL/L (ref 94–109)
CO2 SERPL-SCNC: 28 MMOL/L (ref 20–32)
CREAT SERPL-MCNC: 0.6 MG/DL (ref 0.52–1.04)
CRP SERPL-MCNC: 3.58 MG/L
EGFRCR SERPLBLD CKD-EPI 2021: >90 ML/MIN/1.73M2
EOSINOPHIL # BLD AUTO: 0 10E3/UL (ref 0–0.7)
EOSINOPHIL NFR BLD AUTO: 0 %
ERYTHROCYTE [DISTWIDTH] IN BLOOD BY AUTOMATED COUNT: 13.8 % (ref 10–15)
ERYTHROCYTE [SEDIMENTATION RATE] IN BLOOD BY WESTERGREN METHOD: 8 MM/HR (ref 0–30)
GLUCOSE BLD-MCNC: 116 MG/DL (ref 70–99)
HCT VFR BLD AUTO: 47.8 % (ref 35–47)
HGB BLD-MCNC: 16 G/DL (ref 11.7–15.7)
IMM GRANULOCYTES # BLD: 0 10E3/UL
IMM GRANULOCYTES NFR BLD: 0 %
LYMPHOCYTES # BLD AUTO: 3.1 10E3/UL (ref 0.8–5.3)
LYMPHOCYTES NFR BLD AUTO: 40 %
MCH RBC QN AUTO: 31.6 PG (ref 26.5–33)
MCHC RBC AUTO-ENTMCNC: 33.5 G/DL (ref 31.5–36.5)
MCV RBC AUTO: 94 FL (ref 78–100)
MONOCYTES # BLD AUTO: 0.6 10E3/UL (ref 0–1.3)
MONOCYTES NFR BLD AUTO: 8 %
NEUTROPHILS # BLD AUTO: 4 10E3/UL (ref 1.6–8.3)
NEUTROPHILS NFR BLD AUTO: 51 %
PLATELET # BLD AUTO: 252 10E3/UL (ref 150–450)
POTASSIUM BLD-SCNC: 3.8 MMOL/L (ref 3.4–5.3)
RBC # BLD AUTO: 5.07 10E6/UL (ref 3.8–5.2)
SODIUM SERPL-SCNC: 148 MMOL/L (ref 135–145)
WBC # BLD AUTO: 7.8 10E3/UL (ref 4–11)

## 2024-04-12 PROCEDURE — 36415 COLL VENOUS BLD VENIPUNCTURE: CPT | Performed by: NURSE PRACTITIONER

## 2024-04-12 PROCEDURE — 99205 OFFICE O/P NEW HI 60 MIN: CPT | Performed by: NURSE PRACTITIONER

## 2024-04-12 PROCEDURE — 85025 COMPLETE CBC W/AUTO DIFF WBC: CPT | Performed by: NURSE PRACTITIONER

## 2024-04-12 PROCEDURE — 80048 BASIC METABOLIC PNL TOTAL CA: CPT | Performed by: NURSE PRACTITIONER

## 2024-04-12 PROCEDURE — 86140 C-REACTIVE PROTEIN: CPT | Performed by: NURSE PRACTITIONER

## 2024-04-12 PROCEDURE — 85652 RBC SED RATE AUTOMATED: CPT | Performed by: NURSE PRACTITIONER

## 2024-04-12 RX ORDER — IBUPROFEN 600 MG/1
600 TABLET, FILM COATED ORAL EVERY 8 HOURS PRN
Qty: 45 TABLET | Refills: 0 | Status: SHIPPED | OUTPATIENT
Start: 2024-04-12 | End: 2024-08-12

## 2024-04-12 ASSESSMENT — PAIN SCALES - GENERAL: PAINLEVEL: EXTREME PAIN (9)

## 2024-04-12 NOTE — PROGRESS NOTES
ASSESSMENT: Emeli Granados is a 69 year old female who presents for consultation at the request of PCP Emma Byrne, who presents today for new patient evaluation of:    -lumbar radiculopathy, right sacral insufficiency fracture    Patient is neurologically intact on exam. No myelopathic or red flag symptoms.   Her symptoms are consistent with lumbar radiculopathy. She has disc bulges on the right at L4-5 causing foraminal stenosis with disc/osteophyte impinging on the right L4 ganglion with broad based bulge/protrusion with subarticular recess stenosis and right greater than left L5 nerve encroachment. She also has 2mm spondylolisthesis with circumferential/foraminal bulging with moderate right/mild left foraminal stenosis at L5-S1. Her pain distribution currently seems more consistent with S1. She does not wish to do PT. She would like to do injections. We discussed a right L5-S1 TF KEIHT as a first step.    Upon outside notes review from [facility] however, there seems to have been high concern for infectious facet arthropathy r/t L4-5 facet injection 10/30/23. It is unclear per the outside notes if this was ruled out or treated. Patient states there was no abx treatment and she stopped going to [facility]. I can see notes then from a hospitalization for fever related to the flu. There appears to be facet arthrosis moderate L4-5, L5-S1 with active marrow edema/inflammation right L4-5 joint. No evidence for active infectious process right L4-5 joint or fluid collection. Explained facet infection would be a contraindication for possible steroid injections. Out of an abundance of caution, I recommended starting with some new infectious marker labs today. If normal we can likely proceed.     We also talked about her right sacral insufficiency fracture on her pelvis MRI. We generally recommend waiting until fracture is healed prior to steroid injections but given that it was acute/subacute on January scan and she is no longer  having severe pain in her R gluteal region, I suspect this is the case. I added a new DEXA scan to make sure her osteopenia has not progressed           9/28/2021     7:49 AM   OSWESTRY DISABILITY INDEX   Count 8   Sum 12   Oswestry Score (%) 30 %            Diagnoses and all orders for this visit:  Sacral insufficiency fracture, initial encounter  -     Spine  Referral  Acute bilateral low back pain without sciatica  -     Spine  Referral      PLAN:  Reviewed spine anatomy and disease process. Discussed diagnosis and treatment options with the patient today. A shared decision making model was used.  The patient's values and choices were respected. The following represents what was discussed and decided upon by the provider and the patient.      -DIAGNOSTIC TESTS:  Images were personally reviewed and interpreted and explained to patient today using a spine model.   -bone density scan ordered  -consider new lumbar MRI    LABS:  CBC  CRP  ESR  Patient requests BMP    -PHYSICAL THERAPY:    --patient does not wish to do PT at this time  Discussed the importance of core strengthening, ROM, stretching exercises with the patient and how each of these entities is important in decreasing pain.  Explained to the patient that the purpose of physical therapy is to teach the patient a home exercise program.  These exercises need to be performed every day in order to decrease pain and prevent future occurrences of pain.        -MEDICATIONS:    --continue current medications    Discussed multiple medication options today with patient. Discussed risks, side effects, and proper use of medications. Patient verbalized understanding.    -INTERVENTIONS:    -Discussed the role of injections with patient today. If cleared of infection, we discussed Right TF KEITH L5-S1 as a first step.  If limited relief, would consider right TF KEITH L4-5.  We discussed risks benefits of doing one at a time rather than a two level right  sided injection  Patient would be a good candidate in the future for either epidural steroid injections or medial branch blocks if indicated based on symptoms and supported by imaging results.    -PATIENT EDUCATION:  Total time of 60 minutes, on the day of service, spent with the patient, reviewing the chart, placing orders, and documenting.   -Today we also discussed the pros and cons of the current treatment plan.    -FOLLOW-UP:   we will call with lab results    Advised patient to call the [facility] if symptoms worsen, new numbness or weakness develops in the legs, or if they develop new or worsening problems controlling bladder or bowel function.   ______________________________________________________________________    SUBJECTIVE:    HPI:  Emeli Granados  Is a 69 year old female hx emphysema, acid reflux, osteopenia DEXA 2022 who presents today for new patient evaluation of     Patient complains of severe bilateral lower back pain into the buttocks, down the posterior aspect of her right leg. Started after a fall off of a ladder backwards at work onto her left side hitting some metal shelving behind her on 9/4/21. She never had pain prior to this injury. Originally her pain was on the left but it eventually transitioned into the right. More recently somewhat in the anterior aspect of the right thigh as well at times, but this comes and goes. Pain is worse with getting up in the morning. Bearing weight, bending. She denies numbness or tingling, weakness, or changes in bowel or bladder control.     She adds that she was under care of [facility] last fall and underwent hip injections in Oct, then a right L4-5 facet injection on 10/30/23 which she states caused an infection in her back. She had fevers of 103 which began 1-2 days after her injection. Evidently labwork was coordinated through her PCP and showed elevated infectious markers. She states she was not hospitalized. She states she was not treated with  antibiotics. She states she stopped going to [facility]. Her fevers went away. Her back pain has persisted. Unclear timeline. 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 She had lumbar and sacral MRIs after which she was told she has two fractures. She is here to establish care and wants to have another injection to help with her pain     She has tried using icy hot, taking muscle relaxers, and when pain gets really bad she will take a vicodin or tramadol.  She had R wrist surgery in November and for a while was rotating tylenol and ibuprofen for this as well. Evidently she has to use a bone stimulator for her R wrist as it is slow to heal. During this recovery period, she has had physical restrictions such as avoidance of twisting, physical work, or exercise and explicit recommendations to avoid of PT. She has gained some weight due to this. She is wearing a right wrist brace.        -Treatment to Date:     -Medications:  Flexeril 10  Vicodin   Hydroxyzine  Tramadol 50  Tylenol  ibuprofen    Injections:  Left greater trochanteric bursa injection 1/26/23  Bilateral greater trochanteric bursa injections 10/12/23 [facility]  Right L4-5 facet joint injection 10/30/23 [facility]  Left L5-S1 TF KEITH 8/16/22 [facility]  Left L5-S1 TF KEITH 5/3/22 [facility]      Current Outpatient Medications   Medication Sig Dispense Refill    albuterol (PROAIR HFA/PROVENTIL HFA/VENTOLIN HFA) 108 (90 Base) MCG/ACT inhaler INHALE 1 OR 2 puffs EVERY 6 HOURS as needed for wheezing. Strength: 108 (90 Base) MCG/ACT 8.5 g 5    albuterol (PROVENTIL) (2.5 MG/3ML) 0.083% neb solution INHALE 3 MILLILITERS (2.5 MG) BY NEBULIZATION ROUTE EVERY 6 HOURS AS NEEDED FOR SHORTNESS OF BREATH/DYSPNEA OR WHEEZING. Strength: (2.5 MG/3ML) 0.083% 90 mL 5    ascorbic acid (VITAMIN C) 1000 MG TABS Take 1,000 mg by mouth daily      atorvastatin (LIPITOR) 10 MG tablet Take 1 tablet (10 mg) by mouth daily 90 tablet 1    CALCIUM PO  Take 1 tablet by mouth daily      cyclobenzaprine (FLEXERIL) 5 MG tablet Take 1 tablet (5 mg) by mouth 2 times daily as needed for muscle spasms 30 tablet 2    diltiazem ER COATED BEADS (CARDIZEM CD/CARTIA XT) 120 MG 24 hr capsule Take 1 capsule (120 mg) by mouth every morning Hold for blood pressure <110 30 capsule 11    docusate sodium (COLACE) 100 MG capsule Take 1 capsule (100 mg) by mouth 2 times daily as needed for constipation 30 capsule 1    HYDROcodone-acetaminophen (NORCO)  MG per tablet Take 1 tablet by mouth daily as needed for severe pain 30 tablet 0    ketorolac (ACULAR) 0.5 % ophthalmic solution INSTILL ONE DROP INTO RIGHT EYE FOUR TIMES DAILY. START 4 HOURS AFTER SURGERY. USE UNTIL EMPTY (MAX 3 WEEKS)*      metFORMIN (GLUCOPHAGE XR) 500 MG 24 hr tablet Take 1 tablet (500 mg) by mouth daily (with dinner) 30 tablet 5    moxifloxacin (VIGAMOX) 0.5 % ophthalmic solution INSTILL ONE DROP INTO RIGHT EYE FOUR TIMES DAILY. START 4 HOURS AFTER SURGERY. USE UNTIL EMPTY (MAX 3 WEEKS)*      Multiple Vitamins-Minerals (MULTIVITAMIN OR) Take 1 tablet by mouth daily Per pt, uses ones without Iron      omeprazole (PRILOSEC) 20 MG DR capsule TAKE 1 CAPSULE (20 MG) BY MOUTH DAILY. 90 capsule 1    prednisoLONE acetate (PRED FORTE) 1 % ophthalmic suspension INSTILL ONE DROP INTO RIGHT EYE FOUR TIMES DAILY. START 4 HOURS AFTER SURGERY. USE UNTIL EMPTY (MAX 3 WEEKS)*      traMADol (ULTRAM) 50 MG tablet Take 1 tablet (50 mg) by mouth every 6 hours as needed for severe pain 90 tablet 0    vitamin D3 (CHOLECALCIFEROL) 1000 units (25 mcg) tablet Take 1,000 Units by mouth daily      vitamin E 400 UNITS TABS Take 400 Units by mouth daily      [START ON 4/29/2024] zolpidem (AMBIEN) 5 MG tablet TAKE 1 TABLET (5 MG) BY MOUTH NIGHTLY AS NEEDED FOR SLEEP. DO NOT TAKE WITH TRAMADOL. MUST BE NDC # [address]-55 PER PT REQUEST. 30 tablet 5     No current facility-administered medications for this visit.       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 uterus and left the ovaries    Polycythemia 08/08/2012    resolved now        Patient Active Problem List   Diagnosis    Disorder of bone and cartilage    CARDIOVASCULAR SCREENING; LDL GOAL LESS THAN 130    Osteopenia    Back pain    Moderate persistent asthma with exacerbation    Other chronic pain    Bacteremia    Adenomyomatosis of gallbladder    Diverticulosis of colon    Acute bilateral low back pain without sciatica    Paroxysmal supraventricular tachycardia (H24)    F11.2 - Continuous opioid dependence (H)    Muscle strain of right gluteal region, subsequent encounter    Wheezing    Influenza A    Hypoxia    Hyperlipidemia LDL goal <130       Past Surgical History:   Procedure Laterality Date    CARPAL TUNNEL RELEASE RT/LT Right 11/23/2021    and trigger finger and tendon repair    CARPAL TUNNEL RELEASE RT/LT Left 12/20/2021    and tendon repair    COLONOSCOPY  2004, 2012    repeat in 2022    COLONOSCOPY  11/24/2023    4 polyps - repeat in 3 years    Dilatation of the esophagus once due to dysphagia and stricture  2003    Ganglion cyst removal      HEMORRHOIDECTOMY BANDING      HYSTERECTOMY  1998    LAPAROSCOPIC SALPINGO-OOPHORECTOMY Bilateral 10/23/2015    Procedure: LAPAROSCOPIC SALPINGO-OOPHORECTOMY;  Surgeon: Johnathan Steve MD;  Location: [facility] OR    subscapularous repair and biceps tendon repair  05/2022    Dr. Verma at [facility]    Thumb surgery Right     TONSILLECTOMY      XR WRIST  "SURGERY FLORI RIGHT Right 11/2023    surgery for DJD and bone graft done       Family History   Problem Relation Age of Onset    Hypertension Mother     Cerebrovascular Disease Mother         TIA's    Depression Mother     Cancer - colorectal Maternal Grandmother         dx at 65    Lipids Father     C.A.D. Father         angioplasty at 65    Musculoskeletal Disorder Father     Alcohol/Drug Daughter         in remission    Breast Cancer No family hx of        Reviewed past medical, surgical, and family history with patient found on new patient intake packet located in EMR Media tab.     SOCIAL HX: nonsmoker, no alcohol use, no heavy drinking, no rec drug use    ROS: positive for weight gain, constipation, loss of bladder control, blood in the urine, joint pain, muscle pain and eye symptoms (unspecified). Specifically negative for bowel/bladder dysfunction, balance changes, headache, dizziness, foot drop, fevers, chills, appetite changes, nausea/vomiting, unexplained weight loss. Otherwise 13 systems reviewed are negative. Please see the patient's intake questionnaire from today for details.    OBJECTIVE:  BP (!) 144/69   Pulse 91   Ht 4' 11\" (1.499 m)   Wt 151 lb (68.5 kg)   LMP  (LMP Unknown)   BMI 30.50 kg/m      PHYSICAL EXAMINATION:    --CONSTITUTIONAL:  Vital signs as above.  No acute distress.  The patient is well nourished and well groomed.  --PSYCHIATRIC:  Appropriate mood and affect. The patient is awake, alert, oriented to person, place, time and answering questions appropriately with clear speech.    --SKIN:  Skin over the face, bilateral lower extremities, and posterior torso is clean, dry, intact without rashes.    --RESPIRATORY: Normal rhythm and effort. No abnormal accessory muscle breathing patterns noted.   --ABDOMINAL:  Non-distended.    --GROSS MOTOR: Gait is non-antalgic. Easily arises from a seated position. Toe walking and heel walking are normal without significant difficulty.      --LOWER " EXTREMITY MOTOR TESTING:  Hip flexion: right 5/5, left 5/5  Hip abduction: right 5/5, left 5/5  Hip adduction: right 5/5, left 5/5   Quads: right 5/5, left 5/5  Hamstrings: right 5/5, left 5/5  Dorsiflexion: right 5/5, left 5/5  Plantar flexion: right 5/5, left 5/5    Great toe MTP extension/EHL: right 5/5, left 5/5    --NEUROLOGICAL:  2/4 patellar and achilles reflexes bilaterally. Sensation to light touch is intact throughout both lower extremities. Babinski is negative. No clonus.    --MUSCULOSKELETAL: Lumbar spine inspection reveals no evidence of deformity. Range of motion is limited in lumbar flexion, extension, lateral rotation due to pain. Positive facet loading maneuvers. No point tenderness to palpation lumbar spine. Positive yvette lower lumbar paraspinal musculature tenderness.     Straight leg raising is negative.    --SACROILIAC JOINT:  One finger point test was negative. Negative SI joint tenderness to palpation bilaterally.    --VASCULAR:  Bilateral lower extremities are warm without any discoloration.  There is no pitting edema of the bilateral lower extremities.    RESULTS:   Prior medical records from [facility] and [facility]where were reviewed today.    Imaging: Spine imaging was personally reviewed and interpreted today. The images were shown to the patient and the findings were explained using a spine model.      MRI lumbar spine without contrast   exam date 1/8/2024  Interpretation:  Osseous structures/alignment:  Normal lordotic alignment.  Prominent Modic type I endplate changes  asymmetric to the right at L4-5 with degenerative endplate erosions.  No vertebral fracture.    Facet/SI joints:  Moderate L4-5/L5-S1 facet arthrosis with active inflammatory marrow edema type changes on the right at L4-5.  No abnormal fluid collection or erosive changes to suggest infectious process.  See MRI of the sacrum for evaluation.      Cord/conus:  Normal signal intensity distal cord/conus  medullaris.    Extraspinal findings:  No abnormalities identified.    L5-S1: Moderate disc degeneration, 2 mm spondylolisthesis with circumferential/foraminal bulging, moderate right/mild left foraminal stenosis and no focal neural compression.    L4-5: Severe disc degeneration, asymmetric moderate right foraminal stenosis with disc/osteophyte impinging on the right L4 ganglion.  Broad-based bulge/protrusion with subarticular recess stenosis and right greater than left L5 nerve encroachment.    L3-4: Mild disc degeneration, annular bulge with mild left foraminal stenosis and no focal neural impingement.  Patent central canal.    L2-3: Moderate disc degeneration, 3.5 mm broad-based disc herniation with mild dural sac effacement and no focal neural impingement.    L1-2: Normal disc height.  No disc herniation stenosis or focal neural impingement.    Sections through the lower thoracic spine are unremarkable.    Conclusion:   advanced L4-5 disc degeneration with prominent Modic type I endplate changes.  Moderate right foraminal stenosis/L4 ganglionic impingement.  Facet arthrosis moderate L4-5, L5-S1, with active marrow edema/inflammation right L4-5 joint.  No abnormal fluid collection or evidence for active infectious process right L4-5 joint.  No significant interval changes as compared with prior study      MR sacrum/coccyx without contrast exam date 1/8/2024    Interpretation: Acute/subacute right sacral insufficiency fracture with prominent diffuse bone edema.  No extension to the midline.  Left sacrum is unremarkable.  Appearance of degenerative changes at the sacrococcygeal joint with mild localized inflammatory edematous changes.  No fracture seen.  See lumbar MRI for discussion of the lower lumbar region    Conclusion:  Findings compatible with acute/subacute right sacral insufficiency fracture.  Also, localized inflammatory/degenerative appearing changes sacrococcygeal joint      Rizwana Sinclair P-C  [facility]  [facility]  O. [phone]

## 2024-04-12 NOTE — LETTER
4/12/2024       RE: Emeli Granados  [address]  [facility] [address]     Dear Colleague,    Thank you for referring your patient, Emeli Granados, to the  [facility] at [facility]. Please see a copy of my visit note below.    ASSESSMENT: Emeli Granados is a 69 year old female who presents for consultation at the request of PCP Emma Byrne, who presents today for new patient evaluation of:    -lumbar radiculopathy, right sacral insufficiency fracture    Patient is neurologically intact on exam. No myelopathic or red flag symptoms.   Her symptoms are consistent with lumbar radiculopathy. She has disc bulges on the right at L4-5 causing foraminal stenosis with disc/osteophyte impinging on the right L4 ganglion with broad based bulge/protrusion with subarticular recess stenosis and right greater than left L5 nerve encroachment. She also has 2mm spondylolisthesis with circumferential/foraminal bulging with moderate right/mild left foraminal stenosis at L5-S1. Her pain distribution currently seems more consistent with S1. She does not wish to do PT. She would like to do injections. We discussed a right L5-S1 TF KEITH as a first step.    Upon outside notes review from [facility] however, there seems to have been high concern for infectious facet arthropathy r/t L4-5 facet injection 10/30/23. It is unclear per the outside notes if this was ruled out or treated. Patient states there was no abx treatment and she stopped going to [facility]. I can see notes then from a hospitalization for fever related to the flu. There appears to be facet arthrosis moderate L4-5, L5-S1 with active marrow edema/inflammation right L4-5 joint. No evidence for active infectious process right L4-5 joint or fluid collection. Explained facet infection would be a contraindication for possible steroid injections. Out of an abundance of caution, I recommended starting with some new  infectious marker labs today. If normal we can likely proceed.     We also talked about her right sacral insufficiency fracture on her pelvis MRI. We generally recommend waiting until fracture is healed prior to steroid injections but given that it was acute/subacute on January scan and she is no longer having severe pain in her R gluteal region, I suspect this is the case. I added a new DEXA scan to make sure her osteopenia has not progressed           9/28/2021     7:49 AM   OSWESTRY DISABILITY INDEX   Count 8   Sum 12   Oswestry Score (%) 30 %            Diagnoses and all orders for this visit:  Sacral insufficiency fracture, initial encounter  -     Spine  Referral  Acute bilateral low back pain without sciatica  -     Spine  Referral      PLAN:  Reviewed spine anatomy and disease process. Discussed diagnosis and treatment options with the patient today. A shared decision making model was used.  The patient's values and choices were respected. The following represents what was discussed and decided upon by the provider and the patient.      -DIAGNOSTIC TESTS:  Images were personally reviewed and interpreted and explained to patient today using a spine model.   -bone density scan ordered  -consider new lumbar MRI    LABS:  CBC  CRP  ESR  Patient requests BMP    -PHYSICAL THERAPY:    --patient does not wish to do PT at this time  Discussed the importance of core strengthening, ROM, stretching exercises with the patient and how each of these entities is important in decreasing pain.  Explained to the patient that the purpose of physical therapy is to teach the patient a home exercise program.  These exercises need to be performed every day in order to decrease pain and prevent future occurrences of pain.        -MEDICATIONS:    --continue current medications    Discussed multiple medication options today with patient. Discussed risks, side effects, and proper use of medications. Patient verbalized  understanding.    -INTERVENTIONS:    -Discussed the role of injections with patient today. If cleared of infection, we discussed Right TF KEITH L5-S1 as a first step.  If limited relief, would consider right TF KEITH L4-5.  We discussed risks benefits of doing one at a time rather than a two level right sided injection  Patient would be a good candidate in the future for either epidural steroid injections or medial branch blocks if indicated based on symptoms and supported by imaging results.    -PATIENT EDUCATION:  Total time of 60 minutes, on the day of service, spent with the patient, reviewing the chart, placing orders, and documenting.   -Today we also discussed the pros and cons of the current treatment plan.    -FOLLOW-UP:   we will call with lab results    Advised patient to call the [facility] Center if symptoms worsen, new numbness or weakness develops in the legs, or if they develop new or worsening problems controlling bladder or bowel function.   ______________________________________________________________________    SUBJECTIVE:    HPI:  Emeli Granados  Is a 69 year old female hx emphysema, acid reflux, osteopenia DEXA 2022 who presents today for new patient evaluation of     Patient complains of severe bilateral lower back pain into the buttocks, down the posterior aspect of her right leg. Started after a fall off of a ladder backwards at work onto her left side hitting some metal shelving behind her on 9/4/21. She never had pain prior to this injury. Originally her pain was on the left but it eventually transitioned into the right. More recently somewhat in the anterior aspect of the right thigh as well at times, but this comes and goes. Pain is worse with getting up in the morning. Bearing weight, bending. She denies numbness or tingling, weakness, or changes in bowel or bladder control.     She adds that she was under care of [facility] last fall and underwent hip injections in Oct, then a right L4-5 facet  injection on 10/30/23 which she states caused an infection in her back. She had fevers of 103 which began 1-2 days after her injection. Evidently labwork was coordinated through her PCP and showed elevated infectious markers. She states she was not hospitalized. She states she was not treated with antibiotics. She states she stopped going to [facility]. Her fevers went away. Her back pain has persisted. Unclear timeline. She had a fall end of November while putting up [address] lights and another fall in January off of a chair. She had lumbar and sacral MRIs after which she was told she has two fractures. She is here to establish care and wants to have another injection to help with her pain     She has tried using icy hot, taking muscle relaxers, and when pain gets really bad she will take a vicodin or tramadol.  She had R wrist surgery in November and for a while was rotating tylenol and ibuprofen for this as well. Evidently she has to use a bone stimulator for her R wrist as it is slow to heal. During this recovery period, she has had physical restrictions such as avoidance of twisting, physical work, or exercise and explicit recommendations to avoid of PT. She has gained some weight due to this. She is wearing a right wrist brace.        -Treatment to Date:     -Medications:  Flexeril 10  Vicodin   Hydroxyzine  Tramadol 50  Tylenol  ibuprofen    Injections:  Left greater trochanteric bursa injection 1/26/23  Bilateral greater trochanteric bursa injections 10/12/23 [facility]  Right L4-5 facet joint injection 10/30/23 [facility]  Left L5-S1 TF KEITH 8/16/22 [facility]  Left L5-S1 TF KEITH 5/3/22 [facility]      Current Outpatient Medications   Medication Sig Dispense Refill    albuterol (PROAIR HFA/PROVENTIL HFA/VENTOLIN HFA) 108 (90 Base) MCG/ACT inhaler INHALE 1 OR 2 puffs EVERY 6 HOURS as needed for wheezing. Strength: 108 (90 Base) MCG/ACT 8.5 g 5    albuterol (PROVENTIL) (2.5 MG/3ML) 0.083% neb solution INHALE 3 MILLILITERS (2.5 MG) BY  NEBULIZATION ROUTE EVERY 6 HOURS AS NEEDED FOR SHORTNESS OF BREATH/DYSPNEA OR WHEEZING. Strength: (2.5 MG/3ML) 0.083% 90 mL 5    ascorbic acid (VITAMIN C) 1000 MG TABS Take 1,000 mg by mouth daily      atorvastatin (LIPITOR) 10 MG tablet Take 1 tablet (10 mg) by mouth daily 90 tablet 1    CALCIUM PO Take 1 tablet by mouth daily      cyclobenzaprine (FLEXERIL) 5 MG tablet Take 1 tablet (5 mg) by mouth 2 times daily as needed for muscle spasms 30 tablet 2    diltiazem ER COATED BEADS (CARDIZEM CD/CARTIA XT) 120 MG 24 hr capsule Take 1 capsule (120 mg) by mouth every morning Hold for blood pressure <110 30 capsule 11    docusate sodium (COLACE) 100 MG capsule Take 1 capsule (100 mg) by mouth 2 times daily as needed for constipation 30 capsule 1    HYDROcodone-acetaminophen (NORCO)  MG per tablet Take 1 tablet by mouth daily as needed for severe pain 30 tablet 0    ketorolac (ACULAR) 0.5 % ophthalmic solution INSTILL ONE DROP INTO RIGHT EYE FOUR TIMES DAILY. START 4 HOURS AFTER SURGERY. USE UNTIL EMPTY (MAX 3 WEEKS)*      metFORMIN (GLUCOPHAGE XR) 500 MG 24 hr tablet Take 1 tablet (500 mg) by mouth daily (with dinner) 30 tablet 5    moxifloxacin (VIGAMOX) 0.5 % ophthalmic solution INSTILL ONE DROP INTO RIGHT EYE FOUR TIMES DAILY. START 4 HOURS AFTER SURGERY. USE UNTIL EMPTY (MAX 3 WEEKS)*      Multiple Vitamins-Minerals (MULTIVITAMIN OR) Take 1 tablet by mouth daily Per pt, uses ones without Iron      omeprazole (PRILOSEC) 20 MG DR capsule TAKE 1 CAPSULE (20 MG) BY MOUTH DAILY. 90 capsule 1    prednisoLONE acetate (PRED FORTE) 1 % ophthalmic suspension INSTILL ONE DROP INTO RIGHT EYE FOUR TIMES DAILY. START 4 HOURS AFTER SURGERY. USE UNTIL EMPTY (MAX 3 WEEKS)*      traMADol (ULTRAM) 50 MG tablet Take 1 tablet (50 mg) by mouth every 6 hours as needed for severe pain 90 tablet 0    vitamin D3 (CHOLECALCIFEROL) 1000 units (25 mcg) tablet Take 1,000 Units by mouth daily      vitamin E 400 UNITS TABS Take 400 Units by  mouth daily      [START ON 4/29/2024] zolpidem (AMBIEN) 5 MG tablet TAKE 1 TABLET (5 MG) BY MOUTH NIGHTLY AS NEEDED FOR SLEEP. DO NOT TAKE WITH TRAMADOL. MUST BE NDC # [address]-49 PER PT REQUEST. 30 tablet 5     No current facility-administered medications for this visit.       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 uterus and left the ovaries    Polycythemia 08/08/2012    resolved now        Patient Active Problem List   Diagnosis    Disorder of bone and cartilage    CARDIOVASCULAR SCREENING; LDL GOAL LESS THAN 130    Osteopenia    Back pain    Moderate persistent asthma with exacerbation    Other chronic pain    Bacteremia    Adenomyomatosis of gallbladder    Diverticulosis of colon    Acute bilateral low back pain without sciatica    Paroxysmal supraventricular tachycardia (H24)    F11.2 - Continuous opioid dependence (H)    Muscle strain of right gluteal region, subsequent encounter    Wheezing    Influenza A    Hypoxia    Hyperlipidemia LDL goal <130       Past Surgical History:   Procedure Laterality Date    CARPAL TUNNEL RELEASE RT/LT Right 11/23/2021    and trigger finger and tendon repair    CARPAL TUNNEL RELEASE RT/LT Left 12/20/2021    and tendon repair    COLONOSCOPY  2004, 2012    repeat in 2022    COLONOSCOPY  11/24/2023    4 polyps - repeat in 3 years    Dilatation of the esophagus once due to dysphagia and stricture  2003    Ganglion cyst removal      HEMORRHOIDECTOMY BANDING      HYSTERECTOMY  " 1998    LAPAROSCOPIC SALPINGO-OOPHORECTOMY Bilateral 10/23/2015    Procedure: LAPAROSCOPIC SALPINGO-OOPHORECTOMY;  Surgeon: Johnathan Steve MD;  Location: [facility] OR    subscapularous repair and biceps tendon repair  05/2022    Dr. Verma at [facility]    Thumb surgery Right     TONSILLECTOMY      XR WRIST SURGERY FLORI RIGHT Right 11/2023    surgery for DJD and bone graft done       Family History   Problem Relation Age of Onset    Hypertension Mother     Cerebrovascular Disease Mother         TIA's    Depression Mother     Cancer - colorectal Maternal Grandmother         dx at 65    Lipids Father     C.A.D. Father         angioplasty at 65    Musculoskeletal Disorder Father     Alcohol/Drug Daughter         in remission    Breast Cancer No family hx of        Reviewed past medical, surgical, and family history with patient found on new patient intake packet located in EMR Media tab.     SOCIAL HX: nonsmoker, no alcohol use, no heavy drinking, no rec drug use    ROS: positive for weight gain, constipation, loss of bladder control, blood in the urine, joint pain, muscle pain and eye symptoms (unspecified). Specifically negative for bowel/bladder dysfunction, balance changes, headache, dizziness, foot drop, fevers, chills, appetite changes, nausea/vomiting, unexplained weight loss. Otherwise 13 systems reviewed are negative. Please see the patient's intake questionnaire from today for details.    OBJECTIVE:  BP (!) 144/69   Pulse 91   Ht 4' 11\" (1.499 m)   Wt 151 lb (68.5 kg)   LMP  (LMP Unknown)   BMI 30.50 kg/m      PHYSICAL EXAMINATION:    --CONSTITUTIONAL:  Vital signs as above.  No acute distress.  The patient is well nourished and well groomed.  --PSYCHIATRIC:  Appropriate mood and affect. The patient is awake, alert, oriented to person, place, time and answering questions appropriately with clear speech.    --SKIN:  Skin over the face, bilateral lower extremities, and posterior torso is clean, dry, intact without " connie.    --RESPIRATORY: Normal rhythm and effort. No abnormal accessory muscle breathing patterns noted.   --ABDOMINAL:  Non-distended.    --GROSS MOTOR: Gait is non-antalgic. Easily arises from a seated position. Toe walking and heel walking are normal without significant difficulty.      --LOWER EXTREMITY MOTOR TESTING:  Hip flexion: right 5/5, left 5/5  Hip abduction: right 5/5, left 5/5  Hip adduction: right 5/5, left 5/5   Quads: right 5/5, left 5/5  Hamstrings: right 5/5, left 5/5  Dorsiflexion: right 5/5, left 5/5  Plantar flexion: right 5/5, left 5/5    Great toe MTP extension/EHL: right 5/5, left 5/5    --NEUROLOGICAL:  2/4 patellar and achilles reflexes bilaterally. Sensation to light touch is intact throughout both lower extremities. Babinski is negative. No clonus.    --MUSCULOSKELETAL: Lumbar spine inspection reveals no evidence of deformity. Range of motion is limited in lumbar flexion, extension, lateral rotation due to pain. Positive facet loading maneuvers. No point tenderness to palpation lumbar spine. Positive yvette lower lumbar paraspinal musculature tenderness.     Straight leg raising is negative.    --SACROILIAC JOINT:  One finger point test was negative. Negative SI joint tenderness to palpation bilaterally.    --VASCULAR:  Bilateral lower extremities are warm without any discoloration.  There is no pitting edema of the bilateral lower extremities.    RESULTS:   Prior medical records from [facility] and [facility] Everywhere were reviewed today.    Imaging: Spine imaging was personally reviewed and interpreted today. The images were shown to the patient and the findings were explained using a spine model.      MRI lumbar spine without contrast   exam date 1/8/2024  Interpretation:  Osseous structures/alignment:  Normal lordotic alignment.  Prominent Modic type I endplate changes  asymmetric to the right at L4-5 with degenerative endplate erosions.  No vertebral fracture.    Facet/SI  joints:  Moderate L4-5/L5-S1 facet arthrosis with active inflammatory marrow edema type changes on the right at L4-5.  No abnormal fluid collection or erosive changes to suggest infectious process.  See MRI of the sacrum for evaluation.      Cord/conus:  Normal signal intensity distal cord/conus medullaris.    Extraspinal findings:  No abnormalities identified.    L5-S1: Moderate disc degeneration, 2 mm spondylolisthesis with circumferential/foraminal bulging, moderate right/mild left foraminal stenosis and no focal neural compression.    L4-5: Severe disc degeneration, asymmetric moderate right foraminal stenosis with disc/osteophyte impinging on the right L4 ganglion.  Broad-based bulge/protrusion with subarticular recess stenosis and right greater than left L5 nerve encroachment.    L3-4: Mild disc degeneration, annular bulge with mild left foraminal stenosis and no focal neural impingement.  Patent central canal.    L2-3: Moderate disc degeneration, 3.5 mm broad-based disc herniation with mild dural sac effacement and no focal neural impingement.    L1-2: Normal disc height.  No disc herniation stenosis or focal neural impingement.    Sections through the lower thoracic spine are unremarkable.    Conclusion:   advanced L4-5 disc degeneration with prominent Modic type I endplate changes.  Moderate right foraminal stenosis/L4 ganglionic impingement.  Facet arthrosis moderate L4-5, L5-S1, with active marrow edema/inflammation right L4-5 joint.  No abnormal fluid collection or evidence for active infectious process right L4-5 joint.  No significant interval changes as compared with prior study    MR sacrum/coccyx without contrast exam date 1/8/2024    Interpretation: Acute/subacute right sacral insufficiency fracture with prominent diffuse bone edema.  No extension to the midline.  Left sacrum is unremarkable.  Appearance of degenerative changes at the sacrococcygeal joint with mild localized inflammatory edematous  changes.  No fracture seen.  See lumbar MRI for discussion of the lower lumbar region    Conclusion:  Findings compatible with acute/subacute right sacral insufficiency fracture.  Also, localized inflammatory/degenerative appearing changes sacrococcygeal joint           Again, thank you for allowing me to participate in the care of your patient.      Sincerely,    CORTNEY Mejia CNP

## 2024-04-12 NOTE — PATIENT INSTRUCTIONS
Please proceed to have your labwork done downstairs.     ibuprofen 600mg was sent to your pharmacy to take one tablet three times daily as needed for your back pain . This is an anti-inflammatory to help reduce swelling    Continue your other pain medications as directed    Imaging (bone density scan) has been ordered today to check your bone quality given your recent fracture.  Radiology will call you to schedule. Please call below if you do not hear from them in the next couple of days.     [facility] Radiology Scheduling:  Please call [phone] to schedule your image(s) (select option #1).    There are 3 different locations:    [facility]  [address] [facility] - [place]  [address] [facility]   [place] [id number]    [place] [phone]       We will need to do some research on the status if your possible back infection via your outside notes with [facility]. We will contact you with your lab results on Monday      ~Please call our [facility] Nurse Navigation line [phone] with any questions or concerns about your treatment plan, if symptoms worsen and you would like to be seen urgently, or if you have any new or worsening numbness, weakness, or problems controlling bladder and bowel function.  ~You are also welcome to contact Rizwana Sinclair via [employer], but please be aware that responses to [employer] message may take 2-3 days due to the high volume of patients seen in clinic.

## 2024-04-15 PROCEDURE — 93244 EXT ECG>48HR<7D REV&INTERPJ: CPT | Performed by: INTERNAL MEDICINE

## 2024-04-17 ENCOUNTER — TELEPHONE (OUTPATIENT)
Dept: PHYSICAL MEDICINE AND REHAB | Facility: CLINIC | Age: 69
End: 2024-04-17
Payer: COMMERCIAL

## 2024-04-17 DIAGNOSIS — M54.16 LUMBAR RADICULITIS: Primary | ICD-10-CM

## 2024-04-17 NOTE — TELEPHONE ENCOUNTER
Call placed to patient with provider's results and recommendations.  Pt stated understanding. Pt wanting to move forward with injection. Order placed for right L5-S1 TFESI. Injection requirements reviewed. Transferred to scheduling to make appt.

## 2024-04-17 NOTE — TELEPHONE ENCOUNTER
----- Message from CORTNEY Renner CNP sent at 4/17/2024 12:04 PM CDT -----  Please let Emeli know that her labwork came back without any elevated infectious markers. I can place orders for right TF KEITH L5-S1

## 2024-04-20 ENCOUNTER — HOSPITAL ENCOUNTER (OUTPATIENT)
Dept: MRI IMAGING | Facility: CLINIC | Age: 69
Discharge: HOME | End: 2024-04-20
Attending: FAMILY MEDICINE | Admitting: FAMILY MEDICINE
Payer: COMMERCIAL

## 2024-04-20 DIAGNOSIS — D13.5 ADENOMYOMATOSIS OF GALLBLADDER: ICD-10-CM

## 2024-04-20 PROCEDURE — 255N000002 HC RX 255 OP 636: Performed by: FAMILY MEDICINE

## 2024-04-20 PROCEDURE — A9585 GADOBUTROL INJECTION: HCPCS | Performed by: FAMILY MEDICINE

## 2024-04-20 PROCEDURE — 74183 MRI ABD W/O CNTR FLWD CNTR: CPT

## 2024-04-20 RX ORDER — GADOBUTROL 604.72 MG/ML
6.8 INJECTION INTRAVENOUS ONCE
Status: COMPLETED | OUTPATIENT
Start: 2024-04-20 | End: 2024-04-20

## 2024-04-20 RX ADMIN — GADOBUTROL 6.8 ML: 604.72 INJECTION INTRAVENOUS at 10:49

## 2024-04-22 ENCOUNTER — PATIENT OUTREACH (OUTPATIENT)
Dept: FAMILY MEDICINE | Facility: CLINIC | Age: 69
End: 2024-04-22
Payer: COMMERCIAL

## 2024-04-22 DIAGNOSIS — D13.5 ADENOMYOMATOSIS OF GALLBLADDER: Primary | ICD-10-CM

## 2024-04-22 DIAGNOSIS — M54.16 LUMBAR RADICULITIS: Primary | ICD-10-CM

## 2024-04-22 NOTE — TELEPHONE ENCOUNTER
Emma Byrne MD  P Cr Sb3/5 Ray Smyth (Rn)    Results note below on MRI Liver  The liver spot looks stable.  Radiology recommended consultation with surgery about this.    I will place referral for you and they should call - can you make sure she gets this message?

## 2024-04-22 NOTE — TELEPHONE ENCOUNTER
Called and spoke with pt,     Relayed message from Dr. Byrne below. Gave pt the phone number for general surgery in [place] to schedule [phone].     Pt verbalizes understanding and is agreeable with plan. She states she has an appointment with Dr. Byrne tomorrow and will call then to discuss. She denies any further questions or concerns at this time.     Nathaly ONTIVEROS RN   PAL (Patient Advocate Liaison)  [facility]

## 2024-04-23 ENCOUNTER — OFFICE VISIT (OUTPATIENT)
Dept: FAMILY MEDICINE | Facility: CLINIC | Age: 69
End: 2024-04-23
Payer: COMMERCIAL

## 2024-04-23 VITALS
WEIGHT: 155 LBS | OXYGEN SATURATION: 98 % | RESPIRATION RATE: 20 BRPM | SYSTOLIC BLOOD PRESSURE: 115 MMHG | TEMPERATURE: 98 F | BODY MASS INDEX: 31.31 KG/M2 | HEART RATE: 77 BPM | DIASTOLIC BLOOD PRESSURE: 68 MMHG

## 2024-04-23 DIAGNOSIS — R31.29 MICROSCOPIC HEMATURIA: ICD-10-CM

## 2024-04-23 DIAGNOSIS — M84.48XA SACRAL INSUFFICIENCY FRACTURE, INITIAL ENCOUNTER: ICD-10-CM

## 2024-04-23 DIAGNOSIS — D13.5 ADENOMYOMATOSIS OF GALLBLADDER: ICD-10-CM

## 2024-04-23 DIAGNOSIS — Z23 NEED FOR PROPHYLACTIC VACCINATION AGAINST HEPATITIS A: ICD-10-CM

## 2024-04-23 DIAGNOSIS — M54.50 ACUTE BILATERAL LOW BACK PAIN WITHOUT SCIATICA: ICD-10-CM

## 2024-04-23 DIAGNOSIS — M54.40 ACUTE BILATERAL LOW BACK PAIN WITH SCIATICA, SCIATICA LATERALITY UNSPECIFIED: ICD-10-CM

## 2024-04-23 DIAGNOSIS — Z86.19 HISTORY OF HEPATITIS C: ICD-10-CM

## 2024-04-23 DIAGNOSIS — I47.10 PAROXYSMAL SUPRAVENTRICULAR TACHYCARDIA (H): Primary | ICD-10-CM

## 2024-04-23 PROCEDURE — 99214 OFFICE O/P EST MOD 30 MIN: CPT | Performed by: FAMILY MEDICINE

## 2024-04-23 RX ORDER — CYCLOBENZAPRINE HCL 5 MG
5 TABLET ORAL 2 TIMES DAILY PRN
Qty: 30 TABLET | Refills: 2 | Status: SHIPPED | OUTPATIENT
Start: 2024-04-23 | End: 2024-07-08

## 2024-04-23 NOTE — PROGRESS NOTES
Assessment & Plan     Paroxysmal supraventricular tachycardia (H24)  Being managed by cards  May end up with stress echo or CT angio if having ablation in future  Continue CCB and statin     Adenomyomatosis of gallbladder  Has appt with surgery     Microscopic hematuria  CT urogram and cystoscopy coming up     Need for prophylactic vaccination against hepatitis A  To pharmacy for this     Sacral insufficiency fracture, initial encounter  Seeing pain management    Acute bilateral low back pain with sciatica, sciatica laterality unspecified  Seeing pain  and neurosurgery     History of hepatitis C  Reason for hep a   - hepatitis A vaccine (VAQTA) 50 UNIT/ML injection  Dispense: 1 mL; Refill: 0    Acute bilateral low back pain without sciatica  See above  Refills per epicare    - cyclobenzaprine (FLEXERIL) 5 MG tablet  Dispense: 30 tablet; Refill: 2        32 minutes spent by me on the date of the encounter doing chart review, history and exam, documentation and further activities per the note    MED REC REQUIRED  Post Medication Reconciliation Status: discharge medications reconciled, continue medications without change    FUTURE APPOINTMENTS:       - Follow-up visit in 5 months - already scheduled  See Patient Instructions    Sofiya Sainz is a 69 year old, presenting for the following health issues:  ER F/U  She is here to follow up after ER visit for SVT and chest pressure.   MI was ruled out.   She did not have stress test.    Her last one was in fall 2022.   She did follow  up with [facility] and also had one week zio patch which showed SVT with longest run of 8 beats.   She is doing well with this regard but does occasionally have longer runs and the chest pressure.   [facility] has talked about ablation but she is dealing with her back pain and her bladder issues and the lump on her left shoulder and gallbladder first.       She is getting workup on her bladder.   She has foul smelling urine and yellow and  brown colored at times.    She has lipoma dx with ultrasound on left shoulder and it hurts when she carries bags with strap on that shoulder    She also is getting intervention for her back pain.   She does need refills for her muscle relaxer.     She also was found to have adenomyosis of her gallbladder and has appt next week with general surgery.           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 uterus and left the ovaries    Polycythemia 08/08/2012    resolved now       Past Surgical History:   Procedure Laterality Date    CARPAL TUNNEL RELEASE RT/LT Right 11/23/2021    and trigger finger and tendon repair    CARPAL TUNNEL RELEASE RT/LT Left 12/20/2021    and tendon repair    COLONOSCOPY  2004, 2012    repeat in 2022    COLONOSCOPY  11/24/2023    4 polyps - repeat in 3 years    Dilatation of the esophagus once due to dysphagia and stricture  2003    Ganglion cyst removal      HEMORRHOIDECTOMY BANDING      HYSTERECTOMY  1998    LAPAROSCOPIC SALPINGO-OOPHORECTOMY Bilateral 10/23/2015    Procedure: LAPAROSCOPIC SALPINGO-OOPHORECTOMY;  Surgeon: Johnathan Steve MD;  Location: [facility] OR    subscapularous repair and biceps tendon repair  05/2022    Dr. Verma at [facility]    Thumb surgery Right     TONSILLECTOMY      XR WRIST SURGERY FLORI RIGHT Right 11/2023     surgery for DJD and bone graft done       MEDICATIONS:  Current Outpatient Medications   Medication Sig Dispense Refill    albuterol (PROAIR HFA/PROVENTIL HFA/VENTOLIN HFA) 108 (90 Base) MCG/ACT inhaler INHALE 1 OR 2 puffs EVERY 6 HOURS as needed for wheezing. Strength: 108 (90 Base) MCG/ACT 8.5 g 5    albuterol (PROVENTIL) (2.5 MG/3ML) 0.083% neb solution INHALE 3 MILLILITERS (2.5 MG) BY NEBULIZATION ROUTE EVERY 6 HOURS AS NEEDED FOR SHORTNESS OF BREATH/DYSPNEA OR WHEEZING. Strength: (2.5 MG/3ML) 0.083% 90 mL 5    ascorbic acid (VITAMIN C) 1000 MG TABS Take 1,000 mg by mouth daily      atorvastatin (LIPITOR) 10 MG tablet Take 1 tablet (10 mg) by mouth daily 90 tablet 1    CALCIUM PO Take 1 tablet by mouth daily      cyclobenzaprine (FLEXERIL) 5 MG tablet Take 1 tablet (5 mg) by mouth 2 times daily as needed for muscle spasms 30 tablet 2    diltiazem ER COATED BEADS (CARDIZEM CD/CARTIA XT) 120 MG 24 hr capsule Take 1 capsule (120 mg) by mouth every morning Hold for blood pressure <110 30 capsule 11    docusate sodium (COLACE) 100 MG capsule Take 1 capsule (100 mg) by mouth 2 times daily as needed for constipation 30 capsule 1    hepatitis A vaccine (VAQTA) 50 UNIT/ML injection Inject 1 mL (50 Units) into the muscle once for 1 dose 1 mL 0    HYDROcodone-acetaminophen (NORCO)  MG per tablet Take 1 tablet by mouth daily as needed for severe pain 30 tablet 0    ibuprofen (ADVIL/MOTRIN) 600 MG tablet Take 1 tablet (600 mg) by mouth every 8 hours as needed for inflammatory pain Take with food. Stop if blood in stool. 45 tablet 0    ketorolac (ACULAR) 0.5 % ophthalmic solution INSTILL ONE DROP INTO RIGHT EYE FOUR TIMES DAILY. START 4 HOURS AFTER SURGERY. USE UNTIL EMPTY (MAX 3 WEEKS)*      metFORMIN (GLUCOPHAGE XR) 500 MG 24 hr tablet Take 1 tablet (500 mg) by mouth daily (with dinner) 30 tablet 5    moxifloxacin (VIGAMOX) 0.5 % ophthalmic solution INSTILL ONE DROP INTO RIGHT EYE FOUR TIMES DAILY. START 4 HOURS AFTER  SURGERY. USE UNTIL EMPTY (MAX 3 WEEKS)*      Multiple Vitamins-Minerals (MULTIVITAMIN OR) Take 1 tablet by mouth daily Per pt, uses ones without Iron      omeprazole (PRILOSEC) 20 MG DR capsule TAKE 1 CAPSULE (20 MG) BY MOUTH DAILY. 90 capsule 1    prednisoLONE acetate (PRED FORTE) 1 % ophthalmic suspension INSTILL ONE DROP INTO RIGHT EYE FOUR TIMES DAILY. START 4 HOURS AFTER SURGERY. USE UNTIL EMPTY (MAX 3 WEEKS)*      traMADol (ULTRAM) 50 MG tablet Take 1 tablet (50 mg) by mouth every 6 hours as needed for severe pain 90 tablet 0    vitamin D3 (CHOLECALCIFEROL) 1000 units (25 mcg) tablet Take 1,000 Units by mouth daily      vitamin E 400 UNITS TABS Take 400 Units by mouth daily      [START ON 2024] zolpidem (AMBIEN) 5 MG tablet TAKE 1 TABLET (5 MG) BY MOUTH NIGHTLY AS NEEDED FOR SLEEP. DO NOT TAKE WITH TRAMADOL. MUST BE NDC # [address]-94 PER PT REQUEST. 30 tablet 5     No current facility-administered medications for this visit.       SOCIAL HISTORY:  Social History     Tobacco Use    Smoking status: Former     Current packs/day: 0.00     Average packs/day: 0.5 packs/day for 52.1 years (26.0 ttl pk-yrs)     Types: Cigarettes     Start date: 10/13/1967     Quit date: 2019     Years since quittin.4    Smokeless tobacco: Never    Tobacco comments:     quit 2019   Substance Use Topics    Alcohol use: No     Comment: sober since 99       Family History   Problem Relation Age of Onset    Hypertension Mother     Cerebrovascular Disease Mother         TIA's    Depression Mother     Cancer - colorectal Maternal Grandmother         dx at 65    Lipids Father     C.A.D. Father         angioplasty at 65    Musculoskeletal Disorder Father     Alcohol/Drug Daughter         in remission    Breast Cancer No family hx of              Review of Systems  Constitutional, HEENT, cardiovascular, pulmonary, gi and gu systems are negative, except as otherwise noted.      Objective    /68 (BP Location: Left  arm, Patient Position: Sitting, Cuff Size: Adult Regular)   Pulse 77   Temp 98  F (36.7  C) (Oral)   Resp 20   Wt 70.3 kg (155 lb)   LMP  (LMP Unknown)   SpO2 98%   BMI 31.31 kg/m    Body mass index is 31.31 kg/m .  Physical Exam   GENERAL: alert and no distress  EYES: Eyes grossly normal to inspection, PERRL and conjunctivae and sclerae normal  NECK: no adenopathy, thyroid normal to palpation, and above left clavicle hugging neck is soft large mass under skin - about baseball sized  RESP: lungs clear to auscultation - no rales, rhonchi or wheezes  CV: regular rate and rhythm, normal S1 S2, no S3 or S4, no murmur, click or rub, no peripheral edema  MS: no gross musculoskeletal defects noted, no edema  SKIN: no suspicious lesions or rashes  NEURO: Normal strength and tone, mentation intact and speech normal  PSYCH: mentation appears normal, affect normal/bright    Ultrasound neck  IMPRESSION:  1. Subcentimeter subcutaneous hyperechoic nodules in the left  supraclavicular region which correspond to palpable abnormality, favor  small lipomas but recommend clinical follow-up.  2. No suspicious mass or lymphadenopathy visualized.    MRI liver  IMPRESSION:  1.  Stable 1.6 cm polypoidal enhancing mass at the gallbladder fundus, which could represent focal adenomyomatosis. Surgical consultation recommended if not previously performed as it is difficult to completely exclude malignancy by imaging alone.   Otherwise continued follow-up ultrasound or MRI in 6 months is suggested.        Signed Electronically by: Emma Byrne MD

## 2024-04-23 NOTE — PATIENT INSTRUCTIONS
[facility]   [address] [facility] [address]   [facility] [address]   Phone: [phone]   Fax: [phone]

## 2024-04-24 ENCOUNTER — THERAPY VISIT (OUTPATIENT)
Dept: PHYSICAL THERAPY | Facility: CLINIC | Age: 69
End: 2024-04-24
Attending: NURSE PRACTITIONER
Payer: OTHER MISCELLANEOUS

## 2024-04-24 DIAGNOSIS — M54.16 LUMBAR RADICULITIS: ICD-10-CM

## 2024-04-24 PROCEDURE — 97530 THERAPEUTIC ACTIVITIES: CPT | Mod: GP | Performed by: PHYSICAL THERAPIST

## 2024-04-24 PROCEDURE — 97110 THERAPEUTIC EXERCISES: CPT | Mod: 59 | Performed by: PHYSICAL THERAPIST

## 2024-04-24 PROCEDURE — 97161 PT EVAL LOW COMPLEX 20 MIN: CPT | Mod: GP | Performed by: PHYSICAL THERAPIST

## 2024-04-24 NOTE — PROGRESS NOTES
PHYSICAL THERAPY EVALUATION  Type of Visit: Evaluation    See electronic medical record for Abuse and Falls Screening details.    Subjective     Pt c/o LBP since work injury 9/24/2021.   States was on step ladder when boxes fell into her knocking her off ladder into shelves.  Has had course with PT with little change and multiple injections with temporary improvement.  Pt states she is scheduled for addition Lx injection 5/1/2024.   MD order date 4/22/2024.  Currently pain is in low back and into hips.    To have L4-5 injections 5/1/2024.  Past Lx injections with good results, but did have hip injection 10/2023 that became infected.  Also had sacral fracture from other fall last fall but has healed..    MRI: From outside of system.  Multi-level DDD/DJD/stenosis.        Presenting condition or subjective complaint: low back-both sides  Date of onset: 09/24/21    Relevant medical history:     Dates & types of surgery: R hand, L shoulder    Prior diagnostic imaging/testing results: MRI; CT scan; X-ray     Prior therapy history for the same diagnosis, illness or injury: Yes      Prior Level of Function  Transfers: Independent  Ambulation: Independent  ADL: Independent  IADL: Driving, Finances, Housekeeping, Laundry, Meal preparation, Medication management    Living Environment  Social support: With family members   Type of home: [facility]   Stairs to enter the home: Yes 4 Is there a railing: Yes   Ramp: No   Stairs inside the home: Yes       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, family, etc)  Equipment owned:       Employment: No    Hobbies/Interests: walking, wall pilates, chair exercises    Patient goals for therapy: move pain free    Pain assessment: Pain present     Objective   LUMBAR SPINE EVALUATION  PAIN: Pain Level at Rest: 3/10  Pain Level with Use: 8/10  Pain Location: lumbar spine and into R >L L/E to knee level  Pain Quality: Aching, Numb, Shooting, Stabbing, and Tingling  Pain  Frequency: constant  Pain is Worst: daytime  Pain is Exacerbated By: standing, walking, bending, lifting, sleep  Pain is Relieved By: heat, otc medications, and rest  Pain Progression: Unchanged  INTEGUMENTARY (edema, incisions):   POSTURE: Sitting Posture: Rounded shoulders, Forward head, Lordosis decreased  GAIT:   Weightbearing Status: WBAT  Assistive Device(s): None  Gait Deviations: Antalgic  Trunk flexion  BALANCE/PROPRIOCEPTION: Single Leg Stance Eyes Open (seconds): 3-4 sec B  WEIGHTBEARING ALIGNMENT:   NON-WEIGHTBEARING ALIGNMENT:    ROM:   (Degrees) Left AROM Left PROM  Right AROM Right PROM   Hip Flexion       Hip Extension       Hip Abduction       Hip Adduction       Hip Internal Rotation       Hip External Rotation       Knee Flexion       Knee Extension       Lumbar Side glide     Lumbar Flexion Min loss +/-   Lumbar Extension Mod loss +/-.  KATIE: NE during, increased ROM after     Lx ROM: SB R mod loss +, L ERT  Pain:   End feel:   PELVIC/SI SCREEN:   STRENGTH:  poor core stab mm recruitment, glute med 4-/5, max 4-/5     MYOTOMES: WNL  DTR S:   CORD SIGNS:   DERMATOMES: WNL  NEURAL TENSION: Lumbar WNL  FLEXIBILITY: WFL  LUMBAR/HIP Special Tests:    PELVIS/SI SPECIAL TESTS:   FUNCTIONAL TESTS: Double Leg Squat: Anterior knee translation, Knee valgus, Hip internal rotation, and Improper use of glutes/hips  PALPATION:  TTP Lx paraspinals R>L  SPINAL SEGMENTAL CONCLUSIONS:       Assessment & Plan   CLINICAL IMPRESSIONS  Medical Diagnosis: Lumbar radiculitis    Treatment Diagnosis: LBP   Impression/Assessment: Patient is a 69 year old female with LBP complaints.  The following significant findings have been identified: Pain, Decreased ROM/flexibility, Decreased joint mobility, Decreased strength, Decreased proprioception, Impaired gait, Impaired muscle performance, Decreased activity tolerance, and Impaired posture. These impairments interfere with their ability to perform self care tasks, recreational  activities, household chores, driving , household mobility, and community mobility as compared to previous level of function.     Clinical Decision Making (Complexity):  Clinical Presentation: Stable/Uncomplicated  Clinical Presentation Rationale: based on medical and personal factors listed in PT evaluation  Clinical Decision Making (Complexity): Low complexity    PLAN OF CARE  Treatment Interventions:  Modalities: Ultrasound  Interventions: Manual Therapy, Neuromuscular Re-education, Therapeutic Activity, Therapeutic Exercise    Long Term Goals     PT Goal 1  Goal Identifier: Sleep  Goal Description: Be able to sleep 8 hours pain free  Rationale:  (to establish restorative sleep pattern)  Target Date: 06/19/24      Frequency of Treatment: 2x/week decrasing to 1x/week  Duration of Treatment: 8 weeks    Recommended Referrals to Other Professionals:   Education Assessment:   Learner/Method: Patient;Demonstration;Pictures/Video  Education Comments: print    Risks and benefits of evaluation/treatment have been explained.   Patient/Family/caregiver agrees with Plan of Care.     Evaluation Time:     PT Eval, Low Complexity Minutes (51462): 15       Signing Clinician: Andrew Gaona PT

## 2024-04-26 ENCOUNTER — THERAPY VISIT (OUTPATIENT)
Dept: PHYSICAL THERAPY | Facility: CLINIC | Age: 69
End: 2024-04-26
Payer: OTHER MISCELLANEOUS

## 2024-04-26 DIAGNOSIS — M54.16 LUMBAR RADICULITIS: Primary | ICD-10-CM

## 2024-04-26 PROCEDURE — 97110 THERAPEUTIC EXERCISES: CPT | Mod: GP | Performed by: PHYSICAL THERAPIST

## 2024-04-29 ENCOUNTER — TELEPHONE (OUTPATIENT)
Dept: SURGERY | Facility: CLINIC | Age: 69
End: 2024-04-29

## 2024-04-29 ENCOUNTER — OFFICE VISIT (OUTPATIENT)
Dept: SURGERY | Facility: CLINIC | Age: 69
End: 2024-04-29
Payer: COMMERCIAL

## 2024-04-29 VITALS
SYSTOLIC BLOOD PRESSURE: 100 MMHG | HEIGHT: 59 IN | WEIGHT: 151 LBS | DIASTOLIC BLOOD PRESSURE: 74 MMHG | BODY MASS INDEX: 30.44 KG/M2 | HEART RATE: 79 BPM | OXYGEN SATURATION: 93 %

## 2024-04-29 DIAGNOSIS — K82.4 GALLBLADDER POLYP: Primary | ICD-10-CM

## 2024-04-29 PROCEDURE — 99203 OFFICE O/P NEW LOW 30 MIN: CPT | Performed by: SURGERY

## 2024-04-29 RX ORDER — INDOCYANINE GREEN AND WATER 25 MG
2.5 KIT INJECTION ONCE
Status: CANCELLED | OUTPATIENT
Start: 2024-04-29 | End: 2024-04-29

## 2024-04-29 NOTE — PROGRESS NOTES
Chief complaint:  Gallbladder polyp    HPI:  This patient is a 69 year old female who presents with a gallbladder polyp seen on prior imaging. She states that it was found incidentally several years ago and that it has previously been recommended to have it removed. She has not had prior abdominal surgery. She has had several polyps removed on a colonoscopy last year.    Past Medical History:   has a past medical history of Disorders of porphyrin metabolism (01/01/1996), Diverticula of colon (01/01/2014), Emphysema lung, Esophageal stricture, Hemorrhoids, Hepatitis C (01/01/1996), Malignant neoplasm of endocervix (01/01/1988), and Polycythemia (08/08/2012).    Past Surgical History:  Past Surgical History:   Procedure Laterality Date    CARPAL TUNNEL RELEASE RT/LT Right 11/23/2021    and trigger finger and tendon repair    CARPAL TUNNEL RELEASE RT/LT Left 12/20/2021    and tendon repair    COLONOSCOPY  2004, 2012    repeat in 2022    COLONOSCOPY  11/24/2023    4 polyps - repeat in 3 years    Dilatation of the esophagus once due to dysphagia and stricture  2003    Ganglion cyst removal      HEMORRHOIDECTOMY BANDING      HYSTERECTOMY  1998    LAPAROSCOPIC SALPINGO-OOPHORECTOMY Bilateral 10/23/2015    Procedure: LAPAROSCOPIC SALPINGO-OOPHORECTOMY;  Surgeon: Johnathan Steve MD;  Location: [facility] OR    subscapularous repair and biceps tendon repair  05/2022    Dr. Verma at [facility]    Thumb surgery Right     TONSILLECTOMY      XR WRIST SURGERY FLORI RIGHT Right 11/2023    surgery for DJD and bone graft done        Social History:  Social History     Socioeconomic History    Marital status:      Spouse name: Boyfriend - Fritz    Number of children: 3    Years of education: Not on file    Highest education level: Not on file   Occupational History    Occupation: work in [address]     Employer: [employer]   Tobacco Use    Smoking status: Former     Current packs/day: 0.00     Average packs/day: 0.5 packs/day for 52.1 years (26.0  ttl pk-yrs)     Types: Cigarettes     Start date: 10/13/1967     Quit date: 2019     Years since quittin.4     Passive exposure: Never    Smokeless tobacco: Never    Tobacco comments:     quit 2019   Vaping Use    Vaping status: Never Used   Substance and Sexual Activity    Alcohol use: No     Comment: sober since 99    Drug use: No    Sexual activity: Not Currently     Partners: Male   Other Topics Concern    Parent/sibling w/ CABG, MI or angioplasty before 65F 55M? Yes   Social History Narrative    Not on file     Social Determinants of Health     Financial Resource Strain: Low Risk  (3/11/2024)    Financial Resource Strain     Within the past 12 months, have you or your family members you live with been unable to get utilities (heat, electricity) when it was really needed?: No   Food Insecurity: Low Risk  (3/11/2024)    Food Insecurity     Within the past 12 months, did you worry that your food would run out before you got money to buy more?: No     Within the past 12 months, did the food you bought just not last and you didn t have money to get more?: No   Recent Concern: Food Insecurity - High Risk (2024)    Food Insecurity     Within the past 12 months, did you worry that your food would run out before you got money to buy more?: No     Within the past 12 months, did the food you bought just not last and you didn t have money to get more?: Yes   Transportation Needs: Low Risk  (3/11/2024)    Transportation Needs     Within the past 12 months, has lack of transportation kept you from medical appointments, getting your medicines, non-medical meetings or appointments, work, or from getting things that you need?: No   Physical Activity: Unknown (3/11/2024)    Exercise Vital Sign     Days of Exercise per Week: 3 days     Minutes of Exercise per Session: Not on file   Stress: No Stress Concern Present (3/11/2024)    [place] of Occupational [employer] - Occupational Stress Questionnaire      Feeling of Stress : Only a little   Social Connections: Unknown (3/11/2024)    Social Connection and Isolation Panel [NHANES]     Frequency of Communication with Friends and Family: Not on file     Frequency of Social Gatherings with Friends and Family: Three times a week     Attends Restorationism Services: Not on file     Active Member of Clubs or Organizations: No     Attends Club or Organization Meetings: Never     Marital Status: Living with partner   Interpersonal Safety: Low Risk  (10/31/2023)    Interpersonal Safety     Do you feel physically and emotionally safe where you currently live?: Yes     Within the past 12 months, have you been hit, slapped, kicked or otherwise physically hurt by someone?: No     Within the past 12 months, have you been humiliated or emotionally abused in other ways by your partner or ex-partner?: No   Housing Stability: Low Risk  (3/11/2024)    Housing Stability     Do you have housing? : Yes     Are you worried about losing your housing?: No        Family History:  Family History   Problem Relation Age of Onset    Hypertension Mother     Cerebrovascular Disease Mother         TIA's    Depression Mother     Cancer - colorectal Maternal Grandmother         dx at 65    Lipids Father     C.A.D. Father         angioplasty at 65    Musculoskeletal Disorder Father     Alcohol/Drug Daughter         in remission    Breast Cancer No family hx of        Review of Systems:  The 10 point Review of Systems is negative other than noted in the HPI and above.    Physical Exam:  General - This is a well developed, well nourished female in no apparent distress.  HEENT - Normocephalic, atraumatic.  No scleral icterus, membranes moist.  Respiratory- non-labored  Left shoulder shows a mobile fatty tumor just above the clavicle which is about 4cm in maximum dimension.  Gastrointestinal:   Exam deferred given absence of abdominal symptoms.  Extremities - warm without edema  Neurologic -  non-focal  Psych-normal affect      Relevant labs:  None    Imaging:  MR of her liver done on 4/20/24;  1.6cm polypoid mass in the gallbladder fundus. Was present on prior imaging.    Assessment and Plan:  I reviewed the pathophysiology of biliary tract disease, its natural history and indications for cholecystectomy. In this instance, it is typically advised to remove gallbladders with a polyp in the 1-2cm range have a possibility of malignancy, especially in ages over 60. I have recommended Laparoscopic cholecystectomy, she is interested in pursuing this.  Risks including infection, bleeding, harm to structures, open conversion, bile leak, retained stone and common duct injury were discussed.  I also offered removal of her shoulder mass as it causes discomfort when she wears her purse strap over it. We will work on scheduling surgery at the patient s convenience.    Yao Rogers MD  [facility]    Please route or send letter to:  Primary Care Provider (PCP)

## 2024-04-29 NOTE — TELEPHONE ENCOUNTER
Type of surgery: LAPAROSCOPIC CHOLECYSTECTOMY, EXCISION LEFT SHOULDER MASS   Location of surgery: [facility]  Date and time of surgery: 5/16/2024 10:30 AM   Surgeon: GELY CHAUDHARY MD    Pre-Op Appt Date: PATIENT TO SCHEDULE    Post-Op Appt Date: PATIENT TO SCHEDULE      Packet sent out: Yes  Pre-cert/Authorization completed:  Not Applicable  Date: 4/29/2024         LAPAROSCOPIC CHOLECYSTECTOMY, EXCISION LEFT SHOULDER MASS GENERAL PT INST TO HAVE H&P WITH PCP 75MIN REQ PA ASSIST RMO NMS

## 2024-04-29 NOTE — LETTER
2024       Emeli Granados  [address] [facility] [address]     RE: [id number]  : 1955    Emeli Granados has been scheduled for surgery on 2024 at 10:30 AM  at [facility] with Dr Yao Rogers.  The hospital is located at [address] in [place].    Please check in at the Surgery reception desk at 8:30 AM . This is located in the back of the hospital on the East side, just past the Emergency Room entrance.     DO NOT EAT OR DRINK ANYTHING 8 HOURS BEFORE YOUR ARRIVAL TIME.   You may have sips of clear liquids up until 2 hours before your arrival time. If you have been advised to take your medication, please do this early in the morning with just sips of clear liquid.     Hospital regulations require an updated pre-operative examination to be completed within 30 days of the procedure. This can be done by your primary care provider. Please ask them to fax documentation to [phone]. We also recommend you bring a copy with you.     You should shower before your surgery with Hibiclens or Exidine soap.  This can be found at your local pharmacy or you can pick it up from our office for free.  Please call our office if you have any questions.     You will be required to have an Adult (friend or family member) drive you home after your surgery and arrange for an adult to stay with you until the next morning.     You will receive several calls from our staff 3-7 days prior to your scheduled procedure with further details and to answer any questions you may have.    It is sometimes necessary to adjust the surgery schedule due to emergencies and additions to the schedule.  If your surgery is affected by this, we greatly appreciate your flexibility and understanding in this matter    It is best if you call regarding post-operative questions between the hours of 8:00 am & 3:00 pm Monday-Friday, so you have access to the daytime care team that know you  best.  Prescription refills are accepted during regular office hours only.    Please do not bring any Disability or FMLA papers to the hospital.  They need to be either faxed ([phone]), mailed or hand delivered to our office by you or a family member for completion.  Please allow 14 business days to complete paperwork.        If you have questions or concerns, please contact our office at [phone].

## 2024-05-01 ENCOUNTER — RADIOLOGY INJECTION OFFICE VISIT (OUTPATIENT)
Dept: PHYSICAL MEDICINE AND REHAB | Facility: CLINIC | Age: 69
End: 2024-05-01
Attending: NURSE PRACTITIONER
Payer: COMMERCIAL

## 2024-05-01 VITALS
DIASTOLIC BLOOD PRESSURE: 70 MMHG | HEART RATE: 70 BPM | OXYGEN SATURATION: 98 % | SYSTOLIC BLOOD PRESSURE: 106 MMHG | RESPIRATION RATE: 16 BRPM | TEMPERATURE: 97.5 F

## 2024-05-01 DIAGNOSIS — F11.20 CONTINUOUS OPIOID DEPENDENCE (H): ICD-10-CM

## 2024-05-01 DIAGNOSIS — M54.16 LUMBAR RADICULITIS: ICD-10-CM

## 2024-05-01 DIAGNOSIS — G89.29 OTHER CHRONIC PAIN: ICD-10-CM

## 2024-05-01 DIAGNOSIS — E11.9 DIABETES MELLITUS (H): Primary | ICD-10-CM

## 2024-05-01 DIAGNOSIS — M54.40 ACUTE BILATERAL LOW BACK PAIN WITH SCIATICA, SCIATICA LATERALITY UNSPECIFIED: ICD-10-CM

## 2024-05-01 LAB — GLUCOSE SERPL-MCNC: 101 MG/DL (ref 70–99)

## 2024-05-01 PROCEDURE — 64483 NJX AA&/STRD TFRM EPI L/S 1: CPT | Mod: RT | Performed by: PAIN MEDICINE

## 2024-05-01 PROCEDURE — 82962 GLUCOSE BLOOD TEST: CPT | Performed by: PAIN MEDICINE

## 2024-05-01 RX ORDER — LIDOCAINE HYDROCHLORIDE 10 MG/ML
INJECTION, SOLUTION EPIDURAL; INFILTRATION; INTRACAUDAL; PERINEURAL
Status: COMPLETED | OUTPATIENT
Start: 2024-05-01 | End: 2024-05-01

## 2024-05-01 RX ORDER — HYDROCODONE BITARTRATE AND ACETAMINOPHEN 10; 325 MG/1; MG/1
1 TABLET ORAL DAILY PRN
Qty: 30 TABLET | Refills: 0 | Status: SHIPPED | OUTPATIENT
Start: 2024-05-04 | End: 2024-06-05

## 2024-05-01 RX ORDER — DEXAMETHASONE SODIUM PHOSPHATE 10 MG/ML
INJECTION, SOLUTION INTRAMUSCULAR; INTRAVENOUS
Status: COMPLETED | OUTPATIENT
Start: 2024-05-01 | End: 2024-05-01

## 2024-05-01 RX ADMIN — DEXAMETHASONE SODIUM PHOSPHATE 10 MG: 10 INJECTION, SOLUTION INTRAMUSCULAR; INTRAVENOUS at 09:11

## 2024-05-01 RX ADMIN — LIDOCAINE HYDROCHLORIDE 2 ML: 10 INJECTION, SOLUTION EPIDURAL; INFILTRATION; INTRACAUDAL; PERINEURAL at 09:11

## 2024-05-01 ASSESSMENT — PAIN SCALES - GENERAL
PAINLEVEL: MODERATE PAIN (5)
PAINLEVEL: EXTREME PAIN (8)

## 2024-05-01 NOTE — PATIENT INSTRUCTIONS
DISCHARGE INSTRUCTIONS    During office hours (8:00 a.m.- 4:00 p.m.) questions or concerns may be answered  by calling Spine Center Navigation Nurses at  [phone].  Messages received after hours will be returned the following business day.      In the case of an emergency, please dial 911 or seek assistance at the nearest Emergency Room/Urgent Care facility.     All Patients:    You may experience an increase in your symptoms for the first 2 days (It may take anywhere between 2 days- 2 weeks for the steroid to have maximum effect).    You may use ice on the injection site, as frequently as 20 minutes each hour if needed.    You may take your pain medicine.    You may continue taking your regular medication after your injection. If you have had a Medial Branch Block you may resume pain medication once your pain diary is completed.    You may shower. No swimming, tub bath or hot tub for 48 hours.  You may remove your bandaid/bandage as soon as you are home.    You may resume light activities, as tolerated.    Resume your usual diet as tolerated.    It is strongly advised that you do not drive for 1-3 hours post injection.    If you have had oral sedation:  Do not drive for 8 hours post injection.      If you have had IV sedation:  Do not drive for 24 hours post injection.  Do not operate hazardous machinery or make important personal/business decisions for 24 hours.      POSSIBLE STEROID SIDE EFFECTS (If steroid/cortisone was used for your procedure)    -If you experience these symptoms, it should only last for a short period    Swelling of the legs              Skin redness (flushing)     Mouth (oral) irritation   Blood sugar (glucose) levels            Sweats                    Mood changes  Headache  Sleeplessness  Weakened immune system for up to 14 days, which could increase the risk of orlando the COVID-19 virus and/or experiencing more severe symptoms of the disease, if exposed.  Decreased  effectiveness of the flu vaccine if given within 2 weeks of the steroid.         POSSIBLE PROCEDURE SIDE EFFECTS  -Call the Spine Center if you are concerned  Increased Pain           Increased numbness/tingling      Nausea/Vomiting          Bruising/bleeding at site      Redness or swelling                                              Difficulty walking      Weakness           Fever greater than 100.5    *In the event of a severe headache after an epidural steroid injection that is relieved by lying down, please call the [facility] to speak with a clinical staff member*     Please be advised that your blood glucose levels may be increased for the next 5-7 days as a result of the steroid you received with your injection today.  It is recommended that you monitor your blood glucose levels closely over the next week and direct any additional questions regarding your blood glucose management to your primary doctor.

## 2024-05-02 ENCOUNTER — HOSPITAL ENCOUNTER (OUTPATIENT)
Dept: CT IMAGING | Facility: CLINIC | Age: 69
Discharge: HOME | End: 2024-05-02
Attending: PHYSICIAN ASSISTANT | Admitting: PHYSICIAN ASSISTANT
Payer: COMMERCIAL

## 2024-05-02 DIAGNOSIS — R31.29 MICROSCOPIC HEMATURIA: ICD-10-CM

## 2024-05-02 PROCEDURE — 250N000011 HC RX IP 250 OP 636: Performed by: PHYSICIAN ASSISTANT

## 2024-05-02 PROCEDURE — 74178 CT ABD&PLV WO CNTR FLWD CNTR: CPT

## 2024-05-02 RX ORDER — IOPAMIDOL 755 MG/ML
81 INJECTION, SOLUTION INTRAVASCULAR ONCE
Status: COMPLETED | OUTPATIENT
Start: 2024-05-02 | End: 2024-05-02

## 2024-05-02 RX ADMIN — IOPAMIDOL 81 ML: 755 INJECTION, SOLUTION INTRAVENOUS at 13:05

## 2024-05-03 ENCOUNTER — OFFICE VISIT (OUTPATIENT)
Dept: UROLOGY | Facility: CLINIC | Age: 69
End: 2024-05-03
Payer: COMMERCIAL

## 2024-05-03 VITALS
WEIGHT: 151 LBS | BODY MASS INDEX: 30.44 KG/M2 | SYSTOLIC BLOOD PRESSURE: 108 MMHG | HEIGHT: 59 IN | DIASTOLIC BLOOD PRESSURE: 70 MMHG

## 2024-05-03 DIAGNOSIS — R93.2 ABNORMAL FINDINGS ON DIAGNOSTIC IMAGING OF GALLBLADDER: ICD-10-CM

## 2024-05-03 DIAGNOSIS — R32 URINARY INCONTINENCE, UNSPECIFIED TYPE: ICD-10-CM

## 2024-05-03 DIAGNOSIS — R31.29 MICROSCOPIC HEMATURIA: Primary | ICD-10-CM

## 2024-05-03 LAB
ALBUMIN UR-MCNC: NEGATIVE MG/DL
APPEARANCE UR: CLEAR
BILIRUB UR QL STRIP: NEGATIVE
COLOR UR AUTO: YELLOW
GLUCOSE UR STRIP-MCNC: NEGATIVE MG/DL
HGB UR QL STRIP: NEGATIVE
KETONES UR STRIP-MCNC: ABNORMAL MG/DL
LEUKOCYTE ESTERASE UR QL STRIP: NEGATIVE
NITRATE UR QL: NEGATIVE
PH UR STRIP: 5.5 [PH] (ref 5–7)
SP GR UR STRIP: 1.02 (ref 1–1.03)
UROBILINOGEN UR STRIP-ACNC: 0.2 E.U./DL

## 2024-05-03 PROCEDURE — 81003 URINALYSIS AUTO W/O SCOPE: CPT | Mod: QW | Performed by: STUDENT IN AN ORGANIZED HEALTH CARE EDUCATION/TRAINING PROGRAM

## 2024-05-03 ASSESSMENT — PAIN SCALES - GENERAL: PAINLEVEL: MODERATE PAIN (5)

## 2024-05-03 NOTE — PATIENT INSTRUCTIONS

## 2024-05-03 NOTE — PROGRESS NOTES
"CHIEF COMPLAINT   Emeli Granados who is a 69 year old female returns today for follow-up of microscopic hematuria.      HPI   Emeli Granados is a 69 year old female who presents with a history of microhematuria, urinary incontinence.  She says ever since hospitalization in February 2024 for influenza has had issues with urinary leakage, soaking several pads a day. She says the leakage is constant and doesn't seem related to cough/laugh/sneeze per se. She does not have leakage with urge    PHYSICAL EXAM  Patient is a 69 year old  female   Vitals: Blood pressure 108/70, height 1.499 m (4' 11\"), weight 68.5 kg (151 lb), not currently breastfeeding.  Body mass index is 30.5 kg/m .  General Appearance Adult:   Alert, no acute distress, oriented  HENT: throat/mouth:normal, good dentition  Lungs: no respiratory distress, or pursed lip breathing  Heart: No obvious jugular venous distension present  Abdomen: nondistended  Musculoskeltal: extremities normal, no peripheral edema  Skin: no suspicious lesions or rashes  Neuro: Alert, oriented, speech and mentation normal  Psych: affect and mood normal  Gait: Normal  : normal urethral meatus without caruncle or prolapse    Ct urogram 5/3/2024                                                                     IMPRESSION:   1.  No significant change in appearance of 1.4 cm enhancing mass along  the gallbladder fossa. Focal adenomyomatosis within the differential  diagnosis. However suggests surgical consultation as neoplastic origin  cannot be excluded.  2.  No hydronephrosis or nephrolithiasis is seen.    PRE-PROCEDURE DIAGNOSIS: microhematuria    POST-PROCEDURE DIAGNOSIS: microhematuria    PROCEDURE: Cystoscopy    DESCRIPTION OF PROCEDURE: After informed consent was obtained, the patient was brought to the procedure room where she was placed in the lithotomy position with all pressure points well padded.  The vulva was prepped and draped in sterile fashion. A flexible " cystoscope was introduced through a well-lubricated urethra.     The urethra was short, bladder neck widely patent. Bladder unremarkable without any concerning tumors or raised erythema. No bladder stones    I removed the scope and had the patient valsalva and did not see any urine leak      The flexible cystoscope was removed and the findings were described to the patient.     ASSESSMENT and PLAN  ***    Ct urogram without any concerning source of hematuria. Incidental finding of gallbladder abnormality for which she is already seeing general surgery for consideration of cholecystectomy    Urinary incontinence: suspect stress urinary incontinence. Recommend pelvic floor physical therapy and follow up with Megan Vargas. Can also try Impressa tampons. If not improving, see female pelvic medicine reconstructive surgeon        Brock Silverman MD   [facility]  [facility] Phone: [phone]

## 2024-05-03 NOTE — NURSING NOTE
Chief Complaint   Patient presents with    Microscopic hematuria     Pt here for in office cystoscopy      Prior to the start of the procedure and with procedural staff participation, I verbally confirmed the patient s identity using two indicators, relevant allergies, that the procedure was appropriate and matched the consent or emergent situation, and that the correct equipment/implants were available. Immediately prior to starting the procedure I conducted the Time Out with the procedural staff and re-confirmed the patient s name, procedure, and site/side. I have wiped the patient off with the povidone-Iodine solution, draped them,  used Lidocaine hydrochloride jelly, and instilled sterile water into the bladder. (The Joint Commission universal protocol was followed.)  Yes    Sedation (Moderate or Deep): None     Kristie Hurtado CMA

## 2024-05-03 NOTE — LETTER
"5/3/2024       RE: Emeli Granados  [address]  [facility] [address]     Dear Colleague,    Thank you for referring your patient, Emeli Granados, to the [facility] [place] at [facility]. Please see a copy of my visit note below.    CHIEF COMPLAINT   Emeli Granados who is a 69 year old female returns today for follow-up of microscopic hematuria.      HPI   Emeli Granados is a 69 year old female who presents with a history of microhematuria, urinary incontinence.  She says ever since hospitalization in February 2024 for influenza has had issues with urinary leakage, soaking several pads a day. She says the leakage is constant and doesn't seem related to cough/laugh/sneeze per se. She does not have leakage with urge    PHYSICAL EXAM  Patient is a 69 year old  female   Vitals: Blood pressure 108/70, height 1.499 m (4' 11\"), weight 68.5 kg (151 lb), not currently breastfeeding.  Body mass index is 30.5 kg/m .  General Appearance Adult:   Alert, no acute distress, oriented  HENT: throat/mouth:normal, good dentition  Lungs: no respiratory distress, or pursed lip breathing  Heart: No obvious jugular venous distension present  Abdomen: nondistended  Musculoskeltal: extremities normal, no peripheral edema  Skin: no suspicious lesions or rashes  Neuro: Alert, oriented, speech and mentation normal  Psych: affect and mood normal  Gait: Normal  : normal urethral meatus without caruncle or prolapse    Ct urogram 5/3/2024                                                                     IMPRESSION:   1.  No significant change in appearance of 1.4 cm enhancing mass along  the gallbladder fossa. Focal adenomyomatosis within the differential  diagnosis. However suggests surgical consultation as neoplastic origin  cannot be excluded.  2.  No hydronephrosis or nephrolithiasis is seen.    PRE-PROCEDURE DIAGNOSIS: " microhematuria    POST-PROCEDURE DIAGNOSIS: microhematuria    PROCEDURE: Cystoscopy    DESCRIPTION OF PROCEDURE: After informed consent was obtained, the patient was brought to the procedure room where she was placed in the lithotomy position with all pressure points well padded.  The vulva was prepped and draped in sterile fashion. A flexible cystoscope was introduced through a well-lubricated urethra.     The urethra was short, bladder neck widely patent. Bladder unremarkable without any concerning tumors or raised erythema. No bladder stones    I removed the scope and had the patient valsalva and did not see any urine leak      The flexible cystoscope was removed and the findings were described to the patient.     ASSESSMENT and PLAN      Ct urogram without any concerning source of hematuria. Incidental finding of gallbladder abnormality for which she is already seeing general surgery for consideration of cholecystectomy    Urinary incontinence: suspect stress urinary incontinence. Recommend pelvic floor physical therapy and follow up with Megan Vargas. Can also try Impressa tampons. If not improving, see female pelvic medicine reconstructive surgeon        Brock Silverman MD   [facility] Urology  [facility] Phone: [phone]

## 2024-05-08 ENCOUNTER — THERAPY VISIT (OUTPATIENT)
Dept: PHYSICAL THERAPY | Facility: CLINIC | Age: 69
End: 2024-05-08
Payer: OTHER MISCELLANEOUS

## 2024-05-08 DIAGNOSIS — M54.16 LUMBAR RADICULITIS: Primary | ICD-10-CM

## 2024-05-08 PROCEDURE — 97112 NEUROMUSCULAR REEDUCATION: CPT | Mod: GP | Performed by: PHYSICAL THERAPIST

## 2024-05-08 PROCEDURE — 97110 THERAPEUTIC EXERCISES: CPT | Mod: GP | Performed by: PHYSICAL THERAPIST

## 2024-05-12 ENCOUNTER — NURSE TRIAGE (OUTPATIENT)
Dept: NURSING | Facility: CLINIC | Age: 69
End: 2024-05-12
Payer: COMMERCIAL

## 2024-05-13 ENCOUNTER — TELEPHONE (OUTPATIENT)
Dept: CARDIOLOGY | Facility: CLINIC | Age: 69
End: 2024-05-13
Payer: COMMERCIAL

## 2024-05-13 ENCOUNTER — TELEPHONE (OUTPATIENT)
Dept: PHYSICAL MEDICINE AND REHAB | Facility: CLINIC | Age: 69
End: 2024-05-13
Payer: COMMERCIAL

## 2024-05-13 NOTE — TELEPHONE ENCOUNTER
Nurse Triage SBAR    Is this a 2nd Level Triage? NO    Situation: swollen ankles bilateral    Background: Patient states that she woke this morning and both feet and ankles were swollen.  Patient states that the swelling stops at the ankles. Patient denies difficulty walking, shortness of breath    Assessment: Pedal edema    Protocol Recommended Disposition:   See PCP Within 3 Days, See More Appropriate Guideline    Recommendation:  Patient is anxious about condition. Patient is advised that if she feels like she needs to be seen sooner she can go to the ED. Patient is encouraged to call back with further concerns or questions. Patient verbalized understanding of care advice.      Sherie Miranda RN on 5/12/2024 at 7:14 PM      Does the patient meet one of the following criteria for ADS visit consideration? No      Reason for Disposition   Swelling of both ankles (i.e., pedal edema)   [1] MILD swelling of both ankles (i.e., pedal edema) AND [2] new-onset or worsening    Additional Information   Negative: SEVERE difficulty breathing (e.g., struggling for each breath, speaks in single words)   Negative: Looks like a broken bone or dislocated joint (e.g., crooked or deformed)   Negative: Sounds like a life-threatening emergency to the triager   Negative: Difficulty breathing at rest   Negative: Entire foot is cool or blue in comparison to other side   Negative: [1] Can't walk or can barely walk AND [2] new-onset   Negative: [1] Difficulty breathing with exertion (e.g., walking) AND [2] new-onset or worsening   Negative: [1] Red area or streak AND [2] fever   Negative: [1] Swelling is painful to touch AND [2] fever   Negative: [1] Cast on leg or ankle AND [2] now increased pain   Negative: SEVERE leg swelling (e.g., swelling extends above knee, entire leg is swollen, weeping fluid)   Negative: [1] Red area or streak [2] large (> 2 in. or 5 cm)   Negative: [1] Thigh or calf pain AND [2] only 1 side AND [3] present > 1  hour   Negative: [1] Thigh, calf, or ankle swelling AND [2] only 1 side   Negative: [1] Thigh, calf, or ankle swelling AND [2] bilateral AND [3] 1 side is more swollen   Negative: [1] MODERATE leg swelling (e.g., swelling extends up to knees) AND [2] new-onset or worsening   Negative: Swelling of face, arm or hands  (Exception: Slight puffiness of fingers occurring during hot weather.)   Negative: Looks like a boil, infected sore, deep ulcer or other infected rash (spreading redness, pus)    Protocols used: Ankle Swelling-A-AH, Leg Swelling and Edema-A-AH

## 2024-05-13 NOTE — TELEPHONE ENCOUNTER
"5/13/24 Pt called in to say she woke up Sunday am and her feet were quite swollen which progressively got worse as the day went on. She tried to elevate them as much as possible and things were a bit better this am but as the day is going on, she can tell the swelling is coming back. She stated she spent the weekend at the cabin and di have Fritos, Chips and other salty snacks   Pt denies palpitations or SOB. She cannot tell if she is retaining fluid but \" has been gaining weight for quite some time\"  Discussed that per [facility] she called PCP triage and was recommended follow up in  3 days . She does have an appt made on 5/15 .  Advised to monitor sodium intake and keep feet elevated as much as she can over the next couple days and keep appt w PCP on 5/15  Pt voiced understanding and agreement with plan.   Milton 220 pm   "

## 2024-05-13 NOTE — TELEPHONE ENCOUNTER
Other: pt called requesting for L4 epidural spine fusion order. Can care team call her once the order has been placed in epic? The pain is gone on the L side and is now all on the R side per pt.     Could we send this information to you in Boxee or would you prefer to receive a phone call?:   Patient would prefer a phone call   Okay to leave a detailed message?: Yes at Cell number on file:    Telephone Information:   Mobile [phone]

## 2024-05-13 NOTE — TELEPHONE ENCOUNTER
Phone call to patient to discuss. Explained she has an appointment with Rizwana tomorrow at the [facility]. She will evaluate, discuss response to 1st injection and develop treatment plan after this. Stated understanding and appreciation for call back.

## 2024-05-14 ENCOUNTER — OFFICE VISIT (OUTPATIENT)
Dept: PHYSICAL MEDICINE AND REHAB | Facility: CLINIC | Age: 69
End: 2024-05-14
Payer: COMMERCIAL

## 2024-05-14 VITALS — HEART RATE: 72 BPM | DIASTOLIC BLOOD PRESSURE: 64 MMHG | SYSTOLIC BLOOD PRESSURE: 134 MMHG

## 2024-05-14 DIAGNOSIS — M54.16 BILATERAL LUMBAR RADICULOPATHY: Primary | ICD-10-CM

## 2024-05-14 PROCEDURE — 99214 OFFICE O/P EST MOD 30 MIN: CPT | Performed by: NURSE PRACTITIONER

## 2024-05-14 ASSESSMENT — PAIN SCALES - GENERAL: PAINLEVEL: SEVERE PAIN (6)

## 2024-05-14 NOTE — PATIENT INSTRUCTIONS
An injection has been ordered today to potentially help with your pain symptoms. These injections do not fix what is going on in your back, therefore they typically do not take away the pain completely, however they can many times help improve symptoms. Injections should always be completed along with other modalities such as physical therapy for the best long term outcomes. If injections alone are done, then pain will likely return.     [facility] Injection Requirements:    A  is required for all fluoroscopically-guided injections.  Injection appointments may be cancelled if there are signs/symptoms of an active infection or if the patient is being actively treated with antibiotics for a diagnosed infection.  Patients may have their steroid injection cancelled if they have had another steroid injection within 2 weeks.  Diabetic patients will have their blood glucose levels checked the day of their injection and the appointment will be rescheduled if the blood glucose level is 300 or higher.  Patients with allergies to cortisone, local anesthetics, iodine, or contrast dye should contact the Spine Center to further discuss these considerations.  Patients scheduled for medial branch block diagnostic injections should refrain from taking pain medication the day of the procedure.  The medial branch block injection appointment will be rescheduled if the patient's pain rating is not 5/10 or greater at the time of the procedure.  Patients taking warfarin/Coumadin will have their INR checked the day of the procedure and the procedure may be rescheduled if the INR is greater than 3.0.  Please contact the Spine Center (#[phone]) if you are taking any prescription blood-thinning medications (warfarin, Plavix, Lovenox, Eliquis, Brilinta, Effient, etc.) as special dosing adjustments may need to be made depending on the type of injection you are scheduled to receive.  It is recommended  that you delay having your steroid injection if you have received a flu shot or shingles vaccine within 2 weeks.     ~Please call our [facility] Nurse Navigation line [phone] with any questions or concerns about your treatment plan, if symptoms worsen and you would like to be seen urgently, or if you have any new or worsening numbness, weakness, or problems controlling bladder and bowel function.  ~You are also welcome to contact Rizwana Sinclair via JANZZ, but please be aware that responses to JANZZ message may take 2-3 days due to the high volume of patients seen in clinic.

## 2024-05-14 NOTE — PROGRESS NOTES
ASSESSMENT: Emeli Granados is a 69 year old female who presents for consultation at the request of PCP Emma Byrne, who presents today for follow up patient evaluation of:    -lumbar radiculopathy, right sacral insufficiency fracture    No lasting improvement after right L5-S1 LOUISA. Now yvette low back pain into both legs. Patient is neurologically intact on exam. We talked about more options based on her MRI. She has a disc bulge on the right at L4-5 causing foraminal stenosis with disc/osteophyte impinging on the right L4 ganglion with broad based bulge/protrusion with subarticular recess stenosis and right greater than left L5 nerve encroachment. Would recommend L4-5 yvette TF LOUISA. She would like to proceed. Some yvette SI maneuvers positive but not 3 for insurance req. Will defer for now. She does not wish to make any medication changes at this time. Will continue PT          4/24/2024     8:00 AM   OSWESTRY DISABILITY INDEX   Count 10   Sum 18   Oswestry Score (%) 36 %            Diagnoses and all orders for this visit:  Bilateral lumbar radiculopathy  -     Pain Transforaminal Louisa Inj Lmbscrl 1 Lvl Yvette; Future        PLAN:  Reviewed spine anatomy and disease process. Discussed diagnosis and treatment options with the patient today. A shared decision making model was used.  The patient's values and choices were respected. The following represents what was discussed and decided upon by the provider and the patient.      -DIAGNOSTIC TESTS:  Images were personally reviewed and interpreted and explained to patient today using a spine model.   -none added    -PHYSICAL THERAPY:    --patient does not wish to do PT at this time  -she has continued to do home PT exercises and will continue to do so  Discussed the importance of core strengthening, ROM, stretching exercises with the patient and how each of these entities is important in decreasing pain.  Explained to the patient that the purpose of physical therapy is to  teach the patient a home exercise program.  These exercises need to be performed every day in order to decrease pain and prevent future occurrences of pain.        -MEDICATIONS:    --continue current medications    Discussed multiple medication options today with patient. Does not wish to make any changes today    -INTERVENTIONS:    -Discussed the role of yvette TF KEITH L4-5. Orders placed  Patient would be a good candidate in the future for either SI injections or medial branch blocks if indicated based on symptoms and supported by imaging results.    -PATIENT EDUCATION:  Total time of 20 minutes, on the day of service, spent with the patient, reviewing the chart, placing orders, and documenting.   -Today we also discussed the pros and cons of the current treatment plan.    -FOLLOW-UP:  after injection for follow up appt    Advised patient to call the [facility] if symptoms worsen, new numbness or weakness develops in the legs, or if they develop new or worsening problems controlling bladder or bowel function.   ______________________________________________________________________    SUBJECTIVE:    Felt >50% relief of back pain for a week or two then returned after a long boat ride and she had yvette leg swelling (now gone). Still overall better than it was but ongoing pain yvette low back into posterolateral thighs, not below knees. More posterior than lateral. Denies numbness, weakness, changes in bowel or bladder control. Pain 6/10 today, 10/10 at worst. She is using icy hot, tylenol, ibuprofen, flexeril, typically 1/2 a vicodin for severe pain. She has done 3 PT sessions, continues doing exercises on her own      Per previous visit with updated meds list  HPI:  Emeli Granados  Is a 69 year old female hx emphysema, acid reflux, osteopenia DEXA 2022 who presents today for new patient evaluation of     Patient complains of severe bilateral lower back pain into the buttocks, down the posterior aspect of her right leg.  Started after a fall off of a ladder backwards at work onto her left side hitting some metal shelving behind her on 9/4/21. She never had pain prior to this injury. Originally her pain was on the left but it eventually transitioned into the right. More recently somewhat in the anterior aspect of the right thigh as well at times, but this comes and goes. Pain is worse with getting up in the morning. Bearing weight, bending. She denies numbness or tingling, weakness, or changes in bowel or bladder control.     She adds that she was under care of [facility] last fall and underwent hip injections in Oct, then a right L4-5 facet injection on 10/30/23 which she states caused an infection in her back. She had fevers of 103 which began 1-2 days after her injection. Evidently labwork was coordinated through her PCP and showed elevated infectious markers. She states she was not hospitalized. She states she was not treated with antibiotics. She states she stopped going to [facility]. Her fevers went away. Her back pain has persisted. Unclear timeline. She had a fall end of November while putting up [employer] lights and another fall in January off of a chair. She had lumbar and sacral MRIs after which she was told she has two fractures. She is here to establish care and wants to have another injection to help with her pain     She has tried using icy hot, taking muscle relaxers, and when pain gets really bad she will take a vicodin or tramadol.  She had R wrist surgery in November and for a while was rotating tylenol and ibuprofen for this as well. Evidently she has to use a bone stimulator for her R wrist as it is slow to heal. During this recovery period, she has had physical restrictions such as avoidance of twisting, physical work, or exercise and explicit recommendations to avoid of PT. She has gained some weight due to this. She is wearing a right wrist brace.        -Treatment to Date:     -Medications:  Flexeril 10  Vicodin    Hydroxyzine  Tramadol 50  Tylenol  ibuprofen    Injections:  Left greater trochanteric bursa injection 1/26/23  Bilateral greater trochanteric bursa injections 10/12/23 [facility]  Right L4-5 facet joint injection 10/30/23 [facility]  Left L5-S1 TF KEITH 8/16/22 [facility]  Left L5-S1 TF KEITH 5/3/22 [facility]  Right L5-S1 TF KEITH 5/1/24 >50% relief 2 wks      Current Outpatient Medications   Medication Sig Dispense Refill    albuterol (PROAIR HFA/PROVENTIL HFA/VENTOLIN HFA) 108 (90 Base) MCG/ACT inhaler INHALE 1 OR 2 puffs EVERY 6 HOURS as needed for wheezing. Strength: 108 (90 Base) MCG/ACT 8.5 g 5    albuterol (PROVENTIL) (2.5 MG/3ML) 0.083% neb solution INHALE 3 MILLILITERS (2.5 MG) BY NEBULIZATION ROUTE EVERY 6 HOURS AS NEEDED FOR SHORTNESS OF BREATH/DYSPNEA OR WHEEZING. Strength: (2.5 MG/3ML) 0.083% 90 mL 5    ascorbic acid (VITAMIN C) 1000 MG TABS Take 1,000 mg by mouth daily      atorvastatin (LIPITOR) 10 MG tablet Take 1 tablet (10 mg) by mouth daily 90 tablet 1    CALCIUM PO Take 1 tablet by mouth daily      cyclobenzaprine (FLEXERIL) 5 MG tablet Take 1 tablet (5 mg) by mouth 2 times daily as needed for muscle spasms 30 tablet 2    diltiazem ER COATED BEADS (CARDIZEM CD/CARTIA XT) 120 MG 24 hr capsule Take 1 capsule (120 mg) by mouth every morning Hold for blood pressure <110 30 capsule 11    docusate sodium (COLACE) 100 MG capsule Take 1 capsule (100 mg) by mouth 2 times daily as needed for constipation 30 capsule 1    HYDROcodone-acetaminophen (NORCO)  MG per tablet Take 1 tablet by mouth daily as needed for severe pain 30 tablet 0    ibuprofen (ADVIL/MOTRIN) 600 MG tablet Take 1 tablet (600 mg) by mouth every 8 hours as needed for inflammatory pain Take with food. Stop if blood in stool. 45 tablet 0    ketorolac (ACULAR) 0.5 % ophthalmic solution INSTILL ONE DROP INTO RIGHT EYE FOUR TIMES DAILY. START 4 HOURS AFTER SURGERY. USE UNTIL EMPTY (MAX 3 WEEKS)*      metFORMIN (GLUCOPHAGE XR) 500 MG 24 hr tablet Take 1 tablet  (500 mg) by mouth daily (with dinner) 30 tablet 5    moxifloxacin (VIGAMOX) 0.5 % ophthalmic solution INSTILL ONE DROP INTO RIGHT EYE FOUR TIMES DAILY. START 4 HOURS AFTER SURGERY. USE UNTIL EMPTY (MAX 3 WEEKS)*      Multiple Vitamins-Minerals (MULTIVITAMIN OR) Take 1 tablet by mouth daily Per pt, uses ones without Iron      omeprazole (PRILOSEC) 20 MG DR capsule TAKE 1 CAPSULE (20 MG) BY MOUTH DAILY. 90 capsule 1    prednisoLONE acetate (PRED FORTE) 1 % ophthalmic suspension INSTILL ONE DROP INTO RIGHT EYE FOUR TIMES DAILY. START 4 HOURS AFTER SURGERY. USE UNTIL EMPTY (MAX 3 WEEKS)* (Patient not taking: Reported on 5/3/2024)      traMADol (ULTRAM) 50 MG tablet Take 1 tablet (50 mg) by mouth every 6 hours as needed for severe pain 90 tablet 0    vitamin D3 (CHOLECALCIFEROL) 1000 units (25 mcg) tablet Take 1,000 Units by mouth daily      vitamin E 400 UNITS TABS Take 400 Units by mouth daily      zolpidem (AMBIEN) 5 MG tablet TAKE 1 TABLET (5 MG) BY MOUTH NIGHTLY AS NEEDED FOR SLEEP. DO NOT TAKE WITH TRAMADOL. MUST BE NDC # [address]-56 PER PT REQUEST. 30 tablet 5     No current facility-administered medications for this visit.       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 uterus and left the ovaries    Polycythemia 08/08/2012    resolved now        Patient Active Problem List   Diagnosis    Disorder of bone and cartilage    CARDIOVASCULAR SCREENING; LDL GOAL LESS THAN 130    Osteopenia    Back pain    Moderate  persistent asthma with exacerbation    Other chronic pain    Bacteremia    Adenomyomatosis of gallbladder    Diverticulosis of colon    Acute bilateral low back pain without sciatica    Paroxysmal supraventricular tachycardia (H24)    F11.2 - Continuous opioid dependence (H)    Muscle strain of right gluteal region, subsequent encounter    Wheezing    Influenza A    Hypoxia    Hyperlipidemia LDL goal <130    Lumbar radiculitis       Past Surgical History:   Procedure Laterality Date    CARPAL TUNNEL RELEASE RT/LT Right 11/23/2021    and trigger finger and tendon repair    CARPAL TUNNEL RELEASE RT/LT Left 12/20/2021    and tendon repair    COLONOSCOPY  2004, 2012    repeat in 2022    COLONOSCOPY  11/24/2023    4 polyps - repeat in 3 years    CYSTOSCOPY      Dilatation of the esophagus once due to dysphagia and stricture  2003    Ganglion cyst removal      HEMORRHOIDECTOMY BANDING      HYSTERECTOMY  1998    LAPAROSCOPIC SALPINGO-OOPHORECTOMY Bilateral 10/23/2015    Procedure: LAPAROSCOPIC SALPINGO-OOPHORECTOMY;  Surgeon: Johnathan Steve MD;  Location: [facility] OR    subscapularous repair and biceps tendon repair  05/2022    Dr. Verma at [facility]    Thumb surgery Right     TONSILLECTOMY      XR WRIST SURGERY FLORI RIGHT Right 11/2023    surgery for DJD and bone graft done       Family History   Problem Relation Age of Onset    Hypertension Mother     Cerebrovascular Disease Mother         TIA's    Depression Mother     Cancer - colorectal Maternal Grandmother         dx at 65    Lipids Father     C.A.D. Father         angioplasty at 65    Musculoskeletal Disorder Father     Alcohol/Drug Daughter         in remission    Breast Cancer No family hx of        Reviewed past medical, surgical, and family history with patient found on new patient intake packet located in EMR Media tab.     SOCIAL HX: nonsmoker, no alcohol use, no heavy drinking, no rec drug use      OBJECTIVE:  /64   Pulse 72   LMP  (LMP Unknown)     PHYSICAL  EXAMINATION:    --CONSTITUTIONAL:  Vital signs as above.  No acute distress.  The patient is well nourished and well groomed.  --PSYCHIATRIC:  Appropriate mood and affect. The patient is awake, alert, oriented to person, place, time and answering questions appropriately with clear speech.    --SKIN:  Skin over the face, bilateral lower extremities, and posterior torso is clean, dry, intact without rashes.    --RESPIRATORY: Normal rhythm and effort. No abnormal accessory muscle breathing patterns noted.   --ABDOMINAL:  Non-distended.    --GROSS MOTOR: Gait is non-antalgic. Arises with pain from a seated position.     --LOWER EXTREMITY MOTOR TESTING:  Hip flexion: right 5/5, left 5/5  Hip abduction: right 5/5, left 5/5  Hip adduction: right 5/5, left 5/5   Quads: right 5/5, left 5/5  Hamstrings: right 5/5, left 5/5  Dorsiflexion: right 5/5, left 5/5  Plantar flexion: right 5/5, left 5/5    Great toe MTP extension/EHL: right 5/5, left 5/5    --NEUROLOGICAL:  2/4 patellar and achilles reflexes bilaterally. Sensation to light touch is intact throughout both lower extremities. Babinski is negative. No clonus.    --MUSCULOSKELETAL: Lumbar spine inspection reveals no evidence of deformity.  Lower lumbar point tenderness to palpation. negative yvette lower lumbar paraspinal musculature tenderness.     Straight leg raising is negative.    --SACROILIAC JOINT:  One finger point test was positive yvette. Positive yvette SI joint tenderness to palpation. Positive gaenslen yvette. Negative right compression, positive left compression. Negative thigh thrust yvette.     --VASCULAR:  Bilateral lower extremities are warm without any discoloration.  There is no pitting edema of the bilateral lower extremities.    RESULTS:   Prior medical records from [facility] and [facility] Everywhere were reviewed today.    Imaging: Spine imaging was personally reviewed and interpreted today. The images were shown to the patient and the findings were explained  using a spine model.      MRI lumbar spine without contrast   exam date 1/8/2024  Interpretation:  Osseous structures/alignment:  Normal lordotic alignment.  Prominent Modic type I endplate changes  asymmetric to the right at L4-5 with degenerative endplate erosions.  No vertebral fracture.    Facet/SI joints:  Moderate L4-5/L5-S1 facet arthrosis with active inflammatory marrow edema type changes on the right at L4-5.  No abnormal fluid collection or erosive changes to suggest infectious process.  See MRI of the sacrum for evaluation.      Cord/conus:  Normal signal intensity distal cord/conus medullaris.    Extraspinal findings:  No abnormalities identified.    L5-S1: Moderate disc degeneration, 2 mm spondylolisthesis with circumferential/foraminal bulging, moderate right/mild left foraminal stenosis and no focal neural compression.    L4-5: Severe disc degeneration, asymmetric moderate right foraminal stenosis with disc/osteophyte impinging on the right L4 ganglion.  Broad-based bulge/protrusion with subarticular recess stenosis and right greater than left L5 nerve encroachment.    L3-4: Mild disc degeneration, annular bulge with mild left foraminal stenosis and no focal neural impingement.  Patent central canal.    L2-3: Moderate disc degeneration, 3.5 mm broad-based disc herniation with mild dural sac effacement and no focal neural impingement.    L1-2: Normal disc height.  No disc herniation stenosis or focal neural impingement.    Sections through the lower thoracic spine are unremarkable.    Conclusion:   advanced L4-5 disc degeneration with prominent Modic type I endplate changes.  Moderate right foraminal stenosis/L4 ganglionic impingement.  Facet arthrosis moderate L4-5, L5-S1, with active marrow edema/inflammation right L4-5 joint.  No abnormal fluid collection or evidence for active infectious process right L4-5 joint.  No significant interval changes as compared with prior study      MR sacrum/coccyx  without contrast exam date 1/8/2024    Interpretation: Acute/subacute right sacral insufficiency fracture with prominent diffuse bone edema.  No extension to the midline.  Left sacrum is unremarkable.  Appearance of degenerative changes at the sacrococcygeal joint with mild localized inflammatory edematous changes.  No fracture seen.  See lumbar MRI for discussion of the lower lumbar region    Conclusion:  Findings compatible with acute/subacute right sacral insufficiency fracture.  Also, localized inflammatory/degenerative appearing changes sacrococcygeal joint      Rizwana HOP-C  [facility]  O. [phone]

## 2024-05-14 NOTE — LETTER
5/14/2024         RE: mEeli Granados  [address] [facility] [address]        Dear Colleague,    Thank you for referring your patient, Emeli Granados, to the [facility]. Please see a copy of my visit note below.    ASSESSMENT: Emeli Granados is a 69 year old female who presents for consultation at the request of PCP Emma Byrne, who presents today for follow up patient evaluation of:    -lumbar radiculopathy, right sacral insufficiency fracture    No lasting improvement after right L5-S1 LOUISA. Now yvette low back pain into both legs. Patient is neurologically intact on exam. We talked about more options based on her MRI. She has a disc bulge on the right at L4-5 causing foraminal stenosis with disc/osteophyte impinging on the right L4 ganglion with broad based bulge/protrusion with subarticular recess stenosis and right greater than left L5 nerve encroachment. Would recommend L4-5 yvette TF LOUISA. She would like to proceed. Some yvette SI maneuvers positive but not 3 for insurance req. Will defer for now. She does not wish to make any medication changes at this time. Will continue PT          4/24/2024     8:00 AM   OSWESTRY DISABILITY INDEX   Count 10   Sum 18   Oswestry Score (%) 36 %            Diagnoses and all orders for this visit:  Bilateral lumbar radiculopathy  -     Pain Transforaminal Louisa Inj Lmbscrl 1 Lvl Yvette; Future        PLAN:  Reviewed spine anatomy and disease process. Discussed diagnosis and treatment options with the patient today. A shared decision making model was used.  The patient's values and choices were respected. The following represents what was discussed and decided upon by the provider and the patient.      -DIAGNOSTIC TESTS:  Images were personally reviewed and interpreted and explained to patient today using a spine model.   -none added    -PHYSICAL THERAPY:    --patient does not wish to do PT at this time  -she has continued to do home PT  exercises and will continue to do so  Discussed the importance of core strengthening, ROM, stretching exercises with the patient and how each of these entities is important in decreasing pain.  Explained to the patient that the purpose of physical therapy is to teach the patient a home exercise program.  These exercises need to be performed every day in order to decrease pain and prevent future occurrences of pain.        -MEDICATIONS:    --continue current medications    Discussed multiple medication options today with patient. Does not wish to make any changes today    -INTERVENTIONS:    -Discussed the role of yvette TF KEITH L4-5. Orders placed  Patient would be a good candidate in the future for either SI injections or medial branch blocks if indicated based on symptoms and supported by imaging results.    -PATIENT EDUCATION:  Total time of 20 minutes, on the day of service, spent with the patient, reviewing the chart, placing orders, and documenting.   -Today we also discussed the pros and cons of the current treatment plan.    -FOLLOW-UP:  after injection for follow up appt    Advised patient to call the [facility] if symptoms worsen, new numbness or weakness develops in the legs, or if they develop new or worsening problems controlling bladder or bowel function.   ______________________________________________________________________    SUBJECTIVE:    Felt >50% relief of back pain for a week or two then returned after a long boat ride and she had yvette leg swelling (now gone). Still overall better than it was but ongoing pain yvette low back into posterolateral thighs, not below knees. More posterior than lateral. Denies numbness, weakness, changes in bowel or bladder control. Pain 6/10 today, 10/10 at worst. She is using icy hot, tylenol, ibuprofen, flexeril, typically 1/2 a vicodin for severe pain. She has done 3 PT sessions, continues doing exercises on her own      Per previous visit with updated meds  list  HPI:  Emeli Granados  Is a 69 year old female hx emphysema, acid reflux, osteopenia DEXA 2022 who presents today for new patient evaluation of     Patient complains of severe bilateral lower back pain into the buttocks, down the posterior aspect of her right leg. Started after a fall off of a ladder backwards at work onto her left side hitting some metal shelving behind her on 9/4/21. She never had pain prior to this injury. Originally her pain was on the left but it eventually transitioned into the right. More recently somewhat in the anterior aspect of the right thigh as well at times, but this comes and goes. Pain is worse with getting up in the morning. Bearing weight, bending. She denies numbness or tingling, weakness, or changes in bowel or bladder control.     She adds that she was under care of [facility] last fall and underwent hip injections in Oct, then a right L4-5 facet injection on 10/30/23 which she states caused an infection in her back. She had fevers of 103 which began 1-2 days after her injection. Evidently labwork was coordinated through her PCP and showed elevated infectious markers. She states she was not hospitalized. She states she was not treated with antibiotics. She states she stopped going to [facility]. Her fevers went away. Her back pain has persisted. Unclear timeline. 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 She had lumbar and sacral MRIs after which she was told she has two fractures. She is here to establish care and wants to have another injection to help with her pain     She has tried using icy hot, taking muscle relaxers, and when pain gets really bad she will take a vicodin or tramadol.  She had R wrist surgery in November and for a while was rotating tylenol and ibuprofen for this as well. Evidently she has to use a bone stimulator for her R wrist as it is slow to heal. During this recovery period, she has had  physical restrictions such as avoidance of twisting, physical work, or exercise and explicit recommendations to avoid of PT. She has gained some weight due to this. She is wearing a right wrist brace.        -Treatment to Date:     -Medications:  Flexeril 10  Vicodin   Hydroxyzine  Tramadol 50  Tylenol  ibuprofen    Injections:  Left greater trochanteric bursa injection 1/26/23  Bilateral greater trochanteric bursa injections 10/12/23 [facility]  Right L4-5 facet joint injection 10/30/23 [facility]  Left L5-S1 TF KEITH 8/16/22 [facility]  Left L5-S1 TF KEITH 5/3/22 [facility]  Right L5-S1 TF KEITH 5/1/24 >50% relief 2 wks      Current Outpatient Medications   Medication Sig Dispense Refill     albuterol (PROAIR HFA/PROVENTIL HFA/VENTOLIN HFA) 108 (90 Base) MCG/ACT inhaler INHALE 1 OR 2 puffs EVERY 6 HOURS as needed for wheezing. Strength: 108 (90 Base) MCG/ACT 8.5 g 5     albuterol (PROVENTIL) (2.5 MG/3ML) 0.083% neb solution INHALE 3 MILLILITERS (2.5 MG) BY NEBULIZATION ROUTE EVERY 6 HOURS AS NEEDED FOR SHORTNESS OF BREATH/DYSPNEA OR WHEEZING. Strength: (2.5 MG/3ML) 0.083% 90 mL 5     ascorbic acid (VITAMIN C) 1000 MG TABS Take 1,000 mg by mouth daily       atorvastatin (LIPITOR) 10 MG tablet Take 1 tablet (10 mg) by mouth daily 90 tablet 1     CALCIUM PO Take 1 tablet by mouth daily       cyclobenzaprine (FLEXERIL) 5 MG tablet Take 1 tablet (5 mg) by mouth 2 times daily as needed for muscle spasms 30 tablet 2     diltiazem ER COATED BEADS (CARDIZEM CD/CARTIA XT) 120 MG 24 hr capsule Take 1 capsule (120 mg) by mouth every morning Hold for blood pressure <110 30 capsule 11     docusate sodium (COLACE) 100 MG capsule Take 1 capsule (100 mg) by mouth 2 times daily as needed for constipation 30 capsule 1     HYDROcodone-acetaminophen (NORCO)  MG per tablet Take 1 tablet by mouth daily as needed for severe pain 30 tablet 0     ibuprofen (ADVIL/MOTRIN) 600 MG tablet Take 1 tablet (600 mg) by mouth every 8 hours as needed for inflammatory pain  Take with food. Stop if blood in stool. 45 tablet 0     ketorolac (ACULAR) 0.5 % ophthalmic solution INSTILL ONE DROP INTO RIGHT EYE FOUR TIMES DAILY. START 4 HOURS AFTER SURGERY. USE UNTIL EMPTY (MAX 3 WEEKS)*       metFORMIN (GLUCOPHAGE XR) 500 MG 24 hr tablet Take 1 tablet (500 mg) by mouth daily (with dinner) 30 tablet 5     moxifloxacin (VIGAMOX) 0.5 % ophthalmic solution INSTILL ONE DROP INTO RIGHT EYE FOUR TIMES DAILY. START 4 HOURS AFTER SURGERY. USE UNTIL EMPTY (MAX 3 WEEKS)*       Multiple Vitamins-Minerals (MULTIVITAMIN OR) Take 1 tablet by mouth daily Per pt, uses ones without Iron       omeprazole (PRILOSEC) 20 MG DR capsule TAKE 1 CAPSULE (20 MG) BY MOUTH DAILY. 90 capsule 1     prednisoLONE acetate (PRED FORTE) 1 % ophthalmic suspension INSTILL ONE DROP INTO RIGHT EYE FOUR TIMES DAILY. START 4 HOURS AFTER SURGERY. USE UNTIL EMPTY (MAX 3 WEEKS)* (Patient not taking: Reported on 5/3/2024)       traMADol (ULTRAM) 50 MG tablet Take 1 tablet (50 mg) by mouth every 6 hours as needed for severe pain 90 tablet 0     vitamin D3 (CHOLECALCIFEROL) 1000 units (25 mcg) tablet Take 1,000 Units by mouth daily       vitamin E 400 UNITS TABS Take 400 Units by mouth daily       zolpidem (AMBIEN) 5 MG tablet TAKE 1 TABLET (5 MG) BY MOUTH NIGHTLY AS NEEDED FOR SLEEP. DO NOT TAKE WITH TRAMADOL. MUST BE NDC # [address]-43 PER PT REQUEST. 30 tablet 5     No current facility-administered medications for this visit.       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    took uterus and left the ovaries     Polycythemia 08/08/2012    resolved now        Patient Active Problem List   Diagnosis     Disorder of bone and cartilage     CARDIOVASCULAR SCREENING; LDL GOAL LESS THAN 130     Osteopenia     Back pain     Moderate persistent asthma with exacerbation     Other chronic pain     Bacteremia     Adenomyomatosis of gallbladder     Diverticulosis of colon     Acute bilateral low back pain without sciatica     Paroxysmal supraventricular tachycardia (H24)     F11.2 - Continuous opioid dependence (H)     Muscle strain of right gluteal region, subsequent encounter     Wheezing     Influenza A     Hypoxia     Hyperlipidemia LDL goal <130     Lumbar radiculitis       Past Surgical History:   Procedure Laterality Date     CARPAL TUNNEL RELEASE RT/LT Right 11/23/2021    and trigger finger and tendon repair     CARPAL TUNNEL RELEASE RT/LT Left 12/20/2021    and tendon repair     COLONOSCOPY  2004, 2012    repeat in 2022     COLONOSCOPY  11/24/2023    4 polyps - repeat in 3 years     CYSTOSCOPY       Dilatation of the esophagus once due to dysphagia and stricture  2003     Ganglion cyst removal       HEMORRHOIDECTOMY BANDING       HYSTERECTOMY  1998     LAPAROSCOPIC SALPINGO-OOPHORECTOMY Bilateral 10/23/2015    Procedure: LAPAROSCOPIC SALPINGO-OOPHORECTOMY;  Surgeon: Johnathan Steve MD;  Location: [facility] OR     subscapularous repair and biceps tendon repair  05/2022    Dr. Verma at [facility]     Thumb surgery Right      TONSILLECTOMY       XR WRIST SURGERY FLORI RIGHT Right 11/2023    surgery for DJD and bone graft done       Family History   Problem Relation Age of Onset     Hypertension Mother      Cerebrovascular Disease Mother         TIA's     Depression Mother      Cancer - colorectal Maternal Grandmother         dx at 65     Lipids Father      C.A.D. Father         angioplasty at 65     Musculoskeletal Disorder Father      Alcohol/Drug Daughter          in remission     Breast Cancer No family hx of        Reviewed past medical, surgical, and family history with patient found on new patient intake packet located in EMR Media tab.     SOCIAL HX: nonsmoker, no alcohol use, no heavy drinking, no rec drug use      OBJECTIVE:  /64   Pulse 72   LMP  (LMP Unknown)     PHYSICAL EXAMINATION:    --CONSTITUTIONAL:  Vital signs as above.  No acute distress.  The patient is well nourished and well groomed.  --PSYCHIATRIC:  Appropriate mood and affect. The patient is awake, alert, oriented to person, place, time and answering questions appropriately with clear speech.    --SKIN:  Skin over the face, bilateral lower extremities, and posterior torso is clean, dry, intact without rashes.    --RESPIRATORY: Normal rhythm and effort. No abnormal accessory muscle breathing patterns noted.   --ABDOMINAL:  Non-distended.    --GROSS MOTOR: Gait is non-antalgic. Arises with pain from a seated position.     --LOWER EXTREMITY MOTOR TESTING:  Hip flexion: right 5/5, left 5/5  Hip abduction: right 5/5, left 5/5  Hip adduction: right 5/5, left 5/5   Quads: right 5/5, left 5/5  Hamstrings: right 5/5, left 5/5  Dorsiflexion: right 5/5, left 5/5  Plantar flexion: right 5/5, left 5/5    Great toe MTP extension/EHL: right 5/5, left 5/5    --NEUROLOGICAL:  2/4 patellar and achilles reflexes bilaterally. Sensation to light touch is intact throughout both lower extremities. Babinski is negative. No clonus.    --MUSCULOSKELETAL: Lumbar spine inspection reveals no evidence of deformity.  Lower lumbar point tenderness to palpation. negative yvette lower lumbar paraspinal musculature tenderness.     Straight leg raising is negative.    --SACROILIAC JOINT:  One finger point test was positive yvette. Positive yvette SI joint tenderness to palpation. Positive gaenslen yvette. Negative right compression, positive left compression. Negative thigh thrust yvette.     --VASCULAR:  Bilateral lower extremities are warm  without any discoloration.  There is no pitting edema of the bilateral lower extremities.    RESULTS:   Prior medical records from [facility] and [facility] Everywhere were reviewed today.    Imaging: Spine imaging was personally reviewed and interpreted today. The images were shown to the patient and the findings were explained using a spine model.      MRI lumbar spine without contrast   exam date 1/8/2024  Interpretation:  Osseous structures/alignment:  Normal lordotic alignment.  Prominent Modic type I endplate changes  asymmetric to the right at L4-5 with degenerative endplate erosions.  No vertebral fracture.    Facet/SI joints:  Moderate L4-5/L5-S1 facet arthrosis with active inflammatory marrow edema type changes on the right at L4-5.  No abnormal fluid collection or erosive changes to suggest infectious process.  See MRI of the sacrum for evaluation.      Cord/conus:  Normal signal intensity distal cord/conus medullaris.    Extraspinal findings:  No abnormalities identified.    L5-S1: Moderate disc degeneration, 2 mm spondylolisthesis with circumferential/foraminal bulging, moderate right/mild left foraminal stenosis and no focal neural compression.    L4-5: Severe disc degeneration, asymmetric moderate right foraminal stenosis with disc/osteophyte impinging on the right L4 ganglion.  Broad-based bulge/protrusion with subarticular recess stenosis and right greater than left L5 nerve encroachment.    L3-4: Mild disc degeneration, annular bulge with mild left foraminal stenosis and no focal neural impingement.  Patent central canal.    L2-3: Moderate disc degeneration, 3.5 mm broad-based disc herniation with mild dural sac effacement and no focal neural impingement.    L1-2: Normal disc height.  No disc herniation stenosis or focal neural impingement.    Sections through the lower thoracic spine are unremarkable.    Conclusion:   advanced L4-5 disc degeneration with prominent Modic type I endplate changes.   Moderate right foraminal stenosis/L4 ganglionic impingement.  Facet arthrosis moderate L4-5, L5-S1, with active marrow edema/inflammation right L4-5 joint.  No abnormal fluid collection or evidence for active infectious process right L4-5 joint.  No significant interval changes as compared with prior study      MR sacrum/coccyx without contrast exam date 1/8/2024    Interpretation: Acute/subacute right sacral insufficiency fracture with prominent diffuse bone edema.  No extension to the midline.  Left sacrum is unremarkable.  Appearance of degenerative changes at the sacrococcygeal joint with mild localized inflammatory edematous changes.  No fracture seen.  See lumbar MRI for discussion of the lower lumbar region    Conclusion:  Findings compatible with acute/subacute right sacral insufficiency fracture.  Also, localized inflammatory/degenerative appearing changes sacrococcygeal joint      Rizwana MONIQUE-C  [facility]  O. [phone]             Again, thank you for allowing me to participate in the care of your patient.        Sincerely,        CORTNEY Mejia CNP

## 2024-05-15 ENCOUNTER — OFFICE VISIT (OUTPATIENT)
Dept: FAMILY MEDICINE | Facility: CLINIC | Age: 69
End: 2024-05-15
Payer: COMMERCIAL

## 2024-05-15 VITALS
HEIGHT: 59 IN | BODY MASS INDEX: 31.25 KG/M2 | SYSTOLIC BLOOD PRESSURE: 120 MMHG | WEIGHT: 155 LBS | OXYGEN SATURATION: 95 % | HEART RATE: 70 BPM | DIASTOLIC BLOOD PRESSURE: 70 MMHG | RESPIRATION RATE: 12 BRPM | TEMPERATURE: 98.1 F

## 2024-05-15 DIAGNOSIS — K82.4 GALLBLADDER POLYP: ICD-10-CM

## 2024-05-15 DIAGNOSIS — D13.5 ADENOMYOMATOSIS OF GALLBLADDER: ICD-10-CM

## 2024-05-15 DIAGNOSIS — R06.02 SHORTNESS OF BREATH: ICD-10-CM

## 2024-05-15 DIAGNOSIS — Z01.818 PREOP GENERAL PHYSICAL EXAM: Primary | ICD-10-CM

## 2024-05-15 DIAGNOSIS — R73.03 PREDIABETES: ICD-10-CM

## 2024-05-15 DIAGNOSIS — I47.10 PAROXYSMAL SUPRAVENTRICULAR TACHYCARDIA (H): ICD-10-CM

## 2024-05-15 LAB
ANION GAP SERPL CALCULATED.3IONS-SCNC: 13 MMOL/L (ref 7–15)
BUN SERPL-MCNC: 12.4 MG/DL (ref 8–23)
CALCIUM SERPL-MCNC: 9.7 MG/DL (ref 8.8–10.2)
CHLORIDE SERPL-SCNC: 105 MMOL/L (ref 98–107)
CREAT SERPL-MCNC: 0.69 MG/DL (ref 0.51–0.95)
DEPRECATED HCO3 PLAS-SCNC: 25 MMOL/L (ref 22–29)
EGFRCR SERPLBLD CKD-EPI 2021: >90 ML/MIN/1.73M2
GLUCOSE SERPL-MCNC: 113 MG/DL (ref 70–99)
HBA1C MFR BLD: 6.1 % (ref 0–5.6)
NT-PROBNP SERPL-MCNC: 64 PG/ML (ref 0–900)
POTASSIUM SERPL-SCNC: 3.9 MMOL/L (ref 3.4–5.3)
SODIUM SERPL-SCNC: 143 MMOL/L (ref 135–145)

## 2024-05-15 PROCEDURE — 99214 OFFICE O/P EST MOD 30 MIN: CPT | Performed by: PHYSICIAN ASSISTANT

## 2024-05-15 PROCEDURE — 83036 HEMOGLOBIN GLYCOSYLATED A1C: CPT | Performed by: PHYSICIAN ASSISTANT

## 2024-05-15 PROCEDURE — 83880 ASSAY OF NATRIURETIC PEPTIDE: CPT | Performed by: PHYSICIAN ASSISTANT

## 2024-05-15 PROCEDURE — 93000 ELECTROCARDIOGRAM COMPLETE: CPT | Performed by: PHYSICIAN ASSISTANT

## 2024-05-15 PROCEDURE — 80048 BASIC METABOLIC PNL TOTAL CA: CPT | Performed by: PHYSICIAN ASSISTANT

## 2024-05-15 PROCEDURE — 36415 COLL VENOUS BLD VENIPUNCTURE: CPT | Performed by: PHYSICIAN ASSISTANT

## 2024-05-15 NOTE — PROGRESS NOTES
Preoperative Evaluation  [facility]  [address] [facility] [address]  Phone: [phone]  Primary Provider: Emma Byrne MD  Pre-op Performing Provider: Gina Arias PA-C  May 15, 2024             5/15/2024   Surgical Information   What procedure is being done? Gall bladder removal   Facility or Hospital where procedure/surgery will be performed: [facility]   Who is doing the procedure / surgery? Yao Baker MD   Date of surgery / procedure: 5/30/2024   Time of surgery / procedure: TBD   Where do you plan to recover after surgery? at home with family     Fax number for surgical facility: Note does not need to be faxed, will be available electronically in Epic.    Assessment & Plan     The proposed surgical procedure is considered INTERMEDIATE risk.    Preop general physical exam  Patient is optimized for planned procedure.    Gallbladder polyp  Reason for planned procedure.    Adenomyomatosis of gallbladder  As noted above.    Prediabetes  A1c shows prediabetes.  - HEMOGLOBIN A1C; Future  - HEMOGLOBIN A1C    Shortness of breath  Recent shortness of breath with leg swelling. This is gone now. Checking labs and EKG as noted below. As long as symptoms not returning and labs normal reasonable to continue as planned.  - Basic metabolic panel  (Ca, Cl, CO2, Creat, Gluc, K, Na, BUN); Future  - EKG 12-lead complete w/read - Clinics  - BNP-N terminal pro; Future  - Basic metabolic panel  (Ca, Cl, CO2, Creat, Gluc, K, Na, BUN)  - BNP-N terminal pro    Paroxysmal supraventricular tachycardia (H24)  Has seen cardiology. EKG today normal.      Possible Sleep Apnea: Snores no known diagnosis of sleep apnea      - No identified additional risk factors other than previously addressed    Preoperative Medication Instructions  Antiplatelet or Anticoagulation Medication Instructions   - Patient is on no antiplatelet or anticoagulation  medications.    Additional Medication Instructions  Take all scheduled medications on the day of surgery EXCEPT for modifications listed below:   - Calcium Channel Blockers: May be continued on the day of surgery.   - metformin: DO NOT TAKE day of surgery.   - ibuprofen (Advil, Motrin): DO NOT TAKE 1 day before surgery.     Recommendation  Approval given to proceed with proposed procedure, without further diagnostic evaluation.    Review of external notes as documented elsewhere in note  Review of the result(s) of each unique test - As noted above  Ordering of each unique test  Prescription drug management      Subjective   Emeil is a 69 year old, presenting for the following:  Pre-Op Exam and Consult (Pt has started taking metformin again. Concerns weither that will interfere with her procedure, discuss swelling present in the feet and up the lower legs )    Swelling started lower legs and feet when went to cabin, woke up with them swollen and the next day it worsened. Swelling has improved, denies pain.        5/15/2024     9:42 AM   Additional Questions   Roomed by AT     [facility] related to upcoming procedure: gallbladder removal        5/15/2024   Pre-Op Questionnaire   Have you ever had a heart attack or stroke? No   Have you ever had surgery on your heart or blood vessels, such as a stent placement, a coronary artery bypass, or surgery on an artery in your head, neck, heart, or legs? No   Do you have chest pain with activity? No   Do you have a history of heart failure? No   Do you currently have a cold, bronchitis or symptoms of other infection? No   Do you have a cough, shortness of breath, or wheezing? No   Do you or anyone in your family have previous history of blood clots? No   Do you or does anyone in your family have a serious bleeding problem such as prolonged bleeding following surgeries or cuts? No   Have you ever had problems with anemia or been told to take iron pills? No   Have you had any  abnormal blood loss such as black, tarry or bloody stools, or abnormal vaginal bleeding? No   Have you ever had a blood transfusion? No   Are you willing to have a blood transfusion if it is medically needed before, during, or after your surgery? Yes   Have you or any of your relatives ever had problems with anesthesia? No   Do you have sleep apnea, excessive snoring or daytime drowsiness? (!) UNKNOWN - patient does snore, bought new pillow which has helped   Do you have any artifical heart valves or other implanted medical devices like a pacemaker, defibrillator, or continuous glucose monitor? No   Do you have artificial joints? No   Are you allergic to latex? No     Health Care Directive  Patient does not have a Health Care Directive or Living Will: Discussed advance care planning with patient; information given to patient to review.    Preoperative Review of    reviewed - controlled substances reflected in medication list.      Status of Chronic Conditions:  See problem list for active medical problems.  Problems all longstanding and stable, except as noted/documented.  See ROS for pertinent symptoms related to these conditions.    Patient Active Problem List    Diagnosis Date Noted    Lumbar radiculitis 04/24/2024     Priority: Medium    Hyperlipidemia LDL goal <130 03/11/2024     Priority: Medium    Wheezing 02/09/2024     Priority: Medium    Influenza A 02/09/2024     Priority: Medium    Hypoxia 02/09/2024     Priority: Medium    Muscle strain of right gluteal region, subsequent encounter 12/06/2023     Priority: Medium    F11.2 - Continuous opioid dependence (H) 10/31/2023     Priority: Medium    Paroxysmal supraventricular tachycardia (H24) 03/06/2023     Priority: Medium    Acute bilateral low back pain without sciatica 09/28/2021     Priority: Medium    Adenomyomatosis of gallbladder 06/22/2021     Priority: Medium    Diverticulosis of colon 06/22/2021     Priority: Medium    Bacteremia 06/10/2021      Priority: Medium    Other chronic pain 01/02/2018     Priority: Medium     Patient is followed by Emma Byrne MD for ongoing prescription of pain medication.  All refills should only be approved by this provider, or covering partner.    Medication(s): hydrocodone. 10/325 mg  Maximum quantity per month: 30  Clinic visit frequency required: Q 6 months     Tramadol 50 mg 90 per month    Controlled substance agreement:  CSA -- Encounter Level on 07/17/2017:    Controlled Substance Agreement - Scan on 7/31/2017  9:32 PM: CONTROLLED SUBSTANCE AGREEMENT       CSA -- Encounter Level on 01/07/2015:    Controlled Substance Agreement - Scan on 1/8/2015  9:46 AM:  Clinics Controlled Medication Agreement 1-7-15       CSA -- Patient Level:    Controlled Substance Agreement - Opioid - Scan on 5/3/2021  6:23 PM  Controlled Substance Agreement - Opioid - Scan on 7/29/2019  3:09 PM       Pain Clinic evaluation in the past: No    DIRE Total Score(s):  No flowsheet data found.    Last [facility] website verification:05/2/2022 sr   [email or web address]        Moderate persistent asthma with exacerbation 12/26/2017     Priority: Medium    Back pain 05/19/2015     Priority: Medium    Osteopenia 05/18/2015     Priority: Medium    CARDIOVASCULAR SCREENING; LDL GOAL LESS THAN 130 10/31/2010     Priority: Medium    Disorder of bone and cartilage 01/14/2005     Priority: Medium     Problem list name updated by automated process. 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 uterus and left the ovaries    Polycythemia 08/08/2012    resolved now     Past Surgical  History:   Procedure Laterality Date    CARPAL TUNNEL RELEASE RT/LT Right 11/23/2021    and trigger finger and tendon repair    CARPAL TUNNEL RELEASE RT/LT Left 12/20/2021    and tendon repair    COLONOSCOPY  2004, 2012    repeat in 2022    COLONOSCOPY  11/24/2023    4 polyps - repeat in 3 years    CYSTOSCOPY      Dilatation of the esophagus once due to dysphagia and stricture  2003    Ganglion cyst removal      HEMORRHOIDECTOMY BANDING      HYSTERECTOMY  1998    LAPAROSCOPIC SALPINGO-OOPHORECTOMY Bilateral 10/23/2015    Procedure: LAPAROSCOPIC SALPINGO-OOPHORECTOMY;  Surgeon: Johnathan Steve MD;  Location: [facility] OR    subscapularous repair and biceps tendon repair  05/2022    Dr. Verma at [facility]    Thumb surgery Right     TONSILLECTOMY      XR WRIST SURGERY FLORI RIGHT Right 11/2023    surgery for DJD and bone graft done     Current Outpatient Medications   Medication Sig Dispense Refill    albuterol (PROAIR HFA/PROVENTIL HFA/VENTOLIN HFA) 108 (90 Base) MCG/ACT inhaler INHALE 1 OR 2 puffs EVERY 6 HOURS as needed for wheezing. Strength: 108 (90 Base) MCG/ACT 8.5 g 5    albuterol (PROVENTIL) (2.5 MG/3ML) 0.083% neb solution INHALE 3 MILLILITERS (2.5 MG) BY NEBULIZATION ROUTE EVERY 6 HOURS AS NEEDED FOR SHORTNESS OF BREATH/DYSPNEA OR WHEEZING. Strength: (2.5 MG/3ML) 0.083% 90 mL 5    ascorbic acid (VITAMIN C) 1000 MG TABS Take 1,000 mg by mouth daily      atorvastatin (LIPITOR) 10 MG tablet Take 1 tablet (10 mg) by mouth daily 90 tablet 1    CALCIUM PO Take 1 tablet by mouth daily      cyclobenzaprine (FLEXERIL) 5 MG tablet Take 1 tablet (5 mg) by mouth 2 times daily as needed for muscle spasms 30 tablet 2    diltiazem ER COATED BEADS (CARDIZEM CD/CARTIA XT) 120 MG 24 hr capsule Take 1 capsule (120 mg) by mouth every morning Hold for blood pressure <110 30 capsule 11    docusate sodium (COLACE) 100 MG capsule Take 1 capsule (100 mg) by mouth 2 times daily as needed for constipation 30 capsule 1    HYDROcodone-acetaminophen  (NORCO)  MG per tablet Take 1 tablet by mouth daily as needed for severe pain 30 tablet 0    ibuprofen (ADVIL/MOTRIN) 600 MG tablet Take 1 tablet (600 mg) by mouth every 8 hours as needed for inflammatory pain Take with food. Stop if blood in stool. 45 tablet 0    ketorolac (ACULAR) 0.5 % ophthalmic solution INSTILL ONE DROP INTO RIGHT EYE FOUR TIMES DAILY. START 4 HOURS AFTER SURGERY. USE UNTIL EMPTY (MAX 3 WEEKS)*      metFORMIN (GLUCOPHAGE XR) 500 MG 24 hr tablet Take 1 tablet (500 mg) by mouth daily (with dinner) 30 tablet 5    Multiple Vitamins-Minerals (MULTIVITAMIN OR) Take 1 tablet by mouth daily Per pt, uses ones without Iron      omeprazole (PRILOSEC) 20 MG DR capsule TAKE 1 CAPSULE (20 MG) BY MOUTH DAILY. 90 capsule 1    traMADol (ULTRAM) 50 MG tablet Take 1 tablet (50 mg) by mouth every 6 hours as needed for severe pain 90 tablet 0    vitamin D3 (CHOLECALCIFEROL) 1000 units (25 mcg) tablet Take 1,000 Units by mouth daily      vitamin E 400 UNITS TABS Take 400 Units by mouth daily      zolpidem (AMBIEN) 5 MG tablet TAKE 1 TABLET (5 MG) BY MOUTH NIGHTLY AS NEEDED FOR SLEEP. DO NOT TAKE WITH TRAMADOL. MUST BE NDC # [address]-35 PER PT REQUEST. 30 tablet 5       Allergies   Allergen Reactions    Metaproterenol Sulfate Other (See Comments)     alupent inhaler-shaking    Percocet [Oxycodone-Acetaminophen] Itching        Social History     Tobacco Use    Smoking status: Former     Current packs/day: 0.00     Average packs/day: 0.5 packs/day for 52.1 years (26.0 ttl pk-yrs)     Types: Cigarettes     Start date: 10/13/1967     Quit date: 2019     Years since quittin.5     Passive exposure: Never    Smokeless tobacco: Never    Tobacco comments:     quit 2019   Substance Use Topics    Alcohol use: No     Comment: sober since 99     Family History   Problem Relation Age of Onset    Hypertension Mother     Cerebrovascular Disease Mother         TIA's    Depression Mother     Cancer -  "colorectal Maternal Grandmother         dx at 65    Lipids Father     C.A.D. Father         angioplasty at 65    Musculoskeletal Disorder Father     Alcohol/Drug Daughter         in remission    Breast Cancer No family hx of      History   Drug Use No             Review of Systems  CONSTITUTIONAL: NEGATIVE for fever, chills, change in weight  INTEGUMENTARY/SKIN: NEGATIVE for worrisome rashes, moles or lesions  EYES: NEGATIVE for vision changes or irritation  ENT/MOUTH: NEGATIVE for ear, mouth and throat problems  RESP:Reports shortness of breath that comes and goes, not with exertion (worse when ankles were swollen)  CV: No chest pain. Has SVT. Recent swelling bilateral lower legs, gone now  GI: NEGATIVE for nausea, abdominal pain, heartburn, or change in bowel habits  : NEGATIVE for frequency, dysuria, or hematuria  MUSCULOSKELETAL:As per HPI and problem list  NEURO: NEGATIVE for weakness, dizziness or paresthesias  ENDOCRINE: NEGATIVE for temperature intolerance, skin/hair changes  HEME: NEGATIVE for bleeding problems  PSYCHIATRIC: NEGATIVE for changes in mood or affect    Objective    /70   Pulse 70   Temp 98.1  F (36.7  C) (Oral)   Resp 12   Ht 1.499 m (4' 11\")   Wt 70.3 kg (155 lb)   LMP  (LMP Unknown)   SpO2 95%   BMI 31.31 kg/m     Estimated body mass index is 31.31 kg/m  as calculated from the following:    Height as of this encounter: 1.499 m (4' 11\").    Weight as of this encounter: 70.3 kg (155 lb).    Physical Exam  GENERAL: alert and no distress  EYES: Eyes grossly normal to inspection, PERRL and conjunctivae and sclerae normal  HENT: ear canals and TM's normal, nose and mouth without ulcers or lesions  NECK: no adenopathy, no asymmetry, masses, or scars  RESP: lungs clear to auscultation - no rales, rhonchi or wheezes  CV: regular rate and rhythm, normal S1 S2, no S3 or S4, no murmur, click or rub, no peripheral edema   ABDOMEN: soft, nontender, no hepatosplenomegaly, no masses and " bowel sounds normal  MS: no gross musculoskeletal defects noted, no edema  SKIN: no suspicious lesions or rashes  NEURO: Normal strength and tone, mentation intact and speech normal  PSYCH: mentation appears normal, affect normal/bright    Recent Labs   Lab Test 04/12/24  1439 03/27/24  1537 03/11/24  1114 02/28/24  1040   HGB 16.0* 15.8*  --  13.8    261  --  204   * 140   < >  --    POTASSIUM 3.8 3.9   < >  --    CR 0.60 0.69   < >  --    A1C  --   --   --  6.2*    < > = values in this interval not displayed.        Diagnostics  Recent Results (from the past 48 hour(s))   HEMOGLOBIN A1C    Collection Time: 05/15/24 11:02 AM   Result Value Ref Range    Hemoglobin A1C 6.1 (H) 0.0 - 5.6 %   Basic metabolic panel  (Ca, Cl, CO2, Creat, Gluc, K, Na, BUN)    Collection Time: 05/15/24 11:02 AM   Result Value Ref Range    Sodium 143 135 - 145 mmol/L    Potassium 3.9 3.4 - 5.3 mmol/L    Chloride 105 98 - 107 mmol/L    Carbon Dioxide (CO2) 25 22 - 29 mmol/L    Anion Gap 13 7 - 15 mmol/L    Urea Nitrogen 12.4 8.0 - 23.0 mg/dL    Creatinine 0.69 0.51 - 0.95 mg/dL    GFR Estimate >90 >60 mL/min/1.73m2    Calcium 9.7 8.8 - 10.2 mg/dL    Glucose 113 (H) 70 - 99 mg/dL   BNP-N terminal pro    Collection Time: 05/15/24 11:02 AM   Result Value Ref Range    N Terminal Pro BNP Outpatient 64 0 - 900 pg/mL      EKG required for significant arrhythmia and not completed in the last 90 days.   EKG: appears normal, NSR, normal axis, normal intervals, no acute ST/T changes c/w ischemia, no LVH by voltage criteria    Revised Cardiac Risk Index (RCRI)  The patient has the following serious cardiovascular risks for perioperative complications:   - No serious cardiac risks = 0 points     RCRI Interpretation: 0 points: Class I (very low risk - 0.4% complication rate)         Signed Electronically by: Gina Arias PA-C  Copy of this evaluation report is provided to requesting physician.

## 2024-05-15 NOTE — PATIENT INSTRUCTIONS

## 2024-05-22 RX ORDER — UBIDECARENONE 75 MG
100 CAPSULE ORAL DAILY
COMMUNITY
End: 2024-08-12

## 2024-05-28 ENCOUNTER — TELEPHONE (OUTPATIENT)
Dept: FAMILY MEDICINE | Facility: CLINIC | Age: 69
End: 2024-05-28

## 2024-05-28 ENCOUNTER — HOSPITAL ENCOUNTER (OUTPATIENT)
Dept: ULTRASOUND IMAGING | Facility: CLINIC | Age: 69
Discharge: HOME | End: 2024-05-28
Attending: PREVENTIVE MEDICINE | Admitting: PREVENTIVE MEDICINE
Payer: COMMERCIAL

## 2024-05-28 ENCOUNTER — OFFICE VISIT (OUTPATIENT)
Dept: PEDIATRICS | Facility: CLINIC | Age: 69
End: 2024-05-28
Payer: COMMERCIAL

## 2024-05-28 ENCOUNTER — ANCILLARY PROCEDURE (OUTPATIENT)
Dept: GENERAL RADIOLOGY | Facility: CLINIC | Age: 69
End: 2024-05-28
Attending: PREVENTIVE MEDICINE
Payer: COMMERCIAL

## 2024-05-28 ENCOUNTER — NURSE TRIAGE (OUTPATIENT)
Dept: FAMILY MEDICINE | Facility: CLINIC | Age: 69
End: 2024-05-28

## 2024-05-28 VITALS
SYSTOLIC BLOOD PRESSURE: 120 MMHG | DIASTOLIC BLOOD PRESSURE: 77 MMHG | OXYGEN SATURATION: 96 % | TEMPERATURE: 98.2 F | HEART RATE: 75 BPM | BODY MASS INDEX: 30.9 KG/M2 | WEIGHT: 153 LBS | RESPIRATION RATE: 18 BRPM

## 2024-05-28 DIAGNOSIS — R60.0 BILATERAL LOWER EXTREMITY EDEMA: ICD-10-CM

## 2024-05-28 DIAGNOSIS — R60.0 BILATERAL LOWER EXTREMITY EDEMA: Primary | ICD-10-CM

## 2024-05-28 LAB
ALBUMIN SERPL BCG-MCNC: 4.2 G/DL (ref 3.5–5.2)
ALP SERPL-CCNC: 102 U/L (ref 40–150)
ALT SERPL W P-5'-P-CCNC: 23 U/L (ref 0–50)
ANION GAP SERPL CALCULATED.3IONS-SCNC: 12 MMOL/L (ref 7–15)
AST SERPL W P-5'-P-CCNC: 21 U/L (ref 0–45)
BASOPHILS # BLD AUTO: 0 10E3/UL (ref 0–0.2)
BASOPHILS NFR BLD AUTO: 1 %
BILIRUB SERPL-MCNC: 0.2 MG/DL
BUN SERPL-MCNC: 13.5 MG/DL (ref 8–23)
CALCIUM SERPL-MCNC: 9.3 MG/DL (ref 8.8–10.2)
CHLORIDE SERPL-SCNC: 104 MMOL/L (ref 98–107)
CREAT SERPL-MCNC: 0.65 MG/DL (ref 0.51–0.95)
CREAT UR-MCNC: 91.9 MG/DL
DEPRECATED HCO3 PLAS-SCNC: 24 MMOL/L (ref 22–29)
EGFRCR SERPLBLD CKD-EPI 2021: >90 ML/MIN/1.73M2
EOSINOPHIL # BLD AUTO: 0.1 10E3/UL (ref 0–0.7)
EOSINOPHIL NFR BLD AUTO: 1 %
ERYTHROCYTE [DISTWIDTH] IN BLOOD BY AUTOMATED COUNT: 13.6 % (ref 10–15)
GLUCOSE SERPL-MCNC: 99 MG/DL (ref 70–99)
HCT VFR BLD AUTO: 44 % (ref 35–47)
HGB BLD-MCNC: 14.5 G/DL (ref 11.7–15.7)
IMM GRANULOCYTES # BLD: 0 10E3/UL
IMM GRANULOCYTES NFR BLD: 0 %
LYMPHOCYTES # BLD AUTO: 3 10E3/UL (ref 0.8–5.3)
LYMPHOCYTES NFR BLD AUTO: 40 %
MCH RBC QN AUTO: 31.9 PG (ref 26.5–33)
MCHC RBC AUTO-ENTMCNC: 33 G/DL (ref 31.5–36.5)
MCV RBC AUTO: 97 FL (ref 78–100)
MICROALBUMIN UR-MCNC: <12 MG/L
MICROALBUMIN/CREAT UR: NORMAL MG/G{CREAT}
MONOCYTES # BLD AUTO: 0.5 10E3/UL (ref 0–1.3)
MONOCYTES NFR BLD AUTO: 7 %
NEUTROPHILS # BLD AUTO: 3.7 10E3/UL (ref 1.6–8.3)
NEUTROPHILS NFR BLD AUTO: 51 %
NRBC # BLD AUTO: 0 10E3/UL
NRBC BLD AUTO-RTO: 0 /100
NT-PROBNP SERPL-MCNC: 47 PG/ML (ref 0–900)
PLATELET # BLD AUTO: 295 10E3/UL (ref 150–450)
POTASSIUM SERPL-SCNC: 3.6 MMOL/L (ref 3.4–5.3)
PROT SERPL-MCNC: 6.7 G/DL (ref 6.4–8.3)
RBC # BLD AUTO: 4.54 10E6/UL (ref 3.8–5.2)
SODIUM SERPL-SCNC: 140 MMOL/L (ref 135–145)
TROPONIN T SERPL HS-MCNC: 7 NG/L
WBC # BLD AUTO: 7.3 10E3/UL (ref 4–11)

## 2024-05-28 PROCEDURE — 71046 X-RAY EXAM CHEST 2 VIEWS: CPT | Mod: TC | Performed by: RADIOLOGY

## 2024-05-28 PROCEDURE — 83880 ASSAY OF NATRIURETIC PEPTIDE: CPT | Performed by: PREVENTIVE MEDICINE

## 2024-05-28 PROCEDURE — 85025 COMPLETE CBC W/AUTO DIFF WBC: CPT | Performed by: PREVENTIVE MEDICINE

## 2024-05-28 PROCEDURE — 93000 ELECTROCARDIOGRAM COMPLETE: CPT | Performed by: PREVENTIVE MEDICINE

## 2024-05-28 PROCEDURE — 82043 UR ALBUMIN QUANTITATIVE: CPT | Performed by: PREVENTIVE MEDICINE

## 2024-05-28 PROCEDURE — 99417 PROLNG OP E/M EACH 15 MIN: CPT | Performed by: PREVENTIVE MEDICINE

## 2024-05-28 PROCEDURE — 82570 ASSAY OF URINE CREATININE: CPT | Performed by: PREVENTIVE MEDICINE

## 2024-05-28 PROCEDURE — 36415 COLL VENOUS BLD VENIPUNCTURE: CPT | Performed by: PREVENTIVE MEDICINE

## 2024-05-28 PROCEDURE — 99215 OFFICE O/P EST HI 40 MIN: CPT | Performed by: PREVENTIVE MEDICINE

## 2024-05-28 PROCEDURE — 93970 EXTREMITY STUDY: CPT

## 2024-05-28 PROCEDURE — 84484 ASSAY OF TROPONIN QUANT: CPT | Performed by: PREVENTIVE MEDICINE

## 2024-05-28 PROCEDURE — 80053 COMPREHEN METABOLIC PANEL: CPT | Performed by: PREVENTIVE MEDICINE

## 2024-05-28 NOTE — PATIENT INSTRUCTIONS
Thanks for choosing the [facility] for your medical care today.    You were seen for bilateral leg swelling.    All of you labs, EKG, chest xray and leg ultrasound look fine.      Your leg swelling may be a side effect of diltiazem.    There may be some amount of venous stasis as well.    I advise leg elevation and compression stockings to help your symptoms.

## 2024-05-28 NOTE — TELEPHONE ENCOUNTER
Reason for Call:  Other appointment    Detailed comments: PT WOULD LIKE TO BE SEEN TODAY SHE IS HAVING SWELLING IN HER LEG AND SHE IS TO HAVE SURGERY ON FRIDAY THIS WEEK AND IS WORRIED ABOUT IT. WOULD LIKE TO TALK TO DR HAAS NURSE ABOUT HER LEG. PLEASE CALL AND ADVISE PT    Phone Number Patient can be reached at: Cell number on file:    Telephone Information:   Mobile [phone]       Best Time: ANYTIME    Can we leave a detailed message on this number? YES    Call taken on 5/28/2024 at 1:04 PM by Lion Saleh

## 2024-05-28 NOTE — PROGRESS NOTES
Acute and Diagnostic Services Clinic Visit    Assessment & Plan     (R60.0) Bilateral lower extremity edema  (primary encounter diagnosis)  Plan: CBC with platelets differential, Comprehensive         metabolic panel, Albumin Random Urine         Quantitative with Creat Ratio, XR Chest 2         Views, EKG 12-lead complete w/read - Clinics,         Troponin T, High Sensitivity, US Lower         Extremity Venous Duplex Bilateral    CXR, EKG, labs reassuring, BLE venous doppler reassuring    Your leg swelling may be a side effect of diltiazem.    There may be some amount of venous stasis as well.    I advise leg elevation and compression stockings to help your symptoms.    Follow up with pcp in 1-2 weeks for recheck.       78  minutes were spent doing chart review, history and exam, documentation and further activities per the note.        See Patient Instructions    No follow-ups on file.    Sofiya Sainz is a 69 year old, presenting for the following health issues:  Edema (Bilateral feet and ankle swelling X 1 month)    HPI     Musculoskeletal problem/pain  Onset/Duration: X 1 month  Description:       Location: bilateral lower extremities       Joint swelling: YES       Redness: YES- L > R       Pain: 0/10       Warmth: YES- L > R  Progression of symptoms intermittent  Accompanying signs and symptoms:       Fevers: no        Numbness/tingling/weakness: no   History        Trauma to the area: no         Previous history of Gout: no         Alcohol usage: no         Diuretic use: no         Recent illness: no         Previous similar problem: no         Previous evaluation: no   Aggravating factors include: walking  Therapies tried and outcome: elevating them at night, this helps.    Have you had any surgeries on your arteries of veins: No    This is a 68 yo female who has had ble edema over the past several weeks.  No  pain.  No sob, pnd, orthopnea, abdominal distension.  Edema seems to improve  overnight.    Review of Systems  Constitutional, HEENT, cardiovascular, pulmonary, GI, , musculoskeletal, neuro, skin, endocrine and psych systems are negative, except as otherwise noted.      Objective    Wt 69.4 kg (153 lb)   LMP  (LMP Unknown)   BMI 30.90 kg/m    Body mass index is 30.9 kg/m .  Physical Exam   GENERAL: alert and no distress  EYES: Eyes grossly normal to inspection, PERRL and conjunctivae and sclerae normal  HENT: ear canals and TM's normal, nose and mouth without ulcers or lesions  NECK: no adenopathy, no asymmetry, masses, or scars  RESP: lungs clear to auscultation - no rales, rhonchi or wheezes  CV: regular rate and rhythm, normal S1 S2, no S3 or S4, no murmur, click or rub, no peripheral edema  ABDOMEN: soft, nontender, no hepatosplenomegaly, no masses and bowel sounds normal  MS: no gross musculoskeletal defects noted, no edema  SKIN: no suspicious lesions or rashes  NEURO: Normal strength and tone, mentation intact and speech normal  PSYCH: mentation appears normal, affect normal/bright  EXTREMITIES:  Warm, well perfused, trace - 1+ pitting edema to mid calf bilaterally    Results for orders placed or performed during the hospital encounter of 05/28/24   US Lower Extremity Venous Duplex Bilateral     Status: None    Narrative    EXAM: US LOWER EXTREMITY VENOUS DUPLEX BILATERAL  LOCATION: [facility]  DATE: 5/28/2024    INDICATION:  Bilateral lower extremity edema  COMPARISON: None.  TECHNIQUE: Venous Duplex ultrasound of bilateral lower extremities with and without compression, augmentation and duplex. Color flow and spectral Doppler with waveform analysis performed.    FINDINGS: Exam includes the common femoral, femoral, popliteal veins as well as segmentally visualized deep calf veins and greater saphenous vein.     RIGHT: No deep vein thrombosis. No superficial thrombophlebitis. No popliteal cyst.    LEFT: No deep vein thrombosis. No superficial  thrombophlebitis. No popliteal cyst.      Impression    IMPRESSION:  1.  No deep venous thrombosis in the bilateral lower extremities.   Results for orders placed or performed in visit on 05/28/24   XR Chest 2 Views     Status: None    Narrative    CHEST 2 VIEWS   5/28/2024 4:18 PM     HISTORY: SOB; Bilateral lower extremity edema.    COMPARISON: 3/27/2024.      Impression    IMPRESSION: No acute cardiopulmonary disease.    KENTON STALLINGS MD         SYSTEM ID:  [id number]   Results for orders placed or performed in visit on 05/28/24   Comprehensive metabolic panel     Status: Normal   Result Value Ref Range    Sodium 140 135 - 145 mmol/L    Potassium 3.6 3.4 - 5.3 mmol/L    Carbon Dioxide (CO2) 24 22 - 29 mmol/L    Anion Gap 12 7 - 15 mmol/L    Urea Nitrogen 13.5 8.0 - 23.0 mg/dL    Creatinine 0.65 0.51 - 0.95 mg/dL    GFR Estimate >90 >60 mL/min/1.73m2    Calcium 9.3 8.8 - 10.2 mg/dL    Chloride 104 98 - 107 mmol/L    Glucose 99 70 - 99 mg/dL    Alkaline Phosphatase 102 40 - 150 U/L    AST 21 0 - 45 U/L    ALT 23 0 - 50 U/L    Protein Total 6.7 6.4 - 8.3 g/dL    Albumin 4.2 3.5 - 5.2 g/dL    Bilirubin Total 0.2 <=1.2 mg/dL   BNP-N terminal pro     Status: Normal   Result Value Ref Range    N Terminal Pro BNP Outpatient 47 0 - 900 pg/mL   Troponin T, High Sensitivity     Status: Normal   Result Value Ref Range    Troponin T, High Sensitivity 7 <=14 ng/L   CBC with platelets and differential     Status: None   Result Value Ref Range    WBC Count 7.3 4.0 - 11.0 10e3/uL    RBC Count 4.54 3.80 - 5.20 10e6/uL    Hemoglobin 14.5 11.7 - 15.7 g/dL    Hematocrit 44.0 35.0 - 47.0 %    MCV 97 78 - 100 fL    MCH 31.9 26.5 - 33.0 pg    MCHC 33.0 31.5 - 36.5 g/dL    RDW 13.6 10.0 - 15.0 %    Platelet Count 295 150 - 450 10e3/uL    % Neutrophils 51 %    % Lymphocytes 40 %    % Monocytes 7 %    % Eosinophils 1 %    % Basophils 1 %    % Immature Granulocytes 0 %    NRBCs per 100 WBC 0 <1 /100    Absolute Neutrophils 3.7 1.6 - 8.3 10e3/uL     Absolute Lymphocytes 3.0 0.8 - 5.3 10e3/uL    Absolute Monocytes 0.5 0.0 - 1.3 10e3/uL    Absolute Eosinophils 0.1 0.0 - 0.7 10e3/uL    Absolute Basophils 0.0 0.0 - 0.2 10e3/uL    Absolute Immature Granulocytes 0.0 <=0.4 10e3/uL    Absolute NRBCs 0.0 10e3/uL   Albumin Random Urine Quantitative with Creat Ratio     Status: None   Result Value Ref Range    Creatinine Urine mg/dL 91.9 mg/dL    Albumin Urine mg/L <12.0 mg/L    Albumin Urine mg/g Cr     CBC with platelets differential     Status: None    Narrative    The following orders were created for panel order CBC with platelets differential.  Procedure                               Abnormality         Status                     ---------                               -----------         ------                     CBC with platelets and d...[[id number]]                      Final result                 Please view results for these tests on the individual orders.             Signed Electronically by: Chato Crowe MD

## 2024-05-28 NOTE — TELEPHONE ENCOUNTER
Called the pt.  The swelling started on Mother's Day weekend.  Both of them kind of get swollen, but the right one always has fluid.  She said it goes down and comes back up.  The left ankle is worse - it goes up to her knee.  It is more swollen by her ankle and right above her ankle.  She has been watching her salt intake.  She says the swelling is moderate.  She is very concerned because she is having surgery this week.      See nurse triage of 5/12/24.  See also note of 5/13/24.      See ov of 5/15/24.      Per protocol see HCP within 4 hours.  Asked the pt if she would be willing to go to [facility].  She said yes.  Called the [facility] and spoke to Jaden.  Dr. Crowe was currently with a pt.  Jaden sent me a message they could see the pt today at 3:30.  Called the pt to advise.  Address and phone number were given.          Reason for Disposition   [1] Thigh, calf, or ankle swelling AND [2] bilateral AND [3] 1 side is more swollen    Additional Information   Negative: SEVERE difficulty breathing (e.g., struggling for each breath, speaks in single words)   Negative: Looks like a broken bone or dislocated joint (e.g., crooked or deformed)   Negative: Sounds like a life-threatening emergency to the triager   Negative: Chest pain   Negative: Followed a leg injury   Negative: [1] Followed an insect bite AND [2] localized swelling (e.g., small area of puffy or swollen skin)   Negative: Swelling of one ankle joint   Negative: Swelling of knee is main symptom   Negative: Pregnant   Negative: Postpartum (from 0 to 6 weeks after delivery)   Negative: [1] Heart failure suspected (e.g., ankle swelling, shortness of breath, weight gain) AND [2] recently in hospital for heart failure   Negative: Difficulty breathing at rest   Negative: Entire foot is cool or blue in comparison to other side   Negative: [1] Can't walk or can barely walk AND [2] new-onset   Negative: [1] Difficulty breathing with exertion (e.g., walking) AND [2]  "new-onset or worsening   Negative: [1] Red area or streak AND [2] fever   Negative: [1] Swelling is painful to touch AND [2] fever   Negative: [1] Cast on leg or ankle AND [2] now increased pain   Negative: Patient sounds very sick or weak to the triager   Negative: SEVERE leg swelling (e.g., swelling extends above knee, entire leg is swollen, weeping fluid)   Negative: [1] Red area or streak [2] large (> 2 in. or 5 cm)   Negative: [1] Thigh or calf pain AND [2] only 1 side AND [3] present > 1 hour   Negative: [1] Thigh, calf, or ankle swelling AND [2] only 1 side    Answer Assessment - Initial Assessment Questions  1. ONSET: \"When did the swelling start?\" (e.g., minutes, hours, days)      Started Mother's Day weekend  2. LOCATION: \"What part of the leg is swollen?\"  \"Are both legs swollen or just one leg?\"      The right one is a little swollen, but the left is worse - she is watching her salt intake  3. SEVERITY: \"How bad is the swelling?\" (e.g., localized; mild, moderate, severe)    - Localized: Small area of swelling localized to one leg.    - MILD pedal edema: Swelling limited to foot and ankle, pitting edema < 1/4 inch (6 mm) deep, rest and elevation eliminate most or all swelling.    - MODERATE edema: Swelling of lower leg to knee, pitting edema > 1/4 inch (6 mm) deep, rest and elevation only partially reduce swelling.    - SEVERE edema: Swelling extends above knee, facial or hand swelling present.       moderate  4. REDNESS: \"Does the swelling look red or infected?\"      No   5. PAIN: \"Is the swelling painful to touch?\" If Yes, ask: \"How painful is it?\"   (Scale 1-10; mild, moderate or severe)      no  6. FEVER: \"Do you have a fever?\" If Yes, ask: \"What is it, how was it measured, and when did it start?\"       no  7. CAUSE: \"What do you think is causing the leg swelling?\"      unsure  8. MEDICAL HISTORY: \"Do you have a history of blood clots (e.g., DVT), cancer, heart failure, kidney disease, or liver " "failure?\"      no  9. RECURRENT SYMPTOM: \"Have you had leg swelling before?\" If Yes, ask: \"When was the last time?\" \"What happened that time?\"      no  10. OTHER SYMPTOMS: \"Do you have any other symptoms?\" (e.g., chest pain, difficulty breathing)        no  11. PREGNANCY: \"Is there any chance you are pregnant?\" \"When was your last menstrual period?\"        N/a    Protocols used: Leg Swelling and Edema-A-AH    "

## 2024-05-28 NOTE — TELEPHONE ENCOUNTER
Prior Authorization Retail Medication Request    Medication/Dose: diltiazem ER COATED BEADS (CARDIZEM CD/CARTIA XT) 120 MG 24 hr capsule   Diagnosis and ICD code (if different than what is on RX):    New/renewal/insurance change PA/secondary ins. PA:  Previously Tried and Failed:    Rationale:      [place]z: ELI Gamez, TC

## 2024-05-30 ENCOUNTER — HOSPITAL ENCOUNTER (OUTPATIENT)
Facility: CLINIC | Age: 69
Discharge: HOME | End: 2024-05-30
Attending: SURGERY | Admitting: SURGERY
Payer: COMMERCIAL

## 2024-05-30 ENCOUNTER — APPOINTMENT (OUTPATIENT)
Dept: SURGERY | Facility: PHYSICIAN GROUP | Age: 69
End: 2024-05-30
Payer: COMMERCIAL

## 2024-05-30 ENCOUNTER — ANESTHESIA (OUTPATIENT)
Dept: SURGERY | Facility: CLINIC | Age: 69
End: 2024-05-30
Payer: COMMERCIAL

## 2024-05-30 ENCOUNTER — ANESTHESIA EVENT (OUTPATIENT)
Dept: SURGERY | Facility: CLINIC | Age: 69
End: 2024-05-30
Payer: COMMERCIAL

## 2024-05-30 VITALS
BODY MASS INDEX: 30.82 KG/M2 | RESPIRATION RATE: 14 BRPM | TEMPERATURE: 97.3 F | HEART RATE: 69 BPM | OXYGEN SATURATION: 95 % | WEIGHT: 152.6 LBS | DIASTOLIC BLOOD PRESSURE: 59 MMHG | SYSTOLIC BLOOD PRESSURE: 115 MMHG

## 2024-05-30 DIAGNOSIS — K82.4 GALLBLADDER POLYP: ICD-10-CM

## 2024-05-30 PROCEDURE — 710N000012 HC RECOVERY PHASE 2, PER MINUTE: Performed by: SURGERY

## 2024-05-30 PROCEDURE — 272N000001 HC OR GENERAL SUPPLY STERILE: Performed by: SURGERY

## 2024-05-30 PROCEDURE — 258N000003 HC RX IP 258 OP 636: Performed by: NURSE ANESTHETIST, CERTIFIED REGISTERED

## 2024-05-30 PROCEDURE — 250N000025 HC SEVOFLURANE, PER MIN: Performed by: SURGERY

## 2024-05-30 PROCEDURE — 23071 EXC SHOULDER LES SC 3 CM/>: CPT | Mod: 51 | Performed by: SURGERY

## 2024-05-30 PROCEDURE — 250N000009 HC RX 250: Performed by: SURGERY

## 2024-05-30 PROCEDURE — 250N000011 HC RX IP 250 OP 636: Performed by: SURGERY

## 2024-05-30 PROCEDURE — 47562 LAPAROSCOPIC CHOLECYSTECTOMY: CPT | Performed by: SURGERY

## 2024-05-30 PROCEDURE — 250N000009 HC RX 250: Performed by: NURSE ANESTHETIST, CERTIFIED REGISTERED

## 2024-05-30 PROCEDURE — 88304 TISSUE EXAM BY PATHOLOGIST: CPT | Mod: 26 | Performed by: PATHOLOGY

## 2024-05-30 PROCEDURE — 88304 TISSUE EXAM BY PATHOLOGIST: CPT | Mod: TC | Performed by: SURGERY

## 2024-05-30 PROCEDURE — 258N000003 HC RX IP 258 OP 636: Performed by: ANESTHESIOLOGY

## 2024-05-30 PROCEDURE — 360N000076 HC SURGERY LEVEL 3, PER MIN: Performed by: SURGERY

## 2024-05-30 PROCEDURE — 88305 TISSUE EXAM BY PATHOLOGIST: CPT | Mod: 26 | Performed by: PATHOLOGY

## 2024-05-30 PROCEDURE — 250N000011 HC RX IP 250 OP 636: Performed by: NURSE ANESTHETIST, CERTIFIED REGISTERED

## 2024-05-30 PROCEDURE — 999N000141 HC STATISTIC PRE-PROCEDURE NURSING ASSESSMENT: Performed by: SURGERY

## 2024-05-30 PROCEDURE — 250N000011 HC RX IP 250 OP 636: Performed by: ANESTHESIOLOGY

## 2024-05-30 PROCEDURE — 710N000009 HC RECOVERY PHASE 1, LEVEL 1, PER MIN: Performed by: SURGERY

## 2024-05-30 PROCEDURE — 23071 EXC SHOULDER LES SC 3 CM/>: CPT | Mod: AS | Performed by: PHYSICIAN ASSISTANT

## 2024-05-30 PROCEDURE — 250N000013 HC RX MED GY IP 250 OP 250 PS 637: Performed by: SURGERY

## 2024-05-30 PROCEDURE — 370N000017 HC ANESTHESIA TECHNICAL FEE, PER MIN: Performed by: SURGERY

## 2024-05-30 PROCEDURE — 47562 LAPAROSCOPIC CHOLECYSTECTOMY: CPT | Mod: AS | Performed by: PHYSICIAN ASSISTANT

## 2024-05-30 PROCEDURE — 250N000013 HC RX MED GY IP 250 OP 250 PS 637: Performed by: STUDENT IN AN ORGANIZED HEALTH CARE EDUCATION/TRAINING PROGRAM

## 2024-05-30 PROCEDURE — 258N000001 HC RX 258: Performed by: SURGERY

## 2024-05-30 RX ORDER — DEXAMETHASONE SODIUM PHOSPHATE 4 MG/ML
INJECTION, SOLUTION INTRA-ARTICULAR; INTRALESIONAL; INTRAMUSCULAR; INTRAVENOUS; SOFT TISSUE PRN
Status: DISCONTINUED | OUTPATIENT
Start: 2024-05-30 | End: 2024-05-30

## 2024-05-30 RX ORDER — SODIUM CHLORIDE, SODIUM LACTATE, POTASSIUM CHLORIDE, CALCIUM CHLORIDE 600; 310; 30; 20 MG/100ML; MG/100ML; MG/100ML; MG/100ML
INJECTION, SOLUTION INTRAVENOUS CONTINUOUS PRN
Status: DISCONTINUED | OUTPATIENT
Start: 2024-05-30 | End: 2024-05-30

## 2024-05-30 RX ORDER — SODIUM CHLORIDE, SODIUM LACTATE, POTASSIUM CHLORIDE, CALCIUM CHLORIDE 600; 310; 30; 20 MG/100ML; MG/100ML; MG/100ML; MG/100ML
INJECTION, SOLUTION INTRAVENOUS CONTINUOUS
Status: DISCONTINUED | OUTPATIENT
Start: 2024-05-30 | End: 2024-05-30 | Stop reason: HOSPADM

## 2024-05-30 RX ORDER — BUPIVACAINE HYDROCHLORIDE 5 MG/ML
INJECTION, SOLUTION EPIDURAL; INTRACAUDAL PRN
Status: DISCONTINUED | OUTPATIENT
Start: 2024-05-30 | End: 2024-05-30 | Stop reason: HOSPADM

## 2024-05-30 RX ORDER — NALOXONE HYDROCHLORIDE 0.4 MG/ML
0.1 INJECTION, SOLUTION INTRAMUSCULAR; INTRAVENOUS; SUBCUTANEOUS
Status: DISCONTINUED | OUTPATIENT
Start: 2024-05-30 | End: 2024-05-30 | Stop reason: HOSPADM

## 2024-05-30 RX ORDER — FENTANYL CITRATE 50 UG/ML
25 INJECTION, SOLUTION INTRAMUSCULAR; INTRAVENOUS
Status: DISCONTINUED | OUTPATIENT
Start: 2024-05-30 | End: 2024-05-30 | Stop reason: HOSPADM

## 2024-05-30 RX ORDER — HYDROMORPHONE HCL IN WATER/PF 6 MG/30 ML
0.4 PATIENT CONTROLLED ANALGESIA SYRINGE INTRAVENOUS EVERY 5 MIN PRN
Status: DISCONTINUED | OUTPATIENT
Start: 2024-05-30 | End: 2024-05-30 | Stop reason: HOSPADM

## 2024-05-30 RX ORDER — GLYCOPYRROLATE 0.2 MG/ML
INJECTION, SOLUTION INTRAMUSCULAR; INTRAVENOUS PRN
Status: DISCONTINUED | OUTPATIENT
Start: 2024-05-30 | End: 2024-05-30

## 2024-05-30 RX ORDER — HYDROCODONE BITARTRATE AND ACETAMINOPHEN 5; 325 MG/1; MG/1
1 TABLET ORAL
Status: COMPLETED | OUTPATIENT
Start: 2024-05-30 | End: 2024-05-30

## 2024-05-30 RX ORDER — LABETALOL HYDROCHLORIDE 5 MG/ML
10 INJECTION, SOLUTION INTRAVENOUS
Status: DISCONTINUED | OUTPATIENT
Start: 2024-05-30 | End: 2024-05-30 | Stop reason: HOSPADM

## 2024-05-30 RX ORDER — HYDROMORPHONE HCL IN WATER/PF 6 MG/30 ML
0.2 PATIENT CONTROLLED ANALGESIA SYRINGE INTRAVENOUS EVERY 5 MIN PRN
Status: DISCONTINUED | OUTPATIENT
Start: 2024-05-30 | End: 2024-05-30 | Stop reason: HOSPADM

## 2024-05-30 RX ORDER — CEFAZOLIN SODIUM/WATER 2 G/20 ML
2 SYRINGE (ML) INTRAVENOUS SEE ADMIN INSTRUCTIONS
Status: DISCONTINUED | OUTPATIENT
Start: 2024-05-30 | End: 2024-05-30 | Stop reason: HOSPADM

## 2024-05-30 RX ORDER — NEOSTIGMINE METHYLSULFATE 1 MG/ML
VIAL (ML) INJECTION PRN
Status: DISCONTINUED | OUTPATIENT
Start: 2024-05-30 | End: 2024-05-30

## 2024-05-30 RX ORDER — FENTANYL CITRATE 50 UG/ML
25 INJECTION, SOLUTION INTRAMUSCULAR; INTRAVENOUS EVERY 5 MIN PRN
Status: DISCONTINUED | OUTPATIENT
Start: 2024-05-30 | End: 2024-05-30 | Stop reason: HOSPADM

## 2024-05-30 RX ORDER — PROPOFOL 10 MG/ML
INJECTION, EMULSION INTRAVENOUS CONTINUOUS PRN
Status: DISCONTINUED | OUTPATIENT
Start: 2024-05-30 | End: 2024-05-30

## 2024-05-30 RX ORDER — ONDANSETRON 2 MG/ML
INJECTION INTRAMUSCULAR; INTRAVENOUS PRN
Status: DISCONTINUED | OUTPATIENT
Start: 2024-05-30 | End: 2024-05-30

## 2024-05-30 RX ORDER — CEFAZOLIN SODIUM/WATER 2 G/20 ML
2 SYRINGE (ML) INTRAVENOUS
Status: COMPLETED | OUTPATIENT
Start: 2024-05-30 | End: 2024-05-30

## 2024-05-30 RX ORDER — ONDANSETRON 2 MG/ML
4 INJECTION INTRAMUSCULAR; INTRAVENOUS EVERY 30 MIN PRN
Status: DISCONTINUED | OUTPATIENT
Start: 2024-05-30 | End: 2024-05-30 | Stop reason: HOSPADM

## 2024-05-30 RX ORDER — FENTANYL CITRATE 50 UG/ML
50 INJECTION, SOLUTION INTRAMUSCULAR; INTRAVENOUS EVERY 5 MIN PRN
Status: DISCONTINUED | OUTPATIENT
Start: 2024-05-30 | End: 2024-05-30 | Stop reason: HOSPADM

## 2024-05-30 RX ORDER — LIDOCAINE HYDROCHLORIDE 20 MG/ML
INJECTION, SOLUTION INFILTRATION; PERINEURAL PRN
Status: DISCONTINUED | OUTPATIENT
Start: 2024-05-30 | End: 2024-05-30

## 2024-05-30 RX ORDER — ONDANSETRON 4 MG/1
4 TABLET, ORALLY DISINTEGRATING ORAL EVERY 30 MIN PRN
Status: DISCONTINUED | OUTPATIENT
Start: 2024-05-30 | End: 2024-05-30 | Stop reason: HOSPADM

## 2024-05-30 RX ORDER — LIDOCAINE 40 MG/G
CREAM TOPICAL
Status: DISCONTINUED | OUTPATIENT
Start: 2024-05-30 | End: 2024-05-30 | Stop reason: HOSPADM

## 2024-05-30 RX ORDER — INDOCYANINE GREEN AND WATER 25 MG
2.5 KIT INJECTION ONCE
Status: COMPLETED | OUTPATIENT
Start: 2024-05-30 | End: 2024-05-30

## 2024-05-30 RX ORDER — FENTANYL CITRATE 50 UG/ML
INJECTION, SOLUTION INTRAMUSCULAR; INTRAVENOUS PRN
Status: DISCONTINUED | OUTPATIENT
Start: 2024-05-30 | End: 2024-05-30

## 2024-05-30 RX ORDER — KETOROLAC TROMETHAMINE 15 MG/ML
15 INJECTION, SOLUTION INTRAMUSCULAR; INTRAVENOUS
Status: DISCONTINUED | OUTPATIENT
Start: 2024-05-30 | End: 2024-05-30 | Stop reason: HOSPADM

## 2024-05-30 RX ORDER — HYDRALAZINE HYDROCHLORIDE 20 MG/ML
2.5-5 INJECTION INTRAMUSCULAR; INTRAVENOUS EVERY 10 MIN PRN
Status: DISCONTINUED | OUTPATIENT
Start: 2024-05-30 | End: 2024-05-30 | Stop reason: HOSPADM

## 2024-05-30 RX ORDER — HYDROCODONE BITARTRATE AND ACETAMINOPHEN 5; 325 MG/1; MG/1
1-2 TABLET ORAL EVERY 4 HOURS PRN
Qty: 10 TABLET | Refills: 0 | Status: SHIPPED | OUTPATIENT
Start: 2024-05-30 | End: 2024-06-03

## 2024-05-30 RX ORDER — DEXAMETHASONE SODIUM PHOSPHATE 4 MG/ML
4 INJECTION, SOLUTION INTRA-ARTICULAR; INTRALESIONAL; INTRAMUSCULAR; INTRAVENOUS; SOFT TISSUE
Status: DISCONTINUED | OUTPATIENT
Start: 2024-05-30 | End: 2024-05-30 | Stop reason: HOSPADM

## 2024-05-30 RX ORDER — KETOROLAC TROMETHAMINE 30 MG/ML
INJECTION, SOLUTION INTRAMUSCULAR; INTRAVENOUS PRN
Status: DISCONTINUED | OUTPATIENT
Start: 2024-05-30 | End: 2024-05-30

## 2024-05-30 RX ORDER — PROPOFOL 10 MG/ML
INJECTION, EMULSION INTRAVENOUS PRN
Status: DISCONTINUED | OUTPATIENT
Start: 2024-05-30 | End: 2024-05-30

## 2024-05-30 RX ADMIN — ONDANSETRON 4 MG: 2 INJECTION INTRAMUSCULAR; INTRAVENOUS at 08:51

## 2024-05-30 RX ADMIN — KETOROLAC TROMETHAMINE 30 MG: 30 INJECTION, SOLUTION INTRAMUSCULAR at 08:51

## 2024-05-30 RX ADMIN — SODIUM CHLORIDE, POTASSIUM CHLORIDE, SODIUM LACTATE AND CALCIUM CHLORIDE: 600; 310; 30; 20 INJECTION, SOLUTION INTRAVENOUS at 07:46

## 2024-05-30 RX ADMIN — PHENYLEPHRINE HYDROCHLORIDE 100 MCG: 10 INJECTION INTRAVENOUS at 09:53

## 2024-05-30 RX ADMIN — HYDROCODONE BITARTRATE AND ACETAMINOPHEN 1 TABLET: 5; 325 TABLET ORAL at 12:33

## 2024-05-30 RX ADMIN — NEOSTIGMINE METHYLSULFATE 3 MG: 1 INJECTION, SOLUTION INTRAVENOUS at 10:21

## 2024-05-30 RX ADMIN — FENTANYL CITRATE 50 MCG: 50 INJECTION, SOLUTION INTRAMUSCULAR; INTRAVENOUS at 10:53

## 2024-05-30 RX ADMIN — PROPOFOL 150 MG: 10 INJECTION, EMULSION INTRAVENOUS at 08:51

## 2024-05-30 RX ADMIN — GLYCOPYRROLATE 0.2 MG: 0.2 INJECTION, SOLUTION INTRAMUSCULAR; INTRAVENOUS at 08:51

## 2024-05-30 RX ADMIN — SODIUM CHLORIDE, POTASSIUM CHLORIDE, SODIUM LACTATE AND CALCIUM CHLORIDE: 600; 310; 30; 20 INJECTION, SOLUTION INTRAVENOUS at 08:45

## 2024-05-30 RX ADMIN — ROCURONIUM BROMIDE 50 MG: 50 INJECTION, SOLUTION INTRAVENOUS at 08:51

## 2024-05-30 RX ADMIN — HYDROCODONE BITARTRATE AND ACETAMINOPHEN 1 TABLET: 5; 325 TABLET ORAL at 11:17

## 2024-05-30 RX ADMIN — GLYCOPYRROLATE 0.6 MG: 0.2 INJECTION, SOLUTION INTRAMUSCULAR; INTRAVENOUS at 10:21

## 2024-05-30 RX ADMIN — Medication 2 G: at 08:51

## 2024-05-30 RX ADMIN — FENTANYL CITRATE 100 MCG: 50 INJECTION INTRAMUSCULAR; INTRAVENOUS at 08:51

## 2024-05-30 RX ADMIN — HYDROMORPHONE HYDROCHLORIDE 1 MG: 1 INJECTION, SOLUTION INTRAMUSCULAR; INTRAVENOUS; SUBCUTANEOUS at 08:51

## 2024-05-30 RX ADMIN — PHENYLEPHRINE HYDROCHLORIDE 100 MCG: 10 INJECTION INTRAVENOUS at 08:58

## 2024-05-30 RX ADMIN — MIDAZOLAM 1 MG: 1 INJECTION INTRAMUSCULAR; INTRAVENOUS at 08:49

## 2024-05-30 RX ADMIN — FENTANYL CITRATE 50 MCG: 50 INJECTION, SOLUTION INTRAMUSCULAR; INTRAVENOUS at 10:59

## 2024-05-30 RX ADMIN — PHENYLEPHRINE HYDROCHLORIDE 100 MCG: 10 INJECTION INTRAVENOUS at 09:12

## 2024-05-30 RX ADMIN — INDOCYANINE GREEN AND WATER 2.5 MG: KIT at 07:47

## 2024-05-30 RX ADMIN — PROPOFOL 50 MCG/KG/MIN: 10 INJECTION, EMULSION INTRAVENOUS at 08:51

## 2024-05-30 RX ADMIN — LIDOCAINE HYDROCHLORIDE 30 MG: 20 INJECTION, SOLUTION INFILTRATION; PERINEURAL at 08:51

## 2024-05-30 RX ADMIN — DEXAMETHASONE SODIUM PHOSPHATE 8 MG: 4 INJECTION, SOLUTION INTRA-ARTICULAR; INTRALESIONAL; INTRAMUSCULAR; INTRAVENOUS; SOFT TISSUE at 08:51

## 2024-05-30 RX ADMIN — SODIUM CHLORIDE, POTASSIUM CHLORIDE, SODIUM LACTATE AND CALCIUM CHLORIDE: 600; 310; 30; 20 INJECTION, SOLUTION INTRAVENOUS at 09:26

## 2024-05-30 ASSESSMENT — LIFESTYLE VARIABLES: TOBACCO_USE: 1

## 2024-05-30 ASSESSMENT — ACTIVITIES OF DAILY LIVING (ADL)
ADLS_ACUITY_SCORE: 31

## 2024-05-30 ASSESSMENT — COPD QUESTIONNAIRES
COPD: 1
CAT_SEVERITY: MODERATE

## 2024-05-30 ASSESSMENT — ENCOUNTER SYMPTOMS: DYSRHYTHMIAS: 1

## 2024-05-30 NOTE — ANESTHESIA PROCEDURE NOTES
Airway       Patient location during procedure: OR       Procedure Start/Stop Times: 5/30/2024 8:54 AM  Staff -        CRNA: Garfield Alejandro APRN CRNA       Performed By: CRNA  Consent for Airway        Urgency: elective  Indications and Patient Condition       Indications for airway management: emmy-procedural and airway protection       Induction type:intravenous       Mask difficulty assessment: 1 - vent by mask    Final Airway Details       Final airway type: endotracheal airway       Successful airway: ETT - single  Endotracheal Airway Details        ETT size (mm): 7.0       Successful intubation technique: video laryngoscopy       VL Blade Size: Glidescope 3       Grade View of Cords: 1       Adjucts: stylet       Position: Right       Measured from: lips       Secured at (cm): 22       Bite block used: Soft    Post intubation assessment        Placement verified by: capnometry, equal breath sounds and chest rise        Number of attempts at approach: 1       Secured with: tape       Ease of procedure: easy       Dentition: Intact    Medication(s) Administered   Medication Administration Time: 5/30/2024 8:54 AM

## 2024-05-30 NOTE — DISCHARGE INSTRUCTIONS
You received Toradol, an IV form of Ibuprofen (Motrin) at 9am.  Do not take any Ibuprofen products until 3pm.     Maximum acetaminophen (Tylenol) dose from all sources should not exceed 4 grams (4000 mg) per day. Each Norco contains 325mg of Tylenol.  You had Norco 5mg at 11:30am.             HOME CARE FOLLOWING LAPAROSCOPIC CHOLECYSTECTOMY  ARASELI Adorno, TERRY Sotelo, YOLANDE Chicas, SETH Spencer    INCISIONAL CARE:  Replace the bandage over your incisions DAILY until all drainage stops, or if more comfortable to have in place.  If present, leave the steri-strips (white paper tapes) in place for 14 days after surgery.  If Dermabond (a type of skin glue) is present, leave in place until it wears/flakes off (2-3 weeks).     BATHING:  OK to shower 48 hours after surgery.  Avoid baths for 1 week after surgery.  You may wash your hair at any time.  Gently pat your incision dry after bathing.  Do not apply lotions, creams, or ointments to incisions.    ACTIVITY:  Light Activity -- you may immediately be up and about as tolerated.  Walking is encouraged, increase as tolerated.  Driving/Light Work-- when comfortable and off narcotic pain medications.  Strenuous Work/Activity -- limit lifting to 20 pounds for 2 weeks.  Progressively increase with time.  Active Sports (running, biking, etc.) -- cautiously resume after 2 weeks.    DISCOMFORT:  Local anesthetic placed at surgery should provide relief for 4-8 hours.  Begin taking pain pills before discomfort is severe.  Take the pain medication with some food, when possible, to minimize side effects.  Intermittent use of ice packs may help during the first 1-3 weeks after surgery.  Expect gradual improvement.    Over-the-counter anti-inflammatory medications (i.e. Ibuprofen/Advil/Motrin or Naprosyn/Aleve) may be used per package instructions in addition to or while tapering off the narcotic pain medications to decrease swelling and sensitivity.  DO NOT TAKE  these Anti-inflammatory medications if your primary physician has advised against doing so, or if you have acid reflux, ulcer, or bleeding disorder, or take blood-thinner medications.  Call your primary physician or the surgery office if you have medication questions.    After laparoscopic cholecystectomy, you may have shoulder or upper back discomfort due to the gas used during surgery.  This is temporary and should resolve within 2-3 days.  Frequent short walks may help with this.  You may have decreased energy level for 1-2 weeks after surgery related to your recovery.    DIET:  Start with liquids and gradually increase diet as tolerated.  Drink plenty of fluids.  While taking pain medications, consider use of a stool softener, increase your fiber in your diet, or add a fiber supplement (like Metamucil, Citrucel) to help prevent constipation - a possible side effect of pain medications.  It is not uncommon to experience some bowel changes (loose stools or constipation) after surgery.  Your body has to adapt to you no longer having a gall bladder.  To help minimize this side effect, avoid fatty foods for 1-2 weeks after surgery.  You may then slowly increase the amount of fatty foods in your diet.      NAUSEA:  If nauseated from the anesthetic/pain meds; rest in bed, get up cautiously with assistance, and drink clear liquids (juice, tea, broth).    FOLLOW-UP AFTER SURGERY:  -Our office will contact you approximately 2-3 weeks after surgery to check on your progress and answer any questions you may have.  If you are doing well, you will not need to return for an office appointment.  If any concerns are identified over the phone, we will help you make an appointment to see a provider.    -If you have not received a phone call, have any questions or concerns, or would like to be seen, please call us at [phone].  We are located at: [address], [address]; [place] [address]    -CONTACT US IF THE  FOLLOWING DEVELOPS:   1. A fever that is above 101     2. Increased redness, warmth, drainage, bleeding, or swelling.   3. Pain that is not relieved by rest/ice and your prescription.   4.  Increasing pain after 48 hours.   5. Drainage that is thick, cloudy, yellow, green or white.   6. Any other questions or concerns.      FREQUENTLY ASKED QUESTIONS:    Q:  How should my incision look?    A:  Normally your incision will appear slightly swollen with light redness directly along the incision itself as it heals.  It may feel like a bump or ridge as the healing/scarring happens, and over time (3-4 months) this bump or ridge feeling should slowly go away.  In general, clear or pink watery drainage can be normal at first as your incision heals, but should decrease over time.    Q:  How do I know if my incision is infected?  A:  Look at your incision for signs of infection, like redness around the incision spreading to surrounding skin, or drainage of cloudy or foul-smelling drainage.  If you feel warm, check your temperature to see if you are running a fever.    **If any of these things occur, please notify the nurse at our office.  We may need you to come into the office for an incision check.      Q:  How do I take care of my incision?  A:  If you have a dressing in place - Starting the day after surgery, replace the dressing 1-2 times a day until there is no further drainage from the incision.  At that time, a dressing is no longer needed.  Try to minimize tape on the skin if irritation is occurring at the tape sites.  If you have significant irritation from tape on the skin, please call the office to discuss other method of dressing your incision.    Small pieces of tape called  steri-strips  may be present directly overlying your incision; these may be removed 10 days after surgery unless otherwise specified by your surgeon.  If these tapes start to loosen at the ends, you may trim them back until they fall off or  are removed.    A:  If you had  Dermabond  tissue glue used as a dressing (this causes your incision to look shiny with a clear covering over it) - This type of dressing wears off with time and does not require more dressings over the top unless it is draining around the glue as it wears off.  Do not apply ointments or lotions over the incisions until the glue has completely worn off.    Q:  There is a piece of tape or a sticky  lead  still on my skin.  Can I remove this?  A:  Sometimes the sticky  leads  used for monitoring during surgery or for evaluation in the emergency department are not all removed while you are in the hospital.  These sometimes have a tab or metal dot on them.  You can easily remove these on your own, like taking off a band-aid.  If there is a gel substance under the  lead , simply wipe/clean it off with a washcloth or paper towel.      Q:  What can I do to minimize constipation (very hard stools, or lack of stools)?  A:  Stay well hydrated.  Increase your dietary fiber intake or take a fiber supplement -with plenty of water.  Walk around frequently.  You may consider an over-the-counter stool-softener.  Your Pharmacist can assist you with choosing one that is stocked at your pharmacy.  Constipation is also one of the most common side effects of pain medication.  If you are using pain medication, be pro-active and try to PREVENT problems with constipation by taking the steps above BEFORE constipation becomes a problem.    Q:  What do I do if I need more pain medications?  A:  Call the office to receive refills.  Be aware that certain pain meds cannot be called into a pharmacy and actually require a paper prescription.  A change may be made in your pain med as you progress thru your recovery period or if you have side effects to certain meds.    --Pain meds are NOT refilled after 5pm on weekdays, and NOT AT ALL on the weekends, so please look ahead to prevent problems.      Q:  Why am I having  a hard time sleeping now that I am at home?  A:  Many medications you receive while you are in the hospital can impact your sleep for a number of days after your surgery/hospitalization.  Decreased level of activity and naps during the day may also make sleeping at night difficult.  Try to minimize day-time naps, and get up frequently during the day to walk around your home during your recovery time.  Sleep aides may be of some help, but are not recommended for long-term use.      Q:  I am having some back discomfort.  What should I do?  A:  This may be related to certain positioning that was required for your surgery, extended periods of time in bed, or other changes in your overall activity level.  You may try ice, heat, acetaminophen, or ibuprofen to treat this temporarily.  Note that many pain medications have acetaminophen in them and would state this on the prescription bottle.  Be sure not to exceed the maximum of 4000mg per day of acetaminophen.     **If the pain you are having does not resolve, is severe, or is a flare of back pain you have had on other occasions prior to surgery, please contact your primary physician for further recommendations or for an appointment to be examined at their office.    Q:  Why am I having headaches?  A:  Headaches can be caused by many things:  caffeine withdrawal, use of pain meds, dehydration, high blood pressure, lack of sleep, over-activity/exhaustion, flare-up of usual migraine headaches.  If you feel this is related to muscle tension (a band-like feeling around the head, or a pressure at the low-back of the head) you may try ice or heat to this area.  You may need to drink more fluids (try electrolyte drink like Gatorade), rest, or take your usual migraine medications.   **If your headaches do not resolve, worsen, are accompanied by other symptoms, or if your blood pressure is high, please call your primary physician for recommendation and/or examination.    Q:  I am  unable to urinate.  What do I do?  A:  A small percentage of people can have difficulty urinating initially after surgery.  This includes being able to urinate only a very small amount at a time and feeling discomfort or pressure in the very low abdomen.  This is called  urinary retention , and is actually an urgent situation.  Proceed to your nearest Emergency department for evaluation (not an Urgent Care Center).  Sometimes the bladder does not work correctly after certain medications you receive during surgery, or related to certain procedures.  You may need to have a catheter placed until your bladder recovers.  When planning to go to an Emergency department, it may help to call the ER to let them know you are coming in for this problem after a surgery.  This may help you get in quicker to be evaluated.  **If you have symptoms of a urinary tract infection, please contact your primary physician for the proper evaluation and treatment.          If you have other questions, please call the office Monday thru Friday between 8am and 4:30pm to discuss with the nurse or physician assistant.  #[phone]    There is a surgeon ON CALL on weekday evenings and over the weekend in case of urgent need only, and may be contacted at the same number.    If you are having an emergency, call 911 or proceed to your nearest emergency department.

## 2024-05-30 NOTE — PROGRESS NOTES
Patient sent home with  with patient home oxygen and instructed to monitor and use at home to maintain oxygen 92% or above and to continue incentive spirometer.

## 2024-05-30 NOTE — ANESTHESIA PREPROCEDURE EVALUATION
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 Diaz is GI specialist - in remission    Hypertension     Malignant neoplasm of endocervix 1988    took uterus and left the ovaries    Other chronic pain     Polycythemia 2012    resolved now      Past Surgical History:   Procedure Laterality Date    CARPAL TUNNEL RELEASE RT/LT Right 2021    and trigger finger and tendon repair    CARPAL TUNNEL RELEASE RT/LT Left 2021    and tendon repair    COLONOSCOPY  ,     repeat in     COLONOSCOPY  2023    4 polyps - repeat in 3 years    CYSTOSCOPY      Dilatation of the esophagus once due to dysphagia and stricture      Ganglion cyst removal      HEMORRHOIDECTOMY BANDING      HYSTERECTOMY  1998    LAPAROSCOPIC SALPINGO-OOPHORECTOMY Bilateral 10/23/2015    Procedure: LAPAROSCOPIC SALPINGO-OOPHORECTOMY;  Surgeon: Johnathan Steve MD;  Location: [facility] OR    subscapularous repair and biceps tendon repair  2022    Dr. Verma at [facility]    Thumb surgery Right     TONSILLECTOMY      XR WRIST SURGERY FLORI RIGHT Right 2023    surgery for DJD and bone graft done      Allergies   Allergen Reactions    Metaproterenol Sulfate Other (See Comments)     alupent inhaler-shaking    Percocet [Oxycodone-Acetaminophen] Itching     Has had it since the original episode and has not had any itching       Social History     Tobacco Use    Smoking status: Former     Current packs/day: 0.00     Average packs/day: 0.5 packs/day for 52.1 years (26.0 ttl pk-yrs)      Types: Cigarettes     Start date: 10/13/1967     Quit date: 2019     Years since quittin.5     Passive exposure: Never    Smokeless tobacco: Never    Tobacco comments:     quit 2019   Substance Use Topics    Alcohol use: No     Comment: sober since 99      Wt Readings from Last 1 Encounters:   24 69.4 kg (153 lb)        Anesthesia Evaluation   Pt has had prior anesthetic. Type: General.    No history of anesthetic complications       ROS/MED HX  ENT/Pulmonary:     (+)     HAILEY risk factors, snores loudly, hypertension,         tobacco use, Current use,  26  Pack-Year Hx,   Moderate Persistent, asthma  Treatment: Inhaler prn, Inhaler daily and Inhaled steroids,  moderate,  COPD,              Neurologic:  - neg neurologic ROS     Cardiovascular: Comment: Hx of SVT    (+) Dyslipidemia hypertension- -   -  - -                        dysrhythmias, Other,             METS/Exercise Tolerance:     Hematologic:  - neg hematologic  ROS     Musculoskeletal:       GI/Hepatic:     (+)          cholecystitis/cholelithiasis,          Renal/Genitourinary:  - neg Renal ROS     Endo: Comment: Class 1 obesity    (+)               Obesity,       Psychiatric/Substance Use:     (+)    H/O chronic opiod use .     Infectious Disease:  - neg infectious disease ROS     Malignancy:  - neg malignancy ROS     Other:  - neg other ROS    (+)  , H/O Chronic Pain,         Physical Exam    Airway        Mallampati: II   TM distance: > 3 FB   Neck ROM: full   Mouth opening: > 3 cm    Respiratory Devices and Support         Dental       (+) Multiple crowns, permanant bridges      Cardiovascular   cardiovascular exam normal       Rhythm and rate: regular and normal     Pulmonary   pulmonary exam normal        breath sounds clear to auscultation       Other findings: Lab Test        05/28/24     04/12/24     03/27/24     03/06/23     10/12/22                       1605          1439          1537          1107          1018           WBC          7.3          7.8          8.4            < >        7.5           HGB          14.5         16.0*        15.8*          < >        15.4          MCV          97           94           95             < >        97            PLT          295          252          261            < >        289           INR           --           --           --           --          0.99           < > = values in this interval not displayed.                  Lab Test        05/28/24     05/15/24     05/01/24     04/12/24                       1605          1102          0845          1439          NA           140          143           --          148*          POTASSIUM    3.6          3.9           --          3.8           CHLORIDE     104          105           --          104           CO2          24 25            --          28            BUN          13.5         12.4          --          14            CR           0.65         0.69          --          0.60          ANIONGAP     12           13            --          16*           ALAN          9.3          9.7           --          9.9           GLC          99           113*         101*         116*                 EKG Interpretation:   Sinus Rhythm   Low voltage in precordial leads.    ABNORMAL      OUTSIDE LABS:  CBC:   Lab Results   Component Value Date    WBC 7.3 05/28/2024    WBC 7.8 04/12/2024    HGB 14.5 05/28/2024    HGB 16.0 (H) 04/12/2024    HCT 44.0 05/28/2024    HCT 47.8 (H) 04/12/2024     05/28/2024     04/12/2024     BMP:   Lab Results   Component Value Date     05/28/2024     05/15/2024    POTASSIUM 3.6 05/28/2024    POTASSIUM 3.9 05/15/2024    CHLORIDE 104 05/28/2024    CHLORIDE 105 05/15/2024    CO2 24 05/28/2024    CO2 25 05/15/2024    BUN 13.5 05/28/2024    BUN 12.4 05/15/2024    CR 0.65 05/28/2024    CR 0.69 05/15/2024    GLC 99 05/28/2024     (H) 05/15/2024     COAGS:   Lab Results   Component  "Value Date    PTT 30 10/12/2022    INR 0.99 10/12/2022     POC: No results found for: \"BGM\", \"HCG\", \"HCGS\"  HEPATIC:   Lab Results   Component Value Date    ALBUMIN 4.2 05/28/2024    PROTTOTAL 6.7 05/28/2024    ALT 23 05/28/2024    AST 21 05/28/2024    ALKPHOS 102 05/28/2024    BILITOTAL 0.2 05/28/2024     OTHER:   Lab Results   Component Value Date    LACT 0.6 02/09/2024    A1C 6.1 (H) 05/15/2024    ALAN 9.3 05/28/2024    MAG 1.9 02/13/2024    LIPASE 105 04/12/2014    TSH 1.06 11/21/2023    T4 1.03 05/28/2020    SED 8 04/12/2024       Anesthesia Plan    ASA Status:  3    NPO Status:  NPO Appropriate    Anesthesia Type: General.     - Airway: ETT   Induction: Intravenous.   Maintenance: Balanced.   Techniques and Equipment:     - Airway: Video-Laryngoscope (pt with permanent upper bridge. Use video to reduce strain on appliance)       Consents    Anesthesia Plan(s) and associated risks, benefits, and realistic alternatives discussed. Questions answered and patient/representative(s) expressed understanding.     - Discussed: Risks, Benefits and Alternatives for BOTH SEDATION and the PROCEDURE were discussed     - Discussed with:  Patient      - Extended Intubation/Ventilatory Support Discussed: No.      - Patient is DNR/DNI Status: No     Use of blood products discussed: No .     Postoperative Care    Pain management: IV analgesics, Oral pain medications, Multi-modal analgesia.   PONV prophylaxis: Ondansetron (or other 5HT-3), Dexamethasone or Solumedrol, Background Propofol Infusion     Comments:               Han Urbano MD    I have reviewed the pertinent notes and labs in the chart from the past 30 days and (re)examined the patient.  Any updates or changes from those notes are reflected in this note.              # Obesity: Estimated body mass index is 30.9 kg/m  as calculated from the following:    Height as of 5/15/24: 1.499 m (4' 11\").    Weight as of 5/28/24: 69.4 kg (153 lb).      "

## 2024-05-30 NOTE — PROGRESS NOTES
Patient has O2 at home as needed. Per Dr. Bullock ok to send patient home if requiring O2 as long as patient has somebody at home with her.

## 2024-05-30 NOTE — OP NOTE
[facility] General Surgery Operative Note    Pre-operative diagnosis: Gallbladder polyp, left shoulder mass.   Post-operative diagnosis: same   Procedure: laparoscopic cholecystectomy  Excision left shoulder mass (4cm)   Surgeon: Yao Baker MD   Assistant(s): Lorraine Hare  The Physician Assistant was medically necessary for their expertise in prepping, camera management, suctioning, suturing and retraction.   Anesthesia: general   Estimated blood loss:  Specimen: 10 cc  gallbladder and contents               DESCRIPTION OF PROCEDURE:  The patient was taken to the operating room and placed on the table in supine position.  General endotracheal anesthesia was induced and the abdomen was prepped and draped in standard sterile fashion.  An incision above the umbilicus was made with a blade.  The incision was carried down to the fascia.  The fascia was incised in the midline with a blade.  The peritoneum was entered bluntly with a Carmalt clamp.  Two interrupted 0 Vicryl sutures were placed at the extremes of this fascial incision.  The Sarai trocar was introduced and the abdomen was insufflated with CO2.  A 5 mm trocar was placed in the subxiphoid position.  A 5 mm trocar was placed in the right upper quadrant, just below the costal margin at the midclavicular line.  Another was placed at the anterior axillary line just below the costal margin on the right.  The patient was placed in reverse Trendelenburg and right side up.  The gallbladder was encased in omental adhesions as well as adhered to the duodenum. It was otherwise grossly normal but did have a palpable fleshy mass within it.  The fundus of the gallbladder was grasped and retracted cephalad.  The infundibulum was grasped and retracted laterally.  The peritoneum over the medial and lateral aspects of the triangle of Calot was taken down with the [place] dissector and modest amounts of Bovie electrocautery.  The cystic duct and artery  were freed up from surrounding tissues.  The triangle of Calot was skeletonized revealing the critical view of safety.  This revealed an additional posterior branch which was clipped separately.  The cystic artery and duct were each clipped twice proximally, once distally and transected with the hook scissors.  The gallbladder was then removed from the liver using the hook electrocautery.  The gallbladder was passed into an Endocatch bag and removed through the umbilical trocar site.  We observed the right upper quadrant carefully for hemostasis.  Hemostasis was assured.  We irrigated with copious amounts of sterile saline and aspirated the effluent.  The right upper quadrant trocar sites were anesthetized with local anesthetic.  Each of the trocars was removed under direct visualization.  There was no bleeding from any of these sites.  The Sarai trocar was removed and the abdomen was evacuated of CO2. An additional interrupted 0 Vicryl suture was placed in the umbilical trocar site fascia.  Each of the three 0 Vicryl sutures was cinched down and tied.  The skin of the umbilical incision was anesthetized with local anesthetic.  All of the incisions were closed with interrupted 4-0 Vicryl subcuticular sutures and Dermabond.    We then prepped the left shoulder just posterior to the clavicle and made a 2.5cm transverse incision over the palpable mass. We used cautery to dissect out a 4cm fatty tumor. We then irrigated and cauterized the wound. This site was closed with 4-0 Vicryl and Dermabond.  The patient tolerated the procedure well.  Sponge and instrument counts were correct.    Yao Baker MD

## 2024-05-30 NOTE — ANESTHESIA POSTPROCEDURE EVALUATION
Patient: Emeli Granados    Procedure: Procedure(s):  CHOLECYSTECTOMY, LAPAROSCOPIC  EXCISION LEFT SHOULDER MASS       Anesthesia Type:  General    Note:     Postop Pain Control: Uneventful            Sign Out: Well controlled pain   PONV: No   Neuro/Psych: Uneventful            Sign Out: Acceptable/Baseline neuro status   Airway/Respiratory:             Sign Out: Acceptable/Baseline resp. status   CV/Hemodynamics:             Sign Out: Acceptable CV status   Other NRE:    DID A NON-ROUTINE EVENT OCCUR?            Last vitals:  Vitals Value Taken Time   /63 05/30/24 1145   Temp 98  F (36.7  C) 05/30/24 1130   Pulse 68 05/30/24 1146   Resp 15 05/30/24 1146   SpO2 93 % 05/30/24 1145   Vitals shown include unfiled device data.    Electronically Signed By: Carlos Quinteros DO  May 30, 2024  6:17 PM

## 2024-05-30 NOTE — ANESTHESIA CARE TRANSFER NOTE
Patient: Emeli Granados    Procedure: Procedure(s):  CHOLECYSTECTOMY, LAPAROSCOPIC  EXCISION LEFT SHOULDER MASS       Diagnosis: Gallbladder polyp [K82.4]  Shoulder mass [R22.30]  Diagnosis Additional Information: No value filed.    Anesthesia Type:   General     Note:    Oropharynx: spontaneously breathing  Level of Consciousness: awake  Oxygen Supplementation: face mask    Independent Airway: airway patency satisfactory and stable  Dentition: dentition unchanged  Vital Signs Stable: post-procedure vital signs reviewed and stable  Report to RN Given: handoff report given  Patient transferred to: PACU  Comments: To PACU, report to RN, oxygen per face mask.        Vitals:  Vitals Value Taken Time   /58 05/30/24 1034   Temp     Pulse 86 05/30/24 1040   Resp 19 05/30/24 1040   SpO2 99 % 05/30/24 1040   Vitals shown include unfiled device data.    Electronically Signed By: CORTNEY Steele CRNA  May 30, 2024  10:41 AM

## 2024-05-31 LAB
PATH REPORT.COMMENTS IMP SPEC: NORMAL
PATH REPORT.COMMENTS IMP SPEC: NORMAL
PATH REPORT.FINAL DX SPEC: NORMAL
PATH REPORT.GROSS SPEC: NORMAL
PATH REPORT.MICROSCOPIC SPEC OTHER STN: NORMAL
PATH REPORT.RELEVANT HX SPEC: NORMAL
PHOTO IMAGE: NORMAL

## 2024-06-03 DIAGNOSIS — K82.4 GALLBLADDER POLYP: ICD-10-CM

## 2024-06-03 DIAGNOSIS — M54.40 ACUTE BILATERAL LOW BACK PAIN WITH SCIATICA, SCIATICA LATERALITY UNSPECIFIED: ICD-10-CM

## 2024-06-03 DIAGNOSIS — F11.20 CONTINUOUS OPIOID DEPENDENCE (H): ICD-10-CM

## 2024-06-03 DIAGNOSIS — G89.29 OTHER CHRONIC PAIN: ICD-10-CM

## 2024-06-03 RX ORDER — HYDROCODONE BITARTRATE AND ACETAMINOPHEN 5; 325 MG/1; MG/1
TABLET ORAL
Qty: 10 TABLET | Refills: 0 | OUTPATIENT
Start: 2024-06-03

## 2024-06-03 RX ORDER — HYDROCODONE BITARTRATE AND ACETAMINOPHEN 5; 325 MG/1; MG/1
1-2 TABLET ORAL EVERY 4 HOURS PRN
Qty: 10 TABLET | Refills: 0 | Status: SHIPPED | OUTPATIENT
Start: 2024-06-03 | End: 2024-06-06

## 2024-06-03 RX ORDER — HYDROCODONE BITARTRATE AND ACETAMINOPHEN 10; 325 MG/1; MG/1
1 TABLET ORAL DAILY PRN
Qty: 30 TABLET | Refills: 0 | OUTPATIENT
Start: 2024-06-03

## 2024-06-03 NOTE — TELEPHONE ENCOUNTER
Surgery Type/Date:  S/p laparoscopic cholecystectomy  Excision left shoulder mass (4cm), 5/30/24   Surgeon:  Dr. Rogers  Patient medication request:  Norco 5/325  Refill request: first  Type/Amount of pain medication in current use:  Norco 5/325, 1 tab every 4-6 hours  Patient Symptoms:  incisional / surgery site pain.       She is having BM's,  using colace and correctol prn.    Discussed she should taper Norco with this refill and return to usual management regimen.    She understands one refill allowed over the phone.  If further need for narcotic pain medication for surgery pain she is to be seen in the clinic.       Patient verbalizes understanding and asks that refill be sent to [facility] in [place].

## 2024-06-03 NOTE — TELEPHONE ENCOUNTER
Discarded refill encounter to [facility].  Patient requests refill be sent to [facility] in [place] instead.      See new refill encounter.

## 2024-06-05 ENCOUNTER — TELEPHONE (OUTPATIENT)
Dept: SURGERY | Facility: CLINIC | Age: 69
End: 2024-06-05

## 2024-06-05 ENCOUNTER — OFFICE VISIT (OUTPATIENT)
Dept: FAMILY MEDICINE | Facility: CLINIC | Age: 69
End: 2024-06-05
Payer: COMMERCIAL

## 2024-06-05 VITALS
SYSTOLIC BLOOD PRESSURE: 105 MMHG | BODY MASS INDEX: 30.48 KG/M2 | RESPIRATION RATE: 14 BRPM | WEIGHT: 151.2 LBS | HEART RATE: 97 BPM | DIASTOLIC BLOOD PRESSURE: 74 MMHG | OXYGEN SATURATION: 95 % | TEMPERATURE: 98.5 F | HEIGHT: 59 IN

## 2024-06-05 DIAGNOSIS — F11.20 CONTINUOUS OPIOID DEPENDENCE (H): ICD-10-CM

## 2024-06-05 DIAGNOSIS — G89.29 CHRONIC BILATERAL LOW BACK PAIN, UNSPECIFIED WHETHER SCIATICA PRESENT: Primary | ICD-10-CM

## 2024-06-05 DIAGNOSIS — D17.30 LIPOMA OF SKIN AND SUBCUTANEOUS TISSUE: ICD-10-CM

## 2024-06-05 DIAGNOSIS — M79.89 OTHER SPECIFIED SOFT TISSUE DISORDERS: ICD-10-CM

## 2024-06-05 DIAGNOSIS — Z90.49 STATUS POST CHOLECYSTECTOMY: ICD-10-CM

## 2024-06-05 DIAGNOSIS — M54.50 CHRONIC BILATERAL LOW BACK PAIN, UNSPECIFIED WHETHER SCIATICA PRESENT: Primary | ICD-10-CM

## 2024-06-05 DIAGNOSIS — G89.29 OTHER CHRONIC PAIN: ICD-10-CM

## 2024-06-05 DIAGNOSIS — R20.9 SENSATION OF COLD IN LEG: ICD-10-CM

## 2024-06-05 PROCEDURE — 99214 OFFICE O/P EST MOD 30 MIN: CPT | Mod: 24 | Performed by: PHYSICIAN ASSISTANT

## 2024-06-05 RX ORDER — HYDROCODONE BITARTRATE AND ACETAMINOPHEN 10; 325 MG/1; MG/1
1 TABLET ORAL DAILY PRN
Qty: 30 TABLET | Refills: 0 | Status: SHIPPED | OUTPATIENT
Start: 2024-06-05 | End: 2024-07-01

## 2024-06-05 ASSESSMENT — PAIN SCALES - GENERAL: PAINLEVEL: EXTREME PAIN (8)

## 2024-06-05 NOTE — PROGRESS NOTES
Assessment & Plan     Chronic bilateral low back pain, unspecified whether sciatica present  Patient stable on current Norco dose. Refill provided today.  reviewed. She has had a couple fills post surgery of Norco 5/325.  - HYDROcodone-acetaminophen (NORCO)  MG per tablet; Take 1 tablet by mouth daily as needed for severe pain    F11.2 - Continuous opioid dependence (H)  As noted above.  - HYDROcodone-acetaminophen (NORCO)  MG per tablet; Take 1 tablet by mouth daily as needed for severe pain    Other chronic pain  As noted above.  - HYDROcodone-acetaminophen (NORCO)  MG per tablet; Take 1 tablet by mouth daily as needed for severe pain    Status post cholecystectomy  Recent cholecystectomy. Can try ice over     Lipoma of skin and subcutaneous tissue  Recent surgery. Having more pain than seems typical. No obvious signs of infection. Follow up with surgery team.    Sensation of cold in leg  Patient's left PT pulse slightly diminished but present. Start with ultrasound of the left leg arteries.  - US Lower Extremity Arterial Duplex Left; Future    Other specified soft tissue disorders  As noted above.  - US Lower Extremity Arterial Duplex Left; Future    Review of external notes as documented elsewhere in note  Ordering of each unique test  Prescription drug management  38 minutes spent by me on the date of the encounter doing chart review, history and exam, documentation and further activities per the note    Subjective   Emeli is a 69 year old, presenting for the following health issues:  Pain (C/o numbness in left upper and lower extremities post surgery with intermittent cold sx)      6/5/2024    11:02 AM   Additional Questions   Roomed by Lillian   Accompanied by Self         6/5/2024    11:02 AM   Patient Reported Additional Medications   Patient reports taking the following new medications Norco 5-325 started after surgery.5/30/24     Pain    History of Present Illness       Reason for  "visit:  I had surgery foe them to look at it    She eats 2-3 servings of fruits and vegetables daily.She consumes 3 sweetened beverage(s) daily.She exercises with enough effort to increase her heart rate 10 to 19 minutes per day.  She exercises with enough effort to increase her heart rate 3 or less days per week.   She is taking medications regularly.     Patient reports pain in the left upper shoulder (where she had a large lipoma removed last week). She is having decreased sensation in the left upper arm since surgery as well.    Patient reports white discoloration of the left leg and requires her to rub the leg to improve \"circulation\". It also feels cold to her at times. She denies any leg pain with movement/walking.        Objective    /74 (BP Location: Left arm, Patient Position: Sitting, Cuff Size: Adult Regular)   Pulse 97   Temp 98.5  F (36.9  C) (Oral)   Resp 14   Ht 1.499 m (4' 11\")   Wt 68.6 kg (151 lb 3.2 oz)   LMP  (LMP Unknown)   SpO2 95%   BMI 30.54 kg/m    Body mass index is 30.54 kg/m .  Physical Exam   GENERAL: No acute distress  HEENT: Normocephalic  VASCULAR: 1+ PT pulse left.  SKIN: Left upper shoulder with steri-strips in place without erythema, swelling present and tender to touch. Abdominal incisions with ecchymosis but otherwise clean and intact. Left leg without discoloration and warm to touch.  NEURO: Alert and non-focal        Signed Electronically by: Gina Arias PA-C    "

## 2024-06-05 NOTE — TELEPHONE ENCOUNTER
S/p laparoscopic cholecystectomy  Excision left shoulder mass (4cm)  Procedure date: 5/30/24  Surgeon: Dr. Rogers    Patient reports swelling and numbness at left shoulder.  She was seen by her PCP today and instructed to call our office re: CONCEPCION symptoms.      Emeli tells me that the numbness has been present since surgery.  Numbness is located at the top of the left shoulder and into L axilla.  It does not travel down her arm. Some pain radiates into her lower left neck.    She tells me the swelling is approximately 1 1/2 inches in diameter, soft and squishy.      Emeli tells me that PCP did not feel that the area was infected but because of numbness she should call our office.      -discussed with patient that swelling likely represents a seroma.   -possible nerve irritation.      Scheduled patient with clinic PA tomorrow at 11:00 for a post-op visit.     Clinic location given.  Patient is in agreement with this plan.

## 2024-06-05 NOTE — PATIENT INSTRUCTIONS
Call surgery team about the numbness and pain in the left upper shoulder.     Call radiology team to schedule ultrasound

## 2024-06-05 NOTE — TELEPHONE ENCOUNTER
Name of caller: Patient    Reason for Call:  Swelling and numbness down her arm and wants to see RMO, offered her appt tomorrow with PA, she declined. She was seen by her primary Dr and they told her to call the clinic.     Surgeon:  Dr. Rogers    Recent Surgery:  Yes.    If yes, when & what type:  laparoscopic cholecystectomy  Excision left shoulder mass (4cm) 5/30/24      Best phone number to reach pt at is: [phone]   Ok to leave a message with medical info? Yes.    Pharmacy preferred (if calling for a refill): N/A

## 2024-06-06 ENCOUNTER — OFFICE VISIT (OUTPATIENT)
Dept: SURGERY | Facility: CLINIC | Age: 69
End: 2024-06-06
Payer: COMMERCIAL

## 2024-06-06 VITALS
OXYGEN SATURATION: 94 % | WEIGHT: 151 LBS | SYSTOLIC BLOOD PRESSURE: 116 MMHG | HEART RATE: 94 BPM | DIASTOLIC BLOOD PRESSURE: 70 MMHG | HEIGHT: 59 IN | BODY MASS INDEX: 30.44 KG/M2 | RESPIRATION RATE: 16 BRPM

## 2024-06-06 DIAGNOSIS — L76.33 POSTOPERATIVE SEROMA OF SUBCUTANEOUS TISSUE AFTER DERMATOLOGIC PROCEDURE: Primary | ICD-10-CM

## 2024-06-06 PROCEDURE — 99024 POSTOP FOLLOW-UP VISIT: CPT

## 2024-06-06 NOTE — PROGRESS NOTES
[facility] Note     Subjective:  Emeli Granados is here for her first postoperative visit. She underwent a left shoulder mass and a laparoscopic cholecystectomy by Dr. Rogers on 5/30/24. Today she tells me she has been doing ok. However she has had pain in her left neck/shoulder and also numbness in her left shoulder and armpit. She currently does not require narcotic pain medications, she is eating a normal diet and her bowels are regular. She tells me the pain is worse when you push on the neck incision. She says she doesn't feel much at the skin.    Objective:  Left neck Inc - c/d/I with steristrips, no erythema, +healing ridge, +mass/seroma, +tenderness to palpation. I prepped near the incision and aspirated 12cc of serosanguinous fluid from the surgical area. (It is of note she immediately felt some relief of pain in that area).    Assessment:  Seroma of left neck incision  Left shoulder numbness/paraesthesia  S/p laparoscopic cholecystectomy    Plan:  Discussed paraesthesia and she will give it more time to improve.   She can return to clinic in the next week or so if the seroma comes back, causes pain and would like further drainage. We did discuss that the fluid will typically absorb on its own.  Call/RTC PRN      Jean-Pierre Williamson PA-C  6/6/2024      Please route or send letter to:  *None*

## 2024-06-06 NOTE — TELEPHONE ENCOUNTER
Retail Pharmacy Prior Authorization Team   Phone: [phone]    PA Initiation    Medication: DILTIAZEM HCL ER COATED BEADS 120 MG PO CP24  Insurance Company:    Pharmacy Filling the Rx: [id number] - [place], [place] - [address]  Filling Pharmacy Phone: [phone]  Filling Pharmacy Fax:    Start Date: 6/6/2024    Started PA on CMM and a response of [facility] Rx Prior Authorization Department is unable to review this request at this time. Our records indicate that this member is not eligible for prior authorizations or that the review of prior authorizations is not delegated to the [employer] Prior Authorization Department. Please review the member's insurance demographics and submit to the appropriate party. Thank you in advance.    Called pharmacy, this is the patient's temporary work comp insurance.  The pharmacy changed and ran under patient's pharmacy benefits and received a paid claim and the patient has picked up already.

## 2024-06-11 ENCOUNTER — TELEPHONE (OUTPATIENT)
Dept: SURGERY | Facility: CLINIC | Age: 69
End: 2024-06-11
Payer: COMMERCIAL

## 2024-06-11 DIAGNOSIS — L76.33 POSTOPERATIVE SEROMA OF SUBCUTANEOUS TISSUE AFTER DERMATOLOGIC PROCEDURE: Primary | ICD-10-CM

## 2024-06-11 NOTE — TELEPHONE ENCOUNTER
S/p laparoscopic cholecystectomy  Excision left shoulder mass (4cm)  Procedure date: 5/30/24  Surgeon: Dr. Ann    Returned call to patient.  She is reporting pain at the L shoulder, radiating up to her lower left neck as well as down her upper back.   She tells me the swelling/pain in the area has come back after being drained on 6/6.  She believed it is the same size as before it was drained.  There is not increasing redness in the area.    Her current pain control regimen consists of Norco  mg tabs (prescribed by PCP), and tylenol 500 mg BID.   She wonders what would happen if the seroma became infected.  Writer informed her that Dr. Ann would need to assess the area first - but if suspicion for infection then will treat appropriately with intervention/antibiotics.  Informed the patient it is reassuring that the redness is not increasing in the area.    - Recommended she try to rotate in some ibuprofen as well. She has 600mg tabs at home  - Recommended she try rotating between ice/heat  - Discussed with patient that swelling likely represents there is serous fluid collecting in the area again   - She was assisted in scheduled a PO appointment with Dr. Ann on 6/13 at 2:45 pm  - She will reach out again in the mean time if she has further questions/concerns    She expressed understanding of above plan.    Jessica HERNANDES RN

## 2024-06-11 NOTE — TELEPHONE ENCOUNTER
Name of caller: Patient    Reason for Call: Calling with pain at L shoulder, from breast all the way to the back.     Surgeon:  Dr. Rogers    Recent Surgery:  Yes.    If yes, when & what type:  laparoscopic cholecystectomy  Excision left shoulder mass (4cm) 5/30       Best phone number to reach pt at is: [phone]  Ok to leave a message with medical info? Yes.    Pharmacy preferred (if calling for a refill): [facility]

## 2024-06-12 ENCOUNTER — TELEPHONE (OUTPATIENT)
Dept: FAMILY MEDICINE | Facility: CLINIC | Age: 69
End: 2024-06-12
Payer: COMMERCIAL

## 2024-06-12 NOTE — TELEPHONE ENCOUNTER
Patient calling and states omeprazole was changed to twice a day but RX is for one a day.  States [facility] changed her dose.  Gave [facility] # to call to clarify and send updated RX if to be on twice a day.  Elisabeth Freeman RN    Referred To     Emma Byrne MD   [id number] [facility] [address]   Phone: [phone]   Fax: [phone]    Diagnoses: Special screening for malignant neoplasms, colon   Order: Colonoscopy Screening  Referral    [facility] [address] [address]   Phone: [phone]

## 2024-06-13 ENCOUNTER — LAB (OUTPATIENT)
Dept: LAB | Facility: CLINIC | Age: 69
End: 2024-06-13
Payer: COMMERCIAL

## 2024-06-13 ENCOUNTER — OFFICE VISIT (OUTPATIENT)
Dept: SURGERY | Facility: CLINIC | Age: 69
End: 2024-06-13
Payer: COMMERCIAL

## 2024-06-13 VITALS
OXYGEN SATURATION: 95 % | HEART RATE: 77 BPM | BODY MASS INDEX: 30.44 KG/M2 | WEIGHT: 151 LBS | SYSTOLIC BLOOD PRESSURE: 108 MMHG | RESPIRATION RATE: 16 BRPM | DIASTOLIC BLOOD PRESSURE: 70 MMHG | HEIGHT: 59 IN

## 2024-06-13 DIAGNOSIS — L76.33 POSTOPERATIVE SEROMA OF SUBCUTANEOUS TISSUE AFTER DERMATOLOGIC PROCEDURE: ICD-10-CM

## 2024-06-13 DIAGNOSIS — L76.33 POSTOPERATIVE SEROMA OF SUBCUTANEOUS TISSUE AFTER DERMATOLOGIC PROCEDURE: Primary | ICD-10-CM

## 2024-06-13 PROCEDURE — 99024 POSTOP FOLLOW-UP VISIT: CPT | Performed by: SURGERY

## 2024-06-13 PROCEDURE — 87075 CULTR BACTERIA EXCEPT BLOOD: CPT

## 2024-06-13 PROCEDURE — 87205 SMEAR GRAM STAIN: CPT

## 2024-06-13 NOTE — LETTER
June 17, 2024          No referring provider defined for this encounter.      RE:   Emeli Granados 1955      Dear Colleague,    Thank you for referring your patient, Emeli Granados, to [facility]. Please see a copy of my visit note below.    Ms Granados returns to the office to have a post op seroma evaluated after the excision of a lipoma on the left shoulder. She was seen last week for the same concern and had 12mL of serous fluid aspirated. She notes sensitivity and pain radiating to the left shoulder, breast and base of her neck. There is no overlying erythema and the seroma while protruding the scar upwards is not tense or fluctuant. After offering aspiration, I drew back 8mL of cloudy fluid which was not overtly purulent and sent this for culture. She felt slightly better. Pain medications were refilled per her request. She may see us PRN.    Again, thank you for allowing me to participate in the care of your patient.      Sincerely,      MD CARLOS Mccarty/stanford       D: 6/17/2024  T: 8:27 am

## 2024-06-14 RX ORDER — HYDROCODONE BITARTRATE AND ACETAMINOPHEN 5; 325 MG/1; MG/1
1 TABLET ORAL EVERY 6 HOURS PRN
Qty: 10 TABLET | Refills: 0 | Status: SHIPPED | OUTPATIENT
Start: 2024-06-14 | End: 2024-06-28

## 2024-06-14 NOTE — TELEPHONE ENCOUNTER
Patient was last seen in clinic by Dr. Rogers on 6/13/24.      She underwent aspiration of left shoulder seroma.   Seroma fluid was sent for culture ; results are pending.      Emeli is calling today for an additional refill of pain medication. First refill of Norco done on 6/3/24/     C/o pain at the top of her left shoulder that radiates into her axilla and the base of her neck.      Discussed with Dr. Rogers; ok for one additional refill of [place] 5/325.      Patient should taper the use of norco with this refill and go back to her usual pain management routine as she tolerates.     tightness

## 2024-06-14 NOTE — TELEPHONE ENCOUNTER
Emeli is calling today for a refill on pain meds, oxycodone 10 325. You can reach her at [phone]

## 2024-06-17 ENCOUNTER — NURSE TRIAGE (OUTPATIENT)
Dept: FAMILY MEDICINE | Facility: CLINIC | Age: 69
End: 2024-06-17
Payer: COMMERCIAL

## 2024-06-17 NOTE — TELEPHONE ENCOUNTER
"S: Possible infection per patient report    B: per Dr. Baker, patient had \"... 8mL of cloudy fluid which was not overtly purulent and sent this for culture.\" drained from left shoulder on 6/13/24. This is the second time patient had this procedure after lipoma removal on 5/30/24.    A: Numbness since the surgery, \"on top of my breast, my armpit and shoulder and the top of my back\".  T 100.0-100.2F.  Feels like the fluid is building up again.    R: patient wants blood test to look for infection.  Does not want to go back to surgeon's office.     to assist with scheduling appointment.             "

## 2024-06-17 NOTE — TELEPHONE ENCOUNTER
"  Reason for Call:  Appointment Request    Patient requesting this type of appt:  had surgery 5/30 has been back 2x to get fluid drained. Thinks she may have an infection.  Who performed the surgery thinks he may have cut a nerve but isn't sure. Would like blood work to see if there's infection. She has little fevers at night, go away during the day    Requested provider: Emma Byrne or Gina Arias    Reason patient unable to be scheduled: Not within requested timeframe    When does patient want to be seen/preferred time: 1-2 days    Comments: She is in a lot of pain and shoulder is developing a fluid sac. Has no feeling in neck , back, shoulder, breast. Where she does have feeling it feels like someone has taken sandpaper and rubbed it on her skin. \"It feels horrible, I'm in pain\"    Could we send this information to you in [facility] or would you prefer to receive a phone call?:   No preference   Okay to leave a detailed message?: Yes at Cell number on file:    Telephone Information:   Mobile [phone]       Call taken on 6/17/2024 at 3:42 PM by Shila Chow  "

## 2024-06-17 NOTE — PROGRESS NOTES
Ms Granados returns to the office to have a post op seroma evaluated after the excision of a lipoma on the left shoulder. She was seen last week for the same concern and had 12mL of serous fluid aspirated. She notes sensitivity and pain radiating to the left shoulder, breast and base of her neck. There is no overlying erythema and the seroma while protruding the scar upwards is not tense or fluctuant. After offering aspiration, I drew back 8mL of cloudy fluid which was not overtly purulent and sent this for culture. She felt slightly better. Pain medications were refilled per her request. She may see us PRN.

## 2024-06-18 ENCOUNTER — OFFICE VISIT (OUTPATIENT)
Dept: FAMILY MEDICINE | Facility: CLINIC | Age: 69
End: 2024-06-18
Payer: COMMERCIAL

## 2024-06-18 VITALS
DIASTOLIC BLOOD PRESSURE: 65 MMHG | HEIGHT: 59 IN | HEART RATE: 82 BPM | RESPIRATION RATE: 10 BRPM | SYSTOLIC BLOOD PRESSURE: 112 MMHG | TEMPERATURE: 98.2 F | BODY MASS INDEX: 30.32 KG/M2 | WEIGHT: 150.4 LBS | OXYGEN SATURATION: 96 %

## 2024-06-18 DIAGNOSIS — R10.13 EPIGASTRIC PAIN: ICD-10-CM

## 2024-06-18 DIAGNOSIS — T79.2XXA POST-TRAUMATIC SEROMA (H): Primary | ICD-10-CM

## 2024-06-18 LAB
BACTERIA SNV CULT: NO GROWTH
BASOPHILS # BLD AUTO: 0 10E3/UL (ref 0–0.2)
BASOPHILS NFR BLD AUTO: 0 %
EOSINOPHIL # BLD AUTO: 0 10E3/UL (ref 0–0.7)
EOSINOPHIL NFR BLD AUTO: 0 %
ERYTHROCYTE [DISTWIDTH] IN BLOOD BY AUTOMATED COUNT: 12.9 % (ref 10–15)
GRAM STAIN RESULT: NORMAL
GRAM STAIN RESULT: NORMAL
HCT VFR BLD AUTO: 44.9 % (ref 35–47)
HGB BLD-MCNC: 15.1 G/DL (ref 11.7–15.7)
IMM GRANULOCYTES # BLD: 0.1 10E3/UL
IMM GRANULOCYTES NFR BLD: 1 %
LYMPHOCYTES # BLD AUTO: 1.9 10E3/UL (ref 0.8–5.3)
LYMPHOCYTES NFR BLD AUTO: 17 %
MCH RBC QN AUTO: 31.7 PG (ref 26.5–33)
MCHC RBC AUTO-ENTMCNC: 33.6 G/DL (ref 31.5–36.5)
MCV RBC AUTO: 94 FL (ref 78–100)
MONOCYTES # BLD AUTO: 1.1 10E3/UL (ref 0–1.3)
MONOCYTES NFR BLD AUTO: 10 %
NEUTROPHILS # BLD AUTO: 8.1 10E3/UL (ref 1.6–8.3)
NEUTROPHILS NFR BLD AUTO: 72 %
PLATELET # BLD AUTO: 401 10E3/UL (ref 150–450)
RBC # BLD AUTO: 4.76 10E6/UL (ref 3.8–5.2)
WBC # BLD AUTO: 11.3 10E3/UL (ref 4–11)

## 2024-06-18 PROCEDURE — 85025 COMPLETE CBC W/AUTO DIFF WBC: CPT | Performed by: PHYSICIAN ASSISTANT

## 2024-06-18 PROCEDURE — 36415 COLL VENOUS BLD VENIPUNCTURE: CPT | Performed by: PHYSICIAN ASSISTANT

## 2024-06-18 PROCEDURE — 99214 OFFICE O/P EST MOD 30 MIN: CPT | Performed by: PHYSICIAN ASSISTANT

## 2024-06-18 NOTE — PROGRESS NOTES
"  Assessment & Plan     Post-traumatic seroma (H24)  Checking CBC with differential today per patient request. I did review the culture of the serous fluid removed and it has yet to grow any bacteria.   Patient looking at second opinion following surgery due to paresthesias and now the seroma formation. She will let me know where to sent the referral.  - CBC with platelets and differential; Future  - CBC with platelets and differential    Epigastric pain  Has been on twice daily previously but switched to once daily to see if she would tolerate. Patient reports she needs twice daily as symptoms are not controlled without it.  - omeprazole (PRILOSEC) 20 MG DR capsule; Take 1 capsule (20 mg) by mouth 2 times daily    Review of external notes as documented elsewhere in note  Review of the result(s) of each unique test - as noted above  Ordering of each unique test  Prescription drug management      Subjective   Emeli is a 69 year old, presenting for the following health issues:  Edema (Swelling feeling in the L shoulder where surgery was done, break out in fevers at night, \"no feeling on the L side\")        6/18/2024    11:00 AM   Additional Questions   Roomed by AT     History of Present Illness       Reason for visit:  I had surgery foe them to look at it    She eats 2-3 servings of fruits and vegetables daily.She consumes 3 sweetened beverage(s) daily.She exercises with enough effort to increase her heart rate 10 to 19 minutes per day.  She exercises with enough effort to increase her heart rate 3 or less days per week.   She is taking medications regularly.       Concern - edema on the L shoulder    Description: patient in for a f/u on left shoulder swelling, ongoing, had prior drained however pt states swelling coming back, states no feeling on the L side radiating from her shoulder to her chest and wrapping around to the top of her back and behind shoulder   Intensity: mild  Therapies tried and outcome: cold " "pack, hot pack - no help     Fluid removed 12 mL first and then 8 mL the second time (likely seroma from surgery for removal of lipoma).        Objective    /65 (BP Location: Left arm, Patient Position: Chair, Cuff Size: Adult Regular)   Pulse 82   Temp 98.2  F (36.8  C) (Oral)   Resp 10   Ht 1.499 m (4' 11\")   Wt 68.2 kg (150 lb 6.4 oz)   LMP  (LMP Unknown)   SpO2 96%   BMI 30.38 kg/m    Body mass index is 30.38 kg/m .  Physical Exam   GENERAL: No acute distress  HEENT: Normocephalic  SKIN: Left lateral and inferior neck (just lateral to the base of the neck with healed incision, no significant swelling or fluid collection), tender to touch, no surrounding erythema.  NEURO: Alert and non-focal        Signed Electronically by: Gina Arias PA-C    "

## 2024-06-24 NOTE — PROGRESS NOTES
DISCHARGE  Reason for Discharge: Patient has failed to schedule further appointments.    Equipment Issued: none    Discharge Plan: Patient to continue home program.    Referring Provider:  Rizwana Sinclair       05/08/24 0500   Appointment Info   Signing clinician's name / credentials Andrew Gaona PT   Total/Authorized Visits 10   Visits Used 3   Medical Diagnosis Lumbar radiculitis   PT Tx Diagnosis LBP   Other pertinent information recovering from R wrist surgery-braced   Progress Note/Certification   Onset of illness/injury or Date of Surgery 09/24/21   Therapy Frequency 2x/week decrasing to 1x/week   Predicted Duration 8 weeks   Progress Note Completed Date 04/24/24   PT Goal 1   Goal Identifier Sleep   Goal Description Be able to sleep 8 hours pain free   Rationale   (to establish restorative sleep pattern)   Target Date 06/19/24   Subjective Report   Subjective Report Had L5 injection 5/1/2024 no improvement (maybe worse).  HEP at times helpful   Objective Measures   Objective Measures Objective Measure 1;Objective Measure 2   Objective Measure 1   Objective Measure Pain 8/10 at IE   Details pain 8/10   Objective Measure 2   Objective Measure Fair postural control with standing hip ext/abd and row/pulldown   Treatment Interventions (PT)   Interventions Therapeutic Procedure/Exercise;Therapeutic Activity;Neuromuscular Re-education   Therapeutic Procedure/Exercise   Therapeutic Procedures: strength, endurance, ROM, flexibility minutes (04029) 30   PTRx Ther Proc 1 Standing Extension   PTRx Ther Proc 1 - Details 10x 2 sets.  Increased ROM after   PTRx Ther Proc 2 Single Knee to Chest   PTRx Ther Proc 2 - Details rev doing prior.  5x 5-10sec B   PTRx Ther Proc 3 Supine Lumbar Hip Roll   PTRx Ther Proc 3 - Details 10x 5-10 sec hold B   PTRx Ther Proc 4 Roll Outs   PTRx Ther Proc 4 - Details 10x RTB.  Min cuing for posture and core mm engagement    PTRx Ther Proc 5 Roll Ins   PTRx Ther Proc 5 -  Details 10x 5 sec hold.  Mod cuing for posture and core mm engagement    PTRx Ther Proc 6 Bridging #1   PTRx Ther Proc 6 - Details 10x 5 sec hold   PTRx Ther Proc 7 Hip AROM Standing Extension   PTRx Ther Proc 7 - Details 10x B supported mod postural cuing   PTRx Ther Proc 8 Hip AROM Standing Abduction   PTRx Ther Proc 8 - Details 10x B supported mod postural cuing   Therapeutic Activity   Ther Act 1 - Details Discussed Dx/anatomy, POC, pt goals and self control measures (brief body mechanics)   Neuromuscular Re-education   PTRx Neuro Re-ed 1 Shoulder Theraband Rows   PTRx Neuro Re-ed 1 - Details 10x RTB   PTRx Neuro Re-ed 2 Shoulder Theraband Low Row/Pulldown   PTRx Neuro Re-ed 2 - Details 10x RTB   Neuromuscular re-ed of mvmt, balance, coord, kinesthetic sense, posture, proprioception minutes (86371) 10   Education   Learner/Method Patient;Demonstration;Pictures/Video   Education Comments print   Plan   Home program See PTRx   Total Session Time   Timed Code Treatment Minutes 40   Total Treatment Time (sum of timed and untimed services) 40

## 2024-06-26 ENCOUNTER — HOSPITAL ENCOUNTER (OUTPATIENT)
Dept: ULTRASOUND IMAGING | Facility: CLINIC | Age: 69
Discharge: HOME | End: 2024-06-26
Attending: PHYSICIAN ASSISTANT | Admitting: PHYSICIAN ASSISTANT
Payer: COMMERCIAL

## 2024-06-26 DIAGNOSIS — M79.89 OTHER SPECIFIED SOFT TISSUE DISORDERS: ICD-10-CM

## 2024-06-26 DIAGNOSIS — R20.9 SENSATION OF COLD IN LEG: ICD-10-CM

## 2024-06-26 PROCEDURE — 93926 LOWER EXTREMITY STUDY: CPT | Mod: LT

## 2024-06-27 ENCOUNTER — TELEPHONE (OUTPATIENT)
Dept: FAMILY MEDICINE | Facility: CLINIC | Age: 69
End: 2024-06-27
Payer: COMMERCIAL

## 2024-06-27 DIAGNOSIS — G89.29 OTHER CHRONIC PAIN: ICD-10-CM

## 2024-06-27 DIAGNOSIS — G89.29 CHRONIC BILATERAL LOW BACK PAIN, UNSPECIFIED WHETHER SCIATICA PRESENT: ICD-10-CM

## 2024-06-27 DIAGNOSIS — M54.50 CHRONIC BILATERAL LOW BACK PAIN, UNSPECIFIED WHETHER SCIATICA PRESENT: ICD-10-CM

## 2024-06-27 DIAGNOSIS — F11.20 CONTINUOUS OPIOID DEPENDENCE (H): ICD-10-CM

## 2024-06-27 LAB — BACTERIA SNV CULT: NORMAL

## 2024-06-27 NOTE — TELEPHONE ENCOUNTER
----- Message from Gina Arias sent at 6/27/2024 10:50 AM CDT -----  Please call patient and let her know that the imaging of the leg showed good blood flow. It is possible the sensation of cold in the leg is more related to nerves from the back. I would recommend following up with her back doctors.

## 2024-06-28 DIAGNOSIS — L76.33 POSTOPERATIVE SEROMA OF SUBCUTANEOUS TISSUE AFTER DERMATOLOGIC PROCEDURE: ICD-10-CM

## 2024-06-28 RX ORDER — HYDROCODONE BITARTRATE AND ACETAMINOPHEN 5; 325 MG/1; MG/1
1 TABLET ORAL EVERY 6 HOURS PRN
Qty: 10 TABLET | Refills: 0 | Status: SHIPPED | OUTPATIENT
Start: 2024-06-28 | End: 2024-07-01

## 2024-06-28 NOTE — TELEPHONE ENCOUNTER
"Surgery: laparoscopic cholecystectomy/left shoulder mass excision  Surgery date: 5/30/24  Surgeon: Petrona    Called pt re: refill request Norco 5/325 #10 tablets    1st refill Norco 5-325 6/3/24 #10 tablets  2nd refill Norco  6/5/24 #30 tablets with   3rd refill [place] 5-325 6/14/24 #10 tablets    Aspirated 12 ml 6/6/24  Aspirated  8 ml 6/13/24    Pt answered. She is requesting Norco. She reports he pain is   \"Top of shoulder all the way down into the breast, it is like someone is taking sandpaper and it is just weird. It just hurts and I am numb too in my neck and shoulder.\" This is the same as when she saw the doctor/PA in clinic.  Pt reports it is still filling up with fluid. She says it is not as big as it was when MD last drained she says the fluid comes and goes. Unsure of what activities makes the fluid worse or better. Has tried hot packs and ice and neither of those help.     Rates pain 7.5/10 right now. Took 2 tylenol 3 hours ago which doesn't really help. Alternates ibuprofen with tylenol which also doesn't really help.     Last dose of Norco  was \"days ago\". Has no tablets left. We also discussed the request through , she meant to only send the request to us. The Norco  that was requested for Hugo was for her back pain.     Having regular bowel movements.      No other questions or concerns. I informed pt I would discuss with PA team and call her back.     Pharmacy:  [id number] - [facility] [address] EL Norris RN on 6/28/2024 at 2:27 PM    "

## 2024-06-28 NOTE — TELEPHONE ENCOUNTER
Jean-Pierre Williamson PA-C   to Me  Yao Baker MD   ML    6/28/24  3:19 PM  I will refill the med. Thanks for your assistance with this Antonella.  Jean-Pierre      Called pt to update her on above   Pt verbalized understanding and did not have any further questions or concerns.    Antonella Norris RN on 6/28/2024 at 3:37 PM

## 2024-07-01 RX ORDER — HYDROCODONE BITARTRATE AND ACETAMINOPHEN 10; 325 MG/1; MG/1
1 TABLET ORAL DAILY PRN
Qty: 30 TABLET | Refills: 0 | Status: SHIPPED | OUTPATIENT
Start: 2024-07-05 | End: 2024-07-17

## 2024-07-07 ENCOUNTER — NURSE TRIAGE (OUTPATIENT)
Dept: NURSING | Facility: CLINIC | Age: 69
End: 2024-07-07
Payer: COMMERCIAL

## 2024-07-07 DIAGNOSIS — L76.33 POSTOPERATIVE SEROMA OF SUBCUTANEOUS TISSUE AFTER DERMATOLOGIC PROCEDURE: ICD-10-CM

## 2024-07-07 DIAGNOSIS — M54.50 ACUTE BILATERAL LOW BACK PAIN WITHOUT SCIATICA: ICD-10-CM

## 2024-07-07 RX ORDER — HYDROCODONE BITARTRATE AND ACETAMINOPHEN 5; 325 MG/1; MG/1
1 TABLET ORAL EVERY 6 HOURS PRN
Qty: 10 TABLET | Refills: 0 | OUTPATIENT
Start: 2024-07-07 | End: 2024-07-10

## 2024-07-07 NOTE — TELEPHONE ENCOUNTER
Nurse Triage SBAR    Is this a 2nd Level Triage? YES, LICENSED PRACTITIONER REVIEW IS REQUIRED    Situation: fever    Background: low grade fever starting Friday, taking tylenol and vicodin    Assessment: body aches, fever 101    Protocol Recommended Disposition:   See HCP Within 4 Hours (Or PCP Triage)    Recommendation: await call back from FNA     Paged to provider    Does the patient meet one of the following criteria for ADS visit consideration? 16+ years old, with an [facility] PCP     Provider consult indicated.     Reason for page: 2LT    Page sent to Dr. Mariana Garcia by RN at 0918.     Page sent to Dr. Mariana Garcia by answering Service at 0934.    Provider, Dr. Mariana Garcia, returning page to [facility] at 0935    Provider recommended plan of care: go to     Patient notified and verbalized understanding.     Provider Recommendation Follow Up:   Reached patient/caregiver. Informed of provider's recommendations. Patient verbalized understanding and agrees with the plan.         Ronny Truong RN    TIP  Providers, please consider if this condition is appropriate for management at one of our Acute and Diagnostic Services sites.     If patient is a good candidate, please use dotphrase <dot>triageresponse and select Refer to ADS to document.    Reason for Disposition   [1] Fever > 101 F (38.3 C) AND [2] age > 60 years    Additional Information   Negative: Shock suspected (e.g., cold/pale/clammy skin, too weak to stand, low BP, rapid pulse)   Negative: Difficult to awaken or acting confused (e.g., disoriented, slurred speech)   Negative: [1] Difficulty breathing AND [2] bluish lips, tongue or face   Negative: New-onset rash with multiple purple (or blood-colored) spots or dots   Negative: Sounds like a life-threatening emergency to the triager   Negative: Fever onset within 24 hours of receiving vaccine   Negative: Fever in a cancer patient who is currently (or recently) receiving chemotherapy or  radiation therapy, or cancer patient who has metastatic or end-stage cancer and is receiving palliative care   Negative: Pregnant   Negative: Postpartum (from 0 to 6 weeks after delivery)   Negative: [1] Fever AND [2] within 14 days of COVID-19 Exposure   Negative: Other symptom is present, see that guideline (e.g., symptoms of cough, runny nose, sore throat, earache, abdominal pain, diarrhea, vomiting)   Negative: [1] Headache AND [2] stiff neck (can't touch chin to chest)   Negative: Difficulty breathing   Negative: IV Drug Use (IVDU)   Negative: [1] Drinking very little AND [2] dehydration suspected (e.g., no urine > 12 hours, very dry mouth, very lightheaded)   Negative: Widespread rash and cause unknown   Negative: Patient sounds very sick or weak to the triager  (Exception: Mild weakness and hasn't taken fever medicine.)   Negative: Fever > 104 F (40 C)    Protocols used: Fever-A-AH

## 2024-07-08 ENCOUNTER — ANCILLARY PROCEDURE (OUTPATIENT)
Dept: GENERAL RADIOLOGY | Facility: CLINIC | Age: 69
End: 2024-07-08
Attending: PHYSICIAN ASSISTANT
Payer: COMMERCIAL

## 2024-07-08 ENCOUNTER — TELEPHONE (OUTPATIENT)
Dept: FAMILY MEDICINE | Facility: CLINIC | Age: 69
End: 2024-07-08

## 2024-07-08 ENCOUNTER — OFFICE VISIT (OUTPATIENT)
Dept: URGENT CARE | Facility: URGENT CARE | Age: 69
End: 2024-07-08
Payer: COMMERCIAL

## 2024-07-08 VITALS
HEART RATE: 115 BPM | BODY MASS INDEX: 30.24 KG/M2 | TEMPERATURE: 100.8 F | SYSTOLIC BLOOD PRESSURE: 147 MMHG | DIASTOLIC BLOOD PRESSURE: 82 MMHG | HEIGHT: 59 IN | RESPIRATION RATE: 16 BRPM | WEIGHT: 150 LBS | OXYGEN SATURATION: 94 %

## 2024-07-08 DIAGNOSIS — R06.00 DYSPNEA, UNSPECIFIED TYPE: Primary | ICD-10-CM

## 2024-07-08 DIAGNOSIS — R00.0 TACHYCARDIA: ICD-10-CM

## 2024-07-08 DIAGNOSIS — R06.00 DYSPNEA, UNSPECIFIED TYPE: ICD-10-CM

## 2024-07-08 DIAGNOSIS — R50.9 FEVER, UNSPECIFIED FEVER CAUSE: ICD-10-CM

## 2024-07-08 LAB
ALBUMIN UR-MCNC: NEGATIVE MG/DL
APPEARANCE UR: CLEAR
BACTERIA #/AREA URNS HPF: ABNORMAL /HPF
BASOPHILS # BLD AUTO: 0 10E3/UL (ref 0–0.2)
BASOPHILS NFR BLD AUTO: 0 %
BILIRUB UR QL STRIP: NEGATIVE
COLOR UR AUTO: YELLOW
EOSINOPHIL # BLD AUTO: 0.1 10E3/UL (ref 0–0.7)
EOSINOPHIL NFR BLD AUTO: 1 %
ERYTHROCYTE [DISTWIDTH] IN BLOOD BY AUTOMATED COUNT: 13 % (ref 10–15)
GLUCOSE UR STRIP-MCNC: NEGATIVE MG/DL
HCT VFR BLD AUTO: 43.2 % (ref 35–47)
HGB BLD-MCNC: 14.1 G/DL (ref 11.7–15.7)
HGB UR QL STRIP: ABNORMAL
IMM GRANULOCYTES # BLD: 0.1 10E3/UL
IMM GRANULOCYTES NFR BLD: 1 %
KETONES UR STRIP-MCNC: NEGATIVE MG/DL
LEUKOCYTE ESTERASE UR QL STRIP: NEGATIVE
LYMPHOCYTES # BLD AUTO: 1.7 10E3/UL (ref 0.8–5.3)
LYMPHOCYTES NFR BLD AUTO: 20 %
MCH RBC QN AUTO: 32 PG (ref 26.5–33)
MCHC RBC AUTO-ENTMCNC: 32.6 G/DL (ref 31.5–36.5)
MCV RBC AUTO: 98 FL (ref 78–100)
MONOCYTES # BLD AUTO: 0.8 10E3/UL (ref 0–1.3)
MONOCYTES NFR BLD AUTO: 10 %
NEUTROPHILS # BLD AUTO: 5.7 10E3/UL (ref 1.6–8.3)
NEUTROPHILS NFR BLD AUTO: 68 %
NITRATE UR QL: NEGATIVE
PH UR STRIP: 5.5 [PH] (ref 5–7)
PLATELET # BLD AUTO: 251 10E3/UL (ref 150–450)
RBC # BLD AUTO: 4.4 10E6/UL (ref 3.8–5.2)
RBC #/AREA URNS AUTO: ABNORMAL /HPF
SP GR UR STRIP: 1.02 (ref 1–1.03)
SQUAMOUS #/AREA URNS AUTO: ABNORMAL /LPF
UROBILINOGEN UR STRIP-ACNC: 0.2 E.U./DL
WBC # BLD AUTO: 8.4 10E3/UL (ref 4–11)
WBC #/AREA URNS AUTO: ABNORMAL /HPF

## 2024-07-08 PROCEDURE — 81001 URINALYSIS AUTO W/SCOPE: CPT | Performed by: PHYSICIAN ASSISTANT

## 2024-07-08 PROCEDURE — 36415 COLL VENOUS BLD VENIPUNCTURE: CPT | Performed by: PHYSICIAN ASSISTANT

## 2024-07-08 PROCEDURE — 99215 OFFICE O/P EST HI 40 MIN: CPT | Performed by: PHYSICIAN ASSISTANT

## 2024-07-08 PROCEDURE — 85025 COMPLETE CBC W/AUTO DIFF WBC: CPT | Performed by: PHYSICIAN ASSISTANT

## 2024-07-08 PROCEDURE — 71046 X-RAY EXAM CHEST 2 VIEWS: CPT | Mod: TC | Performed by: RADIOLOGY

## 2024-07-08 RX ORDER — CYCLOBENZAPRINE HCL 5 MG
5 TABLET ORAL 2 TIMES DAILY PRN
Qty: 30 TABLET | Refills: 0 | Status: SHIPPED | OUTPATIENT
Start: 2024-07-08 | End: 2024-08-05

## 2024-07-08 NOTE — TELEPHONE ENCOUNTER
General Call    Contacts       Contact Date/Time Type Contact Phone/Fax    07/08/2024 01:59 PM CDT Phone (Incoming) Emeli Granados (Self) [phone] (M)     Ok to leave a detailed voicemail          Reason for Call:  Call back     What are your questions or concerns:  Fever     Date of last appointment with provider: 06/18/24    Could we send this information to you in [facility] or would you prefer to receive a phone call?:   Patient would prefer a phone call   Okay to leave a detailed message?: Yes at Cell number on file:    Telephone Information:   Mobile [phone]

## 2024-07-08 NOTE — PROGRESS NOTES
URGENT CARE VISIT:    SUBJECTIVE:   Emeli Granados is a 69 year old female presenting with a chief complaint of fever and shortness of breath.  Onset was 3 day(s) ago.   She denies the following symptoms: stuffy nose, cough - non-productive, sore throat, abdominal pain, urinary frequency.  Course of illness is same.    Treatment measures tried include None tried with no relief of symptoms.  Predisposing factors include none.  Had gallbladder and cyst from left shoulder removed via surgery on 5/30.   She did a COVID test which was negative.     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 Strength: 108 (90 Base) MCG/ACT 8.5 g 5    ascorbic acid (VITAMIN C) 1000 MG TABS Take 1,000 mg by mouth daily      atorvastatin (LIPITOR) 10 MG tablet Take 1 tablet (10 mg) by mouth daily 90 tablet 1    CALCIUM PO Take 1 tablet by mouth daily      cyclobenzaprine (FLEXERIL) 5 MG tablet TAKE 1 TABLET BY MOUTH TWICE DAILY AS NEEDED FOR MUSCLE SPASMS 30 tablet 0    diltiazem ER COATED BEADS (CARDIZEM CD/CARTIA XT) 120 MG 24 hr capsule Take 1 capsule (120 mg) by mouth every morning Hold for blood pressure <110 30 capsule 11    HYDROcodone-acetaminophen (NORCO)  MG per  tablet Take 1 tablet by mouth daily as needed for severe pain 30 tablet 0    metFORMIN (GLUCOPHAGE XR) 500 MG 24 hr tablet Take 1 tablet (500 mg) by mouth daily (with dinner) 30 tablet 5    Multiple Vitamins-Minerals (MULTIVITAMIN OR) Take 1 tablet by mouth daily Per pt, uses ones without Iron      omeprazole (PRILOSEC) 20 MG DR capsule Take 1 capsule (20 mg) by mouth 2 times daily 180 capsule 1    traMADol (ULTRAM) 50 MG tablet Take 1 tablet (50 mg) by mouth every 6 hours as needed for severe pain 90 tablet 0    vitamin D3 (CHOLECALCIFEROL) 1000 units (25 mcg) tablet Take 1,000 Units by mouth daily      vitamin E 400 UNITS TABS Take 400 Units by mouth daily      zolpidem (AMBIEN) 5 MG tablet TAKE 1 TABLET (5 MG) BY MOUTH NIGHTLY AS NEEDED FOR SLEEP. DO NOT TAKE WITH TRAMADOL. MUST BE NDC # [address]-37 PER PT REQUEST. 30 tablet 5    albuterol (PROVENTIL) (2.5 MG/3ML) 0.083% neb solution INHALE 3 MILLILITERS (2.5 MG) BY NEBULIZATION ROUTE EVERY 6 HOURS AS NEEDED FOR SHORTNESS OF BREATH/DYSPNEA OR WHEEZING. Strength: (2.5 MG/3ML) 0.083% (Patient not taking: Reported on 2024) 90 mL 5    cyanocobalamin (VITAMIN B-12) 100 MCG tablet Take 100 mcg by mouth daily (Patient not taking: Reported on 2024)      docusate sodium (COLACE) 100 MG capsule Take 1 capsule (100 mg) by mouth 2 times daily as needed for constipation (Patient not taking: Reported on 2024) 30 capsule 1    ibuprofen (ADVIL/MOTRIN) 600 MG tablet Take 1 tablet (600 mg) by mouth every 8 hours as needed for inflammatory pain Take with food. Stop if blood in stool. 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    OBJECTIVE:  BP (!) 147/82   Pulse 115   Temp (!) 100.8  F (38.2  C) (Tympanic)   Resp 16   Ht 1.499 m (4' 11\")   Wt 68 kg (150 lb)   LMP  (LMP Unknown)   SpO2 94%   BMI 30.30 kg/m    GENERAL APPEARANCE: healthy, alert and no distress  EYES: EOMI,  PERRL, conjunctiva clear  HENT: ear canals and TM's normal.  Nose and mouth without ulcers, erythema or lesions  NECK: supple, nontender, no lymphadenopathy  RESP: lungs clear to auscultation - no rales, rhonchi or wheezes  CV: regular rates and rhythm, normal S1 S2, no murmur noted  SKIN: no suspicious lesions or rashes    Labs:    Results for orders placed or performed in visit on 07/08/24   XR Chest 2 Views     Status: None    Narrative    EXAM: XR CHEST 2 VIEWS  LOCATION: [facility]  DATE: 7/8/2024    INDICATION:  Dyspnea, unspecified type  COMPARISON: 5/28/2024      Impression    IMPRESSION: Heart size is normal. Increasing heterogeneous parenchymal opacities in the right lower lobe, favored to represent atelectasis. No CHF, lobar consolidation or effusion. Mediastinum and bony structures are stable in appearance.   Results for orders placed or performed in visit on 07/08/24   CBC with platelets and differential     Status: None   Result Value Ref Range    WBC Count 8.4 4.0 - 11.0 10e3/uL    RBC Count 4.40 3.80 - 5.20 10e6/uL    Hemoglobin 14.1 11.7 - 15.7 g/dL    Hematocrit 43.2 35.0 - 47.0 %    MCV 98 78 - 100 fL    MCH 32.0 26.5 - 33.0 pg    MCHC 32.6 31.5 - 36.5 g/dL    RDW 13.0 10.0 - 15.0 %    Platelet Count 251 150 - 450 10e3/uL    % Neutrophils 68 %    % Lymphocytes 20 %    % Monocytes 10 %    % Eosinophils 1 %    % Basophils 0 %    % Immature Granulocytes 1 %    Absolute Neutrophils 5.7 1.6 - 8.3 10e3/uL    Absolute Lymphocytes 1.7 0.8 - 5.3 10e3/uL    " Absolute Monocytes 0.8 0.0 - 1.3 10e3/uL    Absolute Eosinophils 0.1 0.0 - 0.7 10e3/uL    Absolute Basophils 0.0 0.0 - 0.2 10e3/uL    Absolute Immature Granulocytes 0.1 <=0.4 10e3/uL   CBC with platelets and differential     Status: None    Narrative    The following orders were created for panel order CBC with platelets and differential.  Procedure                               Abnormality         Status                     ---------                               -----------         ------                     CBC with platelets and d...[[id number]]                      Final result                 Please view results for these tests on the individual orders.       ASSESSMENT:    ICD-10-CM    1. Dyspnea, unspecified type  R06.00 XR Chest 2 Views      2. Fever, unspecified fever cause  R50.9 CBC with platelets and differential     UA Macroscopic with reflex to Microscopic and Culture     CBC with platelets and differential     UA Microscopic with Reflex to Culture      3. Tachycardia  R00.0           PLAN:  40 minutes spent by me on the date of the encounter doing chart review, review of outside records, review of test results, interpretation of tests, patient visit, and documentation   Patient Instructions   Patient presents with fever and dyspnea for 3 days. She denies any URI or UTI symptoms. Chest x-ray remains unchanged from last. CBC is normal making infection less likely. She had surgery in June. Ddx includes PE, viral URI, COVID, pericarditis, pneumonia, CHF, pleurisy, among others. She will present to the ER as I am unable to rule out a PE.  Patient verbalized understanding and is agreeable to plan. The patient was discharged ambulatory and in stable condition.    Darlyn Brar PA-C ....................  7/8/2024   7:49 PM

## 2024-07-09 ENCOUNTER — HOSPITAL ENCOUNTER (OUTPATIENT)
Dept: CT IMAGING | Facility: CLINIC | Age: 69
Discharge: HOME | End: 2024-07-09
Attending: PHYSICIAN ASSISTANT | Admitting: PHYSICIAN ASSISTANT
Payer: COMMERCIAL

## 2024-07-09 ENCOUNTER — TELEPHONE (OUTPATIENT)
Dept: FAMILY MEDICINE | Facility: CLINIC | Age: 69
End: 2024-07-09

## 2024-07-09 ENCOUNTER — OFFICE VISIT (OUTPATIENT)
Dept: PEDIATRICS | Facility: CLINIC | Age: 69
End: 2024-07-09
Payer: COMMERCIAL

## 2024-07-09 VITALS
BODY MASS INDEX: 30.3 KG/M2 | WEIGHT: 150 LBS | SYSTOLIC BLOOD PRESSURE: 117 MMHG | HEART RATE: 115 BPM | OXYGEN SATURATION: 94 % | TEMPERATURE: 100.1 F | DIASTOLIC BLOOD PRESSURE: 81 MMHG

## 2024-07-09 DIAGNOSIS — N76.0 BV (BACTERIAL VAGINOSIS): ICD-10-CM

## 2024-07-09 DIAGNOSIS — R06.02 SOB (SHORTNESS OF BREATH): ICD-10-CM

## 2024-07-09 DIAGNOSIS — J45.901 MILD ASTHMA WITH EXACERBATION, UNSPECIFIED WHETHER PERSISTENT: ICD-10-CM

## 2024-07-09 DIAGNOSIS — R07.1 PAINFUL BREATHING: ICD-10-CM

## 2024-07-09 DIAGNOSIS — B96.89 BV (BACTERIAL VAGINOSIS): ICD-10-CM

## 2024-07-09 DIAGNOSIS — R50.9 FEVER, UNSPECIFIED FEVER CAUSE: Primary | ICD-10-CM

## 2024-07-09 DIAGNOSIS — J10.1 INFLUENZA A: ICD-10-CM

## 2024-07-09 DIAGNOSIS — R30.0 DYSURIA: ICD-10-CM

## 2024-07-09 DIAGNOSIS — M54.50 ACUTE BILATERAL LOW BACK PAIN WITHOUT SCIATICA: ICD-10-CM

## 2024-07-09 LAB
ALBUMIN SERPL BCG-MCNC: 3.7 G/DL (ref 3.5–5.2)
ALP SERPL-CCNC: 113 U/L (ref 40–150)
ALT SERPL W P-5'-P-CCNC: 26 U/L (ref 0–50)
ANION GAP SERPL CALCULATED.3IONS-SCNC: 14 MMOL/L (ref 7–15)
AST SERPL W P-5'-P-CCNC: 23 U/L (ref 0–45)
BASOPHILS # BLD AUTO: 0 10E3/UL (ref 0–0.2)
BASOPHILS NFR BLD AUTO: 0 %
BILIRUB SERPL-MCNC: 0.4 MG/DL
BUN SERPL-MCNC: 15.9 MG/DL (ref 8–23)
CALCIUM SERPL-MCNC: 9.3 MG/DL (ref 8.8–10.2)
CHLORIDE SERPL-SCNC: 101 MMOL/L (ref 98–107)
CLUE CELLS: PRESENT
CREAT SERPL-MCNC: 0.66 MG/DL (ref 0.51–0.95)
DEPRECATED HCO3 PLAS-SCNC: 25 MMOL/L (ref 22–29)
EGFRCR SERPLBLD CKD-EPI 2021: >90 ML/MIN/1.73M2
EOSINOPHIL # BLD AUTO: 0.1 10E3/UL (ref 0–0.7)
EOSINOPHIL NFR BLD AUTO: 1 %
ERYTHROCYTE [DISTWIDTH] IN BLOOD BY AUTOMATED COUNT: 13 % (ref 10–15)
FLUAV AG SPEC QL IA: POSITIVE
FLUBV AG SPEC QL IA: NEGATIVE
GLUCOSE SERPL-MCNC: 96 MG/DL (ref 70–99)
HCT VFR BLD AUTO: 43.7 % (ref 35–47)
HGB BLD-MCNC: 14.4 G/DL (ref 11.7–15.7)
IMM GRANULOCYTES # BLD: 0.1 10E3/UL
IMM GRANULOCYTES NFR BLD: 1 %
LYMPHOCYTES # BLD AUTO: 1.9 10E3/UL (ref 0.8–5.3)
LYMPHOCYTES NFR BLD AUTO: 20 %
MCH RBC QN AUTO: 32.1 PG (ref 26.5–33)
MCHC RBC AUTO-ENTMCNC: 33 G/DL (ref 31.5–36.5)
MCV RBC AUTO: 97 FL (ref 78–100)
MONOCYTES # BLD AUTO: 0.8 10E3/UL (ref 0–1.3)
MONOCYTES NFR BLD AUTO: 8 %
NEUTROPHILS # BLD AUTO: 6.5 10E3/UL (ref 1.6–8.3)
NEUTROPHILS NFR BLD AUTO: 69 %
NRBC # BLD AUTO: 0 10E3/UL
NRBC BLD AUTO-RTO: 0 /100
PLATELET # BLD AUTO: 274 10E3/UL (ref 150–450)
POTASSIUM SERPL-SCNC: 4.7 MMOL/L (ref 3.4–5.3)
PROT SERPL-MCNC: 7.4 G/DL (ref 6.4–8.3)
RBC # BLD AUTO: 4.49 10E6/UL (ref 3.8–5.2)
SARS-COV-2 RNA RESP QL NAA+PROBE: NEGATIVE
SODIUM SERPL-SCNC: 140 MMOL/L (ref 135–145)
TRICHOMONAS, WET PREP: ABNORMAL
TROPONIN T SERPL HS-MCNC: 6 NG/L
WBC # BLD AUTO: 9.4 10E3/UL (ref 4–11)
WBC'S/HIGH POWER FIELD, WET PREP: ABNORMAL
YEAST, WET PREP: ABNORMAL

## 2024-07-09 PROCEDURE — 71275 CT ANGIOGRAPHY CHEST: CPT

## 2024-07-09 PROCEDURE — 250N000011 HC RX IP 250 OP 636: Performed by: PHYSICIAN ASSISTANT

## 2024-07-09 PROCEDURE — 84484 ASSAY OF TROPONIN QUANT: CPT | Performed by: PHYSICIAN ASSISTANT

## 2024-07-09 PROCEDURE — 99215 OFFICE O/P EST HI 40 MIN: CPT | Performed by: PHYSICIAN ASSISTANT

## 2024-07-09 PROCEDURE — 87210 SMEAR WET MOUNT SALINE/INK: CPT | Performed by: PHYSICIAN ASSISTANT

## 2024-07-09 PROCEDURE — 87635 SARS-COV-2 COVID-19 AMP PRB: CPT | Performed by: PHYSICIAN ASSISTANT

## 2024-07-09 PROCEDURE — 80053 COMPREHEN METABOLIC PANEL: CPT | Performed by: PHYSICIAN ASSISTANT

## 2024-07-09 PROCEDURE — 85025 COMPLETE CBC W/AUTO DIFF WBC: CPT | Performed by: PHYSICIAN ASSISTANT

## 2024-07-09 PROCEDURE — 36415 COLL VENOUS BLD VENIPUNCTURE: CPT | Performed by: PHYSICIAN ASSISTANT

## 2024-07-09 PROCEDURE — 87804 INFLUENZA ASSAY W/OPTIC: CPT | Performed by: PHYSICIAN ASSISTANT

## 2024-07-09 PROCEDURE — 250N000009 HC RX 250: Performed by: PHYSICIAN ASSISTANT

## 2024-07-09 RX ORDER — OSELTAMIVIR PHOSPHATE 75 MG/1
75 CAPSULE ORAL 2 TIMES DAILY
Qty: 10 CAPSULE | Refills: 0 | Status: SHIPPED | OUTPATIENT
Start: 2024-07-09 | End: 2024-07-14

## 2024-07-09 RX ORDER — ALBUTEROL SULFATE 90 UG/1
2 AEROSOL, METERED RESPIRATORY (INHALATION) EVERY 6 HOURS
Qty: 8.5 G | Refills: 0 | Status: SHIPPED | OUTPATIENT
Start: 2024-07-09 | End: 2024-09-11

## 2024-07-09 RX ORDER — PREDNISONE 20 MG/1
20 TABLET ORAL 2 TIMES DAILY
Qty: 10 TABLET | Refills: 0 | Status: SHIPPED | OUTPATIENT
Start: 2024-07-09 | End: 2024-07-17

## 2024-07-09 RX ORDER — IOPAMIDOL 755 MG/ML
500 INJECTION, SOLUTION INTRAVASCULAR ONCE
Status: COMPLETED | OUTPATIENT
Start: 2024-07-09 | End: 2024-07-09

## 2024-07-09 RX ORDER — ACETAMINOPHEN 500 MG
500-1000 TABLET ORAL EVERY 6 HOURS PRN
Qty: 30 TABLET | Refills: 0 | Status: SHIPPED | OUTPATIENT
Start: 2024-07-09

## 2024-07-09 RX ORDER — HYDROCODONE BITARTRATE AND ACETAMINOPHEN 5; 325 MG/1; MG/1
1 TABLET ORAL EVERY 6 HOURS PRN
Qty: 8 TABLET | Refills: 0 | Status: SHIPPED | OUTPATIENT
Start: 2024-07-09 | End: 2024-07-12

## 2024-07-09 RX ORDER — METRONIDAZOLE 500 MG/1
500 TABLET ORAL 2 TIMES DAILY
Qty: 14 TABLET | Refills: 0 | Status: SHIPPED | OUTPATIENT
Start: 2024-07-09 | End: 2024-07-16

## 2024-07-09 RX ADMIN — IOPAMIDOL 65 ML: 755 INJECTION, SOLUTION INTRAVENOUS at 11:52

## 2024-07-09 RX ADMIN — SODIUM CHLORIDE 86 ML: 9 INJECTION, SOLUTION INTRAVENOUS at 11:52

## 2024-07-09 NOTE — TELEPHONE ENCOUNTER
"Received call from patient. Patient was seen in  yesterday and instructed to go to ED. Patient went to ED but left without being seen.     Per  follow up plan:   Patient Instructions   Patient presents with fever and dyspnea for 3 days. She denies any URI or UTI symptoms. Chest x-ray remains unchanged from last. CBC is normal making infection less likely. She had surgery in June. Ddx includes PE, viral URI, COVID, pericarditis, pneumonia, CHF, pleurisy, among others. She will present to the ER as I am unable to rule out a PE.    Patient has had mild fevers, feeling SOB, burning with urination. Patient has been using inhaler. Patient notes her symptoms started after cleaning out her daughter's cats litter boxes. When asked if patient has allergy to cats, patient states \"well a long time ago I did, but I go by them today and nothing happens\". Patient denies any changes in symptoms from when she was seen yesterday in .     RN huddled w/ MICHELL and [facility] provider. Ok to be seen at 10:30am today 7/9/24. Patient agreed with plan.     Referral to Acute and Diagnostic Services    The [facility] has been contacted at [phone] ([place]) to confirm patient acceptance. The transition to Acute & Diagnostic Services Clinic has been discussed with patient, and she agrees with next level of care.  Patient understands that evaluation/treatment at [facility] typically takes significantly longer than in clinic/urgent care (>2 hours).        Special issues:  None      Referral placed: No  Patient has transportation arranged and will travel to the [facility] without delay: Yes  Patient aware not to eat or drink. Yes        Ayan Lama RN            "

## 2024-07-09 NOTE — PROGRESS NOTES
Acute and Diagnostic Services Clinic Visit    Assessment & Plan     Fever, unspecified fever cause    A fever is a high body temperature and is the body's reaction to an illness. It's one way your body fights illness. A temperature of up to 102 F can be helpful, because it helps the body respond to infection. Most healthy people can have a fever as high as 103 F to 104 F for short periods of time without problems.  Its important to stay well hydrated with a fever and avoid being in the heat.  A fever can be treated with medications provided, but If symptoms worsen then be seen by your PCP or go to the .     Fluids, rest  Tylenol for fever    CBC normal  CMP neg for renal problems  Influenza POS A  Covid pending    - acetaminophen (TYLENOL) 500 MG tablet; Take 1-2 tablets (500-1,000 mg) by mouth every 6 hours as needed for mild pain    Painful breathing    Covid pending  Prednisone for lung inflammation and SOB    CT scan is NEG for PE, pneumonia    - predniSONE (DELTASONE) 20 MG tablet; Take 1 tablet (20 mg) by mouth 2 times daily    Dysuria    UA normal from yesterday  Wet prep is POS for BV  - Wet prep - Clinic Collect  - Symptomatic COVID-19 Virus (Coronavirus) by PCR; Future  - Symptomatic COVID-19 Virus (Coronavirus) by PCR Nose    SOB (shortness of breath)    CT chest is NEG for PE  Troponin neg for cardiac event  Covid pending      - predniSONE (DELTASONE) 20 MG tablet; Take 1 tablet (20 mg) by mouth 2 times daily  - albuterol (PROVENTIL HFA) 108 (90 Base) MCG/ACT inhaler; Inhale 2 puffs into the lungs every 6 hours    BV (bacterial vaginosis)    You have a vaginal infection called bacterial vaginosis (BV). BV occurs when the good bacteria are outnumbered by the bad bacteria causes an imbalance in the vagina. BV is linked with sexual activity, but it's not a sexually transmitted infection (STI).   BV may or may not cause symptoms of thin, gray, milky-white, or sometimes green discharge, unpleasant odor or   fishy  smell.  BV is most often treated with medicines called antibiotics. These may be given as pills or as a vaginal cream.  Do not drink alcohol while on Flagyl or Metrogel as this can cause severe nausea and vomiting.    It's not known what causes BV, but certain factors can make the problem more likely. These can include:   Douching  Spermicides  Use of antibiotics  Change in hormone levels with pregnancy, breastfeeding, or menopause  Having sex with a new partner  Having sex with more than one partner      - metroNIDAZOLE (FLAGYL) 500 MG tablet; Take 1 tablet (500 mg) by mouth 2 times daily for 7 days    Influenza A    Patient given information about influenza.  Patient understands they are contagious until their fever has resolved without the use of motrin or tylenol.  At that time they can return to school/work.  Patient is to monitor for any worsening symptoms and return to the clinic if this occurs.  The most common complication of influenza is Pneumonia or other respiratory problems especially in those with underlying lung problems including asthma and COPD.  Patient will follow up if this occurs.    - oseltamivir (TAMIFLU) 75 MG capsule; Take 1 capsule (75 mg) by mouth 2 times daily for 5 days    Mild asthma with exacerbation, unspecified whether persistent      Asthma makes it hard for you to breathe. During an asthma attack, the airways swell and narrow. Severe asthma attacks can be dangerous, but you can usually prevent them. Controlling asthma and treating symptoms before they get bad can help you avoid bad attacks.  Take medications as directed.  Follow up with your family doctor if not improving or go to the ED if symptoms worsen.     - predniSONE (DELTASONE) 20 MG tablet; Take 1 tablet (20 mg) by mouth 2 times daily  - albuterol (PROVENTIL HFA) 108 (90 Base) MCG/ACT inhaler; Inhale 2 puffs into the lungs every 6 hours    Back pain    Back pain is extremely common.  Its important to avoid using  heating pads, but alternating ice and warm moist compresses are helpful.  Its usually best to try to return to your daily routine as soon as possible.  Stretching and walking to help relieve your discomfort is helpful.  Many times back pain and muscle strains will worsen for the first 3-4 days and then settle down the following 3 to 4 days.  Take medication as prescribed and pay attention as some medications can cause drowsiness.  Over time you will want to slowly increase the amount of time you walk or stretch.   Expect discomfort when you first start, but this should improved as your back starts to recover and become stronger.  Use pain as your guide, if it hurts you are not ready for that activity.  Work back into activity gradually and return to activity as tolerated.      Vicodin for night time pain      60 minutes were spent doing chart review, history and exam, documentation and further activities per the note.      At today's visit with Emeli Granados , we discussed results, diagnosis, medications and formulated a plan.  We also discussed red flags for immediate return to clinic/ER, as well as indications for follow up with PCP if not improved in 3 days. Patient understood and agreed to plan. Emeli Granados was discharged with stable vitals and has no further questions.     No follow-ups on file.    Subjective   Emeli is a 69 year old, presenting for the following health issues:  No chief complaint on file.    HPI     Acute Illness  Acute illness concerns: fever and dyspnea for 4 days. Temp max @ home was 101. Home covid test negative. No history of clots; has had 7 surgeries since 2021- most recent was May 2024. Shortness of breath when up & moving around  Onset/Duration: 4 days  Symptoms:  Fever: YES  Chills/Sweats: YES  Headache (location?): YES- Back of head  Sinus Pressure: No           Decreased energy level: YES  Conjunctivitis:  No  Ear Pain: no  Rhinorrhea: No  Congestion: No  Sore Throat:  "No  Cough: YES-non-productive/\"Dry\"  Wheeze: No           Breathing fast: YES- sometimes  Decreased Appetite/Intake: No  Nausea: No  Vomiting: No  Diarrhea: No  Genitourinary symptoms: YES- burning with urination and has more odor than usual.   Progression of symptoms: same waxing and waning  Sick/Strep Exposure: No  Therapies tried and outcome: Rest & Tylenol (last dose ~0530)        Review of Systems  Constitutional, neuro, ENT, endocrine, pulmonary, cardiac, gastrointestinal, genitourinary, musculoskeletal, integument and psychiatric systems are negative, except as otherwise noted.      Objective    LMP  (LMP Unknown)   There is no height or weight on file to calculate BMI.  Physical Exam   GENERAL: alert and no distress  EYES: Eyes grossly normal to inspection, PERRL and conjunctivae and sclerae normal  HENT: ear canals and TM's normal, nose and mouth without ulcers or lesions  NECK: no adenopathy, no asymmetry, masses, or scars  RESP: lungs clear to auscultation - no rales, rhonchi or wheezes  CV: regular rate and rhythm, normal S1 S2, no S3 or S4, no murmur, click or rub, no peripheral edema  ABDOMEN: soft, nontender, no hepatosplenomegaly, no masses and bowel sounds normal  MS: no gross musculoskeletal defects noted, no edema  SKIN: no suspicious lesions or rashes  NEURO: Normal strength and tone, mentation intact and speech normal  PSYCH: mentation appears normal, affect normal/bright      Results for orders placed or performed during the hospital encounter of 07/09/24   CT Chest Pulmonary Embolism w Contrast     Status: None (Preliminary result)    Narrative    CT CHEST PULMONARY EMBOLISM WITH CONTRAST 7/9/2024 12:06 PM    CLINICAL HISTORY: Female sex; Not pregnant; No prior imaging in the  last 24 hours; Pulmonary Embolism Rule-Out Criteria (PERC) score > 0;  Revised [place] Score (RGS) not >= 11; No D-dimer result available;  D-dimer not ordered; Painful breathing; SOB (shortness of breath).    TECHNIQUE: " CT angiogram chest during arterial phase injection IV  contrast. 2D and 3D MIP reconstructions were performed by the CT  technologist. Dose reduction techniques were used.     CONTRAST: 65mL [id number]-370    COMPARISON: Chest radiograph 7/8/2024. CT chest 9/11/2022.    FINDINGS:  ANGIOGRAM CHEST: Motion degradation limits the distal  segmental/subsegmental pulmonary arteries. Within this limitation, no  convincing pulmonary embolus. The thoracic aorta is nonaneurysmal.     LOWER NECK: Unremarkable.    LUNGS AND PLEURA: Mild to moderate emphysema. No focal consolidation,  pleural effusion or pneumothorax. Similar scattered small pulmonary  nodules including a right upper lobe 3 mm nodule (7/66).    AIRWAYS: Patent.    HEART: No pericardial effusion. Minimal coronary calcifications.    MEDIASTINUM/AXILLAE: Normal.    UPPER ABDOMEN: No acute findings.    MUSCULOSKELETAL: Similar probable remote fracture of the posterior  right 10th rib. No new aggressive osseous lesion.      Impression    IMPRESSION:  Study limitation: Motion degradation.    1.  No convincing pulmonary embolus.  2.  Emphysema.   Results for orders placed or performed in visit on 07/09/24   Comprehensive metabolic panel     Status: Normal   Result Value Ref Range    Sodium 140 135 - 145 mmol/L    Potassium 4.7 3.4 - 5.3 mmol/L    Carbon Dioxide (CO2) 25 22 - 29 mmol/L    Anion Gap 14 7 - 15 mmol/L    Urea Nitrogen 15.9 8.0 - 23.0 mg/dL    Creatinine 0.66 0.51 - 0.95 mg/dL    GFR Estimate >90 >60 mL/min/1.73m2    Calcium 9.3 8.8 - 10.2 mg/dL    Chloride 101 98 - 107 mmol/L    Glucose 96 70 - 99 mg/dL    Alkaline Phosphatase 113 40 - 150 U/L    AST 23 0 - 45 U/L    ALT 26 0 - 50 U/L    Protein Total 7.4 6.4 - 8.3 g/dL    Albumin 3.7 3.5 - 5.2 g/dL    Bilirubin Total 0.4 <=1.2 mg/dL   Troponin T, High Sensitivity     Status: Normal   Result Value Ref Range    Troponin T, High Sensitivity 6 <=14 ng/L   CBC with platelets and differential     Status: None    Result Value Ref Range    WBC Count 9.4 4.0 - 11.0 10e3/uL    RBC Count 4.49 3.80 - 5.20 10e6/uL    Hemoglobin 14.4 11.7 - 15.7 g/dL    Hematocrit 43.7 35.0 - 47.0 %    MCV 97 78 - 100 fL    MCH 32.1 26.5 - 33.0 pg    MCHC 33.0 31.5 - 36.5 g/dL    RDW 13.0 10.0 - 15.0 %    Platelet Count 274 150 - 450 10e3/uL    % Neutrophils 69 %    % Lymphocytes 20 %    % Monocytes 8 %    % Eosinophils 1 %    % Basophils 0 %    % Immature Granulocytes 1 %    NRBCs per 100 WBC 0 <1 /100    Absolute Neutrophils 6.5 1.6 - 8.3 10e3/uL    Absolute Lymphocytes 1.9 0.8 - 5.3 10e3/uL    Absolute Monocytes 0.8 0.0 - 1.3 10e3/uL    Absolute Eosinophils 0.1 0.0 - 0.7 10e3/uL    Absolute Basophils 0.0 0.0 - 0.2 10e3/uL    Absolute Immature Granulocytes 0.1 <=0.4 10e3/uL    Absolute NRBCs 0.0 10e3/uL   Influenza A & B Antigen     Status: Abnormal    Specimen: Nose; Swab   Result Value Ref Range    Influenza A antigen Positive (A) Negative    Influenza B antigen Negative Negative    Narrative    Test results must be correlated with clinical data. If necessary, results should be confirmed by a molecular assay or viral culture.   Wet prep - Clinic Collect     Status: Abnormal    Specimen: Vagina; Swab   Result Value Ref Range    Trichomonas Absent Absent    Yeast Absent Absent    Clue Cells Present (A) Absent    WBCs/high power field 2+ (A) None   CBC with platelets differential     Status: None    Narrative    The following orders were created for panel order CBC with platelets differential.  Procedure                               Abnormality         Status                     ---------                               -----------         ------                     CBC with platelets and d...[[id number]]                      Final result                 Please view results for these tests on the individual orders.             Signed Electronically by: Umesh Hampton [facility] Farm[facility]USHAL

## 2024-07-09 NOTE — PATIENT INSTRUCTIONS
Patient presents with fever and dyspnea for 3 days. She denies any URI or UTI symptoms. Chest x-ray remains unchanged from last. She had surgery in June. Ddx includes PE, viral URI, COVID, pericarditis, pneumonia, CHF, pleurisy, among others. She will present to the ER as I am unable to rule out a PE.

## 2024-07-10 ENCOUNTER — VIRTUAL VISIT (OUTPATIENT)
Dept: UROLOGY | Facility: CLINIC | Age: 69
End: 2024-07-10
Payer: COMMERCIAL

## 2024-07-10 DIAGNOSIS — R31.29 MICROSCOPIC HEMATURIA: Primary | ICD-10-CM

## 2024-07-10 DIAGNOSIS — N39.3 FEMALE STRESS INCONTINENCE: ICD-10-CM

## 2024-07-10 PROCEDURE — 99214 OFFICE O/P EST MOD 30 MIN: CPT | Mod: 95 | Performed by: PHYSICIAN ASSISTANT

## 2024-07-10 NOTE — NURSING NOTE
Current patient location: [address] [place] [id number]    Is the patient currently in the state of [place]? YES    Visit mode:VIDEO    If the visit is dropped, the patient can be reconnected by: VIDEO VISIT: Text to cell phone:   Telephone Information:   Mobile [phone]       Will anyone else be joining the visit? NO  (If patient encounters technical issues they should call [phone]    How would you like to obtain your AVS? MyChart    Are changes needed to the allergy or medication list? No, Pt stated no changes to allergies, and Pt stated no med changes    Are refills needed on medications prescribed by this physician? NO    Reason for visit: ALDO CACERES

## 2024-07-10 NOTE — PATIENT INSTRUCTIONS
Reminder to have the blood in the urine re-evaluated every 3-5 years if it persists. Recommend annual urine testing (with micro) with PCP. Please return sooner if worsening mirco hematuria or if you see blood with urination.

## 2024-07-10 NOTE — LETTER
7/10/2024       RE: Emeli Granados  [address]  [facility] [id number]     Dear Colleague,    Thank you for referring your patient, Emeli Granados, to the [facility] at [facility]. Please see a copy of my visit note below.      Virtual Visit Details    Type of service:  Video Visit   Video Start Time: 10:04 AM  Video End Time: 10:14 AM    Originating Location (pt. Location): Home    Distant Location (provider location):  On-site  Platform used for Video Visit: Jenifer    CC: Hematuria.    HPI: It is a pleasure to see Ms. Emeli Granados, a pleasant 69 year old female, asked to be seen in follow-up of micro hematuria and ANIKET.     4/3/24: The patient denies any gross hematuria.  Ms. Granados voids without difficulty.  She currently denies any dysuria, pyuria, hesitancy, intermittency, feelings of incomplete emptying, or any recent history of urinary tract infections or stones.    Worsening incontinence with coughing after hospitalization in Feb with COPD exacerbation.    Hematuria Risk Factors:  Age >40: Yes     Smoking history: no  Occupational exposure to chemicals or dyes (ie, benzenes, aromatic amines): no  History of urologic disorder or disease: no  History of irritative voiding symptoms: no  History of urinary tract infection: no  Analgesic abuse: no  History of pelvic irradiation: no    5/3/24: neg cysto, cytology and CT Urogram    TODAY 7/10/24:  3 mo follow-up.   Several medical issues in the interim. Did not attend PFPT.   ANIKET better and less bothersome.    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    took uterus and left the ovaries    Other chronic pain     Polycythemia 08/08/2012    resolved now     Past Surgical History:   Procedure Laterality Date    CARPAL TUNNEL RELEASE RT/LT Right 11/23/2021    and trigger finger and tendon repair    CARPAL TUNNEL RELEASE RT/LT Left 12/20/2021    and tendon repair    COLONOSCOPY  2004, 2012    repeat in 2022    COLONOSCOPY  11/24/2023    4 polyps - repeat in 3 years    CYSTOSCOPY      Dilatation of the esophagus once due to dysphagia and stricture  2003    EXCISE SOFT TISSUE SHOULDER Left 5/30/2024    Procedure: EXCISION LEFT SHOULDER MASS;  Surgeon: Yao Baker MD;  Location: [facility] OR    Ganglion cyst removal      HEMORRHOIDECTOMY BANDING      HYSTERECTOMY  1998    LAPAROSCOPIC CHOLECYSTECTOMY N/A 5/30/2024    Procedure: CHOLECYSTECTOMY, LAPAROSCOPIC;  Surgeon: Yao Baker MD;  Location: [facility] OR    LAPAROSCOPIC SALPINGO-OOPHORECTOMY Bilateral 10/23/2015    Procedure: LAPAROSCOPIC SALPINGO-OOPHORECTOMY;  Surgeon: Johnathan Steve MD;  Location: [facility] OR    subscapularous repair and biceps tendon repair  05/2022    Dr. Verma at [facility]    Thumb surgery Right     TONSILLECTOMY      XR WRIST SURGERY FLORI RIGHT Right 11/2023    surgery for DJD and bone graft done     Current Outpatient Medications   Medication Sig Dispense Refill    acetaminophen (TYLENOL) 500 MG tablet Take 1-2 tablets (500-1,000 mg) by mouth every 6 hours as needed for mild pain 30 tablet 0    albuterol (PROAIR HFA/PROVENTIL HFA/VENTOLIN HFA) 108 (90 Base) MCG/ACT inhaler INHALE 1 OR 2 puffs EVERY 6 HOURS as needed for wheezing. Strength: 108 (90 Base) MCG/ACT 8.5 g 5    albuterol (PROVENTIL HFA) 108 (90 Base) MCG/ACT inhaler Inhale 2 puffs into the lungs every 6 hours 8.5 g 0    albuterol (PROVENTIL) (2.5 MG/3ML) 0.083% neb solution INHALE 3 MILLILITERS (2.5 MG) BY NEBULIZATION ROUTE EVERY 6 HOURS AS NEEDED FOR SHORTNESS OF BREATH/DYSPNEA OR WHEEZING.  Strength: (2.5 MG/3ML) 0.083% (Patient not taking: Reported on 7/8/2024) 90 mL 5    ascorbic acid (VITAMIN C) 1000 MG TABS Take 1,000 mg by mouth daily      atorvastatin (LIPITOR) 10 MG tablet Take 1 tablet (10 mg) by mouth daily 90 tablet 1    CALCIUM PO Take 1 tablet by mouth daily      cyanocobalamin (VITAMIN B-12) 100 MCG tablet Take 100 mcg by mouth daily (Patient not taking: Reported on 7/8/2024)      cyclobenzaprine (FLEXERIL) 5 MG tablet TAKE 1 TABLET BY MOUTH TWICE DAILY AS NEEDED FOR MUSCLE SPASMS 30 tablet 0    diltiazem ER COATED BEADS (CARDIZEM CD/CARTIA XT) 120 MG 24 hr capsule Take 1 capsule (120 mg) by mouth every morning Hold for blood pressure <110 30 capsule 11    docusate sodium (COLACE) 100 MG capsule Take 1 capsule (100 mg) by mouth 2 times daily as needed for constipation (Patient not taking: Reported on 7/8/2024) 30 capsule 1    HYDROcodone-acetaminophen (NORCO)  MG per tablet Take 1 tablet by mouth daily as needed for severe pain 30 tablet 0    HYDROcodone-acetaminophen (NORCO) 5-325 MG tablet Take 1 tablet by mouth every 6 hours as needed for severe pain 8 tablet 0    ibuprofen (ADVIL/MOTRIN) 600 MG tablet Take 1 tablet (600 mg) by mouth every 8 hours as needed for inflammatory pain Take with food. Stop if blood in stool. (Patient not taking: Reported on 7/8/2024) 45 tablet 0    metFORMIN (GLUCOPHAGE XR) 500 MG 24 hr tablet Take 1 tablet (500 mg) by mouth daily (with dinner) 30 tablet 5    metroNIDAZOLE (FLAGYL) 500 MG tablet Take 1 tablet (500 mg) by mouth 2 times daily for 7 days 14 tablet 0    Multiple Vitamins-Minerals (MULTIVITAMIN OR) Take 1 tablet by mouth daily Per pt, uses ones without Iron      omeprazole (PRILOSEC) 20 MG DR capsule Take 1 capsule (20 mg) by mouth 2 times daily 180 capsule 1    oseltamivir (TAMIFLU) 75 MG capsule Take 1 capsule (75 mg) by mouth 2 times daily for 5 days 10 capsule 0    predniSONE (DELTASONE) 20 MG tablet Take 1 tablet (20 mg) by mouth 2 times  daily 10 tablet 0    traMADol (ULTRAM) 50 MG tablet Take 1 tablet (50 mg) by mouth every 6 hours as needed for severe pain 90 tablet 0    vitamin D3 (CHOLECALCIFEROL) 1000 units (25 mcg) tablet Take 1,000 Units by mouth daily      vitamin E 400 UNITS TABS Take 400 Units by mouth daily      zolpidem (AMBIEN) 5 MG tablet TAKE 1 TABLET (5 MG) BY MOUTH NIGHTLY AS NEEDED FOR SLEEP. DO NOT TAKE WITH TRAMADOL. MUST BE NDC # [address]-02 PER PT REQUEST. 30 tablet 5     Allergies   Allergen Reactions    Metaproterenol Sulfate Other (See Comments)     alupent inhaler-shaking    Percocet [Oxycodone-Acetaminophen] Itching     Has had it since the original episode and has not had any itching      FAMILY HISTORY: There is no reported history of genitourinary carcinoma.  There is no history of urolithiasis.      Social History     Socioeconomic History    Marital status:      Spouse name: Rossy Hu    Number of children: 3    Years of education: Not on file    Highest education level: Not on file   Occupational History    Occupation: work in [employer]     Employer: [employer]   Tobacco Use    Smoking status: Former     Current packs/day: 0.00     Average packs/day: 0.5 packs/day for 52.1 years (26.0 ttl pk-yrs)     Types: Cigarettes     Start date: 10/13/1967     Quit date: 2019     Years since quittin.6     Passive exposure: Never    Smokeless tobacco: Never    Tobacco comments:     quit 2019   Vaping Use    Vaping status: Never Used   Substance and Sexual Activity    Alcohol use: No     Comment: sober since 99    Drug use: No    Sexual activity: Not Currently     Partners: Male   Other Topics Concern    Parent/sibling w/ CABG, MI or angioplasty before 65F 55M? Yes   Social History Narrative    Not on file     Social Determinants of Health     Financial Resource Strain: Low Risk  (3/11/2024)    Financial Resource Strain     Within the past 12 months, have you or your family members you live with been  unable to get utilities (heat, electricity) when it was really needed?: No   Food Insecurity: Low Risk  (3/11/2024)    Food Insecurity     Within the past 12 months, did you worry that your food would run out before you got money to buy more?: No     Within the past 12 months, did the food you bought just not last and you didn t have money to get more?: No   Recent Concern: Food Insecurity - High Risk (2/7/2024)    Food Insecurity     Within the past 12 months, did you worry that your food would run out before you got money to buy more?: No     Within the past 12 months, did the food you bought just not last and you didn t have money to get more?: Yes   Transportation Needs: Low Risk  (3/11/2024)    Transportation Needs     Within the past 12 months, has lack of transportation kept you from medical appointments, getting your medicines, non-medical meetings or appointments, work, or from getting things that you need?: No   Physical Activity: Unknown (3/11/2024)    Exercise Vital Sign     Days of Exercise per Week: 3 days     Minutes of Exercise per Session: Not on file   Stress: No Stress Concern Present (3/11/2024)    [employer] [place] [employer] - Occupational Stress Questionnaire     Feeling of Stress : Only a little   Social Connections: Unknown (3/11/2024)    Social Connection and Isolation Panel [NHANES]     Frequency of Communication with Friends and Family: Not on file     Frequency of Social Gatherings with Friends and Family: Three times a week     Attends Anabaptism Services: Not on file     Active Member of Clubs or Organizations: No     Attends Club or Organization Meetings: Never     Marital Status: Living with partner   Interpersonal Safety: Low Risk  (5/15/2024)    Interpersonal Safety     Do you feel physically and emotionally safe where you currently live?: Yes     Within the past 12 months, have you been hit, slapped, kicked or otherwise physically hurt by someone?: No     Within the  past 12 months, have you been humiliated or emotionally abused in other ways by your partner or ex-partner?: No   Housing Stability: Low Risk  (3/11/2024)    Housing Stability     Do you have housing? : Yes     Are you worried about losing your housing?: No       PHYSICAL EXAM:   There were no vitals filed for this visit.  PSYCH: NAD  EYES: EOMI  MOUTH: MMM  NEURO: AAO x3    Admission on 03/27/2024, Discharged on 03/27/2024   Component Date Value Ref Range Status    Ventricular Rate 03/27/2024 91  BPM Final    Atrial Rate 03/27/2024 91  BPM Final    GA Interval 03/27/2024 136  ms Final    QRS Duration 03/27/2024 76  ms Final    QT 03/27/2024 340  ms Final    QTc 03/27/2024 418  ms Final    P Axis 03/27/2024 36  degrees Final    R AXIS 03/27/2024 -19  degrees Final    T Axis 03/27/2024 43  degrees Final    Interpretation ECG 03/27/2024    Final                    Value:Sinus rhythm  Low voltage QRS  Cannot rule out Anterior infarct , age undetermined  Abnormal ECG  When compared with ECG of 21-NOV-2023 13:17,  Vent. rate has increased BY  36 BPM  Confirmed by - EMERGENCY ROOM, PHYSICIAN ([id number]),  REHAN LUONG ([id number]) on 3/28/2024 7:05:30 AM      Troponin T, High Sensitivity 03/27/2024 6  <=14 ng/L Final    Either a High Sensitivity Troponin T baseline (0 hours) value = 100 ng/L, or an increase in High Sensitivity Troponin T = 7 ng/L at 2 hours compared to 0 hours (2-0 hours), suggests myocardial injury, and urgent clinical attention is required.    If the 2-0 hours increase is <7 ng/L, a High Sensitivity Troponin T result above gender-specific reference ranges warrants further evaluation.   Recommendations for further evaluation include correlation with clinical decision-making tool (e.g., HEART), a 3rd High Sensitivity Troponin T test 2 hours after the 2nd (a 20% change from baseline would represent concern), admission for observation, close PCC/cardiology follow-up, or urgent outpatient provocative  testing.    Sodium 03/27/2024 140  135 - 145 mmol/L Final    Reference intervals for this test were updated on 09/26/2023 to more accurately reflect our healthy population. There may be differences in the flagging of prior results with similar values performed with this method. Interpretation of those prior results can be made in the context of the updated reference intervals.     Potassium 03/27/2024 3.9  3.4 - 5.3 mmol/L Final    Chloride 03/27/2024 104  98 - 107 mmol/L Final    Carbon Dioxide (CO2) 03/27/2024 23  22 - 29 mmol/L Final    Anion Gap 03/27/2024 13  7 - 15 mmol/L Final    Urea Nitrogen 03/27/2024 17.1  8.0 - 23.0 mg/dL Final    Creatinine 03/27/2024 0.69  0.51 - 0.95 mg/dL Final    GFR Estimate 03/27/2024 >90  >60 mL/min/1.73m2 Final    Calcium 03/27/2024 9.4  8.8 - 10.2 mg/dL Final    Glucose 03/27/2024 145 (H)  70 - 99 mg/dL Final    WBC Count 03/27/2024 8.4  4.0 - 11.0 10e3/uL Final    RBC Count 03/27/2024 4.98  3.80 - 5.20 10e6/uL Final    Hemoglobin 03/27/2024 15.8 (H)  11.7 - 15.7 g/dL Final    Hematocrit 03/27/2024 47.4 (H)  35.0 - 47.0 % Final    MCV 03/27/2024 95  78 - 100 fL Final    MCH 03/27/2024 31.7  26.5 - 33.0 pg Final    MCHC 03/27/2024 33.3  31.5 - 36.5 g/dL Final    RDW 03/27/2024 14.4  10.0 - 15.0 % Final    Platelet Count 03/27/2024 261  150 - 450 10e3/uL Final    % Neutrophils 03/27/2024 68  % Final    % Lymphocytes 03/27/2024 24  % Final    % Monocytes 03/27/2024 7  % Final    % Eosinophils 03/27/2024 0  % Final    % Basophils 03/27/2024 0  % Final    % Immature Granulocytes 03/27/2024 1  % Final    NRBCs per 100 WBC 03/27/2024 0  <1 /100 Final    Absolute Neutrophils 03/27/2024 5.7  1.6 - 8.3 10e3/uL Final    Absolute Lymphocytes 03/27/2024 2.0  0.8 - 5.3 10e3/uL Final    Absolute Monocytes 03/27/2024 0.6  0.0 - 1.3 10e3/uL Final    Absolute Eosinophils 03/27/2024 0.0  0.0 - 0.7 10e3/uL Final    Absolute Basophils 03/27/2024 0.0  0.0 - 0.2 10e3/uL Final    Absolute Immature  Granulocytes 03/27/2024 0.1  <=0.4 10e3/uL Final    Absolute NRBCs 03/27/2024 0.0  10e3/uL Final    Hold Specimen 03/27/2024 [facility]   Final    Hold Specimen 03/27/2024 [facility]   Final    Troponin T, High Sensitivity 03/27/2024 <6  <=14 ng/L Final    Either a High Sensitivity Troponin T baseline (0 hours) value = 100 ng/L, or an increase in High Sensitivity Troponin T = 7 ng/L at 2 hours compared to 0 hours (2-0 hours), suggests myocardial injury, and urgent clinical attention is required.    If the 2-0 hours increase is <7 ng/L, a High Sensitivity Troponin T result above gender-specific reference ranges warrants further evaluation.   Recommendations for further evaluation include correlation with clinical decision-making tool (e.g., HEART), a 3rd High Sensitivity Troponin T test 2 hours after the 2nd (a 20% change from baseline would represent concern), admission for observation, close PCC/cardiology follow-up, or urgent outpatient provocative testing.       IMAGING: none    ASSESSMENT and PLAN:    69 year old female with high risk micro hematuria (Benign eval 2024) and ANIKET (improved).  -Follow-up for repeat hematuria eval in 3-5 years if this persists on annual UAs with micro (Can be done with annual exam with PCP). Return sooner with worsening microhematuria or gross hematuria.  -consider PFPT eval if ANIKET becomes bothersome.       Megan Vargas PA-C  [facility]  20 minutes spent on the date of the encounter doing chart review, review of outside records, review of test results, interpretation of tests, patient visit and documentation.

## 2024-07-10 NOTE — PROGRESS NOTES
Virtual Visit Details    Type of service:  Video Visit   Video Start Time: 10:04 AM  Video End Time: 10:14 AM    Originating Location (pt. Location): Home    Distant Location (provider location):  On-site  Platform used for Video Visit: Jenifer    CC: Hematuria.    HPI: It is a pleasure to see Ms. Emeli Granados, a pleasant 69 year old female, asked to be seen in follow-up of micro hematuria and ANIKET.     4/3/24: The patient denies any gross hematuria.  Ms. Granados voids without difficulty.  She currently denies any dysuria, pyuria, hesitancy, intermittency, feelings of incomplete emptying, or any recent history of urinary tract infections or stones.    Worsening incontinence with coughing after hospitalization in Feb with COPD exacerbation.    Hematuria Risk Factors:  Age >40: Yes     Smoking history: no  Occupational exposure to chemicals or dyes (ie, benzenes, aromatic amines): no  History of urologic disorder or disease: no  History of irritative voiding symptoms: no  History of urinary tract infection: no  Analgesic abuse: no  History of pelvic irradiation: no    5/3/24: neg cysto, cytology and CT Urogram    TODAY 7/10/24:  3 mo follow-up.   Several medical issues in the interim. Did not attend PFPT.   ANIKET better and less bothersome.    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    took uterus and left the ovaries    Other chronic pain     Polycythemia 08/08/2012    resolved now     Past Surgical History:   Procedure Laterality Date    CARPAL TUNNEL RELEASE RT/LT Right 11/23/2021    and trigger finger and tendon repair    CARPAL TUNNEL RELEASE RT/LT Left  12/20/2021    and tendon repair    COLONOSCOPY  2004, 2012    repeat in 2022    COLONOSCOPY  11/24/2023    4 polyps - repeat in 3 years    CYSTOSCOPY      Dilatation of the esophagus once due to dysphagia and stricture  2003    EXCISE SOFT TISSUE SHOULDER Left 5/30/2024    Procedure: EXCISION LEFT SHOULDER MASS;  Surgeon: Yao Baker MD;  Location: [facility] OR    Ganglion cyst removal      HEMORRHOIDECTOMY BANDING      HYSTERECTOMY  1998    LAPAROSCOPIC CHOLECYSTECTOMY N/A 5/30/2024    Procedure: CHOLECYSTECTOMY, LAPAROSCOPIC;  Surgeon: Yao Baker MD;  Location: [facility] OR    LAPAROSCOPIC SALPINGO-OOPHORECTOMY Bilateral 10/23/2015    Procedure: LAPAROSCOPIC SALPINGO-OOPHORECTOMY;  Surgeon: Johnathan Steve MD;  Location: [facility] OR    subscapularous repair and biceps tendon repair  05/2022    Dr. Verma at [facility]    Thumb surgery Right     TONSILLECTOMY      XR WRIST SURGERY FLORI RIGHT Right 11/2023    surgery for DJD and bone graft done     Current Outpatient Medications   Medication Sig Dispense Refill    acetaminophen (TYLENOL) 500 MG tablet Take 1-2 tablets (500-1,000 mg) by mouth every 6 hours as needed for mild pain 30 tablet 0    albuterol (PROAIR HFA/PROVENTIL HFA/VENTOLIN HFA) 108 (90 Base) MCG/ACT inhaler INHALE 1 OR 2 puffs EVERY 6 HOURS as needed for wheezing. Strength: 108 (90 Base) MCG/ACT 8.5 g 5    albuterol (PROVENTIL HFA) 108 (90 Base) MCG/ACT inhaler Inhale 2 puffs into the lungs every 6 hours 8.5 g 0    albuterol (PROVENTIL) (2.5 MG/3ML) 0.083% neb solution INHALE 3 MILLILITERS (2.5 MG) BY NEBULIZATION ROUTE EVERY 6 HOURS AS NEEDED FOR SHORTNESS OF BREATH/DYSPNEA OR WHEEZING. Strength: (2.5 MG/3ML) 0.083% (Patient not taking: Reported on 7/8/2024) 90 mL 5    ascorbic acid (VITAMIN C) 1000 MG TABS Take 1,000 mg by mouth daily      atorvastatin (LIPITOR) 10 MG tablet Take 1 tablet (10 mg) by mouth daily 90 tablet 1    CALCIUM PO Take 1 tablet by mouth daily      cyanocobalamin (VITAMIN B-12)  100 MCG tablet Take 100 mcg by mouth daily (Patient not taking: Reported on 7/8/2024)      cyclobenzaprine (FLEXERIL) 5 MG tablet TAKE 1 TABLET BY MOUTH TWICE DAILY AS NEEDED FOR MUSCLE SPASMS 30 tablet 0    diltiazem ER COATED BEADS (CARDIZEM CD/CARTIA XT) 120 MG 24 hr capsule Take 1 capsule (120 mg) by mouth every morning Hold for blood pressure <110 30 capsule 11    docusate sodium (COLACE) 100 MG capsule Take 1 capsule (100 mg) by mouth 2 times daily as needed for constipation (Patient not taking: Reported on 7/8/2024) 30 capsule 1    HYDROcodone-acetaminophen (NORCO)  MG per tablet Take 1 tablet by mouth daily as needed for severe pain 30 tablet 0    HYDROcodone-acetaminophen (NORCO) 5-325 MG tablet Take 1 tablet by mouth every 6 hours as needed for severe pain 8 tablet 0    ibuprofen (ADVIL/MOTRIN) 600 MG tablet Take 1 tablet (600 mg) by mouth every 8 hours as needed for inflammatory pain Take with food. Stop if blood in stool. (Patient not taking: Reported on 7/8/2024) 45 tablet 0    metFORMIN (GLUCOPHAGE XR) 500 MG 24 hr tablet Take 1 tablet (500 mg) by mouth daily (with dinner) 30 tablet 5    metroNIDAZOLE (FLAGYL) 500 MG tablet Take 1 tablet (500 mg) by mouth 2 times daily for 7 days 14 tablet 0    Multiple Vitamins-Minerals (MULTIVITAMIN OR) Take 1 tablet by mouth daily Per pt, uses ones without Iron      omeprazole (PRILOSEC) 20 MG DR capsule Take 1 capsule (20 mg) by mouth 2 times daily 180 capsule 1    oseltamivir (TAMIFLU) 75 MG capsule Take 1 capsule (75 mg) by mouth 2 times daily for 5 days 10 capsule 0    predniSONE (DELTASONE) 20 MG tablet Take 1 tablet (20 mg) by mouth 2 times daily 10 tablet 0    traMADol (ULTRAM) 50 MG tablet Take 1 tablet (50 mg) by mouth every 6 hours as needed for severe pain 90 tablet 0    vitamin D3 (CHOLECALCIFEROL) 1000 units (25 mcg) tablet Take 1,000 Units by mouth daily      vitamin E 400 UNITS TABS Take 400 Units by mouth daily      zolpidem (AMBIEN) 5 MG  tablet TAKE 1 TABLET (5 MG) BY MOUTH NIGHTLY AS NEEDED FOR SLEEP. DO NOT TAKE WITH TRAMADOL. MUST BE NDC # [address]-68 PER PT REQUEST. 30 tablet 5     Allergies   Allergen Reactions    Metaproterenol Sulfate Other (See Comments)     alupent inhaler-shaking    Percocet [Oxycodone-Acetaminophen] Itching     Has had it since the original episode and has not had any itching      FAMILY HISTORY: There is no reported history of genitourinary carcinoma.  There is no history of urolithiasis.      Social History     Socioeconomic History    Marital status:      Spouse name: Boyfriend - Fritz    Number of children: 3    Years of education: Not on file    Highest education level: Not on file   Occupational History    Occupation: work in [employer]     Employer: [employer]   Tobacco Use    Smoking status: Former     Current packs/day: 0.00     Average packs/day: 0.5 packs/day for 52.1 years (26.0 ttl pk-yrs)     Types: Cigarettes     Start date: 10/13/1967     Quit date: 2019     Years since quittin.6     Passive exposure: Never    Smokeless tobacco: Never    Tobacco comments:     quit 2019   Vaping Use    Vaping status: Never Used   Substance and Sexual Activity    Alcohol use: No     Comment: sober since 99    Drug use: No    Sexual activity: Not Currently     Partners: Male   Other Topics Concern    Parent/sibling w/ CABG, MI or angioplasty before 65F 55M? Yes   Social History Narrative    Not on file     Social Determinants of Health     Financial Resource Strain: Low Risk  (3/11/2024)    Financial Resource Strain     Within the past 12 months, have you or your family members you live with been unable to get utilities (heat, electricity) when it was really needed?: No   Food Insecurity: Low Risk  (3/11/2024)    Food Insecurity     Within the past 12 months, did you worry that your food would run out before you got money to buy more?: No     Within the past 12 months, did the food you bought just not last  and you didn t have money to get more?: No   Recent Concern: Food Insecurity - High Risk (2/7/2024)    Food Insecurity     Within the past 12 months, did you worry that your food would run out before you got money to buy more?: No     Within the past 12 months, did the food you bought just not last and you didn t have money to get more?: Yes   Transportation Needs: Low Risk  (3/11/2024)    Transportation Needs     Within the past 12 months, has lack of transportation kept you from medical appointments, getting your medicines, non-medical meetings or appointments, work, or from getting things that you need?: No   Physical Activity: Unknown (3/11/2024)    Exercise Vital Sign     Days of Exercise per Week: 3 days     Minutes of Exercise per Session: Not on file   Stress: No Stress Concern Present (3/11/2024)    [employer] [place] [employer] - Occupational Stress Questionnaire     Feeling of Stress : Only a little   Social Connections: Unknown (3/11/2024)    Social Connection and Isolation Panel [NHANES]     Frequency of Communication with Friends and Family: Not on file     Frequency of Social Gatherings with Friends and Family: Three times a week     Attends Confucianism Services: Not on file     Active Member of Clubs or Organizations: No     Attends Club or Organization Meetings: Never     Marital Status: Living with partner   Interpersonal Safety: Low Risk  (5/15/2024)    Interpersonal Safety     Do you feel physically and emotionally safe where you currently live?: Yes     Within the past 12 months, have you been hit, slapped, kicked or otherwise physically hurt by someone?: No     Within the past 12 months, have you been humiliated or emotionally abused in other ways by your partner or ex-partner?: No   Housing Stability: Low Risk  (3/11/2024)    Housing Stability     Do you have housing? : Yes     Are you worried about losing your housing?: No       PHYSICAL EXAM:   There were no vitals filed for this  visit.  PSYCH: NAD  EYES: EOMI  MOUTH: MMM  NEURO: AAO x3    Admission on 03/27/2024, Discharged on 03/27/2024   Component Date Value Ref Range Status    Ventricular Rate 03/27/2024 91  BPM Final    Atrial Rate 03/27/2024 91  BPM Final    FL Interval 03/27/2024 136  ms Final    QRS Duration 03/27/2024 76  ms Final    QT 03/27/2024 340  ms Final    QTc 03/27/2024 418  ms Final    P Axis 03/27/2024 36  degrees Final    R AXIS 03/27/2024 -19  degrees Final    T Axis 03/27/2024 43  degrees Final    Interpretation ECG 03/27/2024    Final                    Value:Sinus rhythm  Low voltage QRS  Cannot rule out Anterior infarct , age undetermined  Abnormal ECG  When compared with ECG of 21-NOV-2023 13:17,  Vent. rate has increased BY  36 BPM  Confirmed by - EMERGENCY ROOM, PHYSICIAN ([id number]),  REHAN LUONG ([id number]) on 3/28/2024 7:05:30 AM      Troponin T, High Sensitivity 03/27/2024 6  <=14 ng/L Final    Either a High Sensitivity Troponin T baseline (0 hours) value = 100 ng/L, or an increase in High Sensitivity Troponin T = 7 ng/L at 2 hours compared to 0 hours (2-0 hours), suggests myocardial injury, and urgent clinical attention is required.    If the 2-0 hours increase is <7 ng/L, a High Sensitivity Troponin T result above gender-specific reference ranges warrants further evaluation.   Recommendations for further evaluation include correlation with clinical decision-making tool (e.g., HEART), a 3rd High Sensitivity Troponin T test 2 hours after the 2nd (a 20% change from baseline would represent concern), admission for observation, close PCC/cardiology follow-up, or urgent outpatient provocative testing.    Sodium 03/27/2024 140  135 - 145 mmol/L Final    Reference intervals for this test were updated on 09/26/2023 to more accurately reflect our healthy population. There may be differences in the flagging of prior results with similar values performed with this method. Interpretation of those prior results can be  made in the context of the updated reference intervals.     Potassium 03/27/2024 3.9  3.4 - 5.3 mmol/L Final    Chloride 03/27/2024 104  98 - 107 mmol/L Final    Carbon Dioxide (CO2) 03/27/2024 23  22 - 29 mmol/L Final    Anion Gap 03/27/2024 13  7 - 15 mmol/L Final    Urea Nitrogen 03/27/2024 17.1  8.0 - 23.0 mg/dL Final    Creatinine 03/27/2024 0.69  0.51 - 0.95 mg/dL Final    GFR Estimate 03/27/2024 >90  >60 mL/min/1.73m2 Final    Calcium 03/27/2024 9.4  8.8 - 10.2 mg/dL Final    Glucose 03/27/2024 145 (H)  70 - 99 mg/dL Final    WBC Count 03/27/2024 8.4  4.0 - 11.0 10e3/uL Final    RBC Count 03/27/2024 4.98  3.80 - 5.20 10e6/uL Final    Hemoglobin 03/27/2024 15.8 (H)  11.7 - 15.7 g/dL Final    Hematocrit 03/27/2024 47.4 (H)  35.0 - 47.0 % Final    MCV 03/27/2024 95  78 - 100 fL Final    MCH 03/27/2024 31.7  26.5 - 33.0 pg Final    MCHC 03/27/2024 33.3  31.5 - 36.5 g/dL Final    RDW 03/27/2024 14.4  10.0 - 15.0 % Final    Platelet Count 03/27/2024 261  150 - 450 10e3/uL Final    % Neutrophils 03/27/2024 68  % Final    % Lymphocytes 03/27/2024 24  % Final    % Monocytes 03/27/2024 7  % Final    % Eosinophils 03/27/2024 0  % Final    % Basophils 03/27/2024 0  % Final    % Immature Granulocytes 03/27/2024 1  % Final    NRBCs per 100 WBC 03/27/2024 0  <1 /100 Final    Absolute Neutrophils 03/27/2024 5.7  1.6 - 8.3 10e3/uL Final    Absolute Lymphocytes 03/27/2024 2.0  0.8 - 5.3 10e3/uL Final    Absolute Monocytes 03/27/2024 0.6  0.0 - 1.3 10e3/uL Final    Absolute Eosinophils 03/27/2024 0.0  0.0 - 0.7 10e3/uL Final    Absolute Basophils 03/27/2024 0.0  0.0 - 0.2 10e3/uL Final    Absolute Immature Granulocytes 03/27/2024 0.1  <=0.4 10e3/uL Final    Absolute NRBCs 03/27/2024 0.0  10e3/uL Final    Hold Specimen 03/27/2024 JI   Final    Hold Specimen 03/27/2024 [facility]   Final    Troponin T, High Sensitivity 03/27/2024 <6  <=14 ng/L Final    Either a High Sensitivity Troponin T baseline (0 hours) value = 100 ng/L, or an  increase in High Sensitivity Troponin T = 7 ng/L at 2 hours compared to 0 hours (2-0 hours), suggests myocardial injury, and urgent clinical attention is required.    If the 2-0 hours increase is <7 ng/L, a High Sensitivity Troponin T result above gender-specific reference ranges warrants further evaluation.   Recommendations for further evaluation include correlation with clinical decision-making tool (e.g., HEART), a 3rd High Sensitivity Troponin T test 2 hours after the 2nd (a 20% change from baseline would represent concern), admission for observation, close PCC/cardiology follow-up, or urgent outpatient provocative testing.       IMAGING: none    ASSESSMENT and PLAN:    69 year old female with high risk micro hematuria (Benign eval 2024) and ANIKET (improved).  -Follow-up for repeat hematuria eval in 3-5 years if this persists on annual UAs with micro (Can be done with annual exam with PCP). Return sooner with worsening microhematuria or gross hematuria.  -consider PFPT eval if ANIKET becomes bothersome.       Megan Vargas PA-C  [facility]  20 minutes spent on the date of the encounter doing chart review, review of outside records, review of test results, interpretation of tests, patient visit and documentation.

## 2024-07-10 NOTE — TELEPHONE ENCOUNTER
- Upon chart review, patient has already been seen by provider at [facility] for Fever + Breathing Problems. Also see telephone call from yesterday 7/9/24.     - Patient states that she has everything that she needs, and has follow-up appointments already scheduled for next week. No further action needed at this time.    Addy Benson RN on 7/10/2024 at 1:39 PM

## 2024-07-17 ENCOUNTER — TELEPHONE (OUTPATIENT)
Dept: FAMILY MEDICINE | Facility: CLINIC | Age: 69
End: 2024-07-17

## 2024-07-17 ENCOUNTER — OFFICE VISIT (OUTPATIENT)
Dept: FAMILY MEDICINE | Facility: CLINIC | Age: 69
End: 2024-07-17
Payer: COMMERCIAL

## 2024-07-17 VITALS
SYSTOLIC BLOOD PRESSURE: 125 MMHG | OXYGEN SATURATION: 93 % | DIASTOLIC BLOOD PRESSURE: 71 MMHG | BODY MASS INDEX: 30.6 KG/M2 | WEIGHT: 151.8 LBS | HEART RATE: 106 BPM | HEIGHT: 59 IN | TEMPERATURE: 98.7 F | RESPIRATION RATE: 20 BRPM

## 2024-07-17 DIAGNOSIS — G89.29 CHRONIC BILATERAL LOW BACK PAIN, UNSPECIFIED WHETHER SCIATICA PRESENT: ICD-10-CM

## 2024-07-17 DIAGNOSIS — B96.89 BACTERIAL VAGINOSIS: ICD-10-CM

## 2024-07-17 DIAGNOSIS — F11.20 CONTINUOUS OPIOID DEPENDENCE (H): ICD-10-CM

## 2024-07-17 DIAGNOSIS — B37.0 THRUSH: Primary | ICD-10-CM

## 2024-07-17 DIAGNOSIS — G89.29 OTHER CHRONIC PAIN: ICD-10-CM

## 2024-07-17 DIAGNOSIS — M54.50 CHRONIC BILATERAL LOW BACK PAIN, UNSPECIFIED WHETHER SCIATICA PRESENT: ICD-10-CM

## 2024-07-17 DIAGNOSIS — J02.9 SORE THROAT: ICD-10-CM

## 2024-07-17 DIAGNOSIS — N76.0 BACTERIAL VAGINOSIS: ICD-10-CM

## 2024-07-17 LAB
DEPRECATED S PYO AG THROAT QL EIA: NEGATIVE
GROUP A STREP BY PCR: NOT DETECTED

## 2024-07-17 PROCEDURE — 87651 STREP A DNA AMP PROBE: CPT | Performed by: PHYSICIAN ASSISTANT

## 2024-07-17 PROCEDURE — 99214 OFFICE O/P EST MOD 30 MIN: CPT | Performed by: PHYSICIAN ASSISTANT

## 2024-07-17 RX ORDER — HYDROCODONE BITARTRATE AND ACETAMINOPHEN 10; 325 MG/1; MG/1
1 TABLET ORAL DAILY PRN
Qty: 30 TABLET | Refills: 0 | Status: SHIPPED | OUTPATIENT
Start: 2024-07-17 | End: 2024-08-12

## 2024-07-17 RX ORDER — CLOTRIMAZOLE 10 MG/1
10 LOZENGE ORAL 4 TIMES DAILY
Qty: 40 LOZENGE | Refills: 0 | Status: SHIPPED | OUTPATIENT
Start: 2024-07-17 | End: 2024-07-27

## 2024-07-17 ASSESSMENT — PAIN SCALES - GENERAL: PAINLEVEL: EXTREME PAIN (8)

## 2024-07-17 NOTE — TELEPHONE ENCOUNTER
Please call [facility] to see if they have the hydrocodone/acetaminophen on file from 7/1/24?  shows patient did not  and she is out now. If they have it can then fill it. If the do not have it confirm when patient last picked it up. There is a fill from Urgent Care 7/9/24. I can fill if nothing on file and timing since last prescribed/picked up is appropriate.

## 2024-07-17 NOTE — TELEPHONE ENCOUNTER
Called [facility] and spoke to Fab.  They do not have any RX on file from 7/1/24.  She did get the short supply from .  T'd up.  Please advise.  Elisabeth Freeman RN

## 2024-07-17 NOTE — PROGRESS NOTES
Assessment & Plan     Thrush  Exam shows thrush. Strep testing negative.   She had recent antibiotic use that likely triggered it.  - clotrimazole (MYCELEX) 10 MG lozenge; Place 1 lozenge (10 mg) inside cheek 4 times daily for 10 days    Sore throat  Strep testing negative. Thrush likely cause for symptoms.  - Streptococcus A Rapid Screen w/Reflex to PCR - Clinic Collect  - Group A Streptococcus PCR Throat Swab    Bacterial vaginosis  On Flagyl currently treated by [facility] provider.    Chronic bilateral low back pain, unspecified whether sciatica present  It appears July 1st hydrocodone was sent but  indicates patient did not pick it up. She did have a fill from urgent care. Due for refill. See phone encounter. We confirmed with pharmacy that patient did not  July 1st hydrocodone sent and they did not have it still on file. Sent for patient in phone encounter.    F11.2 - Continuous opioid dependence (H)      Other chronic pain      Subjective   Emeli is a 69 year old, presenting for the following health issues:  Consult (Fever the last four days)      7/17/2024    10:23 AM   Additional Questions   Roomed by marcelina fry   Accompanied by self     History of Present Illness       Back Pain:  She presents for follow up of back pain. Patient's back pain is a chronic problem.  Location of back pain:  Right middle of back, left side of neck, right buttock, right hip and left hip  Description of back pain: fullness, shooting and stabbing  Back pain spreads: right buttocks and right thigh    Since patient first noticed back pain, pain is: unchanged  Does back pain interfere with her job:  Yes       COPD:  She presents for follow up of COPD.   Overall, COPD symptoms are slightly worse since last visit. She has same as usual fatigue or shortness of breath with exertion and less than usual shortness of breath at rest.  She does not cough  and does not have change in sputum. Patient has had recent fever. She can walk 1-2  "miles without stopping to rest. She can walk 1 flights of stairs without resting. The patient has had no ED, urgent care, or hospital admissions because of COPD since the last visit.     Diabetes:   She presents for follow up of diabetes.    She is not checking blood glucose.        She is concerned about other.   She is having weight gain.            Hypothyroidism:     Since last visit, patient describes the following symptoms::  Constipation and Weight gain    Weight gain::  No weight gain    She eats 2-3 servings of fruits and vegetables daily.She consumes 2 sweetened beverage(s) daily.She exercises with enough effort to increase her heart rate 10 to 19 minutes per day.  She exercises with enough effort to increase her heart rate 3 or less days per week.   She is taking medications regularly.       Acute Illness  Acute illness concerns: fevers  Onset/Duration: 1.5 weeks  Symptoms:  Fever: YES- improving (100.4 F)  Chills/Sweats: YES  Headache (location?): YES  Sinus Pressure: No  Conjunctivitis:  No  Ear Pain: no  Rhinorrhea: No  Congestion: No  Sore Throat: YES  Cough: no  Wheeze: No  Decreased Appetite: No  Nausea: No  Vomiting: No  Diarrhea: No  Dysuria/Freq.: No  Dysuria or Hematuria: No  Fatigue/Achiness: YES  Sick/Strep Exposure: No  - patient reported of having the flu 1-2 weeks ago  Therapies tried and outcome: None    She is out of the hydrocodone/acetaminophen, last filled 6/5/24 (had short course 7/9/24 from urgent care)          Objective    /71 (BP Location: Right arm, Patient Position: Chair, Cuff Size: Adult Regular)   Pulse 106   Temp 98.7  F (37.1  C) (Oral)   Resp 20   Ht 1.499 m (4' 11\")   Wt 68.9 kg (151 lb 12.8 oz)   LMP  (LMP Unknown)   SpO2 93%   BMI 30.66 kg/m    Body mass index is 30.66 kg/m .  Physical Exam   GENERAL: No acute distress  HEENT: Normocephalic, PERRL, Canals patent, bilateral TM's non-erythematous and non-bulging. Posterior oropharynx non-erythematous. Tongue " with white colored discharge.  CARDIAC: Regular rate and rhythm. No murmurs.  PULMONARY: Lungs are clear to auscultation bilaterally. No wheezes, rhonchi or crackles.  NEURO: Alert and non-focal      Results for orders placed or performed in visit on 07/17/24 (from the past 24 hour(s))   Streptococcus A Rapid Screen w/Reflex to PCR - Clinic Collect    Specimen: Throat; Swab   Result Value Ref Range    Group A Strep antigen Negative Negative     *Note: Due to a large number of results and/or encounters for the requested time period, some results have not been displayed. A complete set of results can be found in Results Review.           Signed Electronically by: Gina Arias PA-C

## 2024-08-03 DIAGNOSIS — M54.50 ACUTE BILATERAL LOW BACK PAIN WITHOUT SCIATICA: ICD-10-CM

## 2024-08-03 DIAGNOSIS — G89.29 OTHER CHRONIC PAIN: ICD-10-CM

## 2024-08-05 RX ORDER — TRAMADOL HYDROCHLORIDE 50 MG/1
50 TABLET ORAL EVERY 6 HOURS PRN
Qty: 90 TABLET | Refills: 0 | Status: SHIPPED | OUTPATIENT
Start: 2024-08-05 | End: 2024-08-29

## 2024-08-05 RX ORDER — CYCLOBENZAPRINE HCL 5 MG
5 TABLET ORAL 2 TIMES DAILY PRN
Qty: 30 TABLET | Refills: 0 | Status: SHIPPED | OUTPATIENT
Start: 2024-08-05 | End: 2024-08-29

## 2024-08-12 DIAGNOSIS — G89.29 CHRONIC BILATERAL LOW BACK PAIN, UNSPECIFIED WHETHER SCIATICA PRESENT: ICD-10-CM

## 2024-08-12 DIAGNOSIS — G89.29 OTHER CHRONIC PAIN: ICD-10-CM

## 2024-08-12 DIAGNOSIS — F11.20 CONTINUOUS OPIOID DEPENDENCE (H): ICD-10-CM

## 2024-08-12 DIAGNOSIS — M54.50 CHRONIC BILATERAL LOW BACK PAIN, UNSPECIFIED WHETHER SCIATICA PRESENT: ICD-10-CM

## 2024-08-12 RX ORDER — HYDROCODONE BITARTRATE AND ACETAMINOPHEN 10; 325 MG/1; MG/1
1 TABLET ORAL DAILY PRN
Qty: 30 TABLET | Refills: 0 | Status: SHIPPED | OUTPATIENT
Start: 2024-08-16 | End: 2024-09-09

## 2024-08-16 ENCOUNTER — OFFICE VISIT (OUTPATIENT)
Dept: CARDIOLOGY | Facility: CLINIC | Age: 69
End: 2024-08-16
Payer: COMMERCIAL

## 2024-08-16 VITALS
WEIGHT: 155.7 LBS | OXYGEN SATURATION: 96 % | HEIGHT: 59 IN | HEART RATE: 80 BPM | BODY MASS INDEX: 31.39 KG/M2 | DIASTOLIC BLOOD PRESSURE: 79 MMHG | SYSTOLIC BLOOD PRESSURE: 122 MMHG

## 2024-08-16 DIAGNOSIS — I47.10 SVT (SUPRAVENTRICULAR TACHYCARDIA) (H): Primary | ICD-10-CM

## 2024-08-16 PROCEDURE — 99214 OFFICE O/P EST MOD 30 MIN: CPT | Mod: 24 | Performed by: INTERNAL MEDICINE

## 2024-08-16 NOTE — PATIENT INSTRUCTIONS
If you have a device related question, please call [phone].    Plan:  1. Medication changes: none    2. We will schedule you for an SVT ablation in the near future.     It was great seeing you today!    Haven Manley PA-C  Physician Assistant  [facility]

## 2024-08-16 NOTE — LETTER
8/16/2024    Emma Byrne MD  [address] [facility] [address]    RE: Emeli Granados       Dear Colleague,     I had the pleasure of seeing Emeli Granados in the [facility].  EP Cardiology Clinic Progress Note  Emeli Granados MRN# [id number]   YOB: 1955 Age: 69 year old   Primary Cardiologist: Dr. Dominguez (EP) Reason for visit: EP follow-up             Assessment and Plan:   Emeli Granados is a very pleasant 69 year old female who is here today for EP follow-up.      1.  Short RP SVT, symptomatic, with episodes increasing in frequency and duration.  Currently on diltiazem 120 mg once daily.  Patient interested in proceeding with EP study + SVT ablation, as previously discussed with Dr. Dominguez.     Changes today: none    Proceed with EP study + possible SVT ablation with Dr. Dominguez. Orders placed. I informed patient we will be reaching out to her in the near future re: scheduling. She should not take her diltiazem the day of her procedure.     We discussed the risks and benefits of an electrophysiology study and possible ablation, including but not limited to the use of conscious sedation and need for a  upon discharge, peripheral vessel injury and discomfort, heart attack, death, stroke, cardiac puncture and/or tamponade, pneumothorax, [id number]-arrhythmias requiring further treatment and damage to existing electrical structures that may require permanent pacemaker implantation. The patient voiced understanding and is willing to proceed.    Haven Manley PA-C  [facility] - [facility]  Pager: [phone]          History of Presenting Illness:    Emeli Granados is a very pleasant 69 year old female with a history of emphysema, diverticulosis, osteopenia, cervical cancer, and SVT.    For the past few years, patient has had several sensations of racing heart prompting presentation to urgent care or the emergency room.  However, by the time she  would arrive, the sensation had already resolved.    On 10/12/2022, patient developed a similar sensation that lasted longer than usual.  In the ED, she was found to be in SVT at 186 bpm.  ECG consistent with short RP tachycardia.  She was admitted for observation overnight and underwent echocardiogram that revealed normal LVEF 60 to 65%, normal RV size and function, and no hemodynamically significant valvular disease.  She also underwent stress echocardiogram that was negative for ischemia.  She was discharged on diltiazem  mg once daily and did well initially.    More recently, she has began having recurrent episodes of SVT despite ongoing medical therapy.  She was seen in clinic 4/2024 by Dr. Dominguez who discussed options including increasing diltiazem versus pursuing catheter ablation.  Patient was unsure about how she would like to proceed and was advised to contact the clinic once she reached a decision.  Additionally, repeat Zio patch monitor was completed 4/2024 revealing 8 SVT runs, longest lasting 10 seconds in duration.    Patient is here today for EP follow-up as she has been struggling with more frequent SVT episodes.  She had 2 episodes in July that were extremely bothersome, lasting 2 to 5 minutes in duration before subsiding.  With each episode, she has sensation of racing heart and diaphoresis as well as a strange sensation in her chest.  The symptoms have been consistent with each episode since 2022.  She is wondering about proceeding with an SVT ablation.        Denies chest pain, lightheadedness, dizziness, near-syncope or syncope.  No orthopnea or PND.    She has been taking her diltiazem daily as prescribed but would like to get off of this medication.    No tobacco use since quitting 11/2019.      Social History       Social History     Socioeconomic History     Marital status:      Spouse name: Boyfriend Aydee Hu     Number of children: 3     Years of education: Not on file      Highest education level: Not on file   Occupational History     Occupation: work in [employer]     Employer: [employer]   Tobacco Use     Smoking status: Former     Current packs/day: 0.00     Average packs/day: 0.5 packs/day for 52.1 years (26.0 ttl pk-yrs)     Types: Cigarettes     Start date: 10/13/1967     Quit date: 2019     Years since quittin.7     Passive exposure: Never     Smokeless tobacco: Never     Tobacco comments:     quit 2019   Vaping Use     Vaping status: Never Used   Substance and Sexual Activity     Alcohol use: No     Comment: sober since 99     Drug use: No     Sexual activity: Not Currently     Partners: Male   Other Topics Concern     Parent/sibling w/ CABG, MI or angioplasty before 65F 55M? Yes   Social History Narrative     Not on file     Social Determinants of Health     Financial Resource Strain: Low Risk  (3/11/2024)    Financial Resource Strain      Within the past 12 months, have you or your family members you live with been unable to get utilities (heat, electricity) when it was really needed?: No   Food Insecurity: Low Risk  (3/11/2024)    Food Insecurity      Within the past 12 months, did you worry that your food would run out before you got money to buy more?: No      Within the past 12 months, did the food you bought just not last and you didn t have money to get more?: No   Recent Concern: Food Insecurity - High Risk (2024)    Food Insecurity      Within the past 12 months, did you worry that your food would run out before you got money to buy more?: No      Within the past 12 months, did the food you bought just not last and you didn t have money to get more?: Yes   Transportation Needs: Low Risk  (3/11/2024)    Transportation Needs      Within the past 12 months, has lack of transportation kept you from medical appointments, getting your medicines, non-medical meetings or appointments, work, or from getting things that you need?: No   Physical Activity: Unknown  "(3/11/2024)    Exercise Vital Sign      Days of Exercise per Week: 3 days      Minutes of Exercise per Session: Not on file   Stress: No Stress Concern Present (3/11/2024)    [employer] - Occupational Stress Questionnaire      Feeling of Stress : Only a little   Social Connections: Unknown (3/11/2024)    Social Connection and Isolation Panel [NHANES]      Frequency of Communication with Friends and Family: Not on file      Frequency of Social Gatherings with Friends and Family: Three times a week      Attends Episcopal Services: Not on file      Active Member of Clubs or Organizations: No      Attends Club or Organization Meetings: Never      Marital Status: Living with partner   Interpersonal Safety: Low Risk  (5/15/2024)    Interpersonal Safety      Do you feel physically and emotionally safe where you currently live?: Yes      Within the past 12 months, have you been hit, slapped, kicked or otherwise physically hurt by someone?: No      Within the past 12 months, have you been humiliated or emotionally abused in other ways by your partner or ex-partner?: No   Housing Stability: Low Risk  (3/11/2024)    Housing Stability      Do you have housing? : Yes      Are you worried about losing your housing?: No            Review of Systems:   Please see HPI         Physical Exam:   Vitals: /79 (BP Location: Right arm, Patient Position: Sitting, Cuff Size: Adult Regular)   Pulse 80   Ht 1.499 m (4' 11\")   Wt 70.6 kg (155 lb 11.2 oz)   LMP  (LMP Unknown)   SpO2 96%   BMI 31.45 kg/m     Wt Readings from Last 4 Encounters:   07/17/24 68.9 kg (151 lb 12.8 oz)   07/09/24 68 kg (150 lb)   07/08/24 68 kg (150 lb)   06/18/24 68.2 kg (150 lb 6.4 oz)     GEN: well nourished, in no acute distress.  HEENT:  Pupils equal, round. Sclerae nonicteric.   NECK: Supple, no masses appreciated.  No JVD  C/V:  Regular rate and rhythm, no murmur, rub or gallop.    RESP: Respirations are unlabored. Clear " to auscultation bilaterally without wheezing, rales, or rhonchi.  GI: Abdomen soft, nontender.  EXTREM: no LE edema.  NEURO: Alert and oriented, cooperative.  SKIN: Warm and dry.        Data:   LIPID RESULTS:  Lab Results   Component Value Date    CHOL 172 10/31/2023    CHOL 170 04/16/2021    HDL 82 10/31/2023    HDL 53 04/16/2021    LDL 73 10/31/2023    LDL 86 04/16/2021    TRIG 85 10/31/2023    TRIG 155 (H) 04/16/2021    CHOLHDLRATIO 3.6 09/29/2015     LIVER ENZYME RESULTS:  Lab Results   Component Value Date    AST 23 07/09/2024    AST 23 06/22/2021    ALT 26 07/09/2024    ALT 45 06/22/2021     CBC RESULTS:  Lab Results   Component Value Date    WBC 9.4 07/09/2024    WBC 3.5 (L) 06/12/2021    RBC 4.49 07/09/2024    RBC 4.01 06/12/2021    HGB 14.4 07/09/2024    HGB 12.3 06/12/2021    HCT 43.7 07/09/2024    HCT 37.8 06/12/2021    MCV 97 07/09/2024    MCV 94 06/12/2021    MCH 32.1 07/09/2024    MCH 30.7 06/12/2021    MCHC 33.0 07/09/2024    MCHC 32.5 06/12/2021    RDW 13.0 07/09/2024    RDW 13.3 06/12/2021     07/09/2024     06/12/2021     BMP RESULTS:  Lab Results   Component Value Date     07/09/2024     06/22/2021    POTASSIUM 4.7 07/09/2024    POTASSIUM 3.8 04/12/2024    POTASSIUM 3.8 06/22/2021    CHLORIDE 101 07/09/2024    CHLORIDE 104 04/12/2024    CHLORIDE 111 (H) 06/22/2021    CO2 25 07/09/2024    CO2 28 04/12/2024    CO2 25 06/22/2021    ANIONGAP 14 07/09/2024    ANIONGAP 16 (H) 04/12/2024    ANIONGAP 5 06/22/2021    GLC 96 07/09/2024     (A) 05/01/2024    BUN 15.9 07/09/2024    BUN 14 04/12/2024    BUN 15 06/22/2021    CR 0.66 07/09/2024    CR 0.78 06/22/2021    GFRESTIMATED >90 07/09/2024    GFRESTIMATED 79 06/22/2021    GFRESTBLACK >90 06/22/2021    ALAN 9.3 07/09/2024    ALAN 9.2 06/22/2021      A1C RESULTS:  Lab Results   Component Value Date    A1C 6.1 (H) 05/15/2024    A1C 6.2 (H) 02/25/2019     INR RESULTS:  Lab Results   Component Value Date    INR 0.99 10/12/2022             Medications     Current Outpatient Medications   Medication Sig Dispense Refill     acetaminophen (TYLENOL) 500 MG tablet Take 1-2 tablets (500-1,000 mg) by mouth every 6 hours as needed for mild pain 30 tablet 0     albuterol (PROAIR HFA/PROVENTIL HFA/VENTOLIN HFA) 108 (90 Base) MCG/ACT inhaler INHALE 1 OR 2 puffs EVERY 6 HOURS as needed for wheezing. Strength: 108 (90 Base) MCG/ACT 8.5 g 5     albuterol (PROVENTIL HFA) 108 (90 Base) MCG/ACT inhaler Inhale 2 puffs into the lungs every 6 hours 8.5 g 0     ascorbic acid (VITAMIN C) 1000 MG TABS Take 1,000 mg by mouth daily       atorvastatin (LIPITOR) 10 MG tablet Take 1 tablet (10 mg) by mouth daily 90 tablet 1     CALCIUM PO Take 1 tablet by mouth daily       cyclobenzaprine (FLEXERIL) 5 MG tablet TAKE 1 TABLET BY MOUTH TWICE DAILY AS NEEDED FOR MUSCLE SPASMS 30 tablet 0     diltiazem ER COATED BEADS (CARDIZEM CD/CARTIA XT) 120 MG 24 hr capsule Take 1 capsule (120 mg) by mouth every morning Hold for blood pressure <110 30 capsule 11     HYDROcodone-acetaminophen (NORCO)  MG per tablet Take 1 tablet by mouth daily as needed for severe pain 30 tablet 0     metFORMIN (GLUCOPHAGE XR) 500 MG 24 hr tablet Take 1 tablet (500 mg) by mouth daily (with dinner) 30 tablet 5     Multiple Vitamins-Minerals (MULTIVITAMIN OR) Take 1 tablet by mouth daily Per pt, uses ones without Iron       omeprazole (PRILOSEC) 20 MG DR capsule Take 1 capsule (20 mg) by mouth 2 times daily 180 capsule 1     traMADol (ULTRAM) 50 MG tablet Take 1 tablet (50 mg) by mouth every 6 hours as needed for severe pain. 90 tablet 0     vitamin D3 (CHOLECALCIFEROL) 1000 units (25 mcg) tablet Take 1,000 Units by mouth daily       vitamin E 400 UNITS TABS Take 400 Units by mouth daily       zolpidem (AMBIEN) 5 MG tablet TAKE 1 TABLET (5 MG) BY MOUTH NIGHTLY AS NEEDED FOR SLEEP. DO NOT TAKE WITH TRAMADOL. MUST BE NDC # [address]-22 PER PT REQUEST. 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    took uterus and left the ovaries     Other chronic pain      Polycythemia 08/08/2012    resolved now     Past Surgical History:   Procedure Laterality Date     CARPAL TUNNEL RELEASE RT/LT Right 11/23/2021    and trigger finger and tendon repair     CARPAL TUNNEL RELEASE RT/LT Left 12/20/2021    and tendon repair     COLONOSCOPY  2004, 2012    repeat in 2022     COLONOSCOPY  11/24/2023    4 polyps - repeat in 3 years     CYSTOSCOPY       Dilatation of the esophagus once due to dysphagia and stricture  2003     EXCISE SOFT TISSUE SHOULDER Left 5/30/2024    Procedure: EXCISION LEFT SHOULDER MASS;  Surgeon: Yao Baker MD;  Location: [facility] OR     Ganglion cyst removal       HEMORRHOIDECTOMY BANDING       HYSTERECTOMY  1998     LAPAROSCOPIC CHOLECYSTECTOMY N/A 5/30/2024    Procedure: CHOLECYSTECTOMY, LAPAROSCOPIC;  Surgeon: Yao Baker MD;  Location: [facility] OR     LAPAROSCOPIC SALPINGO-OOPHORECTOMY Bilateral 10/23/2015    Procedure: LAPAROSCOPIC SALPINGO-OOPHORECTOMY;  Surgeon: Johnathan Steve MD;  Location: [facility] OR     subscapularous repair and biceps tendon repair  05/2022    Dr. Verma at [facility]     Thumb surgery Right      TONSILLECTOMY       XR WRIST SURGERY FLORI RIGHT Right 11/2023    surgery for DJD and bone graft done     Family History   Problem Relation Age of Onset     Hypertension Mother      Cerebrovascular Disease Mother         TIA's     Depression Mother      Cancer - colorectal Maternal Grandmother         dx at 65     Lipids Father      C.A.D. Father         angioplasty at 65      Musculoskeletal Disorder Father      Alcohol/Drug Daughter         in remission     Breast Cancer No family hx of             Allergies   Metaproterenol sulfate and Percocet [oxycodone-acetaminophen]    30 minutes spent on the date of the encounter doing chart review, history and exam, documentation and further activities as noted above    KAUSHAL Skinner [facility] - [facility] Care  Pager: [phone]      Thank you for allowing me to participate in the care of your patient.      Sincerely,     KAUSHAL Skinner [facility]  cc:   Referred MD Silver  No address on file

## 2024-08-16 NOTE — PROGRESS NOTES
EP Cardiology Clinic Progress Note  Emeli Granados MRN# [id number]   YOB: 1955 Age: 69 year old   Primary Cardiologist: Dr. Dominguez (EP) Reason for visit: EP follow-up             Assessment and Plan:   Emeli Granados is a very pleasant 69 year old female who is here today for EP follow-up.      1.  Short RP SVT, symptomatic, with episodes increasing in frequency and duration.  Currently on diltiazem 120 mg once daily.  Patient interested in proceeding with EP study + SVT ablation, as previously discussed with Dr. Dominguez.     Changes today: none    Proceed with EP study + possible SVT ablation with Dr. Dominguez. Orders placed. I informed patient we will be reaching out to her in the near future re: scheduling. She should not take her diltiazem the day of her procedure.     We discussed the risks and benefits of an electrophysiology study and possible ablation, including but not limited to the use of conscious sedation and need for a  upon discharge, peripheral vessel injury and discomfort, heart attack, death, stroke, cardiac puncture and/or tamponade, pneumothorax, [id number]-arrhythmias requiring further treatment and damage to existing electrical structures that may require permanent pacemaker implantation. The patient voiced understanding and is willing to proceed.    Haven Manley PA-C  [facility]  Pager: [phone]          History of Presenting Illness:    Emeli Granados is a very pleasant 69 year old female with a history of emphysema, diverticulosis, osteopenia, cervical cancer, and SVT.    For the past few years, patient has had several sensations of racing heart prompting presentation to urgent care or the emergency room.  However, by the time she would arrive, the sensation had already resolved.    On 10/12/2022, patient developed a similar sensation that lasted longer than usual.  In the ED, she was found to be in SVT at 186 bpm.  ECG consistent with  short RP tachycardia.  She was admitted for observation overnight and underwent echocardiogram that revealed normal LVEF 60 to 65%, normal RV size and function, and no hemodynamically significant valvular disease.  She also underwent stress echocardiogram that was negative for ischemia.  She was discharged on diltiazem  mg once daily and did well initially.    More recently, she has began having recurrent episodes of SVT despite ongoing medical therapy.  She was seen in clinic 4/2024 by Dr. Dominguez who discussed options including increasing diltiazem versus pursuing catheter ablation.  Patient was unsure about how she would like to proceed and was advised to contact the clinic once she reached a decision.  Additionally, repeat Zio patch monitor was completed 4/2024 revealing 8 SVT runs, longest lasting 10 seconds in duration.    Patient is here today for EP follow-up as she has been struggling with more frequent SVT episodes.  She had 2 episodes in July that were extremely bothersome, lasting 2 to 5 minutes in duration before subsiding.  With each episode, she has sensation of racing heart and diaphoresis as well as a strange sensation in her chest.  The symptoms have been consistent with each episode since 2022.  She is wondering about proceeding with an SVT ablation.        Denies chest pain, lightheadedness, dizziness, near-syncope or syncope.  No orthopnea or PND.    She has been taking her diltiazem daily as prescribed but would like to get off of this medication.    No tobacco use since quitting 11/2019.      Social History       Social History     Socioeconomic History    Marital status:      Spouse name: Boyfrienmalina Hu    Number of children: 3    Years of education: Not on file    Highest education level: Not on file   Occupational History    Occupation: work in [employer]     Employer: [employer]   Tobacco Use    Smoking status: Former     Current packs/day: 0.00     Average packs/day: 0.5 packs/day  for 52.1 years (26.0 ttl pk-yrs)     Types: Cigarettes     Start date: 10/13/1967     Quit date: 2019     Years since quittin.7     Passive exposure: Never    Smokeless tobacco: Never    Tobacco comments:     quit 2019   Vaping Use    Vaping status: Never Used   Substance and Sexual Activity    Alcohol use: No     Comment: sober since 99    Drug use: No    Sexual activity: Not Currently     Partners: Male   Other Topics Concern    Parent/sibling w/ CABG, MI or angioplasty before 65F 55M? Yes   Social History Narrative    Not on file     Social Determinants of Health     Financial Resource Strain: Low Risk  (3/11/2024)    Financial Resource Strain     Within the past 12 months, have you or your family members you live with been unable to get utilities (heat, electricity) when it was really needed?: No   Food Insecurity: Low Risk  (3/11/2024)    Food Insecurity     Within the past 12 months, did you worry that your food would run out before you got money to buy more?: No     Within the past 12 months, did the food you bought just not last and you didn t have money to get more?: No   Recent Concern: Food Insecurity - High Risk (2024)    Food Insecurity     Within the past 12 months, did you worry that your food would run out before you got money to buy more?: No     Within the past 12 months, did the food you bought just not last and you didn t have money to get more?: Yes   Transportation Needs: Low Risk  (3/11/2024)    Transportation Needs     Within the past 12 months, has lack of transportation kept you from medical appointments, getting your medicines, non-medical meetings or appointments, work, or from getting things that you need?: No   Physical Activity: Unknown (3/11/2024)    Exercise Vital Sign     Days of Exercise per Week: 3 days     Minutes of Exercise per Session: Not on file   Stress: No Stress Concern Present (3/11/2024)    [employer] [employer] - Occupational  "Stress Questionnaire     Feeling of Stress : Only a little   Social Connections: Unknown (3/11/2024)    Social Connection and Isolation Panel [NHANES]     Frequency of Communication with Friends and Family: Not on file     Frequency of Social Gatherings with Friends and Family: Three times a week     Attends Worship Services: Not on file     Active Member of Clubs or Organizations: No     Attends Club or Organization Meetings: Never     Marital Status: Living with partner   Interpersonal Safety: Low Risk  (5/15/2024)    Interpersonal Safety     Do you feel physically and emotionally safe where you currently live?: Yes     Within the past 12 months, have you been hit, slapped, kicked or otherwise physically hurt by someone?: No     Within the past 12 months, have you been humiliated or emotionally abused in other ways by your partner or ex-partner?: No   Housing Stability: Low Risk  (3/11/2024)    Housing Stability     Do you have housing? : Yes     Are you worried about losing your housing?: No            Review of Systems:   Please see HPI         Physical Exam:   Vitals: /79 (BP Location: Right arm, Patient Position: Sitting, Cuff Size: Adult Regular)   Pulse 80   Ht 1.499 m (4' 11\")   Wt 70.6 kg (155 lb 11.2 oz)   LMP  (LMP Unknown)   SpO2 96%   BMI 31.45 kg/m     Wt Readings from Last 4 Encounters:   07/17/24 68.9 kg (151 lb 12.8 oz)   07/09/24 68 kg (150 lb)   07/08/24 68 kg (150 lb)   06/18/24 68.2 kg (150 lb 6.4 oz)     GEN: well nourished, in no acute distress.  HEENT:  Pupils equal, round. Sclerae nonicteric.   NECK: Supple, no masses appreciated.  No JVD  C/V:  Regular rate and rhythm, no murmur, rub or gallop.    RESP: Respirations are unlabored. Clear to auscultation bilaterally without wheezing, rales, or rhonchi.  GI: Abdomen soft, nontender.  EXTREM: no LE edema.  NEURO: Alert and oriented, cooperative.  SKIN: Warm and dry.        Data:   LIPID RESULTS:  Lab Results   Component Value " Date    CHOL 172 10/31/2023    CHOL 170 04/16/2021    HDL 82 10/31/2023    HDL 53 04/16/2021    LDL 73 10/31/2023    LDL 86 04/16/2021    TRIG 85 10/31/2023    TRIG 155 (H) 04/16/2021    CHOLHDLRATIO 3.6 09/29/2015     LIVER ENZYME RESULTS:  Lab Results   Component Value Date    AST 23 07/09/2024    AST 23 06/22/2021    ALT 26 07/09/2024    ALT 45 06/22/2021     CBC RESULTS:  Lab Results   Component Value Date    WBC 9.4 07/09/2024    WBC 3.5 (L) 06/12/2021    RBC 4.49 07/09/2024    RBC 4.01 06/12/2021    HGB 14.4 07/09/2024    HGB 12.3 06/12/2021    HCT 43.7 07/09/2024    HCT 37.8 06/12/2021    MCV 97 07/09/2024    MCV 94 06/12/2021    MCH 32.1 07/09/2024    MCH 30.7 06/12/2021    MCHC 33.0 07/09/2024    MCHC 32.5 06/12/2021    RDW 13.0 07/09/2024    RDW 13.3 06/12/2021     07/09/2024     06/12/2021     BMP RESULTS:  Lab Results   Component Value Date     07/09/2024     06/22/2021    POTASSIUM 4.7 07/09/2024    POTASSIUM 3.8 04/12/2024    POTASSIUM 3.8 06/22/2021    CHLORIDE 101 07/09/2024    CHLORIDE 104 04/12/2024    CHLORIDE 111 (H) 06/22/2021    CO2 25 07/09/2024    CO2 28 04/12/2024    CO2 25 06/22/2021    ANIONGAP 14 07/09/2024    ANIONGAP 16 (H) 04/12/2024    ANIONGAP 5 06/22/2021    GLC 96 07/09/2024     (A) 05/01/2024    BUN 15.9 07/09/2024    BUN 14 04/12/2024    BUN 15 06/22/2021    CR 0.66 07/09/2024    CR 0.78 06/22/2021    GFRESTIMATED >90 07/09/2024    GFRESTIMATED 79 06/22/2021    GFRESTBLACK >90 06/22/2021    ALAN 9.3 07/09/2024    ALAN 9.2 06/22/2021      A1C RESULTS:  Lab Results   Component Value Date    A1C 6.1 (H) 05/15/2024    A1C 6.2 (H) 02/25/2019     INR RESULTS:  Lab Results   Component Value Date    INR 0.99 10/12/2022            Medications     Current Outpatient Medications   Medication Sig Dispense Refill    acetaminophen (TYLENOL) 500 MG tablet Take 1-2 tablets (500-1,000 mg) by mouth every 6 hours as needed for mild pain 30 tablet 0    albuterol  (PROAIR HFA/PROVENTIL HFA/VENTOLIN HFA) 108 (90 Base) MCG/ACT inhaler INHALE 1 OR 2 puffs EVERY 6 HOURS as needed for wheezing. Strength: 108 (90 Base) MCG/ACT 8.5 g 5    albuterol (PROVENTIL HFA) 108 (90 Base) MCG/ACT inhaler Inhale 2 puffs into the lungs every 6 hours 8.5 g 0    ascorbic acid (VITAMIN C) 1000 MG TABS Take 1,000 mg by mouth daily      atorvastatin (LIPITOR) 10 MG tablet Take 1 tablet (10 mg) by mouth daily 90 tablet 1    CALCIUM PO Take 1 tablet by mouth daily      cyclobenzaprine (FLEXERIL) 5 MG tablet TAKE 1 TABLET BY MOUTH TWICE DAILY AS NEEDED FOR MUSCLE SPASMS 30 tablet 0    diltiazem ER COATED BEADS (CARDIZEM CD/CARTIA XT) 120 MG 24 hr capsule Take 1 capsule (120 mg) by mouth every morning Hold for blood pressure <110 30 capsule 11    HYDROcodone-acetaminophen (NORCO)  MG per tablet Take 1 tablet by mouth daily as needed for severe pain 30 tablet 0    metFORMIN (GLUCOPHAGE XR) 500 MG 24 hr tablet Take 1 tablet (500 mg) by mouth daily (with dinner) 30 tablet 5    Multiple Vitamins-Minerals (MULTIVITAMIN OR) Take 1 tablet by mouth daily Per pt, uses ones without Iron      omeprazole (PRILOSEC) 20 MG DR capsule Take 1 capsule (20 mg) by mouth 2 times daily 180 capsule 1    traMADol (ULTRAM) 50 MG tablet Take 1 tablet (50 mg) by mouth every 6 hours as needed for severe pain. 90 tablet 0    vitamin D3 (CHOLECALCIFEROL) 1000 units (25 mcg) tablet Take 1,000 Units by mouth daily      vitamin E 400 UNITS TABS Take 400 Units by mouth daily      zolpidem (AMBIEN) 5 MG tablet TAKE 1 TABLET (5 MG) BY MOUTH NIGHTLY AS NEEDED FOR SLEEP. DO NOT TAKE WITH TRAMADOL. MUST BE NDC # [address]-68 PER PT REQUEST. 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    took uterus and left the ovaries    Other chronic pain     Polycythemia 08/08/2012    resolved now     Past Surgical History:   Procedure Laterality Date    CARPAL TUNNEL RELEASE RT/LT Right 11/23/2021    and trigger finger and tendon repair    CARPAL TUNNEL RELEASE RT/LT Left 12/20/2021    and tendon repair    COLONOSCOPY  2004, 2012    repeat in 2022    COLONOSCOPY  11/24/2023    4 polyps - repeat in 3 years    CYSTOSCOPY      Dilatation of the esophagus once due to dysphagia and stricture  2003    EXCISE SOFT TISSUE SHOULDER Left 5/30/2024    Procedure: EXCISION LEFT SHOULDER MASS;  Surgeon: Yao Baker MD;  Location: [facility] OR    Ganglion cyst removal      HEMORRHOIDECTOMY BANDING      HYSTERECTOMY  1998    LAPAROSCOPIC CHOLECYSTECTOMY N/A 5/30/2024    Procedure: CHOLECYSTECTOMY, LAPAROSCOPIC;  Surgeon: Yao Baker MD;  Location: [facility] OR    LAPAROSCOPIC SALPINGO-OOPHORECTOMY Bilateral 10/23/2015    Procedure: LAPAROSCOPIC SALPINGO-OOPHORECTOMY;  Surgeon: Johnathan Steve MD;  Location: [facility] OR    subscapularous repair and biceps tendon repair  05/2022    Dr. Verma at [facility]    Thumb surgery Right     TONSILLECTOMY      XR WRIST SURGERY FLORI RIGHT Right 11/2023    surgery for DJD and bone graft done     Family History   Problem Relation Age of Onset    Hypertension Mother     Cerebrovascular Disease Mother         TIA's    Depression Mother     Cancer - colorectal Maternal Grandmother         dx at 65    Lipids Father     C.A.D. Father         angioplasty at 65    Musculoskeletal Disorder Father     Alcohol/Drug Daughter         in remission    Breast Cancer No family hx of             Allergies   Metaproterenol sulfate and Percocet [oxycodone-acetaminophen]    30 minutes spent on the date of the encounter doing chart review, history and exam,  documentation and further activities as noted above    Haven Manley PA-C  [facility]  Pager: [phone]

## 2024-08-20 ENCOUNTER — TELEPHONE (OUTPATIENT)
Dept: CARDIOLOGY | Facility: CLINIC | Age: 69
End: 2024-08-20
Payer: COMMERCIAL

## 2024-08-20 NOTE — TELEPHONE ENCOUNTER
Spoke to patient to let her know she would be getting a call on 8/30 to review instructions for medication and instructions for ablation scheduled for 9/3.  Discussed briefly that we recommend holding OTC medications.  She was instructed at  on 8/16 to hold diltiazem day of procedure and after review of her medications she is to hold metformin.  Patient provided verbal understanding regarding above.  TRENTON Barillas

## 2024-08-20 NOTE — TELEPHONE ENCOUNTER
Health Call Center    Phone Message    May a detailed message be left on voicemail: yes     Reason for Call: Other: Emeli would like a call back to discuss her medications prior to her procedure on 9/3.     Action Taken: Other: Cardiology    Travel Screening: Not Applicable    Thank you!  [facility]

## 2024-08-26 ENCOUNTER — TELEPHONE (OUTPATIENT)
Dept: CARDIOLOGY | Facility: CLINIC | Age: 69
End: 2024-08-26
Payer: COMMERCIAL

## 2024-08-26 NOTE — TELEPHONE ENCOUNTER
[facility] Call Center    Phone Message    May a detailed message be left on voicemail: yes     Reason for Call: Other: Patient called requesting to speak with a member of her care team in regards to her upcoming ablation. Would like to discuss prep instructions and which medications to hold. Please call back to further discuss.     Action Taken: Message routed to:  Other: Cardiology    Travel Screening: Not Applicable     Thank you!  [facility]

## 2024-08-27 ENCOUNTER — TELEPHONE (OUTPATIENT)
Dept: MEDSURG UNIT | Facility: CLINIC | Age: 69
End: 2024-08-27
Payer: COMMERCIAL

## 2024-08-27 ENCOUNTER — TELEPHONE (OUTPATIENT)
Dept: CARDIOLOGY | Facility: CLINIC | Age: 69
End: 2024-08-27
Payer: COMMERCIAL

## 2024-08-27 DIAGNOSIS — I47.10 SVT (SUPRAVENTRICULAR TACHYCARDIA) (H): Primary | ICD-10-CM

## 2024-08-27 RX ORDER — SODIUM CHLORIDE, SODIUM LACTATE, POTASSIUM CHLORIDE, CALCIUM CHLORIDE 600; 310; 30; 20 MG/100ML; MG/100ML; MG/100ML; MG/100ML
INJECTION, SOLUTION INTRAVENOUS CONTINUOUS
Status: CANCELLED | OUTPATIENT
Start: 2024-08-27

## 2024-08-27 RX ORDER — LIDOCAINE 40 MG/G
CREAM TOPICAL
Status: CANCELLED | OUTPATIENT
Start: 2024-08-27

## 2024-08-27 NOTE — TELEPHONE ENCOUNTER
8/27/24 Attempted to call pt but reached LORNA WHARTON and requested callback. Sent pt ConnectToHome message requesting callback.Milton 1210 pm

## 2024-08-27 NOTE — TELEPHONE ENCOUNTER
Chart reviewed for upcoming appt.  CSE and nursing orders in epic for procedure.  Per clinic note pt is aware of 0630 arrival time.

## 2024-08-27 NOTE — TELEPHONE ENCOUNTER
8/27/24 Called pt regarding SVT Ablation scheduled for tomorrow 8/28/24  Notified of arrival time and location - 630 am [facility] Welcome desk   No solids 8 hours prior to arrival time  Clear liquids up until 2 hours prior to arrival  Discussed clear liquids allowed ( 7 up, ginger ale, chicken broth, apple juice, water, coffee with no cream or sugar)   Pt may have sips of water for am meds    Discussed meds to be held - Will hold AM Diltiazem and OTC vitamins/supplements   Oral diabetes meds: Metformin q evening- instructed to take tonight as scheduled   Insulin: NONE  SGLT2 Inhibitors: NONE  GLP-1 Agonists: NONE   Diuretic: NONE        Discussed that patient will need a  for ride home and someone to stay with pt x 24 hours once pt arrives home   Pt will check temperature am of procedure and call [facility] at [phone] in the am in temp > 100.0  Pt voiced understanding and stated they have no further questions at this time.  Milton  1257 pm

## 2024-08-28 ENCOUNTER — HOSPITAL ENCOUNTER (OUTPATIENT)
Facility: CLINIC | Age: 69
Discharge: HOME | End: 2024-08-28
Attending: INTERNAL MEDICINE | Admitting: INTERNAL MEDICINE
Payer: COMMERCIAL

## 2024-08-28 VITALS
RESPIRATION RATE: 16 BRPM | HEART RATE: 85 BPM | WEIGHT: 159 LBS | SYSTOLIC BLOOD PRESSURE: 112 MMHG | TEMPERATURE: 98.6 F | HEIGHT: 59 IN | BODY MASS INDEX: 32.05 KG/M2 | DIASTOLIC BLOOD PRESSURE: 67 MMHG | OXYGEN SATURATION: 93 %

## 2024-08-28 DIAGNOSIS — I47.10 SVT (SUPRAVENTRICULAR TACHYCARDIA) (H): Primary | ICD-10-CM

## 2024-08-28 LAB
ANION GAP SERPL CALCULATED.3IONS-SCNC: 11 MMOL/L (ref 7–15)
ATRIAL RATE - MUSE: 76 BPM
BUN SERPL-MCNC: 18.3 MG/DL (ref 8–23)
CALCIUM SERPL-MCNC: 9.4 MG/DL (ref 8.8–10.4)
CHLORIDE SERPL-SCNC: 104 MMOL/L (ref 98–107)
CREAT SERPL-MCNC: 0.73 MG/DL (ref 0.51–0.95)
DIASTOLIC BLOOD PRESSURE - MUSE: NORMAL MMHG
EGFRCR SERPLBLD CKD-EPI 2021: 88 ML/MIN/1.73M2
ERYTHROCYTE [DISTWIDTH] IN BLOOD BY AUTOMATED COUNT: 14 % (ref 10–15)
GLUCOSE SERPL-MCNC: 97 MG/DL (ref 70–99)
HCO3 SERPL-SCNC: 25 MMOL/L (ref 22–29)
HCT VFR BLD AUTO: 43 % (ref 35–47)
HGB BLD-MCNC: 14.5 G/DL (ref 11.7–15.7)
INTERPRETATION ECG - MUSE: NORMAL
MCH RBC QN AUTO: 32.5 PG (ref 26.5–33)
MCHC RBC AUTO-ENTMCNC: 33.7 G/DL (ref 31.5–36.5)
MCV RBC AUTO: 96 FL (ref 78–100)
P AXIS - MUSE: 38 DEGREES
PLATELET # BLD AUTO: 234 10E3/UL (ref 150–450)
POTASSIUM SERPL-SCNC: 3.8 MMOL/L (ref 3.4–5.3)
PR INTERVAL - MUSE: 154 MS
QRS DURATION - MUSE: 82 MS
QT - MUSE: 364 MS
QTC - MUSE: 409 MS
R AXIS - MUSE: -23 DEGREES
RBC # BLD AUTO: 4.46 10E6/UL (ref 3.8–5.2)
SODIUM SERPL-SCNC: 140 MMOL/L (ref 135–145)
SYSTOLIC BLOOD PRESSURE - MUSE: NORMAL MMHG
T AXIS - MUSE: 11 DEGREES
VENTRICULAR RATE- MUSE: 76 BPM
WBC # BLD AUTO: 5.7 10E3/UL (ref 4–11)

## 2024-08-28 PROCEDURE — 93623 PRGRMD STIMJ&PACG IV RX NFS: CPT | Performed by: INTERNAL MEDICINE

## 2024-08-28 PROCEDURE — 999N000071 HC STATISTIC HEART CATH LAB OR EP LAB

## 2024-08-28 PROCEDURE — 99152 MOD SED SAME PHYS/QHP 5/>YRS: CPT | Performed by: INTERNAL MEDICINE

## 2024-08-28 PROCEDURE — 93005 ELECTROCARDIOGRAM TRACING: CPT

## 2024-08-28 PROCEDURE — 272N000001 HC OR GENERAL SUPPLY STERILE: Performed by: INTERNAL MEDICINE

## 2024-08-28 PROCEDURE — C1730 CATH, EP, 19 OR FEW ELECT: HCPCS | Performed by: INTERNAL MEDICINE

## 2024-08-28 PROCEDURE — 36415 COLL VENOUS BLD VENIPUNCTURE: CPT | Performed by: INTERNAL MEDICINE

## 2024-08-28 PROCEDURE — C1732 CATH, EP, DIAG/ABL, 3D/VECT: HCPCS | Performed by: INTERNAL MEDICINE

## 2024-08-28 PROCEDURE — 80048 BASIC METABOLIC PNL TOTAL CA: CPT | Performed by: INTERNAL MEDICINE

## 2024-08-28 PROCEDURE — 999N000184 HC STATISTIC TELEMETRY

## 2024-08-28 PROCEDURE — 250N000009 HC RX 250: Performed by: INTERNAL MEDICINE

## 2024-08-28 PROCEDURE — 99153 MOD SED SAME PHYS/QHP EA: CPT | Performed by: INTERNAL MEDICINE

## 2024-08-28 PROCEDURE — 85027 COMPLETE CBC AUTOMATED: CPT | Performed by: INTERNAL MEDICINE

## 2024-08-28 PROCEDURE — 93010 ELECTROCARDIOGRAM REPORT: CPT | Mod: XU | Performed by: INTERNAL MEDICINE

## 2024-08-28 PROCEDURE — 93653 COMPRE EP EVAL TX SVT: CPT | Performed by: INTERNAL MEDICINE

## 2024-08-28 PROCEDURE — 93623 PRGRMD STIMJ&PACG IV RX NFS: CPT | Mod: 26 | Performed by: INTERNAL MEDICINE

## 2024-08-28 PROCEDURE — 999N000054 HC STATISTIC EKG NON-CHARGEABLE

## 2024-08-28 PROCEDURE — 250N000011 HC RX IP 250 OP 636: Performed by: INTERNAL MEDICINE

## 2024-08-28 PROCEDURE — 258N000003 HC RX IP 258 OP 636: Performed by: INTERNAL MEDICINE

## 2024-08-28 PROCEDURE — C1894 INTRO/SHEATH, NON-LASER: HCPCS | Performed by: INTERNAL MEDICINE

## 2024-08-28 RX ORDER — ACETAMINOPHEN 325 MG/1
650 TABLET ORAL EVERY 4 HOURS PRN
Status: DISCONTINUED | OUTPATIENT
Start: 2024-08-28 | End: 2024-08-28 | Stop reason: HOSPADM

## 2024-08-28 RX ORDER — MIDAZOLAM HCL IN 0.9 % NACL/PF 1 MG/ML
.5-6 PLASTIC BAG, INJECTION (ML) INTRAVENOUS CONTINUOUS PRN
Status: DISCONTINUED | OUTPATIENT
Start: 2024-08-28 | End: 2024-08-28 | Stop reason: HOSPADM

## 2024-08-28 RX ORDER — HEPARIN SODIUM 200 [USP'U]/100ML
100-600 INJECTION, SOLUTION INTRAVENOUS CONTINUOUS PRN
Status: DISCONTINUED | OUTPATIENT
Start: 2024-08-28 | End: 2024-08-28 | Stop reason: HOSPADM

## 2024-08-28 RX ORDER — HEPARIN SODIUM 200 [USP'U]/100ML
100-500 INJECTION, SOLUTION INTRAVENOUS CONTINUOUS PRN
Status: DISCONTINUED | OUTPATIENT
Start: 2024-08-28 | End: 2024-08-28 | Stop reason: HOSPADM

## 2024-08-28 RX ORDER — NALOXONE HYDROCHLORIDE 0.4 MG/ML
0.4 INJECTION, SOLUTION INTRAMUSCULAR; INTRAVENOUS; SUBCUTANEOUS
Status: DISCONTINUED | OUTPATIENT
Start: 2024-08-28 | End: 2024-08-28 | Stop reason: HOSPADM

## 2024-08-28 RX ORDER — LIDOCAINE 40 MG/G
CREAM TOPICAL
Status: DISCONTINUED | OUTPATIENT
Start: 2024-08-28 | End: 2024-08-28 | Stop reason: HOSPADM

## 2024-08-28 RX ORDER — FENTANYL CITRATE 50 UG/ML
INJECTION, SOLUTION INTRAMUSCULAR; INTRAVENOUS
Status: DISCONTINUED | OUTPATIENT
Start: 2024-08-28 | End: 2024-08-28 | Stop reason: HOSPADM

## 2024-08-28 RX ORDER — SODIUM CHLORIDE, SODIUM LACTATE, POTASSIUM CHLORIDE, CALCIUM CHLORIDE 600; 310; 30; 20 MG/100ML; MG/100ML; MG/100ML; MG/100ML
INJECTION, SOLUTION INTRAVENOUS CONTINUOUS
Status: DISCONTINUED | OUTPATIENT
Start: 2024-08-28 | End: 2024-08-28 | Stop reason: HOSPADM

## 2024-08-28 RX ORDER — NALOXONE HYDROCHLORIDE 0.4 MG/ML
0.2 INJECTION, SOLUTION INTRAMUSCULAR; INTRAVENOUS; SUBCUTANEOUS
Status: DISCONTINUED | OUTPATIENT
Start: 2024-08-28 | End: 2024-08-28 | Stop reason: HOSPADM

## 2024-08-28 RX ADMIN — SODIUM CHLORIDE, POTASSIUM CHLORIDE, SODIUM LACTATE AND CALCIUM CHLORIDE: 600; 310; 30; 20 INJECTION, SOLUTION INTRAVENOUS at 07:18

## 2024-08-28 ASSESSMENT — ACTIVITIES OF DAILY LIVING (ADL)
ADLS_ACUITY_SCORE: 35

## 2024-08-28 NOTE — DISCHARGE INSTRUCTIONS
SVT Ablation Discharge Instructions      After you go home:    Have an adult stay with you until tomorrow.  You may resume your normal diet.       For 24 hours - due to the sedation you received:  Relax and take it easy.  Do NOT make any important or legal decisions.  Do NOT drive or operate machines at home or at work.  Do NOT drink alcohol.    Care of Puncture Sites:    For the first 24 hrs - check the puncture site every 1-2 hours while awake.  For 2 days, when you cough, sneeze, laugh or move your bowels, hold your hand over the puncture site and press firmly.  Remove the dressing(s) after 24 hours recommend taking off in shower. If there is minor oozing, apply a bandaid and remove it after 12 hours.  It is normal to have bruising or pea sized lump or soreness at the site.  You may shower tomorrow. Do NOT take a bath, or use a hot tub or pool for at least 3 days. Do NOT scrub the site. Do not use lotion or powder near the puncture site.      Activity - For 3 days:    No stooping or squatting  Do NOT do any heavy activity such as exercise, lifting, or straining.   Do NOT do housework, yard work or any activity that make you sweat  Do NOT lift more than 10 pounds  Limit going up and down the stairs repetitively, for the first 24 hours after procedure.       Bleeding:     If you start bleeding from the site in your groin/chest, lie down flat and press firmly on the site for 10 minutes.   Once bleeding stops, lay flat for 2 hours.  Call [facility] as soon as you can.       Call 911 right away if you have heavy bleeding or bleeding that does not stop.      Medicines:    Take your medications, including blood thinners, unless your provider tells you not to.  If you have stopped any medicines, check with your provider about when to restart them.  If you have pain or shortness of breath, you may take Advil (ibuprofen) or Tylenol (acetaminophen).      Follow Up Appointments:    Haven Manley on 10/3  at 10:00 with an EKG  You will receive a phone call the following day/Monday from an RN - Juanita Barber or Leigha.    Call the clinic if:    You have increased pain or a large or growing hard lump around the site.  The site is red, swollen, hot or tender.  Blood or fluid is draining from the site.  You have chills or a fever greater than 101 F (38 C).  Your leg feels numb, cool or changes color.  Increased pain in the chest and/or groin.  Increased shortness of breath  Chest pain not relieved by Tylenol or Advil  New pain in the back or belly that you cannot control with Tylenol.  Recurrent irregular or fast heart rate lasting over 2 hours.  Any questions or concerns.    Heart rhythms:  You may have some irregular heartbeats. These feel very strong. They may make you feel that the fast heart rhythm is going to start again.  Give it time. The irregular beats should occur less often.       [facility]:    [phone] ( 8am-5pm M-F)  Juanita Barber or Leigha    [phone] option 2 (7 days a week)  on call Cardiologist

## 2024-08-28 NOTE — PROGRESS NOTES
[facility] Admission Nursing Note    Patient Information  Name: Emeli Granados  Age: 69 year old  Reason for admission: SVT ablation  [facility] arrival time: 0645    Visitor Information  Name: none     Patient Admission/Assessment   Pre-procedure assessment complete: Yes  If abnormal assessment/labs, provider notified: N/A  NPO: Yes  Medications held per instructions/orders: Yes  Consent: deferred  If applicable, pregnancy test status: deferred  Patient oriented to room: Yes  Education/questions answered: Yes  Plan/other: proceed with orders as planned    Discharge Planning  Discharge name/phone number: Lisbeth BOSE [phone]  Overnight post sedation caregiver: Fritz  Discharge location: home    Paola Velez RN        
[facility] Discharge Nursing Note    Patient Information  Name: Emeli Granados  Age: 69 year old    Discharge Education:  Discharge instructions reviewed: Yes  Additional education/resources provided: none  Patient/patient representative verbalizes understanding: Yes  Patient discharging on new medications: N/A  Medication education completed: Yes    Discharge Plans:   Discharge location: home  Discharge ride contacted: Yes  Approximate discharge time: 1450    Discharge Criteria:  Discharge criteria met and vital signs stable: Yes  Left groin CDI with tegaderm - no hematoma no bleeding noted   CMS intact to left foot   Right groin CDI with gauze and tegaderm - no hematoma no bleeding noted   CMS intact to right foot   Denies any pain   Patient ate, drinking fluids and ambulated to bathroom without difficulty     Patient Belongs:  Patient belongings returned to patient: Yes    Han Cancino RN      
[facility] Post Procedure Note    Patient Information  Name: Emeli Granados  Age: 69 year old    Post Procedure  Time patient returned to [facility]: 1030  Concerns/abnormal assessment: none  If abnormal assessment, provider notified: N/A  Plan/Other: bilat groin site WDL.. right groin with stopcock-secured, left groin with tegaderm only.  Per orders: stopcock to be loosened at 1300 and up after 4 hour bedrest at 1430.  Will cont to monitor.    AVS reviewed with pt.  Pt aware to stop taking diltiazem.    Paola Velez RN    
Dictated.    Bilateral groin access.    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  Successful slow pathway modification using RF energy.    EBL 15 - 20 ML.  Apparent complication.      Plan:  -bedrest for 4 hours  -home later today, if doing well  -follow-up in the EP clinic in 1-2 months.  -She is eager to stop diltiazem, this should no longer be necessary for SVT prevention but the patient may have hypertension. It is okay to stay off diltiazem but I will encourage her to keep an eye on her blood pressure at home and maintain a log.      
details…

## 2024-08-28 NOTE — PRE-PROCEDURE
GENERAL PRE-PROCEDURE:   Procedure:  EPS and ablation  Date/Time:  8/28/2024 8:32 AM    Written consent obtained?: Yes    Risks and benefits: Risks, benefits and alternatives were discussed    Consent given by:  Patient  Patient states understanding of procedure being performed: Yes    Patient's understanding of procedure matches consent: Yes    Procedure consent matches procedure scheduled: Yes    Expected level of sedation:  Moderate  Appropriately NPO:  Yes  ASA Class:  2  Mallampati  :  Grade 2- soft palate, base of uvula, tonsillar pillars, and portion of posterior pharyngeal wall visible  Lungs:  Lungs clear with good breath sounds bilaterally  Heart:  Normal heart sounds and rate  History & Physical reviewed:  History and physical reviewed and no updates needed  Statement of review:  I have reviewed the lab findings, diagnostic data, medications, and the plan for sedation

## 2024-08-29 ENCOUNTER — TELEPHONE (OUTPATIENT)
Dept: CARDIOLOGY | Facility: CLINIC | Age: 69
End: 2024-08-29

## 2024-08-29 ENCOUNTER — OFFICE VISIT (OUTPATIENT)
Dept: FAMILY MEDICINE | Facility: CLINIC | Age: 69
End: 2024-08-29
Payer: COMMERCIAL

## 2024-08-29 VITALS
WEIGHT: 156.2 LBS | DIASTOLIC BLOOD PRESSURE: 83 MMHG | HEART RATE: 93 BPM | OXYGEN SATURATION: 95 % | RESPIRATION RATE: 16 BRPM | HEIGHT: 59 IN | TEMPERATURE: 98.3 F | BODY MASS INDEX: 31.49 KG/M2 | SYSTOLIC BLOOD PRESSURE: 119 MMHG

## 2024-08-29 DIAGNOSIS — M54.50 CHRONIC BILATERAL LOW BACK PAIN, UNSPECIFIED WHETHER SCIATICA PRESENT: ICD-10-CM

## 2024-08-29 DIAGNOSIS — G89.29 CHRONIC BILATERAL LOW BACK PAIN, UNSPECIFIED WHETHER SCIATICA PRESENT: ICD-10-CM

## 2024-08-29 DIAGNOSIS — R73.03 PREDIABETES: ICD-10-CM

## 2024-08-29 DIAGNOSIS — N76.0 BACTERIAL VAGINITIS: Primary | ICD-10-CM

## 2024-08-29 DIAGNOSIS — F11.20 CONTINUOUS OPIOID DEPENDENCE (H): ICD-10-CM

## 2024-08-29 DIAGNOSIS — M54.50 ACUTE BILATERAL LOW BACK PAIN WITHOUT SCIATICA: ICD-10-CM

## 2024-08-29 DIAGNOSIS — G47.9 SLEEP DISORDER: ICD-10-CM

## 2024-08-29 DIAGNOSIS — G89.29 OTHER CHRONIC PAIN: ICD-10-CM

## 2024-08-29 DIAGNOSIS — R10.13 EPIGASTRIC PAIN: ICD-10-CM

## 2024-08-29 DIAGNOSIS — B96.89 BACTERIAL VAGINITIS: Primary | ICD-10-CM

## 2024-08-29 LAB
ALBUMIN UR-MCNC: NEGATIVE MG/DL
APPEARANCE UR: CLEAR
BACTERIA #/AREA URNS HPF: ABNORMAL /HPF
BILIRUB UR QL STRIP: NEGATIVE
CLUE CELLS: PRESENT
COLOR UR AUTO: YELLOW
GLUCOSE UR STRIP-MCNC: NEGATIVE MG/DL
HBA1C MFR BLD: 6.3 % (ref 0–5.6)
HGB UR QL STRIP: ABNORMAL
KETONES UR STRIP-MCNC: NEGATIVE MG/DL
LEUKOCYTE ESTERASE UR QL STRIP: ABNORMAL
MUCOUS THREADS #/AREA URNS LPF: PRESENT /LPF
NITRATE UR QL: NEGATIVE
PH UR STRIP: 6 [PH] (ref 5–7)
RBC #/AREA URNS AUTO: ABNORMAL /HPF
SP GR UR STRIP: 1.02 (ref 1–1.03)
SQUAMOUS #/AREA URNS AUTO: ABNORMAL /LPF
TRICHOMONAS, WET PREP: ABNORMAL
UROBILINOGEN UR STRIP-ACNC: 0.2 E.U./DL
WBC #/AREA URNS AUTO: ABNORMAL /HPF
WBC'S/HIGH POWER FIELD, WET PREP: ABNORMAL
YEAST, WET PREP: ABNORMAL

## 2024-08-29 PROCEDURE — 87210 SMEAR WET MOUNT SALINE/INK: CPT | Performed by: PHYSICIAN ASSISTANT

## 2024-08-29 PROCEDURE — 99214 OFFICE O/P EST MOD 30 MIN: CPT | Performed by: PHYSICIAN ASSISTANT

## 2024-08-29 PROCEDURE — 83036 HEMOGLOBIN GLYCOSYLATED A1C: CPT | Performed by: PHYSICIAN ASSISTANT

## 2024-08-29 PROCEDURE — 36415 COLL VENOUS BLD VENIPUNCTURE: CPT | Performed by: PHYSICIAN ASSISTANT

## 2024-08-29 PROCEDURE — 81001 URINALYSIS AUTO W/SCOPE: CPT | Performed by: PHYSICIAN ASSISTANT

## 2024-08-29 RX ORDER — METRONIDAZOLE 7.5 MG/G
1 GEL VAGINAL DAILY
Qty: 25 G | Refills: 0 | Status: SHIPPED | OUTPATIENT
Start: 2024-08-29 | End: 2024-09-03

## 2024-08-29 RX ORDER — CYCLOBENZAPRINE HCL 5 MG
5 TABLET ORAL 2 TIMES DAILY PRN
Qty: 60 TABLET | Refills: 4 | Status: SHIPPED | OUTPATIENT
Start: 2024-08-29

## 2024-08-29 RX ORDER — HYDROCODONE BITARTRATE AND ACETAMINOPHEN 10; 325 MG/1; MG/1
1 TABLET ORAL DAILY PRN
Qty: 30 TABLET | Refills: 0 | Status: CANCELLED | OUTPATIENT
Start: 2024-08-29

## 2024-08-29 RX ORDER — TRAMADOL HYDROCHLORIDE 50 MG/1
50 TABLET ORAL EVERY 6 HOURS PRN
Qty: 90 TABLET | Refills: 0 | Status: SHIPPED | OUTPATIENT
Start: 2024-09-04

## 2024-08-29 RX ORDER — ZOLPIDEM TARTRATE 5 MG/1
TABLET ORAL
Qty: 30 TABLET | Refills: 1 | Status: SHIPPED | OUTPATIENT
Start: 2024-08-29 | End: 2024-09-11

## 2024-08-29 ASSESSMENT — PAIN SCALES - GENERAL: PAINLEVEL: EXTREME PAIN (8)

## 2024-08-29 NOTE — PROGRESS NOTES
"  Assessment & Plan     Bacterial vaginitis  UA shows no signs of bladder infection but wet prep shows BV.   Treated with Metrogel. Patient reported she prefers the topical option.  Had BV about a month ago and did oral treatment.  She reports \"bumps\" in the vagina. She just had a SVT ablation and is supposed to not be up and about thus vaginal exam not completed today. Has upcoming visit with PCP.  - UA Macroscopic with reflex to Microscopic and Culture - Clinic Collect  - Wet prep - lab collect; Future  - Wet prep - lab collect  - UA Microscopic with Reflex to Culture  - metroNIDAZOLE (METROGEL) 0.75 % vaginal gel; Place 1 applicator (5 g) vaginally daily for 5 days.    Other chronic pain   reviewed. Tramadol due in a few days and thus refilled.  She reports she is sometimes using more hydrocodone-acetaminophen (using two tablets rather than 1 tablet daily). She has upcoming visit with PCP who manages. I recommended she discuss with her to determine what is appropriate and for future refills.  Overdue on agreement and can be signed when she sees PCP soon.  - traMADol (ULTRAM) 50 MG tablet; Take 1 tablet (50 mg) by mouth every 6 hours as needed for severe pain. Do not start before September 4, 2024.    Acute bilateral low back pain without sciatica  Refilled for patient. She uses once or twice a day.  - cyclobenzaprine (FLEXERIL) 5 MG tablet; Take 1 tablet (5 mg) by mouth 2 times daily as needed for muscle spasms.    Chronic bilateral low back pain, unspecified whether sciatica present  As noted above.    F11.2 - Continuous opioid dependence (H)  As noted above.    Epigastric pain  Refilled for patient.  - omeprazole (PRILOSEC) 20 MG DR capsule; Take 1 capsule (20 mg) by mouth 2 times daily.    Prediabetes  A1c shows pre-diabetes still. Continue to monitor.  - HEMOGLOBIN A1C; Future  - HEMOGLOBIN A1C    Sleep disorder   reviewed and refill provided.  - zolpidem (AMBIEN) 5 MG tablet; TAKE 1 TABLET (5 MG) BY " MOUTH NIGHTLY AS NEEDED FOR SLEEP. DO NOT TAKE WITH TRAMADOL. MUST BE NDC # [address]-14 PER PT REQUEST.        Subjective   Emeli is a 69 year old, presenting for the following health issues:  Recheck Medication, Musculoskeletal Problem (Back), and Vaginal Problem        8/29/2024    11:17 AM   Additional Questions   Roomed by Chela Guidry, EMT-B     Musculoskeletal Problem    Vaginal Problem     History of Present Illness       Reason for visit:  Back   She is taking medications regularly.       Genitourinary - Female  Onset/Duration:weeks  Description:   Painful urination (Dysuria): No           Frequency: No  Blood in urine (Hematuria): YES  Delay in urine (Hesitency): No  Intensity: mild  Progression of Symptoms:  same  Accompanying Signs & Symptoms:  Fever/chills: YES- Pt reports fever of 100 at night  Flank pain: YES  Nausea and vomiting: No  Vaginal symptoms: discharge, odor, and itching  Abdominal/Pelvic Pain: No  History:   History of frequent UTI s: No  History of kidney stones: No  Sexually Active: No  Possibility of pregnancy: No  Precipitating or alleviating factors: None  Therapies tried and outcome: Cranberry juice prn (contraindicated in Coumadin patients)  Pain History:  When did you first notice your pain? 2021   Have you seen this provider for your pain in the past? Yes   Where in your body do you have pain? Low back  Are you seeing anyone else for your pain? Yes - pain clinic, [facility]        7/26/2021     1:45 PM 1/24/2022     3:09 PM 12/1/2023     2:02 PM   PHQ-9 SCORE   PHQ-9 Total Score MyChart  0 0   PHQ-9 Total Score 0 0 0           5/12/2021     2:02 PM 1/24/2022     3:11 PM 12/1/2023     2:03 PM   HAMLET-7 SCORE   Total Score  0 (minimal anxiety) 0 (minimal anxiety)   Total Score 0 0 0         PDMP Review         Value Time User    State PDMP site checked  Yes 8/5/2024 11:30 AM Gina Arias PA-C          Last CSA Agreement:   CSA -- Patient Level:     [Media Unavailable]  "Controlled Substance Agreement - Opioid - Scan on 5/2/2023  2:17 PM   [Media Unavailable] Controlled Substance Agreement - Opioid - Scan on 1/24/2022  5:10 PM   [Media Unavailable] Controlled Substance Agreement - Opioid - Scan on 5/3/2021  6:23 PM   [Media Unavailable] Controlled Substance Agreement - Opioid - Scan on 7/29/2019  3:09 PM       Last UDS: 11/6/2023        Objective    /83 (BP Location: Left arm, Patient Position: Sitting, Cuff Size: Adult Regular)   Pulse 93   Temp 98.3  F (36.8  C) (Oral)   Resp 16   Ht 1.499 m (4' 11\")   Wt 70.9 kg (156 lb 3.2 oz)   LMP  (LMP Unknown)   SpO2 95%   BMI 31.55 kg/m    Body mass index is 31.55 kg/m .  Physical Exam   GENERAL: No acute distress  HEENT: Normocephalic  NEURO: Alert and non-focal      Results for orders placed or performed in visit on 08/29/24 (from the past 24 hour(s))   HEMOGLOBIN A1C   Result Value Ref Range    Hemoglobin A1C 6.3 (H) 0.0 - 5.6 %   Wet prep - lab collect    Specimen: Vagina; Swab   Result Value Ref Range    Trichomonas Absent Absent    Yeast Absent Absent    Clue Cells Present (A) Absent    WBCs/high power field 1+ (A) None   UA Macroscopic with reflex to Microscopic and Culture - Clinic Collect    Specimen: Urine, Clean Catch   Result Value Ref Range    Color Urine Yellow Colorless, Straw, Light Yellow, Yellow    Appearance Urine Clear Clear    Glucose Urine Negative Negative mg/dL    Bilirubin Urine Negative Negative    Ketones Urine Negative Negative mg/dL    Specific Gravity Urine 1.020 1.003 - 1.035    Blood Urine Trace (A) Negative    pH Urine 6.0 5.0 - 7.0    Protein Albumin Urine Negative Negative mg/dL    Urobilinogen Urine 0.2 0.2, 1.0 E.U./dL    Nitrite Urine Negative Negative    Leukocyte Esterase Urine Trace (A) Negative   UA Microscopic with Reflex to Culture   Result Value Ref Range    Bacteria Urine Moderate (A) None Seen /HPF    RBC Urine 0-2 0-2 /HPF /HPF    WBC Urine 0-5 0-5 /HPF /HPF    Squamous " Epithelials Urine Few (A) None Seen /LPF    Mucus Urine Present (A) None Seen /LPF    Narrative    Urine Culture not indicated     *Note: Due to a large number of results and/or encounters for the requested time period, some results have not been displayed. A complete set of results can be found in Results Review.           Signed Electronically by: Gina Arias PA-C

## 2024-08-29 NOTE — TELEPHONE ENCOUNTER
8/29/24. Called pt in follow up to SVT Ablation performed 8 /28    Reviewed discharge instructions with patient    Pain level is 0    Groin dressing has been removed, area is CDI    Breathing feels comfortable    Pt is eating, drinking and urinating without difficulty .     Reviewed activity instructions    Reviewed any medication changes .Diltiazem stopped , pt instructed to check BP 1-2 x/day and call back if SBP>140 or DBP > 90 consistently     Discussed that possible Afib episodes may occur and to call if > 2 hours in duration or if symptomatic or sustained HR > 120    Discussed reasons to call Afib RN    Reminded of follow up appointments    Verified pt has Afib RN and after hours Cardiology phone numbers    Pt voiced understanding and has no further questions/concerns at this time.    [place]  215 pm

## 2024-09-04 DIAGNOSIS — R73.03 PREDIABETES: ICD-10-CM

## 2024-09-04 DIAGNOSIS — E78.5 HYPERLIPIDEMIA LDL GOAL <130: ICD-10-CM

## 2024-09-04 RX ORDER — METFORMIN HCL 500 MG
TABLET, EXTENDED RELEASE 24 HR ORAL
Qty: 30 TABLET | Refills: 0 | Status: SHIPPED | OUTPATIENT
Start: 2024-09-04 | End: 2024-09-11

## 2024-09-04 RX ORDER — ATORVASTATIN CALCIUM 10 MG/1
10 TABLET, FILM COATED ORAL DAILY
Qty: 90 TABLET | Refills: 0 | Status: SHIPPED | OUTPATIENT
Start: 2024-09-04 | End: 2024-09-11

## 2024-09-08 DIAGNOSIS — M54.50 CHRONIC BILATERAL LOW BACK PAIN, UNSPECIFIED WHETHER SCIATICA PRESENT: ICD-10-CM

## 2024-09-08 DIAGNOSIS — G89.29 CHRONIC BILATERAL LOW BACK PAIN, UNSPECIFIED WHETHER SCIATICA PRESENT: ICD-10-CM

## 2024-09-08 DIAGNOSIS — G89.29 OTHER CHRONIC PAIN: ICD-10-CM

## 2024-09-08 DIAGNOSIS — F11.20 CONTINUOUS OPIOID DEPENDENCE (H): ICD-10-CM

## 2024-09-09 RX ORDER — HYDROCODONE BITARTRATE AND ACETAMINOPHEN 10; 325 MG/1; MG/1
1 TABLET ORAL DAILY PRN
Qty: 30 TABLET | Refills: 0 | Status: SHIPPED | OUTPATIENT
Start: 2024-09-16 | End: 2024-09-11

## 2024-09-10 ENCOUNTER — OFFICE VISIT (OUTPATIENT)
Dept: DERMATOLOGY | Facility: CLINIC | Age: 69
End: 2024-09-10
Payer: COMMERCIAL

## 2024-09-10 DIAGNOSIS — D22.9 NEVUS: Primary | ICD-10-CM

## 2024-09-10 DIAGNOSIS — L82.1 SEBORRHEIC KERATOSIS: ICD-10-CM

## 2024-09-10 DIAGNOSIS — L57.0 ACTINIC KERATOSIS: ICD-10-CM

## 2024-09-10 DIAGNOSIS — D18.01 ANGIOMA OF SKIN: ICD-10-CM

## 2024-09-10 DIAGNOSIS — L81.4 LENTIGO: ICD-10-CM

## 2024-09-10 PROCEDURE — 17000 DESTRUCT PREMALG LESION: CPT | Performed by: PHYSICIAN ASSISTANT

## 2024-09-10 PROCEDURE — 99213 OFFICE O/P EST LOW 20 MIN: CPT | Mod: 25 | Performed by: PHYSICIAN ASSISTANT

## 2024-09-10 NOTE — LETTER
9/10/2024      Emeli Granados  [address] [facility] [place] [id number]      Dear Colleague,    Thank you for referring your patient, Emeli Granados, to the [facility]. Please see a copy of my visit note below.    HPI:   Chief complaints: Emeli Granados is a pleasant 69 year old female who presents for Full skin cancer screening to rule out skin cancer   Last Skin Exam: 1 year ago     1st Baseline: No  Personal HX of Skin Cancer: no   Personal HX of Malignant Melanoma: no   Family HX of Skin Cancer / Malignant Melanoma: no  Personal HX of Atypical Moles:   no  Risk factors: history of sun exposure and burns; limited sunscreen use currently   New / Changing lesions:yes new spot on the brow  Social History: had 8 surgeries this past year from a workplace injury  On review of systems, there are no further skin complaints, patient is feeling otherwise well.   ROS of the following were done and are negative: Constitutional, Eyes, Ears, Nose,   Mouth, Throat, Cardiovascular, Respiratory, GI, Genitourinary, Musculoskeletal,   Psychiatric, Endocrine, Allergic/Immunologic.    PHYSICAL EXAM:   LMP  (LMP Unknown)   Skin exam performed as follows: Type 2 skin. Mood appropriate  Alert and Oriented X 3. Well developed, well nourished in no distress.  General appearance: Normal  Head including face: Normal  Eyes: conjunctiva and lids: Normal  Mouth: Lips, teeth, gums: Normal  Neck: Normal  Chest-breast/axillae: Normal  Back: Normal  Spleen and liver: Normal  Cardiovascular: Exam of peripheral vascular system by observation for swelling, varicosities, edema: Normal  Genitalia: groin, buttocks: Normal  Extremities: digits/nails (clubbing): Normal  Eccrine and Apocrine glands: Normal  Right upper extremity: Normal  Left upper extremity: Normal  Right lower extremity: Normal  Left lower extremity: Normal  Skin: Scalp and body hair: See below    Pt deferred exam of breasts, groin, buttocks:  No    Other physical findings:  1. Multiple pigmented macules on extremities and trunk  2. Multiple pigmented macules on face, trunk and extremities  3. Multiple vascular papules on trunk, arms and legs  4. Multiple scattered keratotic plaques  5. Pink gritty papule on the mid lower lip x 1       Except as noted above, no other signs of skin cancer or melanoma.     ASSESSMENT/PLAN:   Benign Full skin cancer screening today. . Patient with history of none  Advised on monthly self exams and 1 year  Patient Education: Appropriate brochures given.    Multiple benign appearing melanocytic nevi on arms, legs and trunk. Discussed ABCDEs of melanoma and sunscreen.   Multiple lentigos on arms, legs and trunk. Advised benign, no treatment needed.  Multiple scattered angiomas. Advised benign, no treatment needed.   Seborrheic keratosis on arms, legs and trunk. Advised benign, no treatment needed.  Actinic keratosis on the mid lower lip x 1. As precancerous, cryosurgery performed. Advised on blistering and post-op care. Advised if not resolved in 1-2 months to return for evaluation          Follow-up: yearly/PRN sooner    1.) Patient was asked about new and changing moles. YES  2.) Patient received a complete physical skin examination: YES  3.) Patient was counseled to perform a monthly self skin examination: YES  Scribed By: Ani Cabezas, MS, PAFRANCISCO      Again, thank you for allowing me to participate in the care of your patient.        Sincerely,        Ani Cabezas PA-C

## 2024-09-10 NOTE — PROGRESS NOTES
HPI:   Chief complaints: Emeli Granados is a pleasant 69 year old female who presents for Full skin cancer screening to rule out skin cancer   Last Skin Exam: 1 year ago     1st Baseline: No  Personal HX of Skin Cancer: no   Personal HX of Malignant Melanoma: no   Family HX of Skin Cancer / Malignant Melanoma: no  Personal HX of Atypical Moles:   no  Risk factors: history of sun exposure and burns; limited sunscreen use currently   New / Changing lesions:yes new spot on the brow  Social History: had 8 surgeries this past year from a workplace injury  On review of systems, there are no further skin complaints, patient is feeling otherwise well.   ROS of the following were done and are negative: Constitutional, Eyes, Ears, Nose,   Mouth, Throat, Cardiovascular, Respiratory, GI, Genitourinary, Musculoskeletal,   Psychiatric, Endocrine, Allergic/Immunologic.    PHYSICAL EXAM:   LMP  (LMP Unknown)   Skin exam performed as follows: Type 2 skin. Mood appropriate  Alert and Oriented X 3. Well developed, well nourished in no distress.  General appearance: Normal  Head including face: Normal  Eyes: conjunctiva and lids: Normal  Mouth: Lips, teeth, gums: Normal  Neck: Normal  Chest-breast/axillae: Normal  Back: Normal  Spleen and liver: Normal  Cardiovascular: Exam of peripheral vascular system by observation for swelling, varicosities, edema: Normal  Genitalia: groin, buttocks: Normal  Extremities: digits/nails (clubbing): Normal  Eccrine and Apocrine glands: Normal  Right upper extremity: Normal  Left upper extremity: Normal  Right lower extremity: Normal  Left lower extremity: Normal  Skin: Scalp and body hair: See below    Pt deferred exam of breasts, groin, buttocks: No    Other physical findings:  1. Multiple pigmented macules on extremities and trunk  2. Multiple pigmented macules on face, trunk and extremities  3. Multiple vascular papules on trunk, arms and legs  4. Multiple scattered keratotic plaques  5. Pink  gritty papule on the mid lower lip x 1       Except as noted above, no other signs of skin cancer or melanoma.     ASSESSMENT/PLAN:   Benign Full skin cancer screening today. . Patient with history of none  Advised on monthly self exams and 1 year  Patient Education: Appropriate brochures given.    Multiple benign appearing melanocytic nevi on arms, legs and trunk. Discussed ABCDEs of melanoma and sunscreen.   Multiple lentigos on arms, legs and trunk. Advised benign, no treatment needed.  Multiple scattered angiomas. Advised benign, no treatment needed.   Seborrheic keratosis on arms, legs and trunk. Advised benign, no treatment needed.  Actinic keratosis on the mid lower lip x 1. As precancerous, cryosurgery performed. Advised on blistering and post-op care. Advised if not resolved in 1-2 months to return for evaluation          Follow-up: yearly/PRN sooner    1.) Patient was asked about new and changing moles. YES  2.) Patient received a complete physical skin examination: YES  3.) Patient was counseled to perform a monthly self skin examination: YES  Scribed By: Ani Cabezas MS, PA-C

## 2024-09-11 ENCOUNTER — OFFICE VISIT (OUTPATIENT)
Dept: FAMILY MEDICINE | Facility: CLINIC | Age: 69
End: 2024-09-11
Payer: COMMERCIAL

## 2024-09-11 VITALS
HEIGHT: 59 IN | DIASTOLIC BLOOD PRESSURE: 78 MMHG | OXYGEN SATURATION: 96 % | WEIGHT: 155.5 LBS | SYSTOLIC BLOOD PRESSURE: 102 MMHG | TEMPERATURE: 98.3 F | RESPIRATION RATE: 14 BRPM | HEART RATE: 100 BPM | BODY MASS INDEX: 31.35 KG/M2

## 2024-09-11 DIAGNOSIS — R73.03 PREDIABETES: ICD-10-CM

## 2024-09-11 DIAGNOSIS — E78.5 HYPERLIPIDEMIA LDL GOAL <130: ICD-10-CM

## 2024-09-11 DIAGNOSIS — G89.29 CHRONIC BILATERAL LOW BACK PAIN, UNSPECIFIED WHETHER SCIATICA PRESENT: ICD-10-CM

## 2024-09-11 DIAGNOSIS — R06.02 SOB (SHORTNESS OF BREATH): ICD-10-CM

## 2024-09-11 DIAGNOSIS — J45.901 MILD ASTHMA WITH EXACERBATION, UNSPECIFIED WHETHER PERSISTENT: ICD-10-CM

## 2024-09-11 DIAGNOSIS — R06.2 WHEEZING: ICD-10-CM

## 2024-09-11 DIAGNOSIS — G47.9 SLEEP DISORDER: ICD-10-CM

## 2024-09-11 DIAGNOSIS — G89.29 OTHER CHRONIC PAIN: ICD-10-CM

## 2024-09-11 DIAGNOSIS — F11.20 CONTINUOUS OPIOID DEPENDENCE (H): ICD-10-CM

## 2024-09-11 DIAGNOSIS — M54.50 CHRONIC BILATERAL LOW BACK PAIN, UNSPECIFIED WHETHER SCIATICA PRESENT: ICD-10-CM

## 2024-09-11 PROCEDURE — G0008 ADMIN INFLUENZA VIRUS VAC: HCPCS | Performed by: FAMILY MEDICINE

## 2024-09-11 PROCEDURE — 99214 OFFICE O/P EST MOD 30 MIN: CPT | Mod: 25 | Performed by: FAMILY MEDICINE

## 2024-09-11 PROCEDURE — 90662 IIV NO PRSV INCREASED AG IM: CPT | Performed by: FAMILY MEDICINE

## 2024-09-11 RX ORDER — HYDROCODONE BITARTRATE AND ACETAMINOPHEN 10; 325 MG/1; MG/1
1-2 TABLET ORAL DAILY PRN
Qty: 40 TABLET | Refills: 0 | Status: SHIPPED | OUTPATIENT
Start: 2024-09-11

## 2024-09-11 RX ORDER — ACETAMINOPHEN 500 MG
500-1000 TABLET ORAL EVERY 6 HOURS PRN
Qty: 30 TABLET | Refills: 0 | Status: CANCELLED | OUTPATIENT
Start: 2024-09-11

## 2024-09-11 RX ORDER — ATORVASTATIN CALCIUM 10 MG/1
10 TABLET, FILM COATED ORAL DAILY
Qty: 90 TABLET | Refills: 0 | Status: SHIPPED | OUTPATIENT
Start: 2024-09-11

## 2024-09-11 RX ORDER — TRAMADOL HYDROCHLORIDE 50 MG/1
50 TABLET ORAL EVERY 6 HOURS PRN
Qty: 90 TABLET | Refills: 0 | Status: CANCELLED | OUTPATIENT
Start: 2024-09-11

## 2024-09-11 RX ORDER — METFORMIN HCL 500 MG
500 TABLET, EXTENDED RELEASE 24 HR ORAL 2 TIMES DAILY WITH MEALS
Qty: 180 TABLET | Refills: 1 | Status: SHIPPED | OUTPATIENT
Start: 2024-09-11

## 2024-09-11 RX ORDER — ALBUTEROL SULFATE 0.83 MG/ML
2.5 SOLUTION RESPIRATORY (INHALATION) EVERY 6 HOURS PRN
Qty: 90 ML | Refills: 4 | Status: SHIPPED | OUTPATIENT
Start: 2024-09-11

## 2024-09-11 RX ORDER — ZOLPIDEM TARTRATE 5 MG/1
TABLET ORAL
Qty: 30 TABLET | Refills: 5 | Status: SHIPPED | OUTPATIENT
Start: 2024-09-26

## 2024-09-11 RX ORDER — ALBUTEROL SULFATE 90 UG/1
2 AEROSOL, METERED RESPIRATORY (INHALATION) EVERY 6 HOURS
Qty: 8.5 G | Refills: 5 | Status: SHIPPED | OUTPATIENT
Start: 2024-09-11

## 2024-09-11 ASSESSMENT — PAIN SCALES - GENERAL: PAINLEVEL: EXTREME PAIN (8)

## 2024-09-11 ASSESSMENT — ASTHMA QUESTIONNAIRES: ACT_TOTALSCORE: 22

## 2024-09-11 NOTE — LETTER
My Asthma Action Plan    Name: Emeli Granados   YOB: 1955  Date: 9/11/2024   My doctor: Emma Byrne MD   My clinic: [facility]        My Rescue Medicine:   Albuterol inhaler (Proair/Ventolin/Proventil HFA)  2-4 puffs EVERY 4 HOURS as needed. Use a spacer if recommended by your provider.   My Asthma Severity:   Intermittent / Exercise Induced  Know your asthma triggers: upper respiratory infections             GREEN ZONE   Good Control  I feel good  No cough or wheeze  Can work, sleep and play without asthma symptoms       Take your asthma control medicine every day.     If exercise triggers your asthma, take your rescue medication  15 minutes before exercise or sports, and  During exercise if you have asthma symptoms  Spacer to use with inhaler: If you have a spacer, make sure to use it with your inhaler             YELLOW ZONE Getting Worse  I have ANY of these:  I do not feel good  Cough or wheeze  Chest feels tight  Wake up at night   Keep taking your Green Zone medications  Start taking your rescue medicine:  every 20 minutes for up to 1 hour. Then every 4 hours for 24-48 hours.  If you stay in the Yellow Zone for more than 12-24 hours, contact your doctor.  If you do not return to the Green Zone in 12-24 hours or you get worse, start taking your oral steroid medicine if prescribed by your provider.           RED ZONE Medical Alert - Get Help  I have ANY of these:  I feel awful  Medicine is not helping  Breathing getting harder  Trouble walking or talking  Nose opens wide to breathe       Take your rescue medicine NOW  If your provider has prescribed an oral steroid medicine, start taking it NOW  Call your doctor NOW  If you are still in the Red Zone after 20 minutes and you have not reached your doctor:  Take your rescue medicine again and  Call 911 or go to the emergency room right away    See your regular doctor within 2 weeks of an Emergency Room or Urgent  Care visit for follow-up treatment.          Annual Reminders:  Meet with Asthma Educator,  Flu Shot in the Fall, consider Pneumonia Vaccination for patients with asthma (aged 19 and older).    Pharmacy:    [id number] - [place], [place] - [address]  WRITTEN PRESCRIPTION REQUESTED  [place], [place] - [address]    Electronically signed by Emma Byrne MD   Date: 09/11/24                    Asthma Triggers  How To Control Things That Make Your Asthma Worse    Triggers are things that make your asthma worse.  Look at the list below to help you find your triggers and   what you can do about them. You can help prevent asthma flare-ups by staying away from your triggers.      Trigger                                                          What you can do   Cigarette Smoke  Tobacco smoke can make asthma worse. Do not allow smoking in your home, car or around you.  Be sure no one smokes at a child s day care or school.  If you smoke, ask your health care provider for ways to help you quit.  Ask family members to quit too.  Ask your health care provider for a referral to Quit Plan to help you quit smoking, or call 1-296-298-PLAN.     Colds, Flu, Bronchitis  These are common triggers of asthma. Wash your hands often.  Don t touch your eyes, nose or mouth.  Get a flu shot every year.     Dust Mites  These are tiny bugs that live in cloth or carpet. They are too small to see. Wash sheets and blankets in hot water every week.   Encase pillows and mattress in dust mite proof covers.  Avoid having carpet if you can. If you have carpet, vacuum weekly.   Use a dust mask and HEPA vacuum.   Pollen and Outdoor Mold  Some people are allergic to trees, grass, or weed pollen, or molds. Try to keep your windows closed.  Limit time out doors when pollen count is high.   Ask you health care provider about taking medicine during allergy season.     Animal Dander  Some people are  allergic to skin flakes, urine or saliva from pets with fur or feathers. Keep pets with fur or feathers out of your home.    If you can t keep the pet outdoors, then keep the pet out of your bedroom.  Keep the bedroom door closed.  Keep pets off cloth furniture and away from stuffed toys.     Mice, Rats, and Cockroaches  Some people are allergic to the waste from these pests.   Cover food and garbage.  Clean up spills and food crumbs.  Store grease in the refrigerator.   Keep food out of the bedroom.   Indoor Mold  This can be a trigger if your home has high moisture. Fix leaking faucets, pipes, or other sources of water.   Clean moldy surfaces.  Dehumidify basement if it is damp and smelly.   Smoke, Strong Odors, and Sprays  These can reduce air quality. Stay away from strong odors and sprays, such as perfume, powder, hair spray, paints, smoke incense, paint, cleaning products, candles and new carpet.   Exercise or Sports  Some people with asthma have this trigger. Be active!  Ask your doctor about taking medicine before sports or exercise to prevent symptoms.    Warm up for 5-10 minutes before and after sports or exercise.     Other Triggers of Asthma  Cold air:  Cover your nose and mouth with a scarf.  Sometimes laughing or crying can be a trigger.  Some medicines and food can trigger asthma.

## 2024-09-11 NOTE — LETTER

## 2024-09-11 NOTE — PROGRESS NOTES
"  Assessment & Plan     Chronic bilateral low back pain, unspecified whether sciatica present  Recently worse  Changed her agreement to 40 per month and will send to pain clinic to see if there is anything else that can be done for her short of medication    - HYDROcodone-acetaminophen (NORCO)  MG per tablet  Dispense: 40 tablet; Refill: 0  - Pain Management  Referral  - Primary Care - Care Coordination Referral    F11.2 - Continuous opioid dependence (H)  See above   - HYDROcodone-acetaminophen (NORCO)  MG per tablet  Dispense: 40 tablet; Refill: 0  - Pain Management  Referral    Other chronic pain  Signed CSA again   - HYDROcodone-acetaminophen (NORCO)  MG per tablet  Dispense: 40 tablet; Refill: 0  - Primary Care - Care Coordination Referral    Hyperlipidemia LDL goal <130  Refills per epicare  Under control   - atorvastatin (LIPITOR) 10 MG tablet  Dispense: 90 tablet; Refill: 0    Sleep disorder  Under control  Refills per epicare    - zolpidem (AMBIEN) 5 MG tablet  Dispense: 30 tablet; Refill: 5    Prediabetes  Will increase to 2 per day  The potential side effects of the prescription medication were discussed with the patient.   - metFORMIN (GLUCOPHAGE XR) 500 MG 24 hr tablet  Dispense: 180 tablet; Refill: 1    Wheezing  Refills per epicare    - albuterol (PROVENTIL) (2.5 MG/3ML) 0.083% neb solution  Dispense: 90 mL; Refill: 4    SOB (shortness of breath)  Refills per epicare    - albuterol (PROVENTIL HFA) 108 (90 Base) MCG/ACT inhaler  Dispense: 8.5 g; Refill: 5    Mild asthma with exacerbation, unspecified whether persistent  Refills per epicare    - albuterol (PROVENTIL HFA) 108 (90 Base) MCG/ACT inhaler  Dispense: 8.5 g; Refill: 5  - albuterol (PROVENTIL) (2.5 MG/3ML) 0.083% neb solution  Dispense: 90 mL; Refill: 4            BMI  Estimated body mass index is 31.41 kg/m  as calculated from the following:    Height as of this encounter: 1.499 m (4' 11\").    Weight as of " this encounter: 70.5 kg (155 lb 8 oz).   Weight management plan: Discussed healthy diet and exercise guidelines      CONSULTATION/REFERRAL to pain management   FUTURE APPOINTMENTS:       - Follow-up visit in 6 months for wellness visit   Work on weight loss  Regular exercise  See Patient Instructions    Sofiya Sainz is a 69 year old, presenting for the following health issues:   she is here for med check and has a few concerns:  1- she had had chronic back pain.   She was eval in 2021 and was stable and doing well.   She sees [facility] and had had back injections which work pretty well but recently she had one and it seemed to make things worse.   She was taking 1 norco per day for years but with that flare she has taken 2 per day and run out early.   She wonders if she should see someone else about the pain.     2- she also has gained weight.   She has had hand surgery and was told to stop her exercising and very recently they said she could start back.    3- she has prediabetes and takes one metformin per day.     4- she recently had cardiac ablation last month.   It went well but some soreness in groin.   5- with winter coming she would like to get refills on her inhaler and nebs just in case.     Recheck Medication (/Chronic bilateral low back pain, unspecified whether sciatica present [M54.50, G89.29]//F11.2 - Continuous opioid dependence (H) [F11.20]//Other chronic pain [G89.29]//HYDROcodone-acetaminophen (NORCO)  MG per tablet ) and Weight Loss (Gaining weight and would like to get stable on medication for weight loss)        9/11/2024     9:54 AM   Additional Questions   Roomed by antonella borrero     History of Present Illness       Reason for visit:  Back   She is taking medications regularly.       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    took uterus and left the ovaries    Other chronic pain     Polycythemia 08/08/2012    resolved now       Past Surgical History:   Procedure Laterality Date    CARPAL TUNNEL RELEASE RT/LT Right 11/23/2021    and trigger finger and tendon repair    CARPAL TUNNEL RELEASE RT/LT Left 12/20/2021    and tendon repair    COLONOSCOPY  2004, 2012    repeat in 2022    COLONOSCOPY  11/24/2023    4 polyps - repeat in 3 years    CYSTOSCOPY      Dilatation of the esophagus once due to dysphagia and stricture  2003    EP ABLATION SVT N/A 8/28/2024    Procedure: Ablation Supraventricular Tachycardia;  Surgeon: Millie Dominguez MD;  Location:  [facility]    EXCISE SOFT TISSUE SHOULDER Left 5/30/2024    Procedure: EXCISION LEFT SHOULDER MASS;  Surgeon: Yao Baker MD;  Location: [facility] OR    Ganglion cyst removal      HEMORRHOIDECTOMY BANDING      HYSTERECTOMY  1998    LAPAROSCOPIC CHOLECYSTECTOMY N/A 5/30/2024    Procedure: CHOLECYSTECTOMY, LAPAROSCOPIC;  Surgeon: Yao Baker MD;  Location: [facility] OR    LAPAROSCOPIC SALPINGO-OOPHORECTOMY Bilateral 10/23/2015    Procedure: LAPAROSCOPIC SALPINGO-OOPHORECTOMY;  Surgeon: Johnathan Steve MD;  Location: [facility] OR    subscapularous repair and biceps tendon repair  05/2022    Dr. Verma at [facility]    Thumb surgery Right     TONSILLECTOMY      XR WRIST SURGERY FLORI RIGHT Right 11/2023    surgery for DJD and bone graft done       MEDICATIONS:  Current Outpatient Medications   Medication Sig Dispense Refill    acetaminophen (TYLENOL) 500 MG tablet Take 1-2 tablets (500-1,000 mg) by mouth every 6 hours as needed for mild pain 30 tablet 0    albuterol (PROVENTIL HFA) 108 (90 Base) MCG/ACT inhaler Inhale 2 puffs into the lungs every 6 hours. 8.5 g 5     albuterol (PROVENTIL) (2.5 MG/3ML) 0.083% neb solution Take 1 vial (2.5 mg) by nebulization every 6 hours as needed for shortness of breath, wheezing or cough. 90 mL 4    ascorbic acid (VITAMIN C) 1000 MG TABS Take 1,000 mg by mouth daily      atorvastatin (LIPITOR) 10 MG tablet Take 1 tablet (10 mg) by mouth daily. 90 tablet 0    CALCIUM PO Take 1 tablet by mouth daily      cyclobenzaprine (FLEXERIL) 5 MG tablet Take 1 tablet (5 mg) by mouth 2 times daily as needed for muscle spasms. 60 tablet 4    HYDROcodone-acetaminophen (NORCO)  MG per tablet Take 1-2 tablets by mouth daily as needed for severe pain. 40 tablet 0    metFORMIN (GLUCOPHAGE XR) 500 MG 24 hr tablet Take 1 tablet (500 mg) by mouth 2 times daily (with meals). 180 tablet 1    Multiple Vitamins-Minerals (MULTIVITAMIN OR) Take 1 tablet by mouth daily Per pt, uses ones without Iron      omeprazole (PRILOSEC) 20 MG DR capsule Take 1 capsule (20 mg) by mouth 2 times daily. 180 capsule 1    traMADol (ULTRAM) 50 MG tablet Take 1 tablet (50 mg) by mouth every 6 hours as needed for severe pain. Do not start before 2024. 90 tablet 0    vitamin D3 (CHOLECALCIFEROL) 1000 units (25 mcg) tablet Take 1,000 Units by mouth daily      vitamin E 400 UNITS TABS Take 400 Units by mouth daily      [START ON 2024] zolpidem (AMBIEN) 5 MG tablet TAKE 1 TABLET (5 MG) BY MOUTH NIGHTLY AS NEEDED FOR SLEEP. DO NOT TAKE WITH TRAMADOL. MUST BE NDC # [address]-18 PER PT REQUEST. 30 tablet 5     No current facility-administered medications for this visit.       SOCIAL HISTORY:  Social History     Tobacco Use    Smoking status: Former     Current packs/day: 0.00     Average packs/day: 0.5 packs/day for 52.1 years (26.0 ttl pk-yrs)     Types: Cigarettes     Start date: 10/13/1967     Quit date: 2019     Years since quittin.8     Passive exposure: Never    Smokeless tobacco: Never    Tobacco comments:     quit 2019   Substance Use Topics    Alcohol  "use: No     Comment: sober since 9/11/99       Family History   Problem Relation Age of Onset    Hypertension Mother     Cerebrovascular Disease Mother         TIA's    Depression Mother     Cancer - colorectal Maternal Grandmother         dx at 65    Lipids Father     C.A.D. Father         angioplasty at 65    Musculoskeletal Disorder Father     Alcohol/Drug Daughter         in remission    Breast Cancer No family hx of              Review of Systems  Constitutional, HEENT, cardiovascular, pulmonary, gi and gu systems are negative, except as otherwise noted.      Objective    BP (!) 141/69 (BP Location: Right arm, Patient Position: Sitting, Cuff Size: Adult Regular)   Pulse 100   Temp 98.3  F (36.8  C) (Oral)   Resp 14   Ht 1.499 m (4' 11\")   Wt 70.5 kg (155 lb 8 oz)   LMP  (LMP Unknown)   SpO2 96%   BMI 31.41 kg/m    Body mass index is 31.41 kg/m .  Physical Exam   GENERAL: alert and no distress  EYES: Eyes grossly normal to inspection, PERRL and conjunctivae and sclerae normal  HENT: ear canals and TM's normal, nose and mouth without ulcers or lesions  NECK: no adenopathy, no asymmetry, masses, or scars  RESP: lungs clear to auscultation - no rales, rhonchi or wheezes  CV: regular rates and rhythm, normal S1 S2, no S3 or S4, peripheral pulses strong, and no peripheral edema  ABDOMEN: soft, nontender, no hepatosplenomegaly, no masses and bowel sounds normal  MS: no gross musculoskeletal defects noted, no edema  SKIN: no suspicious lesions or rashes  NEURO: Normal strength and tone, mentation intact and speech normal  PSYCH: mentation appears normal, affect normal/bright  LYMPH: no cervical, supraclavicular, axillary, or inguinal adenopathy    Office Visit on 08/29/2024   Component Date Value Ref Range Status    Color Urine 08/29/2024 Yellow  Colorless, Straw, Light Yellow, Yellow Final    Appearance Urine 08/29/2024 Clear  Clear Final    Glucose Urine 08/29/2024 Negative  Negative mg/dL Final    Bilirubin " Urine 08/29/2024 Negative  Negative Final    Ketones Urine 08/29/2024 Negative  Negative mg/dL Final    Specific Gravity Urine 08/29/2024 1.020  1.003 - 1.035 Final    Blood Urine 08/29/2024 Trace (A)  Negative Final    pH Urine 08/29/2024 6.0  5.0 - 7.0 Final    Protein Albumin Urine 08/29/2024 Negative  Negative mg/dL Final    Urobilinogen Urine 08/29/2024 0.2  0.2, 1.0 E.U./dL Final    Nitrite Urine 08/29/2024 Negative  Negative Final    Leukocyte Esterase Urine 08/29/2024 Trace (A)  Negative Final    Hemoglobin A1C 08/29/2024 6.3 (H)  0.0 - 5.6 % Final    Normal <5.7%   Prediabetes 5.7-6.4%    Diabetes 6.5% or higher     Note: Adopted from ADA consensus guidelines.    Trichomonas 08/29/2024 Absent  Absent Final    Yeast 08/29/2024 Absent  Absent Final    Clue Cells 08/29/2024 Present (A)  Absent Final    WBCs/high power field 08/29/2024 1+ (A)  None Final    Bacteria Urine 08/29/2024 Moderate (A)  None Seen /HPF Final    RBC Urine 08/29/2024 0-2  0-2 /HPF /HPF Final    WBC Urine 08/29/2024 0-5  0-5 /HPF /HPF Final    Squamous Epithelials Urine 08/29/2024 Few (A)  None Seen /LPF Final    Mucus Urine 08/29/2024 Present (A)  None Seen /LPF Final           Signed Electronically by: Emma Byrne MD

## 2024-09-12 ENCOUNTER — PATIENT OUTREACH (OUTPATIENT)
Dept: CARE COORDINATION | Facility: CLINIC | Age: 69
End: 2024-09-12
Payer: COMMERCIAL

## 2024-09-12 NOTE — PROGRESS NOTES
Clinic Care Coordination Contact  Community Health Worker Initial Outreach    CHW Initial Information Gathering:  Referral Source: PCP  Preferred Hospital: [facility]  [phone]  Current living arrangement:: I live in a private home with spouse  Type of residence:: Private home - stairs  Community Resources: None  Informal Support system:: Family  No PCP office visit in Past Year: No  Transportation means:: Regular car  CHW Additional Questions  If ED/Hospital discharge, follow-up appointment scheduled as recommended?: N/A    CHW introduced self and role with CC  Patient states that she had a number of surgery on her hands which makes it difficult to do household tasks. Holding basket on clothes hurts her even when wearing a brace.   She currently lives with SO who has cancer and parkinsons, but states that he is doing pretty good. She feels there is going to come to a time where they won't be able to much and wanting resources for then. [facility], CHW explained that they have home care and respite options too if he qualifies. She will look into this.      Patient accepts CC: Yes. Patient scheduled for assessment with [facility] on 9/13 at 11:00am. Patient noted desire to discuss resources for more help at home.     NICHOLE Webster, B.S. [facility]  Clinic Care Coordination  [facility] Clinics:  [place], [place] and [place]  [phone]  [email or web address]

## 2024-09-13 ENCOUNTER — PATIENT OUTREACH (OUTPATIENT)
Dept: CARE COORDINATION | Facility: CLINIC | Age: 69
End: 2024-09-13

## 2024-09-13 NOTE — PROGRESS NOTES
Clinic Care Coordination Contact  Clinic Care Coordination Contact  [facility]/Voicemail    Clinical Data: Care Coordinator Outreach    Outreach Documentation Number of Outreach Attempt   9/13/2024  12:22 PM 1       Left message on patient's voicemail with call back information and requested return call.    Plan: Care Coordinator  via . Care Coordinator will try to reach patient again in 1-2 business days.    EMMA Del Castillo   Care Coordination Team  [phone]

## 2024-09-13 NOTE — LETTER
[facility]  Patient Centered Plan of Care  About Me:        Patient Name:  Emeli Bhatti    YOB: 1955  Age:         69 year old   Doyle MRN:    [id number] Telephone Information:  Home Phone [phone]   Mobile [phone]       Address:  [address] [facility] [address] Email address:  [email or web address]      Emergency Contact(s)    Name Relationship Lgl Grd Work Phone Home Phone Mobile Phone   1. ROBERTO GUTIERREZ Daughter    [phone]   2. LEE BHATTI Son    [phone]           Primary language:  English     needed? No   [place] [employer] [facility]:  [phone] op. 1  Other communication barriers:None    Preferred Method of Communication:  Mail  Current living arrangement: I live in a private home with spouse    Mobility Status/ Medical Equipment: No data recorded      Health Maintenance  Health Maintenance Reviewed: Up to date  There are no preventive care reminders to display for this patient.       My Access Plan  Medical Emergency 911   Primary Clinic Line [facility] - [phone]   24 Hour Appointment Line [phone] or  [phone]) (toll-free)   24 Hour Nurse Line [phone] (toll-free)   Preferred Urgent Care No data recorded   Preferred Hospital [facility]  [phone]     Preferred Pharmacy [id number] [place], [place] - [address]     Behavioral Health Crisis Line The National Suicide Prevention Lifeline at 1-369.560.9336 or Text/Call 838           My Care Team Members  Patient Care Team         Relationship Specialty Notifications Start End    Emma Byrne MD PCP - General Family Medicine  4/13/22     Phone: [phone] Fax: [phone] [address] [facility] [address]    Emma Byrne MD Assigned PCP   3/20/22     Phone: [phone] Fax: [phone] [address] [facility] [address]    Millie Dominguez  MD KAVITHA [facility] Cardiovascular Disease  11/2/22     Phone: [phone] Fax: [phone] [address] [place] [address]    Emma Byrne MD Assigned Pain Medication Provider   1/9/23     Phone: [phone] Fax: [phone]         [address] [place] [address]    Tiffanie More PA-C Physician Assistant Urology  3/6/24     Phone: [phone] Fax: [phone] [address] [place] [address]    Rizwana Sinclair APRN CNP Assigned Neuroscience Provider   4/23/24     Phone: [phone] Fax: [phone] [address] [facility] [place] [address]    Megan Vargas PA-C Assigned OBGYN Provider   4/23/24     Phone: [phone] Fax: [phone] [address] [place] [address]    Yao Baker MD Assigned Surgical Provider   5/23/24     Phone: [phone] Fax: [phone]         [address] [facility] [address]    Haven Manley PA-C Assigned Heart and Vascular Provider   8/23/24     Phone: [phone] Fax: [phone] [address] [place] [address]    Brea Jamison, LSW Lead Care Coordinator Primary Care - CC Admissions 9/12/24     Phone: [phone]         China Crowder CHW Community Health Worker Primary Care - [facility] Admissions 9/16/24     Phone: [phone]                     My Care Plans  Self Management and Treatment Plan    Care Plan  Care Plan: Help At Home       Problem: Insufficient In-home support       Goal: Establish adequate home support       Start Date: 9/13/2024 Expected End Date: 11/29/2024    Priority: High    Note:     Barriers: Have stairs in home down to laundry room  Strengths: May have access to [facility] services through partner  Patient expressed understanding of goal:   Action steps to achieve this goal:  1. I will ask my partner to call with [facility]  officer ( [phone] )to ask if housekeeping and other services like home accessibility options are  available to him .   2. I will contact [employer] ( [phone]) and explore other options for cleaning services.   3. I will  let Care Coordination know if I need additional resources                              Action Plans on File:   Asthma  Asthma                Advance Care Plans/Directives:   Honoring Choices    Advance Care Planning and Health Care Directives  When it comes to decisions about your health care, it s important that your voice is heard. You may not always be able to speak for yourself.    We encourage you to have discussions with your loved ones, cultural or spiritual leaders and health care providers about your goals, values, beliefs and choices.    We are a part of [employer] [place] , supporting and promoting the benefits of advance care planning conversations.    Our goals are to:  Help you make informed decisions about your healthcare choices and ensure that those choices are honored  Offer advance care planning discussions with trained staff  Ensure your choices are clearly defined, documented and available in your medical record  Translate your choices into medical orders as needed    Why is Advance Care Planning important?  Know what your health care choices are and decide what is right for you  An unexpected illness or injury could leave you unable to participate in important treatment decisions  When choices are left to others to decide that responsibility can be difficult and stressful  By discussing and outlining your choices, your voice is heard in the care you want to receive    How can I learn more?  We offer free classes at multiple locations, days and times. Our trained facilitators will provide information and guide you through a Health Care Directive document. They can also review, notarize and add your document to your medical record.    Call [employer] at [phone] or toll free at [phone] for assistance.   Advanced Care Plan/Directives on file:    No    Discussed with patient/caregiver(s):   Declined Further Information             My Medical and Care Information  Problem List   Patient Active Problem List   Diagnosis    Disorder of bone and cartilage    CARDIOVASCULAR SCREENING; LDL GOAL LESS THAN 130    Osteopenia    Back pain    Moderate persistent asthma with exacerbation    Other chronic pain    Bacteremia    Adenomyomatosis of gallbladder    Diverticulosis of colon    Acute bilateral low back pain without sciatica    Paroxysmal supraventricular tachycardia (H24)    F11.2 - Continuous opioid dependence (H)    Muscle strain of right gluteal region, subsequent encounter    Wheezing    Influenza A    Hypoxia    Hyperlipidemia LDL goal <130    Lumbar radiculitis    Prediabetes    Mild asthma with exacerbation, unspecified whether persistent      Current Medications and Allergies:  See printed Medication Report.    Care Coordination Start Date: 9/11/2024   Frequency of Care Coordination: monthly, more frequently as needed     Form Last Updated: 09/17/2024

## 2024-09-13 NOTE — LETTER
[facility] CARE COORDINATION    September 17, 2024    Emeli Granados  [address] [place] [id number]      Dear Emeli,    I am a clinic care coordinator who works with Emma Byrne MD with the [facility]. I wanted to thank you for spending the time to talk with me.  Below is a description of clinic care coordination and how I can further assist you.       The clinic care coordination team is made up of a registered nurse, , financial resource worker and community health worker who understand the health care system. The goal of clinic care coordination is to help you manage your health and improve access to the health care system. Our team works alongside your provider to assist you in determining your health and social needs. We can help you obtain health care and community resources, providing you with necessary information and education. We can work with you through any barriers and develop a care plan that helps coordinate and strengthen the communication between you and your care team.  Our services are voluntary and are offered without charge to you personally.    Please feel free to contact me with any questions or concerns regarding care coordination and what we can offer.      We are focused on providing you with the highest-quality healthcare experience possible.    Sincerely,     EMMA Del Castillo   Care Coordination Team  [phone]      Enclosed: I have enclosed a copy of the Patient Centered Plan of Care. This has helpful information and goals that we have talked about. Please keep this in an easy to access place to use as needed.

## 2024-09-16 ASSESSMENT — ACTIVITIES OF DAILY LIVING (ADL): DEPENDENT_IADLS:: INDEPENDENT

## 2024-09-17 NOTE — PROGRESS NOTES
Clinic Care Coordination Contact  Clinic Care Coordination Contact  OUTREACH    Referral Information:  Referral Source: PCP    Primary Diagnosis: Other (include Comment box)    Chief Complaint   Patient presents with    Clinic Care Coordination - Initial        Universal Utilization: 93% Admission or ED Risk  Clinic Utilization  Difficulty keeping appointments:: No  Compliance Concerns: No  No-Show Concerns: No  No PCP office visit in Past Year: No  Utilization      No Show Count (past year)  3             ED Visits  3             Hospital Admissions  3                    Current as of: 9/16/2024  5:11 PM                Clinical Concerns:  Current Medical Concerns:    Patient Active Problem List   Diagnosis    Disorder of bone and cartilage    CARDIOVASCULAR SCREENING; LDL GOAL LESS THAN 130    Osteopenia    Back pain    Moderate persistent asthma with exacerbation    Other chronic pain    Bacteremia    Adenomyomatosis of gallbladder    Diverticulosis of colon    Acute bilateral low back pain without sciatica    Paroxysmal supraventricular tachycardia (H24)    F11.2 - Continuous opioid dependence (H)    Muscle strain of right gluteal region, subsequent encounter    Wheezing    Influenza A    Hypoxia    Hyperlipidemia LDL goal <130    Lumbar radiculitis    Prediabetes    Mild asthma with exacerbation, unspecified whether persistent      ANDREW MIRANDA spoke by phone with Emeli.  She lives in a home  (with  stairs) with her partner.  She is finding it more difficult to do tasks such as laundry. Her partner has Parkinson's and he is  also  finding it more difficult to do household tasks.  He is a  and ANDREW MIRANDA suggested that he contact [facility] Veterans Officer to see if he is eligible  for services such as housekeeping and  having accommodations made to the home.         Andrew MIRANDA gave her information about [employer] who provide housekeeping services with a fee. Other companies that provided cleaning  services  such as [email or web address], [employer].           Current Behavioral Concerns: None noted    Education Provided to patient: CC role, housekeeping resources,  information.       Health Maintenance Reviewed: Up to date    Medication Management:  Medication review status: Medications reviewed and no changes reported per patient.             Functional Status:  Dependent ADLs:: Independent  Dependent IADLs:: Independent  Bed or wheelchair confined:: No  Fallen 2 or more times in the past year?: No  Any fall with injury in the past year?: No    Living Situation:  Current living arrangement:: I live in a private home with spouse  [facility]    Lifestyle & Psychosocial Needs:    Social Determinants of Health     Food Insecurity: No Food Insecurity (8/23/2024)    Received from [facility]    Food Insecurity     Worried About Running Out of Food in the Last Year: 1   Depression: Not at risk (8/23/2024)    [facility]    PHQ-2     PHQ-2 TOTAL SCORE: 0   Housing Stability: Low Risk  (8/23/2024)    [facility] from [facility]    Housing Stability     Unable to Pay for Housing in the Last Year: 1   Tobacco Use: Medium Risk (9/11/2024)    Patient History     Smoking Tobacco Use: Former     Smokeless Tobacco Use: Never     Passive Exposure: Never   Financial Resource Strain: Low Risk  (8/23/2024)    [facility] from [facility]    Financial Resource Strain     Difficulty of Paying Living Expenses: 3     Difficulty of Paying Living Expenses: Not on file   Alcohol Use: Not At Risk (10/31/2023)    AUDIT-C     Frequency of Alcohol Consumption: Never     Average Number of Drinks: Patient does not drink     Frequency of Binge Drinking: Never   Transportation Needs: No Transportation Needs (8/23/2024)    Received from [facility]  [employer] [facility]    Transportation Needs     Lack of Transportation (Medical): 1   Physical Activity: Unknown (3/11/2024)    Exercise Vital Sign     Days of Exercise per Week: 3 days     Minutes of Exercise per Session: Not on file   Interpersonal Safety: Low Risk  (8/28/2024)    Interpersonal Safety     Do you feel physically and emotionally safe where you currently live?: Yes     Within the past 12 months, have you been hit, slapped, kicked or otherwise physically hurt by someone?: No     Within the past 12 months, have you been humiliated or emotionally abused in other ways by your partner or ex-partner?: No   Stress: No Stress Concern Present (3/11/2024)    [place] [employer] - Occupational Stress Questionnaire     Feeling of Stress : Only a little   Social Connections: Socially Integrated (8/23/2024)    Received from [employer] [facility]    Social Connections     Frequency of Communication with Friends and Family: 0   Health Literacy: Not on file     Inadequate nutrition (GOAL):: No  Tube Feeding: No  Inadequate activity/exercise (GOAL):: No  Significant changes in sleep pattern (GOAL): No  Transportation means:: Regular car     Mental health management concern (GOAL):: No  Informal Support system:: Family             Resources and Interventions:  Current Resources:      Community Resources: None  Supplies Currently Used at Home: None  Equipment Currently Used at Home: none  Employment Status: retired         Advance Care Plan/Directive  Advanced Care Plans/Directives on file:: No  Discussed with patient/caregiver:: Declined Further Information    Referrals Placed: Community Resources, [facility] Resources, Veterans Linkage Line         Care Plan:  Care Plan: Help At Home       Problem: Insufficient In-home support       Goal: Establish adequate home support       Start Date: 9/13/2024 Expected End Date: 11/29/2024    Priority: High    Note:     Barriers:  Have stairs in home down to laundry room  Strengths: May have access to VA services through partner  Patient expressed understanding of goal:   Action steps to achieve this goal:  1. I will ask my partner to call with [facility] [place] officer ( [phone] )to ask if housekeeping and other services like home accessibility options are available to him .   2. I will contact [employer] ( [phone]) and explore other options for cleaning services.   3. I will  let Care Coordination know if I need additional resources                              Patient/Caregiver understanding: Yes    Outreach Frequency: monthly, more frequently as needed  Future Appointments                In 2 weeks Haven Manley PA-C M [facility] PSA CLIN    In 5 months Emma Byrne MD M [facility],     In 12 months Ani Cabezas PA-C M [facility],             Plan: Emeli will look in to the resources we discussed.  CHW will outreach again in one month.    EMMA Del Castillo   Care Coordination Team  [phone]

## 2024-09-27 ENCOUNTER — OFFICE VISIT (OUTPATIENT)
Dept: FAMILY MEDICINE | Facility: CLINIC | Age: 69
End: 2024-09-27
Payer: COMMERCIAL

## 2024-09-27 VITALS
WEIGHT: 152 LBS | BODY MASS INDEX: 30.64 KG/M2 | OXYGEN SATURATION: 97 % | HEART RATE: 72 BPM | RESPIRATION RATE: 12 BRPM | DIASTOLIC BLOOD PRESSURE: 80 MMHG | SYSTOLIC BLOOD PRESSURE: 124 MMHG | HEIGHT: 59 IN | TEMPERATURE: 99.5 F

## 2024-09-27 DIAGNOSIS — R39.9 URINARY SYMPTOM OR SIGN: ICD-10-CM

## 2024-09-27 DIAGNOSIS — B37.0 THRUSH: ICD-10-CM

## 2024-09-27 DIAGNOSIS — N89.8 VAGINAL ODOR: Primary | ICD-10-CM

## 2024-09-27 LAB
ALBUMIN UR-MCNC: NEGATIVE MG/DL
APPEARANCE UR: CLEAR
BILIRUB UR QL STRIP: NEGATIVE
COLOR UR AUTO: YELLOW
GLUCOSE UR STRIP-MCNC: NEGATIVE MG/DL
HGB UR QL STRIP: NEGATIVE
KETONES UR STRIP-MCNC: NEGATIVE MG/DL
LEUKOCYTE ESTERASE UR QL STRIP: NEGATIVE
NITRATE UR QL: NEGATIVE
PH UR STRIP: 5.5 [PH] (ref 5–7)
SP GR UR STRIP: 1.01 (ref 1–1.03)
UROBILINOGEN UR STRIP-ACNC: 0.2 E.U./DL

## 2024-09-27 PROCEDURE — 99213 OFFICE O/P EST LOW 20 MIN: CPT | Performed by: PHYSICIAN ASSISTANT

## 2024-09-27 PROCEDURE — 81003 URINALYSIS AUTO W/O SCOPE: CPT | Performed by: PHYSICIAN ASSISTANT

## 2024-09-27 RX ORDER — METRONIDAZOLE 7.5 MG/G
1 GEL VAGINAL DAILY
Qty: 25 G | Refills: 0 | Status: SHIPPED | OUTPATIENT
Start: 2024-09-27 | End: 2024-10-02

## 2024-09-27 RX ORDER — CLOTRIMAZOLE 10 MG/1
10 LOZENGE ORAL 4 TIMES DAILY
Qty: 40 LOZENGE | Refills: 0 | Status: SHIPPED | OUTPATIENT
Start: 2024-09-27 | End: 2024-10-07

## 2024-09-27 ASSESSMENT — PAIN SCALES - GENERAL: PAINLEVEL: SEVERE PAIN (6)

## 2024-09-27 NOTE — PROGRESS NOTES
"  Assessment & Plan     Vaginal odor  Likely BV. She tested positive about a month ago and had improvement of symptoms but not all gone. I will defer retesting today and treat. If not going away all together we can retest. She will reach out if this is the case.  - metroNIDAZOLE (METROGEL) 0.75 % vaginal gel; Place 1 applicator (5 g) vaginally daily for 5 days.    Thrush  Returning. She was treated in July. [place] today. Discussed importance of ensuring she is cleaning her dentures as well as it can be difficult to get rid of the thrush on dentures and dental devices.  - clotrimazole (MYCELEX) 10 MG lozenge; Place 1 lozenge (10 mg) inside cheek 4 times daily for 10 days.    Urinary symptom or sign  UA negative for infection. Likely the BV as noted above.  - UA Macroscopic with reflex to Microscopic and Culture - Lab Collect; Future  - UA Macroscopic with reflex to Microscopic and Culture - Lab Collect      Subjective   Emeli is a 69 year old, presenting for the following health issues:  Vaginal Problem (Yeast infection follow up / odor smelling still) and Mouth/Lip Problem (Thrush not gone away)        9/27/2024     9:01 AM   Additional Questions   Roomed by antonella borrero     History of Present Illness       Reason for visit:  UTI check up and thrush  Symptom onset:  3-4 weeks ago  Symptom progression:  Staying the same    She eats 2-3 servings of fruits and vegetables daily.She consumes 1 sweetened beverage(s) daily.She exercises with enough effort to increase her heart rate 10 to 19 minutes per day.  She is missing 5 dose(s) of medications per week.           Objective    /80 (BP Location: Right arm, Patient Position: Sitting, Cuff Size: Adult Regular)   Pulse 72   Temp 99.5  F (37.5  C) (Oral)   Resp 12   Ht 1.499 m (4' 11\")   Wt 68.9 kg (152 lb)   LMP  (LMP Unknown)   SpO2 97%   BMI 30.70 kg/m    Body mass index is 30.7 kg/m .  Physical Exam   GENERAL: No acute distress  HEENT: " Normocephalic, mild amount of white plaque over the tongue.  NEURO: Alert and non-focal      Results for orders placed or performed in visit on 09/27/24 (from the past 24 hour(s))   UA Macroscopic with reflex to Microscopic and Culture - Lab Collect    Specimen: Urine, NOS   Result Value Ref Range    Color Urine Yellow Colorless, Straw, Light Yellow, Yellow    Appearance Urine Clear Clear    Glucose Urine Negative Negative mg/dL    Bilirubin Urine Negative Negative    Ketones Urine Negative Negative mg/dL    Specific Gravity Urine 1.015 1.003 - 1.035    Blood Urine Negative Negative    pH Urine 5.5 5.0 - 7.0    Protein Albumin Urine Negative Negative mg/dL    Urobilinogen Urine 0.2 0.2, 1.0 E.U./dL    Nitrite Urine Negative Negative    Leukocyte Esterase Urine Negative Negative    Narrative    Microscopic not indicated     *Note: Due to a large number of results and/or encounters for the requested time period, some results have not been displayed. A complete set of results can be found in Results Review.           Signed Electronically by: Gina Arias PA-C

## 2024-09-30 ENCOUNTER — VIRTUAL VISIT (OUTPATIENT)
Dept: FAMILY MEDICINE | Facility: OTHER | Age: 69
End: 2024-09-30
Payer: COMMERCIAL

## 2024-09-30 ENCOUNTER — ANCILLARY PROCEDURE (OUTPATIENT)
Dept: GENERAL RADIOLOGY | Facility: CLINIC | Age: 69
End: 2024-09-30
Attending: NURSE PRACTITIONER
Payer: COMMERCIAL

## 2024-09-30 ENCOUNTER — LAB (OUTPATIENT)
Dept: LAB | Facility: CLINIC | Age: 69
End: 2024-09-30
Attending: NURSE PRACTITIONER
Payer: COMMERCIAL

## 2024-09-30 DIAGNOSIS — J34.89 SINUS PRESSURE: ICD-10-CM

## 2024-09-30 DIAGNOSIS — J45.41 MODERATE PERSISTENT ASTHMA WITH EXACERBATION: ICD-10-CM

## 2024-09-30 DIAGNOSIS — J45.41 MODERATE PERSISTENT ASTHMA WITH EXACERBATION: Primary | ICD-10-CM

## 2024-09-30 DIAGNOSIS — R05.9 COUGH, UNSPECIFIED TYPE: ICD-10-CM

## 2024-09-30 DIAGNOSIS — R50.9 FEVER, UNSPECIFIED FEVER CAUSE: ICD-10-CM

## 2024-09-30 DIAGNOSIS — J10.1 INFLUENZA A: ICD-10-CM

## 2024-09-30 PROBLEM — K57.90 DIVERTICULAR DISEASE: Status: ACTIVE | Noted: 2023-11-24

## 2024-09-30 PROBLEM — D12.4 BENIGN NEOPLASM OF DESCENDING COLON: Status: ACTIVE | Noted: 2023-11-28

## 2024-09-30 PROBLEM — R93.2 ABNORMAL GALLBLADDER ULTRASOUND: Status: ACTIVE | Noted: 2024-09-30

## 2024-09-30 PROBLEM — R10.13 EPIGASTRIC PAIN: Status: ACTIVE | Noted: 2024-09-30

## 2024-09-30 PROBLEM — D12.3 BENIGN NEOPLASM OF TRANSVERSE COLON: Status: ACTIVE | Noted: 2023-11-28

## 2024-09-30 PROBLEM — K59.09 CHRONIC CONSTIPATION: Status: ACTIVE | Noted: 2024-09-30

## 2024-09-30 PROBLEM — K63.5 POLYP OF COLON: Status: ACTIVE | Noted: 2023-11-24

## 2024-09-30 LAB
DEPRECATED S PYO AG THROAT QL EIA: NEGATIVE
FLUAV AG SPEC QL IA: POSITIVE
FLUBV AG SPEC QL IA: NEGATIVE
GROUP A STREP BY PCR: NOT DETECTED

## 2024-09-30 PROCEDURE — 71046 X-RAY EXAM CHEST 2 VIEWS: CPT | Mod: TC | Performed by: STUDENT IN AN ORGANIZED HEALTH CARE EDUCATION/TRAINING PROGRAM

## 2024-09-30 PROCEDURE — 87635 SARS-COV-2 COVID-19 AMP PRB: CPT

## 2024-09-30 PROCEDURE — 99214 OFFICE O/P EST MOD 30 MIN: CPT | Mod: 95 | Performed by: NURSE PRACTITIONER

## 2024-09-30 PROCEDURE — 87804 INFLUENZA ASSAY W/OPTIC: CPT | Mod: 95 | Performed by: NURSE PRACTITIONER

## 2024-09-30 PROCEDURE — 87651 STREP A DNA AMP PROBE: CPT | Mod: 95 | Performed by: NURSE PRACTITIONER

## 2024-09-30 RX ORDER — OSELTAMIVIR PHOSPHATE 75 MG/1
75 CAPSULE ORAL 2 TIMES DAILY
Qty: 10 CAPSULE | Refills: 0 | Status: SHIPPED | OUTPATIENT
Start: 2024-09-30 | End: 2024-10-05

## 2024-09-30 RX ORDER — IBUPROFEN 600 MG/1
600 TABLET, FILM COATED ORAL SEE ADMIN INSTRUCTIONS
COMMUNITY
Start: 2024-04-12

## 2024-09-30 RX ORDER — OXYCODONE HYDROCHLORIDE 5 MG/1
5 TABLET ORAL EVERY 6 HOURS PRN
COMMUNITY
Start: 2023-11-21

## 2024-09-30 RX ORDER — CELECOXIB 200 MG/1
200 CAPSULE ORAL 2 TIMES DAILY
COMMUNITY
Start: 2023-12-27

## 2024-09-30 RX ORDER — POLYETHYLENE GLYCOL-3350 AND ELECTROLYTES 236; 6.74; 5.86; 2.97; 22.74 G/274.31G; G/274.31G; G/274.31G; G/274.31G; G/274.31G
240 POWDER, FOR SOLUTION ORAL SEE ADMIN INSTRUCTIONS
COMMUNITY
Start: 2023-11-15

## 2024-09-30 RX ORDER — GABAPENTIN 300 MG/1
300 CAPSULE ORAL SEE ADMIN INSTRUCTIONS
COMMUNITY
Start: 2024-09-03

## 2024-09-30 RX ORDER — HYDROXYZINE HYDROCHLORIDE 10 MG/1
10 TABLET, FILM COATED ORAL SEE ADMIN INSTRUCTIONS
COMMUNITY
Start: 2023-11-22

## 2024-09-30 NOTE — PROGRESS NOTES
Emeli is a 69 year old who is being evaluated via a billable video visit.    How would you like to obtain your AVS? MyChart  If the video visit is dropped, the invitation should be resent by: Text to cell phone: [phone]  Will anyone else be joining your video visit? No    Assessment & Plan     Moderate persistent asthma with exacerbation    - XR Chest 2 Views; Future    Cough, unspecified type  Denies asthma however, this is in her medical history.   - Influenza A & B Antigen - Clinic Collect  - Symptomatic COVID-19 Virus (Coronavirus) by PCR; Future  - Streptococcus A Rapid Screen w/Reflex to PCR - Clinic Collect  - XR Chest 2 Views; Future  - Group A Streptococcus PCR Throat Swab    Fever, unspecified fever cause    - Influenza A & B Antigen - Clinic Collect  - Symptomatic COVID-19 Virus (Coronavirus) by PCR; Future  - Streptococcus A Rapid Screen w/Reflex to PCR - Clinic Collect  - Group A Streptococcus PCR Throat Swab    Sinus pressure    - Influenza A & B Antigen - Clinic Collect  - Symptomatic COVID-19 Virus (Coronavirus) by PCR; Future  - Streptococcus A Rapid Screen w/Reflex to PCR - Clinic Collect  - Group A Streptococcus PCR Throat Swab    Influenza A  Recommend treatment for influenza A  Advised on at home cares and warning symptoms of when to go in for evaluation  Pending Chest xray  Recommend follow up if needed with PCP   - oseltamivir (TAMIFLU) 75 MG capsule; Take 1 capsule (75 mg) by mouth 2 times daily for 5 days.            See Patient Instructions    Subjective   Emeli is a 69 year old, presenting for the following health issues:  URI        9/30/2024    10:38 AM   Additional Questions   Roomed by krishna   Accompanied by self       Video Start Time: 11:07 AM    URI    History of Present Illness       Reason for visit:  Just acheck up    She eats 2-3 servings of fruits and vegetables daily.She consumes 1 sweetened beverage(s) daily.She exercises with enough effort to increase her heart rate  10 to 19 minutes per day.  She is missing 5 dose(s) of medications per week.       Acute Illness  Acute illness concerns: uri  Onset/Duration: 9/27  Symptoms:  Fever: YES- 100 low grade at about 100 and then a little over. 99.0 last night.   Chills/Sweats: No  Headache (location?): No  Sinus Pressure: YES  Conjunctivitis:  No  Ear Pain: no  Rhinorrhea: YES  Congestion: YES  Sore Throat: No  Cough: YES-productive of yellow sputum, productive of green sputum, thick  Wheeze: YES  Decreased Appetite: YES  Nausea: No  Vomiting: No  Diarrhea: No  Dysuria/Freq.: No  Dysuria or Hematuria: No  Fatigue/Achiness: No  Sick/Strep Exposure: No  Therapies tried and outcome: mucinex D, dayquil/Nyquil - not helpful, tylenol - helpful    Cough started on 9/28   Started feeling sick on 9/27        Review of Systems  Constitutional, HEENT, cardiovascular, pulmonary, gi and gu systems are negative, except as otherwise noted.      Objective           Vitals:  No vitals were obtained today due to virtual visit.    Physical Exam   GENERAL: no distress and fatigued  SKIN: Visible skin clear. No significant rash, abnormal pigmentation or lesions.  NEURO: Cranial nerves grossly intact.  Mentation and speech appropriate for age.  PSYCH: Appropriate affect, tone, and pace of words    Results for orders placed or performed in visit on 09/30/24   Influenza A & B Antigen - Clinic Collect     Status: Abnormal    Specimen: Nose; Swab   Result Value Ref Range    Influenza A antigen Positive (A) Negative    Influenza B antigen Negative Negative    Narrative    Test results must be correlated with clinical data. If necessary, results should be confirmed by a molecular assay or viral culture.   Streptococcus A Rapid Screen w/Reflex to PCR - Clinic Collect     Status: Normal    Specimen: Throat; Swab   Result Value Ref Range    Group A Strep antigen Negative Negative         Video-Visit Details    Type of service:  Video Visit   Video End Time:  11:14  Originating Location (pt. Location): Home  Distant Location (provider location):  Off-site  Platform used for Video Visit: Jenifer  Signed Electronically by: CORTNEY Antonio CNP

## 2024-10-01 ENCOUNTER — OFFICE VISIT (OUTPATIENT)
Dept: FAMILY MEDICINE | Facility: CLINIC | Age: 69
End: 2024-10-01
Payer: COMMERCIAL

## 2024-10-01 ENCOUNTER — ANCILLARY PROCEDURE (OUTPATIENT)
Dept: GENERAL RADIOLOGY | Facility: CLINIC | Age: 69
End: 2024-10-01
Attending: FAMILY MEDICINE
Payer: COMMERCIAL

## 2024-10-01 ENCOUNTER — NURSE TRIAGE (OUTPATIENT)
Dept: FAMILY MEDICINE | Facility: CLINIC | Age: 69
End: 2024-10-01

## 2024-10-01 VITALS
DIASTOLIC BLOOD PRESSURE: 74 MMHG | BODY MASS INDEX: 30.64 KG/M2 | TEMPERATURE: 98.4 F | OXYGEN SATURATION: 96 % | HEIGHT: 59 IN | WEIGHT: 152 LBS | HEART RATE: 88 BPM | RESPIRATION RATE: 14 BRPM | SYSTOLIC BLOOD PRESSURE: 131 MMHG

## 2024-10-01 DIAGNOSIS — R05.1 ACUTE COUGH: ICD-10-CM

## 2024-10-01 DIAGNOSIS — J10.1 INFLUENZA A: ICD-10-CM

## 2024-10-01 DIAGNOSIS — H10.32 ACUTE CONJUNCTIVITIS OF LEFT EYE, UNSPECIFIED ACUTE CONJUNCTIVITIS TYPE: Primary | ICD-10-CM

## 2024-10-01 LAB — SARS-COV-2 RNA RESP QL NAA+PROBE: NEGATIVE

## 2024-10-01 PROCEDURE — 99214 OFFICE O/P EST MOD 30 MIN: CPT | Performed by: FAMILY MEDICINE

## 2024-10-01 PROCEDURE — 71046 X-RAY EXAM CHEST 2 VIEWS: CPT | Mod: TC | Performed by: RADIOLOGY

## 2024-10-01 PROCEDURE — G2211 COMPLEX E/M VISIT ADD ON: HCPCS | Performed by: FAMILY MEDICINE

## 2024-10-01 RX ORDER — IPRATROPIUM BROMIDE AND ALBUTEROL SULFATE 2.5; .5 MG/3ML; MG/3ML
1 SOLUTION RESPIRATORY (INHALATION) EVERY 6 HOURS PRN
Qty: 90 ML | Refills: 1 | Status: SHIPPED | OUTPATIENT
Start: 2024-10-01

## 2024-10-01 RX ORDER — POLYMYXIN B SULFATE AND TRIMETHOPRIM 1; 10000 MG/ML; [USP'U]/ML
2 SOLUTION OPHTHALMIC 4 TIMES DAILY
Qty: 10 ML | Refills: 0 | Status: SHIPPED | OUTPATIENT
Start: 2024-10-01 | End: 2024-10-06

## 2024-10-01 ASSESSMENT — PAIN SCALES - GENERAL: PAINLEVEL: SEVERE PAIN (7)

## 2024-10-01 NOTE — TELEPHONE ENCOUNTER
Per Dr. Byrne:  I am concerned about pneumonia - would like her to be seen     Called pt and scheduled her with April Coming MD Jose today at 1:40 PM.     Huddled with April Coming MD Jose- Call [facility] and see if she can be seen there.     Nahtaly ONTIVEROS RN   Clinic RN  [facility]

## 2024-10-01 NOTE — TELEPHONE ENCOUNTER
We are asked to consult on this patient phone triage call the patient reporting increased shortness of breath after diagnosed with influenza --possible concerns for pneumonia.  Patient is not lightheaded or dizzy and no reports of chest pain.    From review of medical chart appears to have a history of smoking in the setting of moderate persistent asthma.    I feel this patient is appropriate for urgent care as they can administer nebs/ steroids available should this be indicated; additionally they will have access to x-ray imaging should there be any concerns for pneumonia.    We are available for further consult if needed    [facility]

## 2024-10-01 NOTE — TELEPHONE ENCOUNTER
"Patient called clinic.     Patient stated she feels like \"horse shit\". She has a bunch of congestion in her chest. She is coughing up \"yellow-brown\" phlegm.     She is not getting worse but not getting better.    She has taken 2 doses of Tamiflu. She was prescribed oseltamivir (TAMIFLU) 75 MG capsule; Take 1 capsule (75 mg) by mouth 2 times daily for 5 days on 9/3024 during virtual visit.    She is using her albuterol nebulizer, not the inhaler. She feels like it is helping a little bit.   She says she can hear \"gurgling\" / wheezing with breathing. She feels short of breath with walking. She is talking in full sentences. She says she has oxygen at home if it gets to that point but she is currently not using it.    She also stated her eye lids are swollen \"huge\" and sore this morning. Her left eye is more swollen than her right. She also notes redness on the skin around eyes. Denies drainage or loss of vision.    She is trying to drink fluids and get rest.     RN advised patient she should be seen in urgent care for her new symptoms with her eyes. She declined.     She asked if this message could go to her PCP to review and advise. She asked if her provider could call her.     RN reviewed emergency room precautions. Patient verbalized understanding.     Routing to provider to review and advise.    DEEP Argueta, RN      Reason for Disposition   SEVERE eyelid swelling (i.e., shut or almost) and involves both eyes    Additional Information   Negative: Unresponsive, passed out or very weak   Negative: Difficulty breathing or wheezing   Negative: Difficulty swallowing or slurred speech and sudden onset   Negative: Sounds like a life-threatening emergency to the triager   Negative: Chemical got in the eye   Negative: Recent injury to the eye   Negative: Entire face is swollen   Negative: Sacs of clear fluid (blisters) on whites of eyes (allergic cysts)   Negative: Contact with pollen, other allergic substance or " eyedrops   Negative: Bee sting and within last 24 hours   Negative: Insect bite suspected   Negative: Yellow or green discharge (pus) in the eye   Negative: Redness of white area (sclera) of eye(s)   Negative: SEVERE eyelid swelling (i.e., shut or almost) and fever   Negative: Eyelid (outer) is very red and fever   Negative: Pregnant 20 or more weeks and sudden weight gain (e.g., more than 3 lbs or 1.4 kg in one week)   Negative: Postpartum (from 0 to 6 weeks after delivery) and sudden weight gain (e.g., more than 3 lbs or 1.4 kg in one week)   Negative: Patient sounds very sick or weak to the triager    Protocols used: Eye - Swelling-A-OH

## 2024-10-01 NOTE — TELEPHONE ENCOUNTER
RN team, please call patient to see how she is doing. I received a message from a provider who saw her yesterday and patient tested positive for influenza. She was admitted for influenza this past winter as well. This is the third time she has had it this year.    Dr. Byrne, do you think a visit with infectious disease would be a good idea?

## 2024-10-01 NOTE — PATIENT INSTRUCTIONS
Use eye drop 4 times per day for 5-7 days.  Use duo-neb every 6 hours OR albuterol every 4 hours, you can alternate.  Continue tamiflu

## 2024-10-01 NOTE — PROGRESS NOTES
Assessment & Plan     Acute conjunctivitis of left eye, unspecified acute conjunctivitis type  Questionable viral versus bacterial conjunctivitis, will treat with eye drops given swelling.  Warm compresses.  Monitor vision.  Follow up if worsening or not improving.  - polymixin b-trimethoprim (POLYTRIM) 54718-9.1 UNIT/ML-% ophthalmic solution; Place 2 drops Into the left eye 4 times daily for 5 days.    Acute cough  Breathing reportedly worse than yesterday. There was concern for pneumonia, so repeat CXR done given change from yesterday.  No obvious pneumonia on imaging, but formal read pending.  Keeping in mind antibiotic stewardship I recommended we hold off on antibiotics until the formal read returns, as she would need two antibiotics for CAP (would do augmentin and azithromycin).  Recommendations pending formal read of xray.  - XR Chest 2 Views; Future    Influenza A  Known positive, on Tamiflu.  Recommend DuoNebs Q6  hours.  Follow up if worsening.  - ipratropium - albuterol 0.5 mg/2.5 mg/3 mL (DUONEB) 0.5-2.5 (3) MG/3ML neb solution; Take 1 vial (3 mLs) by nebulization every 6 hours as needed for shortness of breath, wheezing or cough.      The longitudinal plan of care for the diagnosis(es)/condition(s) as documented were addressed during this visit. Due to the added complexity in care, I will continue to support Emeli in the subsequent management and with ongoing continuity of care.      See Patient Instructions    Subjective   Emeli is a 69 year old, presenting for the following health issues:  Swollen Eye (Left eye)        10/1/2024     1:41 PM   Additional Questions   Roomed by Bernarda Justice   Accompanied by self     History of Present Illness       Reason for visit:  Just acheck up    She eats 2-3 servings of fruits and vegetables daily.She consumes 1 sweetened beverage(s) daily.She exercises with enough effort to increase her heart rate 10 to 19 minutes per day.  She is missing 5 dose(s) of  "medications per week.       Positive for influenza A.    Eye(s) Problem    Onset/Duration: 1 day  Description:   Location: Left  Pain: YES  Redness: No  Accompanying Signs & Symptoms:  Discharge/mattering: YES  Swelling: YES  Visual changes: No  Fever: YES  Nasal Congestion: YES  Bothered by bright lights: No  History:  Trauma: No  Foreign body exposure: No  Wearing contacts: No  Precipitating or alleviating factors: None  Therapies tried and outcome: Refresh eye drops    SOB with walking, eye itches.  Eyelid is painful to the touch but the eye itself does not seem painful.  No vision changes.    Did nebulizer an hour before she came here. On Tamiflu for two days.        Objective    /74 (BP Location: Right arm, Patient Position: Sitting, Cuff Size: Adult Large)   Pulse 88   Temp 98.4  F (36.9  C) (Oral)   Resp 14   Ht 1.499 m (4' 11\")   Wt 68.9 kg (152 lb)   LMP  (LMP Unknown)   SpO2 96%   Breastfeeding No   BMI 30.70 kg/m    Body mass index is 30.7 kg/m .  Physical Exam   GENERAL: alert and no distress  EYES: left eyelid very swollen, conjunctiva mildly erythematous, crusting in corners of eyes.   RESP: diffuse coarse breath sounds, wheezing noted in RLL, BRANDY.  Normal work of breathing  CV: regular rates and rhythm    Lab on 09/30/2024   Component Date Value Ref Range Status    SARS CoV2 PCR 09/30/2024 Negative  Negative Final    NEGATIVE: SARS-CoV-2 (COVID-19) RNA not detected, presumed negative.     Chest XR: No obvious pneumonia or consolidation, formal read pending.          Signed Electronically by: Mariana Garcia MD    "

## 2024-10-05 ENCOUNTER — NURSE TRIAGE (OUTPATIENT)
Dept: NURSING | Facility: CLINIC | Age: 69
End: 2024-10-05
Payer: COMMERCIAL

## 2024-10-05 DIAGNOSIS — J34.89 NASAL DRAINAGE: Primary | ICD-10-CM

## 2024-10-05 DIAGNOSIS — R05.9 COUGH: ICD-10-CM

## 2024-10-05 NOTE — TELEPHONE ENCOUNTER
Nurse Triage SBAR    Is this a 2nd Level Triage? YES, LICENSED PRACTITIONER REVIEW IS REQUIRED    Situation: Influenza, cough    Background: Has been on tamiflu since 10/1 and still has a productive cough.     Assessment: Frequent coughing with yellowish brown sputum. Yellow thick nasal drainage x 5 days. Reports sinus pressure. Has tried tylenol which has not helped. Has not tried nasal washes yet. States it feels just like a sinus infection and would like to know if she can get any other treatment for her symptoms. She has one day left of tamiflu and does not feel much better.     Protocol Recommended Disposition:   Call PCP Within 24 Hours    Recommendation: Per Dr. Elijah Garcia, will treat patient with Augmentin 875-125 mg tablet: take one tablet by mouth twice daily x 7 days. Patient can also try using OTC flonase of saline spray. Patient was notified of above and agrees with the plan. Rx was sent to pharmacy. Red flag symptoms were reviewed with the patient.       Provider consult indicated.     Reason for page: Nasal drainage and productive cough, no improvement of symptoms on Tamiflu.     Page sent to Dr. Mariana Garcia by RN at 1100.     Alecia Hoang RN        Reason for Disposition   [1] HIGH RISK (e.g., age > 64 years, pregnant, HIV+, or chronic medical condition) AND [2]  > 72 hours (3 days) since evaluated by doctor (or NP/PA) AND [3] symptoms not improved    Additional Information   Negative: SEVERE difficulty breathing (e.g., struggling for each breath, speaks in single words)   Negative: Bluish (or gray) lips or face now   Negative: Shock suspected (e.g., cold/pale/clammy skin, too weak to stand, low BP, rapid pulse)   Negative: Sounds like a life-threatening emergency to the triager   Negative: Chest pain  (Exception: MILD central chest pain, present only when coughing)   Negative: Headache and stiff neck (can't touch chin to chest)   Negative: [1] Difficulty breathing AND [2] not severe AND [3]  not from stuffy nose (e.g., not relieved by cleaning out the nose)   Negative: Fever > 104 F (40 C)   Negative: Patient sounds very sick or weak to the triager   Negative: Fever present > 3 days (72 hours)   Negative: [1] Fever returns after gone for over 24 hours AND [2] symptoms worse or not improved   Negative: [1] Using nasal washes and pain medicine > 24 hours AND [2] sinus pain (around cheekbone or eye) persists   Negative: Earache    Protocols used: Influenza Follow-up Call-A-AH

## 2024-10-10 ENCOUNTER — PATIENT OUTREACH (OUTPATIENT)
Dept: CARE COORDINATION | Facility: CLINIC | Age: 69
End: 2024-10-10
Payer: COMMERCIAL

## 2024-10-10 NOTE — PROGRESS NOTES
Clinic Care Coordination Contact  Community Health Worker Follow Up    Care Gaps:     Health Maintenance Due   Topic Date Due    LIPID  10/31/2024    URINE DRUG SCREEN  10/31/2024     Not discussed    Care Plan:   Care Plan: Help At Home       Problem: Insufficient In-home support       Goal: Establish adequate home support       Start Date: 9/13/2024 Expected End Date: 11/29/2024    This Visit's Progress: 10%    Priority: High    Note:     Barriers: Have stairs in home down to laundry room  Strengths: May have access to [facility] services through partner  Patient expressed understanding of goal:   Action steps to achieve this goal:  1. I will ask my partner to call with [facility]  officer ( [phone] )to ask if housekeeping and other services like home accessibility options are available to him .   2. I will contact [employer] ( [phone]) and explore other options for cleaning services.   3. I will  let Care Coordination know if I need additional resources                              Intervention and Education during outreach: Patient states that she has been sick and hasn't remembered what housekeeping resources [facility] discussed with her. CHW compiled a list and sent via [employer].   She is currently waiting for a  to get there and has no further questions or concerns for CC.     CHW Plan: Next outreach in one month.     NICHOLE Webster, B.S. [facility]  Clinic Care Coordination  [facility]:  [place], [place] and [place]  [phone]  [email or web address]      Housekeeping Resources:   Call the [facility]  office at [phone] to see if they have housekeeping services available.     [employer] [email or web address] #[phone]    [employer] [email or web address] #(378) 745-2036  [employer] [email or web address]/ #(569) 927-6450

## 2024-10-18 ENCOUNTER — OFFICE VISIT (OUTPATIENT)
Dept: CARDIOLOGY | Facility: CLINIC | Age: 69
End: 2024-10-18
Payer: COMMERCIAL

## 2024-10-18 VITALS
WEIGHT: 156 LBS | DIASTOLIC BLOOD PRESSURE: 82 MMHG | HEIGHT: 59 IN | HEART RATE: 81 BPM | BODY MASS INDEX: 31.45 KG/M2 | OXYGEN SATURATION: 97 % | SYSTOLIC BLOOD PRESSURE: 116 MMHG

## 2024-10-18 DIAGNOSIS — Z13.6 SCREENING FOR CARDIOVASCULAR CONDITION: Primary | ICD-10-CM

## 2024-10-18 PROCEDURE — 93000 ELECTROCARDIOGRAM COMPLETE: CPT | Performed by: INTERNAL MEDICINE

## 2024-10-18 PROCEDURE — 99214 OFFICE O/P EST MOD 30 MIN: CPT | Performed by: INTERNAL MEDICINE

## 2024-10-18 NOTE — LETTER
10/18/2024    Emma Byrne MD  [id number] [facility] [address]    RE: Emeli Granados       Dear Colleague,     I had the pleasure of seeing Emeli Granados in the [facility].  EP Cardiology Clinic Progress Note  Emeli Granados MRN# [id number]   YOB: 1955 Age: 69 year old   Primary Cardiologist: Dr. Dominguez Reason for visit: procedure follow-up             Assessment and Plan:   Emeli Granados is a very pleasant 69 year old female who is here today for procedure follow-up.      1.  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 Dominguez.     Changes today: none     Ms. Granados has been feeling well since her EP study + ablation of typical AVNRT. She has had no recurrent episodes of tachycardia. No groin site concerns. ECG completed in clinic today reveals normal sinus rhythm with VR 79, , QRS 92, QT/QTc 362/395 ms. She is okay to remain off of diltiazem.  Her blood pressure is well-controlled even in the absence of this at 116/82 today.    She can follow-up with EP in 1 year, or on an as needed basis.     Haven Manley PA-C  [facility]  Pager: [phone]          History of Presenting Illness:    Emeli Granados is a very pleasant 69 year old female with a history of emphysema, diverticulosis, osteopenia, cervical cancer, and SVT.     For the past few years, patient has had several sensations of racing heart prompting presentation to urgent care or the emergency room.  However, by the time she would arrive, the sensation had already resolved.     On 10/12/2022, patient developed a similar sensation that lasted longer than usual.  In the ED, she was found to be in SVT at 186 bpm.  ECG consistent with short RP tachycardia.  She was admitted for observation overnight and underwent echocardiogram that revealed normal LVEF 60 to 65%, normal RV size and function, and no hemodynamically significant valvular disease.  She also  underwent stress echocardiogram that was negative for ischemia.  She was discharged on diltiazem  mg once daily and did well initially.     More recently, she has began having recurrent episodes of SVT despite ongoing medical therapy.  She was seen in clinic 4/2024 by Dr. Dominguez who discussed options including increasing diltiazem versus pursuing catheter ablation.  Patient was unsure about how she would like to proceed and was advised to contact the clinic once she reached a decision.  Additionally, repeat Zio patch monitor was completed 4/2024 revealing 8 SVT runs, longest lasting 10 seconds in duration.    At the time of her last appointment, 8/2024, patient complained of increasing episodes of SVT, symptomatic.  Options for further management were discussed.  Ultimately, patient elected to proceed with EP study with successful ablation of typical AVNRT 8/28/2024.  She tolerated the procedure well without apparent complication.  Her diltiazem was discontinued and she was advised to monitor her blood pressures.      She presents to clinic today for routine postprocedure follow-up. Ms. Granados has been feeling well since her procedure. Denies shortness of breath, orthopnea and PND. Denies chest pain, palpitations, lightheadedness, dizziness, near syncope and syncope. No groin site concerns.    Blood pressure 116/82 and HR 81 in clinic today. BP at home 120/80 or less on a consistent basis. ECG completed in clinic today reveals normal sinus rhythm with VR 79, , QRS 92, QT/QTc 362/395 ms.      Social History       Social History     Socioeconomic History     Marital status:      Spouse name: Boyfriend - Fritz     Number of children: 3     Years of education: Not on file     Highest education level: Not on file   Occupational History     Occupation: work in [email or web address]     Employer: [employer]   Tobacco Use     Smoking status: Former     Current packs/day: 0.00     Average packs/day: 0.5 packs/day for 52.1  years (26.0 ttl pk-yrs)     Types: Cigarettes     Start date: 10/13/1967     Quit date: 2019     Years since quittin.9     Passive exposure: Never     Smokeless tobacco: Never     Tobacco comments:     quit 2019   Vaping Use     Vaping status: Never Used   Substance and Sexual Activity     Alcohol use: No     Comment: sober since 99     Drug use: No     Sexual activity: Not Currently     Partners: Male   Other Topics Concern     Parent/sibling w/ CABG, MI or angioplasty before 65F 55M? Yes   Social History Narrative     Not on file     Social Determinants of Health     Financial Resource Strain: Low Risk  (2024)    [facility]    Financial Resource Strain      Difficulty of Paying Living Expenses: 3      Difficulty of Paying Living Expenses: Not on file   Food Insecurity: No Food Insecurity (2024)    Received from [facility]    Food Insecurity      Worried About Running Out of Food in the Last Year: 1   Transportation Needs: No Transportation Needs (2024)    [facility]    Transportation Needs      Lack of Transportation (Medical): 1   Physical Activity: Unknown (3/11/2024)    Exercise Vital Sign      Days of Exercise per Week: 3 days      Minutes of Exercise per Session: Not on file   Stress: No Stress Concern Present (3/11/2024)    [employer] - Occupational Stress Questionnaire      Feeling of Stress : Only a little   Social Connections: Socially Integrated (2024)    Received from [employer]    Social Connections      Frequency of Communication with Friends and Family: 0   Interpersonal Safety: Low Risk  (2024)    Interpersonal Safety      Do you feel physically and emotionally safe where you currently live?: Yes      Within the past 12 months, have you been hit, slapped, kicked or  "otherwise physically hurt by someone?: No      Within the past 12 months, have you been humiliated or emotionally abused in other ways by your partner or ex-partner?: No   Housing Stability: Low Risk  (8/23/2024)    Received from [facility]    Housing Stability      Unable to Pay for Housing in the Last Year: 1            Review of Systems:   Please see HPI         Physical Exam:   Vitals: /82 (BP Location: Right arm, Patient Position: Sitting, Cuff Size: Adult Regular)   Pulse 81   Ht 1.499 m (4' 11\")   Wt 70.8 kg (156 lb)   LMP  (LMP Unknown)   SpO2 97%   BMI 31.51 kg/m     Wt Readings from Last 4 Encounters:   10/01/24 68.9 kg (152 lb)   09/27/24 68.9 kg (152 lb)   09/11/24 70.5 kg (155 lb 8 oz)   08/29/24 70.9 kg (156 lb 3.2 oz)     GEN: well nourished, in no acute distress.  HEENT:  Pupils equal, round. Sclerae nonicteric.   NECK: Supple, no masses appreciated. No JVD  C/V:  Regular rate and rhythm, no murmur  RESP: Respirations are unlabored. Clear to auscultation bilaterally without wheezing, rales, or rhonchi.  GI: Abdomen soft, nontender.  EXTREM: no LE edema.  NEURO: Alert and oriented, cooperative.  SKIN: Warm and dry.        Data:   LIPID RESULTS:  Lab Results   Component Value Date    CHOL 172 10/31/2023    CHOL 170 04/16/2021    HDL 82 10/31/2023    HDL 53 04/16/2021    LDL 73 10/31/2023    LDL 86 04/16/2021    TRIG 85 10/31/2023    TRIG 155 (H) 04/16/2021    CHOLHDLRATIO 3.6 09/29/2015     LIVER ENZYME RESULTS:  Lab Results   Component Value Date    AST 23 07/09/2024    AST 23 06/22/2021    ALT 26 07/09/2024    ALT 45 06/22/2021     CBC RESULTS:  Lab Results   Component Value Date    WBC 5.7 08/28/2024    WBC 3.5 (L) 06/12/2021    RBC 4.46 08/28/2024    RBC 4.01 06/12/2021    HGB 14.5 08/28/2024    HGB 12.3 06/12/2021    HCT 43.0 08/28/2024    HCT 37.8 06/12/2021    MCV 96 08/28/2024    MCV 94 06/12/2021    MCH 32.5 08/28/2024    MCH 30.7 06/12/2021    " MCHC 33.7 08/28/2024    MCHC 32.5 06/12/2021    RDW 14.0 08/28/2024    RDW 13.3 06/12/2021     08/28/2024     06/12/2021     BMP RESULTS:  Lab Results   Component Value Date     08/28/2024     06/22/2021    POTASSIUM 3.8 08/28/2024    POTASSIUM 3.8 04/12/2024    POTASSIUM 3.8 06/22/2021    CHLORIDE 104 08/28/2024    CHLORIDE 104 04/12/2024    CHLORIDE 111 (H) 06/22/2021    CO2 25 08/28/2024    CO2 28 04/12/2024    CO2 25 06/22/2021    ANIONGAP 11 08/28/2024    ANIONGAP 16 (H) 04/12/2024    ANIONGAP 5 06/22/2021    GLC 97 08/28/2024     (A) 05/01/2024    BUN 18.3 08/28/2024    BUN 14 04/12/2024    BUN 15 06/22/2021    CR 0.73 08/28/2024    CR 0.78 06/22/2021    GFRESTIMATED 88 08/28/2024    GFRESTIMATED 79 06/22/2021    GFRESTBLACK >90 06/22/2021    ALAN 9.4 08/28/2024    ALAN 9.2 06/22/2021      A1C RESULTS:  Lab Results   Component Value Date    A1C 6.3 (H) 08/29/2024    A1C 6.2 (H) 02/25/2019     INR RESULTS:  Lab Results   Component Value Date    INR 0.99 10/12/2022            Medications     Current Outpatient Medications   Medication Sig Dispense Refill     acetaminophen (TYLENOL) 500 MG tablet Take 1-2 tablets (500-1,000 mg) by mouth every 6 hours as needed for mild pain 30 tablet 0     albuterol (PROVENTIL HFA) 108 (90 Base) MCG/ACT inhaler Inhale 2 puffs into the lungs every 6 hours. 8.5 g 5     albuterol (PROVENTIL) (2.5 MG/3ML) 0.083% neb solution Take 1 vial (2.5 mg) by nebulization every 6 hours as needed for shortness of breath, wheezing or cough. 90 mL 4     ascorbic acid (VITAMIN C) 1000 MG TABS Take 1,000 mg by mouth daily       atorvastatin (LIPITOR) 10 MG tablet Take 1 tablet (10 mg) by mouth daily. 90 tablet 0     CALCIUM PO Take 1 tablet by mouth daily       celecoxib (CELEBREX) 200 MG capsule Take 200 mg by mouth 2 times daily.       cyclobenzaprine (FLEXERIL) 5 MG tablet Take 1 tablet (5 mg) by mouth 2 times daily as needed for muscle spasms. 60 tablet 4      gabapentin (NEURONTIN) 300 MG capsule Take 300 mg by mouth See Admin Instructions.       HYDROcodone-acetaminophen (NORCO)  MG per tablet Take 1-2 tablets by mouth daily as needed for severe pain. 40 tablet 0     hydrOXYzine HCl (ATARAX) 10 MG tablet Take 10 mg by mouth See Admin Instructions.       ibuprofen (IBU) 600 MG tablet Take 600 mg by mouth See Admin Instructions.       ipratropium - albuterol 0.5 mg/2.5 mg/3 mL (DUONEB) 0.5-2.5 (3) MG/3ML neb solution Take 1 vial (3 mLs) by nebulization every 6 hours as needed for shortness of breath, wheezing or cough. 90 mL 1     metFORMIN (GLUCOPHAGE XR) 500 MG 24 hr tablet Take 1 tablet (500 mg) by mouth 2 times daily (with meals). 180 tablet 1     Multiple Vitamins-Minerals (MULTIVITAMIN OR) Take 1 tablet by mouth daily Per pt, uses ones without Iron       omeprazole (PRILOSEC) 20 MG DR capsule Take 1 capsule (20 mg) by mouth 2 times daily. 180 capsule 1     oxyCODONE (ROXICODONE) 5 MG tablet Take 5 mg by mouth every 6 hours as needed for pain.       polyethylene glycol (GAVILYTE-G) 236 g suspension Take 240 mLs by mouth See Admin Instructions.       traMADol (ULTRAM) 50 MG tablet Take 1 tablet (50 mg) by mouth every 6 hours as needed for severe pain. Do not start before September 4, 2024. 90 tablet 0     vitamin D3 (CHOLECALCIFEROL) 1000 units (25 mcg) tablet Take 1,000 Units by mouth daily       vitamin E 400 UNITS TABS Take 400 Units by mouth daily       zolpidem (AMBIEN) 5 MG tablet TAKE 1 TABLET (5 MG) BY MOUTH NIGHTLY AS NEEDED FOR SLEEP. DO NOT TAKE WITH TRAMADOL. MUST BE NDC # [address]-76 PER PT REQUEST. 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    took uterus and left the ovaries     Other chronic pain      Polycythemia 08/08/2012    resolved now     Past Surgical History:   Procedure Laterality Date     CARPAL TUNNEL RELEASE RT/LT Right 11/23/2021    and trigger finger and tendon repair     CARPAL TUNNEL RELEASE RT/LT Left 12/20/2021    and tendon repair     COLONOSCOPY  2004, 2012    repeat in 2022     COLONOSCOPY  11/24/2023    4 polyps - repeat in 3 years     CYSTOSCOPY       Dilatation of the esophagus once due to dysphagia and stricture  2003     EP ABLATION SVT N/A 8/28/2024    Procedure: Ablation Supraventricular Tachycardia;  Surgeon: Millie Dominguez MD;  Location:  HE[facility]ARD[facility] CATH LAB     EXCISE SOFT TISSUE SHOULDER Left 5/30/2024    Procedure: EXCISION LEFT SHOULDER MASS;  Surgeon: Yao Baker MD;  Location: [facility] OR     Ganglion cyst removal       HEMORRHOIDECTOMY BANDING       HYSTERECTOMY  1998     LAPAROSCOPIC CHOLECYSTECTOMY N/A 5/30/2024    Procedure: CHOLECYSTECTOMY, LAPAROSCOPIC;  Surgeon: Yao Baker MD;  Location: [facility] OR     LAPAROSCOPIC SALPINGO-OOPHORECTOMY Bilateral 10/23/2015    Procedure: LAPAROSCOPIC SALPINGO-OOPHORECTOMY;  Surgeon: Johnathan Steve MD;  Location: [facility] OR     subscapularous repair and biceps tendon repair  05/2022    Dr. Verma at [facility]     Thumb surgery Right      TONSILLECTOMY       XR WRIST SURGERY FLORI RIGHT Right 11/2023    surgery for DJD and bone graft done     Family History   Problem Relation Age of Onset     Hypertension Mother      Cerebrovascular Disease Mother         TIA's     Depression Mother      Cancer - colorectal Maternal Grandmother         dx at 65     Lipids Father      C.A.D. Father         angioplasty at 65     Musculoskeletal Disorder Father      Alcohol/Drug Daughter         in remission     Breast Cancer No family hx of             Allergies    Metaproterenol sulfate and Percocet [oxycodone-acetaminophen]    30 minutes spent on the date of the encounter doing chart review, history and exam, documentation and further activities as noted above    KAUSHAL Skinner [facility]  Pager: [phone]      Thank you for allowing me to participate in the care of your patient.      Sincerely,     KAUSHAL Skinner [facility]  cc:   Millie Dominguez MD  [address]  [place] [address]

## 2024-10-18 NOTE — PATIENT INSTRUCTIONS
Call the nurse for any questions or concerns at [phone].     Plan:  1. Medication changes: okay to remain off diltiazem    2. Follow up in 1 year or as needed   -Scheduling phone number: [phone]    It was great seeing you today!    Haven Manley PA-C  Physician Assistant  [facility]

## 2024-10-18 NOTE — PROGRESS NOTES
EP Cardiology Clinic Progress Note  Emeli Granados MRN# [id number]   YOB: 1955 Age: 69 year old   Primary Cardiologist: Dr. Dominguez Reason for visit: procedure follow-up             Assessment and Plan:   Emeli Granados is a very pleasant 69 year old female who is here today for procedure follow-up.      1.  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 Dominguez.     Changes today: none     Ms. Granados has been feeling well since her EP study + ablation of typical AVNRT. She has had no recurrent episodes of tachycardia. No groin site concerns. ECG completed in clinic today reveals normal sinus rhythm with VR 79, , QRS 92, QT/QTc 362/395 ms. She is okay to remain off of diltiazem.  Her blood pressure is well-controlled even in the absence of this at 116/82 today.    She can follow-up with EP in 1 year, or on an as needed basis.     Haven Manley PA-C  [facility]  Pager: [phone]          History of Presenting Illness:    Emeli Granados is a very pleasant 69 year old female with a history of emphysema, diverticulosis, osteopenia, cervical cancer, and SVT.     For the past few years, patient has had several sensations of racing heart prompting presentation to urgent care or the emergency room.  However, by the time she would arrive, the sensation had already resolved.     On 10/12/2022, patient developed a similar sensation that lasted longer than usual.  In the ED, she was found to be in SVT at 186 bpm.  ECG consistent with short RP tachycardia.  She was admitted for observation overnight and underwent echocardiogram that revealed normal LVEF 60 to 65%, normal RV size and function, and no hemodynamically significant valvular disease.  She also underwent stress echocardiogram that was negative for ischemia.  She was discharged on diltiazem  mg once daily and did well initially.     More recently, she has began having recurrent episodes of SVT  despite ongoing medical therapy.  She was seen in clinic 2024 by Dr. Dominguez who discussed options including increasing diltiazem versus pursuing catheter ablation.  Patient was unsure about how she would like to proceed and was advised to contact the clinic once she reached a decision.  Additionally, repeat Zio patch monitor was completed 2024 revealing 8 SVT runs, longest lasting 10 seconds in duration.    At the time of her last appointment, 2024, patient complained of increasing episodes of SVT, symptomatic.  Options for further management were discussed.  Ultimately, patient elected to proceed with EP study with successful ablation of typical AVNRT 2024.  She tolerated the procedure well without apparent complication.  Her diltiazem was discontinued and she was advised to monitor her blood pressures.      She presents to clinic today for routine postprocedure follow-up. Ms. Granados has been feeling well since her procedure. Denies shortness of breath, orthopnea and PND. Denies chest pain, palpitations, lightheadedness, dizziness, near syncope and syncope. No groin site concerns.    Blood pressure 116/82 and HR 81 in clinic today. BP at home 120/80 or less on a consistent basis. ECG completed in clinic today reveals normal sinus rhythm with VR 79, , QRS 92, QT/QTc 362/395 ms.      Social History       Social History     Socioeconomic History    Marital status:      Spouse name: Boyfriend - Fritz    Number of children: 3    Years of education: Not on file    Highest education level: Not on file   Occupational History    Occupation: work in [employer]     Employer: [employer]   Tobacco Use    Smoking status: Former     Current packs/day: 0.00     Average packs/day: 0.5 packs/day for 52.1 years (26.0 ttl pk-yrs)     Types: Cigarettes     Start date: 10/13/1967     Quit date: 2019     Years since quittin.9     Passive exposure: Never    Smokeless tobacco: Never    Tobacco comments:     quit  11/2019   Vaping Use    Vaping status: Never Used   Substance and Sexual Activity    Alcohol use: No     Comment: sober since 9/11/99    Drug use: No    Sexual activity: Not Currently     Partners: Male   Other Topics Concern    Parent/sibling w/ CABG, MI or angioplasty before 65F 55M? Yes   Social History Narrative    Not on file     Social Determinants of Health     Financial Resource Strain: Low Risk  (8/23/2024)    Received from [facility]    Financial Resource Strain     Difficulty of Paying Living Expenses: 3     Difficulty of Paying Living Expenses: Not on file   Food Insecurity: No Food Insecurity (8/23/2024)    Received from [facility]    Food Insecurity     Worried About Running Out of Food in the Last Year: 1   Transportation Needs: No Transportation Needs (8/23/2024)    Received from [facility]    Transportation Needs     Lack of Transportation (Medical): 1   Physical Activity: Unknown (3/11/2024)    Exercise Vital Sign     Days of Exercise per Week: 3 days     Minutes of Exercise per Session: Not on file   Stress: No Stress Concern Present (3/11/2024)    [employer] - Occupational Stress Questionnaire     Feeling of Stress : Only a little   Social Connections: Socially Integrated (8/23/2024)    Received from [facility]    Social Connections     Frequency of Communication with Friends and Family: 0   Interpersonal Safety: Low Risk  (8/28/2024)    Interpersonal Safety     Do you feel physically and emotionally safe where you currently live?: Yes     Within the past 12 months, have you been hit, slapped, kicked or otherwise physically hurt by someone?: No     Within the past 12 months, have you been humiliated or emotionally abused in other ways by your partner or ex-partner?: No   Housing Stability: Low Risk  (8/23/2024)    Received from  "[facility]    Housing Stability     Unable to Pay for Housing in the Last Year: 1            Review of Systems:   Please see HPI         Physical Exam:   Vitals: /82 (BP Location: Right arm, Patient Position: Sitting, Cuff Size: Adult Regular)   Pulse 81   Ht 1.499 m (4' 11\")   Wt 70.8 kg (156 lb)   LMP  (LMP Unknown)   SpO2 97%   BMI 31.51 kg/m     Wt Readings from Last 4 Encounters:   10/01/24 68.9 kg (152 lb)   09/27/24 68.9 kg (152 lb)   09/11/24 70.5 kg (155 lb 8 oz)   08/29/24 70.9 kg (156 lb 3.2 oz)     GEN: well nourished, in no acute distress.  HEENT:  Pupils equal, round. Sclerae nonicteric.   NECK: Supple, no masses appreciated. No JVD  C/V:  Regular rate and rhythm, no murmur  RESP: Respirations are unlabored. Clear to auscultation bilaterally without wheezing, rales, or rhonchi.  GI: Abdomen soft, nontender.  EXTREM: no LE edema.  NEURO: Alert and oriented, cooperative.  SKIN: Warm and dry.        Data:   LIPID RESULTS:  Lab Results   Component Value Date    CHOL 172 10/31/2023    CHOL 170 04/16/2021    HDL 82 10/31/2023    HDL 53 04/16/2021    LDL 73 10/31/2023    LDL 86 04/16/2021    TRIG 85 10/31/2023    TRIG 155 (H) 04/16/2021    CHOLHDLRATIO 3.6 09/29/2015     LIVER ENZYME RESULTS:  Lab Results   Component Value Date    AST 23 07/09/2024    AST 23 06/22/2021    ALT 26 07/09/2024    ALT 45 06/22/2021     CBC RESULTS:  Lab Results   Component Value Date    WBC 5.7 08/28/2024    WBC 3.5 (L) 06/12/2021    RBC 4.46 08/28/2024    RBC 4.01 06/12/2021    HGB 14.5 08/28/2024    HGB 12.3 06/12/2021    HCT 43.0 08/28/2024    HCT 37.8 06/12/2021    MCV 96 08/28/2024    MCV 94 06/12/2021    MCH 32.5 08/28/2024    MCH 30.7 06/12/2021    MCHC 33.7 08/28/2024    MCHC 32.5 06/12/2021    RDW 14.0 08/28/2024    RDW 13.3 06/12/2021     08/28/2024     06/12/2021     BMP RESULTS:  Lab Results   Component Value Date     08/28/2024     06/22/2021    " POTASSIUM 3.8 08/28/2024    POTASSIUM 3.8 04/12/2024    POTASSIUM 3.8 06/22/2021    CHLORIDE 104 08/28/2024    CHLORIDE 104 04/12/2024    CHLORIDE 111 (H) 06/22/2021    CO2 25 08/28/2024    CO2 28 04/12/2024    CO2 25 06/22/2021    ANIONGAP 11 08/28/2024    ANIONGAP 16 (H) 04/12/2024    ANIONGAP 5 06/22/2021    GLC 97 08/28/2024     (A) 05/01/2024    BUN 18.3 08/28/2024    BUN 14 04/12/2024    BUN 15 06/22/2021    CR 0.73 08/28/2024    CR 0.78 06/22/2021    GFRESTIMATED 88 08/28/2024    GFRESTIMATED 79 06/22/2021    GFRESTBLACK >90 06/22/2021    ALAN 9.4 08/28/2024    ALAN 9.2 06/22/2021      A1C RESULTS:  Lab Results   Component Value Date    A1C 6.3 (H) 08/29/2024    A1C 6.2 (H) 02/25/2019     INR RESULTS:  Lab Results   Component Value Date    INR 0.99 10/12/2022            Medications     Current Outpatient Medications   Medication Sig Dispense Refill    acetaminophen (TYLENOL) 500 MG tablet Take 1-2 tablets (500-1,000 mg) by mouth every 6 hours as needed for mild pain 30 tablet 0    albuterol (PROVENTIL HFA) 108 (90 Base) MCG/ACT inhaler Inhale 2 puffs into the lungs every 6 hours. 8.5 g 5    albuterol (PROVENTIL) (2.5 MG/3ML) 0.083% neb solution Take 1 vial (2.5 mg) by nebulization every 6 hours as needed for shortness of breath, wheezing or cough. 90 mL 4    ascorbic acid (VITAMIN C) 1000 MG TABS Take 1,000 mg by mouth daily      atorvastatin (LIPITOR) 10 MG tablet Take 1 tablet (10 mg) by mouth daily. 90 tablet 0    CALCIUM PO Take 1 tablet by mouth daily      celecoxib (CELEBREX) 200 MG capsule Take 200 mg by mouth 2 times daily.      cyclobenzaprine (FLEXERIL) 5 MG tablet Take 1 tablet (5 mg) by mouth 2 times daily as needed for muscle spasms. 60 tablet 4    gabapentin (NEURONTIN) 300 MG capsule Take 300 mg by mouth See Admin Instructions.      HYDROcodone-acetaminophen (NORCO)  MG per tablet Take 1-2 tablets by mouth daily as needed for severe pain. 40 tablet 0    hydrOXYzine HCl (ATARAX) 10 MG  tablet Take 10 mg by mouth See Admin Instructions.      ibuprofen (IBU) 600 MG tablet Take 600 mg by mouth See Admin Instructions.      ipratropium - albuterol 0.5 mg/2.5 mg/3 mL (DUONEB) 0.5-2.5 (3) MG/3ML neb solution Take 1 vial (3 mLs) by nebulization every 6 hours as needed for shortness of breath, wheezing or cough. 90 mL 1    metFORMIN (GLUCOPHAGE XR) 500 MG 24 hr tablet Take 1 tablet (500 mg) by mouth 2 times daily (with meals). 180 tablet 1    Multiple Vitamins-Minerals (MULTIVITAMIN OR) Take 1 tablet by mouth daily Per pt, uses ones without Iron      omeprazole (PRILOSEC) 20 MG DR capsule Take 1 capsule (20 mg) by mouth 2 times daily. 180 capsule 1    oxyCODONE (ROXICODONE) 5 MG tablet Take 5 mg by mouth every 6 hours as needed for pain.      polyethylene glycol (GAVILYTE-G) 236 g suspension Take 240 mLs by mouth See Admin Instructions.      traMADol (ULTRAM) 50 MG tablet Take 1 tablet (50 mg) by mouth every 6 hours as needed for severe pain. Do not start before September 4, 2024. 90 tablet 0    vitamin D3 (CHOLECALCIFEROL) 1000 units (25 mcg) tablet Take 1,000 Units by mouth daily      vitamin E 400 UNITS TABS Take 400 Units by mouth daily      zolpidem (AMBIEN) 5 MG tablet TAKE 1 TABLET (5 MG) BY MOUTH NIGHTLY AS NEEDED FOR SLEEP. DO NOT TAKE WITH TRAMADOL. MUST BE NDC # [address]-78 PER PT REQUEST. 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    took uterus and left the ovaries    Other chronic pain     Polycythemia 08/08/2012    resolved now     Past Surgical History:   Procedure Laterality Date     CARPAL TUNNEL RELEASE RT/LT Right 11/23/2021    and trigger finger and tendon repair    CARPAL TUNNEL RELEASE RT/LT Left 12/20/2021    and tendon repair    COLONOSCOPY  2004, 2012    repeat in 2022    COLONOSCOPY  11/24/2023    4 polyps - repeat in 3 years    CYSTOSCOPY      Dilatation of the esophagus once due to dysphagia and stricture  2003    EP ABLATION SVT N/A 8/28/2024    Procedure: Ablation Supraventricular Tachycardia;  Surgeon: Millie Dominguez MD;  Location:  [facility]    EXCISE SOFT TISSUE SHOULDER Left 5/30/2024    Procedure: EXCISION LEFT SHOULDER MASS;  Surgeon: Yao Baker MD;  Location: [facility] OR    Ganglion cyst removal      HEMORRHOIDECTOMY BANDING      HYSTERECTOMY  1998    LAPAROSCOPIC CHOLECYSTECTOMY N/A 5/30/2024    Procedure: CHOLECYSTECTOMY, LAPAROSCOPIC;  Surgeon: Yao Baker MD;  Location: [facility] OR    LAPAROSCOPIC SALPINGO-OOPHORECTOMY Bilateral 10/23/2015    Procedure: LAPAROSCOPIC SALPINGO-OOPHORECTOMY;  Surgeon: Johnathan Steve MD;  Location: [facility] OR    subscapularous repair and biceps tendon repair  05/2022    Dr. Verma at [facility]    Thumb surgery Right     TONSILLECTOMY      XR WRIST SURGERY FLORI RIGHT Right 11/2023    surgery for DJD and bone graft done     Family History   Problem Relation Age of Onset    Hypertension Mother     Cerebrovascular Disease Mother         TIA's    Depression Mother     Cancer - colorectal Maternal Grandmother         dx at 65    Lipids Father     C.A.D. Father         angioplasty at 65    Musculoskeletal Disorder Father     Alcohol/Drug Daughter         in remission    Breast Cancer No family hx of             Allergies   Metaproterenol sulfate and Percocet [oxycodone-acetaminophen]    30 minutes spent on the date of the encounter doing chart review, history and exam, documentation and further activities as noted above    KAUSHAL Skinner [facility]  Pager: [phone]

## 2024-10-21 ENCOUNTER — TELEPHONE (OUTPATIENT)
Dept: FAMILY MEDICINE | Facility: CLINIC | Age: 69
End: 2024-10-21

## 2024-10-21 ENCOUNTER — OFFICE VISIT (OUTPATIENT)
Dept: FAMILY MEDICINE | Facility: CLINIC | Age: 69
End: 2024-10-21
Payer: COMMERCIAL

## 2024-10-21 ENCOUNTER — PATIENT OUTREACH (OUTPATIENT)
Dept: CARE COORDINATION | Facility: CLINIC | Age: 69
End: 2024-10-21

## 2024-10-21 VITALS
WEIGHT: 156.4 LBS | TEMPERATURE: 98.3 F | HEIGHT: 59 IN | HEART RATE: 88 BPM | SYSTOLIC BLOOD PRESSURE: 117 MMHG | RESPIRATION RATE: 12 BRPM | OXYGEN SATURATION: 94 % | BODY MASS INDEX: 31.53 KG/M2 | DIASTOLIC BLOOD PRESSURE: 63 MMHG

## 2024-10-21 DIAGNOSIS — E78.5 HYPERLIPIDEMIA LDL GOAL <130: ICD-10-CM

## 2024-10-21 DIAGNOSIS — E66.811 CLASS 1 OBESITY WITH SERIOUS COMORBIDITY AND BODY MASS INDEX (BMI) OF 31.0 TO 31.9 IN ADULT, UNSPECIFIED OBESITY TYPE: Primary | ICD-10-CM

## 2024-10-21 DIAGNOSIS — R73.03 PREDIABETES: ICD-10-CM

## 2024-10-21 LAB
ALBUMIN SERPL BCG-MCNC: 4.1 G/DL (ref 3.5–5.2)
ALP SERPL-CCNC: 93 U/L (ref 40–150)
ALT SERPL W P-5'-P-CCNC: 23 U/L (ref 0–50)
ANION GAP SERPL CALCULATED.3IONS-SCNC: 11 MMOL/L (ref 7–15)
AST SERPL W P-5'-P-CCNC: 24 U/L (ref 0–45)
BILIRUB SERPL-MCNC: 0.6 MG/DL
BUN SERPL-MCNC: 16.7 MG/DL (ref 8–23)
CALCIUM SERPL-MCNC: 10 MG/DL (ref 8.8–10.4)
CHLORIDE SERPL-SCNC: 105 MMOL/L (ref 98–107)
CHOLEST SERPL-MCNC: 180 MG/DL
CREAT SERPL-MCNC: 0.78 MG/DL (ref 0.51–0.95)
EGFRCR SERPLBLD CKD-EPI 2021: 81 ML/MIN/1.73M2
FASTING STATUS PATIENT QL REPORTED: YES
FASTING STATUS PATIENT QL REPORTED: YES
GLUCOSE SERPL-MCNC: 97 MG/DL (ref 70–99)
HCO3 SERPL-SCNC: 27 MMOL/L (ref 22–29)
HDLC SERPL-MCNC: 57 MG/DL
LDLC SERPL CALC-MCNC: 97 MG/DL
NONHDLC SERPL-MCNC: 123 MG/DL
POTASSIUM SERPL-SCNC: 4.1 MMOL/L (ref 3.4–5.3)
PROT SERPL-MCNC: 6.9 G/DL (ref 6.4–8.3)
SODIUM SERPL-SCNC: 143 MMOL/L (ref 135–145)
TRIGL SERPL-MCNC: 132 MG/DL
TSH SERPL DL<=0.005 MIU/L-ACNC: 1.34 UIU/ML (ref 0.3–4.2)

## 2024-10-21 PROCEDURE — 80053 COMPREHEN METABOLIC PANEL: CPT | Performed by: PHYSICIAN ASSISTANT

## 2024-10-21 PROCEDURE — 80061 LIPID PANEL: CPT | Mod: GZ | Performed by: PHYSICIAN ASSISTANT

## 2024-10-21 PROCEDURE — 84443 ASSAY THYROID STIM HORMONE: CPT | Performed by: PHYSICIAN ASSISTANT

## 2024-10-21 PROCEDURE — 99214 OFFICE O/P EST MOD 30 MIN: CPT | Performed by: PHYSICIAN ASSISTANT

## 2024-10-21 PROCEDURE — 36415 COLL VENOUS BLD VENIPUNCTURE: CPT | Performed by: PHYSICIAN ASSISTANT

## 2024-10-21 RX ORDER — ISOPROPYL ALCOHOL 700 MG/ML
1 CLOTH TOPICAL WEEKLY
Qty: 100 EACH | Refills: 1 | Status: SHIPPED | OUTPATIENT
Start: 2024-10-21

## 2024-10-21 RX ORDER — LANCETS
EACH MISCELLANEOUS
Qty: 100 EACH | Refills: 6 | Status: SHIPPED | OUTPATIENT
Start: 2024-10-21

## 2024-10-21 ASSESSMENT — ACTIVITIES OF DAILY LIVING (ADL): DEPENDENT_IADLS:: INDEPENDENT

## 2024-10-21 ASSESSMENT — PAIN SCALES - GENERAL: PAINLEVEL: NO PAIN (1)

## 2024-10-21 NOTE — TELEPHONE ENCOUNTER
Prior Authorization Retail Medication Request    Medication/Dose: Semaglutide-Weight Management (WEGOVY) 0.25 MG/0.5ML pen  Diagnosis and ICD code (if different than what is on RX):    New/renewal/insurance change PA/secondary ins. PA:  Previously Tried and Failed:    Rationale:      Martinez: [id number]    Marybeth Gamez, TC

## 2024-10-21 NOTE — PROGRESS NOTES
Assessment & Plan     Class 1 obesity with serious comorbidity and body mass index (BMI) of 31.0 to 31.9 in adult, unspecified obesity type  Patient interested in treatments for weight loss. She has been active and adjusting her diet for 6 months or more and without a change in her weight. She would benefit from Wegovy and this was ordered for her.  - Semaglutide-Weight Management (WEGOVY) 0.25 MG/0.5ML pen; Inject 0.25 mg subcutaneously once a week for 4 doses.  - Semaglutide-Weight Management (WEGOVY) 0.5 MG/0.5ML pen; Inject 0.5 mg subcutaneously once a week for 4 doses.  - Semaglutide-Weight Management (WEGOVY) 1 MG/0.5ML pen; Inject 1 mg subcutaneously once a week for 4 doses.  - Isopropyl Alcohol (ALCOHOL WIPES) 70 % MISC; Externally apply 1 each topically once a week.    Prediabetes  Last A1c showed prediabetes. Wegovy would help her immensely with lowering risks associated with prediabetes and prevention of diabetes.  Nutrition handouts provided. Unfortunately nutrition referral not covered without diagnosis of diabetes.  - Lipid panel reflex to direct LDL Fasting; Future  - TSH with free T4 reflex; Future  - Comprehensive metabolic panel (BMP + Alb, Alk Phos, ALT, AST, Total. Bili, TP); Future  - Semaglutide-Weight Management (WEGOVY) 0.25 MG/0.5ML pen; Inject 0.25 mg subcutaneously once a week for 4 doses.  - Semaglutide-Weight Management (WEGOVY) 0.5 MG/0.5ML pen; Inject 0.5 mg subcutaneously once a week for 4 doses.  - Semaglutide-Weight Management (WEGOVY) 1 MG/0.5ML pen; Inject 1 mg subcutaneously once a week for 4 doses.  - Isopropyl Alcohol (ALCOHOL WIPES) 70 % MISC; Externally apply 1 each topically once a week.  - blood glucose monitoring (NO BRAND SPECIFIED) meter device kit; Use to test blood sugar 1 times daily or as directed. Preferred blood glucose meter OR supplies to accompany: Blood Glucose Monitor Brands: per insurance.  - blood glucose (NO BRAND SPECIFIED) test strip; Use to test blood  sugar 1 times daily or as directed. To accompany: Blood Glucose Monitor Brands: per insurance.  - thin (NO BRAND SPECIFIED) lancets; Use with lanceting device. To accompany: Blood Glucose Monitor Brands: per insurance.  - Lipid panel reflex to direct LDL Fasting  - TSH with free T4 reflex  - Comprehensive metabolic panel (BMP + Alb, Alk Phos, ALT, AST, Total. Bili, TP)    Hyperlipidemia LDL goal <130  Due for lipid. Ordered today. She is fasting.  - Lipid panel reflex to direct LDL Fasting; Future  - Lipid panel reflex to direct LDL Fasting    Review of external notes as documented elsewhere in note  Review of the result(s) of each unique test - previous A1c  Ordering of each unique test  Prescription drug management    Subjective   Emeli is a 69 year old, presenting for the following health issues:  Diabetes (DM and weight gain)        10/21/2024     9:20 AM   Additional Questions   Roomed by antonella borrero     History of Present Illness       Back Pain:  She presents for follow up of back pain. Patient's back pain is a chronic problem.  Location of back pain:  Right lower back, left lower back and right middle of back  Description of back pain: stabbing  Back pain spreads: right buttocks, left buttocks and left shoulder    Since patient first noticed back pain, pain is: unchanged  Does back pain interfere with her job:  Yes       Diabetes:   She presents for follow up of diabetes.    She is not checking blood glucose.        She is concerned about other.   She is having weight gain.            She eats 2-3 servings of fruits and vegetables daily.She consumes 1 sweetened beverage(s) daily.She exercises with enough effort to increase her heart rate 10 to 19 minutes per day.  She exercises with enough effort to increase her heart rate 4 days per week.   She is taking medications regularly.       She has been adjusting her diet and being active for the past 6 months. She is not losing any weight. She is  "interested in options for weight loss.          Objective    /63 (BP Location: Right arm, Patient Position: Sitting, Cuff Size: Adult Regular)   Pulse 88   Temp 98.3  F (36.8  C) (Oral)   Resp 12   Ht 1.499 m (4' 11\")   Wt 70.9 kg (156 lb 6.4 oz)   LMP  (LMP Unknown)   SpO2 94%   BMI 31.59 kg/m    Body mass index is 31.59 kg/m .  Physical Exam   GENERAL: No acute distress  HEENT: Normocephalic  NEURO: Alert and non-focal          Signed Electronically by: Gina Arias PA-C    "

## 2024-10-21 NOTE — PROGRESS NOTES
Clinic Care Coordination Contact  Care Coordination Clinician Chart Review    Situation: Patient chart reviewed by Care Coordinator.       Background: Care Coordination Program started: 9/11/2024. Initial assessment completed and patient-centered care plan(s) were developed with participation from patient. Lead CC handed patient off to CHW for continued outreaches.       Assessment: Per chart review, patient outreach completed by CC CHW on 10/10/24.  Patient is actively working to accomplish goal(s). Patient's goal(s) appropriate and relevant at this time. Patient is not due for updated Plan of Care.  Assessments will be completed annually or as needed/with change of patient status.      Care Plan: Help At Home       Problem: Insufficient In-home support       Goal: Establish adequate home support       Start Date: 9/13/2024 Expected End Date: 11/29/2024    This Visit's Progress: 10%    Priority: High    Note:     Barriers: Have stairs in home down to laundry room  Strengths: May have access to [facility] services through partner  Patient expressed understanding of goal:   Action steps to achieve this goal:  1. I will ask my partner to call with [facility] officer ( [phone] )to ask if housekeeping and other services like home accessibility options are available to him .   2. I will contact [employer] ( [phone]) and explore other options for cleaning services.   3. I will  let Care Coordination know if I need additional resources                                 Plan/Recommendations: The patient will continue working with Care Coordination to achieve goal(s) as above. CHW will continue outreaches at minimum every 30 days and will involve Lead CC as needed or if patient is ready to move to Maintenance. Lead CC will continue to monitor CHW outreaches and patient's progress to goal(s) every 6 weeks.     Plan of Care updated and sent to patient: EMMA Pugh   Care Coordination Team  [phone]

## 2024-10-23 ENCOUNTER — OFFICE VISIT (OUTPATIENT)
Dept: FAMILY MEDICINE | Facility: CLINIC | Age: 69
End: 2024-10-23
Payer: COMMERCIAL

## 2024-10-23 ENCOUNTER — TELEPHONE (OUTPATIENT)
Dept: FAMILY MEDICINE | Facility: CLINIC | Age: 69
End: 2024-10-23

## 2024-10-23 VITALS
HEIGHT: 59 IN | OXYGEN SATURATION: 97 % | BODY MASS INDEX: 31.89 KG/M2 | HEART RATE: 89 BPM | RESPIRATION RATE: 15 BRPM | WEIGHT: 158.2 LBS | SYSTOLIC BLOOD PRESSURE: 122 MMHG | TEMPERATURE: 98.3 F | DIASTOLIC BLOOD PRESSURE: 78 MMHG

## 2024-10-23 DIAGNOSIS — H57.89 EYE REDNESS: Primary | ICD-10-CM

## 2024-10-23 DIAGNOSIS — H00.025 HORDEOLUM INTERNUM OF LEFT LOWER EYELID: ICD-10-CM

## 2024-10-23 PROCEDURE — G2211 COMPLEX E/M VISIT ADD ON: HCPCS | Performed by: PHYSICIAN ASSISTANT

## 2024-10-23 PROCEDURE — 99213 OFFICE O/P EST LOW 20 MIN: CPT | Performed by: PHYSICIAN ASSISTANT

## 2024-10-23 RX ORDER — TOBRAMYCIN 3 MG/ML
1 SOLUTION/ DROPS OPHTHALMIC 3 TIMES DAILY
Qty: 5 ML | Refills: 0 | Status: SHIPPED | OUTPATIENT
Start: 2024-10-23

## 2024-10-23 NOTE — PROGRESS NOTES
Assessment & Plan     Eye redness    Will trial different antibiotic drop. Could also be allergic. If not improving in 1 week, follow-up with eye doctor.    - tobramycin (TOBREX) 0.3 % ophthalmic solution; Place 1 drop Into the left eye 3 times daily.      Hordeolum internum of left lower eyelid    See above. There are a couple small internal styes of lower lid. Continue hot packs.     - tobramycin (TOBREX) 0.3 % ophthalmic solution; Place 1 drop Into the left eye 3 times daily.      The longitudinal plan of care for the diagnosis(es)/condition(s) as documented were addressed during this visit. Due to the added complexity in care, I will continue to support Emeli in the subsequent management and with ongoing continuity of care.        Subjective   Emeli is a 69 year old, presenting for the following health issues:  Eye Problem        10/23/2024     5:05 PM   Additional Questions   Roomed by Carmelina Rivera     History of Present Illness       Back Pain:  She presents for follow up of back pain. Patient's back pain is a chronic problem.  Location of back pain:  Right lower back, left lower back and right middle of back  Description of back pain: stabbing  Back pain spreads: right buttocks, left buttocks and left shoulder    Since patient first noticed back pain, pain is: unchanged  Does back pain interfere with her job:  Yes       Diabetes:   She presents for follow up of diabetes.    She is not checking blood glucose.        She is concerned about other.   She is having weight gain.            She eats 2-3 servings of fruits and vegetables daily.She consumes 1 sweetened beverage(s) daily.She exercises with enough effort to increase her heart rate 10 to 19 minutes per day.  She exercises with enough effort to increase her heart rate 4 days per week.   She is taking medications regularly.         Eye(s) Problem  Onset/Duration: 3 days ago  Description:   Location: Left  Pain: No  Redness: YES  Accompanying  "Signs & Symptoms:  Discharge/mattering: YES  Swelling: YES  Visual changes: No  Fever: No  Nasal Congestion: No  Bothered by bright lights: No  History:  Trauma: No  Foreign body exposure: No  Wearing contacts: No  Precipitating or alleviating factors: None  Therapies tried and outcome: Polymixin b-- trimethoprim with no relief      HPI  General Follow Up    Concern: eye problem  Problem started: 10/1/2024  Progression of symptoms: worse        Review of Systems  Constitutional, HEENT, cardiovascular, pulmonary, gi and gu systems are negative, except as otherwise noted.          Objective    /78 (BP Location: Right arm, Patient Position: Sitting, Cuff Size: Adult Regular)   Pulse 89   Temp 98.3  F (36.8  C) (Oral)   Resp 15   Ht 1.499 m (4' 11\")   Wt 71.8 kg (158 lb 3.2 oz)   LMP  (LMP Unknown)   SpO2 97%   BMI 31.95 kg/m    Body mass index is 31.95 kg/m .      Physical Exam   GENERAL: alert and no distress  EYES: eyelids- 2 small internal hordeolum/sty left eye lower lid and conjunctiva/corneas- normal, some mild erythema of eyelids and slight swelling tracking down into upper cheek on left side   MS: no gross musculoskeletal defects noted, no edema  SKIN: no suspicious lesions or rashes  NEURO: Normal strength and tone, mentation intact and speech normal  PSYCH: mentation appears normal, affect normal/bright            Signed Electronically by: Roscoe Hu PA-C    "

## 2024-10-23 NOTE — TELEPHONE ENCOUNTER
Central Prior Authorization Team  Phone: [phone]    PA Initiation    Medication: WEGOVY 0.25 MG/0.5ML SC SOAJ  Insurance Company: [employer] - Phone [phone] Fax [phone]  Pharmacy Filling the Rx: [id number] [place], [place] - [address]  Filling Pharmacy Phone: [phone]  Filling Pharmacy Fax: [phone]  Start Date: 10/23/2024

## 2024-10-24 NOTE — TELEPHONE ENCOUNTER
PRIOR AUTHORIZATION DENIED    Medication: WEGOVY 0.25 MG/0.5ML SC SOAJ  Insurance Company: [employer] - Phone [phone] Fax [phone]  Denial Date: 10/24/2024  Denial Reason(s):     Appeal Information:     Patient Notified: No, care team must notify

## 2024-10-27 DIAGNOSIS — F11.20 CONTINUOUS OPIOID DEPENDENCE (H): ICD-10-CM

## 2024-10-27 DIAGNOSIS — G89.29 CHRONIC BILATERAL LOW BACK PAIN, UNSPECIFIED WHETHER SCIATICA PRESENT: ICD-10-CM

## 2024-10-27 DIAGNOSIS — G89.29 OTHER CHRONIC PAIN: ICD-10-CM

## 2024-10-27 DIAGNOSIS — M54.50 CHRONIC BILATERAL LOW BACK PAIN, UNSPECIFIED WHETHER SCIATICA PRESENT: ICD-10-CM

## 2024-10-28 RX ORDER — HYDROCODONE BITARTRATE AND ACETAMINOPHEN 10; 325 MG/1; MG/1
1-2 TABLET ORAL DAILY PRN
Qty: 40 TABLET | Refills: 0 | Status: SHIPPED | OUTPATIENT
Start: 2024-10-28

## 2024-10-28 NOTE — TELEPHONE ENCOUNTER
Disp Refills Start End GOVIND   HYDROcodone-acetaminophen (NORCO) 5-325 MG tablet 30 tablet 0 5/24/2023  No   Sig: Take 1 tablet by mouth 2 times daily as needed for severe pain (7-10)   Sent to pharmacy as: HYDROcodone-Acetaminophen 5-325 MG Oral Tablet (NORCO)   Class: E-Prescribe   Earliest Fill Date: 5/24/2023   Notes to Pharmacy: Clarification requested - not another rx   Order: [id number]   E-Prescribing Status: Receipt confirmed by pharmacy (5/24/2023  1:52 PM CDT)     Patient states that this dose is not effective for the pain. Was previously taking  mg and is asking why this was not refilled?    Advised patient that will send message to PCP and return call with response.     Patient verbalized understanding and agrees with plan.    Anuja GARCÍA RN, BSN, PHN    
Dr. Byrne-    It appears the request came in incorrectly from the pharmacy. Unsure if patient called in wrong prescription or it was pended incorrectly by pharmacy.    See next refill encounter.   -New encounter created as unable to pend correct medication in this encounter.        Last prescription sent below  HYDROcodone-acetaminophen (NORCO)  MG per tablet (Discontinued) 30 tablet 0 4/24/2023 5/17/2023 No   Sig - Route: Take 1 tablet by mouth 2 times daily as needed for severe pain (7-10) - Oral   Sent to pharmacy as: HYDROcodone-Acetaminophen  MG Oral Tablet (NORCO)   Class: E-Prescribe   Earliest Fill Date: 4/24/2023   Order: [id number]   E-Prescribing Status: Receipt confirmed by pharmacy (4/24/2023 12:26 PM CDT)     Rukhsana REICH RN, BSN, PHN - PAL (patient advocate liaison)  [facility]  [phone]    
I  did not change it.   I approved what was cued up for me?   Did this only happen this last time?  
DESMOND Padron: 72-year-old female with a history of glaucoma, hypertension presented to the emergency department complaining of left arm pain and elevated blood pressure readings at home.  Patient placed in CDU for cardiac monitoring and stress test.  Patient seen and cleared by cardiology Dr. Fuentes advised follow-up outpatient.  Patient started on Amlodipine 10 mg in the ED will continue outpatient and advised follow-up with PMD.  Patient ambulatory without difficulty, tolerating p.o., discharge and results reviewed with patient.

## 2024-11-05 ENCOUNTER — PATIENT OUTREACH (OUTPATIENT)
Dept: CARE COORDINATION | Facility: CLINIC | Age: 69
End: 2024-11-05
Payer: COMMERCIAL

## 2024-11-05 NOTE — PROGRESS NOTES
Clinic Care Coordination Contact  Community Health Worker Follow Up    Care Gaps:     Health Maintenance Due   Topic Date Due    URINE DRUG SCREEN  10/31/2024    HEPATITIS A IMMUNIZATION (2 of 2 - Risk 2-dose series) 10/26/2024    PHQ-9  12/01/2024     Not discussed    Care Plan:   Care Plan: Help At Home       Problem: Insufficient In-home support       Goal: Establish adequate home support       Start Date: 9/13/2024 Expected End Date: 11/29/2024    This Visit's Progress: 10% Recent Progress: 10%    Priority: High    Note:     Barriers: Have stairs in home down to laundry room  Strengths: May have access to [facility] services through partner  Patient expressed understanding of goal:   Action steps to achieve this goal:  1. I will ask my partner to call with [facility] officer ( [phone] )to ask if housekeeping and other services like home accessibility options are available to him .   2. I will contact [employer] ( [phone]) and explore other options for cleaning services.   3. I will  let Care Coordination know if I need additional resources                              Intervention and Education during outreach: Patient states that she did not go over any of the housekeeping resources that CHW sent. She is having issues with a work comp claim that was to be covered 100% and they are trying to charge her $904. She will be calling her .   Patient states that she may be interested in seeing a nutritionist, she has been diagnosed with pre diabetes and needing more info. CHW encouraged her to call and see if insurance will cover diabetes ed or nutrition counseling. Patient asks if CHW can, due to limited time will attempt to view online before next outreach.     CHW Plan: Next outreach in two weeks.     NICHOLE Webster, B.S. [facility]  Clinic Care Coordination  [facility]:  [place], [place] and [place]  [phone]  [email or web address]

## 2024-11-20 ENCOUNTER — OFFICE VISIT (OUTPATIENT)
Dept: PALLIATIVE MEDICINE | Facility: CLINIC | Age: 69
End: 2024-11-20
Attending: FAMILY MEDICINE
Payer: COMMERCIAL

## 2024-11-20 ENCOUNTER — OFFICE VISIT (OUTPATIENT)
Dept: FAMILY MEDICINE | Facility: CLINIC | Age: 69
End: 2024-11-20
Payer: COMMERCIAL

## 2024-11-20 VITALS
DIASTOLIC BLOOD PRESSURE: 71 MMHG | RESPIRATION RATE: 16 BRPM | BODY MASS INDEX: 32.05 KG/M2 | HEIGHT: 59 IN | HEART RATE: 91 BPM | WEIGHT: 159 LBS | TEMPERATURE: 98.2 F | SYSTOLIC BLOOD PRESSURE: 118 MMHG | OXYGEN SATURATION: 93 %

## 2024-11-20 VITALS — OXYGEN SATURATION: 99 % | SYSTOLIC BLOOD PRESSURE: 133 MMHG | HEART RATE: 86 BPM | DIASTOLIC BLOOD PRESSURE: 79 MMHG

## 2024-11-20 DIAGNOSIS — M54.50 CHRONIC BILATERAL LOW BACK PAIN, UNSPECIFIED WHETHER SCIATICA PRESENT: ICD-10-CM

## 2024-11-20 DIAGNOSIS — F11.20 CONTINUOUS OPIOID DEPENDENCE (H): ICD-10-CM

## 2024-11-20 DIAGNOSIS — M79.18 MYOFASCIAL MUSCLE PAIN: Primary | ICD-10-CM

## 2024-11-20 DIAGNOSIS — M47.816 SPONDYLOSIS OF LUMBAR REGION WITHOUT MYELOPATHY OR RADICULOPATHY: ICD-10-CM

## 2024-11-20 DIAGNOSIS — E66.811 CLASS 1 OBESITY WITH SERIOUS COMORBIDITY AND BODY MASS INDEX (BMI) OF 31.0 TO 31.9 IN ADULT, UNSPECIFIED OBESITY TYPE: Primary | ICD-10-CM

## 2024-11-20 DIAGNOSIS — G89.29 CHRONIC BILATERAL LOW BACK PAIN, UNSPECIFIED WHETHER SCIATICA PRESENT: ICD-10-CM

## 2024-11-20 DIAGNOSIS — G89.29 OTHER CHRONIC PAIN: ICD-10-CM

## 2024-11-20 DIAGNOSIS — R73.03 PREDIABETES: ICD-10-CM

## 2024-11-20 DIAGNOSIS — M54.16 LUMBAR RADICULOPATHY: ICD-10-CM

## 2024-11-20 PROCEDURE — G2211 COMPLEX E/M VISIT ADD ON: HCPCS | Performed by: PHYSICIAN ASSISTANT

## 2024-11-20 PROCEDURE — 99214 OFFICE O/P EST MOD 30 MIN: CPT | Performed by: PHYSICIAN ASSISTANT

## 2024-11-20 RX ORDER — BUPROPION HYDROCHLORIDE 150 MG/1
150 TABLET ORAL EVERY MORNING
Qty: 30 TABLET | Refills: 2 | Status: SHIPPED | OUTPATIENT
Start: 2024-11-20

## 2024-11-20 ASSESSMENT — ANXIETY QUESTIONNAIRES
8. IF YOU CHECKED OFF ANY PROBLEMS, HOW DIFFICULT HAVE THESE MADE IT FOR YOU TO DO YOUR WORK, TAKE CARE OF THINGS AT HOME, OR GET ALONG WITH OTHER PEOPLE?: NOT DIFFICULT AT ALL
1. FEELING NERVOUS, ANXIOUS, OR ON EDGE: NOT AT ALL
7. FEELING AFRAID AS IF SOMETHING AWFUL MIGHT HAPPEN: NOT AT ALL
6. BECOMING EASILY ANNOYED OR IRRITABLE: NOT AT ALL
8. IF YOU CHECKED OFF ANY PROBLEMS, HOW DIFFICULT HAVE THESE MADE IT FOR YOU TO DO YOUR WORK, TAKE CARE OF THINGS AT HOME, OR GET ALONG WITH OTHER PEOPLE?: NOT DIFFICULT AT ALL
3. WORRYING TOO MUCH ABOUT DIFFERENT THINGS: NOT AT ALL
7. FEELING AFRAID AS IF SOMETHING AWFUL MIGHT HAPPEN: NOT AT ALL
GAD7 TOTAL SCORE: 0
2. NOT BEING ABLE TO STOP OR CONTROL WORRYING: NOT AT ALL
IF YOU CHECKED OFF ANY PROBLEMS ON THIS QUESTIONNAIRE, HOW DIFFICULT HAVE THESE PROBLEMS MADE IT FOR YOU TO DO YOUR WORK, TAKE CARE OF THINGS AT HOME, OR GET ALONG WITH OTHER PEOPLE: NOT DIFFICULT AT ALL
GAD7 TOTAL SCORE: 0
7. FEELING AFRAID AS IF SOMETHING AWFUL MIGHT HAPPEN: NOT AT ALL
4. TROUBLE RELAXING: NOT AT ALL
5. BEING SO RESTLESS THAT IT IS HARD TO SIT STILL: NOT AT ALL
GAD7 TOTAL SCORE: 0
GAD7 TOTAL SCORE: 0

## 2024-11-20 ASSESSMENT — PAIN SCALES - PAIN ENJOYMENT GENERAL ACTIVITY SCALE (PEG)
INTERFERED_GENERAL_ACTIVITY: 9
INTERFERED_ENJOYMENT_LIFE: 7
INTERFERED_ENJOYMENT_LIFE: 7
PEG_TOTALSCORE: 8.33
INTERFERED_GENERAL_ACTIVITY: 9
AVG_PAIN_PASTWEEK: 9
PEG_TOTALSCORE: 8.33
AVG_PAIN_PASTWEEK: 9

## 2024-11-20 ASSESSMENT — PATIENT HEALTH QUESTIONNAIRE - PHQ9
10. IF YOU CHECKED OFF ANY PROBLEMS, HOW DIFFICULT HAVE THESE PROBLEMS MADE IT FOR YOU TO DO YOUR WORK, TAKE CARE OF THINGS AT HOME, OR GET ALONG WITH OTHER PEOPLE: NOT DIFFICULT AT ALL
SUM OF ALL RESPONSES TO PHQ QUESTIONS 1-9: 2
SUM OF ALL RESPONSES TO PHQ QUESTIONS 1-9: 2

## 2024-11-20 ASSESSMENT — PAIN SCALES - GENERAL: PAINLEVEL_OUTOF10: EXTREME PAIN (8)

## 2024-11-20 NOTE — PROGRESS NOTES
Assessment & Plan     Class 1 obesity with serious comorbidity and body mass index (BMI) of 31.0 to 31.9 in adult, unspecified obesity type  Has been unable to start Wegovy. Prior authorization was denied but appeal letter written as I do think she would benefit greatly from use to prevent diabetes and help with weight loss.   In the mean time we will start Wellbutrin to help with appetite suppression. She cannot use naltrexone due to the chronic opoid use.  - buPROPion (WELLBUTRIN XL) 150 MG 24 hr tablet; Take 1 tablet (150 mg) by mouth every morning.    Prediabetes  6.3% at last visit. She has been working on her nutrition and hoping to get Wegovy approved for weight loss.    Other chronic pain   Saw pain management today to discuss options for procedures for her back pain. They recommended switching the Flexeril for Norflex but she declines.    F11.2 - Continuous opioid dependence (H)   As noted above    Review of external notes as documented elsewhere in note  Review of the result(s) of each unique test - As noted above  Prescription drug management    The longitudinal plan of care for the diagnosis(es)/condition(s) as documented were addressed during this visit. Due to the added complexity in care, I will continue to support Emeli in the subsequent management and with ongoing continuity of care.    Subjective   Emeli is a 69 year old, presenting for the following health issues:  Recheck Medication        11/20/2024     2:37 PM   Additional Questions   Roomed by Amparo MORA     History of Present Illness       Diabetes:   She presents for follow up of diabetes.    She is not checking blood glucose.         She has no concerns regarding her diabetes at this time.  She is having weight gain.            She eats 2-3 servings of fruits and vegetables daily.She consumes 1 sweetened beverage(s) daily.She exercises with enough effort to increase her heart rate 20 to 29 minutes per day.  She exercises with enough  "effort to increase her heart rate 6 days per week.   She is taking medications regularly.         Objective    /71 (BP Location: Left arm, Patient Position: Chair, Cuff Size: Adult Regular)   Pulse 91   Temp 98.2  F (36.8  C) (Oral)   Resp 16   Ht 1.499 m (4' 11\")   Wt 72.1 kg (159 lb)   LMP  (LMP Unknown)   SpO2 93%   BMI 32.11 kg/m    Body mass index is 32.11 kg/m .  Physical Exam   GENERAL: No acute distress  HEENT: Normocephalic  NEURO: Alert and non-focal          Signed Electronically by: Gina Arias PA-C    "

## 2024-11-20 NOTE — PATIENT INSTRUCTIONS
- Physical Therapy: continue   - Diagnostic Studies: reviewed   - Medication Management: defer to PCP    would consider change in flexeril to norflex 100mg twice a day  - Further procedures recommended: would strongly consider bilateral L3,4,5 MEDIAL BRANCH BLOCK/RFA   - could also consider bilateral L4-5 TRANSFORAMINAL EPIDURAL STEROID INJECTION    - Follow up: pending decision to proceed

## 2024-11-20 NOTE — PROGRESS NOTES
[facility] Interventional consult     Date of visit: 11/20/2024    Reason for consultation:    Primary Care Provider is Emma Byrne.  Pain medications are being prescribed.    Please see the [facility] health questionnaire which the patient completed and reviewed with me in detail.  Patient is a Emeli Granados is a 69 year old femalewho I was asked to see in consultation by Emma Byrne for evaluation of   Chief Complaint   Patient presents with    Pain   .   Chief Complaint:    Chief Complaint   Patient presents with    Pain       Pain history: Saw Dr Castle/Yasmin rec follow up and bilat L4-5TFESI was never completed    Emeli Granados is a 69 year old female who first started having problems with pain in chronic lbp  2021 fall   Currently the pain is mainly axial   The pain varies in severity and nature  The pt is sharp throbbing  The pain is stabbing   Denies any numbness ting burning  The pain occasional radiates intro her buttocks  Worse with lifting   Worse with bending  Benefit with rest  Denies recent falls  Denies incont  Denies overt prog weakness  Just got a treadmill  Sleeping ok  Pain sig affects the quality of life  Pain rating: intensity  Averages 8/10 on a 0-10 scale.      Current treatments include:  Norco   disp 40tabs  Flexeril - mainly at night does take during the day but gets a little sleepy  APAP     Previous medication treatments included:  stephan    Other treatments have included:  Emeli Granados has been seen at a pain clinic in the past.  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    took uterus and left the ovaries    Other chronic pain     Polycythemia 08/08/2012    resolved now     Past Surgical History:  Past Surgical History:   Procedure Laterality Date    CARPAL TUNNEL RELEASE RT/LT Right 11/23/2021    and trigger finger and tendon repair    CARPAL TUNNEL RELEASE RT/LT Left 12/20/2021    and tendon repair    COLONOSCOPY  2004, 2012    repeat in 2022    COLONOSCOPY  11/24/2023    4 polyps - repeat in 3 years    CYSTOSCOPY      Dilatation of the esophagus once due to dysphagia and stricture  2003    EP ABLATION SVT N/A 8/28/2024    Procedure: Ablation Supraventricular Tachycardia;  Surgeon: Millie Dominguez MD;  Location:  [facility]    EXCISE SOFT TISSUE SHOULDER Left 5/30/2024    Procedure: EXCISION LEFT SHOULDER MASS;  Surgeon: Yao Baker MD;  Location: [facility] OR    Ganglion cyst removal      HEMORRHOIDECTOMY BANDING      HYSTERECTOMY  1998    LAPAROSCOPIC CHOLECYSTECTOMY N/A 5/30/2024    Procedure: CHOLECYSTECTOMY, LAPAROSCOPIC;  Surgeon: Yao Baker MD;  Location: [facility] OR    LAPAROSCOPIC SALPINGO-OOPHORECTOMY Bilateral 10/23/2015    Procedure: LAPAROSCOPIC SALPINGO-OOPHORECTOMY;  Surgeon: Johnathan Steve MD;  Location: [facility] OR    subscapularous repair and biceps tendon repair  05/2022    Dr. Verma at [facility]    Thumb surgery Right     TONSILLECTOMY      XR WRIST SURGERY FLORI RIGHT Right 11/2023    surgery for DJD and bone graft done     Medications:  Current Outpatient Medications   Medication Sig Dispense Refill     acetaminophen (TYLENOL) 500 MG tablet Take 1-2 tablets (500-1,000 mg) by mouth every 6 hours as needed for mild pain 30 tablet 0    albuterol (PROVENTIL HFA) 108 (90 Base) MCG/ACT inhaler Inhale 2 puffs into the lungs every 6 hours. 8.5 g 5    albuterol (PROVENTIL) (2.5 MG/3ML) 0.083% neb solution Take 1 vial (2.5 mg) by nebulization every 6 hours as needed for shortness of breath, wheezing or cough. 90 mL 4    ascorbic acid (VITAMIN C) 1000 MG TABS Take 1,000 mg by mouth daily      atorvastatin (LIPITOR) 10 MG tablet Take 1 tablet (10 mg) by mouth daily. 90 tablet 0    blood glucose (NO BRAND SPECIFIED) test strip Use to test blood sugar 1 times daily or as directed. To accompany: Blood Glucose Monitor Brands: per insurance. 100 strip 6    blood glucose monitoring (NO BRAND SPECIFIED) meter device kit Use to test blood sugar 1 times daily or as directed. Preferred blood glucose meter OR supplies to accompany: Blood Glucose Monitor Brands: per insurance. 1 kit 0    CALCIUM PO Take 1 tablet by mouth daily      celecoxib (CELEBREX) 200 MG capsule Take 200 mg by mouth 2 times daily.      cyclobenzaprine (FLEXERIL) 5 MG tablet Take 1 tablet (5 mg) by mouth 2 times daily as needed for muscle spasms. 60 tablet 4    gabapentin (NEURONTIN) 300 MG capsule Take 300 mg by mouth See Admin Instructions.      HYDROcodone-acetaminophen (NORCO)  MG per tablet Take 1-2 tablets by mouth daily as needed for severe pain. 40 tablet 0    hydrOXYzine HCl (ATARAX) 10 MG tablet Take 10 mg by mouth See Admin Instructions.      ibuprofen (IBU) 600 MG tablet Take 600 mg by mouth See Admin Instructions.      ipratropium - albuterol 0.5 mg/2.5 mg/3 mL (DUONEB) 0.5-2.5 (3) MG/3ML neb solution Take 1 vial (3 mLs) by nebulization every 6 hours as needed for shortness of breath, wheezing or cough. 90 mL 1    Isopropyl Alcohol (ALCOHOL WIPES) 70 % MISC Externally apply 1 each topically once a week. 100 each 1    metFORMIN (GLUCOPHAGE XR) 500 MG  24 hr tablet Take 1 tablet (500 mg) by mouth 2 times daily (with meals). 180 tablet 1    Multiple Vitamins-Minerals (MULTIVITAMIN OR) Take 1 tablet by mouth daily Per pt, uses ones without Iron      omeprazole (PRILOSEC) 20 MG DR capsule Take 1 capsule (20 mg) by mouth 2 times daily. 180 capsule 1    oxyCODONE (ROXICODONE) 5 MG tablet Take 5 mg by mouth every 6 hours as needed for pain.      polyethylene glycol (GAVILYTE-G) 236 g suspension Take 240 mLs by mouth See Admin Instructions.      Semaglutide-Weight Management (WEGOVY) 0.5 MG/0.5ML pen Inject 0.5 mg subcutaneously once a week for 4 doses. 2 mL 0    [START ON 12/16/2024] Semaglutide-Weight Management (WEGOVY) 1 MG/0.5ML pen Inject 1 mg subcutaneously once a week for 4 doses. 2 mL 0    thin (NO BRAND SPECIFIED) lancets Use with lanceting device. To accompany: Blood Glucose Monitor Brands: per insurance. 100 each 6    tobramycin (TOBREX) 0.3 % ophthalmic solution Place 1 drop Into the left eye 3 times daily. 5 mL 0    traMADol (ULTRAM) 50 MG tablet Take 1 tablet (50 mg) by mouth every 6 hours as needed for severe pain. Do not start before September 4, 2024. 90 tablet 0    vitamin D3 (CHOLECALCIFEROL) 1000 units (25 mcg) tablet Take 1,000 Units by mouth daily      vitamin E 400 UNITS TABS Take 400 Units by mouth daily      zolpidem (AMBIEN) 5 MG tablet TAKE 1 TABLET (5 MG) BY MOUTH NIGHTLY AS NEEDED FOR SLEEP. DO NOT TAKE WITH TRAMADOL. MUST BE NDC # [address]-45 PER PT REQUEST. 30 tablet 5     Allergies:     Allergies   Allergen Reactions    Metaproterenol Sulfate Other (See Comments)     alupent inhaler-shaking    Percocet [Oxycodone-Acetaminophen] Itching     Has had it since the original episode and has not had any itching      Social History:  Home situation: n      Family history:  Family History   Problem Relation Age of Onset    Hypertension Mother     Cerebrovascular Disease Mother         TIA's    Depression Mother     Cancer - colorectal Maternal  Grandmother         dx at 65    Lipids Father     C.A.D. Father         angioplasty at 65    Musculoskeletal Disorder Father     Alcohol/Drug Daughter         in remission    Breast Cancer No family hx of      Physical Exam:  Vitals:    11/20/24 0932   BP: 133/79   Pulse: 86   SpO2: 99%     Exam:  Constitutional: healthy, alert, and no distress  Head: normocephalic. Atraumatic.     Psychiatric: mentation appears normal and affect normal/bright    Musculoskeletal exam:  Gait/Station/Posture: wnl  Cervical spine: ROMwn       Thoracic spine:  Normal     Lumbar spine:     ROM: dec   Myofascial tenderness:  ++   Vallecillo's: ++++++               Straight leg exam: neg    RAVINDRA/FADIR: n/a              SI: mild   Greater trochanteric bursa: neg  Neurologic exam:  Motor:  5/5 UE and LE strength      Sensory:  (upper and lower extremities):   Light touch: normal    Allodynia: absent    Dysethesia: absent    Hyperalgesia: absent     Diagnostic tests:  L5-S1: Moderate disc degeneration, 2 mm spondylolisthesis with circumferential/foraminal bulging, moderate right/mild left foraminal stenosis and no focal neural compression.     L4-5: Severe disc degeneration, asymmetric moderate right foraminal stenosis with disc/osteophyte impinging on the right L4 ganglion.  Broad-based bulge/protrusion with subarticular recess stenosis and right greater than left L5 nerve encroachment.     L3-4: Mild disc degeneration, annular bulge with mild left foraminal stenosis and no focal neural impingement.  Patent central canal.     L2-3: Moderate disc degeneration, 3.5 mm broad-based disc herniation with mild dural sac effacement and no focal neural impingement.     L1-2: Normal disc height.  No disc herniation stenosis or focal neural impingement.     Sections through the lower thoracic spine are unremarkable.     Conclusion:   advanced L4-5 disc degeneration with prominent Modic type I endplate changes.  Moderate right foraminal stenosis/L4  ganglionic impingement.  Facet arthrosis moderate L4-5, L5-S1, with active marrow edema/inflammation right L4-5 joint.  No abnormal fluid collection or evidence for active infectious process right L4-5 joint.  No significant interval changes as compared with prior study  Assessment/Plan:  Emeli Granados is a 69 year old female who presents with the complaints of axial lbp with occasional radiation to her buttocks.   Emeli was seen today for pain.    Diagnoses and all orders for this visit:    Myofascial muscle pain    Chronic bilateral low back pain, unspecified whether sciatica present  -     Pain Management  Referral    F11.2 - Continuous opioid dependence (H)  -     Pain Management  Referral    Lumbar radiculopathy    Spondylosis of lumbar region without myelopathy or radiculopathy         - Physical Therapy: continue   - Diagnostic Studies: reviewed   - Medication Management: defer to PCP    would consider change in flexeril to norflex 100mg twice a day  - Further procedures recommended: would strongly consider bilateral L3,4,5 MEDIAL BRANCH BLOCK/RFA   - could also consider bilateral L4-5 TRANSFORAMINAL EPIDURAL STEROID INJECTION    - Follow up: pending decision to proceed     Total time spent was 30 minutes, and more than 50% of face to face time was spent counseling and/or coordination of care regarding principles of multidisciplinary care and medication management     Sheldon Leach MD  [facility] [place]

## 2024-11-25 ENCOUNTER — PATIENT OUTREACH (OUTPATIENT)
Dept: CARE COORDINATION | Facility: CLINIC | Age: 69
End: 2024-11-25
Payer: COMMERCIAL

## 2024-11-25 NOTE — PROGRESS NOTES
Clinic Care Coordination Contact  [facility]/Voicemail    Clinical Data: Care Coordinator Outreach    Outreach Documentation Number of Outreach Attempt   11/25/2024  10:46 AM 1       Left message on patient's voicemail with call back information and requested return call.    Plan: Care Coordinator will try to reach patient again in 10 business days.    NICHOLE Webster, B.S. [facility] Care Coordination  [facility]:  [place], [place] and [place]  [phone]  [email or web address]

## 2024-12-01 DIAGNOSIS — G89.29 OTHER CHRONIC PAIN: ICD-10-CM

## 2024-12-01 DIAGNOSIS — F11.20 CONTINUOUS OPIOID DEPENDENCE (H): ICD-10-CM

## 2024-12-01 DIAGNOSIS — M54.50 CHRONIC BILATERAL LOW BACK PAIN, UNSPECIFIED WHETHER SCIATICA PRESENT: ICD-10-CM

## 2024-12-01 DIAGNOSIS — G89.29 CHRONIC BILATERAL LOW BACK PAIN, UNSPECIFIED WHETHER SCIATICA PRESENT: ICD-10-CM

## 2024-12-02 RX ORDER — HYDROCODONE BITARTRATE AND ACETAMINOPHEN 10; 325 MG/1; MG/1
1-2 TABLET ORAL DAILY PRN
Qty: 40 TABLET | Refills: 0 | Status: SHIPPED | OUTPATIENT
Start: 2024-12-02

## 2024-12-10 ENCOUNTER — PATIENT OUTREACH (OUTPATIENT)
Dept: CARE COORDINATION | Facility: CLINIC | Age: 69
End: 2024-12-10
Payer: COMMERCIAL

## 2024-12-10 NOTE — PROGRESS NOTES
Clinic Care Coordination Contact  [facility]/Voicemail    Clinical Data: Care Coordinator Outreach    Outreach Documentation Number of Outreach Attempt   11/25/2024  10:46 AM 1   12/10/2024   3:26 PM 2       Left message on patient's voicemail with call back information and requested return call.      Plan: CHW has been unsuccessful in multiple recent attempts to contact patient. Will route to lead CC to review for disenrollment and/or further direction per standard work. No further outreach will be made at this time.     NICHOLE Webster, B.S. [facility]  Clinic Care Coordination  [facility] Clin[facility]:  [place]  [phone]  [email or web address]

## 2024-12-10 NOTE — LETTER
[facility] CARE COORDINATION    December 11, 2024    Emeli Granados  [address] [facility] [address]      Dear Emeli,    We have been unsuccessful in reaching you since our last contact. At this time the Care Coordination team will make no further attempts to reach you, however this does not change your ability to continue receiving care from your providers at your primary care clinic. If you need additional support from a care coordinator in the future please contact me at [phone].    We are focused on providing you with the highest-quality healthcare experience possible.    Sincerely,    EMMA Del Castillo   Care Coordination Team  [phone]

## 2024-12-11 NOTE — PROGRESS NOTES
Clinic Care Coordination Contact  Clinic Care Coordination Contact    Situation: Patient chart reviewed by care coordinator.    Background: Pt is enrolled in care coordination and followed to assist with goal(s) progression. Chart routed to [facility] by W for review to determine eligibility of diserolling from Care Coordination per standard work.     Assessment: Per Chart Review pt has been unreachable for follow up x2 with no further engagement or return calls.     Plan/Recommendations: Per Care Coordination standard work pt will be disenrolled from Care Coordination. Care Coordinator will send disenrollment letter with care coordinator contact information via [employer]. Care Coordinator will remain available, however, will do no further outreaches at this time unless a new referral is made or a change it pt status occurs. Pt has been provided with this writer's contact information and has been encouraged to call with any questions.     EMMA Del Castillo   Care Coordination Team  [phone]

## 2024-12-12 ENCOUNTER — TELEPHONE (OUTPATIENT)
Dept: FAMILY MEDICINE | Facility: CLINIC | Age: 69
End: 2024-12-12

## 2024-12-12 ENCOUNTER — OFFICE VISIT (OUTPATIENT)
Dept: FAMILY MEDICINE | Facility: CLINIC | Age: 69
End: 2024-12-12
Payer: COMMERCIAL

## 2024-12-12 VITALS
HEIGHT: 59 IN | BODY MASS INDEX: 31.04 KG/M2 | DIASTOLIC BLOOD PRESSURE: 83 MMHG | OXYGEN SATURATION: 96 % | RESPIRATION RATE: 16 BRPM | TEMPERATURE: 98.2 F | SYSTOLIC BLOOD PRESSURE: 139 MMHG | HEART RATE: 77 BPM | WEIGHT: 154 LBS

## 2024-12-12 DIAGNOSIS — E66.811 CLASS 1 OBESITY WITH SERIOUS COMORBIDITY AND BODY MASS INDEX (BMI) OF 31.0 TO 31.9 IN ADULT, UNSPECIFIED OBESITY TYPE: ICD-10-CM

## 2024-12-12 DIAGNOSIS — R73.03 PREDIABETES: Primary | ICD-10-CM

## 2024-12-12 DIAGNOSIS — G89.29 OTHER CHRONIC PAIN: ICD-10-CM

## 2024-12-12 DIAGNOSIS — F11.20 CONTINUOUS OPIOID DEPENDENCE (H): ICD-10-CM

## 2024-12-12 DIAGNOSIS — N89.8 VAGINAL ODOR: ICD-10-CM

## 2024-12-12 LAB
ALBUMIN UR-MCNC: NEGATIVE MG/DL
APPEARANCE UR: CLEAR
BILIRUB UR QL STRIP: NEGATIVE
CLUE CELLS: ABNORMAL
COLOR UR AUTO: YELLOW
CREAT UR-MCNC: 124 MG/DL
EST. AVERAGE GLUCOSE BLD GHB EST-MCNC: 128 MG/DL
GLUCOSE UR STRIP-MCNC: NEGATIVE MG/DL
HBA1C MFR BLD: 6.1 % (ref 0–5.6)
HGB UR QL STRIP: NEGATIVE
HOLD SPECIMEN: NORMAL
KETONES UR STRIP-MCNC: ABNORMAL MG/DL
LEUKOCYTE ESTERASE UR QL STRIP: NEGATIVE
NITRATE UR QL: NEGATIVE
PH UR STRIP: 5 [PH] (ref 5–7)
SP GR UR STRIP: 1.02 (ref 1–1.03)
TRICHOMONAS, WET PREP: ABNORMAL
UROBILINOGEN UR STRIP-ACNC: 0.2 E.U./DL
WBC'S/HIGH POWER FIELD, WET PREP: ABNORMAL
YEAST, WET PREP: ABNORMAL

## 2024-12-12 RX ORDER — BUPROPION HYDROCHLORIDE 300 MG/1
300 TABLET ORAL EVERY MORNING
Qty: 30 TABLET | Refills: 1 | Status: SHIPPED | OUTPATIENT
Start: 2024-12-12

## 2024-12-12 RX ORDER — ORLISTAT 120 MG/1
120 CAPSULE ORAL
Qty: 90 CAPSULE | Refills: 1 | Status: SHIPPED | OUTPATIENT
Start: 2024-12-12

## 2024-12-12 RX ORDER — CLINDAMYCIN PHOSPHATE 20 MG/G
1 CREAM VAGINAL AT BEDTIME
Qty: 40 G | Refills: 0 | Status: SHIPPED | OUTPATIENT
Start: 2024-12-12 | End: 2024-12-17

## 2024-12-12 NOTE — TELEPHONE ENCOUNTER
Prior Authorization Retail Medication Request    Medication/Dose:   orlistat (XENICAL) 120 MG capsule      Diagnosis and ICD code (if different than what is on RX):    New/renewal/insurance change PA/secondary ins. PA:  Previously Tried and Failed:    Rationale:      Rx #: [id number]    KATRINA Pop

## 2024-12-12 NOTE — PROGRESS NOTES
Assessment & Plan     Prediabetes  Improved now. Patient has worked hard with her nutrition and exercise. Her back is feeling much better with recent injections.  - HEMOGLOBIN A1C; Future  - HEMOGLOBIN A1C    Vaginal odor  Wet prep negative for BV. Does have 2+ WBCs. Treated with topical clindamycin as noted below.  - Wet prep - Clinic Collect  - UA Macroscopic with reflex to Microscopic and Culture - Lab Collect; Future  - UA Macroscopic with reflex to Microscopic and Culture - Lab Collect  - clindamycin (CLEOCIN) 2 % vaginal cream; Place 1 applicator vaginally at bedtime for 5 days.    Continuous opioid dependence (H)  Due and obtained today.  - [id number] - Urine Drug Confirmation Panel (Comprehensive); Future  - [id number] - Urine Drug Confirmation Panel (Comprehensive)    Other chronic pain  As noted above.  - [id number] - Urine Drug Confirmation Panel (Comprehensive); Future  - [id number] - Urine Drug Confirmation Panel (Comprehensive)    Class 1 obesity with serious comorbidity and body mass index (BMI) of 31.0 to 31.9 in adult, unspecified obesity type  Increased Wellbutrin to 300 mg. Can also try orlistat. Cautioned patient with side effects.  - buPROPion (WELLBUTRIN XL) 300 MG 24 hr tablet; Take 1 tablet (300 mg) by mouth every morning.  - orlistat (XENICAL) 120 MG capsule; Take 1 capsule (120 mg) by mouth 3 times daily (with meals).    Review of external notes as documented elsewhere in note  Review of the result(s) of each unique test - As noted above  Ordering of each unique test  Prescription drug management  34 minutes spent by me on the date of the encounter doing chart review, history and exam, documentation and further activities per the note    Subjective   Emeli is a 69 year old, presenting for the following health issues:  Recheck Medication        12/12/2024    11:09 AM   Additional Questions   Roomed by Amparo MORA     History of Present Illness       Diabetes:   She presents for follow up of diabetes.    " She is not checking blood glucose.         She has no concerns regarding her diabetes at this time.  She is having weight gain.            She eats 2-3 servings of fruits and vegetables daily.She consumes 1 sweetened beverage(s) daily.She exercises with enough effort to increase her heart rate 20 to 29 minutes per day.  She exercises with enough effort to increase her heart rate 4 days per week.   She is taking medications regularly.       Medication Followup of Bupropion  Taking Medication as prescribed: yes  Side Effects:  None  Medication Helping Symptoms:  NO    She is exercising and moving.  She is working on nutrition as well.        Objective    /83 (BP Location: Left arm, Patient Position: Chair, Cuff Size: Adult Regular)   Pulse 77   Temp 98.2  F (36.8  C) (Oral)   Resp 16   Ht 1.499 m (4' 11\")   Wt 69.9 kg (154 lb)   LMP  (LMP Unknown)   SpO2 96%   BMI 31.10 kg/m    Body mass index is 31.1 kg/m .  Physical Exam   GENERAL: No acute distress  HEENT: Normocephalic  NEURO: Alert and non-focal      Results for orders placed or performed in visit on 12/12/24 (from the past 24 hours)   HEMOGLOBIN A1C   Result Value Ref Range    Estimated Average Glucose 128 (H) <117 mg/dL    Hemoglobin A1C 6.1 (H) 0.0 - 5.6 %   Extra Tube    Narrative    The following orders were created for panel order Extra Tube.  Procedure                               Abnormality         Status                     ---------                               -----------         ------                     Extra Red Top Tube[[id number]]                               Final result               Extra Green Top (Lithium...[[id number]]                      Final result                 Please view results for these tests on the individual orders.   Extra Red Top Tube   Result Value Ref Range    Hold Specimen JIC    Extra Green Top (Lithium Heparin) Tube   Result Value Ref Range    Hold Specimen JIC    Wet prep - Clinic Collect    Specimen: " Vagina; Swab   Result Value Ref Range    Trichomonas Absent Absent    Yeast Absent Absent    Clue Cells Absent Absent    WBCs/high power field 2+ (A) None   UA Macroscopic with reflex to Microscopic and Culture - Lab Collect    Specimen: Urine, NOS   Result Value Ref Range    Color Urine Yellow Colorless, Straw, Light Yellow, Yellow    Appearance Urine Clear Clear    Glucose Urine Negative Negative mg/dL    Bilirubin Urine Negative Negative    Ketones Urine Trace (A) Negative mg/dL    Specific Gravity Urine 1.025 1.003 - 1.035    Blood Urine Negative Negative    pH Urine 5.0 5.0 - 7.0    Protein Albumin Urine Negative Negative mg/dL    Urobilinogen Urine 0.2 0.2, 1.0 E.U./dL    Nitrite Urine Negative Negative    Leukocyte Esterase Urine Negative Negative    Narrative    Microscopic not indicated   Extra Tube    Narrative    The following orders were created for panel order Extra Tube.  Procedure                               Abnormality         Status                     ---------                               -----------         ------                     Extra Urine Collection[[id number]]                           Final result                 Please view results for these tests on the individual orders.   Extra Urine Collection   Result Value Ref Range    Hold Specimen [facility]    [id number] - Urine Drug Confirmation Panel (Comprehensive)    Narrative    The following orders were created for panel order [id number] - Urine Drug Confirmation Panel (Comprehensive).  Procedure                               Abnormality         Status                     ---------                               -----------         ------                     Urine Drug Confirmation ...[[id number]]                      In process                 Urine Creatinine for Michael...[[id number]]                      In process                   Please view results for these tests on the individual orders.     *Note: Due to a large number of results and/or  encounters for the requested time period, some results have not been displayed. A complete set of results can be found in Results Review.           Signed Electronically by: Gina Arias PA-C

## 2024-12-16 LAB
TRAMADOL CTO UR CFM-MCNC: 70 NG/ML
TRAMADOL/CREAT UR: 56 NG/MG {CREAT}

## 2024-12-16 NOTE — TELEPHONE ENCOUNTER
Central Prior Authorization Team   Phone: [phone]    PA Initiation    Medication: orlistat (XENICAL) 120 MG capsule  Insurance Company: [employer] - Phone [phone] Fax [phone]  Pharmacy Filling the Rx: [id number] [address]  Filling Pharmacy Phone: [phone]  Filling Pharmacy Fax:    Start Date: 12/16/2024

## 2024-12-17 NOTE — TELEPHONE ENCOUNTER
PRIOR AUTHORIZATION DENIED    Medication: orlistat (XENICAL) 120 MG capsule    Denial Date: 12/17/2024    Denial Rational:  Per insurance, medication is excluded from patient's benefit plan and will not be covered. Review and appeal are not available because of this exclusion.          Appeal Information:  N/A

## 2024-12-18 NOTE — TELEPHONE ENCOUNTER
Emma Byrne MD  [place] [facility]1 minute ago (11:50 AM)     KARLEE  Can you let her know about this?   It is now available over the counter as SHAGUFTA and that is most likely why it is not covered     Marybeth Sullivan routed conversation to Emma Byrne MD18 hours ago (5:42 PM)     Samuel Henriquez  [place] [facility] Pool18 hours ago (5:32 PM)     LW  Per insurance, medication is excluded from patient's benefit plan and will not be covered. Review and appeal are not available because of this exclusion.

## 2024-12-18 NOTE — TELEPHONE ENCOUNTER
RN spoke to patient   Reviewed message below from Dr. Garcia - Orlistat denied plan exclusion     Patient states understanding and asks for this information sent via my chart as she is currently driving - RN sent     Felisa Brown Registered Nurse  [facility]

## 2025-01-08 ENCOUNTER — DOCUMENTATION ONLY (OUTPATIENT)
Dept: FAMILY MEDICINE | Facility: CLINIC | Age: 70
End: 2025-01-08
Payer: COMMERCIAL

## 2025-01-08 DIAGNOSIS — J44.9 CHRONIC OBSTRUCTIVE PULMONARY DISEASE, UNSPECIFIED COPD TYPE (H): Primary | ICD-10-CM

## 2025-01-08 RX ORDER — ALCOHOL ANTISEPTIC PADS
PADS, MEDICATED (EA) TOPICAL
COMMUNITY
Start: 2024-10-21

## 2025-01-15 ENCOUNTER — NURSE TRIAGE (OUTPATIENT)
Dept: FAMILY MEDICINE | Facility: CLINIC | Age: 70
End: 2025-01-15
Payer: COMMERCIAL

## 2025-01-15 NOTE — TELEPHONE ENCOUNTER
"Nurse Triage SBAR    Situation: Hypertension, neck pain, headaches for last 3 days.    Background: Not on blood pressure medications; \"they took me off of them after my heart ablation.\"    Assessment:   Blood pressure was 150/95 today. She states this is high for her.  Went to eye doctor yesterday because her eyes had been dry and blurry. Eye doctor told her to use \"Refresh Optive Lalo-3\" eye drops for dry eyes for a month. The eye drops help.  Headaches for last 3 days. \"And I never get headaches.\" Tylenol and vicodin takes the edge off but doesn't totally get rid of it.  R sided neck pain. Radiates to shoulder. 7/10 pain. Neck was a bit swollen yesterday, but not today. \"I think I slept on it funny.\" Has been using heating pad which doesn't help much.   Denies chest pain, difficulty breathing, numbness/tingling, weakness, fever.    Recommendation: See in Office Within 3 Days. Scheduled for tomorrow with Gina Arias PA-C.    Reason for Disposition   Systolic BP >= 130 OR Diastolic >= 80, and is not taking BP medications   MODERATE neck pain (e.g., interferes with normal activities like work or school) and present > 3 days    Additional Information   Negative: Sounds like a life-threatening emergency to the triager   Negative: Symptom is main concern (e.g., headache, chest pain)   Negative: Low blood pressure is main concern   Negative: Systolic BP >= 160 OR Diastolic >= 100, and any cardiac (e.g., breathing difficulty, chest pain) or neurologic symptoms (e.g., new-onset blurred or double vision)   Negative: Pregnant 20 or more weeks (or postpartum < 6 weeks) with new hand or face swelling   Negative: Pregnant 20 or more weeks (or postpartum < 6 weeks) and Systolic BP >= 160 OR Diastolic >= 110   Negative: Patient sounds very sick or weak to the triager   Negative: Systolic BP >= 200 OR Diastolic >= 120 and having NO cardiac or neurologic symptoms   Negative: Pregnant 20 or more weeks (or postpartum < 6 weeks) " with Systolic BP >= 140 OR Diastolic >= 90   Negative: Systolic BP >= 180 OR Diastolic >= 110, and missed most recent dose of blood pressure medication   Negative: Systolic BP >= 180 OR Diastolic >= 110   Negative: Patient wants to be seen   Negative: Ran out of BP medications   Negative: Taking BP medications and feels is having side effects (e.g., impotence, cough, dizziness)   Negative: Systolic BP >= 160 OR Diastolic >= 100   Negative: Systolic BP >= 130 OR Diastolic >= 80, and pregnant   Negative: Systolic BP >= 130 OR Diastolic >= 80, and is taking BP medications   Negative: Shock suspected (e.g., cold/pale/clammy skin, too weak to stand, low BP, rapid pulse)   Negative: Similar pain previously and it was from 'heart attack'   Negative: Similar pain previously from 'angina' and not relieved by nitroglycerin   Negative: Difficult to awaken or acting confused (e.g., disoriented, slurred speech)   Negative: Sounds like a life-threatening emergency to the triager   Negative: Followed an injury to neck (e.g., MVA, sports, impact or collision)   Negative: Chest pain   Negative: Lymph node in the neck is swollen or painful to the touch   Negative: Sore throat is main symptom   Negative: Difficulty breathing or unusual sweating (e.g., sweating without exertion)   Negative: Chest pain lasting longer than 5 minutes   Negative: Stiff neck (can't touch chin to chest) and has headache   Negative: Stiff neck (can't touch chin to chest) and fever   Negative: Weakness of an arm or hand   Negative: Problems with bowel or bladder control   Negative: Patient sounds very sick or weak to the triager   Negative: SEVERE pain (e.g., excruciating, unable to do any normal activities)   Negative: Head is twisting to one side (or ask 'is it turning against your will?')   Negative: Fever > 103 F (39.4 C)   Negative: Fever > 100 F (37.8 C) and Intravenous Drug Use (IVDU)   Negative: Fever > 100 F (37.8 C) and has diabetes mellitus or a  weak immune system (e.g., HIV positive, cancer chemotherapy, organ transplant, splenectomy, chronic steroids)   Negative: Numbness in an arm or hand (i.e., loss of sensation)   Negative: Painful rash with multiple small blisters grouped together (i.e., dermatomal distribution or 'band' or 'stripe')   Negative: High-risk adult (e.g., history of cancer, HIV, or IV Drug Use)   Negative: Patient wants to be seen   Negative: Tenderness in front of neck over windpipe    Protocols used: Blood Pressure - High-A-OH, Neck Pain or [id number]

## 2025-01-15 NOTE — TELEPHONE ENCOUNTER
Symptoms    Describe your symptoms: Patient has concerns about her neck ache, headaches and blood pressure.     Any pain: Yes:     How long have you been having symptoms: 3  days    Have you been seen for this:  No    Appointment offered?: Yes: She has an appointment with another doctor but she would like to be seen sooner or if someone can call to talk more about her symptoms     Triage offered?: No    Home remedies tried: Tylenol and heating pad     Preferred Pharmacy:   [id number] - [place], [place] - [address] [facility]  [address] [facility] [address]  Phone: [phone] Fax: [phone]    WRITTEN PRESCRIPTION REQUESTED  No address on file      [address] [address]  Phone: [phone] Fax: [phone]      Could we send this information to you in [facility] or would you prefer to receive a phone call?:   Patient would prefer a phone call   Okay to leave a detailed message?: Yes at Home number on file [phone] (mobile)

## 2025-01-16 ENCOUNTER — OFFICE VISIT (OUTPATIENT)
Dept: FAMILY MEDICINE | Facility: CLINIC | Age: 70
End: 2025-01-16
Payer: COMMERCIAL

## 2025-01-16 VITALS
WEIGHT: 151.2 LBS | DIASTOLIC BLOOD PRESSURE: 84 MMHG | TEMPERATURE: 97.4 F | SYSTOLIC BLOOD PRESSURE: 138 MMHG | OXYGEN SATURATION: 95 % | BODY MASS INDEX: 30.48 KG/M2 | RESPIRATION RATE: 12 BRPM | HEART RATE: 91 BPM | HEIGHT: 59 IN

## 2025-01-16 DIAGNOSIS — N30.00 ACUTE CYSTITIS WITHOUT HEMATURIA: ICD-10-CM

## 2025-01-16 DIAGNOSIS — N89.8 VAGINAL ODOR: ICD-10-CM

## 2025-01-16 DIAGNOSIS — F11.20 CONTINUOUS OPIOID DEPENDENCE (H): ICD-10-CM

## 2025-01-16 DIAGNOSIS — R73.03 PREDIABETES: ICD-10-CM

## 2025-01-16 DIAGNOSIS — H69.93 DYSFUNCTION OF BOTH EUSTACHIAN TUBES: ICD-10-CM

## 2025-01-16 DIAGNOSIS — R51.9 ACUTE NONINTRACTABLE HEADACHE, UNSPECIFIED HEADACHE TYPE: Primary | ICD-10-CM

## 2025-01-16 DIAGNOSIS — R35.0 URINARY FREQUENCY: ICD-10-CM

## 2025-01-16 DIAGNOSIS — E66.811 CLASS 1 OBESITY WITH SERIOUS COMORBIDITY AND BODY MASS INDEX (BMI) OF 31.0 TO 31.9 IN ADULT, UNSPECIFIED OBESITY TYPE: ICD-10-CM

## 2025-01-16 LAB
ALBUMIN UR-MCNC: NEGATIVE MG/DL
APPEARANCE UR: ABNORMAL
BACTERIA #/AREA URNS HPF: ABNORMAL /HPF
BILIRUB UR QL STRIP: ABNORMAL
CLUE CELLS: ABNORMAL
COLOR UR AUTO: YELLOW
GLUCOSE UR STRIP-MCNC: NEGATIVE MG/DL
HGB UR QL STRIP: NEGATIVE
KETONES UR STRIP-MCNC: NEGATIVE MG/DL
LEUKOCYTE ESTERASE UR QL STRIP: ABNORMAL
MUCOUS THREADS #/AREA URNS LPF: PRESENT /LPF
NITRATE UR QL: NEGATIVE
PH UR STRIP: 5 [PH] (ref 5–7)
RBC #/AREA URNS AUTO: ABNORMAL /HPF
SP GR UR STRIP: >=1.03 (ref 1–1.03)
SQUAMOUS #/AREA URNS AUTO: ABNORMAL /LPF
TRICHOMONAS, WET PREP: ABNORMAL
UROBILINOGEN UR STRIP-ACNC: 0.2 E.U./DL
WBC #/AREA URNS AUTO: ABNORMAL /HPF
WBC CLUMPS #/AREA URNS HPF: PRESENT /HPF
WBC'S/HIGH POWER FIELD, WET PREP: ABNORMAL
YEAST, WET PREP: ABNORMAL

## 2025-01-16 RX ORDER — SULFAMETHOXAZOLE AND TRIMETHOPRIM 800; 160 MG/1; MG/1
1 TABLET ORAL 2 TIMES DAILY
Qty: 6 TABLET | Refills: 0 | Status: SHIPPED | OUTPATIENT
Start: 2025-01-16 | End: 2025-01-19

## 2025-01-16 RX ORDER — FLUTICASONE PROPIONATE 50 MCG
1 SPRAY, SUSPENSION (ML) NASAL DAILY
Qty: 16 G | Refills: 0 | Status: SHIPPED | OUTPATIENT
Start: 2025-01-16

## 2025-01-16 ASSESSMENT — PAIN SCALES - GENERAL: PAINLEVEL_OUTOF10: SEVERE PAIN (6)

## 2025-01-16 NOTE — PROGRESS NOTES
Assessment & Plan     Acute nonintractable headache, unspecified headache type  No trauma but headache are unusual for patient and this one has been quite severe 8-9/10 at times.  No nuchal rigidity, nausea, vomiting or fever.   She does have some vision changes but just saw the eye doctor. At times she is having pain behind the right eye. Further evaluation needed and CT ordered. Unable to get it today at [facility] and patient unable to get to [place]. CT scheduled for tomorrow morning. If headache worsening in the meantime I recommended she present to the ER for further evaluation.  If CT normal I will send a MyChart if abnormal we will call her.  - CT Head w/o Contrast; Future    Acute cystitis without hematuria  Having some frequency and urgency. Urine shows signs of infection. Started on Bactrim.  - sulfamethoxazole-trimethoprim (BACTRIM DS) 800-160 MG tablet; Take 1 tablet by mouth 2 times daily for 3 days.    Urinary frequency  As noted above. Culture pending.  - UA Macroscopic with reflex to Microscopic and Culture - Lab Collect; Future  - UA Macroscopic with reflex to Microscopic and Culture - Lab Collect  - UA Microscopic with Reflex to Culture  - Urine Culture    Vaginal odor  Wet prep negative for BV. Has had a couple times in the past months and she reports it does not feel like this has cleared. Perhaps it is the bladder infection.  - Wet prep - lab collect; Future  - Wet prep - lab collect    Prediabetes  Patient interested in what her sugars have been. Too early for A1c to recheck. Checking TSH and BMP today.  - TSH with free T4 reflex; Future  - Basic metabolic panel  (Ca, Cl, CO2, Creat, Gluc, K, Na, BUN); Future  - TSH with free T4 reflex  - Basic metabolic panel  (Ca, Cl, CO2, Creat, Gluc, K, Na, BUN)    Class 1 obesity with serious comorbidity and body mass index (BMI) of 31.0 to 31.9 in adult, unspecified obesity type  Rechecking BMP and TSH today.  - TSH with free T4 reflex; Future  - Basic  "metabolic panel  (Ca, Cl, CO2, Creat, Gluc, K, Na, BUN); Future  - TSH with free T4 reflex  - Basic metabolic panel  (Ca, Cl, CO2, Creat, Gluc, K, Na, BUN)    Dysfunction of both eustachian tubes  Fluid noted bilateral TMs. Try some Flonase.  - fluticasone (FLONASE) 50 MCG/ACT nasal spray; Spray 1 spray into both nostrils daily.    Continuous opioid dependence (H)  Stable on current dosing. Managed by PCP.    Review of the result(s) of each unique test - A1c  Ordering of each unique test  Prescription drug management      Subjective   Emeli is a 69 year old, presenting for the following health issues:  Hypertension (BP review hypertension) and Neck Pain        1/16/2025     2:23 PM   Additional Questions   Roomed by antonella BISHOP     The patient reports she has had a right sided- posterior to parietal headache, sharp in nature for 4 days. She also had some neck pain but this has improved. She denies any trauma. She reports standing worsens headache (throbbing with this motion). She rates the pain at 8-9/10 severity at its worst. Pain severity waxes and wanes.  She is having some blurred vision (just saw the eye doctor- she is on new eye drops from this appointment)  No nausea or vomiting or fevers.        Objective    /84 (BP Location: Left arm, Patient Position: Sitting, Cuff Size: Adult Regular)   Pulse 91   Temp 97.4  F (36.3  C) (Oral)   Resp 12   Ht 1.499 m (4' 11\")   Wt 68.6 kg (151 lb 3.2 oz)   LMP  (LMP Unknown)   SpO2 95%   BMI 30.54 kg/m    Body mass index is 30.54 kg/m .    Physical Exam   GENERAL: No acute distress  HEENT: Normocephalic, PERRL, EOMI, No nystagmus. Canals patent, bilateral TM's non-erythematous, clear effusions bilaterally. Turbinates normal in appearance bilaterally. Posterior oropharynx non-erythematous and without exudate.  NECK: No midline tenderness, no nuchal rigidity.  NEURO: Alert and oriented. Cranial nerves II-XII grossly intact.      Results for " orders placed or performed in visit on 01/16/25 (from the past 24 hours)   UA Macroscopic with reflex to Microscopic and Culture - Lab Collect    Specimen: Urine, Clean Catch   Result Value Ref Range    Color Urine Yellow Colorless, Straw, Light Yellow, Yellow    Appearance Urine Slightly Cloudy (A) Clear    Glucose Urine Negative Negative mg/dL    Bilirubin Urine Small (A) Negative    Ketones Urine Negative Negative mg/dL    Specific Gravity Urine >=1.030 1.003 - 1.035    Blood Urine Negative Negative    pH Urine 5.0 5.0 - 7.0    Protein Albumin Urine Negative Negative mg/dL    Urobilinogen Urine 0.2 0.2, 1.0 E.U./dL    Nitrite Urine Negative Negative    Leukocyte Esterase Urine Trace (A) Negative   Wet prep - lab collect    Specimen: Vagina; Swab   Result Value Ref Range    Trichomonas Absent Absent    Yeast Absent Absent    Clue Cells Absent Absent    WBCs/high power field 2+ (A) None   UA Microscopic with Reflex to Culture   Result Value Ref Range    Bacteria Urine Many (A) None Seen /HPF    RBC Urine 2-5 (A) 0-2 /HPF /HPF    WBC Urine 10-25 (A) 0-5 /HPF /HPF    Squamous Epithelials Urine Moderate (A) None Seen /LPF    WBC Clumps Urine Present (A) None Seen /HPF    Mucus Urine Present (A) None Seen /LPF     *Note: Due to a large number of results and/or encounters for the requested time period, some results have not been displayed. A complete set of results can be found in Results Review.           Signed Electronically by: Gina Arias PA-C

## 2025-01-17 ENCOUNTER — HOSPITAL ENCOUNTER (OUTPATIENT)
Dept: CT IMAGING | Facility: CLINIC | Age: 70
Discharge: HOME | End: 2025-01-17
Attending: PHYSICIAN ASSISTANT | Admitting: PHYSICIAN ASSISTANT
Payer: COMMERCIAL

## 2025-01-17 DIAGNOSIS — R51.9 ACUTE NONINTRACTABLE HEADACHE, UNSPECIFIED HEADACHE TYPE: ICD-10-CM

## 2025-01-17 PROCEDURE — 70450 CT HEAD/BRAIN W/O DYE: CPT

## 2025-01-21 ENCOUNTER — NURSE TRIAGE (OUTPATIENT)
Dept: NURSING | Facility: CLINIC | Age: 70
End: 2025-01-21
Payer: COMMERCIAL

## 2025-01-22 ENCOUNTER — OFFICE VISIT (OUTPATIENT)
Dept: FAMILY MEDICINE | Facility: CLINIC | Age: 70
End: 2025-01-22
Payer: COMMERCIAL

## 2025-01-22 VITALS
SYSTOLIC BLOOD PRESSURE: 122 MMHG | OXYGEN SATURATION: 100 % | DIASTOLIC BLOOD PRESSURE: 78 MMHG | TEMPERATURE: 98.4 F | RESPIRATION RATE: 20 BRPM | BODY MASS INDEX: 30.6 KG/M2 | WEIGHT: 151.8 LBS | HEIGHT: 59 IN | HEART RATE: 89 BPM

## 2025-01-22 DIAGNOSIS — R03.0 ELEVATED BLOOD PRESSURE READING WITHOUT DIAGNOSIS OF HYPERTENSION: ICD-10-CM

## 2025-01-22 DIAGNOSIS — R51.9 ACUTE NONINTRACTABLE HEADACHE, UNSPECIFIED HEADACHE TYPE: ICD-10-CM

## 2025-01-22 DIAGNOSIS — H61.893 IRRITATION OF EXTERNAL EAR CANAL, BILATERAL: Primary | ICD-10-CM

## 2025-01-22 PROCEDURE — 99213 OFFICE O/P EST LOW 20 MIN: CPT

## 2025-01-22 RX ORDER — NEOMYCIN SULFATE, POLYMYXIN B SULFATE, HYDROCORTISONE 3.5; 10000; 1 MG/ML; [USP'U]/ML; MG/ML
3 SOLUTION/ DROPS AURICULAR (OTIC) 4 TIMES DAILY
Qty: 10 ML | Refills: 0 | Status: SHIPPED | OUTPATIENT
Start: 2025-01-22

## 2025-01-22 NOTE — PATIENT INSTRUCTIONS
Start taking cortisporin for ear canal irritation    Headache  Tylenol (1000 mg) three times a day  -please take food with these    Topical Analgesics (Icy Hot, Biofreeze, Voltaren Gel) on neck   -can use up to 3-4 times a day    Ice (inflammation)/ Heat (Muscle relaxing)

## 2025-01-22 NOTE — PROGRESS NOTES
Assessment & Plan     (H61.893) Irritation of external ear canal, bilateral  (primary encounter diagnosis)  Comment: recent middle ear effusions seem to have resolved. I would continue Flonase as prescribed previously. However, exam noting erythema and mild swelling of ear canal. Would like to trial Cortisporin. Did not observe TM perforation. Discussed medication risks and benefits of Cortisporin with patient in detail with patient verbal understanding. Discussed having patient follow up if noting worsening in symptoms. Patient's ear pain could certainly contribute to her headaches. Patient fully understands and is agreeable with plan of care, at this point patient will follow up as needed unless acute concerns arise in the meantime.  Plan: neomycin-polymyxin-hydrocortisone (CORTISPORIN)        [id number] otic solution    (R51.9) Acute nonintractable headache, unspecified headache type  Comment: I would like patient to exhaust conservative measures before adding on headache medicines. Encouraging patient to increase water intake to at least 60-80 oz/day. Ideally would like patient to get 6-8. Continue to use tylenol as needed. If neck is contributing, patient could utilize topical analgesics to area w/ heat and ice. Follow up if noting no improvement after implementing strategies. Patient fully understands and is agreeable with plan of care, at this point patient will follow up as needed unless acute concerns arise in the meantime.    [R03.0] Elevated blood pressure reading without diagnosis of hypertension  Comment: stable on both checks today. Continue to monitor. If noting BP >140/90 consistently follow up for evaluation. Patient fully understands and is agreeable with plan of care, at this point patient will follow up as needed unless acute concerns arise in the meantime.    Sofiya Sainz is a 69 year old, presenting for the following health issues:  Hypertension and Ear Problem        1/22/2025      2:29 PM   Additional Questions   Roomed by Shahnaz ARMAS     History of Present Illness       Hypertension: She presents for follow up of hypertension.  She does check blood pressure  regularly outside of the clinic. Outside blood pressures have been over 140/90. She does not follow a low salt diet.     Reason for visit:  My ear    She eats 0-1 servings of fruits and vegetables daily.She consumes 1 sweetened beverage(s) daily.She exercises with enough effort to increase her heart rate 20 to 29 minutes per day.  She exercises with enough effort to increase her heart rate 4 days per week.   She is taking medications regularly.     Hypertension Follow-up    Do you check your blood pressure regularly outside of the clinic? Yes   Are you following a low salt diet? No  Are your blood pressures ever more than 140 on the top number (systolic) OR more   than 90 on the bottom number (diastolic), for example 140/90? Yes  How many servings of fruits and vegetables do you eat daily?  0-1  On average, how many sweetened beverages do you drink each day (Examples: soda, juice, sweet tea, etc.  Do NOT count diet or artificially sweetened beverages)?   1  How many days per week do you exercise enough to make your heart beat faster? 4  How many minutes a day do you exercise enough to make your heart beat faster? 20 - 29  How many days per week do you miss taking your medication? 0      Acute Illness  Acute illness concerns: both ears have pain   Onset/Duration: 1 week ago   Symptoms:  Fever: YES- 100 in the  beginning   Chills/Sweats: No  Headache (location?): YES  Sinus Pressure: No  Conjunctivitis:  No  Ear Pain: YES: both  Rhinorrhea: No  Congestion: No  Sore Throat: No  Cough: no  Wheeze: No  Decreased Appetite: No  Nausea: No  Vomiting: No  Diarrhea: No  Dysuria/Freq.: No  Dysuria or Hematuria: No  Fatigue/Achiness: No  Sick/Strep Exposure: No  Therapies tried and outcome: otc pain medications     Headaches  -persistent  -has been  "trying tylenol, tramadol, and norco; not much relief  -has been having some neck discomfort, contributing? Neck pain seems to be getting better however  -patient sleep hit or miss  -patient only drinking roughly three 16 oz bottles of water per day  -denies any neurologic red flags  -recent head CT negative for any acute findings.     Review of Systems  Constitutional, HEENT, cardiovascular, pulmonary, GI, , musculoskeletal, neuro, skin, endocrine and psych systems are negative, except as otherwise noted.      Objective    /77 (BP Location: Right arm, Patient Position: Sitting, Cuff Size: Adult Regular)   Pulse 89   Temp 98.4  F (36.9  C) (Oral)   Resp 20   Ht 1.499 m (4' 11\")   Wt 68.9 kg (151 lb 12.8 oz)   LMP  (LMP Unknown)   SpO2 100%   BMI 30.66 kg/m    Body mass index is 30.66 kg/m .  Physical Exam  Vitals and nursing note reviewed.   Constitutional:       Appearance: Normal appearance. She is well-developed. She is not ill-appearing or toxic-appearing.   HENT:      Right Ear: External ear normal. Swelling present. No drainage or tenderness. No middle ear effusion. There is no impacted cerumen. No foreign body. No mastoid tenderness. Tympanic membrane is not scarred, perforated, erythematous, retracted or bulging.      Left Ear: Tympanic membrane and external ear normal. Swelling present. No drainage or tenderness.  No middle ear effusion. There is no impacted cerumen. No foreign body. No mastoid tenderness. Tympanic membrane is not scarred, perforated, erythematous, retracted or bulging.      Ears:      Comments: Swelling and erythema to bilateral ear canals.  Cardiovascular:      Rate and Rhythm: Normal rate.   Pulmonary:      Effort: Pulmonary effort is normal.   Neurological:      General: No focal deficit present.      Mental Status: She is alert.   Psychiatric:         Attention and Perception: Attention and perception normal.         Mood and Affect: Mood normal.         Speech: Speech " normal.         Behavior: Behavior normal. Behavior is cooperative.         Thought Content: Thought content normal.         Judgment: Judgment normal.            Signed Electronically by: CORTNEY Reis CNP

## 2025-01-22 NOTE — TELEPHONE ENCOUNTER
Nurse Triage SBAR    Is this a 2nd Level Triage? NO    Situation:   hypertension    Background:   She used to be on blood pressure meds but was taken off of them after her ablation in 8/2024.  She has diltiazem 120mg that cardiology prescribed to her after her ablation but she hasn't taken it in months due to her bp being controlled.     Assessment:   The past 3 weeks, she's been getting a dull headache that is relieved by tylenol.  She has been checking her bp.  Tonight she is calling as her bp went up to 155/74 HR 88.  She has a throbbing feeling in her head when she gets up too fast or when she is bending over.  Denies chest pains or difficulty breathing.  No weakness or numbness.  Recheck bp during call was 144/80 HR 80    Protocol Recommended Disposition:   See PCP Within 2 Weeks    Recommendation:   Advised pt to be seen within 2 weeks.  Advised to keep a diary of BP.  Do not take diltiazem as there is no current order for pt to take it.  Care advice given.  Pt verbalized understanding.    Tamera Shelton, RN, BSN Nurse Triage Advisor 1/21/2025 8:46 PM        Reason for Disposition   [1] Systolic BP  >= 130 OR Diastolic >= 80 AND [2] not taking BP medications    Additional Information   Negative: Difficult to awaken or acting confused (e.g., disoriented, slurred speech)   Negative: SEVERE difficulty breathing (e.g., struggling for each breath, speaks in single words)   Negative: [1] Weakness of the face, arm or leg on one side of the body AND [2] new-onset   Negative: [1] Numbness (i.e., loss of sensation) of the face, arm or leg on one side of the body AND [2] new-onset   Negative: [1] Chest pain lasts > 5 minutes AND [2] history of heart disease (i.e., heart attack, bypass surgery, angina, angioplasty, CHF)   Negative: [1] Chest pain AND [2] took nitrogylcerin AND [3] pain was not relieved   Negative: Sounds like a life-threatening emergency to the triager   Negative: Symptom is main concern (e.g., headache, chest  pain)   Negative: Low blood pressure is main concern   Negative: [1] Systolic BP  >= 160 OR Diastolic >= 100 AND [2] cardiac (e.g., breathing difficulty, chest pain) or neurologic symptoms (e.g., new-onset blurred or double vision, unsteady gait)   Negative: [1] Pregnant 20 or more weeks (or postpartum < 6 weeks) AND [2] new hand or face swelling   Negative: [1] Pregnant 20 or more weeks (or postpartum < 6 weeks) AND [2] Systolic BP >= 160 OR Diastolic >= 110   Negative: [1] Systolic BP  >= 200 OR Diastolic >= 120 AND [2] having NO cardiac or neurologic symptoms   Negative: [1] Pregnant 20 or more weeks (or postpartum < 6 weeks) AND [2] Systolic BP  >= 140 OR Diastolic >= 90   Negative: [1] Systolic BP  >= 180 OR Diastolic >= 110 AND [2] missed most recent dose of blood pressure medication   Negative: Systolic BP  >= 180 OR Diastolic >= 110   Negative: Ran out of BP medications   Negative: Systolic BP  >= 160 OR Diastolic >= 100   Negative: [1] Taking BP medications AND [2] feels is having side effects (e.g., impotence, cough, dizzy upon standing)   Negative: [1] Systolic BP  >= 130 OR Diastolic >= 80 AND [2] pregnant   Negative: [1] Systolic BP  >= 130 OR Diastolic >= 80 AND [2] taking BP medications    Protocols used: [facility]

## 2025-01-30 DIAGNOSIS — M54.50 CHRONIC BILATERAL LOW BACK PAIN, UNSPECIFIED WHETHER SCIATICA PRESENT: ICD-10-CM

## 2025-01-30 DIAGNOSIS — G89.29 CHRONIC BILATERAL LOW BACK PAIN, UNSPECIFIED WHETHER SCIATICA PRESENT: ICD-10-CM

## 2025-01-30 DIAGNOSIS — F11.20 CONTINUOUS OPIOID DEPENDENCE (H): ICD-10-CM

## 2025-01-30 DIAGNOSIS — G89.29 OTHER CHRONIC PAIN: ICD-10-CM

## 2025-01-30 RX ORDER — HYDROCODONE BITARTRATE AND ACETAMINOPHEN 10; 325 MG/1; MG/1
1-2 TABLET ORAL DAILY PRN
Qty: 40 TABLET | Refills: 0 | Status: SHIPPED | OUTPATIENT
Start: 2025-01-30

## 2025-01-30 NOTE — TELEPHONE ENCOUNTER
Please refer to provider DORIS encounter for NST documentation/charging    There's nothing in the record (I haven't treated her for WC but I reviewed the UC, PT, [place]  and Pain notes, and shoulder is not referenced re: the injury. So I have nothing to base it on.  .

## 2025-01-30 NOTE — TELEPHONE ENCOUNTER
reviewed. Reviewed note in September and patient was to get 40 tablets monthly. Refill provided.

## 2025-02-10 ENCOUNTER — PATIENT OUTREACH (OUTPATIENT)
Dept: CARE COORDINATION | Facility: CLINIC | Age: 70
End: 2025-02-10
Payer: COMMERCIAL

## 2025-02-13 ENCOUNTER — TELEPHONE (OUTPATIENT)
Dept: FAMILY MEDICINE | Facility: CLINIC | Age: 70
End: 2025-02-13
Payer: COMMERCIAL

## 2025-02-13 NOTE — TELEPHONE ENCOUNTER
FYI - Status Update    Who is Calling: patient    Update: pt has an oxygen tank up for renew she needs     Does caller want a call/response back: No

## 2025-02-13 NOTE — TELEPHONE ENCOUNTER
Called and spoke with pt,     Informed pt that an oxygen order was placed on 1/8/25 by Emma Byrne MD  And send to [facility]. Instructed pt to reach out if anything further is needed.     Pt verbalizes understanding and is agreeable with plan. Pt denies any further questions or concerns at this time.     Nathaly ONTIVEROS RN   Clinic RN  [facility]

## 2025-02-22 DIAGNOSIS — E66.811 CLASS 1 OBESITY WITH SERIOUS COMORBIDITY AND BODY MASS INDEX (BMI) OF 31.0 TO 31.9 IN ADULT, UNSPECIFIED OBESITY TYPE: ICD-10-CM

## 2025-02-24 ENCOUNTER — PATIENT OUTREACH (OUTPATIENT)
Dept: CARE COORDINATION | Facility: CLINIC | Age: 70
End: 2025-02-24
Payer: COMMERCIAL

## 2025-02-24 RX ORDER — BUPROPION HYDROCHLORIDE 300 MG/1
300 TABLET ORAL EVERY MORNING
Qty: 30 TABLET | Refills: 0 | Status: SHIPPED | OUTPATIENT
Start: 2025-02-24

## 2025-03-04 ENCOUNTER — TELEPHONE (OUTPATIENT)
Dept: FAMILY MEDICINE | Facility: CLINIC | Age: 70
End: 2025-03-04
Payer: COMMERCIAL

## 2025-03-18 ENCOUNTER — PATIENT OUTREACH (OUTPATIENT)
Dept: FAMILY MEDICINE | Facility: CLINIC | Age: 70
End: 2025-03-18
Payer: COMMERCIAL

## 2025-03-18 NOTE — TELEPHONE ENCOUNTER
Patient Quality Outreach    Patient is due for the following:   Physical Annual Wellness Visit    Action(s) Taken:   Patient has upcoming appointment, these items will be addressed at that time.    Type of outreach:    Chart review performed, no outreach needed.    Questions for provider review:    None           Heena Angel, CMA

## 2025-03-22 ENCOUNTER — HEALTH MAINTENANCE LETTER (OUTPATIENT)
Age: 70
End: 2025-03-22

## 2025-03-24 ENCOUNTER — OFFICE VISIT (OUTPATIENT)
Dept: FAMILY MEDICINE | Facility: CLINIC | Age: 70
End: 2025-03-24
Payer: COMMERCIAL

## 2025-03-24 VITALS
RESPIRATION RATE: 18 BRPM | WEIGHT: 148 LBS | HEIGHT: 59 IN | DIASTOLIC BLOOD PRESSURE: 66 MMHG | OXYGEN SATURATION: 94 % | SYSTOLIC BLOOD PRESSURE: 133 MMHG | BODY MASS INDEX: 29.84 KG/M2 | HEART RATE: 106 BPM | TEMPERATURE: 98.1 F

## 2025-03-24 DIAGNOSIS — R10.13 DYSPEPSIA: ICD-10-CM

## 2025-03-24 DIAGNOSIS — E11.9 TYPE 2 DIABETES MELLITUS WITHOUT COMPLICATION, WITHOUT LONG-TERM CURRENT USE OF INSULIN (H): ICD-10-CM

## 2025-03-24 DIAGNOSIS — G47.9 SLEEP DISORDER: ICD-10-CM

## 2025-03-24 DIAGNOSIS — L03.113 CELLULITIS OF RIGHT UPPER EXTREMITY: ICD-10-CM

## 2025-03-24 DIAGNOSIS — Z78.0 ASYMPTOMATIC MENOPAUSAL STATE: ICD-10-CM

## 2025-03-24 DIAGNOSIS — J44.9 CHRONIC OBSTRUCTIVE PULMONARY DISEASE, UNSPECIFIED COPD TYPE (H): ICD-10-CM

## 2025-03-24 DIAGNOSIS — Z00.00 ENCOUNTER FOR MEDICARE ANNUAL WELLNESS EXAM: Primary | ICD-10-CM

## 2025-03-24 DIAGNOSIS — G89.29 OTHER CHRONIC PAIN: ICD-10-CM

## 2025-03-24 DIAGNOSIS — B18.2 CHRONIC HEPATITIS C WITHOUT HEPATIC COMA (H): ICD-10-CM

## 2025-03-24 DIAGNOSIS — E78.5 HYPERLIPIDEMIA LDL GOAL <130: ICD-10-CM

## 2025-03-24 DIAGNOSIS — J96.01 ACUTE RESPIRATORY FAILURE WITH HYPOXIA (H): ICD-10-CM

## 2025-03-24 DIAGNOSIS — Z23 NEED FOR PROPHYLACTIC VACCINATION AGAINST HEPATITIS A: ICD-10-CM

## 2025-03-24 PROBLEM — J45.901 MILD ASTHMA WITH EXACERBATION, UNSPECIFIED WHETHER PERSISTENT: Status: RESOLVED | Noted: 2024-09-11 | Resolved: 2025-03-24

## 2025-03-24 PROBLEM — R09.02 HYPOXIA: Status: RESOLVED | Noted: 2024-02-09 | Resolved: 2025-03-24

## 2025-03-24 PROBLEM — J10.1 INFLUENZA A: Status: RESOLVED | Noted: 2024-02-09 | Resolved: 2025-03-24

## 2025-03-24 PROBLEM — M54.50 ACUTE BILATERAL LOW BACK PAIN WITHOUT SCIATICA: Status: RESOLVED | Noted: 2021-09-28 | Resolved: 2025-03-24

## 2025-03-24 LAB
EST. AVERAGE GLUCOSE BLD GHB EST-MCNC: 117 MG/DL
HBA1C MFR BLD: 5.7 % (ref 0–5.6)

## 2025-03-24 PROCEDURE — 91320 SARSCV2 VAC 30MCG TRS-SUC IM: CPT | Performed by: FAMILY MEDICINE

## 2025-03-24 PROCEDURE — G2211 COMPLEX E/M VISIT ADD ON: HCPCS | Performed by: FAMILY MEDICINE

## 2025-03-24 PROCEDURE — 82947 ASSAY GLUCOSE BLOOD QUANT: CPT | Performed by: FAMILY MEDICINE

## 2025-03-24 PROCEDURE — 90480 ADMN SARSCOV2 VAC 1/ONLY CMP: CPT | Performed by: FAMILY MEDICINE

## 2025-03-24 PROCEDURE — G0439 PPPS, SUBSEQ VISIT: HCPCS | Performed by: FAMILY MEDICINE

## 2025-03-24 PROCEDURE — 83036 HEMOGLOBIN GLYCOSYLATED A1C: CPT | Performed by: FAMILY MEDICINE

## 2025-03-24 PROCEDURE — 36415 COLL VENOUS BLD VENIPUNCTURE: CPT | Performed by: FAMILY MEDICINE

## 2025-03-24 PROCEDURE — 3075F SYST BP GE 130 - 139MM HG: CPT | Performed by: FAMILY MEDICINE

## 2025-03-24 PROCEDURE — 3078F DIAST BP <80 MM HG: CPT | Performed by: FAMILY MEDICINE

## 2025-03-24 PROCEDURE — 80061 LIPID PANEL: CPT | Performed by: FAMILY MEDICINE

## 2025-03-24 PROCEDURE — 99214 OFFICE O/P EST MOD 30 MIN: CPT | Mod: 25 | Performed by: FAMILY MEDICINE

## 2025-03-24 PROCEDURE — 84460 ALANINE AMINO (ALT) (SGPT): CPT | Performed by: FAMILY MEDICINE

## 2025-03-24 RX ORDER — METFORMIN HYDROCHLORIDE 500 MG/1
500 TABLET, EXTENDED RELEASE ORAL 2 TIMES DAILY WITH MEALS
Qty: 180 TABLET | Refills: 4 | Status: SHIPPED | OUTPATIENT
Start: 2025-03-24

## 2025-03-24 RX ORDER — BUPROPION HYDROCHLORIDE 300 MG/1
300 TABLET ORAL EVERY MORNING
Qty: 90 TABLET | Refills: 4 | Status: SHIPPED | OUTPATIENT
Start: 2025-03-24

## 2025-03-24 RX ORDER — ZOLPIDEM TARTRATE 5 MG/1
TABLET ORAL
Qty: 30 TABLET | Refills: 5 | Status: SHIPPED | OUTPATIENT
Start: 2025-03-24

## 2025-03-24 RX ORDER — BLOOD-GLUCOSE METER
EACH MISCELLANEOUS
COMMUNITY
Start: 2024-10-21

## 2025-03-24 RX ORDER — ATORVASTATIN CALCIUM 10 MG/1
10 TABLET, FILM COATED ORAL DAILY
Qty: 90 TABLET | Refills: 4 | Status: SHIPPED | OUTPATIENT
Start: 2025-03-24

## 2025-03-24 RX ORDER — CYCLOBENZAPRINE HCL 5 MG
5 TABLET ORAL 2 TIMES DAILY PRN
Qty: 60 TABLET | Refills: 4 | Status: SHIPPED | OUTPATIENT
Start: 2025-03-24

## 2025-03-24 RX ORDER — OMEPRAZOLE 20 MG/1
20 CAPSULE, DELAYED RELEASE ORAL 2 TIMES DAILY
Qty: 180 CAPSULE | Refills: 4 | Status: SHIPPED | OUTPATIENT
Start: 2025-03-24

## 2025-03-24 RX ORDER — CEPHALEXIN 500 MG/1
500 CAPSULE ORAL 2 TIMES DAILY
Qty: 20 CAPSULE | Refills: 0 | Status: SHIPPED | OUTPATIENT
Start: 2025-03-24 | End: 2025-04-03

## 2025-03-24 SDOH — HEALTH STABILITY: PHYSICAL HEALTH: ON AVERAGE, HOW MANY DAYS PER WEEK DO YOU ENGAGE IN MODERATE TO STRENUOUS EXERCISE (LIKE A BRISK WALK)?: 4 DAYS

## 2025-03-24 SDOH — HEALTH STABILITY: PHYSICAL HEALTH: ON AVERAGE, HOW MANY MINUTES DO YOU ENGAGE IN EXERCISE AT THIS LEVEL?: 20 MIN

## 2025-03-24 ASSESSMENT — SOCIAL DETERMINANTS OF HEALTH (SDOH)
HOW OFTEN DO YOU GET TOGETHER WITH FRIENDS OR RELATIVES?: THREE TIMES A WEEK
HOW OFTEN DO YOU GET TOGETHER WITH FRIENDS OR RELATIVES?: THREE TIMES A WEEK

## 2025-03-24 NOTE — PATIENT INSTRUCTIONS
Patient Education   Preventive Care Advice   This is general advice given by our system to help you stay healthy. However, your care team may have specific advice just for you. Please talk to your care team about your preventive care needs.  Nutrition  Eat 5 or more servings of fruits and vegetables each day.  Try wheat bread, brown rice and whole grain pasta (instead of white bread, rice, and pasta).  Get enough calcium and vitamin D. Check the label on foods and aim for 100% of the RDA (recommended daily allowance).  Lifestyle  Exercise at least 150 minutes each week  (30 minutes a day, 5 days a week).  Do muscle strengthening activities 2 days a week. These help control your weight and prevent disease.  No smoking.  Wear sunscreen to prevent skin cancer.  Have a dental exam and cleaning every 6 months.  Yearly exams  See your health care team every year to talk about:  Any changes in your health.  Any medicines your care team has prescribed.  Preventive care, family planning, and ways to prevent chronic diseases.  Shots (vaccines)   HPV shots (up to age 26), if you've never had them before.  Hepatitis B shots (up to age 59), if you've never had them before.  COVID-19 shot: Get this shot when it's due.  Flu shot: Get a flu shot every year.  Tetanus shot: Get a tetanus shot every 10 years.  Pneumococcal, hepatitis A, and RSV shots: Ask your care team if you need these based on your risk.  Shingles shot (for age 50 and up)  General health tests  Diabetes screening:  Starting at age 35, Get screened for diabetes at least every 3 years.  If you are younger than age 35, ask your care team if you should be screened for diabetes.  Cholesterol test: At age 39, start having a cholesterol test every 5 years, or more often if advised.  Bone density scan (DEXA): At age 50, ask your care team if you should have this scan for osteoporosis (brittle bones).  Hepatitis C: Get tested at least once in your life.  STIs (sexually  transmitted infections)  Before age 24: Ask your care team if you should be screened for STIs.  After age 24: Get screened for STIs if you're at risk. You are at risk for STIs (including HIV) if:  You are sexually active with more than one person.  You don't use condoms every time.  You or a partner was diagnosed with a sexually transmitted infection.  If you are at risk for HIV, ask about PrEP medicine to prevent HIV.  Get tested for HIV at least once in your life, whether you are at risk for HIV or not.  Cancer screening tests  Cervical cancer screening: If you have a cervix, begin getting regular cervical cancer screening tests starting at age 21.  Breast cancer scan (mammogram): If you've ever had breasts, begin having regular mammograms starting at age 40. This is a scan to check for breast cancer.  Colon cancer screening: It is important to start screening for colon cancer at age 45.  Have a colonoscopy test every 10 years (or more often if you're at risk) Or, ask your provider about stool tests like a FIT test every year or Cologuard test every 3 years.  To learn more about your testing options, visit:   .  For help making a decision, visit:   https://bit.ly/gi93576.  Prostate cancer screening test: If you have a prostate, ask your care team if a prostate cancer screening test (PSA) at age 55 is right for you.  Lung cancer screening: If you are a current or former smoker ages 50 to 80, ask your care team if ongoing lung cancer screenings are right for you.  For informational purposes only. Not to replace the advice of your health care provider. Copyright   2023 [facility]. All rights reserved. Clinically reviewed by the [facility]. [employer] [id number] - REV 01/24.  Learning About Activities of Daily Living  What are activities of daily living?     Activities of daily living (ADLs) are the basic self-care tasks you do every day. These include eating, bathing, dressing,  and moving around.  As you age, and if you have health problems, you may find that it's harder to do some of these tasks. If so, your doctor can suggest ideas that may help.  To measure what kind of help you may need, your doctor will ask how well you are able to do ADLs. Let your doctor know if there are any tasks that you are having trouble doing. This is an important first step to getting help. And when you have the help you need, you can stay as independent as possible.  How will a doctor assess your ADLs?  Asking about ADLs is part of a routine health checkup your doctor will likely do as you age. Your health check might be done in a doctor's office, in your home, or at a hospital. The goal is to find out if you are having any problems that could make it hard to care for yourself or that make it unsafe for you to be on your own.  To measure your ADLs, your doctor will ask how hard it is for you to do routine tasks. Your doctor may also want to know if you have changed the way you do a task because of a health problem. Your doctor may watch how you:  Walk back and forth.  Keep your balance while you stand or walk.  Move from sitting to standing or from a bed to a chair.  Button or unbutton a shirt or sweater.  Remove and put on your shoes.  It's common to feel a little worried or anxious if you find you can't do all the things you used to be able to do. Talking with your doctor about ADLs is a way to make sure you're as safe as possible and able to care for yourself as well as you can. You may want to bring a caregiver, friend, or family member to your checkup. They can help you talk to your doctor.  Follow-up care is a key part of your treatment and safety. Be sure to make and go to all appointments, and call your doctor if you are having problems. It's also a good idea to know your test results and keep a list of the medicines you take.  Current as of: October 24, 2024  Content Version: 14.4    2293-0674  [employer]   Care instructions adapted under license by your healthcare professional. If you have questions about a medical condition or this instruction, always ask your healthcare professional. [employer] disclaims any warranty or liability for your use of this information.    Preventing Falls: Care Instructions  Injuries and health problems such as trouble walking or poor eyesight can increase your risk of falling. So can some medicines. But there are things you can do to help prevent falls. You can exercise to get stronger. You can also arrange your home to make it safer.    Talk to your doctor about the medicines you take. Ask if any of them increase the risk of falls and whether they can be changed or stopped.   Try to exercise regularly. It can help improve your strength and balance. This can help lower your risk of falling.         Practice fall safety and prevention.   Wear low-heeled shoes that fit well and give your feet good support. Talk to your doctor if you have foot problems that make this hard.  Carry a cellphone or wear a medical alert device that you can use to call for help.  Use stepladders instead of chairs to reach high objects. Don't climb if you're at risk for falls. Ask for help, if needed.  Wear the correct eyeglasses, if you need them.        Make your home safer.   Remove rugs, cords, clutter, and furniture from walkways.  Keep your house well lit. Use night-lights in hallways and bathrooms.  Install and use sturdy handrails on stairways.  Wear nonskid footwear, even inside. Don't walk barefoot or in socks without shoes.        Be safe outside.   Use handrails, curb cuts, and ramps whenever possible.  Keep your hands free by using a shoulder bag or backpack.  Try to walk in well-lit areas. Watch out for uneven ground, changes in pavement, and debris.  Be careful in the winter. Walk on the grass or gravel when sidewalks are slippery. Use de-icer on steps and  "walkways. Add non-slip devices to shoes.    Put grab bars and nonskid mats in your shower or tub and near the toilet. Try to use a shower chair or bath bench when bathing.   Get into a tub or shower by putting in your weaker leg first. Get out with your strong side first. Have a phone or medical alert device in the bathroom with you.   Where can you learn more?  Go to https://www.[employer]/patiented  Enter G117 in the search box to learn more about \"Preventing Falls: Care Instructions.\"  Current as of: July 31, 2024  Content Version: 14.4    2545-0397 [employer]   Care instructions adapted under license by your healthcare professional. If you have questions about a medical condition or this instruction, always ask your healthcare professional. [employer] disclaims any warranty or liability for your use of this information.    Chronic Pain: Care Instructions  Your Care Instructions     Chronic pain is pain that lasts a long time (months or even years) and may or may not have a clear cause. It is different from acute pain, which usually does have a clear cause--like an injury or illness--and gets better over time. Chronic pain:  Lasts over time but may vary from day to day.  Does not go away despite efforts to end it.  May disrupt your sleep and lead to fatigue.  May cause depression or anxiety.  May make your muscles tense, causing more pain.  Can disrupt your work, hobbies, home life, and relationships with friends and family.  Chronic pain is a very real condition. It is not just in your head. Treatment can help and usually includes several methods used together, such as medicines, physical therapy, exercise, and other treatments. Learning how to relax and changing negative thought patterns can also help you cope.  Chronic pain is complex. Taking an active role in your treatment will help you better manage your pain. Tell your doctor if you have trouble dealing with your pain. You may " have to try several things before you find what works best for you.  Follow-up care is a key part of your treatment and safety. Be sure to make and go to all appointments, and call your doctor if you are having problems. It's also a good idea to know your test results and keep a list of the medicines you take.  How can you care for yourself at home?  Pace yourself. Break up large jobs into smaller tasks. Save harder tasks for days when you have less pain, or go back and forth between hard tasks and easier ones. Take rest breaks.  Relax, and reduce stress. Relaxation techniques such as deep breathing or meditation can help.  Keep moving. Gentle, daily exercise can help reduce pain over the long run. Try low- or no-impact exercises such as walking, swimming, and stationary biking. Do stretches to stay flexible.  Try heat, cold packs, and massage.  Get enough sleep. Chronic pain can make you tired and drain your energy. Talk with your doctor if you have trouble sleeping because of pain.  Think positive. Your thoughts can affect your pain level. Do things that you enjoy to distract yourself when you have pain instead of focusing on the pain. See a movie, read a book, listen to music, or spend time with a friend.  If you think you are depressed, talk to your doctor about treatment.  Keep a daily pain diary. Record how your moods, thoughts, sleep patterns, activities, and medicine affect your pain. You may find that your pain is worse during or after certain activities or when you are feeling a certain emotion. Having a record of your pain can help you and your doctor find the best ways to treat your pain.  Take pain medicines exactly as directed.  If the doctor gave you a prescription medicine for pain, take it as prescribed.  If you are not taking a prescription pain medicine, ask your doctor if you can take an over-the-counter medicine.  Reducing constipation caused by pain medicine  Talk to your doctor about a  "laxative. If a laxative doesn't work, your doctor may suggest a prescription medicine.  Include fruits, vegetables, beans, and whole grains in your diet each day. These foods are high in fiber.  If your doctor recommends it, get more exercise. Walking is a good choice. Bit by bit, increase the amount you walk every day. Try for at least 30 minutes on most days of the week.  Schedule time each day for a bowel movement. A daily routine may help. Take your time and do not strain when having a bowel movement.  When should you call for help?   Call your doctor now or seek immediate medical care if:    Your pain gets worse or is out of control.     You feel down or blue, or you do not enjoy things like you once did. You may be depressed, which is common in people with chronic pain. Depression can be treated.     You have vomiting or cramps for more than 2 hours.   Watch closely for changes in your health, and be sure to contact your doctor if:    You cannot sleep because of pain.     You are very worried or anxious about your pain.     You have trouble taking your pain medicine.     You have any concerns about your pain medicine.     You have trouble with bowel movements, such as:  No bowel movement in 3 days.  Blood in the anal area, in your stool, or on the toilet paper.  Diarrhea for more than 24 hours.   Where can you learn more?  Go to https://www.[employer].net/patiented  Enter N004 in the search box to learn more about \"Chronic Pain: Care Instructions.\"  Current as of: July 31, 2024  Content Version: 14.4    6018-8757 [employer]   Care instructions adapted under license by your healthcare professional. If you have questions about a medical condition or this instruction, always ask your healthcare professional. [employer] disclaims any warranty or liability for your use of this information.       "

## 2025-03-24 NOTE — PROGRESS NOTES
Preventive Care Visit  [facility]  Emma Byrne MD, Family Medicine  Mar 24, 2025      Assessment & Plan     Encounter for Medicare annual wellness exam    - REVIEW OF HEALTH MAINTENANCE PROTOCOL ORDERS  - DX Bone Density    Need for prophylactic vaccination against hepatitis A  Will get at pharmacy     Type 2 diabetes mellitus without complication, without long-term current use of insulin (H)  Under control  Refills per epicare  Continue same regimen    - HEMOGLOBIN A1C  - blood glucose monitoring (ONE TOUCH ULTRA 2) meter device kit  - PRIMARY CARE FOLLOW-UP SCHEDULING  - Lipid panel reflex to direct LDL Fasting  - ALT  - Glucose  - HEMOGLOBIN A1C  - Lipid panel reflex to direct LDL Fasting  - ALT  - Glucose    Acute respiratory failure with hypoxia (H)  Resolved and stable now   - COVID-19 12+ (PFIZER)    Chronic hepatitis C without hepatic coma (H)  Resolved with treatment     Chronic obstructive pulmonary disease, unspecified COPD type (H)  Stable     Cellulitis of right upper extremity  See photo  - cephALEXin (KEFLEX) 500 MG capsule  Dispense: 20 capsule; Refill: 0  The potential side effects of the prescription medication were discussed with the patient.  The patient is to follow up as needed for nonresolution of symptoms      Hyperlipidemia LDL goal <130  Under control  Refills per [facility]    - atorvastatin (LIPITOR) 10 MG tablet  Dispense: 90 tablet; Refill: 4    Class 1 obesity with serious comorbidity and body mass index (BMI) of 31.0 to 31.9 in adult, unspecified obesity type  Resolved -doing well   - buPROPion (WELLBUTRIN XL) 300 MG 24 hr tablet  Dispense: 90 tablet; Refill: 4    Acute bilateral low back pain without sciatica  Refills per [facility]    - cyclobenzaprine (FLEXERIL) 5 MG tablet  Dispense: 60 tablet; Refill: 4    Epigastric pain  Refills per [facility]    - omeprazole (PRILOSEC) 20 MG DR capsule  Dispense: 180 capsule; Refill: 4    Sleep disorder  Added refills  -  "zolpidem (AMBIEN) 5 MG tablet  Dispense: 30 tablet; Refill: 5    Asymptomatic menopausal state  Due for dexa   - DX Bone Density      Patient has been advised of split billing requirements and indicates understanding: Yes        BMI  Estimated body mass index is 29.89 kg/m  as calculated from the following:    Height as of this encounter: 1.499 m (4' 11\").    Weight as of this encounter: 67.1 kg (148 lb).       Counseling  Appropriate preventive services were addressed with this patient via screening, questionnaire, or discussion as appropriate for fall prevention, nutrition, physical activity, Tobacco-use cessation, social engagement, weight loss and cognition.  Checklist reviewing preventive services available has been given to the patient.  Reviewed patient's diet, addressing concerns and/or questions.   The patient was instructed to see the dentist every 6 months.   Updated plan of care.  Patient reported difficulty with activities of daily living were addressed today.I have reviewed Opioid Use Disorder and Substance Use Disorder risk factors and made any needed referrals.       FUTURE APPOINTMENTS:       - Follow-up visit in as needed if arm does not get better and in 6 months for med check for diabetes and blood pressure   Regular exercise  See Patient Instructions    Sofiya Sainz is a 70 year old, presenting for the following:  Wellness Visit  She is also here for recheck on her diabetes and COPD and insomnia and chronic pain.   She fell a few days ago and scraped her right forearm on the rug and it ripped the skin.  Now the skin around it is getting more red and tender.   She does not have fever.           3/24/2025     4:56 PM   Additional Questions   Roomed by Veronique BISHOP  See above          Advance Care Planning  Patient does not have a Health Care Directive: discussed last year        3/24/2025   General Health   How would you rate your overall physical health? Good   Feel stress " (tense, anxious, or unable to sleep) Only a little   (!) STRESS CONCERN      3/24/2025   Nutrition   Diet: Regular (no restrictions)    Diabetic    I don't know       Multiple values from one day are sorted in reverse-chronological order         3/24/2025   Exercise   Days per week of moderate/strenous exercise 4 days   Average minutes spent exercising at this level 20 min         3/24/2025   Social Factors   Frequency of gathering with friends or relatives Three times a week   Worry food won't last until get money to buy more No   Food not last or not have enough money for food? No   Do you have housing? (Housing is defined as stable permanent housing and does not include staying ouside in a car, in a tent, in an abandoned building, in an overnight shelter, or couch-surfing.) No   Are you worried about losing your housing? No   Lack of transportation? No   Unable to get utilities (heat,electricity)? No   Want help with housing or utility concern? No   (!) HOUSING CONCERN PRESENT      3/24/2025   Fall Risk   Fallen 2 or more times in the past year? Yes    Yes   Trouble with walking or balance? No    No   Gait Speed Test (Document in seconds) 4   Gait Speed Test Interpretation Less than or equal to 5.00 seconds - PASS       Multiple values from one day are sorted in reverse-chronological order          3/24/2025   Activities of Daily Living- Home Safety   Needs help with the following daily activites Laundry   Safety concerns in the home None of the above         3/24/2025   Dental   Dentist two times every year? (!) NO         3/24/2025   Hearing Screening   Hearing concerns? None of the above         3/24/2025   Driving Risk Screening   Patient/family members have concerns about driving No         3/24/2025   General Alertness/Fatigue Screening   Have you been more tired than usual lately? No         3/24/2025   Urinary Incontinence Screening   Bothered by leaking urine in past 6 months No           Today's PHQ-2  Score:       3/24/2025     4:51 PM   PHQ-2 (  [employer])   Q1: Little interest or pleasure in doing things 0    Q2: Feeling down, depressed or hopeless 0    PHQ-2 Score 0    Q1: Little interest or pleasure in doing things Not at all   Q2: Feeling down, depressed or hopeless Not at all   PHQ-2 Score 0       Proxy-reported           3/24/2025   Substance Use   Alcohol more than 3/day or more than 7/wk No   Do you have a current opioid prescription? (!) YES   How severe/bad is pain from 1 to 10? 8/10   Do you use any other substances recreationally? No       Social History     Tobacco Use    Smoking status: Former     Current packs/day: 0.00     Average packs/day: 0.5 packs/day for 52.1 years (26.0 ttl pk-yrs)     Types: Cigarettes     Start date: 10/13/1967     Quit date: 2019     Years since quittin.3     Passive exposure: Past    Smokeless tobacco: Never    Tobacco comments:     quit 2019   Vaping Use    Vaping status: Never Used   Substance Use Topics    Alcohol use: No     Comment: sober since 99    Drug use: No           3/23/2023   LAST FHS-7 RESULTS   1st degree relative breast or ovarian cancer No   Any relative bilateral breast cancer No   Any male have breast cancer No   Any ONE woman have BOTH breast AND ovarian cancer No   Any woman with breast cancer before 50yrs No   2 or more relatives with breast AND/OR ovarian cancer No   2 or more relatives with breast AND/OR bowel cancer No        Mammogram Screening - Mammogram every 1-2 years updated in Health Maintenance based on mutual decision making    ASCVD Risk   The 10-year ASCVD risk score (Ngoc ALLISON, et al., 2019) is: 20.2%    Values used to calculate the score:      Age: 70 years      Sex: Female      Is Non- : No      Diabetic: Yes      Tobacco smoker: No      Systolic Blood Pressure: 133 mmHg      Is BP treated: No      HDL Cholesterol: 57 mg/dL      Total Cholesterol: 180 mg/dL            Reviewed and  updated as needed this visit by Provider                    Labs reviewed in EPIC  BP Readings from Last 3 Encounters:   03/24/25 133/66   03/07/25 125/86   01/22/25 122/78    Wt Readings from Last 3 Encounters:   03/24/25 67.1 kg (148 lb)   03/07/25 68 kg (149 lb 14.4 oz)   01/22/25 68.9 kg (151 lb 12.8 oz)                  Patient Active Problem List   Diagnosis    Disorder of bone and cartilage    CARDIOVASCULAR SCREENING; LDL GOAL LESS THAN 130    Osteopenia    Back pain    Moderate persistent asthma with exacerbation    Other chronic pain    Bacteremia    Adenomyomatosis of gallbladder    Diverticulosis of colon    Acute bilateral low back pain without sciatica    Paroxysmal supraventricular tachycardia    F11.2 - Continuous opioid dependence (H)    Muscle strain of right gluteal region, subsequent encounter    Wheezing    Influenza A    Hypoxia    Hyperlipidemia LDL goal <130    Lumbar radiculitis    Prediabetes    Mild asthma with exacerbation, unspecified whether persistent    Abnormal gallbladder ultrasound    Benign neoplasm of descending colon    Benign neoplasm of transverse colon    Chronic constipation    Diaphragmatic hernia    Diverticular disease    Epigastric pain    Polyp of colon    Obstruction of esophagus    Gastroesophageal reflux disease without esophagitis    Chronic hepatitis C (H)    Chronic obstructive pulmonary disease, unspecified COPD type (H)    Acute respiratory failure with hypoxia (H)     Past Surgical History:   Procedure Laterality Date    CARPAL TUNNEL RELEASE RT/LT Right 11/23/2021    and trigger finger and tendon repair    CARPAL TUNNEL RELEASE RT/LT Left 12/20/2021    and tendon repair    COLONOSCOPY  2004, 2012    again in 2023 and 4 polyp - repeat in 3 years    COLONOSCOPY  11/24/2023    4 polyps - repeat in 3 years    CYSTOSCOPY      Dilatation of the esophagus once due to dysphagia and stricture  2003    EP ABLATION SVT N/A 08/28/2024    Procedure: Ablation  Supraventricular Tachycardia;  Surgeon: Millie Dominguez MD;  Location:  [facility]    EXCISE SOFT TISSUE SHOULDER Left 2024    Procedure: EXCISION LEFT SHOULDER MASS;  Surgeon: Yao Baker MD;  Location: [facility] OR    Ganglion cyst removal      HEMORRHOIDECTOMY BANDING      HYSTERECTOMY      LAPAROSCOPIC CHOLECYSTECTOMY N/A 2024    Procedure: CHOLECYSTECTOMY, LAPAROSCOPIC;  Surgeon: Yao Baker MD;  Location: [facility] OR    LAPAROSCOPIC SALPINGO-OOPHORECTOMY Bilateral 10/23/2015    Procedure: LAPAROSCOPIC SALPINGO-OOPHORECTOMY;  Surgeon: Johnathan Steve MD;  Location: [facility] OR    subscapularous repair and biceps tendon repair  2022    Dr[facility] Verma at [facility]    Thumb surgery Right     TONSILLECTOMY      XR WRIST SURGERY FLORI RIGHT Right 2023    surgery for DJD and bone graft done       Social History     Tobacco Use    Smoking status: Former     Current packs/day: 0.00     Average packs/day: 0.5 packs/day for 52.1 years (26.0 ttl pk-yrs)     Types: Cigarettes     Start date: 10/13/1967     Quit date: 2019     Years since quittin.3     Passive exposure: Past    Smokeless tobacco: Never    Tobacco comments:     quit 2019   Substance Use Topics    Alcohol use: No     Comment: sober since 99     Family History   Problem Relation Age of Onset    Hypertension Mother     Cerebrovascular Disease Mother         TIA's    Depression Mother     Cancer - colorectal Maternal Grandmother         dx at 65    Lipids Father     C.A.D. Father         angioplasty at 65    Musculoskeletal Disorder Father     Alcohol/Drug Daughter         in remission    Breast Cancer No family hx of          Current Outpatient Medications   Medication Sig Dispense Refill    acetaminophen (TYLENOL) 500 MG tablet Take 1-2 tablets (500-1,000 mg) by mouth every 6 hours as needed for mild pain 30 tablet 0    albuterol (PROVENTIL HFA) 108 (90 Base) MCG/ACT inhaler Inhale 2 puffs into the  lungs every 6 hours. 8.5 g 5    albuterol (PROVENTIL) (2.5 MG/3ML) 0.083% neb solution Take 1 vial (2.5 mg) by nebulization every 6 hours as needed for shortness of breath, wheezing or cough. 90 mL 4    ascorbic acid (VITAMIN C) 1000 MG TABS Take 1,000 mg by mouth daily      atorvastatin (LIPITOR) 10 MG tablet Take 1 tablet (10 mg) by mouth daily. 90 tablet 4    blood glucose (NO BRAND SPECIFIED) test strip Use to test blood sugar 1 times daily or as directed. To accompany: Blood Glucose Monitor Brands: per insurance. 100 strip 6    blood glucose monitoring (NO BRAND SPECIFIED) meter device kit Use to test blood sugar 1 times daily or as directed. Preferred blood glucose meter OR supplies to accompany: Blood Glucose Monitor Brands: per insurance. 1 kit 0    blood glucose monitoring (ONE TOUCH ULTRA 2) meter device kit USE TO TEST BLOOD SUGAR 1 TIME DAILY OR AS DIRECTED.*      buPROPion (WELLBUTRIN XL) 300 MG 24 hr tablet Take 1 tablet (300 mg) by mouth every morning. 90 tablet 4    CALCIUM PO Take 1 tablet by mouth daily      cephALEXin (KEFLEX) 500 MG capsule Take 1 capsule (500 mg) by mouth 2 times daily for 10 days. 20 capsule 0    cyclobenzaprine (FLEXERIL) 5 MG tablet Take 1 tablet (5 mg) by mouth 2 times daily as needed for muscle spasms. 60 tablet 4    fluticasone (FLONASE) 50 MCG/ACT nasal spray Spray 1 spray into both nostrils daily. 16 g 0    HYDROcodone-acetaminophen (NORCO)  MG per tablet Take 1-2 tablets by mouth daily as needed for severe pain. 40 tablet 0    ipratropium - albuterol 0.5 mg/2.5 mg/3 mL (DUONEB) 0.5-2.5 (3) MG/3ML neb solution Take 1 vial (3 mLs) by nebulization every 6 hours as needed for shortness of breath, wheezing or cough. 90 mL 1    Isopropyl Alcohol (ALCOHOL WIPES) 70 % MISC Externally apply 1 each topically once a week. 100 each 1    metFORMIN (GLUCOPHAGE XR) 500 MG 24 hr tablet Take 1 tablet (500 mg) by mouth 2 times daily (with meals). 180 tablet 4    Multiple  Vitamins-Minerals (MULTIVITAMIN OR) Take 1 tablet by mouth daily Per pt, uses ones without Iron      neomycin-polymyxin-hydrocortisone (CORTISPORIN) 3.5-17500-8 otic solution Place 3 drops in ear(s) 4 times daily. 10 mL 0    omeprazole (PRILOSEC) 20 MG DR capsule Take 1 capsule (20 mg) by mouth 2 times daily. 180 capsule 4    polyethylene glycol (GAVILYTE-G) 236 g suspension Take 240 mLs by mouth See Admin Instructions.      thin (NO BRAND SPECIFIED) lancets Use with lanceting device. To accompany: Blood Glucose Monitor Brands: per insurance. 100 each 6    traMADol (ULTRAM) 50 MG tablet Take 1 tablet (50 mg) by mouth every 6 hours as needed for severe pain. Do not start before September 4, 2024. 90 tablet 0    UltiCare Alcohol Swabs 70 % PADS Externally apply 1 each topically once a week.*      vitamin D3 (CHOLECALCIFEROL) 1000 units (25 mcg) tablet Take 1,000 Units by mouth daily      vitamin E 400 UNITS TABS Take 400 Units by mouth daily      zolpidem (AMBIEN) 5 MG tablet TAKE 1 TABLET (5 MG) BY MOUTH NIGHTLY AS NEEDED FOR SLEEP. DO NOT TAKE WITH TRAMADOL. MUST BE NDC # [address]-17 PER PT REQUEST. 30 tablet 5     Allergies   Allergen Reactions    Metaproterenol Sulfate Other (See Comments)     alupent inhaler-shaking    Percocet [Oxycodone-Acetaminophen] Itching     Has had it since the original episode and has not had any itching      Current providers sharing in care for this patient include:  Patient Care Team:  Emma Byrne MD as PCP - General (Family Medicine)  Emma Byrne MD as Assigned PCP  Millie Dominguez MD as MD (Cardiovascular Disease)  Emma Byrne MD as Assigned Pain Medication Provider  Tiffanie More PA-C as Physician Assistant (Urology)  Rizwana Sinclair APRN CNP as Assigned Neuroscience Provider  Megan Vargas PA-C as Assigned OBGYN Provider  Haven Manley PA-C as Assigned Heart and Vascular Provider  Yao Baker MD as Assigned Surgical  "Provider  Ani Cabezas PA-C as Assigned Dermatology Provider    The following health maintenance items are reviewed in Epic and correct as of today:  Health Maintenance   Topic Date Due    ANNUAL REVIEW OF HM ORDERS  10/31/2024    EYE EXAM  02/23/2025    COVID-19 Vaccine (8 - 2024-25 season) 03/04/2025    A1C  03/12/2025    HEPATITIS A IMMUNIZATION (2 of 2 - Risk 2-dose series) 10/26/2024    MEDICARE ANNUAL WELLNESS VISIT  03/11/2025    MAMMO SCREENING  03/23/2025    DTAP/TDAP/TD IMMUNIZATION (2 - Td or Tdap) 05/02/2025 (Originally 3/4/2025)    DIABETIC FOOT EXAM  08/29/2025 (Originally 1955)    MICROALBUMIN  05/28/2025    CONTROLLED SUBSTANCE AGREEMENT FOR CHRONIC PAIN MANAGEMENT  09/11/2025    LIPID  10/21/2025    HAMLET ASSESSMENT  11/20/2025    PHQ-9  11/20/2025    URINE DRUG SCREEN  12/12/2025    BMP  01/16/2026    FALL RISK ASSESSMENT  03/24/2026    COLORECTAL CANCER SCREENING  11/24/2026    ADVANCE CARE PLANNING  05/15/2029    DEXA  06/16/2037    SPIROMETRY  Completed    COPD ACTION PLAN  Completed    PHQ-2 (once per calendar year)  Completed    INFLUENZA VACCINE  Completed    ZOSTER IMMUNIZATION  Completed    RSV VACCINE  Completed    HPV IMMUNIZATION  Aged Out    MENINGITIS IMMUNIZATION  Aged Out    TSH W/FREE T4 REFLEX  Discontinued    Pneumococcal Vaccine: 50+ Years  Discontinued    HEPATITIS B IMMUNIZATION  Discontinued    LUNG CANCER SCREENING  Discontinued         Review of Systems  Constitutional, HEENT, cardiovascular, pulmonary, gi and gu systems are negative, except as otherwise noted.     Objective    Exam  /66 (BP Location: Left arm, Patient Position: Chair, Cuff Size: Adult Regular)   Pulse 106   Temp 98.1  F (36.7  C) (Oral)   Resp 18   Ht 1.499 m (4' 11\")   Wt 67.1 kg (148 lb)   LMP  (LMP Unknown)   SpO2 94%   BMI 29.89 kg/m     Estimated body mass index is 29.89 kg/m  as calculated from the following:    Height as of this encounter: 1.499 m (4' 11\").    Weight as of this " encounter: 67.1 kg (148 lb).    Physical Exam  GENERAL: alert and no distress  EYES: Eyes grossly normal to inspection, PERRL and conjunctivae and sclerae normal  HENT: ear canals and TM's normal, nose and mouth without ulcers or lesions  NECK: no adenopathy, no asymmetry, masses, or scars  RESP: lungs clear to auscultation - no rales, rhonchi or wheezes  BREAST: normal without masses, tenderness or nipple discharge and no palpable axillary masses or adenopathy  CV: regular rate and rhythm, normal S1 S2, no S3 or S4, no murmur, click or rub, no peripheral edema  ABDOMEN: soft, nontender, no hepatosplenomegaly, no masses and bowel sounds normal  MS: no gross musculoskeletal defects noted, no edema  SKIN: see photo of right arm - irregular abrasion with surrounding rim of redness which is tender - no purulent drainage and no streaking   NEURO: Normal strength and tone, mentation intact and speech normal  PSYCH: mentation appears normal, affect normal/bright  LYMPH: no cervical, supraclavicular, axillary, or inguinal adenopathy        3/24/2025   Mini Cog   Clock Draw Score 2 Normal   3 Item Recall 2 objects recalled   Mini Cog Total Score 4             3/11/2024   Vision Screen   Reason Vision Screen Not Completed Patient had exam in last 12 months       Signed Electronically by: Emma Byrne MD

## 2025-03-25 LAB
ALT SERPL W P-5'-P-CCNC: 27 U/L (ref 0–50)
CHOLEST SERPL-MCNC: 187 MG/DL
FASTING STATUS PATIENT QL REPORTED: ABNORMAL
FASTING STATUS PATIENT QL REPORTED: NORMAL
GLUCOSE SERPL-MCNC: 94 MG/DL (ref 70–99)
HDLC SERPL-MCNC: 68 MG/DL
LDLC SERPL CALC-MCNC: 77 MG/DL
NONHDLC SERPL-MCNC: 119 MG/DL
TRIGL SERPL-MCNC: 208 MG/DL

## 2025-03-26 DIAGNOSIS — G89.29 CHRONIC BILATERAL LOW BACK PAIN, UNSPECIFIED WHETHER SCIATICA PRESENT: ICD-10-CM

## 2025-03-26 DIAGNOSIS — F11.20 CONTINUOUS OPIOID DEPENDENCE (H): ICD-10-CM

## 2025-03-26 DIAGNOSIS — M54.50 CHRONIC BILATERAL LOW BACK PAIN, UNSPECIFIED WHETHER SCIATICA PRESENT: ICD-10-CM

## 2025-03-26 DIAGNOSIS — G89.29 OTHER CHRONIC PAIN: ICD-10-CM

## 2025-03-26 RX ORDER — HYDROCODONE BITARTRATE AND ACETAMINOPHEN 10; 325 MG/1; MG/1
1-2 TABLET ORAL DAILY PRN
Qty: 40 TABLET | Refills: 0 | Status: SHIPPED | OUTPATIENT
Start: 2025-03-26

## 2025-03-26 NOTE — TELEPHONE ENCOUNTER
Patient has one pill left and needs refill.      Mallory FREEMAN, - [facility] 2  [place]  [facility]

## 2025-03-30 NOTE — PROGRESS NOTES
HPI:   Chief complaints: Emeli Granados is a pleasant 67 year old female who presents for Full skin cancer screening to rule out skin cancer   Last Skin Exam: n/a      1st Baseline: yes  Personal HX of Skin Cancer: no   Personal HX of Malignant Melanoma: no   Family HX of Skin Cancer / Malignant Melanoma: no  Personal HX of Atypical Moles:   no  Risk factors: history of sun exposure and burns; limited sunscreen use currently   New / Changing lesions:yes new spot on the brow  Social History:   On review of systems, there are no further skin complaints, patient is feeling otherwise well.   ROS of the following were done and are negative: Constitutional, Eyes, Ears, Nose,   Mouth, Throat, Cardiovascular, Respiratory, GI, Genitourinary, Musculoskeletal,   Psychiatric, Endocrine, Allergic/Immunologic.    PHYSICAL EXAM:   LMP  (LMP Unknown)   Breastfeeding No   Skin exam performed as follows: Type 2 skin. Mood appropriate  Alert and Oriented X 3. Well developed, well nourished in no distress.  General appearance: Normal  Head including face: Normal  Eyes: conjunctiva and lids: Normal  Mouth: Lips, teeth, gums: Normal  Neck: Normal  Chest-breast/axillae: Normal  Back: Normal  Spleen and liver: Normal  Cardiovascular: Exam of peripheral vascular system by observation for swelling, varicosities, edema: Normal  Genitalia: groin, buttocks: Normal  Extremities: digits/nails (clubbing): Normal  Eccrine and Apocrine glands: Normal  Right upper extremity: Normal  Left upper extremity: Normal  Right lower extremity: Normal  Left lower extremity: Normal  Skin: Scalp and body hair: See below    Pt deferred exam of breasts, groin, buttocks: No    Other physical findings:  1. Multiple pigmented macules on extremities and trunk  2. Multiple pigmented macules on face, trunk and extremities  3. Multiple vascular papules on trunk, arms and legs  4. Multiple scattered keratotic plaques       Except as noted above, no other signs of skin  cancer or melanoma.     ASSESSMENT/PLAN:   Benign Full skin cancer screening today. . Patient with history of none  Advised on monthly self exams and 1 year  Patient Education: Appropriate brochures given.    1. Multiple benign appearing melanocytic nevi on arms, legs and trunk. Discussed ABCDEs of melanoma and sunscreen.   2. Multiple lentigos on arms, legs and trunk. Advised benign, no treatment needed.  3. Multiple scattered angiomas. Advised benign, no treatment needed.   4. Seborrheic keratosis on arms, legs and trunk. Advised benign, no treatment needed.              Follow-up: yearly/PRN sooner    1.) Patient was asked about new and changing moles. YES  2.) Patient received a complete physical skin examination: YES  3.) Patient was counseled to perform a monthly self skin examination: YES  Scribed By: Ani Cabezas MS, PA-C       Yes

## 2025-04-21 ENCOUNTER — TELEPHONE (OUTPATIENT)
Dept: FAMILY MEDICINE | Facility: CLINIC | Age: 70
End: 2025-04-21
Payer: COMMERCIAL

## 2025-04-21 DIAGNOSIS — M54.50 CHRONIC BILATERAL LOW BACK PAIN, UNSPECIFIED WHETHER SCIATICA PRESENT: Primary | ICD-10-CM

## 2025-04-21 DIAGNOSIS — G89.29 CHRONIC BILATERAL LOW BACK PAIN, UNSPECIFIED WHETHER SCIATICA PRESENT: Primary | ICD-10-CM

## 2025-04-21 NOTE — TELEPHONE ENCOUNTER
Attempt x 1.  Called pt and left a message to call back to [phone] and to ask to speak to a nurse.     When pt calls back,     Inquire about PT request below. Are these new or worsening symptoms?    Nathaly ONTIVEROS, RN   Clinic RN  [facility]

## 2025-04-21 NOTE — TELEPHONE ENCOUNTER
Order/Referral Request    Who is requesting: patient      Orders being requested: pt order    Reason service is needed/diagnosis: back and leg pain    When are orders needed by: asap    Has this been discussed with Provider: Yes    Does patient have a preference on a Group/Provider/[facility]    Does patient have an appointment scheduled?: No    Where to send orders: Fax    Could we send this information to you in [facility] or would you prefer to receive a phone call?:   Patient would prefer a phone call   Okay to leave a detailed message?: Yes at Cell number on file:    Telephone Information:   Mobile [phone]
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
## 2025-05-03 ENCOUNTER — OFFICE VISIT (OUTPATIENT)
Dept: URGENT CARE | Facility: URGENT CARE | Age: 70
End: 2025-05-03
Payer: COMMERCIAL

## 2025-05-03 VITALS
HEART RATE: 82 BPM | OXYGEN SATURATION: 98 % | TEMPERATURE: 98 F | WEIGHT: 150.8 LBS | BODY MASS INDEX: 30.4 KG/M2 | RESPIRATION RATE: 18 BRPM | HEIGHT: 59 IN | SYSTOLIC BLOOD PRESSURE: 118 MMHG | DIASTOLIC BLOOD PRESSURE: 77 MMHG

## 2025-05-03 DIAGNOSIS — J44.1 COPD EXACERBATION (H): Primary | ICD-10-CM

## 2025-05-03 PROCEDURE — 3074F SYST BP LT 130 MM HG: CPT | Performed by: FAMILY MEDICINE

## 2025-05-03 PROCEDURE — 99214 OFFICE O/P EST MOD 30 MIN: CPT | Performed by: FAMILY MEDICINE

## 2025-05-03 PROCEDURE — 3078F DIAST BP <80 MM HG: CPT | Performed by: FAMILY MEDICINE

## 2025-05-03 RX ORDER — PREDNISONE 20 MG/1
TABLET ORAL
Qty: 10 TABLET | Refills: 0 | Status: SHIPPED | OUTPATIENT
Start: 2025-05-03

## 2025-05-03 NOTE — PROGRESS NOTES
Urgent Care Clinic Visit    Chief Complaint   Patient presents with    Urgent Care     Pt has had a cough, mild throat pain and congestion for 2 days.              5/3/2025     2:23 PM   Additional Questions   Roomed by marislea uriarte   Accompanied by n/a         5/3/2025   Forms   Any forms needing to be completed Yes

## 2025-05-03 NOTE — LETTER
May 3, 2025      Emeli Granados  [id number] [facility] [id number]        To Whom It May Concern:    Emeli Granados  was seen on May 3, 2025.  Because of her current illness, please excuse her absence from work on May 3 2025.  She may return to work once she feels better.         Sincerely,        Emmanuel Swanson MD    Electronically signed

## 2025-05-03 NOTE — PROGRESS NOTES
SUBJECTIVE:   Emeli Granados is a 70 year old female with a history of multiple medical problems, including COPD, emphysema, and moderate persistent asthma.  She is presenting with a chief complaint of frequent, severe cough (severe coughing attacks, productive of large amounts, green-white phlegm), chest congestion , increasing shortness of breath when walking, gurgling sounds when breathing, mild lower throat pain.  Onset of symptoms was two days ago.  Course of illness is worsening. .    Severity severe.   Current and Associated symptoms: nighttime low-grade fevers for the past three days.  There has been shortness of breath.    No stuffy nose.   No ear problems.   Treatment measures tried include Mucinex D, Dayquil, Nyquil with partial relief.  .    Predisposing factors include .  Her children and grandkids have been experiencing.  Her daughter recent was diagnosed with Bronchitis about a couple of weeks ago.  Her boss recently had coughing attacks.      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 8.5 g 5    albuterol (PROVENTIL) (2.5 MG/3ML) 0.083% neb solution Take 1 vial (2.5 mg) by nebulization every 6 hours as  needed for shortness of breath, wheezing or cough. 90 mL 4    ascorbic acid (VITAMIN C) 1000 MG TABS Take 1,000 mg by mouth daily      atorvastatin (LIPITOR) 10 MG tablet Take 1 tablet (10 mg) by mouth daily. 90 tablet 4    blood glucose (NO BRAND SPECIFIED) test strip Use to test blood sugar 1 times daily or as directed. To accompany: Blood Glucose Monitor Brands: per insurance. 100 strip 6    blood glucose monitoring (NO BRAND SPECIFIED) meter device kit Use to test blood sugar 1 times daily or as directed. Preferred blood glucose meter OR supplies to accompany: Blood Glucose Monitor Brands: per insurance. 1 kit 0    blood glucose monitoring (ONE TOUCH ULTRA 2) meter device kit USE TO TEST BLOOD SUGAR 1 TIME DAILY OR AS DIRECTED.*      buPROPion (WELLBUTRIN XL) 300 MG 24 hr tablet Take 1 tablet (300 mg) by mouth every morning. 90 tablet 4    CALCIUM PO Take 1 tablet by mouth daily      cyclobenzaprine (FLEXERIL) 5 MG tablet Take 1 tablet (5 mg) by mouth 2 times daily as needed for muscle spasms. 60 tablet 4    fluticasone (FLONASE) 50 MCG/ACT nasal spray Spray 1 spray into both nostrils daily. 16 g 0    HYDROcodone-acetaminophen (NORCO)  MG per tablet Take 1-2 tablets by mouth daily as needed for severe pain. 40 tablet 0    ipratropium - albuterol 0.5 mg/2.5 mg/3 mL (DUONEB) 0.5-2.5 (3) MG/3ML neb solution Take 1 vial (3 mLs) by nebulization every 6 hours as needed for shortness of breath, wheezing or cough. 90 mL 1    Isopropyl Alcohol (ALCOHOL WIPES) 70 % MISC Externally apply 1 each topically once a week. 100 each 1    metFORMIN (GLUCOPHAGE XR) 500 MG 24 hr tablet Take 1 tablet (500 mg) by mouth 2 times daily (with meals). 180 tablet 4    Multiple Vitamins-Minerals (MULTIVITAMIN OR) Take 1 tablet by mouth daily Per pt, uses ones without Iron      neomycin-polymyxin-hydrocortisone (CORTISPORIN) 3.5-15952-6 otic solution Place 3 drops in ear(s) 4 times daily. 10 mL 0    omeprazole (PRILOSEC) 20 MG   "capsule Take 1 capsule (20 mg) by mouth 2 times daily. 180 capsule 4    polyethylene glycol (GAVILYTE-G) 236 g suspension Take 240 mLs by mouth See Admin Instructions.      thin (NO BRAND SPECIFIED) lancets Use with lanceting device. To accompany: Blood Glucose Monitor Brands: per insurance. 100 each 6    traMADol (ULTRAM) 50 MG tablet Take 1 tablet (50 mg) by mouth every 6 hours as needed for severe pain. Do not start before 2024. 90 tablet 0    UltiCare Alcohol Swabs 70 % PADS Externally apply 1 each topically once a week.*      vitamin D3 (CHOLECALCIFEROL) 1000 units (25 mcg) tablet Take 1,000 Units by mouth daily      vitamin E 400 UNITS TABS Take 400 Units by mouth daily      zolpidem (AMBIEN) 5 MG tablet TAKE 1 TABLET (5 MG) BY MOUTH NIGHTLY AS NEEDED FOR SLEEP. DO NOT TAKE WITH TRAMADOL. MUST BE NDC # [address]-57 PER PT REQUEST. 30 tablet 5     Social History     Tobacco Use    Smoking status: Former     Current packs/day: 0.00     Average packs/day: 0.5 packs/day for 52.1 years (26.0 ttl pk-yrs)     Types: Cigarettes     Start date: 10/13/1967     Quit date: 2019     Years since quittin.4     Passive exposure: Past    Smokeless tobacco: Never    Tobacco comments:     quit 2019   Substance Use Topics    Alcohol use: No     Comment: sober since 99       ROS:  CONSTITUTIONAL:negative for fevers.    ENT/MOUTH:  positive for lower throat pain.   RESP: positive for cough, a lot of thick phlegm, gurgling sounds when breathing.      OBJECTIVE:  /77   Pulse 82   Temp 98  F (36.7  C) (Tympanic)   Resp 18   Ht 1.499 m (4' 11\")   Wt 68.4 kg (150 lb 12.8 oz)   LMP  (LMP Unknown)   SpO2 98%   BMI 30.46 kg/m    GENERAL APPEARANCE: healthy, alert and no distress.  There is frequent coughing present.  She can speak in full sentences without difficulty.    HENT: TM's normal bilaterally and oral mucous membranes moist, no erythema noted  NECK: supple, nontender, no " lymphadenopathy  RESP: rales R mid posterior and R lower posterior  CV: regular rates and rhythm, normal S1 S2, no murmur noted  SKIN: No pallor/cyanosis.      ASSESSMENT:  COPD Exacerbation    PLAN:  Rx:  Augmentin  Rx:  Prednisone    Follow up if not better in 4 days.      Go to the emergency room if you develop worsening, severe shortness of breath.      Emmanuel Swanson MD

## 2025-05-03 NOTE — PATIENT INSTRUCTIONS
Follow up if not better in 4 days.      Go to the emergency room if you develop worsening, severe shortness of breath.

## 2025-05-08 ENCOUNTER — OFFICE VISIT (OUTPATIENT)
Dept: FAMILY MEDICINE | Facility: CLINIC | Age: 70
End: 2025-05-08
Payer: COMMERCIAL

## 2025-05-08 ENCOUNTER — ANCILLARY PROCEDURE (OUTPATIENT)
Dept: GENERAL RADIOLOGY | Facility: CLINIC | Age: 70
End: 2025-05-08
Attending: PHYSICIAN ASSISTANT
Payer: COMMERCIAL

## 2025-05-08 ENCOUNTER — RESULTS FOLLOW-UP (OUTPATIENT)
Dept: FAMILY MEDICINE | Facility: CLINIC | Age: 70
End: 2025-05-08

## 2025-05-08 VITALS
DIASTOLIC BLOOD PRESSURE: 83 MMHG | RESPIRATION RATE: 26 BRPM | SYSTOLIC BLOOD PRESSURE: 139 MMHG | TEMPERATURE: 98.1 F | OXYGEN SATURATION: 95 % | HEART RATE: 84 BPM | BODY MASS INDEX: 30.44 KG/M2 | WEIGHT: 151 LBS | HEIGHT: 59 IN

## 2025-05-08 DIAGNOSIS — J18.9 PNEUMONIA OF RIGHT LOWER LOBE DUE TO INFECTIOUS ORGANISM: ICD-10-CM

## 2025-05-08 DIAGNOSIS — G89.29 CHRONIC BILATERAL LOW BACK PAIN, UNSPECIFIED WHETHER SCIATICA PRESENT: ICD-10-CM

## 2025-05-08 DIAGNOSIS — J18.9 PNEUMONIA OF RIGHT LOWER LOBE DUE TO INFECTIOUS ORGANISM: Primary | ICD-10-CM

## 2025-05-08 DIAGNOSIS — Z87.891 FORMER SMOKER: ICD-10-CM

## 2025-05-08 DIAGNOSIS — M54.50 CHRONIC BILATERAL LOW BACK PAIN, UNSPECIFIED WHETHER SCIATICA PRESENT: ICD-10-CM

## 2025-05-08 PROBLEM — J96.01 ACUTE RESPIRATORY FAILURE WITH HYPOXIA (H): Status: RESOLVED | Noted: 2025-03-24 | Resolved: 2025-05-08

## 2025-05-08 RX ORDER — CEFDINIR 300 MG/1
300 CAPSULE ORAL 2 TIMES DAILY
Qty: 10 CAPSULE | Refills: 0 | Status: SHIPPED | OUTPATIENT
Start: 2025-05-08 | End: 2025-05-13

## 2025-05-08 NOTE — PROGRESS NOTES
Assessment & Plan     Pneumonia of right lower lobe due to infectious organism  X-ray chest obtained today. She is having improvement of symptoms but right lung continues to have significant crackles. I felt x-ray would be prudent with her history of influenza, pneumonia and previous hospitalizations due to breathing concerns. Switched to cefdinir as Augmentin is causing some GI side effects.  - XR Chest 2 Views; Future  - cefdinir (OMNICEF) 300 MG capsule; Take 1 capsule (300 mg) by mouth 2 times daily for 5 days.    Chronic bilateral low back pain, unspecified whether sciatica present  She has been seeing [facility]. Interested in physical therapy (which she has done in the past). Referral placed.  - Physical Therapy  Referral; Future        MED REC REQUIRED  Post Medication Reconciliation Status:  Discharge medications reconciled and changed, see notes/orders    The longitudinal plan of care for the diagnosis(es)/condition(s) as documented were addressed during this visit. Due to the added complexity in care, I will continue to support Emeli in the subsequent management and with ongoing continuity of care.    Subjective   Emeli is a 70 year old, presenting for the following health issues:  UC Follow-Up (F/U Saritha  visit -COPD exacerbation )        5/8/2025    11:11 AM   Additional Questions   Roomed by Lillian   Accompanied by Self         5/8/2025    11:11 AM   Patient Reported Additional Medications   Patient reports taking the following new medications Prednisone 20 mg twice daily 5 days and Augmentin twice daily for 7 days     HPI      ED/UC Followup:    Facility:  [facility]  Date of visit: 5/3/2025  Reason for visit: COPD exacerbation; pneumonia and fever  Current Status: Improving    Patient is a 70 year old female who presents today for follow up Urgent Care. She was seen any evaluated in Urgent Care on 5/3/25 and started on prednisone and Augmentin. She is here today for follow  "up.    She is using the nebulizer and Nyquil at night. Symptoms do seem to be improving.        Objective    /83 (BP Location: Left arm, Patient Position: Sitting, Cuff Size: Adult Regular)   Pulse 84   Temp 98.1  F (36.7  C) (Oral)   Resp 26   Ht 1.499 m (4' 11\")   Wt 68.5 kg (151 lb)   LMP  (LMP Unknown)   SpO2 95%   BMI 30.50 kg/m    Body mass index is 30.5 kg/m .  Physical Exam   GENERAL: No acute distress  HEENT: Normocephalic, PERRL  CARDIAC: Regular rate and rhythm. No murmurs.  PULMONARY: Crackles and rhonchi noted in the right lung in particular and left lower lobe as well (but to a lesser degree).  NEURO: Alert and non-focal          Signed Electronically by: Gina Arias PA-C    "

## 2025-05-18 DIAGNOSIS — G89.29 OTHER CHRONIC PAIN: ICD-10-CM

## 2025-05-18 DIAGNOSIS — M54.50 CHRONIC BILATERAL LOW BACK PAIN, UNSPECIFIED WHETHER SCIATICA PRESENT: ICD-10-CM

## 2025-05-18 DIAGNOSIS — F11.20 CONTINUOUS OPIOID DEPENDENCE (H): ICD-10-CM

## 2025-05-18 DIAGNOSIS — G89.29 CHRONIC BILATERAL LOW BACK PAIN, UNSPECIFIED WHETHER SCIATICA PRESENT: ICD-10-CM

## 2025-05-19 RX ORDER — HYDROCODONE BITARTRATE AND ACETAMINOPHEN 10; 325 MG/1; MG/1
1-2 TABLET ORAL DAILY PRN
Qty: 40 TABLET | Refills: 0 | Status: SHIPPED | OUTPATIENT
Start: 2025-05-19

## 2025-05-21 ENCOUNTER — TELEPHONE (OUTPATIENT)
Dept: FAMILY MEDICINE | Facility: CLINIC | Age: 70
End: 2025-05-21
Payer: COMMERCIAL

## 2025-05-21 NOTE — TELEPHONE ENCOUNTER
Prior Auth Needed:     Disp Refills Start End GOVIND   HYDROcodone-acetaminophen (NORCO)  MG per tablet 40 tablet 0 2025 -- No   Sig - Route: Take 1-2 tablets by mouth daily as needed for severe pain. - Oral   Sent to pharmacy as: HYDROcodone-Acetaminophen  MG Oral Tablet (NORCO)   Class: E-Prescribe   Earliest Fill Date: 2025   Order: [id number]     Key: [id number]  Patient Last Name: Roberta  : 1955

## 2025-05-23 NOTE — TELEPHONE ENCOUNTER
PA Initiation    Medication: HYDROCODONE-ACETAMINOPHEN  MG PO TABS  Insurance Company: [employer] - Phone [phone] Fax [phone]  Pharmacy Filling the Rx: [id number] - [place], [place] - [address]  Filling Pharmacy Phone:    Filling Pharmacy Fax: [phone]  Start Date: 5/23/2025

## 2025-05-24 ENCOUNTER — HEALTH MAINTENANCE LETTER (OUTPATIENT)
Age: 70
End: 2025-05-24

## 2025-05-27 NOTE — TELEPHONE ENCOUNTER
Prior Authorization Approval    Medication: HYDROCODONE-ACETAMINOPHEN  MG PO TABS  Authorization Effective Date: 5/27/2025  Authorization Expiration Date: 5/31/2025  Approved Dose/Quantity:   Reference #: [id number]   Insurance Company: [employer] - Phone [phone] Fax [phone]  Expected CoPay: $    CoPay Card Available:      Financial Assistance Needed:   Which Pharmacy is filling the prescription: [id number] - [place], [place] - [address] [address] [address]  Pharmacy Notified: YES - CALLED PHARMACY AND IT HAS ALREADY BEE PICKED UP  Patient Notified: Instructed pharmacy to notify patient when script is ready to pick-up/ship

## 2025-06-16 ENCOUNTER — TELEPHONE (OUTPATIENT)
Dept: CARDIOLOGY | Facility: CLINIC | Age: 70
End: 2025-06-16
Payer: COMMERCIAL

## 2025-06-16 ENCOUNTER — PATIENT OUTREACH (OUTPATIENT)
Dept: CARE COORDINATION | Facility: CLINIC | Age: 70
End: 2025-06-16
Payer: COMMERCIAL

## 2025-06-16 ENCOUNTER — RESULTS FOLLOW-UP (OUTPATIENT)
Dept: FAMILY MEDICINE | Facility: CLINIC | Age: 70
End: 2025-06-16

## 2025-06-16 DIAGNOSIS — I47.10 SVT (SUPRAVENTRICULAR TACHYCARDIA): Primary | ICD-10-CM

## 2025-06-16 NOTE — TELEPHONE ENCOUNTER
06/16/25 Spoke w pt who states that for the past 2 weeks she has noticed palpitations numerous times/day lasting a few minutes at a time. They started off very lightly at first but have been becoming more noticeable and frequent int he past few days . She denies triggers or other changes in her health . She has a [employer] and Apple Watch but states she has not been able to get them to work.  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   Routing to Haven to see if a monitor is needed  [place] 1045 am

## 2025-06-16 NOTE — TELEPHONE ENCOUNTER
[facility] Call Center    Phone Message    May a detailed message be left on voicemail: yes     Reason for Call: Symptoms or Concerns     If patient has red-flag symptoms, warm transfer to triage line    Current symptom or concern: Heart racing on and off     Symptoms have been present for:  2 week(s)    Has patient previously been seen for this? Yes    By : Millie Dominguez MD     Are there any new or worsening symptoms? Yes: Her heart has  been racing  lately which is concerning her. She is scheduled for an appt. 08/01. Offered Triage she denied speaking with nurse .    Action Taken: Other: cardio    Travel Screening: Not Applicable     Date of Service:     Thank you!  [facility]

## 2025-06-16 NOTE — TELEPHONE ENCOUNTER
06/16/25 Msg recd from Haven Manley PA-C    Yes, I recommend a 2 week Zio patch monitor for further evaluation. If she has several episodes the first few days of wearing the monitor, she can mail it back to us before the 14 days is up. Thanks, Haven     Spoke w pt and explained recommendation. She prefers mail out, Address verified     [facility] 127 pm

## 2025-06-18 ENCOUNTER — PATIENT OUTREACH (OUTPATIENT)
Dept: CARE COORDINATION | Facility: CLINIC | Age: 70
End: 2025-06-18
Payer: COMMERCIAL

## 2025-07-07 ENCOUNTER — TELEPHONE (OUTPATIENT)
Dept: FAMILY MEDICINE | Facility: CLINIC | Age: 70
End: 2025-07-07
Payer: COMMERCIAL

## 2025-07-07 NOTE — TELEPHONE ENCOUNTER
Patient Quality Outreach    Patient is due for the following:   Breast Cancer Screening - Mammogram    Action(s) Taken:   Patient has upcoming appointment, these items will be addressed at that time.    Type of outreach:    Chart review performed, no outreach needed.    Questions for provider review:    None         Heena Angel, [facility]  Chart routed to None.

## 2025-07-16 DIAGNOSIS — G89.29 CHRONIC BILATERAL LOW BACK PAIN, UNSPECIFIED WHETHER SCIATICA PRESENT: ICD-10-CM

## 2025-07-16 DIAGNOSIS — F11.20 CONTINUOUS OPIOID DEPENDENCE (H): ICD-10-CM

## 2025-07-16 DIAGNOSIS — G89.29 OTHER CHRONIC PAIN: ICD-10-CM

## 2025-07-16 DIAGNOSIS — M54.50 CHRONIC BILATERAL LOW BACK PAIN, UNSPECIFIED WHETHER SCIATICA PRESENT: ICD-10-CM

## 2025-07-16 RX ORDER — HYDROCODONE BITARTRATE AND ACETAMINOPHEN 10; 325 MG/1; MG/1
1-2 TABLET ORAL DAILY PRN
Qty: 40 TABLET | Refills: 0 | Status: SHIPPED | OUTPATIENT
Start: 2025-07-16

## 2025-07-29 ENCOUNTER — TELEPHONE (OUTPATIENT)
Dept: CARDIOLOGY | Facility: CLINIC | Age: 70
End: 2025-07-29
Payer: COMMERCIAL

## 2025-07-29 NOTE — TELEPHONE ENCOUNTER
"Spoke to pt and who states that she has been having episodes of chest pressure off and on with the last episode lasting 25 minutes. Tried to understand if the chest pressure was correlating with the SVT episodes, which pt did wear a monitor that only showed 2 episodes of SVT lasting 7 best.  Pt states that if that chest pressure occurs again she would go to the ER.  Pt stats that she took a vicodin and it went away.  Pt then stated that she has pain in her esophagus which may have been caused by her throwing up \"big time\". Pt stats that she started on Semaglutide last Thursday, but said her friend roly up higher dose than what pt was suppose to start from.  Discussed that pt needs to drink fluids and if her throat that is irritated after all the vomiting that she has done. Pt also states that she has not been able to eat anything but a couple of pieces of toast.  Did tell pt that if she continues to have issues she needs to discuss with her PCP.   Dell   "

## 2025-07-29 NOTE — TELEPHONE ENCOUNTER
[facility] Call Center    Phone Message    May a detailed message be left on voicemail: yes     Reason for Call: Symptoms or Concerns     If patient has red-flag symptoms, warm transfer to triage line    Patient did not want to talk with triage line.  She asked to speak with Haven as she (patient) just had a monitor on for 14 days. Please reach out to patient.     Current symptom or concern: Chest pressure    Symptoms have been present for:  2 day(s)        Action Taken: Other: Cardiology     Travel Screening: Not Applicable     Thank you!  [facility]

## 2025-07-30 NOTE — TELEPHONE ENCOUNTER
Attempted to call pt but she is at work until 1700. Will attempt to call at the end of day, but may call tomorrow. Dell

## 2025-07-31 NOTE — TELEPHONE ENCOUNTER
Spoke to pt who is doing well today with no chest pressure. Made pt aware that Haven would also recommend that pt go to ER with chest pain and agrees to all else that was discussed with pt regarding vomiting and fluid intake. Dell

## 2025-09-02 ENCOUNTER — TELEPHONE (OUTPATIENT)
Dept: FAMILY MEDICINE | Facility: CLINIC | Age: 70
End: 2025-09-02
Payer: COMMERCIAL

## (undated) DEVICE — LINEN TOWEL PACK X10 5473

## (undated) DEVICE — CLIP APPLIER ENDO 5MM M/L LIGAMAX EL5ML

## (undated) DEVICE — SU VICRYL+ 0 27 UR6 VLT VCP603H

## (undated) DEVICE — PATCH CARTO 3 EXTERNAL REFERENCE 3D MAPPING CREFP6

## (undated) DEVICE — DEFIB PRO-PADZ LVP LQD GEL ADULT 8900-2105-01

## (undated) DEVICE — LINEN TOWEL PACK X5 5464

## (undated) DEVICE — ENDO TROCAR FIRST ENTRY KII FIOS ADV FIX 05X100MM CFF03

## (undated) DEVICE — RAD INTRODUCER KIT MICRO 5FRX10CM .018 NITINOL G/W

## (undated) DEVICE — GLOVE BIOGEL PI MICRO SZ 7.5 48575

## (undated) DEVICE — SU VICRYL 4-0 PS-2 18" UND J496H

## (undated) DEVICE — SU VICRYL 3-0 SH 27" UND J416H

## (undated) DEVICE — CATH EP EZ STEER NAV 4MMX115CM 2-5-2 F-J CURVE BN7TCFJ4L

## (undated) DEVICE — ENDO POUCH UNIV RETRIEVAL SYSTEM INZII 10MM CD001

## (undated) DEVICE — LINEN POUCH DBL 5427

## (undated) DEVICE — BLADE KNIFE SURG 11 371111

## (undated) DEVICE — ENDO TROCAR BLUNT TIP KII BALLOON 12X100MM C0R47

## (undated) DEVICE — MANIFOLD NEPTUNE 4 PORT 700-20

## (undated) DEVICE — ESU GROUND PAD ADULT W/CORD E7507

## (undated) DEVICE — SOL NACL 0.9% IRRIG 3000ML BAG 2B7477

## (undated) DEVICE — GLOVE BIOGEL PI MICRO INDICATOR UNDERGLOVE SZ 8.0 48980

## (undated) DEVICE — INTRO CATH 12CM 8.5FR FST-CATH

## (undated) DEVICE — SUCTION IRR STRYKERFLOW II W/TIP 250-070-520

## (undated) DEVICE — SU DERMABOND ADVANCED .7ML DNX12

## (undated) DEVICE — SU VICRYL+ 3-0 27IN SH UND VCP416H

## (undated) DEVICE — ENDO TROCAR SLEEVE KII Z-THREADED 05X100MM CTS02

## (undated) DEVICE — ENDO TROCAR SLEEVE KII ADV FIXATION 05X100MM CFS02

## (undated) DEVICE — Device

## (undated) DEVICE — ESU CORD MONOPOLAR 10'  E0510

## (undated) DEVICE — CATH EP CATH EP REPRO DAIG RSPN FX CRV DX EP C

## (undated) DEVICE — SOL NACL 0.9% IRRIG 1000ML BOTTLE 2F7124

## (undated) DEVICE — ENDO SPONGE ENDOSTICK KITTNER 5MM CHERRY 37002010

## (undated) DEVICE — SU VICRYL 0 UR-6 27" J603H

## (undated) DEVICE — CATH EP 6FR 2MM TIP 2-8-2 115C

## (undated) DEVICE — PACK MINOR CUSTOM RIDGES SBA32RMRMA

## (undated) DEVICE — TUBING SUCTION MEDI-VAC SOFT 3/16"X20' N520A

## (undated) DEVICE — LINEN FULL SHEET 5511

## (undated) DEVICE — DRAPE LAP W/ARMBOARD 29410

## (undated) DEVICE — LINEN HALF SHEET 5512

## (undated) DEVICE — INTRODUCER SHEATH GREEN 6.5FRX11CM .038IN PSI-6F-11-038ACT

## (undated) DEVICE — BAG CLEAR TRASH 1.3M 39X33" P4040C

## (undated) RX ORDER — CEFAZOLIN SODIUM/WATER 2 G/20 ML
SYRINGE (ML) INTRAVENOUS
Status: DISPENSED
Start: 2024-05-30

## (undated) RX ORDER — FENTANYL CITRATE 50 UG/ML
INJECTION, SOLUTION INTRAMUSCULAR; INTRAVENOUS
Status: DISPENSED
Start: 2024-05-30

## (undated) RX ORDER — GLYCOPYRROLATE 0.2 MG/ML
INJECTION, SOLUTION INTRAMUSCULAR; INTRAVENOUS
Status: DISPENSED
Start: 2024-05-30

## (undated) RX ORDER — INDOCYANINE GREEN AND WATER 25 MG
KIT INJECTION
Status: DISPENSED
Start: 2024-05-30

## (undated) RX ORDER — BUPIVACAINE HYDROCHLORIDE 5 MG/ML
INJECTION, SOLUTION EPIDURAL; INTRACAUDAL
Status: DISPENSED
Start: 2024-05-30

## (undated) RX ORDER — ONDANSETRON 2 MG/ML
INJECTION INTRAMUSCULAR; INTRAVENOUS
Status: DISPENSED
Start: 2024-05-30

## (undated) RX ORDER — HEPARIN SODIUM 1000 [USP'U]/ML
INJECTION, SOLUTION INTRAVENOUS; SUBCUTANEOUS
Status: DISPENSED
Start: 2024-08-28

## (undated) RX ORDER — FENTANYL CITRATE-0.9 % NACL/PF 10 MCG/ML
PLASTIC BAG, INJECTION (ML) INTRAVENOUS
Status: DISPENSED
Start: 2024-05-30

## (undated) RX ORDER — PROPOFOL 10 MG/ML
INJECTION, EMULSION INTRAVENOUS
Status: DISPENSED
Start: 2024-05-30

## (undated) RX ORDER — HYDROCODONE BITARTRATE AND ACETAMINOPHEN 5; 325 MG/1; MG/1
TABLET ORAL
Status: DISPENSED
Start: 2024-05-30

## (undated) RX ORDER — DEXAMETHASONE SODIUM PHOSPHATE 4 MG/ML
INJECTION, SOLUTION INTRA-ARTICULAR; INTRALESIONAL; INTRAMUSCULAR; INTRAVENOUS; SOFT TISSUE
Status: DISPENSED
Start: 2024-05-30

## (undated) RX ORDER — LIDOCAINE HYDROCHLORIDE 10 MG/ML
INJECTION, SOLUTION EPIDURAL; INFILTRATION; INTRACAUDAL; PERINEURAL
Status: DISPENSED
Start: 2024-05-30

## (undated) RX ORDER — FENTANYL CITRATE 50 UG/ML
INJECTION, SOLUTION INTRAMUSCULAR; INTRAVENOUS
Status: DISPENSED
Start: 2024-08-28